# Patient Record
Sex: MALE | Race: WHITE | NOT HISPANIC OR LATINO | Employment: OTHER | URBAN - METROPOLITAN AREA
[De-identification: names, ages, dates, MRNs, and addresses within clinical notes are randomized per-mention and may not be internally consistent; named-entity substitution may affect disease eponyms.]

---

## 2017-01-03 ENCOUNTER — TRANSFERRED RECORDS (OUTPATIENT)
Dept: FAMILY MEDICINE | Facility: CLINIC | Age: 77
End: 2017-01-03

## 2017-01-25 ENCOUNTER — OFFICE VISIT (OUTPATIENT)
Dept: PHARMACY | Facility: PHYSICIAN GROUP | Age: 77
End: 2017-01-25
Payer: COMMERCIAL

## 2017-01-25 VITALS — DIASTOLIC BLOOD PRESSURE: 62 MMHG | SYSTOLIC BLOOD PRESSURE: 112 MMHG

## 2017-01-25 VITALS — BODY MASS INDEX: 27.31 KG/M2 | WEIGHT: 185 LBS

## 2017-01-25 DIAGNOSIS — I50.22 CHRONIC SYSTOLIC CONGESTIVE HEART FAILURE (H): Primary | ICD-10-CM

## 2017-01-25 DIAGNOSIS — Z79.01 LONG TERM CURRENT USE OF ANTICOAGULANT THERAPY: ICD-10-CM

## 2017-01-25 DIAGNOSIS — E11.9 TYPE 2 DIABETES MELLITUS WITHOUT COMPLICATION, WITHOUT LONG-TERM CURRENT USE OF INSULIN (H): ICD-10-CM

## 2017-01-25 DIAGNOSIS — Z86.73 HISTORY OF STROKE: ICD-10-CM

## 2017-01-25 DIAGNOSIS — Z86.73 HISTORY OF TIA (TRANSIENT ISCHEMIC ATTACK) AND STROKE: Primary | ICD-10-CM

## 2017-01-25 DIAGNOSIS — I10 BENIGN ESSENTIAL HYPERTENSION: ICD-10-CM

## 2017-01-25 LAB — INR PPP: 4.3

## 2017-01-25 PROCEDURE — 36415 COLL VENOUS BLD VENIPUNCTURE: CPT | Performed by: FAMILY MEDICINE

## 2017-01-25 PROCEDURE — 99207 ZZC NO CHARGE LOS: CPT | Performed by: PHARMACIST

## 2017-01-25 PROCEDURE — 99213 OFFICE O/P EST LOW 20 MIN: CPT | Performed by: FAMILY MEDICINE

## 2017-01-25 PROCEDURE — 85610 PROTHROMBIN TIME: CPT | Performed by: FAMILY MEDICINE

## 2017-01-25 NOTE — PROGRESS NOTES
Problem(s) Oriented visit        SUBJECTIVE:                                                    Jose Lees is a 76 year old male who presents to clinic today for medication recheck. He is on coumadin for history of TIA. He denies any new associated symptoms. He has diabetes. He takes metformin and glimepiride. He denies any vision concerns, numbness in his feet, or chest pain or pressure. He does not get much exercise. He denies any BARTH, orthopnea, or edema. No hypoglycemic events.       Problem list, Medication list, Allergies, and Medical/Social/Surgical histories reviewed in [facility] and updated as appropriate.   Additional history: as documented    ROS:  General and CV completed and negative except as noted above      Histories:   Patient Active Problem List   Diagnosis     Diabetes mellitus, type 2 (H)     Prostate cancer (H)     Autism disorder     ACP (advance care planning)     Health Care Home     History of MI (myocardial infarction)     History of stroke     CHF (congestive heart failure) (H)     Transient cerebral ischemia     Long term current use of anticoagulant therapy     Advanced directives, counseling/discussion     Stricture and stenosis of esophagus     Past Surgical History   Procedure Laterality Date     Coronary angiography adult order       Heart cath, angioplasty  4/14     BMS to LAD     Esophagoscopy, gastroscopy, duodenoscopy (egd), combined N/A 8/6/2015     Procedure: COMBINED ESOPHAGOSCOPY, GASTROSCOPY, DUODENOSCOPY (EGD), REMOVE FOREIGN BODY;  Surgeon: Yu Tapia MD;  Location:  GI       Social History   Substance Use Topics     Smoking status: Never Smoker      Smokeless tobacco: Never Used     Alcohol Use: No      Comment: never     Family History   Problem Relation Age of Onset     CEREBROVASCULAR DISEASE Mother 92     C.A.D. Father 48           OBJECTIVE:                                                    /62 mmHg  There is no weight on file to calculate BMI.    APPEARANCE: = Relaxed and in no distress  Neck = No anterior or posterior adenopathy appreciated.  Thyroid = Not enlarged and no masses felt  Resp effort = Calm regular breathing  Breath Sounds = Good air movement with no rales or rhonchi in any lung fields  Heart Rate, Rythym, & sounds (no Murm)  = Regular rate and rythym with no S3, S4, or murmer appreciated.  Recent/Remote Memory = Alert and Oriented x 3  Mood/Affect = Cooperative and interested   Labs Resulted Today:   Results for orders placed or performed in visit on 01/25/17   Prothrombin - INR ([facility])   Result Value Ref Range    INR 4.3 (A)    Hemoglobin A1C ([facility])   Result Value Ref Range    Hemoglobin A1C 7.6 (H) 4.8 - 5.6 %    Narrative    Performed at:  01 - [facility] [address], [place], [place]  [id number]  : Pollo Oconnor MD, Phone:  [phone]     ASSESSMENT/PLAN:                                                        Jose was seen today for inr followup and blood draw.    Diagnoses and all orders for this visit:    Transient cerebral ischemia  -     Prothrombin - INR ([facility])    Long term current use of anticoagulant therapy  -     Prothrombin - INR ([facility])    Diabetes mellitus, type 2 (H)  -     Hemoglobin A1C ([facility])  Continue oral meds. Strongly encouraged to add exercise, watch his diet, and he will recheck A1c in 6 months.    Patient Instructions   Please skip one dose of Coumadin on Thursday, January 26th.  Then resume same dose of Coumadin 2 mg on Mondays, Wednesdays, and Fridays, with Coumadin 4 mg Tuesdays, Thursdays, Saturdays and Sundays.  Recheck INR in one week.        The following health maintenance items are reviewed in Epic and correct as of today:  Health Maintenance   Topic Date Due     COLON CANCER SCREEN (SYSTEM ASSIGNED)  11/13/1991     AORTIC ANEURYSM SCREENING (SYSTEM ASSIGNED)  11/13/2006     EYE EXAM Q1 YEAR( NO INBASKET)  04/01/2013     FALL RISK ASSESSMENT  01/07/2015     PSA Q1 YR  12/08/2016      LIPID MONITORING Q1 YEAR( NO INBASKET)  01/04/2017     BMP Q6 MOS (NO INBASKET)  01/27/2017     FOOT EXAM Q1 YEAR( NO INBASKET)  02/18/2017     MICROALBUMIN Q1 YEAR( NO INBASKET)  02/18/2017     A1C Q6 MO( NO INBASKET)  07/25/2017     ALT Q1 YR (NO INBASKET)  07/27/2017     CREATININE Q1 YEAR (NO INBASKET)  07/27/2017     CBC Q1 YR (NO INBASKET)  07/27/2017     INFLUENZA VACCINE (SYSTEM ASSIGNED)  09/01/2017     TSH W/ FREE T4 REFLEX Q2 YEAR (NO INBASKET)  02/03/2018     HF ACTION PLAN Q3 YR (NO INBASKET MSG)  04/06/2019     ADVANCE DIRECTIVE PLANNING Q5 YRS (NO INBASKET)  08/12/2020     TETANUS IMMUNIZATION (SYSTEM ASSIGNED)  09/16/2021     PNEUMOCOCCAL  Completed       Arlene Mcdermott MD  [facility]  Family Practice  [facility]  [phone]    For any issues my office # is [phone]

## 2017-01-25 NOTE — PATIENT INSTRUCTIONS
Please skip one dose of Coumadin on Thursday, January 26th.  Then resume same dose of Coumadin 2 mg on Mondays, Wednesdays, and Fridays, with Coumadin 4 mg Tuesdays, Thursdays, Saturdays and Sundays.  Recheck INR in one week.

## 2017-01-25 NOTE — PROGRESS NOTES
SUBJECTIVE/OBJECTIVE:                                                    Jose Lees is a 76 year old male coming in for a follow-up visit for Medication Therapy Management.  He was referred to me from Dr. Helm.     Chief Complaint: Follow up from our visit on 7/27.  First visit of the year.   Tobacco: No tobacco use   Alcohol: not currently using    Medication Adherence: no issues reported    HFrEF: Current medications include metoprolol succinate 25mg daily.  Patient reports no current medication side effects.     ECHO:  Date 4/7/14, EF 25-30%, has not been rechecked. Pt is not complaining of sx of HF.    Pt is not measuring daily weights now- clinic weights have been stable.    Patient does not self-monitor BP.   Pt is not following a low sodium diet, is avoiding EtOH.  Not on ACE inhib- BP low to start and unlikely able to tolerate additional BP lowering medication at this time.     Diabetes:  Pt currently taking metformin 1000mg BID (500mg tabs- easier to swallow) and glimepiride 1mg daily.  Pt is not experiencing side effects.   SMBG: never. Ranges (patient reported): none- not checking, would cost a lot of money to have help to get this checked by care takers.   Symptoms of low blood sugar? none. Frequency of hypoglycemia? never.  Recent symptoms of high blood sugar? none  Microalbumin is < 30 mg/g. Pt is not taking an ACEi/ARB.  Aspirin: Taking 81mg daily and denies side effects  Diet/Exercise: eats one meal a day with [facility] and isn't watching his diet well, but has a hard time choosing the right foods.      Hypertension/Stroke: Current medications include metoprolol succinate 50mg daily, warfarin as directed. Patient does not self-monitor BP.  Patient reports no current medication side effects.    Current labs include:  BP Readings from Last 3 Encounters:   06/23/16 120/80   05/24/16 116/60   04/15/16 110/58     Today's Vitals: There were no vitals taken for this visit.  A1C      7.1    7/27/2016.  CHOL      139   1/4/2016  TRIG      117   1/4/2016  HDL       51   1/4/2016  LDL       65   1/4/2016    Liver Function Studies -   Recent Labs   Lab Test  01/04/16   1124   PROTTOTAL  6.8   ALBUMIN  4.3   BILITOTAL  0.6   ALKPHOS  56   AST  22   ALT  18       No results found for: UCRR, MICROL, UMALCR    Last Basic Metabolic Panel:  NA      140   7/27/2016   POTASSIUM      4.2   7/27/2016  CHLORIDE      101   7/27/2016  BUN       14   7/27/2016  BUN       14   7/27/2016  CR     1.03   7/27/2016  GFR ESTIMATE   Date Value Ref Range Status   08/06/2015 81 >60 mL/min/1.7m2 Final     Comment:     Non  GFR Calc   04/10/2014 >90 >60 mL/min/1.7m2 Final   04/08/2014 >90 >60 mL/min/1.7m2 Final     GFR ESTIMATE IF BLACK   Date Value Ref Range Status   08/06/2015 >90   GFR Calc   >60 mL/min/1.7m2 Final   04/10/2014 >90 >60 mL/min/1.7m2 Final   04/08/2014 >90 >60 mL/min/1.7m2 Final     TSH   Date Value Ref Range Status   06/03/2012 4.41 0.4 - 5.0 mU/L Final   ]    Most Recent Immunizations   Administered Date(s) Administered     Influenza (High Dose) 3 valent vaccine 10/07/2016     Influenza (IIV3) 10/09/2013     Pneumococcal (PCV 13) 10/07/2016     Pneumococcal 23 valent 09/16/2011     TDAP (ADACEL AGES 11-64) 09/16/2011     Zoster vaccine, live 03/03/2015     ASSESSMENT:                                                    Current medications were reviewed today.      Medication Adherence: no issues identified    HFrEF: Needs improvement- pcp to consider rechecking ECHO given last one in 2014 had decrease in EF.     Diabetes: Needs improvement, due for recheck of A1c.     Hypertension/Stroke: Stable.     PLAN:                                                      1. A1c check today.     2. PCP to consider rechecking ECHO if appropriate.       I spent 20 minutes with this patient today.  All changes were made via collaborative practice agreement with Jermain Helm. A copy of the  visit note was provided to the patient's primary care provider.     Will follow up in 6 months.    The patient was given a summary of these recommendations as an after visit summary.    Jennifer Gonzalez, Pharm.D, [facility]  Medication Therapy Management Pharmacist  [phone]

## 2017-01-25 NOTE — PATIENT INSTRUCTIONS
Recommendations from today's MTM visit:                                                    MTM (medication therapy management) is a service provided by a clinical pharmacist designed to help you get the most of out of your medicines.   Today we reviewed what your medicines are for, how to know if they are working, that your medicines are safe and how to make your medicine regimen as easy as possible.     1. Checking INR and A1c today.     Next MTM visit: 6 months or sooner if needed.     To schedule another MTM appointment, please call the clinic directly or you may call the MTM scheduling line at [phone] or toll-free at [phone].     My Clinical Pharmacist's contact information:                                                      It was a pleasure seeing you today!  Please feel free to contact me with any questions or concerns you have.      Jennifer Gonzalez, Pharm.D, [facility]  Medication Therapy Management Pharmacist  [phone]    You may receive a survey about the MTM services you received.  I would appreciate your feedback to help me serve you better in the future. Please fill it out and return it when you can. Your comments will be anonymous.

## 2017-01-25 NOTE — MR AVS SNAPSHOT
"              After Visit Summary   1/25/2017    Jose Lees    MRN: [id number]           Patient Information     Date Of Birth          1940        Visit Information        Provider Department      1/25/2017 2:30 PM Arlene Mcdermott MD [facility]        Today's Diagnoses     Transient cerebral ischemia    -  1     Long term current use of anticoagulant therapy         Diabetes mellitus, type 2 (H)           Care Instructions    Please skip one dose of Coumadin on Thursday, January 26th.  Then resume same dose of Coumadin 2 mg on Mondays, Wednesdays, and Fridays, with Coumadin 4 mg Tuesdays, Thursdays, Saturdays and Sundays.  Recheck INR in one week.        Follow-ups after your visit        Who to contact     If you have questions or need follow up information about today's clinic visit or your schedule please contact [facility] directly at [phone].  Normal or non-critical lab and imaging results will be communicated to you by LeanWagon[employer], letter or phone within 4 business days after the clinic has received the results. If you do not hear from us within 7 days, please contact the clinic through Aliveshoest or phone. If you have a critical or abnormal lab result, we will notify you by phone as soon as possible.  Submit refill requests through [employer] or call your pharmacy and they will forward the refill request to us. Please allow 3 business days for your refill to be completed.          Additional Information About Your Visit        MyChart Information     [employer] lets you send messages to your doctor, view your test results, renew your prescriptions, schedule appointments and more. To sign up, go to [email or web address] . Click on \"Log in\" on the left side of the screen, which will take you to the Welcome page. Then click on \"Sign up Now\" on the right side of the page.     You will be asked to enter the access code listed below, as well as some personal information. " Please follow the directions to create your username and password.     Your access code is: [email or web address]  Expires: 2017  2:46 PM     Your access code will  in 90 days. If you need help or a new code, please call your [facility] clinic or [phone].        Care EveryWhere ID     This is your Care EveryWhere ID. This could be used by other organizations to access your [facility] medical records  [id number]         Blood Pressure from Last 3 Encounters:   17 112/62   16 120/80   16 116/60    Weight from Last 3 Encounters:   17 83.915 kg (185 lb)   16 86.183 kg (190 lb)   16 84.823 kg (187 lb)              We Performed the Following     Hemoglobin A1C ([facility])     Prothrombin - INR ([facility])        Primary Care Provider Office Phone # Fax #    Jermain Helm [phone]       [facility] [address] [place] [address]        Thank you!     Thank you for choosing [facility]  for your care. Our goal is always to provide you with excellent care. Hearing back from our patients is one way we can continue to improve our services. Please take a few minutes to complete the written survey that you may receive in the mail after your visit with us. Thank you!             Your Updated Medication List - Protect others around you: Learn how to safely use, store and throw away your medicines at www.disposemymeds.org.          This list is accurate as of: 17  3:02 PM.  Always use your most recent med list.                   Brand Name Dispense Instructions for use    ACE/ARB NOT PRESCRIBED (INTENTIONAL)      ACE & ARB not prescribed due to Symptomatic hypotension not due to excessive diuresis       aspirin 81 MG chewable tablet     36 tablet    Take 1 tablet (81 mg) by mouth daily       atorvastatin 20 MG tablet    LIPITOR    90 tablet    TAKE 1 TABLET EVERY DAY       bicalutamide 50 MG tablet    CASODEX    90 tablet    TAKE 1  TABLET EVERY EVENING       Blood Glucose Monitoring Suppl W/DEVICE Kit     1 kit    Test blood glucose every other day       D3-1000 PO      Take 1 capsule by mouth daily       ENSURE PO      Take 1 Can by mouth daily.       glimepiride 1 MG tablet    AMARYL    90 tablet    TAKE 1 TABLET (1 MG) BY MOUTH EVERY MORNING (BEFORE BREAKFAST)       metFORMIN 500 MG tablet    GLUCOPHAGE    360 tablet    Take 2 tablets (1,000 mg) by mouth 2 times daily (with meals)       metoprolol 25 MG 24 hr tablet    TOPROL-XL    90 tablet    TAKE 1 TABLET EVERY DAY       multivitamin, therapeutic Tabs tablet      Take 1 tablet by mouth daily       sertraline 50 MG tablet    ZOLOFT    90 tablet    TAKE 1 TABLET EVERY DAY       * warfarin 4 MG tablet    COUMADIN    180 tablet    4 mg every day except tuesday       * warfarin 2 MG tablet    COUMADIN    90 tablet    Take 2 mg on Tuesdays only       * Notice:  This list has 2 medication(s) that are the same as other medications prescribed for you. Read the directions carefully, and ask your doctor or other care provider to review them with you.

## 2017-01-26 LAB — HBA1C MFR BLD: 7.6 % (ref 4.8–5.6)

## 2017-02-05 PROBLEM — Z86.73 HISTORY OF TIA (TRANSIENT ISCHEMIC ATTACK) AND STROKE: Status: ACTIVE | Noted: 2017-02-05

## 2017-02-07 ENCOUNTER — HOSPITAL ENCOUNTER (INPATIENT)
Facility: CLINIC | Age: 77
LOS: 5 days | Discharge: NURSING, REHAB OR LONG-TERM CARE | DRG: 194 | End: 2017-02-13
Attending: EMERGENCY MEDICINE | Admitting: INTERNAL MEDICINE
Payer: MEDICARE

## 2017-02-07 ENCOUNTER — APPOINTMENT (OUTPATIENT)
Dept: GENERAL RADIOLOGY | Facility: CLINIC | Age: 77
DRG: 194 | End: 2017-02-07
Attending: EMERGENCY MEDICINE
Payer: MEDICARE
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
## 2017-02-07 DIAGNOSIS — R07.0 THROAT PAIN: Primary | ICD-10-CM

## 2017-02-07 DIAGNOSIS — M62.81 GENERALIZED MUSCLE WEAKNESS: ICD-10-CM

## 2017-02-07 DIAGNOSIS — J10.1 INFLUENZA A: ICD-10-CM

## 2017-02-07 LAB
ALBUMIN SERPL-MCNC: 3.6 G/DL (ref 3.4–5)
ALP SERPL-CCNC: 57 U/L (ref 40–150)
ALT SERPL W P-5'-P-CCNC: 25 U/L (ref 0–70)
ANION GAP SERPL CALCULATED.3IONS-SCNC: 12 MMOL/L (ref 3–14)
AST SERPL W P-5'-P-CCNC: 25 U/L (ref 0–45)
BILIRUB SERPL-MCNC: 0.5 MG/DL (ref 0.2–1.3)
BUN SERPL-MCNC: 16 MG/DL (ref 7–30)
CALCIUM SERPL-MCNC: 8.7 MG/DL (ref 8.5–10.1)
CHLORIDE SERPL-SCNC: 104 MMOL/L (ref 94–109)
CO2 SERPL-SCNC: 23 MMOL/L (ref 20–32)
CREAT SERPL-MCNC: 0.97 MG/DL (ref 0.66–1.25)
ERYTHROCYTE [DISTWIDTH] IN BLOOD BY AUTOMATED COUNT: 13.5 % (ref 10–15)
FLUAV+FLUBV AG SPEC QL: ABNORMAL
FLUAV+FLUBV AG SPEC QL: POSITIVE
GFR SERPL CREATININE-BSD FRML MDRD: 75 ML/MIN/1.7M2
GLUCOSE SERPL-MCNC: 233 MG/DL (ref 70–99)
HCT VFR BLD AUTO: 37.4 % (ref 40–53)
HGB BLD-MCNC: 12.7 G/DL (ref 13.3–17.7)
INR PPP: 1.87 (ref 0.86–1.14)
LACTATE BLD-SCNC: 3.1 MMOL/L (ref 0.7–2.1)
MCH RBC QN AUTO: 31.2 PG (ref 26.5–33)
MCHC RBC AUTO-ENTMCNC: 34 G/DL (ref 31.5–36.5)
MCV RBC AUTO: 92 FL (ref 78–100)
PLATELET # BLD AUTO: 142 10E9/L (ref 150–450)
POTASSIUM SERPL-SCNC: 3.9 MMOL/L (ref 3.4–5.3)
PROT SERPL-MCNC: 7.3 G/DL (ref 6.8–8.8)
RBC # BLD AUTO: 4.07 10E12/L (ref 4.4–5.9)
SODIUM SERPL-SCNC: 139 MMOL/L (ref 133–144)
SPECIMEN SOURCE: ABNORMAL
WBC # BLD AUTO: 7 10E9/L (ref 4–11)

## 2017-02-07 PROCEDURE — 83605 ASSAY OF LACTIC ACID: CPT | Performed by: EMERGENCY MEDICINE

## 2017-02-07 PROCEDURE — 25000128 H RX IP 250 OP 636: Performed by: EMERGENCY MEDICINE

## 2017-02-07 PROCEDURE — 99285 EMERGENCY DEPT VISIT HI MDM: CPT | Mod: 25

## 2017-02-07 PROCEDURE — 80053 COMPREHEN METABOLIC PANEL: CPT | Performed by: EMERGENCY MEDICINE

## 2017-02-07 PROCEDURE — 85610 PROTHROMBIN TIME: CPT | Performed by: EMERGENCY MEDICINE

## 2017-02-07 PROCEDURE — 87804 INFLUENZA ASSAY W/OPTIC: CPT | Performed by: EMERGENCY MEDICINE

## 2017-02-07 PROCEDURE — 71020 XR CHEST 2 VW: CPT

## 2017-02-07 PROCEDURE — 85027 COMPLETE CBC AUTOMATED: CPT | Performed by: EMERGENCY MEDICINE

## 2017-02-07 RX ADMIN — SODIUM CHLORIDE 1000 ML: 9 INJECTION, SOLUTION INTRAVENOUS at 23:14

## 2017-02-07 NOTE — IP AVS SNAPSHOT
"` `           [place] [address] [facility]: [phone]                                              INTERAGENCY TRANSFER FORM - NURSING   2017                    Hospital Admission Date: 2017  HONG FAJARDO   : 1940  Sex: Male        Attending Provider: Dyllan Duncan MD     Allergies:  No Known Allergies    Infection:  None   Service:  HOSPITALIST    Ht:  1.765 m (5' 9.5\")   Wt:  80.7 kg (177 lb 14.6 oz)   Admission Wt:  84.6 kg (186 lb 8 oz)    BMI:  25.9 kg/m 2   BSA:  1.99 m 2            Patient PCP Information     Provider PCP Type    Jermain Helm MD General      Current Code Status     Date Active Code Status Order ID Comments User Context       Prior      Code Status History     Date Active Date Inactive Code Status Order ID Comments User Context    2017  1:21 PM  Full Code [id number]  Ash Tidwell MD Outpatient    2017  2:32 AM 2017  1:21 PM Full Code [id number]  Dyllan Duncan MD Inpatient    2015 10:12 AM 2017  2:32 AM Full Code [id number]  Brock Smith MD Outpatient    2015 12:12 PM 2015 10:12 AM Full Code [id number]  Brock Smith MD Inpatient    2014  5:11 PM 2014  3:41 PM Full Code [id number]  Buck Peraza RN Inpatient    2012  6:31 PM 2012  7:31 PM Full Code [id number]  Stefan Barraza RN Inpatient      Advance Directives        Does patient have a scanned Advance Directive/ACP document in EPIC?           No        Hospital Problems as of 2017              Priority Class Noted POA    Influenza A Medium  2017 Yes      Non-Hospital Problems as of 2017              Priority Class Noted    Diabetes mellitus, type 2 (H)   Unknown    Prostate cancer (H)   10/13/2011    Autism disorder   2012    ACP (advance care planning)   Unknown    Health Care Home   2014    History of MI (myocardial infarction) Medium  2014    History of stroke Medium  2014    " CHF (congestive heart failure) (H) Medium  5/12/2014    Long term current use of anticoagulant therapy Medium  10/23/2014    Advanced directives, counseling/discussion   8/12/2015    Stricture and stenosis of esophagus Medium  10/22/2015    History of TIA (transient ischemic attack) and stroke Medium  2/5/2017      Immunizations     Name Date      HERPES ZOSTER 03/03/15     Influenza (High Dose) 3 valent vaccine 10/07/16     Influenza (High Dose) 3 valent vaccine 11/11/15     Influenza (High Dose) 3 valent vaccine 09/12/14     Influenza (IIV3) 10/09/13     Influenza (IIV3) 09/19/12     Influenza (IIV3) 09/16/11     Pneumococcal (PCV 13) 10/07/16     Pneumococcal 23 valent 09/16/11     TDAP (ADACEL AGES 11-64) 09/16/11          END      ASSESSMENT     Discharge Profile Flowsheet     EXPECTED DISCHARGE     GI Signs/Symptoms  diarrhea 02/13/17 1320    Expected Discharge Date  02/13/17 ([facility] at 1530) 02/13/17 1403   Passing flatus  yes 02/13/17 1320    DISCHARGE NEEDS ASSESSMENT     COMMUNICATION ASSESSMENT      Concerns To Be Addressed  discharge planning concerns 02/09/17 1501   Patient's communication style  spoken language (English or Bilingual) 08/06/15 1217    Patient/family verbalizes understanding of discharge plan recommendations?  Yes 02/13/17 1403   FINAL RESOURCES      Medical Team notified of plan?  yes 02/13/17 1403   Resources List  Transitional Care 02/10/17 1402    Equipment Currently Used at Home  walker, standard 02/09/17 1120   Transitional Care  [facility] [phone] 02/10/17 1402    Transportation Available  family or friend will provide (Family at 1530) 02/13/17 1403   PAS Number  [id number] 02/13/17 1428    # of Referrals Placed by CTS  Senior Linkage Line 02/13/17 1428   Senior Linkage Line Referral Placed  02/13/17 02/13/17 1428    Equipment Used at Home  bath bench;walker, rolling;grab bar 04/08/14 1340   Referrals Placed  Senior Linkage Line  "02/13/17 1428    ASSESSMENT OF FUNCTIONAL STATUS     SKIN      Assesssment of Functional Status  Needs placement in a SNF/TCF for rehabilitation 02/09/17 1501   Inspection  Full 02/13/17 0205    GASTROINTESTINAL (ADULT,PEDIATRIC,OB)     Skin WDL  WDL 02/13/17 1320    GI WDL  ex 02/13/17 1320   Skin Color/Characteristics  pale 02/13/17 0205    Abdominal Appearance  distended 02/10/17 0854   SAFETY      Last Bowel Movement  02/13/17 02/13/17 1320   Safety WDL  WDL 02/13/17 1327                 Assessment WDL (Within Defined Limits) Definitions           Safety WDL     Effective: 09/28/15    Row Information: <b>WDL Definition:</b> Bed in low position, wheels locked; call light in reach; upper side rails up x 2; ID band on<br> <font color=\"gray\"><i>Item=AS safety wdl>>List=AS safety wdl>>Version=F14</i></font>      Skin WDL     Effective: 09/28/15    Row Information: <b>WDL Definition:</b> Warm; dry; intact; elastic; without discoloration; pressure points without redness<br> <font color=\"gray\"><i>Item=AS skin wdl>>List=AS skin wdl>>Version=F14</i></font>      Vitals     Vital Signs Flowsheet     VITAL SIGNS     Side Effects Monitoring: Sedation Level  S 02/13/17 0153    Temp  97.5  F (36.4  C) 02/13/17 0814   HEIGHT AND WEIGHT      Temp src  Oral 02/13/17 0814   Height  1.765 m (5' 9.5\") 02/07/17 2249    Resp  16 02/13/17 0814   Height Method  Stated 02/07/17 2249    Pulse  68 02/13/17 0103   Weight  80.7 kg (177 lb 14.6 oz) 02/10/17 0606    Heart Rate  79 02/13/17 0814   BSA (Calculated - sq m)  2.04 02/07/17 2249    Pulse/Heart Rate Source  Monitor 02/12/17 1950   BMI (Calculated)  27.2 02/07/17 2249    BP  111/64 02/13/17 0814   JAVON COMA SCALE      BP Location  Left arm 02/13/17 0103   Best Eye Response  4-->(E4) spontaneous 02/13/17 0153    OXYGEN THERAPY     Best Motor Response  6-->(M6) obeys commands 02/13/17 0153    SpO2  94 % 02/13/17 0814   Best Verbal Response  5-->(V5) oriented 02/13/17 0153    O2 Device "  None (Room air) 02/13/17 0814   Alexandre Coma Scale Score  15 02/13/17 0153    PAIN/COMFORT     POSITIONING      Patient Currently in Pain  denies 02/13/17 1312   Body Position  independently positioning 02/13/17 1327    Preferred Pain Scale  number (Numeric Rating Pain Scale) 02/12/17 0558   Head of Bed (HOB)  HOB at 30-45 degrees 02/13/17 0205    0-10 Pain Scale  3 02/11/17 0949   Chair  Recline and up in chair 02/12/17 0543    Pain Location  Throat 02/11/17 2051   Positioning/Transfer Devices  pillows 02/13/17 1327    Pain Management Interventions  medication offered but refused;no interventions per patient request 02/11/17 2051   DAILY CARE      Pain Intervention(s)  Medication (See eMAR) 02/11/17 1830   Activity Type  ambulated to bathroom;dorsiflexion, plantar flexion encouraged 02/13/17 1327    ANALGESIA SIDE EFFECTS MONITORING     Activity Level of Assistance  assistance, 1 person 02/13/17 1327    Side Effects Monitoring: Respiratory Quality  R 02/13/17 0153   Activity Assistive Device  gait belt;walker 02/13/17 1327    Side Effects Monitoring: Respiratory Depth  N 02/13/17 0153                 Patient Lines/Drains/Airways Status    Active LINES/DRAINS/AIRWAYS     None            Patient Lines/Drains/Airways Status    Active PICC/CVC     None            Intake/Output Detail Report     Date Intake     Output Net    Shift P.O. I.V. IV Piggyback Total Urine Total       Day 02/12/17 0700 - 02/12/17 1459 400 -- -- 400 -- -- 400    Pinky 02/12/17 1500 - 02/12/17 2259 -- -- -- -- 200 200 -200    Noc 02/12/17 2300 - 02/13/17 0659 -- -- -- -- -- -- 0    Day 02/13/17 0700 - 02/13/17 1459 -- -- -- -- 400 400 -400    Pinky 02/13/17 1500 - 02/13/17 2259 -- -- -- -- -- -- 0      Last Void/BM       Most Recent Value    Urine Occurrence 1 at 02/13/2017 1308    Stool Occurrence 1 at 02/13/2017 1308      Case Management/Discharge Planning     Case Management/Discharge Planning Flowsheet     REFERRAL INFORMATION     Sources Of  Support  friend(s);other family members;sibling(s) 02/09/17 1501    Did the Initial Social Work Assessment result in a Social Work Case?  Yes 02/09/17 1459   Reaction To Health Status  accepting 02/09/17 1501    Admission Type  inpatient 02/09/17 1459   Understanding Of Condition And Treatment  adequate understanding of medical condition;adequate understanding of treatment 02/09/17 1501    Arrived From  home or self-care 02/09/17 1459   EXPECTED DISCHARGE      Referral Source  interdisciplinary rounds 02/09/17 1501   Expected Discharge Date  02/13/17 ([facility] at 1530) 02/13/17 1403    # of Referrals Placed by [facility]  Senior Linkage Line 02/13/17 1428   ASSESSMENT/CONCERNS TO BE ADDRESSED      Reason For Consult  discharge planning 02/09/17 1501   Concerns To Be Addressed  discharge planning concerns 02/09/17 1501    Record Reviewed  history and physical;medical record 02/09/17 1501   DISCHARGE PLANNING       Assigned to Case  SHU Miramontes 02/13/17 1428   Patient/family verbalizes understanding of discharge plan recommendations?  Yes 02/13/17 1403    Primary Care Clinic Name  [facility] 08/07/15 1231   Medical Team notified of plan?  yes 02/13/17 1403    Primary Care MD Name  Jermain Helm MD 02/09/17 1501   Transportation Available  family or friend will provide (Family at 1530) 02/13/17 1403    LIVING ENVIRONMENT     [facility]illed [facility] Facility  [facility] 06/04/12 1527    Lives With  alone 02/09/17 1501   Skilled Nursing Facility Phone Number  [phone] 06/04/12 1527    Living Arrangements  assisted living ([facility]) 02/09/17 1501   Equipment Used at Home  bath bench;walker, rolling;grab bar 04/08/14 1340    Provides Primary Care For  no one 02/09/17 1501   FINAL NOTE      Quality Of Family Relationships  supportive;involved 02/09/17 1501   Final Note  D/C to [facility] on 2-13-17 via family at 1530 02/13/17 1403    Able to Return to Prior  Living Arrangements  no 02/09/17 1501   FINAL RESOURCES      HOME SAFETY     Equipment Currently Used at Home  walker, standard 02/09/17 1120    Patient Feels Safe Living in Home?  yes 02/09/17 1501   Resources List  Transitional Care 02/10/17 1402    ASSESSMENT OF FAMILY/SOCIAL SUPPORT     Transitional Care  [facility] [phone] 02/10/17 1402    Marital Status  Single 02/09/17 1501   PAS Number  [id number] 02/13/17 1428    Who is your support system?  Sibling(s) 02/09/17 1501   Senior Linkage Line Referral Placed  02/13/17 02/13/17 1428    Description of Support System  Supportive;Involved 02/09/17 1501   Referrals Placed  Senior Linkage Line 02/13/17 1428    Support Assessment  Adequate family and caregiver support;Adequate social supports 02/09/17 1501   ABUSE RISK SCREEN      Quality of Family Relationships  supportive;involved 02/09/17 1501   QUESTION TO PATIENT:  Has a member of your family or a partner(now or in the past) intimidated, hurt, manipulated, or controlled you in any way?  no 02/07/17 2248    ASSESSMENT OF FUNCTIONAL STATUS     QUESTION TO PATIENT: Do you feel safe going back to the place where you are living?  yes 02/07/17 2248    Assesssment of Functional Status  Needs placement in a SNF/TCF for rehabilitation 02/09/17 1501   OBSERVATION: Is there reason to believe there has been maltreatment of a vulnerable adult (ie. Physical/Sexual/Emotional abuse, self neglect, lack of adequate food, shelter, medical care, or financial exploitation)?  no 02/07/17 2248    EMPLOYMENT     (R) MENTAL HEALTH SUICIDE RISK      Do you work full or part-time?  no 02/09/17 1501   Are you depressed or being treated for depression?  No 02/08/17 0251    COPING/STRESS     HOMICIDE RISK      Major Change/Loss/Stressor  hospitalization 02/09/17 1501   Homicidal Ideation  no 02/07/17 2248    Patient Personal Strengths  expressive of needs;motivated;positive attitude;strong support system 02/09/17  1502

## 2017-02-07 NOTE — IP AVS SNAPSHOT
` [place] [address] [facility]: [phone]            Medication Administration Report for Jose Lees as of 02/13/17 1796   Legend:    Given Hold Not Given Due Canceled Entry Other Actions    Time Time (Time) Time  Time-Action       Inactive    Active    Linked        Medications 02/07/17 02/08/17 02/09/17 02/10/17 02/11/17 02/12/17 02/13/17    acetaminophen (TYLENOL) Suppository 650 mg  Dose: 650 mg Freq: EVERY 4 HOURS PRN Route: RE  PRN Reason: mild pain  Start: 02/08/17 0232   Admin Instructions: Alternate ibuprofen (if ordered) with acetaminophen.  Maximum acetaminophen dose from all sources = 75 mg/kg/day not to exceed 4 grams/day.               acetaminophen (TYLENOL) tablet 650 mg  Dose: 650 mg Freq: EVERY 4 HOURS PRN Route: PO  PRN Reasons: mild pain,fever  Start: 02/08/17 0232   Admin Instructions: Alternate ibuprofen (if ordered) with acetaminophen.  Maximum acetaminophen dose from all sources = 75 mg/kg/day not to exceed 4 grams/day.      2018 (650 mg)-Given        0818 (650 mg)-Given               aspirin chewable tablet 81 mg  Dose: 81 mg Freq: DAILY Route: PO  Start: 02/08/17 0900     0923 (81 mg)-Given        0818 (81 mg)-Given        0836 (81 mg)-Given        0855 (81 mg)-Given        0819 (81 mg)-Given        0823 (81 mg)-Given           atorvastatin (LIPITOR) tablet 20 mg  Dose: 20 mg Freq: DAILY Route: PO  Start: 02/08/17 0900     0923 (20 mg)-Given        0818 (20 mg)-Given        0837 (20 mg)-Given        0854 (20 mg)-Given        0819 (20 mg)-Given        0823 (20 mg)-Given           benzocaine-menthol (CHLORASEPTIC) 6-10 MG lozenge 1 lozenge  Dose: 1 lozenge Freq: 3 TIMES DAILY Route: BU  Start: 02/10/17 1415       1445 (1 lozenge)-Given       2151 (1 lozenge)-Given        0855 (1 lozenge)-Given       1802 (1 lozenge)-Given               (0819)-Not Given       (1545)-Not Given       2121 (1 lozenge)-Given        1024 (1 lozenge)-Given       [ ] 1600       [ ]  2200           benzocaine-menthol (CHLORASEPTIC) 6-10 MG lozenge 1 lozenge  Dose: 1 lozenge Freq: EVERY 1 HOUR PRN Route: BU  PRN Reason: sore throat  Start: 02/10/17 1411              bicalutamide (CASODEX) tablet 50 mg  Dose: 50 mg Freq: EVERY EVENING Route: PO  Start: 02/08/17 2000   Admin Instructions: Administer at the same time every day.      2018 (50 mg)-Given        2104 (50 mg)-Given        2151 (50 mg)-Given        2039 (50 mg)-Given        2121 (50 mg)-Given        [ ] 2000           bisacodyl (DULCOLAX) Suppository 10 mg  Dose: 10 mg Freq: DAILY PRN Route: RE  PRN Reason: constipation  Start: 02/08/17 0232   Admin Instructions: Hold for loose stools.  This is the third step of a three step constipation treatment protocol.               glimepiride (AMARYL) tablet 1 mg  Dose: 1 mg Freq: DAILY WITH BREAKFAST Route: PO  Start: 02/11/17 0900   Admin Instructions: Take before or with meals.         0855 (1 mg)-Given        0819 (1 mg)-Given        0823 (1 mg)-Given           glucose 40 % gel 15-30 g  Dose: 15-30 g Freq: EVERY 15 MIN PRN Route: PO  PRN Reason: low blood sugar  Start: 02/08/17 0232   Admin Instructions: Give 15 g for BG 51 to 69 mg/dL IF patient is conscious and able to swallow. Give 30 g for BG less than or equal to 50 mg/dL IF patient is conscious and able to swallow. Do NOT give glucose gel via enteral tube.  IF patient has enteral tube: give apple juice 120 mL (4 oz or 15 g of CHO) via enteral tube for BG 51 to 69 mg/dL.  Give apple juice 240 mL (8 oz or 30 g of CHO) via enteral tube for BG less than or equal to 50 mg/dL.              Or  dextrose 50 % injection 25-50 mL  Dose: 25-50 mL Freq: EVERY 15 MIN PRN Route: IV  PRN Reason: low blood sugar  Start: 02/08/17 0232   Admin Instructions: Use if have IV access, BG less than 70 mg/dL and meet dose criteria below:  Dose if conscious and alert (or disorientated) and NPO = 25 mL  Dose if unconscious / not alert = 50 mL               Or  glucagon injection 1 mg  Dose: 1 mg Freq: EVERY 15 MIN PRN Route: SC  PRN Reason: low blood sugar  PRN Comment: May repeat x 1 only  Start: 02/08/17 0232   Admin Instructions: May give SQ or IM. IF BG less than or equal to 50 mg/dL and no IV access.  ONLY use glucagon IF patient has NO IV access AND is UNABLE to swallow.               HYDROmorphone (PF) (DILAUDID) injection 0.2 mg  Dose: 0.2 mg Freq: EVERY 2 HOURS PRN Route: IV  PRN Reason: severe pain  Start: 02/08/17 0232   Admin Instructions: Hold while on PCA.               insulin aspart (NovoLOG) inj (RAPID ACTING)  Dose: 1-5 Units Freq: AT BEDTIME Route: SC  Start: 02/08/17 0245   Admin Instructions: MEDIUM INSULIN RESISTANCE DOSING    Do Not give Bedtime Correction Insulin if BG less than  200.   For  - 249 give 1 units.   For  - 299 give 2 units.   For  - 349 give 3 units.   For  -399 give 4 units.   For BG greater than or equal to 400 give 5 units.  Notify provider if glucose greater than or equal to 350 mg/dL after administration of correction dose.  If given at mealtime, must be administered 5 min before meal or immediately after.      (0317)-Not Given       2248 (1 Units)-Given         (0014)-Not Given       (2151)-Not Given [C]        (2107)-Not Given        (2121)-Not Given        [ ] 2200           insulin aspart (NovoLOG) inj (RAPID ACTING)  Dose: 1-7 Units Freq: 3 TIMES DAILY BEFORE MEALS Route: SC  Start: 02/08/17 0730   Admin Instructions: Correction Scale - MEDIUM INSULIN RESISTANCE DOSING     Do Not give Correction Insulin if Pre-Meal BG less than 140.   For Pre-Meal  - 189 give 1 unit.   For Pre-Meal  - 239 give 2 units.   For Pre-Meal  - 289 give 3 units.   For Pre-Meal  - 339 give 4 units.   For Pre-Meal - 399 give 5 units.   For Pre-Meal -449 give 6 units  For Pre-Meal BG greater than or equal to 450 give 7 units.   To be given with prandial insulin, and based on pre-meal  "blood glucose.    Notify provider if glucose greater than or equal to 350 mg/dL after administration of correction dose.  If given at mealtime, must be administered 5 min before meal or immediately after.      0922 (1 Units)-Given       1257 (1 Units)-Given       1800 (1 Units)-Given        0817 (1 Units)-Given       1441 (2 Units)-Given       1851 (2 Units)-Given        (0828)-Not Given [C]       1215 (2 Units)-Given [C]       1702 (1 Units)-Given        0855 (1 Units)-Given       (1427)-Not Given       (1802)-Not Given        (0819)-Not Given       1139 (2 Units)-Given       (1633)-Not Given        (0827)-Not Given       1137 (1 Units)-Given       [ ] 1700           lidocaine (LMX4) cream  Freq: EVERY 1 HOUR PRN Route: Top  PRN Reason: pain  PRN Comment: with VAD insertion or accessing implanted port.  Start: 02/08/17 0232   Admin Instructions: Do NOT give if patient has a history of allergy to any local anesthetic or any \"gonzalo\" product.   Apply 30 minutes prior to VAD insertion or port access.  MAX Dose:  2.5 g (  of 5 g tube)               lidocaine 1 % 1 mL  Dose: 1 mL Freq: EVERY 1 HOUR PRN Route: OTHER  PRN Comment: mild pain with VAD insertion or accessing implanted port  Start: 02/08/17 0232   Admin Instructions: Do NOT give if patient has a history of allergy to any local anesthetic or any \"gonzalo\" product. MAX dose 1 mL subcutaneous OR intradermal in divided doses.               magnesium sulfate 4 g in 100 mL sterile water (premade)  Dose: 4 g Freq: EVERY 4 HOURS PRN Route: IV  PRN Reason: magnesium supplementation  Start: 02/08/17 0232   Admin Instructions: For serum Mg++ less than 1.6 mg/dL  Give 4 g and recheck magnesium level 2 hours after dose, and next AM.               metFORMIN (GLUCOPHAGE) tablet 1,000 mg  Dose: 1,000 mg Freq: 2 TIMES DAILY WITH MEALS Route: PO  Start: 02/10/17 1800   Admin Instructions: If the patient receives intravenous, iodinated contrast, hold metformin for 24 hours before " AND 48 hours after procedure. Contact pharmacy if no hold order is written.        1654 (1,000 mg)-Given               0854 (1,000 mg)-Given       1801 (1,000 mg)-Given        0819 (1,000 mg)-Given       1804 (1,000 mg)-Given        0823 (1,000 mg)-Given       [ ] 1800           metoprolol (TOPROL-XL) 24 hr tablet 25 mg  Dose: 25 mg Freq: DAILY Route: PO  Start: 02/08/17 0900   Admin Instructions: DO NOT CRUSH. Tablet may be split in half along score line.      0923 (25 mg)-Given        0818 (25 mg)-Given        0836 (25 mg)-Given        0854 (25 mg)-Given        0819 (25 mg)-Given        0823 (25 mg)-Given           naloxone (NARCAN) injection 0.1-0.4 mg  Dose: 0.1-0.4 mg Freq: EVERY 2 MIN PRN Route: IV  PRN Reason: opioid reversal  Start: 02/08/17 0232   Admin Instructions: For respiratory rate LESS than or EQUAL to 8.  Partial reversal dose:  0.1 mg titrated q 2 minutes for Analgesia Side Effects Monitoring Sedation Level of 3 (frequently drowsy, arousable, drifts to sleep during conversation).Full reversal dose:  0.4 mg bolus for Analgesia Side Effects Monitoring Sedation Level of 4 (somnolent, minimal or no response to stimulation).               ondansetron (ZOFRAN-ODT) ODT tab 4 mg  Dose: 4 mg Freq: EVERY 6 HOURS PRN Route: PO  PRN Reason: nausea  Start: 02/08/17 0232   Admin Instructions: This is Step 1 of nausea and vomiting management.  If nausea not resolved in 15 minutes, go to Step 2 prochlorperazine (COMPAZINE). Do not push through foil backing. Peel back foil and gently remove. Place on tongue immediately. Administration with liquid unnecessary              Or  ondansetron (ZOFRAN) injection 4 mg  Dose: 4 mg Freq: EVERY 6 HOURS PRN Route: IV  PRN Reasons: nausea,vomiting  Start: 02/08/17 0232   Admin Instructions: This is Step 1 of nausea and vomiting management.  If nausea not resolved in 15 minutes, go to Step 2 prochlorperazine (COMPAZINE).               oxyCODONE (ROXICODONE) IR half-tab 2.5-5  mg  Dose: 2.5-5 mg Freq: EVERY 3 HOURS PRN Route: PO  PRN Reason: moderate to severe pain  Start: 02/08/17 0232              polyethylene glycol (MIRALAX/GLYCOLAX) Packet 17 g  Dose: 17 g Freq: DAILY PRN Route: PO  PRN Reason: constipation  Start: 02/08/17 0232   Admin Instructions: Give in 8oz of  water, juice, or soda. Hold for loose stools.  This is the second step of a three step constipation treatment protocol.  1 Packet = 17 grams. Mixed prescribed dose in 8 ounces of water. Follow with 8 oz. of water.               potassium chloride (KLOR-CON) Packet 20-40 mEq  Dose: 20-40 mEq Freq: EVERY 2 HOURS PRN Route: ORAL OR FEED  PRN Reason: potassium supplementation  Start: 02/08/17 0232   Admin Instructions: Use if unable to tolerate tablets.  If Serum K+ 3.0-3.3, dose = 60 mEq po total dose (40 mEq x1 followed in 2 hours by 20 mEq x1). Recheck K+ level 4 hours after dose and the next AM.  If Serum K+ 2.5-2.9, dose = 80 mEq po total dose (40 mEq Q2H x2). Recheck K+ level 4 hours after dose and the next AM.  If Serum K+ less than 2.5, See IV order.  Dissolve packet contents in 4-8 ounces of cold water or juice.               potassium chloride 10 mEq in 100 mL intermittent infusion  Dose: 10 mEq Freq: EVERY 1 HOUR PRN Route: IV  PRN Reason: potassium supplementation  Start: 02/08/17 0232   Admin Instructions: Infuse via PERIPHERAL LINE or CENTRAL LINE. Use for central line replacement if patient weight less than 65 kg, if patient is on TPN with high potassium content or if unit does not stock 20 mEq bags.   If Serum K+ 3.0-3.3, dose = 10 mEq/hr x4 doses (40 mEq IV total dose). Recheck K+ level 2 hours after dose and the next AM.   If Serum K+ less than 3.0, dose = 10 mEq/hr x6 doses (60 mEq IV total dose). Recheck K+ level 2 hours after dose and the next AM.               potassium chloride 10 mEq in 100 mL intermittent infusion with 10 mg lidocaine  Dose: 10 mEq Freq: EVERY 1 HOUR PRN Route: IV  PRN Reason:  potassium supplementation  Start: 02/08/17 0232   Admin Instructions: Infuse via PERIPHERAL LINE. Use potassium with lidocaine for pain with peripheral administration.  If Serum K+ 3.0-3.3, dose = 10 mEq/hr x4 doses (40 mEq IV total dose). Recheck K+ level 2 hours after dose and the next AM.  If Serum K+ less than 3.0, dose = 10 mEq/hr x6 doses (60 mEq IV total dose). Recheck K+ level 2 hours after dose and the next AM.               potassium chloride 20 mEq in 50 mL intermittent infusion  Dose: 20 mEq Freq: EVERY 1 HOUR PRN Route: IV  PRN Reason: potassium supplementation  Start: 02/08/17 0232   Admin Instructions: Infuse via CENTRAL LINE Only. May need EKG if less than 65 kg or on TPN - Max rate is 0.3 mEq/kg/hr for patients not on EKG monitoring.   If Serum K+ 3.0-3.3, dose = 20 mEq/hr x2 doses (40 mEq IV total dose). Recheck K+ level 2 hours after dose and the next AM.  If Serum K+ less than 3.0, dose = 20 mEq/hr x3 doses (60 mEq IV total dose). Recheck K+ level 2 hours after dose and the next AM.               potassium chloride SA (K-DUR/KLOR-CON M) CR tablet 20-40 mEq  Dose: 20-40 mEq Freq: EVERY 2 HOURS PRN Route: PO  PRN Reason: potassium supplementation  Start: 02/08/17 0232   Admin Instructions: Use if able to take PO.   If Serum K+ 3.0-3.3, dose = 60 mEq po total dose (40 mEq x1 followed in 2 hours by 20 mEq x1). Recheck K+ level 4 hours after dose and the next AM.  If Serum K+ 2.5-2.9, dose = 80 mEq po total dose (40 mEq Q2H x2). Recheck K+ level 4 hours after dose and the next AM.  If Serum K+ less than 2.5, See IV order.  DO NOT CRUSH.               prochlorperazine (COMPAZINE) injection 5 mg  Dose: 5 mg Freq: EVERY 6 HOURS PRN Route: IV  PRN Reasons: nausea,vomiting  Start: 02/08/17 0232   Admin Instructions: This is Step 2 of nausea and vomiting management.   If nausea not resolved in 15 minutes, give metoclopramide (REGLAN) if ordered (step 3 of nausea and vomiting management)               Or  prochlorperazine (COMPAZINE) tablet 5 mg  Dose: 5 mg Freq: EVERY 6 HOURS PRN Route: PO  PRN Reason: vomiting  Start: 02/08/17 0232   Admin Instructions: This is Step 2 of nausea and vomiting management.   If nausea not resolved in 15 minutes, give metoclopramide (REGLAN) if ordered (step 3 of nausea and vomiting management)              Or  prochlorperazine (COMPAZINE) Suppository 12.5 mg  Dose: 12.5 mg Freq: EVERY 12 HOURS PRN Route: RE  PRN Reasons: nausea,vomiting  Start: 02/08/17 0232   Admin Instructions: This is Step 2 of nausea and vomiting management.   If nausea not resolved in 15 minutes, give metoclopramide (REGLAN) if ordered (step 3 of nausea and vomiting management)               senna-docusate (SENOKOT-S;PERICOLACE) 8.6-50 MG per tablet 1-2 tablet  Dose: 1-2 tablet Freq: 2 TIMES DAILY PRN Route: PO  PRN Comment: constipation   Start: 02/08/17 0232   Admin Instructions: If no bowel movement in 24 hours, increase to 2 tablets PO BID.  Hold for loose stools.   This is the first step of a three step constipation treatment protocol.               sertraline (ZOLOFT) tablet 50 mg  Dose: 50 mg Freq: DAILY Route: PO  Start: 02/08/17 0900     0923 (50 mg)-Given        0818 (50 mg)-Given        0836 (50 mg)-Given        0854 (50 mg)-Given        0819 (50 mg)-Given        0823 (50 mg)-Given           sodium chloride (PF) 0.9% PF flush 3 mL  Dose: 3 mL Freq: EVERY 1 HOUR PRN Route: IK  PRN Reason: line flush  PRN Comment: for peripheral IV flush post IV meds  Start: 02/08/17 0232         0819 (3 mL)-Given            Warfarin Therapy Reminder (Check START DATE - warfarin may be starting in the FUTURE)  Freq: CONTINUOUS PRN Route: XX  Start: 02/08/17 0232   Admin Instructions: *Note to reorder warfarin daily*  Pharmacy Warfarin Dosing Service  Patient is on Warfarin Therapy - check for daily order               warfarin-No DOSE today  Dose: 1 each Freq: NO DOSE TODAY (WARFARIN) Route: XX  Start: 02/13/17 1257    End: 02/13/17 2356   Admin Instructions: No dose of Warfarin due today per order           2356-Med Discontinued         Dose: 1 each Freq: NO DOSE TODAY (WARFARIN) Route: XX  Start: 02/12/17 0902   End: 02/13/17 0001   Admin Instructions: No dose of Warfarin due today per order           0001-Med Discontinued      Completed Medications  Medications 02/07/17 02/08/17 02/09/17 02/10/17 02/11/17 02/12/17 02/13/17         Dose: 75 mg Freq: 2 TIMES DAILY Route: PO  Indications of Use: INFLUENZA  Start: 02/08/17 0900   End: 02/12/17 0819     0923 (75 mg)-Given       2018 (75 mg)-Given        0818 (75 mg)-Given       2104 (75 mg)-Given        0836 (75 mg)-Given       2151 (75 mg)-Given        0855 (75 mg)-Given       2039 (75 mg)-Given        0819 (75 mg)-Given              Dose: 0.5 mg Freq: ONCE AT 6PM Route: PO  Start: 02/11/17 1800   End: 02/11/17 1801        1801 (0.5 mg)-Given               Dose: 1 mg Freq: ONCE AT 6PM Route: PO  Start: 02/10/17 1800   End: 02/10/17 1654       1654 (1 mg)-Given                    Discontinued Medications  Medications 02/07/17 02/08/17 02/09/17 02/10/17 02/11/17 02/12/17 02/13/17         Dose: 3 mL Freq: EVERY 8 HOURS Route: IK  Start: 02/08/17 0245   End: 02/11/17 0856   Admin Instructions: And Q1H PRN, to lock peripheral IV dormant line.      0317 (3 mL)-Given       0926 (3 mL)-Given              1802 (3 mL)-Given        0151 (3 mL)-Given       0818 (3 mL)-Given              1950 (3 mL)-Given        0548 (3 mL)-Given               0100 (3 mL)-Given       (0856)-Not Given       0856-Med Discontinued

## 2017-02-07 NOTE — IP AVS SNAPSHOT
"[place] [address] [facility]: [phone]                                              INTERAGENCY TRANSFER FORM - PHYSICIAN ORDERS   2017                    Hospital Admission Date: 2017  HONG FAJARDO   : 1940  Sex: Male        Attending Provider: Dyllan Duncan MD     Allergies:  No Known Allergies    Infection:  None   Service:  HOSPITALIST    Ht:  1.765 m (5' 9.5\")   Wt:  80.7 kg (177 lb 14.6 oz)   Admission Wt:  84.6 kg (186 lb 8 oz)    BMI:  25.9 kg/m 2   BSA:  1.99 m 2            Patient PCP Information     Provider PCP Type    Jermain Helm MD General      ED Clinical Impression     Diagnosis Description Comment Added By Time Added    Influenza A [J10.1] Influenza A [J10.1]  Pollo Armstrong MD 2017 12:13 AM    Generalized muscle weakness [M62.81] Generalized muscle weakness [M62.81]  Pollo Armstrong MD 2017 12:13 AM    Fall, initial encounter [W19.XXXA] Fall, initial encounter [W19.XXXA]  Pollo Armstrong MD 2017 12:13 AM      Hospital Problems as of 2017              Priority Class Noted POA    Influenza A Medium  2017 Yes      Non-Hospital Problems as of 2017              Priority Class Noted    Diabetes mellitus, type 2 (H)   Unknown    Prostate cancer (H)   10/13/2011    Autism disorder   2012    ACP (advance care planning)   Unknown    Health Care Home   2014    History of MI (myocardial infarction) Medium  2014    History of stroke Medium  2014    CHF (congestive heart failure) (H) Medium  2014    Long term current use of anticoagulant therapy Medium  10/23/2014    Advanced directives, counseling/discussion   2015    Stricture and stenosis of esophagus Medium  10/22/2015    History of TIA (transient ischemic attack) and stroke Medium  2017      Code Status History     Date Active Date Inactive Code Status Order ID Comments User Context    2017  1:21 PM  Full Code [id number]  " Ash Tidwell MD Outpatient    2/8/2017  2:32 AM 2/9/2017  1:21 PM Full Code [id number]  Dyllan Duncan MD Inpatient    8/7/2015 10:12 AM 2/8/2017  2:32 AM Full Code [id number]  Brock Smith MD Outpatient    8/6/2015 12:12 PM 8/7/2015 10:12 AM Full Code [id number]  Brock Smith MD Inpatient    4/7/2014  5:11 PM 4/11/2014  3:41 PM Full Code [id number]  Buck Peraza RN Inpatient    6/1/2012  6:31 PM 6/4/2012  7:31 PM Full Code [id number]  Stefan Barraza RN Inpatient         Medication Review      START taking        Dose / Directions Comments    * benzocaine-menthol 6-10 MG lozenge   Commonly known as:  CHLORASEPTIC   Used for:  Throat pain        Dose:  1 lozenge   Place 1 lozenge inside cheek 3 times daily for 3 days   Quantity:  84 lozenge   Refills:  0        * benzocaine-menthol 6-10 MG lozenge   Commonly known as:  CHLORASEPTIC   Used for:  Throat pain        Dose:  1 lozenge   Place 1 lozenge inside cheek every hour as needed for sore throat   Quantity:  84 lozenge   Refills:  0        oseltamivir 75 MG capsule   Commonly known as:  TAMIFLU   Indication:  Flu        Dose:  75 mg   Take 1 capsule (75 mg) by mouth 2 times daily for 7 doses   Quantity:  7 capsule   Refills:  1        * Notice:  This list has 2 medication(s) that are the same as other medications prescribed for you. Read the directions carefully, and ask your doctor or other care provider to review them with you.      CONTINUE these medications which have NOT CHANGED        Dose / Directions Comments    ACE/ARB NOT PRESCRIBED (INTENTIONAL)   Used for:  Chronic systolic congestive heart failure (H)        ACE & ARB not prescribed due to Symptomatic hypotension not due to excessive diuresis   Refills:  0        aspirin 81 MG chewable tablet   Used for:  History of MI (myocardial infarction)        Dose:  81 mg   Take 1 tablet (81 mg) by mouth daily   Quantity:  36 tablet   Refills:  12        atorvastatin 20 MG  tablet   Commonly known as:  LIPITOR   Used for:  Encounter for medication refill        TAKE 1 TABLET EVERY DAY   Quantity:  90 tablet   Refills:  1    - Reminder needs fasting labs & diabetic appt before next refill     - thanks       bicalutamide 50 MG tablet   Commonly known as:  CASODEX   Used for:  Encounter for medication refill        TAKE 1 TABLET EVERY EVENING   Quantity:  90 tablet   Refills:  3        Blood Glucose Monitoring Suppl W/DEVICE Kit   Used for:  Type II or unspecified type diabetes mellitus without mention of complication, not stated as uncontrolled, Rhabdomyolysis, UTI (urinary tract infection)        Test blood glucose every other day   Quantity:  1 kit   Refills:  0    Dispence device and strips per patients insurance coverage       D3-1000 PO        Dose:  1 capsule   Take 1 capsule by mouth daily   Refills:  0        ENSURE PO        Dose:  1 Can   Take 1 Can by mouth daily.   Refills:  0        glimepiride 1 MG tablet   Commonly known as:  AMARYL   Used for:  Encounter for medication refill        TAKE 1 TABLET (1 MG) BY MOUTH EVERY MORNING (BEFORE BREAKFAST)   Quantity:  90 tablet   Refills:  1        metFORMIN 500 MG tablet   Commonly known as:  GLUCOPHAGE   Used for:  Encounter for medication refill        Dose:  1000 mg   Take 2 tablets (1,000 mg) by mouth 2 times daily (with meals)   Quantity:  360 tablet   Refills:  1        metoprolol 25 MG 24 hr tablet   Commonly known as:  TOPROL-XL   Used for:  Encounter for medication refill        TAKE 1 TABLET EVERY DAY   Quantity:  90 tablet   Refills:  3        multivitamin, therapeutic Tabs tablet        Dose:  1 tablet   Take 1 tablet by mouth daily   Refills:  0        sertraline 50 MG tablet   Commonly known as:  ZOLOFT   Used for:  Encounter for medication refill        TAKE 1 TABLET EVERY DAY   Quantity:  90 tablet   Refills:  3        * warfarin 4 MG tablet   Commonly known as:  COUMADIN   Used for:  Encounter for medication refill         4 mg on Sundays, Tuesdays, Thursdays, and Saturdays   Quantity:  180 tablet   Refills:  3        * warfarin 2 MG tablet   Commonly known as:  COUMADIN   Used for:  Long term (current) use of anticoagulants        Take 2 mg on Mondays, Wednesdays, and Fridays   Quantity:  90 tablet   Refills:  3        * Notice:  This list has 2 medication(s) that are the same as other medications prescribed for you. Read the directions carefully, and ask your doctor or other care provider to review them with you.            Summary of Visit     Reason for your hospital stay       Influenza A infection.             After Care     Activity       Your activity upon discharge: activity as tolerated.       Diet       Follow this diet upon discharge:   Combination Diet Low Saturated Fat Na <2400mg Diet, No Caffeine Diet       General info for SNF       Length of Stay Estimate: Short Term Care: Estimated # of Days <30  Condition at Discharge: Stable  Level of care:skilled   Rehabilitation Potential: Good  Admission H&P remains valid and up-to-date: Yes  Recent Chemotherapy: N/A  Use Nursing Home Standing Orders: Yes       Mantoux instructions       Give two-step Mantoux (PPD) Per Facility Policy Yes             Referrals     Occupational Therapy Adult Consult       Evaluate and treat as clinically indicated.    Reason:   Influenza A and Deconditioning.       Physical Therapy Adult Consult       Evaluate and treat as clinically indicated.    Reason: Influenza A and Deconditioning.             Follow-Up Appointment Instructions     Future Labs/Procedures    Follow-up and recommended labs and tests      Comments:    Follow up with primary care provider, Jermain Helm, within 1-2 weeks, for hospital follow- up. No follow up labs or test are needed.      Follow-Up Appointment Instructions     Follow-up and recommended labs and tests        Follow up with primary care provider, Jermain Helm, within 1-2 weeks, for hospital  follow- up. No follow up labs or test are needed.             Statement of Approval     Ordered          02/13/17 1407  I have reviewed and agree with all the recommendations and orders detailed in this document.  EFFECTIVE NOW     Approved and electronically signed by:  Ash Tidwell MD           02/09/17 8731  I have reviewed and agree with all the recommendations and orders detailed in this document.  EFFECTIVE NOW     Approved and electronically signed by:  Ash Tidwell MD

## 2017-02-07 NOTE — IP AVS SNAPSHOT
"` ` Patient Information     Patient Name Sex Jose Metz ([id number]) Male 1940       Room Bed    7117 5517-01      Patient Demographics     Address Phone E-mail Address    [address] [facility] [address] [phone] (Home)  NONE (Work)  [phone] (Mobile) [email or web address]      Patient Ethnicity & Race     Ethnic Group Patient Race    American White      Emergency Contact(s)     Name Relation Home Work Mobile    RAEANN LEON Sister [phone] none [phone]      Documents on File        Status Date Received Description       Documents for the Patient    Consent for Services - [facility] Received () 11 CONSENT    Privacy Notice - [facility] Received () 11 PRIVACY    Insurance Card Received () 11 MEDICA    Patient ID Received 14 -    Power of  Not Received  Power of ....2011    HIM GERMAN Authorization - File Only   \"My Chart\" Auth w sister as proxy..Oct 23, 2011    Privacy Notice - [place]       External Medication Information Consent Accepted () 12 [facility]-EXTERNAL    Consent for Services - Hospital/Clinic  ()      Privacy Notice - [place] Received 12     Advance Directives and Living Will Not Received  12 POLST FORM    Consent for Services - [facility] Received () 12 [facility]-CONSENT    Privacy Notice - [facility] Received 12 [facility]-PRIVACY    Insurance Card Received () 12 [facility]-MEDICA    Insurance Card Received () 12 [facility]-MEDICARE    Consent for EHR Access  13 Copied from existing Consent for services - IP/ED collected on 2012    Insurance Card Received () 13 [facility]-MEDICA    Insurance Card Received () 13 [facility]-MEDICARE    Consent for Services - Hospital/Clinic   3/21/13 ABN [facility]    Consent for Services - [facility] Received () 13 [facility]-CONSENT    External Medication Information Consent Accepted 13 [facility]-EXTERNAL    Insurance " Card Received () 14 [facility]-MEDICA    Insurance Card Received 14 [facility]-MEDICARE    [facility] Specified Other       Consent for Services - Hospital/Clinic Received () 14     Consent for Services - Geriatrics Received 14     Consent for Services - [facility] Received () 14 [facility]-CONSENT    Insurance Card Received () 01/13/15 [facility]-Medica    Patient ID Received 08/06/15 -    Insurance Card Received 08/06/15 -Medicare    Insurance Card Received 08/06/15 -BCBS    Consent for Services - Hospital/Clinic Received () 08/06/15     Consent for Services - [facility] Received () 08/12/15 [facility]-CONSENT    Insurance Card Received 16 [facility]-MEDICA    Consent for Services - [facility] Received 16 [facility]-CONSENT    Consent for Services/Privacy Notice - Hospital/Clinic Received 17     Insurance Card Received 17     Insurance Card  (Deleted)      External Medication Information Consent  (Deleted)         Documents for the Encounter    CMS IM for Patient Signature Received 02/10/17 1MM      Admission Information     Attending Provider Admitting Provider Admission Type Admission Date/Time    Duncan, Dyllan Darnell MD Duncan, Dyllan Darnell MD Emergency 17  2244    Discharge Date Hospital Service Auth/Cert Status Service Area     Hospitalist [facility] SERVICES    Unit Room/Bed Admission Status        55 ORTHO SPEC UNIT 5517/5517-01 Admission (Confirmed)       Admission     Complaint    Influenza A      Hospital Account     Name Acct ID Class Status Primary Coverage    Jose Lees [id number] Inpatient Open MEDICARE - MEDICARE FOR HB SUPPLEMENT            Guarantor Account (for Hospital Account #[id number])     Name Relation to Pt Service Area Active? Acct Type    Jose Lees Self FCS Yes Personal/Family    Address Phone          [address] [place] [address] [phone](H)  NONE(O)              Coverage Information (for Hospital Account  #[id number])     1. MEDICARE/MEDICARE FOR HB SUPPLEMENT     F/O Payor/Plan Precert #    MEDICARE/MEDICARE FOR HB SUPPLEMENT     Subscriber Subscriber #    Jose Lees [id number]    Address Phone    [employer] CLAIMS  [address]  [place] [address] [phone]          2. MEDICA/MEDICA PRIME SOLUTION     F/O Payor/Plan Precert #    MEDICA/MEDICA PRIME SOLUTION     Subscriber Subscriber #    Jose Lees [id number]    Address Phone    [address] [address]  [place] [address] [phone]

## 2017-02-07 NOTE — IP AVS SNAPSHOT
"          [place] [address] [facility]: [phone]                                              INTERAGENCY TRANSFER FORM - LAB / IMAGING / EKG / EMG RESULTS   2017                    Hospital Admission Date: 2017  HONG FAJARDO   : 1940  Sex: Male        Attending Provider: Dyllan Duncan MD     Allergies:  No Known Allergies    Infection:  None   Service:  HOSPITALIST    Ht:  1.765 m (5' 9.5\")   Wt:  80.7 kg (177 lb 14.6 oz)   Admission Wt:  84.6 kg (186 lb 8 oz)    BMI:  25.9 kg/m 2   BSA:  1.99 m 2            Patient PCP Information     Provider PCP Type    Jermain Helm MD General         Lab Results - 3 Days      Glucose by meter [[id number]] (Abnormal)  Resulted: 17 1156, Result status: Final result    Ordering provider: Dyllan Duncan MD  17 1129 Resulting lab: POINT OF CARE TEST, GLUCOSE    Specimen Information    Type Source Collected On     17 1129          Components       Value Reference Range Flag Lab   Glucose 157 70 - 99 mg/dL H 170            INR [[id number]] (Abnormal)  Resulted: 17 0703, Result status: Final result    Ordering provider: Dyllan Duncan MD  17 0001 Resulting lab: [facility]    Specimen Information    Type Source Collected On   Blood  17 0628          Components       Value Reference Range Flag Lab   INR 3.32 0.86 - 1.14 H FrStHsLb            Glucose by meter [[id number]]  Resulted: 17 2136, Result status: Final result    Ordering provider: Dyllan Duncan MD  17 Resulting lab: POINT OF CARE TEST, GLUCOSE    Specimen Information    Type Source Collected On     17          Components       Value Reference Range Flag Lab   Glucose 94 70 - 99 mg/dL  170            Glucose by meter [[id number]]  Resulted: 17 1636, Result status: Final result    Ordering provider: Dyllan Duncan MD  17 162 Resulting lab: POINT OF CARE TEST, GLUCOSE    " Specimen Information    Type Source Collected On     02/12/17 1622          Components       Value Reference Range Flag Lab   Glucose 78 70 - 99 mg/dL  170            Glucose by meter [[id number]] (Abnormal)  Resulted: 02/12/17 1136, Result status: Final result    Ordering provider: Dyllan Duncan MD  02/12/17 1124 Resulting lab: POINT OF CARE TEST, GLUCOSE    Specimen Information    Type Source Collected On     02/12/17 1124          Components       Value Reference Range Flag Lab   Glucose 236 70 - 99 mg/dL H 170            Glucose by meter [[id number]] (Abnormal)  Resulted: 02/12/17 0756, Result status: Final result    Ordering provider: Dyllan Duncan MD  02/12/17 0748 Resulting lab: POINT OF CARE TEST, GLUCOSE    Specimen Information    Type Source Collected On     02/12/17 0748          Components       Value Reference Range Flag Lab   Glucose 127 70 - 99 mg/dL H 170            INR [[id number]] (Abnormal)  Resulted: 02/12/17 0751, Result status: Final result    Ordering provider: Dyllan Duncan MD  02/11/17 2151 Resulting lab: [facility]    Specimen Information    Type Source Collected On   Blood  02/12/17 0659          Components       Value Reference Range Flag Lab   INR 3.19 0.86 - 1.14 H FrStHsLb            Glucose by meter [[id number]] (Abnormal)  Resulted: 02/11/17 2121, Result status: Final result    Ordering provider: Dyllan Duncan MD  02/11/17 2102 Resulting lab: POINT OF CARE TEST, GLUCOSE    Specimen Information    Type Source Collected On     02/11/17 2102          Components       Value Reference Range Flag Lab   Glucose 123 70 - 99 mg/dL H 170            Glucose by meter [[id number]] (Abnormal)  Resulted: 02/11/17 1721, Result status: Final result    Ordering provider: Dyllan Duncan MD  02/11/17 1713 Resulting lab: POINT OF CARE TEST, GLUCOSE    Specimen Information    Type Source Collected On     02/11/17 1713          Components       Value Reference Range Flag  Lab   Glucose 121 70 - 99 mg/dL H 170            Glucose by meter [[id number]]  Resulted: 02/11/17 1401, Result status: Final result    Ordering provider: Dyllan Duncan MD  02/11/17 1355 Resulting lab: POINT OF CARE TEST, GLUCOSE    Specimen Information    Type Source Collected On     02/11/17 1355          Components       Value Reference Range Flag Lab   Glucose 91 70 - 99 mg/dL  170            Glucose by meter [[id number]] (Abnormal)  Resulted: 02/11/17 0836, Result status: Final result    Ordering provider: Dyllan Duncan MD  02/11/17 0820 Resulting lab: POINT OF CARE TEST, GLUCOSE    Specimen Information    Type Source Collected On     02/11/17 0820          Components       Value Reference Range Flag Lab   Glucose 166 70 - 99 mg/dL H 170            INR [[id number]] (Abnormal)  Resulted: 02/11/17 0702, Result status: Final result    Ordering provider: Dyllan Duncan MD  02/11/17 0000 Resulting lab: [facility]    Specimen Information    Type Source Collected On   Blood  02/11/17 0633          Components       Value Reference Range Flag Lab   INR 2.95 0.86 - 1.14 H FrStHsLb            Glucose by meter [[id number]] (Abnormal)  Resulted: 02/11/17 0201, Result status: Final result    Ordering provider: Dyllan Duncan MD  02/11/17 0157 Resulting lab: POINT OF CARE TEST, GLUCOSE    Specimen Information    Type Source Collected On     02/11/17 0157          Components       Value Reference Range Flag Lab   Glucose 167 70 - 99 mg/dL H 170            Glucose by meter [[id number]] (Abnormal)  Resulted: 02/10/17 2126, Result status: Final result    Ordering provider: Dyllan Duncan MD  02/10/17 2121 Resulting lab: POINT OF CARE TEST, GLUCOSE    Specimen Information    Type Source Collected On     02/10/17 2121          Components       Value Reference Range Flag Lab   Glucose 163 70 - 99 mg/dL H 170            Glucose by meter [[id number]] (Abnormal)  Resulted: 02/10/17 1616, Result status:  Final result    Ordering provider: Dyllan Duncan MD  02/10/17 1604 Resulting lab: POINT OF CARE TEST, GLUCOSE    Specimen Information    Type Source Collected On     02/10/17 1604          Components       Value Reference Range Flag Lab   Glucose 188 70 - 99 mg/dL H 170            Glucose by meter [[id number]] (Abnormal)  Resulted: 02/10/17 1211, Result status: Final result    Ordering provider: Dyllan Duncan MD  02/10/17 1153 Resulting lab: POINT OF CARE TEST, GLUCOSE    Specimen Information    Type Source Collected On     02/10/17 1153          Components       Value Reference Range Flag Lab   Glucose 198 70 - 99 mg/dL H 170            Glucose by meter [[id number]] (Abnormal)  Resulted: 02/10/17 1211, Result status: Final result    Ordering provider: Dyllan Duncan MD  02/10/17 0757 Resulting lab: POINT OF CARE TEST, GLUCOSE    Specimen Information    Type Source Collected On     02/10/17 0757          Components       Value Reference Range Flag Lab   Glucose 139 70 - 99 mg/dL H 170            INR [[id number]] (Abnormal)  Resulted: 02/10/17 0712, Result status: Final result    Ordering provider: Dyllan Duncan MD  02/10/17 0000 Resulting lab: [facility]    Specimen Information    Type Source Collected On   Blood  02/10/17 0647          Components       Value Reference Range Flag Lab   INR 2.70 0.86 - 1.14 [facility]            Testing Performed By     Lab - Abbreviation Name Director Address Valid Date Range    14 - [facility] Unknown [address] [address] 05/08/15 1057 - Present    170 - Unknown POINT OF CARE TEST, GLUCOSE Unknown Unknown 10/31/11 1114 - Present            Unresulted Labs (24h ago through future)    Start       Ordered    02/14/17 0600  Creatinine  EVERY THREE DAYS,   Routine     Comments:  Last Lab Result: Creatinine (mg/dL)       Date                     Value                 02/09/2017               0.88            "  ----------    02/13/17 1256    02/09/17 0600  INR  (warfarin (COUMADIN) Pharmacy Consult-INITIAL ORDER)  DAILY,   Routine      02/08/17 0232    Unscheduled  Potassium  (Potassium Replacement - \"Standard\" - For K levels less than 3.4 mmol/L - UU,UR,UA,RH,SH,PH,WY )  CONDITIONAL (SPECIFY),   Routine     Comments:  Obtain Potassium Level for these conditions:  *IF no potassium result within 24 hours before initiation of order set, draw potassium level with next lab collect.    *2 HOURS AFTER last IV potassium replacement dose and 4 hours after an oral replacement dose.  *Next morning after potassium dose.     Repeat Potassium Replacement if necessary.    02/08/17 0232    Unscheduled  Magnesium  (Magnesium Replacement -  Adult - \"Standard\" - Replacement for all levels less than 1.6 mg/dL )  CONDITIONAL (SPECIFY),   Routine     Comments:  Obtain Magnesium Level for these conditions:  *IF no magnesium result within 24 hrs before initiation of order set, draw magnesium level with next lab collect.    *2 HOURS AFTER last magnesium replacement dose when magnesium replacement given for level less than 1.6   *Next morning after magnesium dose.     Repeat Magnesium Replacement if necessary.    02/08/17 0232      Encounter-Level Documents:     There are no encounter-level documents.      Order-Level Documents:     There are no order-level documents.      "

## 2017-02-07 NOTE — IP AVS SNAPSHOT
` `     [facility] ORTH[facility]TY UNIT: [phone]                 INTERAGENCY TRANSFER FORM - NOTES (H&P, Discharge Summary, Consults, Procedures, Therapies)   2017                    Hospital Admission Date: 2017  OJSE FAJARDO   : 1940  Sex: Male        Patient PCP Information     Provider PCP Type    Jermain Helm MD General         History & Physicals      H&P by Dyllan Duncan MD at 2017  1:07 AM     Author:  Dyllan Duncan MD Service:  Hospitalist Author Type:  Physician    Filed:  2017  2:18 AM Date of Service:  2017  1:07 AM Note Created:  2017  1:07 AM    Status:  Signed :  Dyllan Duncan MD (Physician)         [facility]    History and Physical  Hospitalist       Date of Admission:  2017  Date of Service (when I saw the patient): 2017    Assessment and Plan  Jose Fajardo is a 76 year old male who presents with fever, chills, lightheadedness with standing, and 3 falls today found to have positive influenza A    Influenza A with SIRS: Patient with fevers to 102, shaking chills, cough sore throat who was evaluated in the emergency department and found to be influenza A positive. Patient with ~36h of symptoms. Lactic acid elevated to the 3 range on admission in the setting of poor intake and fevers  -Tamiflu 75 b.i.d.for total of 10 doses  -Spot check oximetry  -Ambulate with nursing staff to assess for oxygen needs    Orthostatic hypotension with falls: The patient endorses feeling lightheaded over the past 2 days, multiple falls 17.  -1 L normal saline over 4 hours; patient with low EF heart failure, so will need to be cautious with rate of fluid administration  -fall precautions  -Ambulate with nursing staff  -Orthostatic blood pressure and pulse following each IV fluid bolus; provider to be paged if abnormal for additional fluid orders  -Cardiac diet as tolerated  -Compression stockings given  chronic peripheral edema and now orthostatic symptoms    DM II: Most recent hemoglobin A1c of 7.6 1/25/17. Blood glucose in the 200 range on arrival to emergency department  -Holding prior to admission metformin and glimepiride  -medium dose sliding scale available    Chronic systolic congestive heart failure, left-sided: Ischemic cardiomyopathy secondary to April 2014 myocardial infarction. It appears that most recent TTE was performed July 2014 with an ejection fraction of 35% as commented on by cardiology service; image not currently available for this.Does not appear to be in acute exacerbation, though is at risk given presentation with orthostatic hypotension and falls requiring fluid resuscitation  -Intake and output  -Daily weights  -Cardiac telemetry  -Spot check oximetry  -continue daily aspirin as per prior cardiology recommendations; aware the patient is also on chronic anticoagulation  -Continue Lipitor 20 mg daily  -Continue metoprolol 25 mg XL daily  -It has been several years since patient's last TTE, though currently I do not see an indication to repeat. Consider outpatient cardiology follow-up to discuss discontinuation of aspirin in the setting of chronic anticoagulation or slow up titration of ACE inhibitor previously held given history of hypotension, though patient appears to have been stable for several years now otherwise.  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 No formal diagnosis of atrial fibrillation, though has remained on anticoagulation. Slightly subtherapeutic at 1.87 on admission  -Will continue warfarin, pharmacy to dose    Prostate cancer: Follows [facility]. Seen recently in clinic for Lupron administration with next follow-up  scheduled in July 2017  -Continue Casodex evening administration    DVT Prophylaxis: continue warfarin    Code Status: full code. Discussed with patient on admission    Disposition: Expected discharge in 1-2 days pending resolution of orthostasis, advancement of diet as tolerated    [facility]an [facility]    Primary Care Physician  Jermain Helm    Chief Complaint  Falls, fever, weakness    History is obtained from the patient, chart review, Dr Armstrong in the ED    History of Present Illness  Jose Lees is a 76 year old male who presents with approximately 36 hours of fever, chills, sore throat who presents today after multiple falls and feeling lightheaded. Patient states he fell 3 times today. Had been feeling in his usual state of health up until 2/6/17 when he developed fevers up to 102. States that his appetite was poor, and he knew he was ill. He suspected a viral illness, and actually avoided going down for meals as he didn't want to get any other residents at [facility] assisted living ill. States he only ate a few bites of mashed potatoes he other evening given poor appetite. Denies nausea or vomiting. In the emergency department, influenza swab positive for influenza A, and initial dose of Tamiflu given in the emergency department.    Regarding falls, patient states that he had been feeling lightheaded throughout the day when standing. Does not recall any particular prodrome prior to falls. Did not lose consciousness, denies hitting his head, no trauma associated with these falls. Falls were the primary reason patient presented to the emergency department today.    Past Medical History   I have reviewed this patient's medical history and updated it with pertinent information if needed.   Past Medical History   Diagnosis Date     Type II or unspecified type diabetes mellitus without mention of complication, not stated as uncontrolled      Diabetes mellitus     Autism disorder 6/29/2012     ACP  (advance care planning)      Form given     Stroke, embolic (H) 4/7/2014     bilateral cerebral embolic CVAs     Acute anterior wall MI (H) 4/7/2014     Hyperlipidaemia      Coronary artery disease 4/2014 4/2014 HEART CATH - 70% mid LAD -- BMS placed, small 1st diagonal 80%, RCA 60-70% before crux, 70% mid PDA     Prostate cancer (H) 10/11     GLEASOR 8     Dementia      CHF (congestive heart failure) (H)      ECHO EF=25-30% on 4/7/14     Hyperlipidaemia LDL goal < 100      Stricture and stenosis of esophagus 10/22/2015       Past Surgical History  I have reviewed this patient's surgical history and updated it with pertinent information if needed.  Past Surgical History   Procedure Laterality Date     Coronary angiography adult order       Heart cath, angioplasty  4/14     BMS to LAD     Esophagoscopy, gastroscopy, duodenoscopy (egd), combined N/A 8/6/2015     Procedure: COMBINED ESOPHAGOSCOPY, GASTROSCOPY, DUODENOSCOPY (EGD), REMOVE FOREIGN BODY;  Surgeon: Yu Tapia MD;  Location:  GI       Prior to Admission Medications  Prior to Admission Medications   Prescriptions Last Dose Informant Patient Reported? Taking?   ACE/ARB NOT PRESCRIBED, INTENTIONAL,   No No   Sig: ACE & ARB not prescribed due to Symptomatic hypotension not due to excessive diuresis   Blood Glucose Monitoring Suppl W/DEVICE KIT  Self No No   Sig: Test blood glucose every other day       Cholecalciferol (D3-1000 PO)   Yes No   Sig: Take 1 capsule by mouth daily   Nutritional Supplements (ENSURE PO)  Self Yes No   Sig: Take 1 Can by mouth daily.   aspirin 81 MG chewable tablet  Self No No   Sig: Take 1 tablet (81 mg) by mouth daily   atorvastatin (LIPITOR) 20 MG tablet   No No   Sig: TAKE 1 TABLET EVERY DAY   bicalutamide (CASODEX) 50 MG tablet   No No   Sig: TAKE 1 TABLET EVERY EVENING   glimepiride (AMARYL) 1 MG tablet   No No   Sig: TAKE 1 TABLET (1 MG) BY MOUTH EVERY MORNING (BEFORE BREAKFAST)   metFORMIN (GLUCOPHAGE) 500 MG  tablet   No No   Sig: Take 2 tablets (1,000 mg) by mouth 2 times daily (with meals)   metoprolol (TOPROL-XL) 25 MG 24 hr tablet   No No   Sig: TAKE 1 TABLET EVERY DAY   multivitamin, therapeutic (THERA-VIT) TABS  Self Yes No   Sig: Take 1 tablet by mouth daily   sertraline (ZOLOFT) 50 MG tablet   No No   Sig: TAKE 1 TABLET EVERY DAY   warfarin (COUMADIN) 2 MG tablet   Yes No   Sig: Take 2 mg on  only   warfarin (COUMADIN) 4 MG tablet   Yes No   Si mg every day except tuesday      Facility-Administered Medications: None     Allergies  No Known Allergies    Social History  I have reviewed this patient's social history and updated it with pertinent information if needed. Jose LUCAS Yoana  reports that he has never smoked. He has never used smokeless tobacco. He reports that he does not drink alcohol or use illicit drugs.    Family History  I have reviewed this patient's family history and updated it with pertinent information if needed.   Family History   Problem Relation Age of Onset     CEREBROVASCULAR DISEASE Mother 92     C.A.D. Father 48       Review of Systems  The 10 point Review of Systems is negative other than noted in the HPI or here.   Patient endorses a sore throat and mild cough  Denies orthopnea    Physical Exam  Temp: 99.5  F (37.5  C) Temp src: Temporal BP: 136/66 mmHg Pulse: 85 Heart Rate: 93 Resp: 20 SpO2: 96 % O2 Device: None (Room air)    Vital Signs with Ranges  Temp:  [99.5  F (37.5  C)] 99.5  F (37.5  C)  Pulse:  [85] 85  Heart Rate:  [93] 93  Resp:  [20] 20  BP: (135-136)/(64-80) 136/66 mmHg  SpO2:  [96 %] 96 %  186 lbs 8 oz    Constitutional: no acute distress, alert, conversant  Eyes: no scleral icterus or injection  HEENT: normocephalic without evidence of trauma  Respiratory: breath sounds clear bilaterally to auscultation, no wheezes, no crackles  Cardiovascular: regular rate and rhythm, 2/6 relatively harsh systolic murmur appreciated at apex. This is not noted at last  cardiology visit, though patient states he was told as a child he hada murmur  GI: abdomen soft, non-tender, normoactive bowel sounds, no masses  Lymph/Hematologic: 1+ bilateral lower extremity pitting edema  Genitourinary: not examined  Skin: no rashes  Musculoskeletal: muscular tone intact in all extremities  Neurologic: mental status grossly intact, no focal deficits, alert  Psychiatric: normal affect    Data  Data reviewed today:  I personally reviewed chest x-ray with no acute infiltrate.    Recent Labs  Lab 02/07/17  2300   WBC 7.0   HGB 12.7*   MCV 92   *   INR 1.87*      POTASSIUM 3.9   CHLORIDE 104   CO2 23   BUN 16   CR 0.97   ANIONGAP 12   TENZIN 8.7   *   ALBUMIN 3.6   PROTTOTAL 7.3   BILITOTAL 0.5   ALKPHOS 57   ALT 25   AST 25       Recent Results (from the past 24 hour(s))   Chest XR,  PA & LAT    Narrative    XR CHEST 2 VW  2/7/2017 11:25 PM      HISTORY: Cough.     COMPARISON: 8/6/2015.    FINDINGS: The heart size is normal. Thoracic aorta is calcified. The  lungs are clear. No pneumothorax or pleural effusion. Degenerative  disease in the thoracic spine.      Impression    IMPRESSION: No acute abnormality.          Revision History        User Key Date/Time User Provider Type Action    > [N/A] 2/8/2017  2:18 AM Duncan, Dyllan Darnell MD Physician Sign                     Discharge Summaries      Discharge Summaries by Ash Tidwell MD at 2/13/2017  2:00 PM     Author:  Ash Tidwell MD Service:  Hospitalist Author Type:  Physician    Filed:  2/13/2017  2:15 PM Date of Service:  2/13/2017  2:00 PM Note Created:  2/13/2017  2:08 PM    Status:  Signed :  Ash Tidwell MD (Physician)         [facility]    Discharge Summary  Hospitalist  Ash Tidwell MD    Date of Admission:  2/7/2017  Date of Discharge:[AT1.1]  2/13/2017[AT1.2]  Discharging Provider:[AT1.1] Ash Tidwell    Discharge Diagnoses[AT1.2]    1. Influenza A with SIRS, resolved.[AT1.1]    History of Present Illness[AT1.2]   Jose Lees is an 76 year old male, who presented with fever, chills, sore throat, lightheadedness and falls.[AT1.1]    Hospital Course[AT1.2]   76 year old male with PmHx of CAD, Type 2 DM, embolic stroke, prostate cancer, hyperlipidemia, dementia, esophageal stricture, who was admitted on 2/8/17 due to fever up to 102F, chills, sore throat, lightheadedness with standing and history of 3 falls on the day of admission. Work up done on 2/7/17 revealed, normal CMP. CBC revealed, Hb 12.7, WBC 7.0 and Plts 142. Lactic acid was 3.1. INR was 1.87 and UA is negative. Nasal swab was positive for Influenza A. Chest x-rays on 2/7/17 is normal.     He was admitted to the medical floor and was placed on droplet isolation. He was commenced on a 5 days course of Tamiflu 75mg po b.i.d, which he completed on 2/12/17. He had an intermittent cough, which was occasional productive of white sputum and a mild sore throat on the day of discharge. He was reviewed by the physical and occupational therapists, who recommended TCU at discharge. For scheduled Chloraseptic Lozenges and Chloraseptic lozenges prn.   [AT1.1]  Significant Results and Procedures[AT1.2]   See above.[AT1.1]    Pending Results[AT1.2]   None.[AT1.1]  Unresulted Labs Ordered in the Past 30 Days of this Admission     No orders found from 12/9/2016 to 2/8/2017.          Code Status[AT1.2]   Full Code[AT1.1]       Primary Care Physician   Jermain Helm    Physical Exam   Temp: 97.5  F (36.4  C) Temp src: Oral BP: 111/64 Pulse: 68 Heart Rate: 79 Resp: 16 SpO2: 94 % O2 Device: None (Room air)    Vitals:    02/07/17 2245 02/09/17 0729 02/10/17 0606   Weight: 84.6 kg (186 lb 8 oz) 81.7 kg (180 lb 1.9 oz) 80.7 kg (177 lb 14.6 oz)[AT1.2]     Constitutional: Elderly white male, awake, cooperative, no apparent distress, O2 Sats 94% on RA  HENNT: No pharyngeal erythema  Respiratory: BS  vesicular bilaterally, no crackles or wheezing  Cardiovascular: S1 and S2, reg, no murmur noted  GI: Soft, non-distended, non-tender, no masses, BS present+  Skin/Integumen: No rashes  Extremities: Bilat pedal edema 1+[AT1.1]    Discharge Disposition[AT1.2]   Discharged to short-term care facility  Condition at discharge: Stable[AT1.1]    Consultations This Hospital Stay   PHARMACY TO DOSE WARFARIN  PHYSICAL THERAPY ADULT IP CONSULT  PHYSICAL THERAPY ADULT IP CONSULT  OCCUPATIONAL THERAPY ADULT IP CONSULT    Time Spent on this Encounter[AT1.2]   Ash KNIGHT, personally saw the patient today and spent less than or equal to 30 minutes discharging this patient.[AT1.1]    Discharge Orders     Reason for your hospital stay   Influenza A infection.     Follow-up and recommended labs and tests    Follow up with primary care provider, Jermain Helm, within 1-2 weeks, for hospital follow- up. No follow up labs or test are needed.     Activity   Your activity upon discharge: activity as tolerated.     Mantoux instructions   Give two-step Mantoux (PPD) Per Facility Policy Yes     General info for SNF   Length of Stay Estimate: Short Term Care: Estimated # of Days <30  Condition at Discharge: Stable  Level of care:skilled   Rehabilitation Potential: Good  Admission H&P remains valid and up-to-date: Yes  Recent Chemotherapy: N/A  Use Nursing Home Standing Orders: Yes     Full Code     Physical Therapy Adult Consult   Evaluate and treat as clinically indicated.    Reason: Influenza A and Deconditioning.     Occupational Therapy Adult Consult   Evaluate and treat as clinically indicated.    Reason:   Influenza A and Deconditioning.     Diet   Follow this diet upon discharge:   Combination Diet Low Saturated Fat Na <2400mg Diet, No Caffeine Diet       Discharge Medications   Current Discharge Medication List      START taking these medications    Details   !! benzocaine-menthol (CHLORASEPTIC) 6-10 MG lozenge  Place 1 lozenge inside cheek 3 times daily for 3 days  Qty: 84 lozenge    Associated Diagnoses: Throat pain      !! benzocaine-menthol (CHLORASEPTIC) 6-10 MG lozenge Place 1 lozenge inside cheek every hour as needed for sore throat  Qty: 84 lozenge    Associated Diagnoses: Throat pain      oseltamivir (TAMIFLU) 75 MG capsule Take 1 capsule (75 mg) by mouth 2 times daily for 7 doses  Qty: 7 capsule, Refills: 1    Associated Diagnoses: Influenza A       !! - Potential duplicate medications found. Please discuss with provider.      CONTINUE these medications which have NOT CHANGED    Details   metFORMIN (GLUCOPHAGE) 500 MG tablet Take 2 tablets (1,000 mg) by mouth 2 times daily (with meals)  Qty: 360 tablet, Refills: 1    Associated Diagnoses: Encounter for medication refill      metoprolol (TOPROL-XL) 25 MG 24 hr tablet TAKE 1 TABLET EVERY DAY  Qty: 90 tablet, Refills: 3    Associated Diagnoses: Encounter for medication refill      glimepiride (AMARYL) 1 MG tablet TAKE 1 TABLET (1 MG) BY MOUTH EVERY MORNING (BEFORE BREAKFAST)  Qty: 90 tablet, Refills: 1    Associated Diagnoses: Encounter for medication refill      atorvastatin (LIPITOR) 20 MG tablet TAKE 1 TABLET EVERY DAY  Qty: 90 tablet, Refills: 1    Comments: Reminder needs fasting labs & diabetic appt before next refill   thanks  Associated Diagnoses: Encounter for medication refill      !! warfarin (COUMADIN) 4 MG tablet 4 mg on Sundays, Tuesdays, Thursdays, and Saturdays  Qty: 180 tablet, Refills: 3    Associated Diagnoses: Encounter for medication refill      !! warfarin (COUMADIN) 2 MG tablet Take 2 mg on Mondays, Wednesdays, and Fridays  Qty: 90 tablet, Refills: 3    Associated Diagnoses: Long term (current) use of anticoagulants      Cholecalciferol (D3-1000 PO) Take 1 capsule by mouth daily      sertraline (ZOLOFT) 50 MG tablet TAKE 1 TABLET EVERY DAY  Qty: 90 tablet, Refills: 3    Associated Diagnoses: Encounter for medication refill      bicalutamide  (CASODEX) 50 MG tablet TAKE 1 TABLET EVERY EVENING  Qty: 90 tablet, Refills: 3    Associated Diagnoses: Encounter for medication refill      aspirin 81 MG chewable tablet Take 1 tablet (81 mg) by mouth daily  Qty: 36 tablet, Refills: 12    Associated Diagnoses: History of MI (myocardial infarction)      multivitamin, therapeutic (THERA-VIT) TABS Take 1 tablet by mouth daily      Nutritional Supplements (ENSURE PO) Take 1 Can by mouth daily.      ACE/ARB NOT PRESCRIBED, INTENTIONAL, ACE & ARB not prescribed due to Symptomatic hypotension not due to excessive diuresis    Associated Diagnoses: Chronic systolic congestive heart failure (H)      Blood Glucose Monitoring Suppl W/DEVICE KIT Test blood glucose every other day      Qty: 1 kit, Refills: 0    Comments: Dispence device and strips per patients insurance coverage  Associated Diagnoses: Type II or unspecified type diabetes mellitus without mention of complication, not stated as uncontrolled; Rhabdomyolysis; UTI (urinary tract infection)       !! - Potential duplicate medications found. Please discuss with provider.        Allergies   No Known Allergies  Data[AT1.2]   Most Recent 3 CBC's:[AT1.1]  Recent Labs   Lab Test  02/09/17   0641  02/07/17   2300  07/27/16   1526   WBC  7.4  7.0  8.1   HGB  12.1*  12.7*  13.3*   MCV  91  92  86.9   PLT  131*  142*  204[AT1.2]      Most Recent 3 BMP's:[AT1.1]  Recent Labs   Lab Test  02/09/17   0641  02/07/17   2300  07/27/16   1526   08/06/15   0905   NA  137  139  140   < >  142   POTASSIUM  3.5  3.9  4.2   < >  3.6   CHLORIDE  105  104  101   < >  110*   CO2  22  23   --    --   19*   BUN  13  16  14  14   < >  13   CR  0.88  0.97  1.03   < >  0.92   ANIONGAP  10  12   --    --   13   TENZIN  8.3*  8.7  9.5   < >  8.8   GLC  180*  233*  118*   < >  196*    < > = values in this interval not displayed.[AT1.2]     Most Recent 2 LFT's:[AT1.1]  Recent Labs   Lab Test  02/07/17   2300  01/04/16   1124   AST  25  22   ALT  25  18    ALKPHOS  57  56   BILITOTAL  0.5  0.6[AT1.2]     Most Recent INR's and Anticoagulation Dosing History:  Anticoagulation Dose History     Recent Dosing and Labs Latest Ref Rng & Units 2/7/2017 2/8/2017 2/9/2017 2/10/2017 2/11/2017 2/12/2017 2/13/2017    Warfarin 0.5 mg - - - - - 0.5 mg - -    Warfarin 1 mg - - - - 1 mg - - -    Warfarin 4 mg - - 4 mg 4 mg - - - -    INR 0.86 - 1.14 1.87(H) 2.03(H) 1.96(H) 2.70(H) 2.95(H) 3.19(H) 3.32(H)        Most Recent 3 Troponin's:[AT1.1]  Recent Labs   Lab Test  08/06/15   0905  04/09/14   0600  04/09/14   0240   TROPI  <0.015  The 99th percentile for upper reference range is 0.045 ug/L.  Troponin values in   the range of 0.045 - 0.120 ug/L may be associated with risks of adverse   clinical events.    0.626*  0.752*[AT1.2]     Most Recent Cholesterol Panel:[AT1.1]  Recent Labs   Lab Test  01/04/16   1124   CHOL  139   LDL  65   HDL  51   TRIG  117[AT1.2]     Most Recent 6 Bacteria Isolates From Any Culture (See EPIC Reports for Culture Details):[AT1.1]  Recent Labs   Lab Test  06/02/12   1820  06/01/12   1415   CULT  10 to 50,000 colonies/mL Coagulase negative Staphylococcus Faxed final report to [phone] 6.6.12 [facility]  No growth after 6 days  No growth after 6 days[AT1.2]     Most Recent TSH, T4 and A1c Labs:[AT1.1]  Recent Labs   Lab Test  01/25/17   1429   02/03/16   1046   06/03/12   0610   TSH   --    --    --    --   4.41   T4   --    --   1.01   --    --    A1C  7.6*   < >   --    < >   --     < > = values in this interval not displayed.[AT1.2]       Results for orders placed or performed during the hospital encounter of 02/07/17   Chest XR,  PA & LAT    Narrative    XR CHEST 2 VW  2/7/2017 11:25 PM      HISTORY: Cough.     COMPARISON: 8/6/2015.    FINDINGS: The heart size is normal. Thoracic aorta is calcified. The  lungs are clear. No pneumothorax or pleural effusion. Degenerative  disease in the thoracic spine.      Impression    IMPRESSION: No acute  abnormality.    ZULEMA COTTO MD[AT1.1]          Revision History        User Key Date/Time User Provider Type Action    > AT1.2 2/13/2017  2:15 PM Ash Tidwell MD Physician Sign     AT1.1 2/13/2017  2:08 PM Ash Tidwell MD Physician                   Consult Notes     No notes of this type exist for this encounter.         Progress Notes - Physician (Notes from 02/10/17 through 02/13/17)      Progress Notes by Cristela Acosta LSW at 2/13/2017  2:05 PM     Author:  Cristela Acosta LSW Service:  Social Work Author Type:      Filed:  2/13/2017  2:28 PM Date of Service:  2/13/2017  2:05 PM Note Created:  2/13/2017  2:05 PM    Status:  Addendum :  Cristela Acosta LSW ()         CHARLIE  D: Per MD order, patient okay to discharge today to Rehab.  I: CHARLIE faxed the discharge orders and discussed transportation to get patient to Rehab.  Patient confirmed that his sister will transport him to Rehab today.  Patient confirmed that his sister will plan to transport him today at around 1530.  CHARLIE spoke to Maida in Admissions at [facility] to update her that patient is ready for discharge today and the time of transportation.  A: Patient is alert and oriented X3.  Patient is aware and in agreement with the plan for discharge today to Rehab.  P: Patient to discharge today to [facility].  CHARLIE will be available to assist as needed.    SHU Miramontes  [SB1.1]    PAS-RR    D: Per DHS regulation, CHARLIE completed and submitted PAS-RR to [place] [employer] on [employer] Direct Connect via the [employer] LinkAge Line.  PAS-RR confirmation # is : [id number].    I: CHARLIE spoke with patient and he is aware a PAS-RR has been submitted.  CHARLIE reviewed with patient that he may be contacted for a follow up appointment within 10 days of hospital discharge if their SNF stay is < 30 days.  Contact information for [facility] LinkAge Line was also  provided.    A: Patient verbalized understanding.    P: Further questions may be directed to Senior LinkAge Line at #[phone], option #4 for [facility] staff.    SHU Miramontes  [SB1.2]         Revision History        User Key Date/Time User Provider Type Action    > SB1.2 2/13/2017  2:28 PM Cristela Acosta LSW  Addend     SB1.1 2/13/2017  2:07 PM Cristela Acosta LSW  Sign            Progress Notes by Aileen Wan at 2/13/2017 10:30 AM     Author:  Aileen Wan Service:  Infection Preventionist Author Type:  (none)    Filed:  2/13/2017 10:34 AM Date of Service:  2/13/2017 10:30 AM Note Created:  2/13/2017 10:30 AM    Status:  Signed :  Aileen Wan         Droplet isolation for influenza:  (Per Dr. Duncan H and P dated 2/8/17, symptoms of influenza started 36 hours prior.  Influenza symptom onset is 2/6/17.)  Droplet isolation for influenza for 7 days after illness onset or 24 hours after resolution of fever and respiratory symptoms, whichever is longer.  Earliest D/C of droplet iso is 2/13/17, provided that fever and respiratory symptoms are resolved.  Aileen Wan, [facility][TD1.1]     Revision History        User Key Date/Time User Provider Type Action    > TD1.1 2/13/2017 10:34 AM Aileen Wan (none) Sign            Progress Notes by Ash Tidwell MD at 2/12/2017  2:51 PM     Author:  Ash Tidwell MD Service:  Hospitalist Author Type:  Physician    Filed:  2/12/2017  3:59 PM Date of Service:  2/12/2017  2:51 PM Note Created:  2/12/2017  3:51 PM    Status:  Signed :  Ash Tidwell MD (Physician)         [facility]    Hospitalist Progress Note  Ash Tidwell MD    Date of Service : 02/12/2017    Assessment & Plan      76 year old male with PmHx of  CAD, Type 2 DM, embolic stroke, prostate cancer, hyperlipidemia, dementia, esophageal stricture, who was  admitted on 2/8/17 due to fever up to 102F, chills, sore throat, lightheadedness with standing and history of  3 falls on the day of admission. Work up done on 2/7/17 revealed, normal CMP. CBC revealed, Hb 12.7, WBC 7.0 and Plts 142. Lactic acid was 3.1. INR was 1.87 and UA is negative. Nasal swab was positive for Influenza A. Chest x-rays on  2/7/17 is normal.    1. Influenza A with SIRS: Continue Tamiflu 75mg po b.i.d. for total of 10 doses (to end on 2/12/17). For scheduled Chloraseptic Lozenges and Chloraseptic lozenges prn.    2. Type 2 DM: BG was 166 this am. HbA1C was 7.6% on 1/25/17. Continue metformin and glimepiride today. Continue medium dose sliding scale prn.    3. Chronic systolic congestive heart failure: stable. Continue, aspirin, toprol Xl and lipitor. ECHO done in July 2014 revealed, LVEF 35%.    4. History of embolic CVA:  continue warfarin per pharmacy. INR is 3.19  on 2/12/17.    5. Prostate cancer: pt [facility]. On Lupron and  Casodex.        DVT Prophylaxis: Ambulate every shift  Code Status: Prior    Disposition: Expected discharge in 1 day. Appreciate PT review. For possible TCU at discharge, as the pt's assisted living facility will not take him back, if he needs Home PT.      Interval History   The pt reported that his sore throat is getting better. No complaints of shortness of breath/chest pain/cough.    -Data reviewed today: I reviewed all new labs and imaging results over the last 24 hours. I personally reviewed no images or EKG's today.    Physical Exam   Temp: 98.6  F (37  C) Temp src: Oral BP: 119/72 Pulse: 64 Heart Rate: 71 Resp: 16 SpO2: 95 % O2 Device: None (Room air)    Vitals:    02/07/17 2245 02/09/17 0729 02/10/17 0606   Weight: 84.6 kg (186 lb 8 oz) 81.7 kg (180 lb 1.9 oz) 80.7 kg (177 lb 14.6 oz)     Vital Signs with Ranges  Temp:  [98.4  F (36.9  C)-98.9  F (37.2  C)] 98.6  F (37  C)  Pulse:  [64] 64  Heart Rate:  [63-71] 71  Resp:   [16] 16  BP: ()/(56-74) 119/72  SpO2:  [95 %-100 %] 95 %  I/O last 3 completed shifts:  In: 650 [P.O.:650]  Out: 400 [Urine:400]    Constitutional: Elderly white male, awake, cooperative, no apparent distress, O2 Sats 96% on RA  HENNT: No pharyngeal erythema  Respiratory: BS vesicular bilaterally, no crackles or wheezing  Cardiovascular: S1 and S2, reg, no murmur noted  GI: Soft, non-distended, non-tender, no masses, BS present+  Skin/Integumen: No rashes  Extremities: Bilat pedal edema 1+    Medications     Warfarin Therapy Reminder         warfarin-No DOSE today  1 each Does not apply no dose today (warfarin)     sore throat lozenge  1 lozenge Buccal TID     metFORMIN  1,000 mg Oral BID w/meals     glimepiride  1 mg Oral Daily with breakfast     aspirin  81 mg Oral Daily     atorvastatin  20 mg Oral Daily     bicalutamide  50 mg Oral QPM     sertraline  50 mg Oral Daily     metoprolol  25 mg Oral Daily     insulin aspart  1-7 Units Subcutaneous TID AC     insulin aspart  1-5 Units Subcutaneous At Bedtime       Data     Recent Labs  Lab 02/12/17  0659 02/11/17  0633 02/10/17  0647 02/09/17  0641  02/07/17  2300   WBC  --   --   --  7.4  --  7.0   HGB  --   --   --  12.1*  --  12.7*   MCV  --   --   --  91  --  92   PLT  --   --   --  131*  --  142*   INR 3.19* 2.95* 2.70* 1.96*  < > 1.87*   NA  --   --   --  137  --  139   POTASSIUM  --   --   --  3.5  --  3.9   CHLORIDE  --   --   --  105  --  104   CO2  --   --   --  22  --  23   BUN  --   --   --  13  --  16   CR  --   --   --  0.88  --  0.97   ANIONGAP  --   --   --  10  --  12   TENZIN  --   --   --  8.3*  --  8.7   GLC  --   --   --  180*  --  233*   ALBUMIN  --   --   --   --   --  3.6   PROTTOTAL  --   --   --   --   --  7.3   BILITOTAL  --   --   --   --   --  0.5   ALKPHOS  --   --   --   --   --  57   ALT  --   --   --   --   --  25   AST  --   --   --   --   --  25   < > = values in this interval not displayed.    No results found for this or any  previous visit (from the past 24 hour(s)).[AT1.1]         Revision History        User Key Date/Time User Provider Type Action    > AT1.1 2/12/2017  3:59 PM Ash Tidwell MD Physician Sign            Progress Notes by Ash Tidwell MD at 2/11/2017  7:47 PM     Author:  Ash Tidwell MD Service:  Hospitalist Author Type:  Physician    Filed:  2/11/2017 11:51 PM Date of Service:  2/11/2017  7:47 PM Note Created:  2/11/2017 11:47 PM    Status:  Signed :  Ash Tidwell MD (Physician)         [facility]    Hospitalist Progress Note  Ash Tidwell MD    Date of Service : 02/11/2017    Assessment and Plan     76 year old male with PmHx of  CAD, Type 2 DM, embolic stroke, prostate cancer, hyperlipidemia, dementia, esophageal stricture, who was admitted on 2/8/17 due to fever up to 102F, chills, sore throat, lightheadedness with standing and history of  3 falls on the day of admission. Work up done on 2/7/17 revealed, normal CMP. CBC revealed, Hb 12.7, WBC 7.0 and Plts 142. Lactic acid was 3.1. INR was 1.87 and UA is negative. Nasal swab was positive for Influenza A. Chest x-rays on  2/7/17 is normal.    1. Influenza A with SIRS: Continue Tamiflu 75mg po b.i.d. for total of 10 doses (to end on 2/12/17). For scheduled Chloraseptic Lozenges and Chloraseptic lozenges prn.    2. Type 2 DM: BG was 166 this am. HbA1C was 7.6% on 1/25/17. Continue metformin and glimepiride today. Continue medium dose sliding scale prn.    3. Chronic systolic congestive heart failure: monitor weights. Continue, aspirin, toprol Xl and lipitor. ECHO done in July 2014 revealed, LVEF 35%.    4. History of embolic CVA:  continue warfarin per pharmacy. INR is 2.95  on 2/9/17.    5. Prostate cancer: pt follows [facility]. On Lupron and  Casodex.        DVT Prophylaxis: Ambulate every shift  Code Status: Prior    Disposition: Expected  discharge in 1-2 days. Appreciate PT review. For possible TCU at discharge, as the pt's assisted living facility will not take him back, if he needs Home PT.      Interval History  The pt still has a sore throat, which is more prominent when he swallows. No complaints of shortness of breath/chest pain.    -Data reviewed today: I reviewed all new labs and imaging results over the last 24 hours. I personally reviewed no images or EKG's today.    Physical Exam  Temp: 98.5  F (36.9  C) Temp src: Oral BP: 117/66 mmHg Pulse: 62 Heart Rate: 67 Resp: 16 SpO2: 100 % O2 Device: None (Room air)    Filed Vitals:    02/07/17 2245 02/09/17 0729 02/10/17 0606   Weight: 84.596 kg (186 lb 8 oz) 81.7 kg (180 lb 1.9 oz) 80.7 kg (177 lb 14.6 oz)     Vital Signs with Ranges  Temp:  [97.6  F (36.4  C)-98.7  F (37.1  C)] 98.5  F (36.9  C)  Pulse:  [62] 62  Heart Rate:  [62-79] 67  Resp:  [16] 16  BP: ()/(56-68) 117/66 mmHg  SpO2:  [95 %-100 %] 100 %  I/O last 3 completed shifts:  In: 600 [P.O.:600]  Out: 175 [Urine:175]    Constitutional: Elderly white male, awake, raspy voice+, cooperative, no apparent distress, O2 Sats 100% on RA  HENNT: No pharyngeal erythema  Respiratory: BS vesicular bilaterally, no crackles or wheezing  Cardiovascular: S1 and S2, reg, no murmur noted  GI: Soft, non-distended, non-tender, no masses, BS present+  Skin/Integumen: No rashes  Extremities: Bilat pedal edema 1+    Medications    Warfarin Therapy Reminder         sore throat lozenge  1 lozenge Buccal TID     metFORMIN  1,000 mg Oral BID w/meals     glimepiride  1 mg Oral Daily with breakfast     oseltamivir  75 mg Oral BID     aspirin  81 mg Oral Daily     atorvastatin  20 mg Oral Daily     bicalutamide  50 mg Oral QPM     sertraline  50 mg Oral Daily     metoprolol  25 mg Oral Daily     insulin aspart  1-7 Units Subcutaneous TID AC     insulin aspart  1-5 Units Subcutaneous At Bedtime       Data    Recent Labs  Lab 02/11/17  0633 02/10/17  0647  02/09/17  0641  02/07/17  2300   WBC  --   --  7.4  --  7.0   HGB  --   --  12.1*  --  12.7*   MCV  --   --  91  --  92   PLT  --   --  131*  --  142*   INR 2.95* 2.70* 1.96*  < > 1.87*   NA  --   --  137  --  139   POTASSIUM  --   --  3.5  --  3.9   CHLORIDE  --   --  105  --  104   CO2  --   --  22  --  23   BUN  --   --  13  --  16   CR  --   --  0.88  --  0.97   ANIONGAP  --   --  10  --  12   TENZIN  --   --  8.3*  --  8.7   GLC  --   --  180*  --  233*   ALBUMIN  --   --   --   --  3.6   PROTTOTAL  --   --   --   --  7.3   BILITOTAL  --   --   --   --  0.5   ALKPHOS  --   --   --   --  57   ALT  --   --   --   --  25   AST  --   --   --   --  25   < > = values in this interval not displayed.    No results found for this or any previous visit (from the past 24 hour(s)).         Revision History        User Key Date/Time User Provider Type Action    > [N/A] 2/11/2017 11:51 PM Ash Tidwell MD Physician Sign            Progress Notes by Ash Tidwell MD at 2/10/2017  2:18 PM     Author:  Ash Tidwell MD Service:  Hospitalist Author Type:  Physician    Filed:  2/10/2017  2:48 PM Date of Service:  2/10/2017  2:18 PM Note Created:  2/10/2017  2:18 PM    Status:  Signed :  Ash Tidwell MD (Physician)         [facility]    Hospitalist Progress Note  Ash Tidwell MD    Date of Service : 02/10/2017    Assessment and Plan     76 year old male with PmHx of  CAD, Type 2 DM, embolic stroke, prostate cancer, hyperlipidemia, dementia, esophageal stricture, who was admitted on 2/8/17 due to fever up to 102F, chills, sore throat, lightheadedness with standing and history of   3 falls on the day of admission. Work up done on 2/7/17 revealed, normal CMP. CBC revealed, Hb 12.7, WBC 7.0 and Plts 142. Lactic acid was 3.1. INR was 1.87 and UA is negative. Nasal swab was positive for Influenza A. Chest x-rays on  2/7/17 is normal.    1.  Influenza A with SIRS: Continue Tamiflu 75mg po b.i.d. for total of 10 doses (to end on 2/12/17). For scheduled Chloraseptic  Lozenges and lozenges prn.    2. Type 2 DM: BG was 139 this am. HbA1C was 7.6% on 1/25/17. Recommenced metformin and glimepiride today.  Continue medium dose sliding scale prn.    3. Chronic systolic congestive heart failure: monitor weights. Continue, aspirin, toprol Xl and lipitor. ECHO done in July 2014 revealed, LVEF 35%.    4. History of embolic CVA:  continue warfarin per pharmacy. INR is 1.96 on 2/9/17.    5. Prostate cancer: pt follows [facility]. On Lupron and  Casodex.        DVT Prophylaxis: Ambulate every shift  Code Status: Prior    Disposition: Expected discharge in 1-3 days. Appreciate PT review. For possible TCU at discharge, as the pt's assisted living facility will not take him back, if he needs Home PT.      Interval History  The pt still has a sore throat. He is coughing up clear sputum. No complaints of shortness of breath/chest pain.    -Data reviewed today: I reviewed all new labs and imaging results over the last 24 hours. I personally reviewed no images or EKG's today.    Physical Exam  Temp: 98.6  F (37  C) Temp src: Oral BP: 118/68 mmHg Pulse: 72 Heart Rate: 65 Resp: 16 SpO2: 94 % O2 Device: None (Room air)    Filed Vitals:    02/07/17 2245 02/09/17 0729 02/10/17 0606   Weight: 84.596 kg (186 lb 8 oz) 81.7 kg (180 lb 1.9 oz) 80.7 kg (177 lb 14.6 oz)     Vital Signs with Ranges  Temp:  [98.6  F (37  C)-99.2  F (37.3  C)] 98.6  F (37  C)  Pulse:  [72] 72  Heart Rate:  [65-74] 65  Resp:  [16] 16  BP: (113-118)/(65-68) 118/68 mmHg  SpO2:  [94 %-96 %] 94 %  I/O last 3 completed shifts:  In: 880 [P.O.:880]  Out: 575 [Urine:575]    Constitutional: Elderly white male, awake, raspy voice+, cooperative, no apparent distress, O2 Sats 97% on RA  HENNT: No pharyngeal erythema  Respiratory: BS vesicular bilaterally, no crackles or  wheezing  Cardiovascular: S1 and S2, reg, no murmur noted  GI: Soft, non-distended, non-tender, no masses, BS present+  Skin/Integumen: No rashes  Extremities: Bilat pedal edema 1+    Medications    Warfarin Therapy Reminder         warfarin  1 mg Oral ONCE at 18:00     sore throat lozenge  1 lozenge Buccal TID     oseltamivir  75 mg Oral BID     aspirin  81 mg Oral Daily     atorvastatin  20 mg Oral Daily     bicalutamide  50 mg Oral QPM     sertraline  50 mg Oral Daily     metoprolol  25 mg Oral Daily     insulin aspart  1-7 Units Subcutaneous TID AC     insulin aspart  1-5 Units Subcutaneous At Bedtime     sodium chloride (PF)  3 mL Intracatheter Q8H       Data    Recent Labs  Lab 02/10/17  0647 02/09/17  0641 02/08/17  1126 02/07/17  2300   WBC  --  7.4  --  7.0   HGB  --  12.1*  --  12.7*   MCV  --  91  --  92   PLT  --  131*  --  142*   INR 2.70* 1.96* 2.03* 1.87*   NA  --  137  --  139   POTASSIUM  --  3.5  --  3.9   CHLORIDE  --  105  --  104   CO2  --  22  --  23   BUN  --  13  --  16   CR  --  0.88  --  0.97   ANIONGAP  --  10  --  12   TENZIN  --  8.3*  --  8.7   GLC  --  180*  --  233*   ALBUMIN  --   --   --  3.6   PROTTOTAL  --   --   --  7.3   BILITOTAL  --   --   --  0.5   ALKPHOS  --   --   --  57   ALT  --   --   --  25   AST  --   --   --  25       No results found for this or any previous visit (from the past 24 hour(s)).         Revision History        User Key Date/Time User Provider Type Action    > [N/A] 2/10/2017  2:48 PM Ash Tidwell MD Physician Sign            Progress Notes by Cristela Acosta LSW at 2/10/2017  2:02 PM     Author:  Cristela Acosta LSW Service:  Social Work Author Type:      Filed:  2/10/2017  2:13 PM Date of Service:  2/10/2017  2:02 PM Note Created:  2/10/2017  2:02 PM    Status:  Signed :  Cristela Acosta LSW ()         CHARLIE  D: CHARLIE following for discharge planning needs.  CHARLIE received a message from Maida in  Admissions at [facility] stating that they will have a bed available for patient on Monday.  SW to follow up closer to discharge regarding transportation.  P: SW will continue to follow and assist with finalizing the discharge plan as appropriate.    SHU Miramontes         Revision History        User Key Date/Time User Provider Type Action    > [N/A] 2/10/2017  2:13 PM Cristela Acosta LSW  Sign                  Procedure Notes     No notes of this type exist for this encounter.         Progress Notes - Therapies (Notes from 02/10/17 through 02/13/17)      Progress Notes by Aileen Wan at 2/13/2017 10:30 AM     Author:  Aileen Wan Service:  Infection Preventionist Author Type:  (none)    Filed:  2/13/2017 10:34 AM Date of Service:  2/13/2017 10:30 AM Note Created:  2/13/2017 10:30 AM    Status:  Signed :  Aileen Wan         Droplet isolation for influenza:  (Per Dr. Duncan H and P dated 2/8/17, symptoms of influenza started 36 hours prior.  Influenza symptom onset is 2/6/17.)  Droplet isolation for influenza for 7 days after illness onset or 24 hours after resolution of fever and respiratory symptoms, whichever is longer.  Earliest D/C of droplet iso is 2/13/17, provided that fever and respiratory symptoms are resolved.  ALEXANDRA [facility][TD1.1]     Revision History        User Key Date/Time User Provider Type Action    > TD1.1 2/13/2017 10:34 AM Aileen Wan (none) Sign

## 2017-02-07 NOTE — IP AVS SNAPSHOT
[address] [facility]    [address] [place] [address]    Phone:  [phone]                                       After Visit Summary   2/7/2017    Jose Lees    MRN: [id number]           After Visit Summary Signature Page     I have received my discharge instructions, and my questions have been answered. I have discussed any challenges I see with this plan with the nurse or doctor.    ..........................................................................................................................................  Patient/Patient Representative Signature      ..........................................................................................................................................  Patient Representative Print Name and Relationship to Patient    ..................................................               ................................................  Date                                            Time    ..........................................................................................................................................  Reviewed by Signature/Title    ...................................................              ..............................................  Date                                                            Time

## 2017-02-07 NOTE — IP AVS SNAPSHOT
MRN:[id number]                      After Visit Summary   2/7/2017    Jose Lees    MRN: [id number]           Thank you!     Thank you for choosing [facility] for your care. Our goal is always to provide you with excellent care. Hearing back from our patients is one way we can continue to improve our services. Please take a few minutes to complete the written survey that you may receive in the mail after you visit with us. Thank you!        Patient Information     Date Of Birth          1940        About your hospital stay     You were admitted on:  February 8, 2017 You last received care in the:  [address] [facility]    You were discharged on:  February 13, 2017        Reason for your hospital stay       Influenza A infection.                  Who to Call     For medical emergencies, please call 911.  For non-urgent questions about your medical care, please call your primary care provider or clinic, [phone]          Attending Provider     Provider Specialty    Pollo Armstrong MD Emergency Medicine    Duncan, Dyllan Darnell MD Internal Medicine       Primary Care Provider Office Phone # Fax #    Jermain Helm [phone]       [facility] [address] [facility] [address]        After Care Instructions     Activity       Your activity upon discharge: activity as tolerated.            Diet       Follow this diet upon discharge:   Combination Diet Low Saturated Fat Na <2400mg Diet, No Caffeine Diet            General info for SNF       Length of Stay Estimate: Short Term Care: Estimated # of Days <30  Condition at Discharge: Stable  Level of care:skilled   Rehabilitation Potential: Good  Admission H&P remains valid and up-to-date: Yes  Recent Chemotherapy: N/A  Use Nursing Home Standing Orders: Yes            Mantoux instructions       Give two-step Mantoux (PPD) Per Facility Policy Yes                  Follow-up  "Appointments     Follow-up and recommended labs and tests        Follow up with primary care provider, Jermain Helm, within 1-2 weeks, for hospital follow- up. No follow up labs or test are needed.                  Additional Services     Occupational Therapy Adult Consult       Evaluate and treat as clinically indicated.    Reason:   Influenza A and Deconditioning.            Physical Therapy Adult Consult       Evaluate and treat as clinically indicated.    Reason: Influenza A and Deconditioning.                  Pending Results     No orders found from 2/5/2017 to 2/8/2017.            Statement of Approval     Ordered          02/13/17 1407  I have reviewed and agree with all the recommendations and orders detailed in this document.  EFFECTIVE NOW     Approved and electronically signed by:  Ash Tidwell MD           02/09/17 1325  I have reviewed and agree with all the recommendations and orders detailed in this document.  EFFECTIVE NOW     Approved and electronically signed by:  Ash Tidwell MD             Admission Information     Date & Time Provider Department Dept. Phone    2/7/2017 Duncan, Dyllan Darnell MD [place] [address] [facility] [phone]      Your Vitals Were     Blood Pressure Pulse Temperature Respirations Height Weight    111/64 68 97.5  F (36.4  C) (Oral) 16 1.765 m (5' 9.5\") 80.7 kg (177 lb 14.6 oz)    Pulse Oximetry BMI (Body Mass Index)                94% 25.9 kg/m2          MyChart Information     [email or web address] gives you secure access to your electronic health record. If you see a primary care provider, you can also send messages to your care team and make appointments. If you have questions, please call your primary care clinic.  If you do not have a primary care provider, please call [phone] and they will assist you.        Care EveryWhere ID     This is your Care EveryWhere ID. This could be used by other organizations to access your " [facility] medical records  [id number]           Review of your medicines      START taking        Dose / Directions    * benzocaine-menthol 6-10 MG lozenge   Commonly known as:  CHLORASEPTIC   Used for:  Throat pain        Dose:  1 lozenge   Place 1 lozenge inside cheek 3 times daily for 3 days   Quantity:  84 lozenge   Refills:  0       * benzocaine-menthol 6-10 MG lozenge   Commonly known as:  CHLORASEPTIC   Used for:  Throat pain        Dose:  1 lozenge   Place 1 lozenge inside cheek every hour as needed for sore throat   Quantity:  84 lozenge   Refills:  0       oseltamivir 75 MG capsule   Commonly known as:  TAMIFLU   Indication:  Flu        Dose:  75 mg   Take 1 capsule (75 mg) by mouth 2 times daily for 7 doses   Quantity:  7 capsule   Refills:  1       * Notice:  This list has 2 medication(s) that are the same as other medications prescribed for you. Read the directions carefully, and ask your doctor or other care provider to review them with you.      CONTINUE these medicines which have NOT CHANGED        Dose / Directions    ACE/ARB NOT PRESCRIBED (INTENTIONAL)   Used for:  Chronic systolic congestive heart failure (H)        ACE & ARB not prescribed due to Symptomatic hypotension not due to excessive diuresis   Refills:  0       aspirin 81 MG chewable tablet   Used for:  History of MI (myocardial infarction)        Dose:  81 mg   Take 1 tablet (81 mg) by mouth daily   Quantity:  36 tablet   Refills:  12       atorvastatin 20 MG tablet   Commonly known as:  LIPITOR   Used for:  Encounter for medication refill        TAKE 1 TABLET EVERY DAY   Quantity:  90 tablet   Refills:  1       bicalutamide 50 MG tablet   Commonly known as:  CASODEX   Used for:  Encounter for medication refill        TAKE 1 TABLET EVERY EVENING   Quantity:  90 tablet   Refills:  3       Blood Glucose Monitoring Suppl W/DEVICE Kit   Used for:  Type II or unspecified type diabetes mellitus without mention of complication, not stated as  uncontrolled, Rhabdomyolysis, UTI (urinary tract infection)        Test blood glucose every other day   Quantity:  1 kit   Refills:  0       D3-1000 PO        Dose:  1 capsule   Take 1 capsule by mouth daily   Refills:  0       ENSURE PO        Dose:  1 Can   Take 1 Can by mouth daily.   Refills:  0       glimepiride 1 MG tablet   Commonly known as:  AMARYL   Used for:  Encounter for medication refill        TAKE 1 TABLET (1 MG) BY MOUTH EVERY MORNING (BEFORE BREAKFAST)   Quantity:  90 tablet   Refills:  1       metFORMIN 500 MG tablet   Commonly known as:  GLUCOPHAGE   Used for:  Encounter for medication refill        Dose:  1000 mg   Take 2 tablets (1,000 mg) by mouth 2 times daily (with meals)   Quantity:  360 tablet   Refills:  1       metoprolol 25 MG 24 hr tablet   Commonly known as:  TOPROL-XL   Used for:  Encounter for medication refill        TAKE 1 TABLET EVERY DAY   Quantity:  90 tablet   Refills:  3       multivitamin, therapeutic Tabs tablet        Dose:  1 tablet   Take 1 tablet by mouth daily   Refills:  0       sertraline 50 MG tablet   Commonly known as:  ZOLOFT   Used for:  Encounter for medication refill        TAKE 1 TABLET EVERY DAY   Quantity:  90 tablet   Refills:  3       * warfarin 4 MG tablet   Commonly known as:  COUMADIN   Used for:  Encounter for medication refill        4 mg on Sundays, Tuesdays, Thursdays, and Saturdays   Quantity:  180 tablet   Refills:  3       * warfarin 2 MG tablet   Commonly known as:  COUMADIN   Used for:  Long term (current) use of anticoagulants        Take 2 mg on Mondays, Wednesdays, and Fridays   Quantity:  90 tablet   Refills:  3       * Notice:  This list has 2 medication(s) that are the same as other medications prescribed for you. Read the directions carefully, and ask your doctor or other care provider to review them with you.         Where to get your medicines      These medications were sent to [facility] [place], [place] - [address]   [address] [place] [address]     Phone:  [phone]     oseltamivir 75 MG capsule         Some of these will need a paper prescription and others can be bought over the counter. Ask your nurse if you have questions.     You don't need a prescription for these medications     benzocaine-menthol 6-10 MG lozenge    benzocaine-menthol 6-10 MG lozenge                Protect others around you: Learn how to safely use, store and throw away your medicines at www.disposemymeds.org.             Medication List: This is a list of all your medications and when to take them. Check marks below indicate your daily home schedule. Keep this list as a reference.      Medications           Morning Afternoon Evening Bedtime As Needed    ACE/ARB NOT PRESCRIBED (INTENTIONAL)   ACE & ARB not prescribed due to Symptomatic hypotension not due to excessive diuresis                                aspirin 81 MG chewable tablet   Take 1 tablet (81 mg) by mouth daily   Last time this was given:  81 mg on 2/13/2017  8:23 AM                                atorvastatin 20 MG tablet   Commonly known as:  LIPITOR   TAKE 1 TABLET EVERY DAY   Last time this was given:  20 mg on 2/13/2017  8:23 AM                                * benzocaine-menthol 6-10 MG lozenge   Commonly known as:  CHLORASEPTIC   Place 1 lozenge inside cheek 3 times daily for 3 days   Last time this was given:  1 lozenge on 2/13/2017 10:24 AM                                * benzocaine-menthol 6-10 MG lozenge   Commonly known as:  CHLORASEPTIC   Place 1 lozenge inside cheek every hour as needed for sore throat   Last time this was given:  1 lozenge on 2/13/2017 10:24 AM                                bicalutamide 50 MG tablet   Commonly known as:  CASODEX   TAKE 1 TABLET EVERY EVENING   Last time this was given:  50 mg on 2/12/2017  9:21 PM                                Blood Glucose Monitoring Suppl W/DEVICE Kit   Test blood glucose every other day                                 D3-1000 PO   Take 1 capsule by mouth daily                                ENSURE PO   Take 1 Can by mouth daily.                                glimepiride 1 MG tablet   Commonly known as:  AMARYL   TAKE 1 TABLET (1 MG) BY MOUTH EVERY MORNING (BEFORE BREAKFAST)   Last time this was given:  1 mg on 2/13/2017  8:23 AM                                metFORMIN 500 MG tablet   Commonly known as:  GLUCOPHAGE   Take 2 tablets (1,000 mg) by mouth 2 times daily (with meals)   Last time this was given:  1,000 mg on 2/13/2017  8:23 AM                                metoprolol 25 MG 24 hr tablet   Commonly known as:  TOPROL-XL   TAKE 1 TABLET EVERY DAY   Last time this was given:  25 mg on 2/13/2017  8:23 AM                                multivitamin, therapeutic Tabs tablet   Take 1 tablet by mouth daily                                oseltamivir 75 MG capsule   Commonly known as:  TAMIFLU   Take 1 capsule (75 mg) by mouth 2 times daily for 7 doses   Last time this was given:  75 mg on 2/12/2017  8:19 AM                                sertraline 50 MG tablet   Commonly known as:  ZOLOFT   TAKE 1 TABLET EVERY DAY   Last time this was given:  50 mg on 2/13/2017  8:23 AM                                * warfarin 4 MG tablet   Commonly known as:  COUMADIN   4 mg on Sundays, Tuesdays, Thursdays, and Saturdays   Last time this was given:  0.5 mg on 2/11/2017  6:01 PM                                * warfarin 2 MG tablet   Commonly known as:  COUMADIN   Take 2 mg on Mondays, Wednesdays, and Fridays   Last time this was given:  0.5 mg on 2/11/2017  6:01 PM                                * Notice:  This list has 4 medication(s) that are the same as other medications prescribed for you. Read the directions carefully, and ask your doctor or other care provider to review them with you.

## 2017-02-07 NOTE — IP AVS SNAPSHOT
Jose Lees #[id number] (CSN: [id number])  (76 year old M)  (Adm: 17)     [id number]               [place] [address] [facility] UNIT: [phone]            Patient Demographics     Patient Name Sex          Age SSN Address Phone    Francisco JangelikacrystalJose coburn Male 1940 (76 year old) [id number] [facility] [address] [phone] (Home)  NONE (Work)  [phone] (Mobile)      Emergency Contact(s)     Name Relation Home Work Mobile    RAEANN LEON [phone] none [phone]      Admission Information     Attending Provider Admitting Provider Admission Type Admission Date/Time    Duncan, Dyllan Darnell MD Duncan, Dyllan Darnell MD Emergency 17  2244    Discharge Date Hospital Service Auth/Cert Status Service Area     Hospitalist [facility] SERVICES    Unit Room/Bed Admission Status       [facility] ORTHO SPEC UNIT 5517/5517-01 Admission (Confirmed)       Admission     Complaint    Influenza A      Hospital Account     Name Acct ID Class Status Primary Coverage    Jose Lees [id number] Inpatient Open MEDICARE - MEDICARE FOR HB SUPPLEMENT            Guarantor Account (for Hospital Account #[id number])     Name Relation to Pt Service Area Active? Acct Type    Jsoe Lees Self FCS Yes Personal/Family    Address Phone          [address] [place] [address] [phone](H)  NONE(O)              Coverage Information (for Hospital Account #[id number])     1. MEDICARE/MEDICARE FOR HB SUPPLEMENT     F/O Payor/Plan Precert #    MEDICARE/MEDICARE FOR HB SUPPLEMENT     Subscriber Subscriber #    Jose Lees [id number]    Address Phone    ATTN CLAIMS  [address]  [facility] [place] [address] [phone]          2. MEDICA/MEDICA PRIME SOLUTION     F/O Payor/Plan Precert #    MEDICA/MEDICA PRIME SOLUTION     Subscriber Subscriber #    Jose Lees [id number]    Address Phone    [address] [address]  [facility]  [place] [address] [phone]                                                      INTERAGENCY TRANSFER FORM - PHYSICIAN ORDERS   2/7/2017                       [place] [address] ORTHO SPECIALTY UNIT: [phone]            Attending Provider: Dyllan Duncan MD     Allergies:  No Known Allergies    Infection:  None   Service:  HOSPITALIST    Ht:  1.765 m (5' 9.5\")   Wt:  80.7 kg (177 lb 14.6 oz)   Admission Wt:  84.6 kg (186 lb 8 oz)    BMI:  25.9 kg/m 2   BSA:  1.99 m 2            ED Clinical Impression     Diagnosis Description Comment Added By Time Added    Influenza A [J10.1] Influenza A [J10.1]  Pollo Armstrong MD 2/8/2017 12:13 AM    Generalized muscle weakness [M62.81] Generalized muscle weakness [M62.81]  Pollo Armstrong MD 2/8/2017 12:13 AM    Fall, initial encounter [W19.XXXA] Fall, initial encounter [W19.XXXA]  Pollo Armstrong MD 2/8/2017 12:13 AM      Hospital Problems as of 2/13/2017              Priority Class Noted POA    Influenza A Medium  2/8/2017 Yes      Non-Hospital Problems as of 2/13/2017              Priority Class Noted    Diabetes mellitus, type 2 (H)   Unknown    Prostate cancer (H)   10/13/2011    Autism disorder   6/29/2012    ACP (advance care planning)   Unknown    Health Care Home   1/7/2014    History of MI (myocardial infarction) Medium  5/12/2014    History of stroke Medium  5/12/2014    CHF (congestive heart failure) (H) Medium  5/12/2014    Long term current use of anticoagulant therapy Medium  10/23/2014    Advanced directives, counseling/discussion   8/12/2015    Stricture and stenosis of esophagus Medium  10/22/2015    History of TIA (transient ischemic attack) and stroke Medium  2/5/2017      Code Status History     Date Active Date Inactive Code Status Order ID Comments User Context    2/9/2017  1:21 PM  Full Code [id number]  Ash Tidwell MD Outpatient    2/8/2017  2:32 AM 2/9/2017  1:21 PM Full Code [id number]  Dyllan Duncan MD " Inpatient    8/7/2015 10:12 AM 2/8/2017  2:32 AM Full Code [id number]  Brock Smith MD Outpatient    8/6/2015 12:12 PM 8/7/2015 10:12 AM Full Code [id number]  Brock Smith MD Inpatient    4/7/2014  5:11 PM 4/11/2014  3:41 PM Full Code [id number]  Buck Peraza RN Inpatient    6/1/2012  6:31 PM 6/4/2012  7:31 PM Full Code [id number]  Stefan Barraza RN Inpatient      Current Code Status     Date Active Code Status Order ID Comments User Context       Prior      Summary of Visit     Reason for your hospital stay       Influenza A infection.                Medication Review      START taking        Dose / Directions Comments    * benzocaine-menthol 6-10 MG lozenge   Commonly known as:  CHLORASEPTIC   Used for:  Throat pain        Dose:  1 lozenge   Place 1 lozenge inside cheek 3 times daily for 3 days   Quantity:  84 lozenge   Refills:  0        * benzocaine-menthol 6-10 MG lozenge   Commonly known as:  CHLORASEPTIC   Used for:  Throat pain        Dose:  1 lozenge   Place 1 lozenge inside cheek every hour as needed for sore throat   Quantity:  84 lozenge   Refills:  0        oseltamivir 75 MG capsule   Commonly known as:  TAMIFLU   Indication:  Flu        Dose:  75 mg   Take 1 capsule (75 mg) by mouth 2 times daily for 7 doses   Quantity:  7 capsule   Refills:  1        * Notice:  This list has 2 medication(s) that are the same as other medications prescribed for you. Read the directions carefully, and ask your doctor or other care provider to review them with you.      CONTINUE these medications which have NOT CHANGED        Dose / Directions Comments    ACE/ARB NOT PRESCRIBED (INTENTIONAL)   Used for:  Chronic systolic congestive heart failure (H)        ACE & ARB not prescribed due to Symptomatic hypotension not due to excessive diuresis   Refills:  0        aspirin 81 MG chewable tablet   Used for:  History of MI (myocardial infarction)        Dose:  81 mg   Take 1 tablet (81 mg) by mouth daily    Quantity:  36 tablet   Refills:  12        atorvastatin 20 MG tablet   Commonly known as:  LIPITOR   Used for:  Encounter for medication refill        TAKE 1 TABLET EVERY DAY   Quantity:  90 tablet   Refills:  1    - Reminder needs fasting labs & diabetic appt before next refill     - thanks       bicalutamide 50 MG tablet   Commonly known as:  CASODEX   Used for:  Encounter for medication refill        TAKE 1 TABLET EVERY EVENING   Quantity:  90 tablet   Refills:  3        Blood Glucose Monitoring Suppl W/DEVICE Kit   Used for:  Type II or unspecified type diabetes mellitus without mention of complication, not stated as uncontrolled, Rhabdomyolysis, UTI (urinary tract infection)        Test blood glucose every other day   Quantity:  1 kit   Refills:  0    Dispence device and strips per patients insurance coverage       D3-1000 PO        Dose:  1 capsule   Take 1 capsule by mouth daily   Refills:  0        ENSURE PO        Dose:  1 Can   Take 1 Can by mouth daily.   Refills:  0        glimepiride 1 MG tablet   Commonly known as:  AMARYL   Used for:  Encounter for medication refill        TAKE 1 TABLET (1 MG) BY MOUTH EVERY MORNING (BEFORE BREAKFAST)   Quantity:  90 tablet   Refills:  1        metFORMIN 500 MG tablet   Commonly known as:  GLUCOPHAGE   Used for:  Encounter for medication refill        Dose:  1000 mg   Take 2 tablets (1,000 mg) by mouth 2 times daily (with meals)   Quantity:  360 tablet   Refills:  1        metoprolol 25 MG 24 hr tablet   Commonly known as:  TOPROL-XL   Used for:  Encounter for medication refill        TAKE 1 TABLET EVERY DAY   Quantity:  90 tablet   Refills:  3        multivitamin, therapeutic Tabs tablet        Dose:  1 tablet   Take 1 tablet by mouth daily   Refills:  0        sertraline 50 MG tablet   Commonly known as:  ZOLOFT   Used for:  Encounter for medication refill        TAKE 1 TABLET EVERY DAY   Quantity:  90 tablet   Refills:  3        * warfarin 4 MG tablet   Commonly  known as:  COUMADIN   Used for:  Encounter for medication refill        4 mg on Sundays, Tuesdays, Thursdays, and Saturdays   Quantity:  180 tablet   Refills:  3        * warfarin 2 MG tablet   Commonly known as:  COUMADIN   Used for:  Long term (current) use of anticoagulants        Take 2 mg on Mondays, Wednesdays, and Fridays   Quantity:  90 tablet   Refills:  3        * Notice:  This list has 2 medication(s) that are the same as other medications prescribed for you. Read the directions carefully, and ask your doctor or other care provider to review them with you.            After Care     Activity       Your activity upon discharge: activity as tolerated.       Diet       Follow this diet upon discharge:   Combination Diet Low Saturated Fat Na <2400mg Diet, No Caffeine Diet       General info for SNF       Length of Stay Estimate: Short Term Care: Estimated # of Days <30  Condition at Discharge: Stable  Level of care:skilled   Rehabilitation Potential: Good  Admission H&P remains valid and up-to-date: Yes  Recent Chemotherapy: N/A  Use Nursing Home Standing Orders: Yes       Mantoux instructions       Give two-step Mantoux (PPD) Per Facility Policy Yes             Referrals     Occupational Therapy Adult Consult       Evaluate and treat as clinically indicated.    Reason:   Influenza A and Deconditioning.       Physical Therapy Adult Consult       Evaluate and treat as clinically indicated.    Reason: Influenza A and Deconditioning.             Follow-Up Appointment Instructions     Follow-up and recommended labs and tests        Follow up with primary care provider, Jermain Helm, within 1-2 weeks, for hospital follow- up. No follow up labs or test are needed.             Statement of Approval     Ordered          02/13/17 1408  I have reviewed and agree with all the recommendations and orders detailed in this document.  EFFECTIVE NOW     Approved and electronically signed by:  Ash Tidwell  "MD           02/09/17 1322  I have reviewed and agree with all the recommendations and orders detailed in this document.  EFFECTIVE NOW     Approved and electronically signed by:  Ash Tidwell MD                                                 INTERAGENCY TRANSFER FORM - NURSING   2/7/2017                       [place] [address] [facility]IT: [phone]            Attending Provider: Dakota, Dyllan Darnell MD     Allergies:  No Known Allergies    Infection:  None   Service:  HOSPITALIST    Ht:  1.765 m (5' 9.5\")   Wt:  80.7 kg (177 lb 14.6 oz)   Admission Wt:  84.6 kg (186 lb 8 oz)    BMI:  25.9 kg/m 2   BSA:  1.99 m 2            Advance Directives        Does patient have a scanned Advance Directive/ACP document in EPIC?           No        Immunizations     Name Date      HERPES ZOSTER 03/03/15     Influenza (High Dose) 3 valent vaccine 10/07/16     Influenza (High Dose) 3 valent vaccine 11/11/15     Influenza (High Dose) 3 valent vaccine 09/12/14     Influenza (IIV3) 10/09/13     Influenza (IIV3) 09/19/12     Influenza (IIV3) 09/16/11     Pneumococcal (PCV 13) 10/07/16     Pneumococcal 23 valent 09/16/11     TDAP (ADACEL AGES 11-64) 09/16/11       ASSESSMENT     Discharge Profile Flowsheet     EXPECTED DISCHARGE     GI Signs/Symptoms  diarrhea 02/13/17 1320    Expected Discharge Date  02/13/17 ([facility] at 1530) 02/13/17 1403   Passing flatus  yes 02/13/17 1320    DISCHARGE NEEDS ASSESSMENT     COMMUNICATION ASSESSMENT      Concerns To Be Addressed  discharge planning concerns 02/09/17 1501   Patient's communication style  spoken language (English or Bilingual) 08/06/15 1217    Patient/family verbalizes understanding of discharge plan recommendations?  Yes 02/13/17 1403   FINAL RESOURCES      Medical Team notified of plan?  yes 02/13/17 1403   Resources List  Transitional Care 02/10/17 1402    Equipment Currently Used at Home  walker, standard 02/09/17 1120   " "Transitional Care  [facility] [phone] 02/10/17 1402    Transportation Available  family or friend will provide (Family at 1530) 02/13/17 1403   PAS Number  [id number] 02/13/17 1428    # of Referrals Placed by CTS  Senior Linkage Line 02/13/17 1428   Senior Linkage Line Referral Placed  02/13/17 02/13/17 1428    Equipment Used at Home  bath bench;walker, rolling;grab bar 04/08/14 1340   Referrals Placed  Senior Linkage Line 02/13/17 1428    ASSESSMENT OF FUNCTIONAL STATUS     SKIN      Assesssment of Functional Status  Needs placement in a SNF/TCF for rehabilitation 02/09/17 1501   Inspection  Full 02/13/17 0205    GASTROINTESTINAL (ADULT,PEDIATRIC,OB)     Skin WDL  WDL 02/13/17 1320    GI WDL  ex 02/13/17 1320   Skin Color/Characteristics  pale 02/13/17 0205    Abdominal Appearance  distended 02/10/17 0854   SAFETY      Last Bowel Movement  02/13/17 02/13/17 1320   Safety WDL  WDL 02/13/17 1327                 Assessment WDL (Within Defined Limits) Definitions           Safety WDL     Effective: 09/28/15    Row Information: <b>WDL Definition:</b> Bed in low position, wheels locked; call light in reach; upper side rails up x 2; ID band on<br> <font color=\"gray\"><i>Item=AS safety wdl>>List=AS safety wdl>>Version=F14</i></font>      Skin WDL     Effective: 09/28/15    Row Information: <b>WDL Definition:</b> Warm; dry; intact; elastic; without discoloration; pressure points without redness<br> <font color=\"gray\"><i>Item=AS skin wdl>>List=AS skin wdl>>Version=F14</i></font>      Vitals     Vital Signs Flowsheet     VITAL SIGNS     Side Effects Monitoring: Sedation Level  S 02/13/17 0153    Temp  97.5  F (36.4  C) 02/13/17 0814   HEIGHT AND WEIGHT      Temp src  Oral 02/13/17 0814   Height  1.765 m (5' 9.5\") 02/07/17 2249    Resp  16 02/13/17 0814   Height Method  Stated 02/07/17 2249    Pulse  68 02/13/17 0103   Weight  80.7 kg (177 lb 14.6 oz) 02/10/17 0606    Heart Rate  79 02/13/17 0814   " BSA (Calculated - sq m)  2.04 02/07/17 2249    Pulse/Heart Rate Source  Monitor 02/12/17 1950   BMI (Calculated)  27.2 02/07/17 2249    BP  111/64 02/13/17 0814   JAVON COMA SCALE      BP Location  Left arm 02/13/17 0103   Best Eye Response  4-->(E4) spontaneous 02/13/17 0153    OXYGEN THERAPY     Best Motor Response  6-->(M6) obeys commands 02/13/17 0153    SpO2  94 % 02/13/17 0814   Best Verbal Response  5-->(V5) oriented 02/13/17 0153    O2 Device  None (Room air) 02/13/17 0814   Javon Coma Scale Score  15 02/13/17 0153    PAIN/COMFORT     POSITIONING      Patient Currently in Pain  denies 02/13/17 1312   Body Position  independently positioning 02/13/17 1327    Preferred Pain Scale  number (Numeric Rating Pain Scale) 02/12/17 0558   Head of Bed (HOB)  HOB at 30-45 degrees 02/13/17 0205    0-10 Pain Scale  3 02/11/17 0949   Chair  Recline and up in chair 02/12/17 0543    Pain Location  Throat 02/11/17 2051   Positioning/Transfer Devices  pillows 02/13/17 1327    Pain Management Interventions  medication offered but refused;no interventions per patient request 02/11/17 2051   DAILY CARE      Pain Intervention(s)  Medication (See eMAR) 02/11/17 1830   Activity Type  ambulated to bathroom;dorsiflexion, plantar flexion encouraged 02/13/17 1327    ANALGESIA SIDE EFFECTS MONITORING     Activity Level of Assistance  assistance, 1 person 02/13/17 1327    Side Effects Monitoring: Respiratory Quality  R 02/13/17 0153   Activity Assistive Device  gait belt;walker 02/13/17 1327    Side Effects Monitoring: Respiratory Depth  N 02/13/17 0153                 Patient Lines/Drains/Airways Status    Active LINES/DRAINS/AIRWAYS     None            Patient Lines/Drains/Airways Status    Active PICC/CVC     None            Intake/Output Detail Report     Date Intake     Output Net    Shift P.O. I.V. IV Piggyback Total Urine Total       Day 02/12/17 0700 - 02/12/17 1459 400 -- -- 400 -- -- 400    Pinky 02/12/17 1500 - 02/12/17 1258  -- -- -- -- 200 200 -200    Noc 02/12/17 2300 - 02/13/17 0659 -- -- -- -- -- -- 0    Day 02/13/17 0700 - 02/13/17 1459 -- -- -- -- 400 400 -400    Pinky 02/13/17 1500 - 02/13/17 2259 -- -- -- -- -- -- 0      Last Void/BM       Most Recent Value    Urine Occurrence 1 at 02/13/2017 1308    Stool Occurrence 1 at 02/13/2017 1308      Case Management/Discharge Planning     Case Management/Discharge Planning Flowsheet     REFERRAL INFORMATION     Sources Of Support  friend(s);other family members;sibling(s) 02/09/17 1501    Did the Initial Social Work Assessment result in a Social Work Case?  Yes 02/09/17 1459   Reaction To Health Status  accepting 02/09/17 1501    Admission Type  inpatient 02/09/17 1459   Understanding Of Condition And Treatment  adequate understanding of medical condition;adequate understanding of treatment 02/09/17 1501    Arrived From  home or self-care 02/09/17 1459   EXPECTED DISCHARGE      Referral Source  interdisciplinary rounds 02/09/17 1501   Expected Discharge Date  02/13/17 ([facility] at 1530) 02/13/17 1403    # of Referrals Placed by [facility]  Senior Linkage Line 02/13/17 1428   ASSESSMENT/CONCERNS TO BE ADDRESSED      Reason For Consult  discharge planning 02/09/17 1501   Concerns To Be Addressed  discharge planning concerns 02/09/17 1501    Record Reviewed  history and physical;medical record 02/09/17 1501   DISCHARGE PLANNING       Assigned to Case  SHU Miramontes 02/13/17 1428   Patient/family verbalizes understanding of discharge plan recommendations?  Yes 02/13/17 1403    Primary Care Clinic Name  [facility] 08/07/15 1231   Medical Team notified of plan?  yes 02/13/17 1403    Primary Care MD Name  Jermain Helm MD 02/09/17 1501   Transportation Available  family or friend will provide (Family at 1530) 02/13/17 1403    LIVING ENVIRONMENT     Skilled Nursing Facility  [facility] 06/04/12 1527    Lives With  alone 02/09/17 1501   Skilled  Nursing Facility Phone Number  [phone] 06/04/12 1527    Living Arrangements  assisted living ([facility]) 02/09/17 1501   Equipment Used at Home  bath bench;walker, rolling;grab bar 04/08/14 1340    Provides Primary Care For  no one 02/09/17 1501   FINAL NOTE      Quality Of Family Relationships  supportive;involved 02/09/17 1501   Final Note  D/C to [facility] on 2-13-17 via family at 1530 02/13/17 1403    Able to Return to Prior Living Arrangements  no 02/09/17 1501   FINAL RESOURCES      HOME SAFETY     Equipment Currently Used at Home  walker, standard 02/09/17 1120    Patient Feels Safe Living in Home?  yes 02/09/17 1501   Resources List  Transitional Care 02/10/17 1402    ASSESSMENT OF FAMILY/SOCIAL SUPPORT     Transitional Care  [facility] [phone] 02/10/17 1402    Marital Status  Single 02/09/17 1501   PAS Number  [id number] 02/13/17 1428    Who is your support system?  Sibling(s) 02/09/17 1501   Senior Linkage Line Referral Placed  02/13/17 02/13/17 1428    Description of Support System  Supportive;Involved 02/09/17 1501   Referrals Placed  Senior Linkage Line 02/13/17 1428    Support Assessment  Adequate family and caregiver support;Adequate social supports 02/09/17 1501   ABUSE RISK SCREEN      Quality of Family Relationships  supportive;involved 02/09/17 1501   QUESTION TO PATIENT:  Has a member of your family or a partner(now or in the past) intimidated, hurt, manipulated, or controlled you in any way?  no 02/07/17 2248    ASSESSMENT OF FUNCTIONAL STATUS     QUESTION TO PATIENT: Do you feel safe going back to the place where you are living?  yes 02/07/17 2248    Assesssment of Functional Status  Needs placement in a SNF/TCF for rehabilitation 02/09/17 1501   OBSERVATION: Is there reason to believe there has been maltreatment of a vulnerable adult (ie. Physical/Sexual/Emotional abuse, self neglect, lack of adequate food, shelter, medical care, or financial  exploitation)?  no 02/07/17 2248    EMPLOYMENT     (R) MENTAL HEALTH SUICIDE RISK      Do you work full or part-time?  no 02/09/17 1501   Are you depressed or being treated for depression?  No 02/08/17 0251    COPING/STRESS     HOMICIDE RISK      Major Change/Loss/Stressor  hospitalization 02/09/17 1501   Homicidal Ideation  no 02/07/17 2248    Patient Personal Strengths  expressive of needs;motivated;positive attitude;strong support system 02/09/17 1501                       [address] SPECIALTY UNIT: [phone]            Medication Administration Report for Jose Lees as of 02/13/17 1506   Legend:    Given Hold Not Given Due Canceled Entry Other Actions    Time Time (Time) Time  Time-Action       Inactive    Active    Linked        Medications 02/07/17 02/08/17 02/09/17 02/10/17 02/11/17 02/12/17 02/13/17    acetaminophen (TYLENOL) Suppository 650 mg  Dose: 650 mg Freq: EVERY 4 HOURS PRN Route: RE  PRN Reason: mild pain  Start: 02/08/17 0232   Admin Instructions: Alternate ibuprofen (if ordered) with acetaminophen.  Maximum acetaminophen dose from all sources = 75 mg/kg/day not to exceed 4 grams/day.               acetaminophen (TYLENOL) tablet 650 mg  Dose: 650 mg Freq: EVERY 4 HOURS PRN Route: PO  PRN Reasons: mild pain,fever  Start: 02/08/17 0232   Admin Instructions: Alternate ibuprofen (if ordered) with acetaminophen.  Maximum acetaminophen dose from all sources = 75 mg/kg/day not to exceed 4 grams/day.      2018 (650 mg)-Given        0818 (650 mg)-Given               aspirin chewable tablet 81 mg  Dose: 81 mg Freq: DAILY Route: PO  Start: 02/08/17 0900     0923 (81 mg)-Given        0818 (81 mg)-Given        0836 (81 mg)-Given        0855 (81 mg)-Given        0819 (81 mg)-Given        0823 (81 mg)-Given           atorvastatin (LIPITOR) tablet 20 mg  Dose: 20 mg Freq: DAILY Route: PO  Start: 02/08/17 0900     0923 (20 mg)-Given        0818 (20 mg)-Given        0837 (20  mg)-Given        0854 (20 mg)-Given        0819 (20 mg)-Given        0823 (20 mg)-Given           benzocaine-menthol (CHLORASEPTIC) 6-10 MG lozenge 1 lozenge  Dose: 1 lozenge Freq: 3 TIMES DAILY Route: BU  Start: 02/10/17 1415       1445 (1 lozenge)-Given       2151 (1 lozenge)-Given        0855 (1 lozenge)-Given       1802 (1 lozenge)-Given               (0819)-Not Given       (1545)-Not Given       2121 (1 lozenge)-Given        1024 (1 lozenge)-Given       [ ] 1600       [ ] 2200           benzocaine-menthol (CHLORASEPTIC) 6-10 MG lozenge 1 lozenge  Dose: 1 lozenge Freq: EVERY 1 HOUR PRN Route: BU  PRN Reason: sore throat  Start: 02/10/17 1411              bicalutamide (CASODEX) tablet 50 mg  Dose: 50 mg Freq: EVERY EVENING Route: PO  Start: 02/08/17 2000   Admin Instructions: Administer at the same time every day.      2018 (50 mg)-Given        2104 (50 mg)-Given        2151 (50 mg)-Given        2039 (50 mg)-Given        2121 (50 mg)-Given        [ ] 2000           bisacodyl (DULCOLAX) Suppository 10 mg  Dose: 10 mg Freq: DAILY PRN Route: RE  PRN Reason: constipation  Start: 02/08/17 0232   Admin Instructions: Hold for loose stools.  This is the third step of a three step constipation treatment protocol.               glimepiride (AMARYL) tablet 1 mg  Dose: 1 mg Freq: DAILY WITH BREAKFAST Route: PO  Start: 02/11/17 0900   Admin Instructions: Take before or with meals.         0855 (1 mg)-Given        0819 (1 mg)-Given        0823 (1 mg)-Given           glucose 40 % gel 15-30 g  Dose: 15-30 g Freq: EVERY 15 MIN PRN Route: PO  PRN Reason: low blood sugar  Start: 02/08/17 0232   Admin Instructions: Give 15 g for BG 51 to 69 mg/dL IF patient is conscious and able to swallow. Give 30 g for BG less than or equal to 50 mg/dL IF patient is conscious and able to swallow. Do NOT give glucose gel via enteral tube.  IF patient has enteral tube: give apple juice 120 mL (4 oz or 15 g of CHO) via enteral tube for BG 51 to 69  mg/dL.  Give apple juice 240 mL (8 oz or 30 g of CHO) via enteral tube for BG less than or equal to 50 mg/dL.              Or  dextrose 50 % injection 25-50 mL  Dose: 25-50 mL Freq: EVERY 15 MIN PRN Route: IV  PRN Reason: low blood sugar  Start: 02/08/17 0232   Admin Instructions: Use if have IV access, BG less than 70 mg/dL and meet dose criteria below:  Dose if conscious and alert (or disorientated) and NPO = 25 mL  Dose if unconscious / not alert = 50 mL              Or  glucagon injection 1 mg  Dose: 1 mg Freq: EVERY 15 MIN PRN Route: SC  PRN Reason: low blood sugar  PRN Comment: May repeat x 1 only  Start: 02/08/17 0232   Admin Instructions: May give SQ or IM. IF BG less than or equal to 50 mg/dL and no IV access.  ONLY use glucagon IF patient has NO IV access AND is UNABLE to swallow.               HYDROmorphone (PF) (DILAUDID) injection 0.2 mg  Dose: 0.2 mg Freq: EVERY 2 HOURS PRN Route: IV  PRN Reason: severe pain  Start: 02/08/17 0232   Admin Instructions: Hold while on PCA.               insulin aspart (NovoLOG) inj (RAPID ACTING)  Dose: 1-5 Units Freq: AT BEDTIME Route: SC  Start: 02/08/17 0245   Admin Instructions: MEDIUM INSULIN RESISTANCE DOSING    Do Not give Bedtime Correction Insulin if BG less than  200.   For  - 249 give 1 units.   For  - 299 give 2 units.   For  - 349 give 3 units.   For  -399 give 4 units.   For BG greater than or equal to 400 give 5 units.  Notify provider if glucose greater than or equal to 350 mg/dL after administration of correction dose.  If given at mealtime, must be administered 5 min before meal or immediately after.      (0317)-Not Given       2248 (1 Units)-Given         (0014)-Not Given       (2151)-Not Given [C]        (2107)-Not Given        (2121)-Not Given        [ ] 2200           insulin aspart (NovoLOG) inj (RAPID ACTING)  Dose: 1-7 Units Freq: 3 TIMES DAILY BEFORE MEALS Route: SC  Start: 02/08/17 0730   Admin Instructions: Correction  "Scale - MEDIUM INSULIN RESISTANCE DOSING     Do Not give Correction Insulin if Pre-Meal BG less than 140.   For Pre-Meal  - 189 give 1 unit.   For Pre-Meal  - 239 give 2 units.   For Pre-Meal  - 289 give 3 units.   For Pre-Meal  - 339 give 4 units.   For Pre-Meal - 399 give 5 units.   For Pre-Meal -449 give 6 units  For Pre-Meal BG greater than or equal to 450 give 7 units.   To be given with prandial insulin, and based on pre-meal blood glucose.    Notify provider if glucose greater than or equal to 350 mg/dL after administration of correction dose.  If given at mealtime, must be administered 5 min before meal or immediately after.      0922 (1 Units)-Given       1257 (1 Units)-Given       1800 (1 Units)-Given        0817 (1 Units)-Given       1441 (2 Units)-Given       1851 (2 Units)-Given        (0828)-Not Given [C]       1215 (2 Units)-Given [C]       1702 (1 Units)-Given        0855 (1 Units)-Given       (1427)-Not Given       (1802)-Not Given        (0819)-Not Given       1139 (2 Units)-Given       (1633)-Not Given        (0827)-Not Given       1137 (1 Units)-Given       [ ] 1700           lidocaine (LMX4) cream  Freq: EVERY 1 HOUR PRN Route: Top  PRN Reason: pain  PRN Comment: with VAD insertion or accessing implanted port.  Start: 02/08/17 0232   Admin Instructions: Do NOT give if patient has a history of allergy to any local anesthetic or any \"gonzalo\" product.   Apply 30 minutes prior to VAD insertion or port access.  MAX Dose:  2.5 g (  of 5 g tube)               lidocaine 1 % 1 mL  Dose: 1 mL Freq: EVERY 1 HOUR PRN Route: OTHER  PRN Comment: mild pain with VAD insertion or accessing implanted port  Start: 02/08/17 0232   Admin Instructions: Do NOT give if patient has a history of allergy to any local anesthetic or any \"gonzalo\" product. MAX dose 1 mL subcutaneous OR intradermal in divided doses.               magnesium sulfate 4 g in 100 mL sterile water (premade)  Dose: 4 " g Freq: EVERY 4 HOURS PRN Route: IV  PRN Reason: magnesium supplementation  Start: 02/08/17 0232   Admin Instructions: For serum Mg++ less than 1.6 mg/dL  Give 4 g and recheck magnesium level 2 hours after dose, and next AM.               metFORMIN (GLUCOPHAGE) tablet 1,000 mg  Dose: 1,000 mg Freq: 2 TIMES DAILY WITH MEALS Route: PO  Start: 02/10/17 1800   Admin Instructions: If the patient receives intravenous, iodinated contrast, hold metformin for 24 hours before AND 48 hours after procedure. Contact pharmacy if no hold order is written.        1654 (1,000 mg)-Given               0854 (1,000 mg)-Given       1801 (1,000 mg)-Given        0819 (1,000 mg)-Given       1804 (1,000 mg)-Given        0823 (1,000 mg)-Given       [ ] 1800           metoprolol (TOPROL-XL) 24 hr tablet 25 mg  Dose: 25 mg Freq: DAILY Route: PO  Start: 02/08/17 0900   Admin Instructions: DO NOT CRUSH. Tablet may be split in half along score line.      0923 (25 mg)-Given        0818 (25 mg)-Given        0836 (25 mg)-Given        0854 (25 mg)-Given        0819 (25 mg)-Given        0823 (25 mg)-Given           naloxone (NARCAN) injection 0.1-0.4 mg  Dose: 0.1-0.4 mg Freq: EVERY 2 MIN PRN Route: IV  PRN Reason: opioid reversal  Start: 02/08/17 0232   Admin Instructions: For respiratory rate LESS than or EQUAL to 8.  Partial reversal dose:  0.1 mg titrated q 2 minutes for Analgesia Side Effects Monitoring Sedation Level of 3 (frequently drowsy, arousable, drifts to sleep during conversation).Full reversal dose:  0.4 mg bolus for Analgesia Side Effects Monitoring Sedation Level of 4 (somnolent, minimal or no response to stimulation).               ondansetron (ZOFRAN-ODT) ODT tab 4 mg  Dose: 4 mg Freq: EVERY 6 HOURS PRN Route: PO  PRN Reason: nausea  Start: 02/08/17 0232   Admin Instructions: This is Step 1 of nausea and vomiting management.  If nausea not resolved in 15 minutes, go to Step 2 prochlorperazine (COMPAZINE). Do not push through foil  backing. Peel back foil and gently remove. Place on tongue immediately. Administration with liquid unnecessary              Or  ondansetron (ZOFRAN) injection 4 mg  Dose: 4 mg Freq: EVERY 6 HOURS PRN Route: IV  PRN Reasons: nausea,vomiting  Start: 02/08/17 0232   Admin Instructions: This is Step 1 of nausea and vomiting management.  If nausea not resolved in 15 minutes, go to Step 2 prochlorperazine (COMPAZINE).               oxyCODONE (ROXICODONE) IR half-tab 2.5-5 mg  Dose: 2.5-5 mg Freq: EVERY 3 HOURS PRN Route: PO  PRN Reason: moderate to severe pain  Start: 02/08/17 0232              polyethylene glycol (MIRALAX/GLYCOLAX) Packet 17 g  Dose: 17 g Freq: DAILY PRN Route: PO  PRN Reason: constipation  Start: 02/08/17 0232   Admin Instructions: Give in 8oz of  water, juice, or soda. Hold for loose stools.  This is the second step of a three step constipation treatment protocol.  1 Packet = 17 grams. Mixed prescribed dose in 8 ounces of water. Follow with 8 oz. of water.               potassium chloride (KLOR-CON) Packet 20-40 mEq  Dose: 20-40 mEq Freq: EVERY 2 HOURS PRN Route: ORAL OR FEED  PRN Reason: potassium supplementation  Start: 02/08/17 0232   Admin Instructions: Use if unable to tolerate tablets.  If Serum K+ 3.0-3.3, dose = 60 mEq po total dose (40 mEq x1 followed in 2 hours by 20 mEq x1). Recheck K+ level 4 hours after dose and the next AM.  If Serum K+ 2.5-2.9, dose = 80 mEq po total dose (40 mEq Q2H x2). Recheck K+ level 4 hours after dose and the next AM.  If Serum K+ less than 2.5, See IV order.  Dissolve packet contents in 4-8 ounces of cold water or juice.               potassium chloride 10 mEq in 100 mL intermittent infusion  Dose: 10 mEq Freq: EVERY 1 HOUR PRN Route: IV  PRN Reason: potassium supplementation  Start: 02/08/17 0232   Admin Instructions: Infuse via PERIPHERAL LINE or CENTRAL LINE. Use for central line replacement if patient weight less than 65 kg, if patient is on TPN with high  potassium content or if unit does not stock 20 mEq bags.   If Serum K+ 3.0-3.3, dose = 10 mEq/hr x4 doses (40 mEq IV total dose). Recheck K+ level 2 hours after dose and the next AM.   If Serum K+ less than 3.0, dose = 10 mEq/hr x6 doses (60 mEq IV total dose). Recheck K+ level 2 hours after dose and the next AM.               potassium chloride 10 mEq in 100 mL intermittent infusion with 10 mg lidocaine  Dose: 10 mEq Freq: EVERY 1 HOUR PRN Route: IV  PRN Reason: potassium supplementation  Start: 02/08/17 0232   Admin Instructions: Infuse via PERIPHERAL LINE. Use potassium with lidocaine for pain with peripheral administration.  If Serum K+ 3.0-3.3, dose = 10 mEq/hr x4 doses (40 mEq IV total dose). Recheck K+ level 2 hours after dose and the next AM.  If Serum K+ less than 3.0, dose = 10 mEq/hr x6 doses (60 mEq IV total dose). Recheck K+ level 2 hours after dose and the next AM.               potassium chloride 20 mEq in 50 mL intermittent infusion  Dose: 20 mEq Freq: EVERY 1 HOUR PRN Route: IV  PRN Reason: potassium supplementation  Start: 02/08/17 0232   Admin Instructions: Infuse via CENTRAL LINE Only. May need EKG if less than 65 kg or on TPN - Max rate is 0.3 mEq/kg/hr for patients not on EKG monitoring.   If Serum K+ 3.0-3.3, dose = 20 mEq/hr x2 doses (40 mEq IV total dose). Recheck K+ level 2 hours after dose and the next AM.  If Serum K+ less than 3.0, dose = 20 mEq/hr x3 doses (60 mEq IV total dose). Recheck K+ level 2 hours after dose and the next AM.               potassium chloride SA (K-DUR/KLOR-CON M) CR tablet 20-40 mEq  Dose: 20-40 mEq Freq: EVERY 2 HOURS PRN Route: PO  PRN Reason: potassium supplementation  Start: 02/08/17 0232   Admin Instructions: Use if able to take PO.   If Serum K+ 3.0-3.3, dose = 60 mEq po total dose (40 mEq x1 followed in 2 hours by 20 mEq x1). Recheck K+ level 4 hours after dose and the next AM.  If Serum K+ 2.5-2.9, dose = 80 mEq po total dose (40 mEq Q2H x2). Recheck K+  level 4 hours after dose and the next AM.  If Serum K+ less than 2.5, See IV order.  DO NOT CRUSH.               prochlorperazine (COMPAZINE) injection 5 mg  Dose: 5 mg Freq: EVERY 6 HOURS PRN Route: IV  PRN Reasons: nausea,vomiting  Start: 02/08/17 0232   Admin Instructions: This is Step 2 of nausea and vomiting management.   If nausea not resolved in 15 minutes, give metoclopramide (REGLAN) if ordered (step 3 of nausea and vomiting management)              Or  prochlorperazine (COMPAZINE) tablet 5 mg  Dose: 5 mg Freq: EVERY 6 HOURS PRN Route: PO  PRN Reason: vomiting  Start: 02/08/17 0232   Admin Instructions: This is Step 2 of nausea and vomiting management.   If nausea not resolved in 15 minutes, give metoclopramide (REGLAN) if ordered (step 3 of nausea and vomiting management)              Or  prochlorperazine (COMPAZINE) Suppository 12.5 mg  Dose: 12.5 mg Freq: EVERY 12 HOURS PRN Route: RE  PRN Reasons: nausea,vomiting  Start: 02/08/17 0232   Admin Instructions: This is Step 2 of nausea and vomiting management.   If nausea not resolved in 15 minutes, give metoclopramide (REGLAN) if ordered (step 3 of nausea and vomiting management)               senna-docusate (SENOKOT-S;PERICOLACE) 8.6-50 MG per tablet 1-2 tablet  Dose: 1-2 tablet Freq: 2 TIMES DAILY PRN Route: PO  PRN Comment: constipation   Start: 02/08/17 0232   Admin Instructions: If no bowel movement in 24 hours, increase to 2 tablets PO BID.  Hold for loose stools.   This is the first step of a three step constipation treatment protocol.               sertraline (ZOLOFT) tablet 50 mg  Dose: 50 mg Freq: DAILY Route: PO  Start: 02/08/17 0900     0923 (50 mg)-Given        0818 (50 mg)-Given        0836 (50 mg)-Given        0854 (50 mg)-Given        0819 (50 mg)-Given        0823 (50 mg)-Given           sodium chloride (PF) 0.9% PF flush 3 mL  Dose: 3 mL Freq: EVERY 1 HOUR PRN Route: IK  PRN Reason: line flush  PRN Comment: for peripheral IV flush post IV  meds  Start: 02/08/17 0232         0819 (3 mL)-Given            Warfarin Therapy Reminder (Check START DATE - warfarin may be starting in the FUTURE)  Freq: CONTINUOUS PRN Route: XX  Start: 02/08/17 0232   Admin Instructions: *Note to reorder warfarin daily*  Pharmacy Warfarin Dosing Service  Patient is on Warfarin Therapy - check for daily order               warfarin-No DOSE today  Dose: 1 each Freq: NO DOSE TODAY (WARFARIN) Route: XX  Start: 02/13/17 1257   End: 02/13/17 2356   Admin Instructions: No dose of Warfarin due today per order           2356-Med Discontinued         Dose: 1 each Freq: NO DOSE TODAY (WARFARIN) Route: XX  Start: 02/12/17 0902   End: 02/13/17 0001   Admin Instructions: No dose of Warfarin due today per order           0001-Med Discontinued      Completed Medications  Medications 02/07/17 02/08/17 02/09/17 02/10/17 02/11/17 02/12/17 02/13/17         Dose: 75 mg Freq: 2 TIMES DAILY Route: PO  Indications of Use: INFLUENZA  Start: 02/08/17 0900   End: 02/12/17 0819     0923 (75 mg)-Given       2018 (75 mg)-Given        0818 (75 mg)-Given       2104 (75 mg)-Given        0836 (75 mg)-Given       2151 (75 mg)-Given        0855 (75 mg)-Given       2039 (75 mg)-Given        0819 (75 mg)-Given              Dose: 0.5 mg Freq: ONCE AT 6PM Route: PO  Start: 02/11/17 1800   End: 02/11/17 1801        1801 (0.5 mg)-Given               Dose: 1 mg Freq: ONCE AT 6PM Route: PO  Start: 02/10/17 1800   End: 02/10/17 1654       1654 (1 mg)-Given                    Discontinued Medications  Medications 02/07/17 02/08/17 02/09/17 02/10/17 02/11/17 02/12/17 02/13/17         Dose: 3 mL Freq: EVERY 8 HOURS Route: IK  Start: 02/08/17 0245   End: 02/11/17 0856   Admin Instructions: And Q1H PRN, to lock peripheral IV dormant line.      0317 (3 mL)-Given       0926 (3 mL)-Given              1802 (3 mL)-Given        0151 (3 mL)-Given       0818 (3 mL)-Given              1950 (3 mL)-Given        0548 (3 mL)-Given                0100 (3 mL)-Given       (0856)-Not Given       0856-Med Discontinued                  INTERAGENCY TRANSFER FORM - NOTES (H&P, Discharge Summary, Consults, Procedures, Therapies)   2/7/2017                       [place] [address] [facility]: [phone]               History & Physicals      H&P by Dyllan Duncan MD at 2/8/2017  1:07 AM     Author:  Dyllan Duncan MD Service:  Hospitalist Author Type:  Physician    Filed:  2/8/2017  2:18 AM Date of Service:  2/8/2017  1:07 AM Note Created:  2/8/2017  1:07 AM    Status:  Signed :  Dyllan Duncan MD (Physician)         [facility]    History and Physical  Hospitalist       Date of Admission:  2/7/2017  Date of Service (when I saw the patient): 02/08/2017    Assessment and Plan  Jose Lees is a 76 year old male who presents with fever, chills, lightheadedness with standing, and 3 falls today found to have positive influenza A    Influenza A with SIRS: Patient with fevers to 102, shaking chills, cough sore throat who was evaluated in the emergency department and found to be influenza A positive. Patient with ~36h of symptoms. Lactic acid elevated to the 3 range on admission in the setting of poor intake and fevers  -Tamiflu 75 b.i.d.for total of 10 doses  -Spot check oximetry  -Ambulate with nursing staff to assess for oxygen needs    Orthostatic hypotension with falls: The patient endorses feeling lightheaded over the past 2 days, multiple falls 2/7/17.  -1 L normal saline over 4 hours; patient with low EF heart failure, so will need to be cautious with rate of fluid administration  -fall precautions  -Ambulate with nursing staff  -Orthostatic blood pressure and pulse following each IV fluid bolus; provider to be paged if abnormal for additional fluid orders  -Cardiac diet as tolerated  -Compression stockings given chronic peripheral edema and now orthostatic symptoms    DM II: Most recent hemoglobin  A1c of 7.6 1/25/17. Blood glucose in the 200 range on arrival to emergency department  -Holding prior to admission metformin and glimepiride  -medium dose sliding scale available    Chronic systolic congestive heart failure, left-sided: Ischemic cardiomyopathy secondary to April 2014 myocardial infarction. It appears that most recent TTE was performed July 2014 with an ejection fraction of 35% as commented on by cardiology service; image not currently available for this.Does not appear to be in acute exacerbation, though is at risk given presentation with orthostatic hypotension and falls requiring fluid resuscitation  -Intake and output  -Daily weights  -Cardiac telemetry  -Spot check oximetry  -continue daily aspirin as per prior cardiology recommendations; aware the patient is also on chronic anticoagulation  -Continue Lipitor 20 mg daily  -Continue metoprolol 25 mg XL daily  -It has been several years since patient's last TTE, though currently I do not see an indication to repeat. Consider outpatient cardiology follow-up to discuss discontinuation of aspirin in the setting of chronic anticoagulation or slow up titration of ACE inhibitor previously held given history of hypotension, though patient appears to have been stable for several years now otherwise.  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 No formal diagnosis of atrial fibrillation, though has remained on anticoagulation. Slightly subtherapeutic at 1.87 on admission  -Will continue warfarin, pharmacy to dose    Prostate cancer: [facility]s [facility]. Seen recently in clinic for Lupron administration with next follow-up scheduled in July 2017  -Continue Casodex evening administration    DVT Prophylaxis: continue  warfarin    Code Status: full code. Discussed with patient on admission    Disposition: Expected discharge in 1-2 days pending resolution of orthostasis, advancement of diet as tolerated    Dyllan [facility]    Primary Care Physician  Jermain Helm    Chief Complaint  Falls, fever, weakness    History is obtained from the patient, chart review, Dr Armstrong in the ED    History of Present Illness  Jose Lees is a 76 year old male who presents with approximately 36 hours of fever, chills, sore throat who presents today after multiple falls and feeling lightheaded. Patient states he fell 3 times today. Had been feeling in his usual state of health up until 2/6/17 when he developed fevers up to 102. States that his appetite was poor, and he knew he was ill. He suspected a viral illness, and actually avoided going down for meals as he didn't want to get any other residents at [facility] assisted living ill. States he only ate a few bites of mashed potatoes he other evening given poor appetite. Denies nausea or vomiting. In the emergency department, influenza swab positive for influenza A, and initial dose of Tamiflu given in the emergency department.    Regarding falls, patient states that he had been feeling lightheaded throughout the day when standing. Does not recall any particular prodrome prior to falls. Did not lose consciousness, denies hitting his head, no trauma associated with these falls. Falls were the primary reason patient presented to the emergency department today.    Past Medical History   I have reviewed this patient's medical history and updated it with pertinent information if needed.   Past Medical History   Diagnosis Date     Type II or unspecified type diabetes mellitus without mention of complication, not stated as uncontrolled      Diabetes mellitus     Autism disorder 6/29/2012     ACP (advance care planning)      Form given     Stroke, embolic (H) 4/7/2014     bilateral  cerebral embolic CVAs     Acute anterior wall MI (H) 4/7/2014     Hyperlipidaemia      Coronary artery disease 4/2014 4/2014 HEART CATH - 70% mid LAD -- BMS placed, small 1st diagonal 80%, RCA 60-70% before crux, 70% mid PDA     Prostate cancer (H) 10/11     GLEASOR 8     Dementia      CHF (congestive heart failure) (H)      ECHO EF=25-30% on 4/7/14     Hyperlipidaemia LDL goal < 100      Stricture and stenosis of esophagus 10/22/2015       Past Surgical History  I have reviewed this patient's surgical history and updated it with pertinent information if needed.  Past Surgical History   Procedure Laterality Date     Coronary angiography adult order       Heart cath, angioplasty  4/14     BMS to LAD     Esophagoscopy, gastroscopy, duodenoscopy (egd), combined N/A 8/6/2015     Procedure: COMBINED ESOPHAGOSCOPY, GASTROSCOPY, DUODENOSCOPY (EGD), REMOVE FOREIGN BODY;  Surgeon: Yu Tapia MD;  Location:  GI       Prior to Admission Medications  Prior to Admission Medications   Prescriptions Last Dose Informant Patient Reported? Taking?   ACE/ARB NOT PRESCRIBED, INTENTIONAL,   No No   Sig: ACE & ARB not prescribed due to Symptomatic hypotension not due to excessive diuresis   Blood Glucose Monitoring Suppl W/DEVICE KIT  Self No No   Sig: Test blood glucose every other day       Cholecalciferol (D3-1000 PO)   Yes No   Sig: Take 1 capsule by mouth daily   Nutritional Supplements (ENSURE PO)  Self Yes No   Sig: Take 1 Can by mouth daily.   aspirin 81 MG chewable tablet  Self No No   Sig: Take 1 tablet (81 mg) by mouth daily   atorvastatin (LIPITOR) 20 MG tablet   No No   Sig: TAKE 1 TABLET EVERY DAY   bicalutamide (CASODEX) 50 MG tablet   No No   Sig: TAKE 1 TABLET EVERY EVENING   glimepiride (AMARYL) 1 MG tablet   No No   Sig: TAKE 1 TABLET (1 MG) BY MOUTH EVERY MORNING (BEFORE BREAKFAST)   metFORMIN (GLUCOPHAGE) 500 MG tablet   No No   Sig: Take 2 tablets (1,000 mg) by mouth 2 times daily (with meals)    metoprolol (TOPROL-XL) 25 MG 24 hr tablet   No No   Sig: TAKE 1 TABLET EVERY DAY   multivitamin, therapeutic (THERA-VIT) TABS  Self Yes No   Sig: Take 1 tablet by mouth daily   sertraline (ZOLOFT) 50 MG tablet   No No   Sig: TAKE 1 TABLET EVERY DAY   warfarin (COUMADIN) 2 MG tablet   Yes No   Sig: Take 2 mg on  only   warfarin (COUMADIN) 4 MG tablet   Yes No   Si mg every day except tuesday      Facility-Administered Medications: None     Allergies  No Known Allergies    Social History  I have reviewed this patient's social history and updated it with pertinent information if needed. Jose LUCAS Francisco Jangelikaleightonrenée  reports that he has never smoked. He has never used smokeless tobacco. He reports that he does not drink alcohol or use illicit drugs.    Family History  I have reviewed this patient's family history and updated it with pertinent information if needed.   Family History   Problem Relation Age of Onset     CEREBROVASCULAR DISEASE Mother 92     C.A.D. Father 48       Review of Systems  The 10 point Review of Systems is negative other than noted in the HPI or here.   Patient endorses a sore throat and mild cough  Denies orthopnea    Physical Exam  Temp: 99.5  F (37.5  C) Temp src: Temporal BP: 136/66 mmHg Pulse: 85 Heart Rate: 93 Resp: 20 SpO2: 96 % O2 Device: None (Room air)    Vital Signs with Ranges  Temp:  [99.5  F (37.5  C)] 99.5  F (37.5  C)  Pulse:  [85] 85  Heart Rate:  [93] 93  Resp:  [20] 20  BP: (135-136)/(64-80) 136/66 mmHg  SpO2:  [96 %] 96 %  186 lbs 8 oz    Constitutional: no acute distress, alert, conversant  Eyes: no scleral icterus or injection  HEENT: normocephalic without evidence of trauma  Respiratory: breath sounds clear bilaterally to auscultation, no wheezes, no crackles  Cardiovascular: regular rate and rhythm, 2/6 relatively harsh systolic murmur appreciated at apex. This is not noted at last cardiology visit, though patient states he was told as a child he hada murmur  GI:  abdomen soft, non-tender, normoactive bowel sounds, no masses  Lymph/Hematologic: 1+ bilateral lower extremity pitting edema  Genitourinary: not examined  Skin: no rashes  Musculoskeletal: muscular tone intact in all extremities  Neurologic: mental status grossly intact, no focal deficits, alert  Psychiatric: normal affect    Data  Data reviewed today:  I personally reviewed chest x-ray with no acute infiltrate.    Recent Labs  Lab 02/07/17  2300   WBC 7.0   HGB 12.7*   MCV 92   *   INR 1.87*      POTASSIUM 3.9   CHLORIDE 104   CO2 23   BUN 16   CR 0.97   ANIONGAP 12   TENZIN 8.7   *   ALBUMIN 3.6   PROTTOTAL 7.3   BILITOTAL 0.5   ALKPHOS 57   ALT 25   AST 25       Recent Results (from the past 24 hour(s))   Chest XR,  PA & LAT    Narrative    XR CHEST 2 VW  2/7/2017 11:25 PM      HISTORY: Cough.     COMPARISON: 8/6/2015.    FINDINGS: The heart size is normal. Thoracic aorta is calcified. The  lungs are clear. No pneumothorax or pleural effusion. Degenerative  disease in the thoracic spine.      Impression    IMPRESSION: No acute abnormality.          Revision History        User Key Date/Time User Provider Type Action    > [N/A] 2/8/2017  2:18 AM Duncan, Dyllan Darnell MD Physician Sign                     Discharge Summaries      Discharge Summaries by Ash Tidwell MD at 2/13/2017  2:00 PM     Author:  Ash Tidwell MD Service:  Hospitalist Author Type:  Physician    Filed:  2/13/2017  2:15 PM Date of Service:  2/13/2017  2:00 PM Note Created:  2/13/2017  2:08 PM    Status:  Signed :  Ash Tidwell MD (Physician)         [facility]    Discharge Summary  Hospitalist  Ash Tidwell MD    Date of Admission:  2/7/2017  Date of Discharge:[AT1.1]  2/13/2017[AT1.2]  Discharging Provider:[AT1.1] Ash Tidwell    Discharge Diagnoses[AT1.2]   1. Influenza A with SIRS, resolved.[AT1.1]    History of Present Illness[AT1.2]    Jose Lees is an 76 year old male, who presented with fever, chills, sore throat, lightheadedness and falls.[AT1.1]    Hospital Course[AT1.2]   76 year old male with PmHx of CAD, Type 2 DM, embolic stroke, prostate cancer, hyperlipidemia, dementia, esophageal stricture, who was admitted on 2/8/17 due to fever up to 102F, chills, sore throat, lightheadedness with standing and history of 3 falls on the day of admission. Work up done on 2/7/17 revealed, normal CMP. CBC revealed, Hb 12.7, WBC 7.0 and Plts 142. Lactic acid was 3.1. INR was 1.87 and UA is negative. Nasal swab was positive for Influenza A. Chest x-rays on 2/7/17 is normal.     He was admitted to the medical floor and was placed on droplet isolation. He was commenced on a 5 days course of Tamiflu 75mg po b.i.d, which he completed on 2/12/17. He had an intermittent cough, which was occasional productive of white sputum and a mild sore throat on the day of discharge. He was reviewed by the physical and occupational therapists, who recommended TCU at discharge. For scheduled Chloraseptic Lozenges and Chloraseptic lozenges prn.   [AT1.1]  Significant Results and Procedures[AT1.2]   See above.[AT1.1]    Pending Results[AT1.2]   None.[AT1.1]  Unresulted Labs Ordered in the Past 30 Days of this Admission     No orders found from 12/9/2016 to 2/8/2017.          Code Status[AT1.2]   Full Code[AT1.1]       Primary Care Physician   Jermain Helm    Physical Exam   Temp: 97.5  F (36.4  C) Temp src: Oral BP: 111/64 Pulse: 68 Heart Rate: 79 Resp: 16 SpO2: 94 % O2 Device: None (Room air)    Vitals:    02/07/17 2245 02/09/17 0729 02/10/17 0606   Weight: 84.6 kg (186 lb 8 oz) 81.7 kg (180 lb 1.9 oz) 80.7 kg (177 lb 14.6 oz)[AT1.2]     Constitutional: Elderly white male, awake, cooperative, no apparent distress, O2 Sats 94% on RA  HENNT: No pharyngeal erythema  Respiratory: BS vesicular bilaterally, no crackles or wheezing  Cardiovascular: S1 and S2, reg, no  murmur noted  GI: Soft, non-distended, non-tender, no masses, BS present+  Skin/Integumen: No rashes  Extremities: Bilat pedal edema 1+[AT1.1]    Discharge Disposition[AT1.2]   Discharged to short-term care facility  Condition at discharge: Stable[AT1.1]    Consultations This Hospital Stay   PHARMACY TO DOSE WARFARIN  PHYSICAL THERAPY ADULT IP CONSULT  PHYSICAL THERAPY ADULT IP CONSULT  OCCUPATIONAL THERAPY ADULT IP CONSULT    Time Spent on this Encounter[AT1.2]   Ash KNIGHT, personally saw the patient today and spent less than or equal to 30 minutes discharging this patient.[AT1.1]    Discharge Orders     Reason for your hospital stay   Influenza A infection.     Follow-up and recommended labs and tests    Follow up with primary care provider, Jermain Helm, within 1-2 weeks, for hospital follow- up. No follow up labs or test are needed.     Activity   Your activity upon discharge: activity as tolerated.     Mantoux instructions   Give two-step Mantoux (PPD) Per Facility Policy Yes     General info for SNF   Length of Stay Estimate: Short Term Care: Estimated # of Days <30  Condition at Discharge: Stable  Level of care:skilled   Rehabilitation Potential: Good  Admission H&P remains valid and up-to-date: Yes  Recent Chemotherapy: N/A  Use Nursing Home Standing Orders: Yes     Full Code     Physical Therapy Adult Consult   Evaluate and treat as clinically indicated.    Reason: Influenza A and Deconditioning.     Occupational Therapy Adult Consult   Evaluate and treat as clinically indicated.    Reason:   Influenza A and Deconditioning.     Diet   Follow this diet upon discharge:   Combination Diet Low Saturated Fat Na <2400mg Diet, No Caffeine Diet       Discharge Medications   Current Discharge Medication List      START taking these medications    Details   !! benzocaine-menthol (CHLORASEPTIC) 6-10 MG lozenge Place 1 lozenge inside cheek 3 times daily for 3 days  Qty: 84 lozenge    Associated  Diagnoses: Throat pain      !! benzocaine-menthol (CHLORASEPTIC) 6-10 MG lozenge Place 1 lozenge inside cheek every hour as needed for sore throat  Qty: 84 lozenge    Associated Diagnoses: Throat pain      oseltamivir (TAMIFLU) 75 MG capsule Take 1 capsule (75 mg) by mouth 2 times daily for 7 doses  Qty: 7 capsule, Refills: 1    Associated Diagnoses: Influenza A       !! - Potential duplicate medications found. Please discuss with provider.      CONTINUE these medications which have NOT CHANGED    Details   metFORMIN (GLUCOPHAGE) 500 MG tablet Take 2 tablets (1,000 mg) by mouth 2 times daily (with meals)  Qty: 360 tablet, Refills: 1    Associated Diagnoses: Encounter for medication refill      metoprolol (TOPROL-XL) 25 MG 24 hr tablet TAKE 1 TABLET EVERY DAY  Qty: 90 tablet, Refills: 3    Associated Diagnoses: Encounter for medication refill      glimepiride (AMARYL) 1 MG tablet TAKE 1 TABLET (1 MG) BY MOUTH EVERY MORNING (BEFORE BREAKFAST)  Qty: 90 tablet, Refills: 1    Associated Diagnoses: Encounter for medication refill      atorvastatin (LIPITOR) 20 MG tablet TAKE 1 TABLET EVERY DAY  Qty: 90 tablet, Refills: 1    Comments: Reminder needs fasting labs & diabetic appt before next refill   thanks  Associated Diagnoses: Encounter for medication refill      !! warfarin (COUMADIN) 4 MG tablet 4 mg on Sundays, Tuesdays, Thursdays, and Saturdays  Qty: 180 tablet, Refills: 3    Associated Diagnoses: Encounter for medication refill      !! warfarin (COUMADIN) 2 MG tablet Take 2 mg on Mondays, Wednesdays, and Fridays  Qty: 90 tablet, Refills: 3    Associated Diagnoses: Long term (current) use of anticoagulants      Cholecalciferol (D3-1000 PO) Take 1 capsule by mouth daily      sertraline (ZOLOFT) 50 MG tablet TAKE 1 TABLET EVERY DAY  Qty: 90 tablet, Refills: 3    Associated Diagnoses: Encounter for medication refill      bicalutamide (CASODEX) 50 MG tablet TAKE 1 TABLET EVERY EVENING  Qty: 90 tablet, Refills: 3     Associated Diagnoses: Encounter for medication refill      aspirin 81 MG chewable tablet Take 1 tablet (81 mg) by mouth daily  Qty: 36 tablet, Refills: 12    Associated Diagnoses: History of MI (myocardial infarction)      multivitamin, therapeutic (THERA-VIT) TABS Take 1 tablet by mouth daily      Nutritional Supplements (ENSURE PO) Take 1 Can by mouth daily.      ACE/ARB NOT PRESCRIBED, INTENTIONAL, ACE & ARB not prescribed due to Symptomatic hypotension not due to excessive diuresis    Associated Diagnoses: Chronic systolic congestive heart failure (H)      Blood Glucose Monitoring Suppl W/DEVICE KIT Test blood glucose every other day      Qty: 1 kit, Refills: 0    Comments: Dispence device and strips per patients insurance coverage  Associated Diagnoses: Type II or unspecified type diabetes mellitus without mention of complication, not stated as uncontrolled; Rhabdomyolysis; UTI (urinary tract infection)       !! - Potential duplicate medications found. Please discuss with provider.        Allergies   No Known Allergies  Data[AT1.2]   Most Recent 3 CBC's:[AT1.1]  Recent Labs   Lab Test  02/09/17   0641  02/07/17   2300 07/27/16   1526   WBC  7.4  7.0  8.1   HGB  12.1*  12.7*  13.3*   MCV  91  92  86.9   PLT  131*  142*  204[AT1.2]      Most Recent 3 BMP's:[AT1.1]  Recent Labs   Lab Test  02/09/17   0641  02/07/17 2300  07/27/16   1526   08/06/15   0905   NA  137  139  140   < >  142   POTASSIUM  3.5  3.9  4.2   < >  3.6   CHLORIDE  105  104  101   < >  110*   CO2  22  23   --    --   19*   BUN  13  16  14  14   < >  13   CR  0.88  0.97  1.03   < >  0.92   ANIONGAP  10  12   --    --   13   TENZIN  8.3*  8.7  9.5   < >  8.8   GLC  180*  233*  118*   < >  196*    < > = values in this interval not displayed.[AT1.2]     Most Recent 2 LFT's:[AT1.1]  Recent Labs   Lab Test  02/07/17   2300  01/04/16   1124   AST  25  22   ALT  25  18   ALKPHOS  57  56   BILITOTAL  0.5  0.6[AT1.2]     Most Recent INR's and  Anticoagulation Dosing History:  Anticoagulation Dose History     Recent Dosing and Labs Latest Ref Rng & Units 2/7/2017 2/8/2017 2/9/2017 2/10/2017 2/11/2017 2/12/2017 2/13/2017    Warfarin 0.5 mg - - - - - 0.5 mg - -    Warfarin 1 mg - - - - 1 mg - - -    Warfarin 4 mg - - 4 mg 4 mg - - - -    INR 0.86 - 1.14 1.87(H) 2.03(H) 1.96(H) 2.70(H) 2.95(H) 3.19(H) 3.32(H)        Most Recent 3 Troponin's:[AT1.1]  Recent Labs   Lab Test  08/06/15   0905  04/09/14   0600  04/09/14   0240   TROPI  <0.015  The 99th percentile for upper reference range is 0.045 ug/L.  Troponin values in   the range of 0.045 - 0.120 ug/L may be associated with risks of adverse   clinical events.    0.626*  0.752*[AT1.2]     Most Recent Cholesterol Panel:[AT1.1]  Recent Labs   Lab Test  01/04/16   1124   CHOL  139   LDL  65   HDL  51   TRIG  117[AT1.2]     Most Recent 6 Bacteria Isolates From Any Culture (See EPIC Reports for Culture Details):[AT1.1]  Recent Labs   Lab Test  06/02/12   1820  06/01/12   1415   CULT  10 to 50,000 colonies/mL Coagulase negative Staphylococcus Faxed final report to [phone] 6.6.12 [facility]  No growth after 6 days  No growth after 6 days[AT1.2]     Most Recent TSH, T4 and A1c Labs:[AT1.1]  Recent Labs   Lab Test  01/25/17   1429   02/03/16   1046   06/03/12   0610   TSH   --    --    --    --   4.41   T4   --    --   1.01   --    --    A1C  7.6*   < >   --    < >   --     < > = values in this interval not displayed.[AT1.2]       Results for orders placed or performed during the hospital encounter of 02/07/17   Chest XR,  PA & LAT    Narrative    XR CHEST 2 VW  2/7/2017 11:25 PM      HISTORY: Cough.     COMPARISON: 8/6/2015.    FINDINGS: The heart size is normal. Thoracic aorta is calcified. The  lungs are clear. No pneumothorax or pleural effusion. Degenerative  disease in the thoracic spine.      Impression    IMPRESSION: No acute abnormality.    ZULEMA COTTO MD[AT1.1]          Revision History        User Key  Date/Time User Provider Type Action    > AT1.2 2/13/2017  2:15 PM Ash Tidwell MD Physician Sign     AT1.1 2/13/2017  2:08 PM Ash Tidwell MD Physician                   Consult Notes     No notes of this type exist for this encounter.         Progress Notes - Physician (Notes for yesterday and today)      Progress Notes by Aileen Wan at 2/13/2017 10:30 AM     Author:  Aileen Wan Service:  Infection Preventionist Author Type:  (none)    Filed:  2/13/2017 10:34 AM Date of Service:  2/13/2017 10:30 AM Note Created:  2/13/2017 10:30 AM    Status:  Signed :  Aileen Wan         Droplet isolation for influenza:  (Per Dr. Duncan H and P dated 2/8/17, symptoms of influenza started 36 hours prior.  Influenza symptom onset is 2/6/17.)  Droplet isolation for influenza for 7 days after illness onset or 24 hours after resolution of fever and respiratory symptoms, whichever is longer.  Earliest D/C of droplet iso is 2/13/17, provided that fever and respiratory symptoms are resolved.  Aileen Wan [facility][TD1.1]     Revision History        User Key Date/Time User Provider Type Action    > TD1.1 2/13/2017 10:34 AM Aileen Wan (none) Sign            Progress Notes by Ash Tidwell MD at 2/12/2017  2:51 PM     Author:  Ash Tidwell MD Service:  Hospitalist Author Type:  Physician    Filed:  2/12/2017  3:59 PM Date of Service:  2/12/2017  2:51 PM Note Created:  2/12/2017  3:51 PM    Status:  Signed :  Ash Tidwell MD (Physician)         [facility]    Hospitalist Progress Note  Ash Tidwell MD    Date of Service : 02/12/2017    Assessment & Plan      76 year old male with PmHx of  CAD, Type 2 DM, embolic stroke, prostate cancer, hyperlipidemia, dementia, esophageal stricture, who was admitted on 2/8/17 due to fever up to 102F, chills, sore throat, lightheadedness with standing  and history of  3 falls on the day of admission. Work up done on 2/7/17 revealed, normal CMP. CBC revealed, Hb 12.7, WBC 7.0 and Plts 142. Lactic acid was 3.1. INR was 1.87 and UA is negative. Nasal swab was positive for Influenza A. Chest x-rays on  2/7/17 is normal.    1. Influenza A with SIRS: Continue Tamiflu 75mg po b.i.d. for total of 10 doses (to end on 2/12/17). For scheduled Chloraseptic Lozenges and Chloraseptic lozenges prn.    2. Type 2 DM: BG was 166 this am. HbA1C was 7.6% on 1/25/17. Continue metformin and glimepiride today. Continue medium dose sliding scale prn.    3. Chronic systolic congestive heart failure: stable. Continue, aspirin, toprol Xl and lipitor. ECHO done in July 2014 revealed, LVEF 35%.    4. History of embolic CVA:  continue warfarin per pharmacy. INR is 3.19  on 2/12/17.    5. Prostate cancer: pt follows [facility] Dr. [facility]. On Lupron and  Casodex.        DVT Prophylaxis: Ambulate every shift  Code Status: Prior    Disposition: Expected discharge in 1 day. Appreciate PT review. For possible TCU at discharge, as the pt's assisted living facility will not take him back, if he needs Home PT.      Interval History   The pt reported that his sore throat is getting better. No complaints of shortness of breath/chest pain/cough.    -Data reviewed today: I reviewed all new labs and imaging results over the last 24 hours. I personally reviewed no images or EKG's today.    Physical Exam   Temp: 98.6  F (37  C) Temp src: Oral BP: 119/72 Pulse: 64 Heart Rate: 71 Resp: 16 SpO2: 95 % O2 Device: None (Room air)    Vitals:    02/07/17 2245 02/09/17 0729 02/10/17 0606   Weight: 84.6 kg (186 lb 8 oz) 81.7 kg (180 lb 1.9 oz) 80.7 kg (177 lb 14.6 oz)     Vital Signs with Ranges  Temp:  [98.4  F (36.9  C)-98.9  F (37.2  C)] 98.6  F (37  C)  Pulse:  [64] 64  Heart Rate:  [63-71] 71  Resp:  [16] 16  BP: ()/(56-74) 119/72  SpO2:  [95 %-100 %] 95 %  I/O last 3 completed shifts:  In:  650 [P.O.:650]  Out: 400 [Urine:400]    Constitutional: Elderly white male, awake, cooperative, no apparent distress, O2 Sats 96% on RA  HENNT: No pharyngeal erythema  Respiratory: BS vesicular bilaterally, no crackles or wheezing  Cardiovascular: S1 and S2, reg, no murmur noted  GI: Soft, non-distended, non-tender, no masses, BS present+  Skin/Integumen: No rashes  Extremities: Bilat pedal edema 1+    Medications     Warfarin Therapy Reminder         warfarin-No DOSE today  1 each Does not apply no dose today (warfarin)     sore throat lozenge  1 lozenge Buccal TID     metFORMIN  1,000 mg Oral BID w/meals     glimepiride  1 mg Oral Daily with breakfast     aspirin  81 mg Oral Daily     atorvastatin  20 mg Oral Daily     bicalutamide  50 mg Oral QPM     sertraline  50 mg Oral Daily     metoprolol  25 mg Oral Daily     insulin aspart  1-7 Units Subcutaneous TID AC     insulin aspart  1-5 Units Subcutaneous At Bedtime       Data     Recent Labs  Lab 02/12/17  0659 02/11/17  0633 02/10/17  0647 02/09/17  0641  02/07/17  2300   WBC  --   --   --  7.4  --  7.0   HGB  --   --   --  12.1*  --  12.7*   MCV  --   --   --  91  --  92   PLT  --   --   --  131*  --  142*   INR 3.19* 2.95* 2.70* 1.96*  < > 1.87*   NA  --   --   --  137  --  139   POTASSIUM  --   --   --  3.5  --  3.9   CHLORIDE  --   --   --  105  --  104   CO2  --   --   --  22  --  23   BUN  --   --   --  13  --  16   CR  --   --   --  0.88  --  0.97   ANIONGAP  --   --   --  10  --  12   TENZIN  --   --   --  8.3*  --  8.7   GLC  --   --   --  180*  --  233*   ALBUMIN  --   --   --   --   --  3.6   PROTTOTAL  --   --   --   --   --  7.3   BILITOTAL  --   --   --   --   --  0.5   ALKPHOS  --   --   --   --   --  57   ALT  --   --   --   --   --  25   AST  --   --   --   --   --  25   < > = values in this interval not displayed.    No results found for this or any previous visit (from the past 24 hour(s)).[AT1.1]         Revision History        User Key Date/Time  "User Provider Type Action    > AT1.1 2/12/2017  3:59 PM Ash Tidwell MD Physician Sign                  Procedure Notes     No notes of this type exist for this encounter.         Progress Notes - Therapies (Notes from 02/10/17 through 02/13/17)      Progress Notes by Aileen Wan at 2/13/2017 10:30 AM     Author:  Aileen Wan Service:  Infection Preventionist Author Type:  (none)    Filed:  2/13/2017 10:34 AM Date of Service:  2/13/2017 10:30 AM Note Created:  2/13/2017 10:30 AM    Status:  Signed :  Aileen Wan         Droplet isolation for influenza:  (Per Dr. Duncan H and P dated 2/8/17, symptoms of influenza started 36 hours prior.  Influenza symptom onset is 2/6/17.)  Droplet isolation for influenza for 7 days after illness onset or 24 hours after resolution of fever and respiratory symptoms, whichever is longer.  Earliest D/C of droplet iso is 2/13/17, provided that fever and respiratory symptoms are resolved.  ALEXANDRA [facility][TD1.1]     Revision History        User Key Date/Time User Provider Type Action    > TD1.1 2/13/2017 10:34 AM Aileen Wan (none) Sign                                                      INTERAGENCY TRANSFER FORM - LAB / IMAGING / EKG / EMG RESULTS   2/7/2017                       [place] [id number] [facility]: [phone]            Unresulted Labs (24h ago through future)    Start       Ordered    02/14/17 0600  Creatinine  EVERY THREE DAYS,   Routine     Comments:  Last Lab Result: Creatinine (mg/dL)       Date                     Value                 02/09/2017               0.88             ----------    02/13/17 1256    02/09/17 0600  INR  (warfarin (COUMADIN) Pharmacy Consult-INITIAL ORDER)  DAILY,   Routine      02/08/17 0232    Unscheduled  Potassium  (Potassium Replacement - \"Standard\" - For K levels less than 3.4 mmol/L - UU,UR,UA,RH,SH,PH,WY )  CONDITIONAL (SPECIFY),   Routine     Comments:  " "Obtain Potassium Level for these conditions:  *IF no potassium result within 24 hours before initiation of order set, draw potassium level with next lab collect.    *2 HOURS AFTER last IV potassium replacement dose and 4 hours after an oral replacement dose.  *Next morning after potassium dose.     Repeat Potassium Replacement if necessary.    02/08/17 0232    Unscheduled  Magnesium  (Magnesium Replacement -  Adult - \"Standard\" - Replacement for all levels less than 1.6 mg/dL )  CONDITIONAL (SPECIFY),   Routine     Comments:  Obtain Magnesium Level for these conditions:  *IF no magnesium result within 24 hrs before initiation of order set, draw magnesium level with next lab collect.    *2 HOURS AFTER last magnesium replacement dose when magnesium replacement given for level less than 1.6   *Next morning after magnesium dose.     Repeat Magnesium Replacement if necessary.    02/08/17 0232         Lab Results - 3 Days      Glucose by meter [[id number]] (Abnormal)  Resulted: 02/13/17 1156, Result status: Final result    Ordering provider: Dyllan Duncan MD  02/13/17 1129 Resulting lab: POINT OF CARE TEST, GLUCOSE    Specimen Information    Type Source Collected On     02/13/17 1129          Components       Value Reference Range Flag Lab   Glucose 157 70 - 99 mg/dL H 170            INR [[id number]] (Abnormal)  Resulted: 02/13/17 0703, Result status: Final result    Ordering provider: Dyllan Duncan MD  02/13/17 0001 Resulting lab: [facility]    Specimen Information    Type Source Collected On   Blood  02/13/17 0628          Components       Value Reference Range Flag Lab   INR 3.32 0.86 - 1.14 H FrStHsLb            Glucose by meter [[id number]]  Resulted: 02/12/17 2136, Result status: Final result    Ordering provider: Dyllan Duncan MD  02/12/17 2107 Resulting lab: POINT OF CARE TEST, GLUCOSE    Specimen Information    Type Source Collected On     02/12/17 2107          Components       Value " Reference Range Flag Lab   Glucose 94 70 - 99 mg/dL  170            Glucose by meter [[id number]]  Resulted: 02/12/17 1636, Result status: Final result    Ordering provider: Dyllan Duncan MD  02/12/17 1622 Resulting lab: POINT OF CARE TEST, GLUCOSE    Specimen Information    Type Source Collected On     02/12/17 1622          Components       Value Reference Range Flag Lab   Glucose 78 70 - 99 mg/dL  170            Glucose by meter [[id number]] (Abnormal)  Resulted: 02/12/17 1136, Result status: Final result    Ordering provider: Dyllan Duncan MD  02/12/17 1124 Resulting lab: POINT OF CARE TEST, GLUCOSE    Specimen Information    Type Source Collected On     02/12/17 1124          Components       Value Reference Range Flag Lab   Glucose 236 70 - 99 mg/dL H 170            Glucose by meter [[id number]] (Abnormal)  Resulted: 02/12/17 0756, Result status: Final result    Ordering provider: Dyllan Duncan MD  02/12/17 0748 Resulting lab: POINT OF CARE TEST, GLUCOSE    Specimen Information    Type Source Collected On     02/12/17 0748          Components       Value Reference Range Flag Lab   Glucose 127 70 - 99 mg/dL H 170            INR [[id number]] (Abnormal)  Resulted: 02/12/17 0751, Result status: Final result    Ordering provider: Dyllan Duncan MD  02/11/17 2151 Resulting lab: [facility]    Specimen Information    Type Source Collected On   Blood  02/12/17 0659          Components       Value Reference Range Flag Lab   INR 3.19 0.86 - 1.14 H FrStHsLb            Glucose by meter [[id number]] (Abnormal)  Resulted: 02/11/17 2121, Result status: Final result    Ordering provider: Dyllan Duncan MD  02/11/17 2102 Resulting lab: POINT OF CARE TEST, GLUCOSE    Specimen Information    Type Source Collected On     02/11/17 2102          Components       Value Reference Range Flag Lab   Glucose 123 70 - 99 mg/dL H 170            Glucose by meter [[id number]] (Abnormal)  Resulted: 02/11/17  1721, Result status: Final result    Ordering provider: Dyllan Duncan MD  02/11/17 1713 Resulting lab: POINT OF CARE TEST, GLUCOSE    Specimen Information    Type Source Collected On     02/11/17 1713          Components       Value Reference Range Flag Lab   Glucose 121 70 - 99 mg/dL H 170            Glucose by meter [[id number]]  Resulted: 02/11/17 1401, Result status: Final result    Ordering provider: Dyllan Duncan MD  02/11/17 1355 Resulting lab: POINT OF CARE TEST, GLUCOSE    Specimen Information    Type Source Collected On     02/11/17 1355          Components       Value Reference Range Flag Lab   Glucose 91 70 - 99 mg/dL  170            Glucose by meter [[id number]] (Abnormal)  Resulted: 02/11/17 0836, Result status: Final result    Ordering provider: Dyllan Duncan MD  02/11/17 0820 Resulting lab: POINT OF CARE TEST, GLUCOSE    Specimen Information    Type Source Collected On     02/11/17 0820          Components       Value Reference Range Flag Lab   Glucose 166 70 - 99 mg/dL H 170            INR [[id number]] (Abnormal)  Resulted: 02/11/17 0702, Result status: Final result    Ordering provider: Dyllan Duncan MD  02/11/17 0000 Resulting lab: [facility]    Specimen Information    Type Source Collected On   Blood  02/11/17 0633          Components       Value Reference Range Flag Lab   INR 2.95 0.86 - 1.14 H FrStHsLb            Glucose by meter [[id number]] (Abnormal)  Resulted: 02/11/17 0201, Result status: Final result    Ordering provider: Dyllan Duncan MD  02/11/17 0157 Resulting lab: POINT OF CARE TEST, GLUCOSE    Specimen Information    Type Source Collected On     02/11/17 0157          Components       Value Reference Range Flag Lab   Glucose 167 70 - 99 mg/dL H 170            Glucose by meter [[id number]] (Abnormal)  Resulted: 02/10/17 2126, Result status: Final result    Ordering provider: Dyllan Duncan MD  02/10/17 2121 Resulting lab: POINT OF CARE TEST,  GLUCOSE    Specimen Information    Type Source Collected On     02/10/17 2121          Components       Value Reference Range Flag Lab   Glucose 163 70 - 99 mg/dL H 170            Glucose by meter [[id number]] (Abnormal)  Resulted: 02/10/17 1616, Result status: Final result    Ordering provider: Dyllan Duncan MD  02/10/17 1604 Resulting lab: POINT OF CARE TEST, GLUCOSE    Specimen Information    Type Source Collected On     02/10/17 1604          Components       Value Reference Range Flag Lab   Glucose 188 70 - 99 mg/dL H 170            Glucose by meter [[id number]] (Abnormal)  Resulted: 02/10/17 1211, Result status: Final result    Ordering provider: Dyllan Duncan MD  02/10/17 1153 Resulting lab: POINT OF CARE TEST, GLUCOSE    Specimen Information    Type Source Collected On     02/10/17 1153          Components       Value Reference Range Flag Lab   Glucose 198 70 - 99 mg/dL H 170            Glucose by meter [[id number]] (Abnormal)  Resulted: 02/10/17 1211, Result status: Final result    Ordering provider: Dyllan Duncan MD  02/10/17 0757 Resulting lab: POINT OF CARE TEST, GLUCOSE    Specimen Information    Type Source Collected On     02/10/17 0757          Components       Value Reference Range Flag Lab   Glucose 139 70 - 99 mg/dL H 170            INR [[id number]] (Abnormal)  Resulted: 02/10/17 0712, Result status: Final result    Ordering provider: Dyllan Duncan MD  02/10/17 0000 Resulting lab: [facility]    Specimen Information    Type Source Collected On   Blood  02/10/17 0647          Components       Value Reference Range Flag Lab   INR 2.70 0.86 - 1.14 H FrStLb            Testing Performed By     Lab - Abbreviation Name Director Address Valid Date Range    14 - FrStLb [facility] Unknown [address]  [place] [address] 05/08/15 1057 - Present    170 - Unknown POINT OF CARE TEST, GLUCOSE Unknown Unknown 10/31/11 1114 - Present            Encounter-Level  Documents:     There are no encounter-level documents.      Order-Level Documents:     There are no order-level documents.

## 2017-02-08 PROBLEM — J10.1 INFLUENZA A: Status: ACTIVE | Noted: 2017-02-08

## 2017-02-08 LAB
ALBUMIN UR-MCNC: NEGATIVE MG/DL
APPEARANCE UR: CLEAR
BACTERIA #/AREA URNS HPF: ABNORMAL /HPF
BILIRUB UR QL STRIP: NEGATIVE
COLOR UR AUTO: YELLOW
GLUCOSE BLDC GLUCOMTR-MCNC: 141 MG/DL (ref 70–99)
GLUCOSE BLDC GLUCOMTR-MCNC: 148 MG/DL (ref 70–99)
GLUCOSE BLDC GLUCOMTR-MCNC: 161 MG/DL (ref 70–99)
GLUCOSE BLDC GLUCOMTR-MCNC: 189 MG/DL (ref 70–99)
GLUCOSE BLDC GLUCOMTR-MCNC: 212 MG/DL (ref 70–99)
GLUCOSE UR STRIP-MCNC: 300 MG/DL
HGB UR QL STRIP: NEGATIVE
INR PPP: 2.03 (ref 0.86–1.14)
KETONES UR STRIP-MCNC: NEGATIVE MG/DL
LEUKOCYTE ESTERASE UR QL STRIP: NEGATIVE
MUCOUS THREADS #/AREA URNS LPF: PRESENT /LPF
NITRATE UR QL: NEGATIVE
PH UR STRIP: 5.5 PH (ref 5–7)
RBC #/AREA URNS AUTO: 1 /HPF (ref 0–2)
SP GR UR STRIP: 1.01 (ref 1–1.03)
SQUAMOUS #/AREA URNS AUTO: <1 /HPF (ref 0–1)
URN SPEC COLLECT METH UR: ABNORMAL
UROBILINOGEN UR STRIP-MCNC: NORMAL MG/DL (ref 0–2)
WBC #/AREA URNS AUTO: <1 /HPF (ref 0–2)

## 2017-02-08 PROCEDURE — 99222 1ST HOSP IP/OBS MODERATE 55: CPT | Mod: AI | Performed by: INTERNAL MEDICINE

## 2017-02-08 PROCEDURE — A9270 NON-COVERED ITEM OR SERVICE: HCPCS | Mod: GY | Performed by: EMERGENCY MEDICINE

## 2017-02-08 PROCEDURE — 81001 URINALYSIS AUTO W/SCOPE: CPT | Performed by: EMERGENCY MEDICINE

## 2017-02-08 PROCEDURE — 25000132 ZZH RX MED GY IP 250 OP 250 PS 637: Mod: GY | Performed by: EMERGENCY MEDICINE

## 2017-02-08 PROCEDURE — A9270 NON-COVERED ITEM OR SERVICE: HCPCS | Mod: GY | Performed by: INTERNAL MEDICINE

## 2017-02-08 PROCEDURE — 25000131 ZZH RX MED GY IP 250 OP 636 PS 637: Mod: GY | Performed by: INTERNAL MEDICINE

## 2017-02-08 PROCEDURE — 12000007 ZZH R&B INTERMEDIATE

## 2017-02-08 PROCEDURE — 00000146 ZZHCL STATISTIC GLUCOSE BY METER IP

## 2017-02-08 PROCEDURE — 85610 PROTHROMBIN TIME: CPT | Performed by: INTERNAL MEDICINE

## 2017-02-08 PROCEDURE — 25000128 H RX IP 250 OP 636: Performed by: INTERNAL MEDICINE

## 2017-02-08 PROCEDURE — 25000132 ZZH RX MED GY IP 250 OP 250 PS 637: Mod: GY | Performed by: INTERNAL MEDICINE

## 2017-02-08 PROCEDURE — 36415 COLL VENOUS BLD VENIPUNCTURE: CPT | Performed by: INTERNAL MEDICINE

## 2017-02-08 RX ORDER — POTASSIUM CHLORIDE 1.5 G/1.58G
20-40 POWDER, FOR SOLUTION ORAL
Status: DISCONTINUED | OUTPATIENT
Start: 2017-02-08 | End: 2017-02-13 | Stop reason: HOSPADM

## 2017-02-08 RX ORDER — ONDANSETRON 2 MG/ML
4 INJECTION INTRAMUSCULAR; INTRAVENOUS EVERY 6 HOURS PRN
Status: DISCONTINUED | OUTPATIENT
Start: 2017-02-08 | End: 2017-02-13 | Stop reason: HOSPADM

## 2017-02-08 RX ORDER — AMOXICILLIN 250 MG
1-2 CAPSULE ORAL 2 TIMES DAILY PRN
Status: DISCONTINUED | OUTPATIENT
Start: 2017-02-08 | End: 2017-02-13 | Stop reason: HOSPADM

## 2017-02-08 RX ORDER — MAGNESIUM SULFATE HEPTAHYDRATE 40 MG/ML
4 INJECTION, SOLUTION INTRAVENOUS EVERY 4 HOURS PRN
Status: DISCONTINUED | OUTPATIENT
Start: 2017-02-08 | End: 2017-02-13 | Stop reason: HOSPADM

## 2017-02-08 RX ORDER — LIDOCAINE 40 MG/G
CREAM TOPICAL
Status: DISCONTINUED | OUTPATIENT
Start: 2017-02-08 | End: 2017-02-13 | Stop reason: HOSPADM

## 2017-02-08 RX ORDER — HYDROMORPHONE HYDROCHLORIDE 1 MG/ML
0.2 INJECTION, SOLUTION INTRAMUSCULAR; INTRAVENOUS; SUBCUTANEOUS
Status: DISCONTINUED | OUTPATIENT
Start: 2017-02-08 | End: 2017-02-13 | Stop reason: HOSPADM

## 2017-02-08 RX ORDER — ASPIRIN 81 MG/1
81 TABLET, CHEWABLE ORAL DAILY
Status: DISCONTINUED | OUTPATIENT
Start: 2017-02-08 | End: 2017-02-13 | Stop reason: HOSPADM

## 2017-02-08 RX ORDER — METOPROLOL SUCCINATE 25 MG/1
25 TABLET, EXTENDED RELEASE ORAL DAILY
Status: DISCONTINUED | OUTPATIENT
Start: 2017-02-08 | End: 2017-02-13 | Stop reason: HOSPADM

## 2017-02-08 RX ORDER — ACETAMINOPHEN 650 MG/1
650 SUPPOSITORY RECTAL EVERY 4 HOURS PRN
Status: DISCONTINUED | OUTPATIENT
Start: 2017-02-08 | End: 2017-02-13 | Stop reason: HOSPADM

## 2017-02-08 RX ORDER — BICALUTAMIDE 50 MG/1
50 TABLET, FILM COATED ORAL EVERY EVENING
Status: DISCONTINUED | OUTPATIENT
Start: 2017-02-08 | End: 2017-02-13 | Stop reason: HOSPADM

## 2017-02-08 RX ORDER — POLYETHYLENE GLYCOL 3350 17 G/17G
17 POWDER, FOR SOLUTION ORAL DAILY PRN
Status: DISCONTINUED | OUTPATIENT
Start: 2017-02-08 | End: 2017-02-13 | Stop reason: HOSPADM

## 2017-02-08 RX ORDER — SERTRALINE HYDROCHLORIDE 25 MG/1
50 TABLET, FILM COATED ORAL DAILY
Status: DISCONTINUED | OUTPATIENT
Start: 2017-02-08 | End: 2017-02-13 | Stop reason: HOSPADM

## 2017-02-08 RX ORDER — ATORVASTATIN CALCIUM 20 MG/1
20 TABLET, FILM COATED ORAL DAILY
Status: DISCONTINUED | OUTPATIENT
Start: 2017-02-08 | End: 2017-02-13 | Stop reason: HOSPADM

## 2017-02-08 RX ORDER — POTASSIUM CHLORIDE 29.8 MG/ML
20 INJECTION INTRAVENOUS
Status: DISCONTINUED | OUTPATIENT
Start: 2017-02-08 | End: 2017-02-13 | Stop reason: HOSPADM

## 2017-02-08 RX ORDER — OSELTAMIVIR PHOSPHATE 75 MG/1
75 CAPSULE ORAL 2 TIMES DAILY
Status: COMPLETED | OUTPATIENT
Start: 2017-02-08 | End: 2017-02-12

## 2017-02-08 RX ORDER — POTASSIUM CHLORIDE 750 MG/1
20-40 TABLET, EXTENDED RELEASE ORAL
Status: DISCONTINUED | OUTPATIENT
Start: 2017-02-08 | End: 2017-02-13 | Stop reason: HOSPADM

## 2017-02-08 RX ORDER — ONDANSETRON 4 MG/1
4 TABLET, ORALLY DISINTEGRATING ORAL EVERY 6 HOURS PRN
Status: DISCONTINUED | OUTPATIENT
Start: 2017-02-08 | End: 2017-02-13 | Stop reason: HOSPADM

## 2017-02-08 RX ORDER — WARFARIN SODIUM 4 MG/1
4 TABLET ORAL
Status: COMPLETED | OUTPATIENT
Start: 2017-02-08 | End: 2017-02-08

## 2017-02-08 RX ORDER — PROCHLORPERAZINE MALEATE 5 MG
5 TABLET ORAL EVERY 6 HOURS PRN
Status: DISCONTINUED | OUTPATIENT
Start: 2017-02-08 | End: 2017-02-13 | Stop reason: HOSPADM

## 2017-02-08 RX ORDER — DEXTROSE MONOHYDRATE 25 G/50ML
25-50 INJECTION, SOLUTION INTRAVENOUS
Status: DISCONTINUED | OUTPATIENT
Start: 2017-02-08 | End: 2017-02-13 | Stop reason: HOSPADM

## 2017-02-08 RX ORDER — ACETAMINOPHEN 325 MG/1
650 TABLET ORAL EVERY 4 HOURS PRN
Status: DISCONTINUED | OUTPATIENT
Start: 2017-02-08 | End: 2017-02-13 | Stop reason: HOSPADM

## 2017-02-08 RX ORDER — NICOTINE POLACRILEX 4 MG
15-30 LOZENGE BUCCAL
Status: DISCONTINUED | OUTPATIENT
Start: 2017-02-08 | End: 2017-02-13 | Stop reason: HOSPADM

## 2017-02-08 RX ORDER — PROCHLORPERAZINE 25 MG
12.5 SUPPOSITORY, RECTAL RECTAL EVERY 12 HOURS PRN
Status: DISCONTINUED | OUTPATIENT
Start: 2017-02-08 | End: 2017-02-13 | Stop reason: HOSPADM

## 2017-02-08 RX ORDER — OSELTAMIVIR PHOSPHATE 75 MG/1
75 CAPSULE ORAL ONCE
Status: COMPLETED | OUTPATIENT
Start: 2017-02-08 | End: 2017-02-08

## 2017-02-08 RX ORDER — BISACODYL 10 MG
10 SUPPOSITORY, RECTAL RECTAL DAILY PRN
Status: DISCONTINUED | OUTPATIENT
Start: 2017-02-08 | End: 2017-02-13 | Stop reason: HOSPADM

## 2017-02-08 RX ORDER — POTASSIUM CHLORIDE 7.45 MG/ML
10 INJECTION INTRAVENOUS
Status: DISCONTINUED | OUTPATIENT
Start: 2017-02-08 | End: 2017-02-13 | Stop reason: HOSPADM

## 2017-02-08 RX ORDER — NALOXONE HYDROCHLORIDE 0.4 MG/ML
.1-.4 INJECTION, SOLUTION INTRAMUSCULAR; INTRAVENOUS; SUBCUTANEOUS
Status: DISCONTINUED | OUTPATIENT
Start: 2017-02-08 | End: 2017-02-13 | Stop reason: HOSPADM

## 2017-02-08 RX ADMIN — ACETAMINOPHEN 650 MG: 325 TABLET, FILM COATED ORAL at 20:18

## 2017-02-08 RX ADMIN — SERTRALINE HYDROCHLORIDE 50 MG: 25 TABLET ORAL at 09:23

## 2017-02-08 RX ADMIN — Medication 2 LOZENGE: at 16:50

## 2017-02-08 RX ADMIN — BICALUTAMIDE 50 MG: 50 TABLET, FILM COATED ORAL at 20:18

## 2017-02-08 RX ADMIN — INSULIN ASPART 1 UNITS: 100 INJECTION, SOLUTION INTRAVENOUS; SUBCUTANEOUS at 12:57

## 2017-02-08 RX ADMIN — OSELTAMIVIR PHOSPHATE 75 MG: 75 CAPSULE ORAL at 20:18

## 2017-02-08 RX ADMIN — INSULIN ASPART 1 UNITS: 100 INJECTION, SOLUTION INTRAVENOUS; SUBCUTANEOUS at 18:00

## 2017-02-08 RX ADMIN — Medication 2 LOZENGE: at 11:00

## 2017-02-08 RX ADMIN — Medication 2 LOZENGE: at 20:18

## 2017-02-08 RX ADMIN — METOPROLOL SUCCINATE 25 MG: 25 TABLET, EXTENDED RELEASE ORAL at 09:23

## 2017-02-08 RX ADMIN — WARFARIN SODIUM 4 MG: 4 TABLET ORAL at 18:00

## 2017-02-08 RX ADMIN — OSELTAMIVIR PHOSPHATE 75 MG: 75 CAPSULE ORAL at 09:23

## 2017-02-08 RX ADMIN — INSULIN ASPART 1 UNITS: 100 INJECTION, SOLUTION INTRAVENOUS; SUBCUTANEOUS at 09:22

## 2017-02-08 RX ADMIN — SODIUM CHLORIDE 1000 ML: 9 INJECTION, SOLUTION INTRAVENOUS at 03:11

## 2017-02-08 RX ADMIN — ASPIRIN 81 MG 81 MG: 81 TABLET ORAL at 09:23

## 2017-02-08 RX ADMIN — ATORVASTATIN CALCIUM 20 MG: 20 TABLET, FILM COATED ORAL at 09:23

## 2017-02-08 RX ADMIN — OSELTAMIVIR PHOSPHATE 75 MG: 75 CAPSULE ORAL at 02:10

## 2017-02-08 ASSESSMENT — ENCOUNTER SYMPTOMS
VOMITING: 0
DIARRHEA: 0
SHORTNESS OF BREATH: 0
VOICE CHANGE: 1
SORE THROAT: 1
LIGHT-HEADEDNESS: 0
CHILLS: 1
DIZZINESS: 0
FEVER: 1
COUGH: 1
ABDOMINAL PAIN: 0

## 2017-02-08 ASSESSMENT — ACTIVITIES OF DAILY LIVING (ADL)
COGNITION: 0 - NO COGNITION ISSUES REPORTED
RETIRED_COMMUNICATION: 0-->UNDERSTANDS/COMMUNICATES WITHOUT DIFFICULTY
TOILETING: 0-->INDEPENDENT
WHICH_OF_THE_ABOVE_FUNCTIONAL_RISKS_HAD_A_RECENT_ONSET_OR_CHANGE?: FALL HISTORY
FALL_HISTORY_WITHIN_LAST_SIX_MONTHS: YES
TRANSFERRING: 1-->ASSISTIVE EQUIPMENT
RETIRED_EATING: 0-->INDEPENDENT
SWALLOWING: 0-->SWALLOWS FOODS/LIQUIDS WITHOUT DIFFICULTY
DRESS: 0-->INDEPENDENT
NUMBER_OF_TIMES_PATIENT_HAS_FALLEN_WITHIN_LAST_SIX_MONTHS: 3
BATHING: 0-->INDEPENDENT
AMBULATION: 1-->ASSISTIVE EQUIPMENT

## 2017-02-08 NOTE — PROGRESS NOTES
[facility]    Hospitalist Progress Note  Ash Tidwell MD    Date of Service : 02/08/2017    Assessment and Plan     76 year old male with PmHx of  CAD, Type 2 DM, embolic stroke, prostate cancer, hyperlipidemia, dementia, esophageal stricture, who was admitted on 2/8/17 due to fever up to 102F, chills, sore throat, lightheadedness with standing and history of 3 falls on the day of admission. Work up done on 2/7/17 revealed, normal CMP. CBC revealed, Hb 12.7, WBC 7.0 and Plts 142. Lactic acid was 3.1. INR was 1.87 and UA is negative. Nasal swab was positive for Influenza A. Chest x-rays on  2/7/17 is normal.    1. Influenza A with SIRS: Continue Tamiflu 75mg po b.i.d. for total of 10 doses    2. Orthostatic hypotension: improved S/P 2L IV N/saline. Monitor vitals.     3. Type 2 DM: BG was 161 this am. HbA1C was 7.6 on 1/25/17. Holding prior to admission metformin and glimepiride.  Continue medium dose sliding scale prn.    4. Chronic systolic congestive heart failure: monitor weights. Continue, aspirin, toprol Xl and lipitor. ECHO done in July 2014 revealed, LVEF 35%.    5. History of embolic CVA:  continue warfarin per pharmacy.    6. Prostate cancer: [facility]s [facility]. On Lupron and  Casodex.        DVT Prophylaxis: Ambulate every shift  Code Status: Full Code    Disposition: Expected discharge in 1 days. For PT review.      Interval History  The pt reported feeling better today. He still has a sore throat. He denied any dizziness at rest, but he is yet to ambulate.     -Data reviewed today: I reviewed all new labs and imaging results over the last 24 hours. I personally reviewed no images or EKG's today.    Physical Exam  Temp: 98.8  F (37.1  C) Temp src: Oral BP: 107/59 mmHg Pulse: 69 Heart Rate: 70 Resp: 16 SpO2: 97 % O2 Device: None (Room air)    Filed Vitals:    02/07/17 2245   Weight: 84.596 kg (186 lb 8 oz)     Vital Signs with Ranges  Temp:  [97.8   F (36.6  C)-99.5  F (37.5  C)] 98.8  F (37.1  C)  Pulse:  [69-85] 69  Heart Rate:  [70-93] 70  Resp:  [16-20] 16  BP: (107-136)/(59-80) 107/59 mmHg  SpO2:  [96 %-97 %] 97 %  I/O last 3 completed shifts:  In: 120 [P.O.:120]  Out: 100 [Urine:100]    Constitutional: Elderly white male, awake, husky voice+, cooperative, no apparent distress, O2 Sats 94% on RA  Respiratory: BS vesicular bilaterally, no crackles or wheezing  Cardiovascular: S1 and S2, reg, no murmur noted  GI: Soft, non-distended, non-tender, no masses, BS present+  Skin/Integumen: No rashes  Extremities: Bilat pedal edema 1+    Medications    Warfarin Therapy Reminder         oseltamivir  75 mg Oral BID     aspirin  81 mg Oral Daily     atorvastatin  20 mg Oral Daily     bicalutamide  50 mg Oral QPM     sertraline  50 mg Oral Daily     metoprolol  25 mg Oral Daily     insulin aspart  1-7 Units Subcutaneous TID AC     insulin aspart  1-5 Units Subcutaneous At Bedtime     sodium chloride (PF)  3 mL Intracatheter Q8H       Data    Recent Labs  Lab 02/07/17  2300   WBC 7.0   HGB 12.7*   MCV 92   *   INR 1.87*      POTASSIUM 3.9   CHLORIDE 104   CO2 23   BUN 16   CR 0.97   ANIONGAP 12   TENZIN 8.7   *   ALBUMIN 3.6   PROTTOTAL 7.3   BILITOTAL 0.5   ALKPHOS 57   ALT 25   AST 25       Recent Results (from the past 24 hour(s))   Chest XR,  PA & LAT    Narrative    XR CHEST 2 VW  2/7/2017 11:25 PM      HISTORY: Cough.     COMPARISON: 8/6/2015.    FINDINGS: The heart size is normal. Thoracic aorta is calcified. The  lungs are clear. No pneumothorax or pleural effusion. Degenerative  disease in the thoracic spine.      Impression    IMPRESSION: No acute abnormality.    ZULEMA COTTO MD

## 2017-02-08 NOTE — PLAN OF CARE
Problem: Individualization  Goal: Patient Preferences  Outcome: No Change  Vss, a-febrile, denies pain, c/o sore throat, lozenge given, voice is hoarse,denies nausea or coughing, up with 1-2 assist gait belt and walker, voiding per urinal

## 2017-02-08 NOTE — PLAN OF CARE
Problem: Goal Outcome Summary  Goal: Goal Outcome Summary  Outcome: Improving  Pt up on floor from ED around 2:35am. A&O. Up w/ ax2, says uses walker per baseline. Infrequent non productive cough, vss. Voids in urinal but may be incontinent at times. IV SL, 1000ml bolus given @ 250ml/hr. Continue to monitor.

## 2017-02-08 NOTE — PHARMACY-ADMISSION MEDICATION HISTORY
Admission medication history interview status for the 2/7/2017  admission is complete. See EPIC admission navigator for prior to admission medications     Medication history source reliability:Good    Actions taken by pharmacist (provider contacted, etc):None     Additional medication history information not noted on PTA med list :None    Medication reconciliation/reorder completed by provider prior to medication history? Yes    Time spent in this activity: 15 minutes    Prior to Admission medications    Medication Sig Last Dose Taking? Auth Provider   metFORMIN (GLUCOPHAGE) 500 MG tablet Take 2 tablets (1,000 mg) by mouth 2 times daily (with meals) 2/7/2017 at am Yes Arlene Mcdermott MD   metoprolol (TOPROL-XL) 25 MG 24 hr tablet TAKE 1 TABLET EVERY DAY 2/7/2017 at am Yes Jermain Helm MD   glimepiride (AMARYL) 1 MG tablet TAKE 1 TABLET (1 MG) BY MOUTH EVERY MORNING (BEFORE BREAKFAST) 2/7/2017 at am Yes Jermain Helm MD   atorvastatin (LIPITOR) 20 MG tablet TAKE 1 TABLET EVERY DAY 2/7/2017 at am Yes Jermain Helm MD   warfarin (COUMADIN) 4 MG tablet 4 mg on Sundays, Tuesdays, Thursdays, and Saturdays 2/7/2017 at am Yes Jermain Helm MD   warfarin (COUMADIN) 2 MG tablet Take 2 mg on Mondays, Wednesdays, and Fridays 2/6/2017 at am Yes Jermain Helm MD   Cholecalciferol (D3-1000 PO) Take 1 capsule by mouth daily 2/7/2017 at am Yes Reported, Patient   sertraline (ZOLOFT) 50 MG tablet TAKE 1 TABLET EVERY DAY 2/6/2017 at hs Yes Jermain Helm MD   bicalutamide (CASODEX) 50 MG tablet TAKE 1 TABLET EVERY EVENING 2/7/2017 at am Yes Jermain Helm MD   aspirin 81 MG chewable tablet Take 1 tablet (81 mg) by mouth daily 2/7/2017 at am Yes Gely Urbina,    multivitamin, therapeutic (THERA-VIT) TABS Take 1 tablet by mouth daily 2/7/2017 at am Yes Unknown, Entered By History   Nutritional Supplements (ENSURE PO) Take 1 Can by mouth daily.  Yes Reported, Patient

## 2017-02-08 NOTE — H&P
[facility]    History and Physical  Hospitalist       Date of Admission:  2/7/2017  Date of Service (when I saw the patient): 02/08/2017    Assessment and Plan  Jose Lees is a 76 year old male who presents with fever, chills, lightheadedness with standing, and 3 falls today found to have positive influenza A    Influenza A with SIRS: Patient with fevers to 102, shaking chills, cough sore throat who was evaluated in the emergency department and found to be influenza A positive. Patient with ~36h of symptoms. Lactic acid elevated to the 3 range on admission in the setting of poor intake and fevers  -Tamiflu 75 b.i.d.for total of 10 doses  -Spot check oximetry  -Ambulate with nursing staff to assess for oxygen needs    Orthostatic hypotension with falls: The patient endorses feeling lightheaded over the past 2 days, multiple falls 2/7/17.  -1 L normal saline over 4 hours; patient with low EF heart failure, so will need to be cautious with rate of fluid administration  -fall precautions  -Ambulate with nursing staff  -Orthostatic blood pressure and pulse following each IV fluid bolus; provider to be paged if abnormal for additional fluid orders  -Cardiac diet as tolerated  -Compression stockings given chronic peripheral edema and now orthostatic symptoms    DM II: Most recent hemoglobin A1c of 7.6 1/25/17. Blood glucose in the 200 range on arrival to emergency department  -Holding prior to admission metformin and glimepiride  -medium dose sliding scale available    Chronic systolic congestive heart failure, left-sided: Ischemic cardiomyopathy secondary to April 2014 myocardial infarction. It appears that most recent TTE was performed July 2014 with an ejection fraction of 35% as commented on by cardiology service; image not currently available for this.Does not appear to be in acute exacerbation, though is at risk given presentation with orthostatic hypotension and falls requiring fluid  resuscitation  -Intake and output  -Daily weights  -Cardiac telemetry  -Spot check oximetry  -continue daily aspirin as per prior cardiology recommendations; aware the patient is also on chronic anticoagulation  -Continue Lipitor 20 mg daily  -Continue metoprolol 25 mg XL daily  -It has been several years since patient's last TTE, though currently I do not see an indication to repeat. Consider outpatient cardiology follow-up to discuss discontinuation of aspirin in the setting of chronic anticoagulation or slow up titration of ACE inhibitor previously held given history of hypotension, though patient appears to have been stable for several years now otherwise.  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 No formal diagnosis of atrial fibrillation, though has remained on anticoagulation. Slightly subtherapeutic at 1.87 on admission  -Will continue warfarin, pharmacy to dose    Prostate cancer: Follows with [facility]. Seen recently in clinic for Lupron administration with next follow-up scheduled in July 2017  -Continue Casodex evening administration    DVT Prophylaxis: continue warfarin    Code Status: full code. Discussed with patient on admission    Disposition: Expected discharge in 1-2 days pending resolution of orthostasis, advancement of diet as tolerated    Dyllan Darnell [place]    Primary Care Physician  Jermain Helm    Chief Complaint  Falls, fever, weakness    History is obtained from the patient, chart review, Dr Armstrong in the ED    History of Present Illness  Jose Lees is a 76 year old male who presents with approximately 36 hours of fever, chills, sore throat who presents today after multiple falls and feeling lightheaded. Patient states he fell 3 times  today. Had been feeling in his usual state of health up until 2/6/17 when he developed fevers up to 102. States that his appetite was poor, and he knew he was ill. He suspected a viral illness, and actually avoided going down for meals as he didn't want to get any other residents at [facility] assisted living ill. States he only ate a few bites of mashed potatoes he other evening given poor appetite. Denies nausea or vomiting. In the emergency department, influenza swab positive for influenza A, and initial dose of Tamiflu given in the emergency department.    Regarding falls, patient states that he had been feeling lightheaded throughout the day when standing. Does not recall any particular prodrome prior to falls. Did not lose consciousness, denies hitting his head, no trauma associated with these falls. Falls were the primary reason patient presented to the emergency department today.    Past Medical History   I have reviewed this patient's medical history and updated it with pertinent information if needed.   Past Medical History   Diagnosis Date     Type II or unspecified type diabetes mellitus without mention of complication, not stated as uncontrolled      Diabetes mellitus     Autism disorder 6/29/2012     ACP (advance care planning)      Form given     Stroke, embolic (H) 4/7/2014     bilateral cerebral embolic CVAs     Acute anterior wall MI (H) 4/7/2014     Hyperlipidaemia      Coronary artery disease 4/2014 4/2014 HEART CATH - 70% mid LAD -- BMS placed, small 1st diagonal 80%, RCA 60-70% before crux, 70% mid PDA     Prostate cancer (H) 10/11     GLEASOR 8     Dementia      CHF (congestive heart failure) (H)      ECHO EF=25-30% on 4/7/14     Hyperlipidaemia LDL goal < 100      Stricture and stenosis of esophagus 10/22/2015       Past Surgical History  I have reviewed this patient's surgical history and updated it with pertinent information if needed.  Past Surgical History   Procedure  Laterality Date     Coronary angiography adult order       Heart cath, angioplasty       BMS to LAD     Esophagoscopy, gastroscopy, duodenoscopy (egd), combined N/A 2015     Procedure: COMBINED ESOPHAGOSCOPY, GASTROSCOPY, DUODENOSCOPY (EGD), REMOVE FOREIGN BODY;  Surgeon: Yu Tapia MD;  Location:  GI       Prior to Admission Medications  Prior to Admission Medications   Prescriptions Last Dose Informant Patient Reported? Taking?   ACE/ARB NOT PRESCRIBED, INTENTIONAL,   No No   Sig: ACE & ARB not prescribed due to Symptomatic hypotension not due to excessive diuresis   Blood Glucose Monitoring Suppl W/DEVICE KIT  Self No No   Sig: Test blood glucose every other day       Cholecalciferol (D3-1000 PO)   Yes No   Sig: Take 1 capsule by mouth daily   Nutritional Supplements (ENSURE PO)  Self Yes No   Sig: Take 1 Can by mouth daily.   aspirin 81 MG chewable tablet  Self No No   Sig: Take 1 tablet (81 mg) by mouth daily   atorvastatin (LIPITOR) 20 MG tablet   No No   Sig: TAKE 1 TABLET EVERY DAY   bicalutamide (CASODEX) 50 MG tablet   No No   Sig: TAKE 1 TABLET EVERY EVENING   glimepiride (AMARYL) 1 MG tablet   No No   Sig: TAKE 1 TABLET (1 MG) BY MOUTH EVERY MORNING (BEFORE BREAKFAST)   metFORMIN (GLUCOPHAGE) 500 MG tablet   No No   Sig: Take 2 tablets (1,000 mg) by mouth 2 times daily (with meals)   metoprolol (TOPROL-XL) 25 MG 24 hr tablet   No No   Sig: TAKE 1 TABLET EVERY DAY   multivitamin, therapeutic (THERA-VIT) TABS  Self Yes No   Sig: Take 1 tablet by mouth daily   sertraline (ZOLOFT) 50 MG tablet   No No   Sig: TAKE 1 TABLET EVERY DAY   warfarin (COUMADIN) 2 MG tablet   Yes No   Sig: Take 2 mg on  only   warfarin (COUMADIN) 4 MG tablet   Yes No   Si mg every day except tuesday      Facility-Administered Medications: None     Allergies  No Known Allergies    Social History  I have reviewed this patient's social history and updated it with pertinent information if needed. Jose LUCAS  Yoana  reports that he has never smoked. He has never used smokeless tobacco. He reports that he does not drink alcohol or use illicit drugs.    Family History  I have reviewed this patient's family history and updated it with pertinent information if needed.   Family History   Problem Relation Age of Onset     CEREBROVASCULAR DISEASE Mother 92     C.A.D. Father 48       Review of Systems  The 10 point Review of Systems is negative other than noted in the HPI or here.   Patient endorses a sore throat and mild cough  Denies orthopnea    Physical Exam  Temp: 99.5  F (37.5  C) Temp src: Temporal BP: 136/66 mmHg Pulse: 85 Heart Rate: 93 Resp: 20 SpO2: 96 % O2 Device: None (Room air)    Vital Signs with Ranges  Temp:  [99.5  F (37.5  C)] 99.5  F (37.5  C)  Pulse:  [85] 85  Heart Rate:  [93] 93  Resp:  [20] 20  BP: (135-136)/(64-80) 136/66 mmHg  SpO2:  [96 %] 96 %  186 lbs 8 oz    Constitutional: no acute distress, alert, conversant  Eyes: no scleral icterus or injection  HEENT: normocephalic without evidence of trauma  Respiratory: breath sounds clear bilaterally to auscultation, no wheezes, no crackles  Cardiovascular: regular rate and rhythm, 2/6 relatively harsh systolic murmur appreciated at apex. This is not noted at last cardiology visit, though patient states he was told as a child he hada murmur  GI: abdomen soft, non-tender, normoactive bowel sounds, no masses  Lymph/Hematologic: 1+ bilateral lower extremity pitting edema  Genitourinary: not examined  Skin: no rashes  Musculoskeletal: muscular tone intact in all extremities  Neurologic: mental status grossly intact, no focal deficits, alert  Psychiatric: normal affect    Data  Data reviewed today:  I personally reviewed chest x-ray with no acute infiltrate.    Recent Labs  Lab 02/07/17  2300   WBC 7.0   HGB 12.7*   MCV 92   *   INR 1.87*      POTASSIUM 3.9   CHLORIDE 104   CO2 23   BUN 16   CR 0.97   ANIONGAP 12   TENZIN 8.7   *   ALBUMIN  3.6   PROTTOTAL 7.3   BILITOTAL 0.5   ALKPHOS 57   ALT 25   AST 25       Recent Results (from the past 24 hour(s))   Chest XR,  PA & LAT    Narrative    XR CHEST 2 VW  2/7/2017 11:25 PM      HISTORY: Cough.     COMPARISON: 8/6/2015.    FINDINGS: The heart size is normal. Thoracic aorta is calcified. The  lungs are clear. No pneumothorax or pleural effusion. Degenerative  disease in the thoracic spine.      Impression    IMPRESSION: No acute abnormality.

## 2017-02-08 NOTE — ED NOTES
Bed: ED  Expected date: 2/7/17  Expected time: 10:35 PM  Means of arrival: Ambulance  Comments:  [address] 76M weakness

## 2017-02-08 NOTE — ED NOTES
[facility]  ED Nurse Handoff Report    ED Chief complaint: Fever      ED Diagnosis:   Final diagnoses:   Influenza A   Generalized muscle weakness   Fall, initial encounter       Code Status: Full Code    Allergies: No Known Allergies    Activity level:  Stand with Assist     Needed?: No    Isolation: No  Infection: Not Applicable  Influenza    Bariatric?: No      Vital Signs:   Filed Vitals:    02/07/17 2315 02/08/17 0105 02/08/17 0106 02/08/17 0110   BP: 136/66 121/67     Pulse:       Temp:       TempSrc:       Resp:       Height:       Weight:       SpO2:   96% 96%       Cardiac Rhythm: ,        Pain level: 0-10 Pain Scale: 0    Is this patient confused?: No    Patient Report: Initial Complaint:   Fever and weakness last few days, fall time 2 at care facility earlier today secondarily to weakness. Denies CP, SOB or lightheadedness. Pt. has freq. non-prod. cough and sore throat. Alert and orient. times 3.            Focused Assessment: Lung sounds clear, pt. Having non-prod. Cough and fever at home.   Tests Performed: CXR, and influenza, blood work.   Abnormal Results: See results  Treatments provided: IV fluids, Tamiflu    Family Comments: none    OBS brochure/video discussed/provided to patient: N/A    ED Medications:   Medications   oseltamivir (TAMIFLU) capsule 75 mg (not administered)   0.9% sodium chloride BOLUS (1,000 mLs Intravenous New Bag 2/7/17 7147)       Drips infusing?:  No      ED NURSE PHONE NUMBER: [phone]

## 2017-02-08 NOTE — ED PROVIDER NOTES
History     Chief Complaint:  Generalized Weakness    HPI   Jose Lees is a 76 year old anticoagulated male with a history of CAD, MI s/p one cardiac stent, hypertension, diabetes, CHF, and stroke who presents via EMS with concern for generalized weakness. The patient reports that yesterday morning he developed a flu-like illness with fever up to 102.7, chills, dry cough, and sore throat with hoarse voice. Today, the patient began feeling markedly weak and fell three times. He attributes each of these falls to feeling generally weak; he did not have any preceding symptoms of chest pain, shortness of breath, lightheadedness, or dizziness. He did not sustain any injuries, hit his head, or lose consciousness any of the times he fell. Here, the patient denies any current chest pain, abdominal pain, vomiting, or diarrhea. He did get his flu shot this year. He notes that he has been around a sick gentleman at his living facility.     Allergies:  The patient has no known drug allergies.     Medications:    Metformin  Metoprolol  Glimepiride  Lipitor  Coumadin  Vitamin D3  Zoloft  Casodex  Aspirin  MVI  Ensure     Past Medical History:    CAD  Acute anterior wall MI  HTN  HLD  DM type 2  Autism disorder  Embolic stroke  TIA  Prostate cancer  Dementia  CHF  Stricture and stenosis of esophagus    Past Surgical History:    Heart cath, angioplasty (BMS to LAD)  EGD, remove foreign body    Family History:    Cerebrovascular disease  CAD    Social History:  Relationship status: Single  Tobacco Use: Negative  Alcohol Use: Negative  The patient presents with his sister and brother-in-law.  The patient is retired.  The patient uses a walker to ambulate.   The patient lives in an assisted living facility ([facility]).      Review of Systems   Constitutional: Positive for fever and chills.        Positive for generalized weakness.    HENT: Positive for sore throat and voice change.         Negative  "for head injury.   Respiratory: Positive for cough. Negative for shortness of breath.    Cardiovascular: Negative for chest pain.   Gastrointestinal: Negative for vomiting, abdominal pain and diarrhea.   Neurological: Negative for dizziness, syncope and light-headedness.   All other systems reviewed and are negative.    Physical Exam   First Vitals:  BP: 135/64 mmHg  Pulse: 85  Heart Rate: 93  Temp: 99.5  F (37.5  C)  Resp: 20  Height: 176.5 cm (5' 9.5\")  Weight: 84.596 kg (186 lb 8 oz)  SpO2: 96 %    Physical Exam  General:              Well-nourished              Speaking in full sentences              Hoarse sounding voice  Head:   No hematoma or step-off   No open wounds or bleeding  Eyes:              Conjunctiva without injection or scleral icterus              PERRL   No raccoon eyes  ENT:              Moist mucous membranes              Posterior oropharynx clear without erythema or exudate              TM clear without erythema or fluid   No hemotympanum              Nares patent              Pinnae normal  Neck:              Full ROM              No stiffness appreciated  Resp:              Lungs CTAB              No crackles, wheezing or audible rubs              Good air movement  CV:                    Normal rate, regular rhythm              S1 and S2 present              III/VI systolic murmur  GI:              BS present              Abdomen soft without distention              Non-tender to light and deep palpation              No guarding or rebound tenderness  Skin:              Warm, dry, well perfused              No rashes or open wounds on exposed skin  MSK:              Moves all extremities              No focal deformities              Trace LE edema  Neuro:              Alert              Answers questions appropriately              Moves all extremities equally  Psych:              Normal affect, normal mood    Emergency Department Course   Imaging:  Radiographic findings were " communicated with the patient who voiced understanding of the findings.    Chest XR, PA and LAT, per radiology:   No acute abnormality.       Laboratory:  CBC: WBC 7.0, HGB 12.7 (L),  (L)  CMP: Glucose 233 (H), o/w WNL (Creatinine 0.97)  Lactate: 3.1 (H)  2300: INR: 1.87 (H)    Influenza A/B antigen: A:POSITIVE  B:Negative     UA: pending collection    Interventions:  2314: Normal Saline, 1000 mL, IV  Tamiflu, 75 mg, PO      Emergency Department Course:  Nursing notes and vitals reviewed.  I performed an exam of the patient as documented above.  The above workup was undertaken.  0014: I rechecked the patient and discussed results and plan for admission.  0028: Discussed the patient with Dr. Duncan of the hospitalist service who accepts care of the patient.  Findings and plan explained to the Patient who consents to admission. Discussed the patient with Dr. Duncan, who will admit the patient for further monitoring, evaluation, and treatment.     Impression & Plan    Medical Decision Making:  Jose Lees is a 76 year old male presenting to the ED for evaluation of weakness, and fevers. Vital signs on presentation reveal mildly elevated blood pressure though otherwise are grossly unremarkable. History, exam, and ED course as above. Symptoms are most consistent with influenza. Rapid influenza test did return positive for influenza A. Given the duration of symptoms as well as patient's chronic comorbid disease he was provided first dose of Tamiflu from the ED. No other focal evidence of superimposed infection is identified by history or on exam. CXR clear without evidence of pneumonia. Laboratory studies reveal hemoglobin of 12.7, platelet count of 142, and lactate of 3.1. Lactate elevation likely secondary to the above infectious process, although given that this is likely related to influenza, will hold on broad spectrum antibiotics. With regards to patient's falls, he denies any injury these and  specifically denies striking his head nor loss of consciousness. I see no objective evidence of trauma on my exam. Patient was provided IV fluids. In light of his persistent weakness as well as recurrent falls at home, he will be admitted to the hospitalist service under the care of Dr. Duncan for further treatment and care. Patient and family updated and are agreeable. All questions answered prior to admission.     Diagnosis:    ICD-10-CM   1. Influenza A J10.1   2. Generalized muscle weakness M62.81   3. Fall, initial encounter W19.XXXA     Disposition:  Admit under the care of Dr. Duncan.      I, Teri Ellis, am serving as a scribe on 2/7/2017 at 11:00 PM to personally document services performed by Pollo Armstrong MD, based on my observations and the provider's statements to me.     EMERGENCY DEPARTMENT    Pollo Armstrong MD  02/08/17 0459

## 2017-02-09 ENCOUNTER — APPOINTMENT (OUTPATIENT)
Dept: PHYSICAL THERAPY | Facility: CLINIC | Age: 77
DRG: 194 | End: 2017-02-09
Attending: INTERNAL MEDICINE
Payer: MEDICARE

## 2017-02-09 LAB
ANION GAP SERPL CALCULATED.3IONS-SCNC: 10 MMOL/L (ref 3–14)
BUN SERPL-MCNC: 13 MG/DL (ref 7–30)
CALCIUM SERPL-MCNC: 8.3 MG/DL (ref 8.5–10.1)
CHLORIDE SERPL-SCNC: 105 MMOL/L (ref 94–109)
CO2 SERPL-SCNC: 22 MMOL/L (ref 20–32)
CREAT SERPL-MCNC: 0.88 MG/DL (ref 0.66–1.25)
ERYTHROCYTE [DISTWIDTH] IN BLOOD BY AUTOMATED COUNT: 13.4 % (ref 10–15)
GFR SERPL CREATININE-BSD FRML MDRD: 84 ML/MIN/1.7M2
GLUCOSE BLDC GLUCOMTR-MCNC: 154 MG/DL (ref 70–99)
GLUCOSE BLDC GLUCOMTR-MCNC: 200 MG/DL (ref 70–99)
GLUCOSE BLDC GLUCOMTR-MCNC: 205 MG/DL (ref 70–99)
GLUCOSE BLDC GLUCOMTR-MCNC: 235 MG/DL (ref 70–99)
GLUCOSE SERPL-MCNC: 180 MG/DL (ref 70–99)
HCT VFR BLD AUTO: 35.9 % (ref 40–53)
HGB BLD-MCNC: 12.1 G/DL (ref 13.3–17.7)
INR PPP: 1.96 (ref 0.86–1.14)
LACTATE BLD-SCNC: 0.8 MMOL/L (ref 0.7–2.1)
MAGNESIUM SERPL-MCNC: 2.2 MG/DL (ref 1.6–2.3)
MCH RBC QN AUTO: 30.7 PG (ref 26.5–33)
MCHC RBC AUTO-ENTMCNC: 33.7 G/DL (ref 31.5–36.5)
MCV RBC AUTO: 91 FL (ref 78–100)
PLATELET # BLD AUTO: 131 10E9/L (ref 150–450)
POTASSIUM SERPL-SCNC: 3.5 MMOL/L (ref 3.4–5.3)
RBC # BLD AUTO: 3.94 10E12/L (ref 4.4–5.9)
SODIUM SERPL-SCNC: 137 MMOL/L (ref 133–144)
WBC # BLD AUTO: 7.4 10E9/L (ref 4–11)

## 2017-02-09 PROCEDURE — 40000193 ZZH STATISTIC PT WARD VISIT: Performed by: PHYSICAL THERAPIST

## 2017-02-09 PROCEDURE — 97530 THERAPEUTIC ACTIVITIES: CPT | Mod: GP | Performed by: PHYSICAL THERAPIST

## 2017-02-09 PROCEDURE — A9270 NON-COVERED ITEM OR SERVICE: HCPCS | Mod: GY | Performed by: INTERNAL MEDICINE

## 2017-02-09 PROCEDURE — 25000132 ZZH RX MED GY IP 250 OP 250 PS 637: Mod: GY | Performed by: INTERNAL MEDICINE

## 2017-02-09 PROCEDURE — 85027 COMPLETE CBC AUTOMATED: CPT | Performed by: INTERNAL MEDICINE

## 2017-02-09 PROCEDURE — 85610 PROTHROMBIN TIME: CPT | Performed by: INTERNAL MEDICINE

## 2017-02-09 PROCEDURE — 00000146 ZZHCL STATISTIC GLUCOSE BY METER IP

## 2017-02-09 PROCEDURE — 12000007 ZZH R&B INTERMEDIATE

## 2017-02-09 PROCEDURE — 83735 ASSAY OF MAGNESIUM: CPT | Performed by: INTERNAL MEDICINE

## 2017-02-09 PROCEDURE — 36415 COLL VENOUS BLD VENIPUNCTURE: CPT | Performed by: INTERNAL MEDICINE

## 2017-02-09 PROCEDURE — 80048 BASIC METABOLIC PNL TOTAL CA: CPT | Performed by: INTERNAL MEDICINE

## 2017-02-09 PROCEDURE — 97116 GAIT TRAINING THERAPY: CPT | Mod: GP | Performed by: PHYSICAL THERAPIST

## 2017-02-09 PROCEDURE — 99232 SBSQ HOSP IP/OBS MODERATE 35: CPT | Performed by: INTERNAL MEDICINE

## 2017-02-09 PROCEDURE — 83605 ASSAY OF LACTIC ACID: CPT | Performed by: INTERNAL MEDICINE

## 2017-02-09 RX ORDER — OSELTAMIVIR PHOSPHATE 75 MG/1
75 CAPSULE ORAL 2 TIMES DAILY
Qty: 7 CAPSULE | Refills: 1 | Status: SHIPPED | OUTPATIENT
Start: 2017-02-09 | End: 2017-02-13

## 2017-02-09 RX ORDER — WARFARIN SODIUM 4 MG/1
4 TABLET ORAL
Status: COMPLETED | OUTPATIENT
Start: 2017-02-09 | End: 2017-02-09

## 2017-02-09 RX ADMIN — WARFARIN SODIUM 4 MG: 4 TABLET ORAL at 19:49

## 2017-02-09 RX ADMIN — ASPIRIN 81 MG 81 MG: 81 TABLET ORAL at 08:18

## 2017-02-09 RX ADMIN — SERTRALINE HYDROCHLORIDE 50 MG: 25 TABLET ORAL at 08:18

## 2017-02-09 RX ADMIN — INSULIN ASPART 2 UNITS: 100 INJECTION, SOLUTION INTRAVENOUS; SUBCUTANEOUS at 14:41

## 2017-02-09 RX ADMIN — Medication 2 LOZENGE: at 16:20

## 2017-02-09 RX ADMIN — OSELTAMIVIR PHOSPHATE 75 MG: 75 CAPSULE ORAL at 08:18

## 2017-02-09 RX ADMIN — OSELTAMIVIR PHOSPHATE 75 MG: 75 CAPSULE ORAL at 21:04

## 2017-02-09 RX ADMIN — ACETAMINOPHEN 650 MG: 325 TABLET, FILM COATED ORAL at 08:18

## 2017-02-09 RX ADMIN — ATORVASTATIN CALCIUM 20 MG: 20 TABLET, FILM COATED ORAL at 08:18

## 2017-02-09 RX ADMIN — BICALUTAMIDE 50 MG: 50 TABLET, FILM COATED ORAL at 21:04

## 2017-02-09 RX ADMIN — METOPROLOL SUCCINATE 25 MG: 25 TABLET, EXTENDED RELEASE ORAL at 08:18

## 2017-02-09 RX ADMIN — INSULIN ASPART 2 UNITS: 100 INJECTION, SOLUTION INTRAVENOUS; SUBCUTANEOUS at 18:51

## 2017-02-09 RX ADMIN — INSULIN ASPART 1 UNITS: 100 INJECTION, SOLUTION INTRAVENOUS; SUBCUTANEOUS at 08:17

## 2017-02-09 NOTE — PROGRESS NOTES
[facility]    Hospitalist Progress Note  Ash Tidwell MD    Date of Service : 02/09/2017    Assessment and Plan     76 year old male with PmHx of  CAD, Type 2 DM, embolic stroke, prostate cancer, hyperlipidemia, dementia, esophageal stricture, who was admitted on 2/8/17 due to fever up to 102F, chills, sore throat, lightheadedness with standing and history of   3 falls on the day of admission. Work up done on 2/7/17 revealed, normal CMP. CBC revealed, Hb 12.7, WBC 7.0 and Plts 142. Lactic acid was 3.1. INR was 1.87 and UA is negative. Nasal swab was positive for Influenza A. Chest x-rays on  2/7/17 is normal.    1. Influenza A with SIRS: Continue Tamiflu 75mg po b.i.d. for total of 10 doses. Continue lozenges prn.    2. Orthostatic hypotension: resolved. S/P 2L IV N/saline. SBP is ranging 97-->115mmHg. Monitor vitals.     3. Type 2 DM: BG was 180 this am. HbA1C was 7.6% on 1/25/17. Holding prior to admission metformin and glimepiride.  Continue medium dose sliding scale prn.    4. Chronic systolic congestive heart failure: monitor weights. Continue, aspirin, toprol Xl and lipitor. ECHO done in July 2014 revealed, LVEF 35%.    5. History of embolic CVA:  continue warfarin per pharmacy. INR is 1.96 on 2/9/17.    6. Prostate cancer: pt [facility]. On Lupron and  Casodex.        DVT Prophylaxis: Ambulate every shift  Code Status: Prior    Disposition: Expected discharge in 1 days. Appreciate PT review. For TCU at discharge, as the pt's assisted living facility will not take him back with home PT      Interval History  The pt still has a sore throat. No complaints of shortness of breath/chest pain.    -Data reviewed today: I reviewed all new labs and imaging results over the last 24 hours. I personally reviewed no images or EKG's today.    Physical Exam  Temp: 99.6  F (37.6  C) Temp src: Oral BP: 97/54 mmHg Pulse: 83 Heart Rate: 82 Resp: 16 SpO2: 97 % O2  Device: None (Room air)    Filed Vitals:    02/07/17 2245 02/09/17 0729   Weight: 84.596 kg (186 lb 8 oz) 81.7 kg (180 lb 1.9 oz)     Vital Signs with Ranges  Temp:  [98.5  F (36.9  C)-100.6  F (38.1  C)] 99.6  F (37.6  C)  Pulse:  [65-83] 83  Heart Rate:  [74-82] 82  Resp:  [16] 16  BP: ()/(54-75) 97/54 mmHg  SpO2:  [93 %-97 %] 97 %  I/O last 3 completed shifts:  In: 300 [P.O.:300]  Out: 200 [Urine:200]    Constitutional: Elderly white male, awake, slight husky voice+, cooperative, no apparent distress, O2 Sats 97% on RA  Respiratory: BS vesicular bilaterally, no crackles or wheezing  Cardiovascular: S1 and S2, reg, no murmur noted  GI: Soft, non-distended, non-tender, no masses, BS present+  Skin/Integumen: No rashes  Extremities: Bilat pedal edema 1+    Medications    Warfarin Therapy Reminder         warfarin  4 mg Oral ONCE at 18:00     oseltamivir  75 mg Oral BID     aspirin  81 mg Oral Daily     atorvastatin  20 mg Oral Daily     bicalutamide  50 mg Oral QPM     sertraline  50 mg Oral Daily     metoprolol  25 mg Oral Daily     insulin aspart  1-7 Units Subcutaneous TID AC     insulin aspart  1-5 Units Subcutaneous At Bedtime     sodium chloride (PF)  3 mL Intracatheter Q8H       Data    Recent Labs  Lab 02/09/17  0641 02/08/17  1126 02/07/17  2300   WBC 7.4  --  7.0   HGB 12.1*  --  12.7*   MCV 91  --  92   *  --  142*   INR 1.96* 2.03* 1.87*     --  139   POTASSIUM 3.5  --  3.9   CHLORIDE 105  --  104   CO2 22  --  23   BUN 13  --  16   CR 0.88  --  0.97   ANIONGAP 10  --  12   TENZIN 8.3*  --  8.7   *  --  233*   ALBUMIN  --   --  3.6   PROTTOTAL  --   --  7.3   BILITOTAL  --   --  0.5   ALKPHOS  --   --  57   ALT  --   --  25   AST  --   --  25       No results found for this or any previous visit (from the past 24 hour(s)).

## 2017-02-09 NOTE — PLAN OF CARE
Problem: Goal Outcome Summary  Goal: Goal Outcome Summary  Outcome: No Change  A&Ox4, denies pain/discomfort during noc. Slept well. VSS on RA. LS diminished, coarse in bases and inspiratory/expiratory wheezes throughout. Infrequent, loose non-productive cough. 0200 BG reading 154. Up with 1, voiding well. Tolerates po intake. Remains on droplet precautions.

## 2017-02-09 NOTE — PLAN OF CARE
Problem: Goal Outcome Summary  Goal: Goal Outcome Summary  Outcome: Improving  A&O, up with A1, GB and walker. Up in chair intermittently. IV SL. VSS on RA, low grade temp, tylenol given, improved.  and 205, S/s given per order. LS diminished, loose productive cough, droplet precautions continued. SW/CC following for placement.

## 2017-02-09 NOTE — PROGRESS NOTES
HOSPITALIST SERVICE CROSS-COVER NOTE:    Discontinued telemetry.      Jean-Paul Doe MD  Hospitalist  Pager # [phone]

## 2017-02-09 NOTE — PLAN OF CARE
Problem: Goal Outcome Summary  Goal: Goal Outcome Summary  PT: Orders received, evaluation completed, and treatment initiated. Patient is a 75 y/o male presenting with influenza A. Patient lives in an assisted living, patient reports he receives assist for meals. Patient reports completing functional mobility independently with use of walker. Patient performed bed mobility, standby assist. Sit <> stand with walker and contact guard assist. Patient ambulated 15 feet and 20 feet with walker and contact guard assist, short shuffling gait pattern with forward flexed posture. Patient required minimum assist for toilet transfer with use of walker. Patient sitting up in chair at end of session. PT Recommendation: Discharge to [facility] with increased assist for all functional mobility and Home PT. If [facility] is unable to provide increased level of assist would recommend TCU prior to returning to [facility].

## 2017-02-09 NOTE — PLAN OF CARE
Problem: Goal Outcome Summary  Goal: Goal Outcome Summary  Outcome: No Change  Lozenge riven for sore throat w/ relief. Tylenol also given for comfort. Voiding per urinal but also incontinent. Up w/ assist of 1, walker and gait belt. Possible DC tomorrow.

## 2017-02-09 NOTE — PROGRESS NOTES
02/09/17 1037   Quick Adds   Type of Visit Initial PT Evaluation   Living Environment   Lives With facility resident   Living Arrangements assisted living   Number of Stairs to Enter Home 0   Number of Stairs Within Home 0   Transportation Available family or friend will provide   Living Environment Comment Pt recieves assist for meals.    Self-Care   Usual Activity Tolerance good   Current Activity Tolerance moderate   Regular Exercise no   Equipment Currently Used at Home walker, standard   Activity/Exercise/Self-Care Comment Pt owns a FWW.    Functional Level Prior   Ambulation 1-->assistive equipment   Transferring 1-->assistive equipment   Toileting 1-->assistive equipment   Bathing 1-->assistive equipment   Dressing 1-->assistive equipment   Fall history within last six months yes   Number of times patient has fallen within last six months 3   Prior Functional Level Comment Pt reports completing functional mobility independently with use of walker.    General Information   Onset of Illness/Injury or Date of Surgery - Date 02/07/17   Referring Physician Ash Tidwell MD   Patient/Family Goals Statement None stated.    Pertinent History of Current Problem (include personal factors and/or comorbidities that impact the POC) 75 y/o male presenting with influenza A. PMH including CAD, MI s/p cardiac stent, HTN, DM, CHF, and stroke.    Precautions/Limitations fall precautions   General Observations Pt laying in bed upon arrival of therapist.    General Info Comments Ambulate with assist.    Cognitive Status Examination   Orientation orientation to person, place and time   Level of Consciousness alert   Follows Commands and Answers Questions 75% of the time;unable to follow multi-step instructions   Personal Safety and Judgment at risk behaviors demonstrated   Pain Assessment   Patient Currently in Pain No   Range of Motion (ROM)   ROM Comment B LEs WFL.    Strength   Strength Comments B LEs grossly  "4-/5.    Bed Mobility   Bed Mobility Comments Supine-sit, SBA.    Transfer Skills   Transfer Comments Sit <> stand with FWW and CGA.    Gait   Gait Comments Pt amb 15' wiht FWW and CGA.    Balance   Balance Comments Noted good sitting balance and fair standing balance at FWW.    Sensory Examination   Sensory Perception Comments Pt denied numbness/tingling in B LEs.    General Therapy Interventions   Planned Therapy Interventions balance training;gait training;neuromuscular re-education;ROM;strengthening;transfer training   Clinical Impression   Criteria for Skilled Therapeutic Intervention yes, treatment indicated   PT Diagnosis Difficulty with gait.    Influenced by the following impairments LE weakness, decreased activity tolerance   Functional limitations due to impairments Limited functional mobility requiring AD and assist.    Clinical Presentation Stable/Uncomplicated   Clinical Presentation Rationale Based on PMH, current presentation, and social support.    Clinical Decision Making (Complexity) Low complexity   Therapy Frequency` daily   Predicted Duration of Therapy Intervention (days/wks) 4 days   Anticipated Discharge Disposition Home with Assist;Transitional Care Facility  (Pending progress. )   Risk & Benefits of therapy have been explained Yes   Patient, Family & other staff in agreement with plan of care Yes   [facility] AM-PAC  \"6 Clicks\" V.2 Basic Mobility Inpatient Short Form   1. Turning from your back to your side while in a flat bed without using bedrails? 4 - None   2. Moving from lying on your back to sitting on the side of a flat bed without using bedrails? 3 - A Little   3. Moving to and from a bed to a chair (including a wheelchair)? 3 - A Little   4. Standing up from a chair using your arms (e.g., wheelchair, or bedside chair)? 3 - A Little   5. To walk in hospital room? 3 - A Little   6. Climbing 3-5 steps with a railing? 2 - A Lot   Basic Mobility Raw Score (Score out of 24.Lower " scores equate to lower levels of function) 18   Total Evaluation Time   Total Evaluation Time (Minutes) 15

## 2017-02-09 NOTE — PROGRESS NOTES
Visited with patient regarding recommendation for one assist with mobility, and patient agrees.  He states he will contact his sister to discuss private pay for TCU, but is unsure he will be able to get a hold of her as she is babysitting at another location. [facility] where he lives cannot accommodate the set up for increased services where he resides, and is unable to accommodate a return today per RN at [facility]. Await patient attempt to contact family, but assuming he will be unable to leave today, and anticipate discharge to TCU tomorrow.  Will follow as needed. Tanya CarcamoRN

## 2017-02-09 NOTE — PROGRESS NOTES
Care Transition Initial Assessment - SW  Reason For Consult: discharge planning  Met with: Patient   Active Problems:    Influenza A         DATA  Lives With: alone  Living Arrangements: assisted living ([facility])  Description of Support System: Supportive, Involved  Who is your support system?: Sibling(s)  Support Assessment: Adequate family and caregiver support, Adequate social supports.   Identified issues/concerns regarding health management:   Patient feels that they have adequate support @ home?  Yes           Quality Of Family Relationships: supportive, involved  Transportation Available: TBD      ASSESSMENT  Cognitive Status:  Awake, alert and oriented X3.  Concerns to be addressed:     Per automatic referral, SW met with patient to discuss discharge planning needs.  Patient is a 76-year-old male who was admitted to the hospital on 2-7-17 with Influenza A.  Prior to hospitalization, patient was living alone in an apartment at [facility].  Patient is aware that the recommendation is for TCU upon discharge and patient expressed interest in [facility].  He is aware of the $36 per day fee for the private room that is not covered under his insurance.  SW made a referral to [facility] via [facility] to have them assess patient for a possible admission for Monday February 13.  SW will follow up regarding transportation once the discharge plan has been finalized.     PLAN  Patient Goals and Preferences: TCU at discharge.  Patient anticipates discharging to:  [facility].    SHU Miramontes

## 2017-02-10 ENCOUNTER — APPOINTMENT (OUTPATIENT)
Dept: PHYSICAL THERAPY | Facility: CLINIC | Age: 77
DRG: 194 | End: 2017-02-10
Payer: MEDICARE

## 2017-02-10 LAB
GLUCOSE BLDC GLUCOMTR-MCNC: 139 MG/DL (ref 70–99)
GLUCOSE BLDC GLUCOMTR-MCNC: 154 MG/DL (ref 70–99)
GLUCOSE BLDC GLUCOMTR-MCNC: 163 MG/DL (ref 70–99)
GLUCOSE BLDC GLUCOMTR-MCNC: 187 MG/DL (ref 70–99)
GLUCOSE BLDC GLUCOMTR-MCNC: 188 MG/DL (ref 70–99)
GLUCOSE BLDC GLUCOMTR-MCNC: 198 MG/DL (ref 70–99)
INR PPP: 2.7 (ref 0.86–1.14)

## 2017-02-10 PROCEDURE — 97530 THERAPEUTIC ACTIVITIES: CPT | Mod: GP | Performed by: PHYSICAL THERAPIST

## 2017-02-10 PROCEDURE — 00000146 ZZHCL STATISTIC GLUCOSE BY METER IP

## 2017-02-10 PROCEDURE — 99232 SBSQ HOSP IP/OBS MODERATE 35: CPT | Performed by: INTERNAL MEDICINE

## 2017-02-10 PROCEDURE — A9270 NON-COVERED ITEM OR SERVICE: HCPCS | Mod: GY | Performed by: INTERNAL MEDICINE

## 2017-02-10 PROCEDURE — 36415 COLL VENOUS BLD VENIPUNCTURE: CPT | Performed by: INTERNAL MEDICINE

## 2017-02-10 PROCEDURE — 85610 PROTHROMBIN TIME: CPT | Performed by: INTERNAL MEDICINE

## 2017-02-10 PROCEDURE — 97116 GAIT TRAINING THERAPY: CPT | Mod: GP | Performed by: PHYSICAL THERAPIST

## 2017-02-10 PROCEDURE — 25000132 ZZH RX MED GY IP 250 OP 250 PS 637: Mod: GY | Performed by: INTERNAL MEDICINE

## 2017-02-10 PROCEDURE — 40000193 ZZH STATISTIC PT WARD VISIT: Performed by: PHYSICAL THERAPIST

## 2017-02-10 PROCEDURE — 12000007 ZZH R&B INTERMEDIATE

## 2017-02-10 RX ORDER — GLIMEPIRIDE 1 MG/1
1 TABLET ORAL
Status: DISCONTINUED | OUTPATIENT
Start: 2017-02-11 | End: 2017-02-13 | Stop reason: HOSPADM

## 2017-02-10 RX ORDER — WARFARIN SODIUM 1 MG/1
1 TABLET ORAL
Status: COMPLETED | OUTPATIENT
Start: 2017-02-10 | End: 2017-02-10

## 2017-02-10 RX ADMIN — Medication 1 LOZENGE: at 21:51

## 2017-02-10 RX ADMIN — ASPIRIN 81 MG 81 MG: 81 TABLET ORAL at 08:36

## 2017-02-10 RX ADMIN — ATORVASTATIN CALCIUM 20 MG: 20 TABLET, FILM COATED ORAL at 08:37

## 2017-02-10 RX ADMIN — METOPROLOL SUCCINATE 25 MG: 25 TABLET, EXTENDED RELEASE ORAL at 08:36

## 2017-02-10 RX ADMIN — WARFARIN SODIUM 1 MG: 1 TABLET ORAL at 16:54

## 2017-02-10 RX ADMIN — Medication 1 LOZENGE: at 14:45

## 2017-02-10 RX ADMIN — OSELTAMIVIR PHOSPHATE 75 MG: 75 CAPSULE ORAL at 21:51

## 2017-02-10 RX ADMIN — BICALUTAMIDE 50 MG: 50 TABLET, FILM COATED ORAL at 21:51

## 2017-02-10 RX ADMIN — INSULIN ASPART 1 UNITS: 100 INJECTION, SOLUTION INTRAVENOUS; SUBCUTANEOUS at 17:02

## 2017-02-10 RX ADMIN — SERTRALINE HYDROCHLORIDE 50 MG: 25 TABLET ORAL at 08:36

## 2017-02-10 RX ADMIN — OSELTAMIVIR PHOSPHATE 75 MG: 75 CAPSULE ORAL at 08:36

## 2017-02-10 RX ADMIN — INSULIN ASPART 2 UNITS: 100 INJECTION, SOLUTION INTRAVENOUS; SUBCUTANEOUS at 12:15

## 2017-02-10 RX ADMIN — METFORMIN HYDROCHLORIDE 1000 MG: 500 TABLET, FILM COATED ORAL at 16:54

## 2017-02-10 NOTE — PROGRESS NOTES
[facility]    Hospitalist Progress Note  Ash Tidwell MD    Date of Service : 02/10/2017    Assessment and Plan     76 year old male with PmHx of  CAD, Type 2 DM, embolic stroke, prostate cancer, hyperlipidemia, dementia, esophageal stricture, who was admitted on 2/8/17 due to fever up to 102F, chills, sore throat, lightheadedness with standing and history of   3 falls on the day of admission. Work up done on 2/7/17 revealed, normal CMP. CBC revealed, Hb 12.7, WBC 7.0 and Plts 142. Lactic acid was 3.1. INR was 1.87 and UA is negative. Nasal swab was positive for Influenza A. Chest x-rays on  2/7/17 is normal.    1. Influenza A with SIRS: Continue Tamiflu 75mg po b.i.d. for total of 10 doses (to end on 2/12/17). For scheduled Chloraseptic  Lozenges and lozenges prn.    2. Type 2 DM: BG was 139 this am. HbA1C was 7.6% on 1/25/17. Recommenced metformin and glimepiride today.  Continue medium dose sliding scale prn.    3. Chronic systolic congestive heart failure: monitor weights. Continue, aspirin, toprol Xl and lipitor. ECHO done in July 2014 revealed, LVEF 35%.    4. History of embolic CVA:  continue warfarin per pharmacy. INR is 1.96 on 2/9/17.    5. Prostate cancer: pt [facility]. On Lupron and  Casodex.        DVT Prophylaxis: Ambulate every shift  Code Status: Prior    Disposition: Expected discharge in 1-3 days. Appreciate PT review. For possible TCU at discharge, as the pt's assisted living facility will not take him back, if he needs Home PT.      Interval History  The pt still has a sore throat. He is coughing up clear sputum. No complaints of shortness of breath/chest pain.    -Data reviewed today: I reviewed all new labs and imaging results over the last 24 hours. I personally reviewed no images or EKG's today.    Physical Exam  Temp: 98.6  F (37  C) Temp src: Oral BP: 118/68 mmHg Pulse: 72 Heart Rate: 65 Resp: 16 SpO2: 94 % O2 Device: None  (Room air)    Filed Vitals:    02/07/17 2245 02/09/17 0729 02/10/17 0606   Weight: 84.596 kg (186 lb 8 oz) 81.7 kg (180 lb 1.9 oz) 80.7 kg (177 lb 14.6 oz)     Vital Signs with Ranges  Temp:  [98.6  F (37  C)-99.2  F (37.3  C)] 98.6  F (37  C)  Pulse:  [72] 72  Heart Rate:  [65-74] 65  Resp:  [16] 16  BP: (113-118)/(65-68) 118/68 mmHg  SpO2:  [94 %-96 %] 94 %  I/O last 3 completed shifts:  In: 880 [P.O.:880]  Out: 575 [Urine:575]    Constitutional: Elderly white male, awake, raspy voice+, cooperative, no apparent distress, O2 Sats 97% on RA  HENNT: No pharyngeal erythema  Respiratory: BS vesicular bilaterally, no crackles or wheezing  Cardiovascular: S1 and S2, reg, no murmur noted  GI: Soft, non-distended, non-tender, no masses, BS present+  Skin/Integumen: No rashes  Extremities: Bilat pedal edema 1+    Medications    Warfarin Therapy Reminder         warfarin  1 mg Oral ONCE at 18:00     sore throat lozenge  1 lozenge Buccal TID     oseltamivir  75 mg Oral BID     aspirin  81 mg Oral Daily     atorvastatin  20 mg Oral Daily     bicalutamide  50 mg Oral QPM     sertraline  50 mg Oral Daily     metoprolol  25 mg Oral Daily     insulin aspart  1-7 Units Subcutaneous TID AC     insulin aspart  1-5 Units Subcutaneous At Bedtime     sodium chloride (PF)  3 mL Intracatheter Q8H       Data    Recent Labs  Lab 02/10/17  0647 02/09/17  0641 02/08/17  1126 02/07/17  2300   WBC  --  7.4  --  7.0   HGB  --  12.1*  --  12.7*   MCV  --  91  --  92   PLT  --  131*  --  142*   INR 2.70* 1.96* 2.03* 1.87*   NA  --  137  --  139   POTASSIUM  --  3.5  --  3.9   CHLORIDE  --  105  --  104   CO2  --  22  --  23   BUN  --  13  --  16   CR  --  0.88  --  0.97   ANIONGAP  --  10  --  12   TENZIN  --  8.3*  --  8.7   GLC  --  180*  --  233*   ALBUMIN  --   --   --  3.6   PROTTOTAL  --   --   --  7.3   BILITOTAL  --   --   --  0.5   ALKPHOS  --   --   --  57   ALT  --   --   --  25   AST  --   --   --  25       No results found for this or  any previous visit (from the past 24 hour(s)).

## 2017-02-10 NOTE — PLAN OF CARE
Problem: Goal Outcome Summary  Goal: Goal Outcome Summary  Outcome: Improving  Pt has confirmed Influenza A. VSS. Glucose tests before meals. Pt forgetful at times. Pt incontinent at times. Expected to discharge after prescription of tamiflu is done.

## 2017-02-10 NOTE — PLAN OF CARE
Problem: Goal Outcome Summary  Goal: Goal Outcome Summary  Outcome: No Change  A&O but forgetful at times, vs stable, on room air.  Lungs coarse and diminished, infrequent cough, productive at times.  Up with assist x 1 and walker at the bedside.  Voiding using the urinal, incontinent of urine at times, brief on, will continue to monitor.

## 2017-02-10 NOTE — PLAN OF CARE
Problem: Goal Outcome Summary  Goal: Goal Outcome Summary  Outcome: No Change  A&O x4. A-1 with walker. L/S diminished, denies SOB. C/o of sore throat, relieved with throat lozenges. Eating, drinking, and voiding adequately. Pt had low gr fever this evening, otherwise VSS on RA. Pt to DC to tcu after finished tamiflu doses.

## 2017-02-10 NOTE — PLAN OF CARE
Problem: Goal Outcome Summary  Goal: Goal Outcome Summary  Outcome: Improving  Assumed care at 1530. Pt is a/o. VSS. Forgetful. Infrequent cough. Breathing well. On RA. Up SBA to 1 person assist with a walker. Tolerating diet well. Voiding. Anticipating dcd back to [facility]. Will monitor

## 2017-02-10 NOTE — PROGRESS NOTES
CHARLIE  D: SW following for discharge planning needs.  CHARLIE received a message from Maida in Admissions at [facility] stating that they will have a bed available for patient on Monday.  SW to follow up closer to discharge regarding transportation.  P: SW will continue to follow and assist with finalizing the discharge plan as appropriate.    SHU Miramontes

## 2017-02-10 NOTE — PLAN OF CARE
Problem: Goal Outcome Summary  Goal: Goal Outcome Summary  PT: Patient agreeable to participate in therapy session. Patient performed supine-sit, standby assist. Sit <> stand with walker and contact guard assist, cues provided for safe transfer technique. Patient ambulated 40 feet and 30 feet with walker and contact guard assist, continuous cues for upright posture and keeping walker close to body. Patient required minimum assist for toilet transfer, moderate assist for brief management. Patient sitting up in chair for breakfast at end of session. PT Recommendation: TCU prior to returning to [facility].

## 2017-02-11 ENCOUNTER — APPOINTMENT (OUTPATIENT)
Dept: PHYSICAL THERAPY | Facility: CLINIC | Age: 77
DRG: 194 | End: 2017-02-11
Payer: MEDICARE

## 2017-02-11 LAB
GLUCOSE BLDC GLUCOMTR-MCNC: 121 MG/DL (ref 70–99)
GLUCOSE BLDC GLUCOMTR-MCNC: 123 MG/DL (ref 70–99)
GLUCOSE BLDC GLUCOMTR-MCNC: 166 MG/DL (ref 70–99)
GLUCOSE BLDC GLUCOMTR-MCNC: 167 MG/DL (ref 70–99)
GLUCOSE BLDC GLUCOMTR-MCNC: 91 MG/DL (ref 70–99)
INR PPP: 2.95 (ref 0.86–1.14)

## 2017-02-11 PROCEDURE — 97530 THERAPEUTIC ACTIVITIES: CPT | Mod: GP

## 2017-02-11 PROCEDURE — 99232 SBSQ HOSP IP/OBS MODERATE 35: CPT | Performed by: INTERNAL MEDICINE

## 2017-02-11 PROCEDURE — 85610 PROTHROMBIN TIME: CPT | Performed by: INTERNAL MEDICINE

## 2017-02-11 PROCEDURE — A9270 NON-COVERED ITEM OR SERVICE: HCPCS | Mod: GY | Performed by: INTERNAL MEDICINE

## 2017-02-11 PROCEDURE — 36415 COLL VENOUS BLD VENIPUNCTURE: CPT | Performed by: INTERNAL MEDICINE

## 2017-02-11 PROCEDURE — 25000132 ZZH RX MED GY IP 250 OP 250 PS 637: Mod: GY | Performed by: INTERNAL MEDICINE

## 2017-02-11 PROCEDURE — 00000146 ZZHCL STATISTIC GLUCOSE BY METER IP

## 2017-02-11 PROCEDURE — 40000193 ZZH STATISTIC PT WARD VISIT

## 2017-02-11 PROCEDURE — 12000007 ZZH R&B INTERMEDIATE

## 2017-02-11 PROCEDURE — 97116 GAIT TRAINING THERAPY: CPT | Mod: GP

## 2017-02-11 RX ADMIN — OSELTAMIVIR PHOSPHATE 75 MG: 75 CAPSULE ORAL at 20:39

## 2017-02-11 RX ADMIN — ASPIRIN 81 MG 81 MG: 81 TABLET ORAL at 08:55

## 2017-02-11 RX ADMIN — BICALUTAMIDE 50 MG: 50 TABLET, FILM COATED ORAL at 20:39

## 2017-02-11 RX ADMIN — INSULIN ASPART 1 UNITS: 100 INJECTION, SOLUTION INTRAVENOUS; SUBCUTANEOUS at 08:55

## 2017-02-11 RX ADMIN — METFORMIN HYDROCHLORIDE 1000 MG: 500 TABLET, FILM COATED ORAL at 18:01

## 2017-02-11 RX ADMIN — Medication 1 LOZENGE: at 08:55

## 2017-02-11 RX ADMIN — METOPROLOL SUCCINATE 25 MG: 25 TABLET, EXTENDED RELEASE ORAL at 08:54

## 2017-02-11 RX ADMIN — OSELTAMIVIR PHOSPHATE 75 MG: 75 CAPSULE ORAL at 08:55

## 2017-02-11 RX ADMIN — ATORVASTATIN CALCIUM 20 MG: 20 TABLET, FILM COATED ORAL at 08:54

## 2017-02-11 RX ADMIN — METFORMIN HYDROCHLORIDE 1000 MG: 500 TABLET, FILM COATED ORAL at 08:54

## 2017-02-11 RX ADMIN — SERTRALINE HYDROCHLORIDE 50 MG: 25 TABLET ORAL at 08:54

## 2017-02-11 RX ADMIN — Medication 1 LOZENGE: at 18:02

## 2017-02-11 RX ADMIN — GLIMEPIRIDE 1 MG: 1 TABLET ORAL at 08:55

## 2017-02-11 RX ADMIN — Medication 0.5 MG: at 18:01

## 2017-02-11 NOTE — PLAN OF CARE
Problem: Goal Outcome Summary  Goal: Goal Outcome Summary  Outcome: Improving  Pt A/O, forgetful at times. Up with 1. Lung sounds coarse. Frequent, productive cough. [facility]. Will continue to monitor.

## 2017-02-11 NOTE — PLAN OF CARE
Problem: Goal Outcome Summary  Goal: Goal Outcome Summary  Physical Therapy: Patient agreed to participate in PT.  Ambulated with FWW and SBA x 50' with fair balance and able to self-correct posture 75% of the time.  Bed mobility with supervision, sit/stand with Stand by assist, toilet transfer with Stand by assist to endy/doff brief and able to stand at bathroom sink x 5 min to wash/dry hands/glasses with stand by assist.  Agree with plan to discharge to TCU when medically appropriate.

## 2017-02-12 ENCOUNTER — APPOINTMENT (OUTPATIENT)
Dept: PHYSICAL THERAPY | Facility: CLINIC | Age: 77
DRG: 194 | End: 2017-02-12
Payer: MEDICARE

## 2017-02-12 LAB
GLUCOSE BLDC GLUCOMTR-MCNC: 127 MG/DL (ref 70–99)
GLUCOSE BLDC GLUCOMTR-MCNC: 236 MG/DL (ref 70–99)
GLUCOSE BLDC GLUCOMTR-MCNC: 78 MG/DL (ref 70–99)
GLUCOSE BLDC GLUCOMTR-MCNC: 94 MG/DL (ref 70–99)
INR PPP: 3.19 (ref 0.86–1.14)

## 2017-02-12 PROCEDURE — 25000132 ZZH RX MED GY IP 250 OP 250 PS 637: Mod: GY | Performed by: INTERNAL MEDICINE

## 2017-02-12 PROCEDURE — 12000007 ZZH R&B INTERMEDIATE

## 2017-02-12 PROCEDURE — 36415 COLL VENOUS BLD VENIPUNCTURE: CPT | Performed by: INTERNAL MEDICINE

## 2017-02-12 PROCEDURE — 85610 PROTHROMBIN TIME: CPT | Performed by: INTERNAL MEDICINE

## 2017-02-12 PROCEDURE — A9270 NON-COVERED ITEM OR SERVICE: HCPCS | Mod: GY | Performed by: INTERNAL MEDICINE

## 2017-02-12 PROCEDURE — 00000146 ZZHCL STATISTIC GLUCOSE BY METER IP

## 2017-02-12 PROCEDURE — 97116 GAIT TRAINING THERAPY: CPT | Mod: GP | Performed by: PHYSICAL THERAPY ASSISTANT

## 2017-02-12 PROCEDURE — 40000193 ZZH STATISTIC PT WARD VISIT: Performed by: PHYSICAL THERAPY ASSISTANT

## 2017-02-12 PROCEDURE — 99232 SBSQ HOSP IP/OBS MODERATE 35: CPT | Performed by: INTERNAL MEDICINE

## 2017-02-12 RX ADMIN — GLIMEPIRIDE 1 MG: 1 TABLET ORAL at 08:19

## 2017-02-12 RX ADMIN — ASPIRIN 81 MG 81 MG: 81 TABLET ORAL at 08:19

## 2017-02-12 RX ADMIN — Medication 1 LOZENGE: at 21:21

## 2017-02-12 RX ADMIN — OSELTAMIVIR PHOSPHATE 75 MG: 75 CAPSULE ORAL at 08:19

## 2017-02-12 RX ADMIN — BICALUTAMIDE 50 MG: 50 TABLET, FILM COATED ORAL at 21:21

## 2017-02-12 RX ADMIN — INSULIN ASPART 2 UNITS: 100 INJECTION, SOLUTION INTRAVENOUS; SUBCUTANEOUS at 11:39

## 2017-02-12 RX ADMIN — METOPROLOL SUCCINATE 25 MG: 25 TABLET, EXTENDED RELEASE ORAL at 08:19

## 2017-02-12 RX ADMIN — METFORMIN HYDROCHLORIDE 1000 MG: 500 TABLET, FILM COATED ORAL at 08:19

## 2017-02-12 RX ADMIN — METFORMIN HYDROCHLORIDE 1000 MG: 500 TABLET, FILM COATED ORAL at 18:04

## 2017-02-12 RX ADMIN — SERTRALINE HYDROCHLORIDE 50 MG: 25 TABLET ORAL at 08:19

## 2017-02-12 RX ADMIN — ATORVASTATIN CALCIUM 20 MG: 20 TABLET, FILM COATED ORAL at 08:19

## 2017-02-12 NOTE — PLAN OF CARE
Problem: Goal Outcome Summary  Goal: Goal Outcome Summary  Outcome: Improving  Progressing toward plan of care.

## 2017-02-12 NOTE — PLAN OF CARE
Problem: Goal Outcome Summary  Goal: Goal Outcome Summary  PT- Per plan established by the Physical Therapist, according to functional mobility the discharge recommendation is TCU.  Pt performed sit to/from stand transfer with min assist.  Pt performed gait training x 180 ft using wheeled walker and CGA.  Pt fatigued following gait.

## 2017-02-12 NOTE — PROGRESS NOTES
[facility]    Hospitalist Progress Note  Ash Tidwell MD    Date of Service : 02/11/2017    Assessment and Plan     76 year old male with PmHx of  CAD, Type 2 DM, embolic stroke, prostate cancer, hyperlipidemia, dementia, esophageal stricture, who was admitted on 2/8/17 due to fever up to 102F, chills, sore throat, lightheadedness with standing and history of  3 falls on the day of admission. Work up done on 2/7/17 revealed, normal CMP. CBC revealed, Hb 12.7, WBC 7.0 and Plts 142. Lactic acid was 3.1. INR was 1.87 and UA is negative. Nasal swab was positive for Influenza A. Chest x-rays on  2/7/17 is normal.    1. Influenza A with SIRS: Continue Tamiflu 75mg po b.i.d. for total of 10 doses (to end on 2/12/17). For scheduled Chloraseptic Lozenges and Chloraseptic lozenges prn.    2. Type 2 DM: BG was 166 this am. HbA1C was 7.6% on 1/25/17. Continue metformin and glimepiride today. Continue medium dose sliding scale prn.    3. Chronic systolic congestive heart failure: monitor weights. Continue, aspirin, toprol Xl and lipitor. ECHO done in July 2014 revealed, LVEF 35%.    4. History of embolic CVA:  continue warfarin per pharmacy. INR is 2.95  on 2/9/17.    5. Prostate cancer: pt follows [facility]. On Lupron and  Casodex.        DVT Prophylaxis: Ambulate every shift  Code Status: Prior    Disposition: Expected discharge in 1-2 days. Appreciate PT review. For possible TCU at discharge, as the pt's assisted living facility will not take him back, if he needs Home PT.      Interval History  The pt still has a sore throat, which is more prominent when he swallows. No complaints of shortness of breath/chest pain.    -Data reviewed today: I reviewed all new labs and imaging results over the last 24 hours. I personally reviewed no images or EKG's today.    Physical Exam  Temp: 98.5  F (36.9  C) Temp src: Oral BP: 117/66 mmHg Pulse: 62 Heart Rate: 67 Resp: 16 SpO2:  100 % O2 Device: None (Room air)    Filed Vitals:    02/07/17 2245 02/09/17 0729 02/10/17 0606   Weight: 84.596 kg (186 lb 8 oz) 81.7 kg (180 lb 1.9 oz) 80.7 kg (177 lb 14.6 oz)     Vital Signs with Ranges  Temp:  [97.6  F (36.4  C)-98.7  F (37.1  C)] 98.5  F (36.9  C)  Pulse:  [62] 62  Heart Rate:  [62-79] 67  Resp:  [16] 16  BP: ()/(56-68) 117/66 mmHg  SpO2:  [95 %-100 %] 100 %  I/O last 3 completed shifts:  In: 600 [P.O.:600]  Out: 175 [Urine:175]    Constitutional: Elderly white male, awake, raspy voice+, cooperative, no apparent distress, O2 Sats 100% on RA  HENNT: No pharyngeal erythema  Respiratory: BS vesicular bilaterally, no crackles or wheezing  Cardiovascular: S1 and S2, reg, no murmur noted  GI: Soft, non-distended, non-tender, no masses, BS present+  Skin/Integumen: No rashes  Extremities: Bilat pedal edema 1+    Medications    Warfarin Therapy Reminder         sore throat lozenge  1 lozenge Buccal TID     metFORMIN  1,000 mg Oral BID w/meals     glimepiride  1 mg Oral Daily with breakfast     oseltamivir  75 mg Oral BID     aspirin  81 mg Oral Daily     atorvastatin  20 mg Oral Daily     bicalutamide  50 mg Oral QPM     sertraline  50 mg Oral Daily     metoprolol  25 mg Oral Daily     insulin aspart  1-7 Units Subcutaneous TID AC     insulin aspart  1-5 Units Subcutaneous At Bedtime       Data    Recent Labs  Lab 02/11/17  0633 02/10/17  0647 02/09/17  0641  02/07/17  2300   WBC  --   --  7.4  --  7.0   HGB  --   --  12.1*  --  12.7*   MCV  --   --  91  --  92   PLT  --   --  131*  --  142*   INR 2.95* 2.70* 1.96*  < > 1.87*   NA  --   --  137  --  139   POTASSIUM  --   --  3.5  --  3.9   CHLORIDE  --   --  105  --  104   CO2  --   --  22  --  23   BUN  --   --  13  --  16   CR  --   --  0.88  --  0.97   ANIONGAP  --   --  10  --  12   TENZIN  --   --  8.3*  --  8.7   GLC  --   --  180*  --  233*   ALBUMIN  --   --   --   --  3.6   PROTTOTAL  --   --   --   --  7.3   BILITOTAL  --   --   --   --   0.5   ALKPHOS  --   --   --   --  57   ALT  --   --   --   --  25   AST  --   --   --   --  25   < > = values in this interval not displayed.    No results found for this or any previous visit (from the past 24 hour(s)).

## 2017-02-12 NOTE — PLAN OF CARE
Problem: Goal Outcome Summary  Goal: Goal Outcome Summary  Outcome: Improving  Patient is A&O x3 with some forgetfulness, VSS on RA. Infrequent dry cough, grunting during expiration, denies any pain. Up with assist of 1 and using a urinal, uses brief with occasional incontinence, on mod carb-low sodium-no caffeine diet. On droplet precaution, Plan is to d/c tomorrow.

## 2017-02-12 NOTE — PLAN OF CARE
Problem: Goal Outcome Summary  Goal: Goal Outcome Summary  Outcome: Improving  Lungs now more clear. Voiding well per urinal or bathroom. States toes tingling but WNL for patient. Encouraged to have lozenges for sore throat, but patient declines at this time.

## 2017-02-12 NOTE — PROGRESS NOTES
[facility]    Hospitalist Progress Note  Ash Tidwell MD    Date of Service : 02/12/2017    Assessment & Plan      76 year old male with PmHx of  CAD, Type 2 DM, embolic stroke, prostate cancer, hyperlipidemia, dementia, esophageal stricture, who was admitted on 2/8/17 due to fever up to 102F, chills, sore throat, lightheadedness with standing and history of  3 falls on the day of admission. Work up done on 2/7/17 revealed, normal CMP. CBC revealed, Hb 12.7, WBC 7.0 and Plts 142. Lactic acid was 3.1. INR was 1.87 and UA is negative. Nasal swab was positive for Influenza A. Chest x-rays on  2/7/17 is normal.    1. Influenza A with SIRS: Continue Tamiflu 75mg po b.i.d. for total of 10 doses (to end on 2/12/17). For scheduled Chloraseptic Lozenges and Chloraseptic lozenges prn.    2. Type 2 DM: BG was 166 this am. HbA1C was 7.6% on 1/25/17. Continue metformin and glimepiride today. Continue medium dose sliding scale prn.    3. Chronic systolic congestive heart failure: stable. Continue, aspirin, toprol Xl and lipitor. ECHO done in July 2014 revealed, LVEF 35%.    4. History of embolic CVA:  continue warfarin per pharmacy. INR is 3.19  on 2/12/17.    5. Prostate cancer: pt follows with [facility]. On Lupron and  Casodex.        DVT Prophylaxis: Ambulate every shift  Code Status: Prior    Disposition: Expected discharge in 1 day. Appreciate PT review. For possible TCU at discharge, as the pt's assisted living facility will not take him back, if he needs Home PT.      Interval History   The pt reported that his sore throat is getting better. No complaints of shortness of breath/chest pain/cough.    -Data reviewed today: I reviewed all new labs and imaging results over the last 24 hours. I personally reviewed no images or EKG's today.    Physical Exam   Temp: 98.6  F (37  C) Temp src: Oral BP: 119/72 Pulse: 64 Heart Rate: 71 Resp: 16 SpO2: 95 % O2 Device: None (Room  air)    Vitals:    02/07/17 2245 02/09/17 0729 02/10/17 0606   Weight: 84.6 kg (186 lb 8 oz) 81.7 kg (180 lb 1.9 oz) 80.7 kg (177 lb 14.6 oz)     Vital Signs with Ranges  Temp:  [98.4  F (36.9  C)-98.9  F (37.2  C)] 98.6  F (37  C)  Pulse:  [64] 64  Heart Rate:  [63-71] 71  Resp:  [16] 16  BP: ()/(56-74) 119/72  SpO2:  [95 %-100 %] 95 %  I/O last 3 completed shifts:  In: 650 [P.O.:650]  Out: 400 [Urine:400]    Constitutional: Elderly white male, awake, cooperative, no apparent distress, O2 Sats 96% on RA  HENNT: No pharyngeal erythema  Respiratory: BS vesicular bilaterally, no crackles or wheezing  Cardiovascular: S1 and S2, reg, no murmur noted  GI: Soft, non-distended, non-tender, no masses, BS present+  Skin/Integumen: No rashes  Extremities: Bilat pedal edema 1+    Medications     Warfarin Therapy Reminder         warfarin-No DOSE today  1 each Does not apply no dose today (warfarin)     sore throat lozenge  1 lozenge Buccal TID     metFORMIN  1,000 mg Oral BID w/meals     glimepiride  1 mg Oral Daily with breakfast     aspirin  81 mg Oral Daily     atorvastatin  20 mg Oral Daily     bicalutamide  50 mg Oral QPM     sertraline  50 mg Oral Daily     metoprolol  25 mg Oral Daily     insulin aspart  1-7 Units Subcutaneous TID AC     insulin aspart  1-5 Units Subcutaneous At Bedtime       Data     Recent Labs  Lab 02/12/17  0659 02/11/17  0633 02/10/17  0647 02/09/17  0641  02/07/17  2300   WBC  --   --   --  7.4  --  7.0   HGB  --   --   --  12.1*  --  12.7*   MCV  --   --   --  91  --  92   PLT  --   --   --  131*  --  142*   INR 3.19* 2.95* 2.70* 1.96*  < > 1.87*   NA  --   --   --  137  --  139   POTASSIUM  --   --   --  3.5  --  3.9   CHLORIDE  --   --   --  105  --  104   CO2  --   --   --  22  --  23   BUN  --   --   --  13  --  16   CR  --   --   --  0.88  --  0.97   ANIONGAP  --   --   --  10  --  12   TENZIN  --   --   --  8.3*  --  8.7   GLC  --   --   --  180*  --  233*   ALBUMIN  --   --   --   --    --  3.6   PROTTOTAL  --   --   --   --   --  7.3   BILITOTAL  --   --   --   --   --  0.5   ALKPHOS  --   --   --   --   --  57   ALT  --   --   --   --   --  25   AST  --   --   --   --   --  25   < > = values in this interval not displayed.    No results found for this or any previous visit (from the past 24 hour(s)).

## 2017-02-13 VITALS
BODY MASS INDEX: 25.47 KG/M2 | DIASTOLIC BLOOD PRESSURE: 64 MMHG | TEMPERATURE: 97.5 F | OXYGEN SATURATION: 94 % | SYSTOLIC BLOOD PRESSURE: 111 MMHG | RESPIRATION RATE: 16 BRPM | WEIGHT: 177.91 LBS | HEIGHT: 70 IN | HEART RATE: 68 BPM

## 2017-02-13 LAB
GLUCOSE BLDC GLUCOMTR-MCNC: 157 MG/DL (ref 70–99)
INR PPP: 3.32 (ref 0.86–1.14)

## 2017-02-13 PROCEDURE — 99238 HOSP IP/OBS DSCHRG MGMT 30/<: CPT | Performed by: INTERNAL MEDICINE

## 2017-02-13 PROCEDURE — A9270 NON-COVERED ITEM OR SERVICE: HCPCS | Mod: GY | Performed by: INTERNAL MEDICINE

## 2017-02-13 PROCEDURE — 36415 COLL VENOUS BLD VENIPUNCTURE: CPT | Performed by: INTERNAL MEDICINE

## 2017-02-13 PROCEDURE — 00000146 ZZHCL STATISTIC GLUCOSE BY METER IP

## 2017-02-13 PROCEDURE — 85610 PROTHROMBIN TIME: CPT | Performed by: INTERNAL MEDICINE

## 2017-02-13 PROCEDURE — 25000132 ZZH RX MED GY IP 250 OP 250 PS 637: Mod: GY | Performed by: INTERNAL MEDICINE

## 2017-02-13 RX ADMIN — ATORVASTATIN CALCIUM 20 MG: 20 TABLET, FILM COATED ORAL at 08:23

## 2017-02-13 RX ADMIN — METFORMIN HYDROCHLORIDE 1000 MG: 500 TABLET, FILM COATED ORAL at 08:23

## 2017-02-13 RX ADMIN — SERTRALINE HYDROCHLORIDE 50 MG: 25 TABLET ORAL at 08:23

## 2017-02-13 RX ADMIN — INSULIN ASPART 1 UNITS: 100 INJECTION, SOLUTION INTRAVENOUS; SUBCUTANEOUS at 11:37

## 2017-02-13 RX ADMIN — METOPROLOL SUCCINATE 25 MG: 25 TABLET, EXTENDED RELEASE ORAL at 08:23

## 2017-02-13 RX ADMIN — ASPIRIN 81 MG 81 MG: 81 TABLET ORAL at 08:23

## 2017-02-13 RX ADMIN — Medication 1 LOZENGE: at 10:24

## 2017-02-13 RX ADMIN — GLIMEPIRIDE 1 MG: 1 TABLET ORAL at 08:23

## 2017-02-13 NOTE — PLAN OF CARE
Problem: Goal Outcome Summary  Goal: Goal Outcome Summary  Outcome: Adequate for Discharge Date Met:  02/13/17  Patient denies pain, up with assist 1 and walker, voiding in urinal or BR, had bm today, discharged to TCU, family here to transport.

## 2017-02-13 NOTE — PLAN OF CARE
Problem: Goal Outcome Summary  Goal: Goal Outcome Summary  PT-  PT deferred as pt is discharging to TCU soon.  PT goals not met.

## 2017-02-13 NOTE — PLAN OF CARE
Problem: Goal Outcome Summary  Goal: Goal Outcome Summary  Outcome: No Change  Up with SBA. Has expiratory rhonchi, but clears with cough. Blood glucose stable and WNL. Eager to be discharged soon.

## 2017-02-13 NOTE — PROGRESS NOTES
SPIRITUAL HEALTH SERVICES Progress Note  [facility]    LOS visit.  Pt about to discharge in mere minutes, and so declined.                                                                                                                                           Barbra Chanel M.Div.  Chaplain Resident  Pager [phone]

## 2017-02-13 NOTE — PROGRESS NOTES
Droplet isolation for influenza:  (Per Dr. Duncan H and P dated 2/8/17, symptoms of influenza started 36 hours prior.  Influenza symptom onset is 2/6/17.)  Droplet isolation for influenza for 7 days after illness onset or 24 hours after resolution of fever and respiratory symptoms, whichever is longer.  Earliest D/C of droplet iso is 2/13/17, provided that fever and respiratory symptoms are resolved.  Aileen Wan, IP [facility]

## 2017-02-13 NOTE — DISCHARGE SUMMARY
[facility]    Discharge Summary  Hospitalist  Ash Tidwell MD    Date of Admission:  2/7/2017  Date of Discharge:  2/13/2017  Discharging Provider: Ash Tidwell    Discharge Diagnoses   1. Influenza A with SIRS, resolved.    History of Present Illness   Jose Lees is an 76 year old male, who presented with fever, chills, sore throat, lightheadedness and falls.    Hospital Course   76 year old male with PmHx of CAD, Type 2 DM, embolic stroke, prostate cancer, hyperlipidemia, dementia, esophageal stricture, who was admitted on 2/8/17 due to fever up to 102F, chills, sore throat, lightheadedness with standing and history of 3 falls on the day of admission. Work up done on 2/7/17 revealed, normal CMP. CBC revealed, Hb 12.7, WBC 7.0 and Plts 142. Lactic acid was 3.1. INR was 1.87 and UA is negative. Nasal swab was positive for Influenza A. Chest x-rays on 2/7/17 is normal.     He was admitted to the medical floor and was placed on droplet isolation. He was commenced on a 5 days course of Tamiflu 75mg po b.i.d, which he completed on 2/12/17. He had an intermittent cough, which was occasional productive of white sputum and a mild sore throat on the day of discharge. He was reviewed by the physical and occupational therapists, who recommended TCU at discharge. For scheduled Chloraseptic Lozenges and Chloraseptic lozenges prn.     Significant Results and Procedures   See above.    Pending Results   None.  Unresulted Labs Ordered in the Past 30 Days of this Admission     No orders found from 12/9/2016 to 2/8/2017.          Code Status   Full Code       Primary Care Physician   Jermain Helm    Physical Exam   Temp: 97.5  F (36.4  C) Temp src: Oral BP: 111/64 Pulse: 68 Heart Rate: 79 Resp: 16 SpO2: 94 % O2 Device: None (Room air)    Vitals:    02/07/17 2245 02/09/17 0729 02/10/17 0606   Weight: 84.6 kg (186 lb 8 oz) 81.7 kg (180 lb 1.9 oz) 80.7 kg (177 lb 14.6 oz)      Constitutional: Elderly white male, awake, cooperative, no apparent distress, O2 Sats 94% on RA  HENNT: No pharyngeal erythema  Respiratory: BS vesicular bilaterally, no crackles or wheezing  Cardiovascular: S1 and S2, reg, no murmur noted  GI: Soft, non-distended, non-tender, no masses, BS present+  Skin/Integumen: No rashes  Extremities: Bilat pedal edema 1+    Discharge Disposition   Discharged to short-term care facility  Condition at discharge: Stable    Consultations This Hospital Stay   PHARMACY TO DOSE WARFARIN  PHYSICAL THERAPY ADULT IP CONSULT  PHYSICAL THERAPY ADULT IP CONSULT  OCCUPATIONAL THERAPY ADULT IP CONSULT    Time Spent on this Encounter   I, Ash Tidwell, personally saw the patient today and spent less than or equal to 30 minutes discharging this patient.    Discharge Orders     Reason for your hospital stay   Influenza A infection.     Follow-up and recommended labs and tests    Follow up with primary care provider, Jermain Helm, within 1-2 weeks, for hospital follow- up. No follow up labs or test are needed.     Activity   Your activity upon discharge: activity as tolerated.     Mantoux instructions   Give two-step Mantoux (PPD) Per Facility Policy Yes     General info for SNF   Length of Stay Estimate: Short Term Care: Estimated # of Days <30  Condition at Discharge: Stable  Level of care:skilled   Rehabilitation Potential: Good  Admission H&P remains valid and up-to-date: Yes  Recent Chemotherapy: N/A  Use Nursing Home Standing Orders: Yes     Full Code     Physical Therapy Adult Consult   Evaluate and treat as clinically indicated.    Reason: Influenza A and Deconditioning.     Occupational Therapy Adult Consult   Evaluate and treat as clinically indicated.    Reason:   Influenza A and Deconditioning.     Diet   Follow this diet upon discharge:   Combination Diet Low Saturated Fat Na <2400mg Diet, No Caffeine Diet       Discharge Medications   Current Discharge  Medication List      START taking these medications    Details   !! benzocaine-menthol (CHLORASEPTIC) 6-10 MG lozenge Place 1 lozenge inside cheek 3 times daily for 3 days  Qty: 84 lozenge    Associated Diagnoses: Throat pain      !! benzocaine-menthol (CHLORASEPTIC) 6-10 MG lozenge Place 1 lozenge inside cheek every hour as needed for sore throat  Qty: 84 lozenge    Associated Diagnoses: Throat pain      oseltamivir (TAMIFLU) 75 MG capsule Take 1 capsule (75 mg) by mouth 2 times daily for 7 doses  Qty: 7 capsule, Refills: 1    Associated Diagnoses: Influenza A       !! - Potential duplicate medications found. Please discuss with provider.      CONTINUE these medications which have NOT CHANGED    Details   metFORMIN (GLUCOPHAGE) 500 MG tablet Take 2 tablets (1,000 mg) by mouth 2 times daily (with meals)  Qty: 360 tablet, Refills: 1    Associated Diagnoses: Encounter for medication refill      metoprolol (TOPROL-XL) 25 MG 24 hr tablet TAKE 1 TABLET EVERY DAY  Qty: 90 tablet, Refills: 3    Associated Diagnoses: Encounter for medication refill      glimepiride (AMARYL) 1 MG tablet TAKE 1 TABLET (1 MG) BY MOUTH EVERY MORNING (BEFORE BREAKFAST)  Qty: 90 tablet, Refills: 1    Associated Diagnoses: Encounter for medication refill      atorvastatin (LIPITOR) 20 MG tablet TAKE 1 TABLET EVERY DAY  Qty: 90 tablet, Refills: 1    Comments: Reminder needs fasting labs & diabetic appt before next refill   thanks  Associated Diagnoses: Encounter for medication refill      !! warfarin (COUMADIN) 4 MG tablet 4 mg on Sundays, Tuesdays, Thursdays, and Saturdays  Qty: 180 tablet, Refills: 3    Associated Diagnoses: Encounter for medication refill      !! warfarin (COUMADIN) 2 MG tablet Take 2 mg on Mondays, Wednesdays, and Fridays  Qty: 90 tablet, Refills: 3    Associated Diagnoses: Long term (current) use of anticoagulants      Cholecalciferol (D3-1000 PO) Take 1 capsule by mouth daily      sertraline (ZOLOFT) 50 MG tablet TAKE 1  TABLET EVERY DAY  Qty: 90 tablet, Refills: 3    Associated Diagnoses: Encounter for medication refill      bicalutamide (CASODEX) 50 MG tablet TAKE 1 TABLET EVERY EVENING  Qty: 90 tablet, Refills: 3    Associated Diagnoses: Encounter for medication refill      aspirin 81 MG chewable tablet Take 1 tablet (81 mg) by mouth daily  Qty: 36 tablet, Refills: 12    Associated Diagnoses: History of MI (myocardial infarction)      multivitamin, therapeutic (THERA-VIT) TABS Take 1 tablet by mouth daily      Nutritional Supplements (ENSURE PO) Take 1 Can by mouth daily.      ACE/ARB NOT PRESCRIBED, INTENTIONAL, ACE & ARB not prescribed due to Symptomatic hypotension not due to excessive diuresis    Associated Diagnoses: Chronic systolic congestive heart failure (H)      Blood Glucose Monitoring Suppl W/DEVICE KIT Test blood glucose every other day      Qty: 1 kit, Refills: 0    Comments: Dispence device and strips per patients insurance coverage  Associated Diagnoses: Type II or unspecified type diabetes mellitus without mention of complication, not stated as uncontrolled; Rhabdomyolysis; UTI (urinary tract infection)       !! - Potential duplicate medications found. Please discuss with provider.        Allergies   No Known Allergies  Data   Most Recent 3 CBC's:  Recent Labs   Lab Test  02/09/17   0641  02/07/17   2300  07/27/16   1526   WBC  7.4  7.0  8.1   HGB  12.1*  12.7*  13.3*   MCV  91  92  86.9   PLT  131*  142*  204      Most Recent 3 BMP's:  Recent Labs   Lab Test  02/09/17   0641  02/07/17   2300  07/27/16   1526   08/06/15   0905   NA  137  139  140   < >  142   POTASSIUM  3.5  3.9  4.2   < >  3.6   CHLORIDE  105  104  101   < >  110*   CO2  22  23   --    --   19*   BUN  13  16  14  14   < >  13   CR  0.88  0.97  1.03   < >  0.92   ANIONGAP  10  12   --    --   13   TENZIN  8.3*  8.7  9.5   < >  8.8   GLC  180*  233*  118*   < >  196*    < > = values in this interval not displayed.     Most Recent 2 LFT's:  Recent  Labs   Lab Test  02/07/17   2300  01/04/16   1124   AST  25  22   ALT  25  18   ALKPHOS  57  56   BILITOTAL  0.5  0.6     Most Recent INR's and Anticoagulation Dosing History:  Anticoagulation Dose History     Recent Dosing and Labs Latest Ref Rng & Units 2/7/2017 2/8/2017 2/9/2017 2/10/2017 2/11/2017 2/12/2017 2/13/2017    Warfarin 0.5 mg - - - - - 0.5 mg - -    Warfarin 1 mg - - - - 1 mg - - -    Warfarin 4 mg - - 4 mg 4 mg - - - -    INR 0.86 - 1.14 1.87(H) 2.03(H) 1.96(H) 2.70(H) 2.95(H) 3.19(H) 3.32(H)        Most Recent 3 Troponin's:  Recent Labs   Lab Test  08/06/15   0905  04/09/14   0600  04/09/14   0240   TROPI  <0.015  The 99th percentile for upper reference range is 0.045 ug/L.  Troponin values in   the range of 0.045 - 0.120 ug/L may be associated with risks of adverse   clinical events.    0.626*  0.752*     Most Recent Cholesterol Panel:  Recent Labs   Lab Test  01/04/16   1124   CHOL  139   LDL  65   HDL  51   TRIG  117     Most Recent 6 Bacteria Isolates From Any Culture (See EPIC Reports for Culture Details):  Recent Labs   Lab Test  06/02/12   1820  06/01/12   1415   CULT  10 to 50,000 colonies/mL Coagulase negative Staphylococcus Faxed final report to [phone] 6.6.12 [facility]  No growth after 6 days  No growth after 6 days     Most Recent TSH, T4 and A1c Labs:  Recent Labs   Lab Test  01/25/17   1429   02/03/16   1046   06/03/12   0610   TSH   --    --    --    --   4.41   T4   --    --   1.01   --    --    A1C  7.6*   < >   --    < >   --     < > = values in this interval not displayed.       Results for orders placed or performed during the hospital encounter of 02/07/17   Chest XR,  PA & LAT    Narrative    XR CHEST 2 VW  2/7/2017 11:25 PM      HISTORY: Cough.     COMPARISON: 8/6/2015.    FINDINGS: The heart size is normal. Thoracic aorta is calcified. The  lungs are clear. No pneumothorax or pleural effusion. Degenerative  disease in the thoracic spine.      Impression    IMPRESSION: No acute  abnormality.    ZULEMA COTTO MD

## 2017-02-13 NOTE — PLAN OF CARE
Problem: Goal Outcome Summary  Goal: Goal Outcome Summary  Outcome: No Change  Patient VSS on RA. A&Ox4 with forgetfulness. Patient assist of one with gait belt and walker. No SOB present. Patient noted to have expiratory wheezes throughout their lungs. Nonproductive cough noted. Patient resting comfortably.

## 2017-02-13 NOTE — PROGRESS NOTES
CHARLIE  D: Per MD order, patient okay to discharge today to Rehab.  I: SW faxed the discharge orders and discussed transportation to get patient to Rehab.  Patient confirmed that his sister will transport him to Rehab today.  Patient confirmed that his sister will plan to transport him today at around 1530.  SW spoke to Maida in Admissions at [facility] to update her that patient is ready for discharge today and the time of transportation.  A: Patient is alert and oriented X3.  Patient is aware and in agreement with the plan for discharge today to Rehab.  P: Patient to discharge today to [facility].  SW will be available to assist as needed.    SHU Miramontes      PAS-RR    D: Per DHS regulation, CHARLIE completed and submitted PAS-RR to [place] [employer] on [employer]ging Direct Connect via the Senior LinkAge Line.  PAS-RR confirmation # is : [id number].    I: CHARLIE spoke with patient and he is aware a PAS-RR has been submitted.  CHARLIE reviewed with patient that he may be contacted for a follow up appointment within 10 days of hospital discharge if their SNF stay is < 30 days.  Contact information for [facility] Line was also provided.    A: Patient verbalized understanding.    P: Further questions may be directed to [facility] Line at #[phone], option #4 for PAS-RR staff.    SHU Miramontes

## 2017-02-14 ENCOUNTER — NURSING HOME VISIT (OUTPATIENT)
Dept: GERIATRICS | Facility: CLINIC | Age: 77
End: 2017-02-14
Payer: COMMERCIAL

## 2017-02-14 VITALS
SYSTOLIC BLOOD PRESSURE: 128 MMHG | TEMPERATURE: 98.3 F | DIASTOLIC BLOOD PRESSURE: 66 MMHG | WEIGHT: 178.7 LBS | BODY MASS INDEX: 25.58 KG/M2 | HEART RATE: 88 BPM | HEIGHT: 70 IN | OXYGEN SATURATION: 94 % | RESPIRATION RATE: 18 BRPM

## 2017-02-14 DIAGNOSIS — C61 PROSTATE CANCER (H): ICD-10-CM

## 2017-02-14 DIAGNOSIS — F84.0 AUTISM DISORDER: ICD-10-CM

## 2017-02-14 DIAGNOSIS — R53.81 PHYSICAL DECONDITIONING: ICD-10-CM

## 2017-02-14 DIAGNOSIS — E11.9 TYPE 2 DIABETES MELLITUS WITHOUT COMPLICATION, WITHOUT LONG-TERM CURRENT USE OF INSULIN (H): ICD-10-CM

## 2017-02-14 DIAGNOSIS — Z79.01 LONG TERM CURRENT USE OF ANTICOAGULANT THERAPY: ICD-10-CM

## 2017-02-14 DIAGNOSIS — J10.1 INFLUENZA A: Primary | ICD-10-CM

## 2017-02-14 DIAGNOSIS — Z86.73 HISTORY OF STROKE: ICD-10-CM

## 2017-02-14 DIAGNOSIS — K22.2 STRICTURE AND STENOSIS OF ESOPHAGUS: ICD-10-CM

## 2017-02-14 DIAGNOSIS — I50.22 CHRONIC SYSTOLIC CONGESTIVE HEART FAILURE (H): ICD-10-CM

## 2017-02-14 DIAGNOSIS — I25.2 HISTORY OF MI (MYOCARDIAL INFARCTION): ICD-10-CM

## 2017-02-14 LAB — INR PPP: 3.9

## 2017-02-14 PROCEDURE — 99310 SBSQ NF CARE HIGH MDM 45: CPT | Performed by: NURSE PRACTITIONER

## 2017-02-14 PROCEDURE — 99207 ZZC CDG-CORRECTLY CODED, REVIEWED AND AGREE: CPT | Performed by: NURSE PRACTITIONER

## 2017-02-14 RX ORDER — POTASSIUM CHLORIDE 1.5 G/1.58G
20 POWDER, FOR SOLUTION ORAL 2 TIMES DAILY
COMMUNITY
Start: 2017-02-14 | End: 2017-02-15

## 2017-02-14 RX ORDER — METOPROLOL SUCCINATE 25 MG/1
25 TABLET, EXTENDED RELEASE ORAL DAILY
COMMUNITY
End: 2017-09-09

## 2017-02-14 NOTE — PROGRESS NOTES
[facility]  PRIMARY CARE PROVIDER AND CLINIC:  [facility] [id number] / [facility]*  Chief Complaint   Patient presents with     Hospital F/U       HPI:      Jose Lees is a 76 year old  (1940),admitted to the [facility] from [facility].  Hospital stay 2/7/2017 through 2/13/2017.  Admitted to this facility for  rehab, medical management and nursing care. Current issues are:     Per Hospital course:    76 year old male with Pm Hx of CAD, Type 2 DM, embolic stroke, prostate cancer, hyperlipidemia, dementia, esophageal stricture, who was admitted on 2/8/17 due to fever up to 102F, chills, sore throat, lightheadedness with standing and history of 3 falls on the day of admission. Work up done on 2/7/17 revealed, normal CMP. CBC revealed, Hb 12.7, WBC 7.0 and Plts 142. Lactic acid was 3.1. INR was 1.87 and UA is negative. Nasal swab was positive for Influenza A. Chest x-rays on 2/7/17 is normal.      He was admitted to the medical floor and was placed on droplet isolation. He was commenced on a 5 days course of Tamiflu 75 mg po b.i.d, which he completed on 2/12/17. He had an intermittent cough, which was occasional productive of white sputum and a mild sore throat on the day of discharge. He was reviewed by the physical and occupational therapists, who recommended TCU at discharge. For scheduled Chloraseptic Lozenges and Chloraseptic lozenges prn.    No CP, SOB, Cough, dizziness, fevers, chills, HA, N/V, dysuria. States has loose BM.. Appetite is good.  No pain.        CODE STATUS/ADVANCE DIRECTIVES DISCUSSION:   CPR/Full code   Patient's living condition: lives alone    ALLERGIES:Review of patient's allergies indicates no known allergies.  PAST MEDICAL HISTORY:  has a past medical history of ACP (advance care planning); Acute anterior wall MI (H) (4/7/2014); Autism disorder (6/29/2012); CHF  (congestive heart failure) (H); Coronary artery disease (4/2014); Dementia; Hyperlipidaemia; Hyperlipidaemia LDL goal < 100; Prostate cancer (H) (10/11); Stricture and stenosis of esophagus (10/22/2015); Stroke, embolic (H) (4/7/2014); and Type II or unspecified type diabetes mellitus without mention of complication, not stated as uncontrolled.  PAST SURGICAL HISTORY:  has a past surgical history that includes Coronary Angiography Adult Order; Heart Cath, Angioplasty (4/14); and Esophagoscopy, gastroscopy, duodenoscopy (EGD), combined (N/A, 8/6/2015).  FAMILY HISTORY: family history includes C.A.D. (age of onset: 48) in his father; CEREBROVASCULAR DISEASE (age of onset: 92) in his mother.  SOCIAL HISTORY:  reports that he has never smoked. He has never used smokeless tobacco. He reports that he does not drink alcohol or use illicit drugs.    Post Discharge Medication Reconciliation Status: discharge medications reconciled and changed, per note/orders (see AVS).  Current Outpatient Prescriptions   Medication Sig Dispense Refill     Oseltamivir Phosphate (TAMIFLU PO) Take 75 mg by mouth 2 times daily For 7 days       metoprolol (TOPROL XL) 25 MG 24 hr tablet Take 25 mg by mouth daily Hold if SBP<110 or HR<55 and call if held 2 times in a row.       potassium chloride (KLOR-CON) 20 MEQ Packet Take 20 mEq by mouth 2 times daily Today then dc.       Warfarin Sodium (COUMADIN PO) Take by mouth daily INR 3.9 today so no coumadin on 2/14 and 2/15.  Check INR on 2/16       benzocaine-menthol (CHLORASEPTIC) 6-10 MG lozenge Place 1 lozenge inside cheek 3 times daily for 3 days 84 lozenge      benzocaine-menthol (CHLORASEPTIC) 6-10 MG lozenge Place 1 lozenge inside cheek every hour as needed for sore throat 84 lozenge      metFORMIN (GLUCOPHAGE) 500 MG tablet Take 2 tablets (1,000 mg) by mouth 2 times daily (with meals) 360 tablet 1     glimepiride (AMARYL) 1 MG tablet TAKE 1 TABLET (1 MG) BY MOUTH EVERY MORNING (BEFORE  "BREAKFAST) 90 tablet 1     atorvastatin (LIPITOR) 20 MG tablet TAKE 1 TABLET EVERY DAY 90 tablet 1     Cholecalciferol (D3-1000 PO) Take 1 capsule by mouth daily       ACE/ARB NOT PRESCRIBED, INTENTIONAL, ACE & ARB not prescribed due to Symptomatic hypotension not due to excessive diuresis       sertraline (ZOLOFT) 50 MG tablet TAKE 1 TABLET EVERY DAY 90 tablet 3     bicalutamide (CASODEX) 50 MG tablet TAKE 1 TABLET EVERY EVENING 90 tablet 3     aspirin 81 MG chewable tablet Take 1 tablet (81 mg) by mouth daily 36 tablet 12     multivitamin, therapeutic (THERA-VIT) TABS Take 1 tablet by mouth daily       Blood Glucose Monitoring Suppl W/DEVICE KIT Test blood glucose every other day     1 kit 0     Nutritional Supplements (ENSURE PO) Take 1 Can by mouth daily.         ROS:  10 point ROS of systems including Constitutional, Eyes, Respiratory, Cardiovascular, Gastroenterology, Genitourinary, Integumentary, Muscularskeletal, Psychiatric were all negative except for pertinent positives noted in my HPI.    Exam:  /66  Pulse 88  Temp 98.3  F (36.8  C)  Resp 18  Ht 5' 9.5\" (1.765 m)  Wt 178 lb 11.2 oz (81.1 kg)  SpO2 94%  BMI 26.01 kg/m2  GENERAL APPEARANCE:  Alert, oriented, cooperative  ENT:  Mouth and posterior oropharynx normal, moist mucous membranes, normal hearing acuity  EYES:  EOM, conjunctivae, lids, pupils and irises normal  NECK:  No adenopathy,masses or thyromegaly  RESP:  respiratory effort and palpation of chest normal, lungs clear to auscultation , no respiratory distress, diminished breath sounds slightly in bases  CV:  Palpation and auscultation of heart done , regular rate and rhythm, no murmur, rub, or gallop, no edema, +2 pedal pulses  ABDOMEN:  normal bowel sounds, soft, nontender, no hepatosplenomegaly or other masses  M/S:   seen in bed ROSS equally  SKIN:  Inspection of skin and subcutaneous tissue baseline  NEURO:   Cranial nerves 2-12 are normal tested and grossly at patient's " baseline  PSYCH:  oriented X 3, normal insight, judgement and memory, affect and mood normal    Lab/Diagnostic data:     CBC RESULTS:   Recent Labs   Lab Test  02/09/17   0641  02/07/17   2300   WBC  7.4  7.0   RBC  3.94*  4.07*   HGB  12.1*  12.7*   HCT  35.9*  37.4*   MCV  91  92   MCH  30.7  31.2   MCHC  33.7  34.0   RDW  13.4  13.5   PLT  131*  142*       Last Basic Metabolic Panel:  Recent Labs   Lab Test  02/09/17   0641  02/07/17   2300   NA  137  139   POTASSIUM  3.5  3.9   CHLORIDE  105  104   TENZIN  8.3*  8.7   CO2  22  23   BUN  13  16   CR  0.88  0.97   GLC  180*  233*       Liver Function Studies -   Recent Labs   Lab Test  02/07/17   2300  01/04/16   1124   PROTTOTAL  7.3  6.8   ALBUMIN  3.6  4.3   BILITOTAL  0.5  0.6   ALKPHOS  57  56   AST  25  22   ALT  25  18       TSH   Date Value Ref Range Status   06/03/2012 4.41 0.4 - 5.0 mU/L Final     Lab Results   Component Value Date    A1C 7.6 01/25/2017    A1C 7.1 07/27/2016     WBC   Date Value Ref Range Status   02/09/2017 7.4 4.0 - 11.0 10e9/L Final     WBC x10/cmm   Date Value Ref Range Status   07/27/2016 8.1 3.8 - 11.0 x10/cmm Final     Hemoglobin   Date Value Ref Range Status   02/09/2017 12.1 (L) 13.3 - 17.7 g/dL Final   02/07/2017 12.7 (L) 13.3 - 17.7 g/dL Final     Last Basic Metabolic Panel:  Lab Results   Component Value Date     02/09/2017      Lab Results   Component Value Date    POTASSIUM 3.5 02/09/2017     Lab Results   Component Value Date    TENZIN 8.3 02/09/2017     Lab Results   Component Value Date    CO2 22 02/09/2017     Lab Results   Component Value Date    BUN 13 02/09/2017    BUN 14 07/27/2016     Lab Results   Component Value Date    CR 0.88 02/09/2017     Lab Results   Component Value Date     02/09/2017       ASSESSMENT / PLAN:  (J10.1) Influenza A  (primary encounter diagnosis)  Comment: cont Tamil flu, feels better, no cough. monitor.    (E11.9) Type 2 diabetes mellitus without complication, without long-term  current use of insulin (H)  Comment:  A1C 7.6, check accuchecks which are 130-180's coslty in hospital, no changes, monitor.    (Z86.73) History of stroke   (I50.22) Chronic systolic congestive heart failure (H)  Z79.01) Long term current use of anticoagulant therapy  Comment: no coumadin today or  Wednesday. Check INR on 2/16. It was 3.9 today.    P (I25.2) History of MI (myocardial infarction)  Comment: cont same POC, monitor.    (R53.81) Physical deconditioning  Comment: PT OT    OTHER:  (C61) Prostate cancer (H)  (K22.2) Stricture and stenosis of esophagus   (F84.0) Autism disorder   Comment: monitor,       Information reviewed:  Medications, vital signs, orders, nursing notes, problem list, hospital information. Facility MDS and care plan reviewed.    Total time spent with patient visit was 60 minutes including patient visit and review of past records. Greater than 50% of total time spent with counseling and coordinating care.    Electronically signed by:  SARATH Yi CNP

## 2017-02-15 ENCOUNTER — CARE COORDINATION (OUTPATIENT)
Dept: CASE MANAGEMENT | Facility: CLINIC | Age: 77
End: 2017-02-15

## 2017-02-15 NOTE — PROGRESS NOTES
Clinic Care Coordination Contact  [facility]/Voicemail    Referral Source: IP Follow up  Clinical Data: Care Coordinator Outreach  Medical Concerns: Jose Lees is a 76 year old (1940),admitted to the [facility] from [facility]. Hospital stay 2/7/2017 through 2/13/2017. Admitted to this facility for rehab, medical management and nursing care.    Plan:  Care Coordinator will follow up with patient after he discharges home from [facility].     Elaine Manriquez RN  Clinic Care Coordination  [facility]  [phone]

## 2017-02-16 ENCOUNTER — NURSING HOME VISIT (OUTPATIENT)
Dept: GERIATRICS | Facility: CLINIC | Age: 77
End: 2017-02-16
Payer: COMMERCIAL

## 2017-02-16 ENCOUNTER — TRANSFERRED RECORDS (OUTPATIENT)
Dept: HEALTH INFORMATION MANAGEMENT | Facility: CLINIC | Age: 77
End: 2017-02-16

## 2017-02-16 VITALS
HEART RATE: 88 BPM | WEIGHT: 174.6 LBS | BODY MASS INDEX: 25 KG/M2 | OXYGEN SATURATION: 94 % | SYSTOLIC BLOOD PRESSURE: 128 MMHG | RESPIRATION RATE: 18 BRPM | TEMPERATURE: 98.3 F | HEIGHT: 70 IN | DIASTOLIC BLOOD PRESSURE: 66 MMHG

## 2017-02-16 DIAGNOSIS — K22.2 STRICTURE AND STENOSIS OF ESOPHAGUS: ICD-10-CM

## 2017-02-16 DIAGNOSIS — J10.1 INFLUENZA A: Primary | ICD-10-CM

## 2017-02-16 DIAGNOSIS — E11.9 TYPE 2 DIABETES MELLITUS WITHOUT COMPLICATION, WITHOUT LONG-TERM CURRENT USE OF INSULIN (H): ICD-10-CM

## 2017-02-16 DIAGNOSIS — Z79.01 LONG TERM CURRENT USE OF ANTICOAGULANT THERAPY: ICD-10-CM

## 2017-02-16 DIAGNOSIS — Z86.73 HISTORY OF STROKE: ICD-10-CM

## 2017-02-16 DIAGNOSIS — I25.2 HISTORY OF MI (MYOCARDIAL INFARCTION): ICD-10-CM

## 2017-02-16 DIAGNOSIS — R53.81 PHYSICAL DECONDITIONING: ICD-10-CM

## 2017-02-16 DIAGNOSIS — F84.0 AUTISM DISORDER: ICD-10-CM

## 2017-02-16 DIAGNOSIS — C61 PROSTATE CANCER (H): ICD-10-CM

## 2017-02-16 DIAGNOSIS — I50.22 CHRONIC SYSTOLIC CONGESTIVE HEART FAILURE (H): ICD-10-CM

## 2017-02-16 LAB
ANION GAP SERPL CALCULATED.3IONS-SCNC: 9 MMOL/L (ref 5–18)
BUN SERPL-MCNC: 15 MG/DL (ref 8–28)
CALCIUM SERPL-MCNC: 9 MG/DL (ref 8.5–10.5)
CHLORIDE SERPLBLD-SCNC: 105 MMOL/L (ref 98–107)
CO2 SERPL-SCNC: 24 MMOL/L (ref 22–31)
CREAT SERPL-MCNC: 0.81 MG/DL (ref 0.7–1.3)
GFR SERPL CREATININE-BSD FRML MDRD: >60 ML/MIN/1.73M2
GLUCOSE SERPL-MCNC: 140 MG/DL (ref 70–125)
INR PPP: 3.1
POTASSIUM SERPL-SCNC: 4 MMOL/L (ref 3.5–5)
SODIUM SERPL-SCNC: 138 MMOL/L (ref 136–145)
TSH SERPL-ACNC: 3.39 UIU/ML (ref 0.3–5)

## 2017-02-16 PROCEDURE — 99309 SBSQ NF CARE MODERATE MDM 30: CPT | Performed by: NURSE PRACTITIONER

## 2017-02-16 NOTE — PROGRESS NOTES
[facility]    Chief Complaint   Patient presents with     RECHECK     INR RESULTS     Anticoagulation       HPI:    Jose Lees is a 76 year old  (1940), who is being seen today for an episodic care visit at [facility] for the following medical issues:      Influenza A  Type 2 diabetes mellitus without complication, without long-term current use of insulin (H)  History of stroke  Chronic systolic congestive heart failure (H)  Long term current use of anticoagulant therapy  History of MI (myocardial infarction)  Physical deconditioning  Prostate cancer (H)  Stricture and stenosis of esophagus  Autism disorder       No CP, SOB, Cough, dizziness, fevers, chills, HA, N/V, dysuria or Bowel Abnormalities. Appetite is good. States he has a mild cough with some clear/white productive sputum at times. No concerns voiced today.     ALLERGIES: Review of patient's allergies indicates no known allergies.  Past Medical, Surgical, Family and Social History reviewed and updated in [facility].    Current Outpatient Prescriptions   Medication Sig Dispense Refill     benzocaine-menthol (CHLORASEPTIC) 6-10 MG lozenge Place 1 lozenge inside cheek 3 times daily X 3 days. Stop date: 2/16       Oseltamivir Phosphate (TAMIFLU PO) Take 75 mg by mouth 2 times daily For 7 administrations       metoprolol (TOPROL XL) 25 MG 24 hr tablet Take 25 mg by mouth daily Hold if SBP<110 or HR<55 and call if held 2 times in a row.       Warfarin Sodium (COUMADIN PO) Take by mouth daily INR 3.1 today-NO coumadin today, 2/16/17. INR recheck 2/17/17-will follow.       benzocaine-menthol (CHLORASEPTIC) 6-10 MG lozenge Place 1 lozenge inside cheek every hour as needed for sore throat 84 lozenge      metFORMIN (GLUCOPHAGE) 500 MG tablet Take 2 tablets (1,000 mg) by mouth 2 times daily (with meals) 360 tablet 1     glimepiride (AMARYL) 1 MG tablet TAKE 1 TABLET (1 MG) BY MOUTH EVERY MORNING (BEFORE BREAKFAST) 90  "tablet 1     atorvastatin (LIPITOR) 20 MG tablet TAKE 1 TABLET EVERY DAY 90 tablet 1     Cholecalciferol (D3-1000 PO) Take 1 capsule by mouth daily       ACE/ARB NOT PRESCRIBED, INTENTIONAL, ACE & ARB not prescribed due to Symptomatic hypotension not due to excessive diuresis       sertraline (ZOLOFT) 50 MG tablet TAKE 1 TABLET EVERY DAY 90 tablet 3     bicalutamide (CASODEX) 50 MG tablet TAKE 1 TABLET EVERY EVENING 90 tablet 3     aspirin 81 MG chewable tablet Take 1 tablet (81 mg) by mouth daily 36 tablet 12     multivitamin, therapeutic (THERA-VIT) TABS Take 1 tablet by mouth daily       Blood Glucose Monitoring Suppl W/DEVICE KIT Test blood glucose every other day     1 kit 0     Medications reviewed:  Medications reconciled to facility chart and changes were made to reflect current medications as identified as above med list. Below are the changes that were made:   Medications stopped since last EPIC medication reconciliation:   Medications Discontinued During This Encounter   Medication Reason     benzocaine-menthol (CHLORASEPTIC) 6-10 MG lozenge Dose adjustment     Nutritional Supplements (ENSURE PO) Therapy completed       Medications started since last [facility] medication reconciliation:  Orders Placed This Encounter   Medications     benzocaine-menthol (CHLORASEPTIC) 6-10 MG lozenge     Sig: Place 1 lozenge inside cheek 3 times daily X 3 days. Stop date: 2/16         REVIEW OF SYSTEMS:  10 point ROS of systems including Constitutional, Eyes, Respiratory, Cardiovascular, Gastroenterology, Genitourinary, Integumentary, Muscularskeletal, Psychiatric were all negative except for pertinent positives noted in my HPI.    Physical Exam:  /66  Pulse 88  Temp 98.3  F (36.8  C)  Resp 18  Ht 5' 9.5\" (1.765 m)  Wt 174 lb 9.6 oz (79.2 kg)  SpO2 94%  BMI 25.41 kg/m2  GENERAL APPEARANCE:  Alert, in no distress, cooperative  ENT:  Mouth and posterior oropharynx normal, moist mucous membranes, normal hearing acuity  EYES: "  EOM normal, conjunctiva and lids normal  NECK:  No adenopathy,masses or thyromegaly  RESP:  respiratory effort and palpation of chest normal, lungs clear to auscultation , no respiratory distress, cough at times, can be productive  CV:  Palpation and auscultation of heart done , regular rate and rhythm, no murmur, rub, or gallop, no edema, +2 pedal pulses  ABDOMEN:  normal bowel sounds, soft, nontender, no hepatosplenomegaly or other masses  M/S:   Gait and station normal  SKIN:  Inspection of skin and subcutaneous tissue baseline  NEURO:   Cranial nerves 2-12 are normal tested and grossly at patient's baseline  PSYCH:  oriented X 3, normal insight, judgement and memory, affect and mood normal    Recent Labs:    TSH   Date Value Ref Range Status   02/16/2017 3.39 0.30 - 5.00 uIU/mL Final   ]    Last Basic Metabolic Panel:  Lab Results   Component Value Date     02/16/2017      Lab Results   Component Value Date    POTASSIUM 4.0 02/16/2017     Lab Results   Component Value Date    CHLORIDE 105 02/16/2017     Lab Results   Component Value Date    TENZIN 9.0 02/16/2017     Lab Results   Component Value Date    CO2 24 02/16/2017     Lab Results   Component Value Date    BUN 15 02/16/2017            Lab Results   Component Value Date    CR 0.81 02/16/2017     Lab Results   Component Value Date     02/16/2017          Lab Results   Component Value Date    WBC 7.4 02/09/2017    WBC 8.1 07/27/2016     Lab Results   Component Value Date    RBC 3.94 02/09/2017           Lab Results   Component Value Date    HGB 12.1 02/09/2017     Lab Results   Component Value Date    HCT 35.9 02/09/2017     Lab Results   Component Value Date    MCV 91 02/09/2017     Lab Results   Component Value Date    MCH 30.7 02/09/2017     Lab Results   Component Value Date    MCHC 33.7 02/09/2017     Lab Results   Component Value Date    RDW 13.4 02/09/2017     Lab Results   Component Value Date     02/09/2017       Lab Results    Component Value Date    INR 3.1 02/16/2017    INR 3.9 02/14/2017    INR 3.32 02/13/2017    INR 3.19 02/12/2017    INR 2.95 02/11/2017    INR 2.70 02/10/2017    INR 1.96 02/09/2017    INR 2.03 02/08/2017       Assessment/Plan:  Influenza A  Continue to monitor. Mild cough at times.     Type 2 diabetes mellitus without complication, without long-term current use of insulin (H)  No changes in diabetic regimen. Currently on oral medication without issue.   Bmp is WNL, TSH is WNL    History of stroke  Chronic systolic congestive heart failure  Long term current use of anticoagulant therapy  INR 3.1 today. No coumadin today. Recheck tomorrow, 2/17/17-will follow.     History of MI (myocardial infarction)  Continue care, monitor.     Physical deconditioning  Ambulating well in room, continue working with PT/OT.    Prostate cancer (H)  Stricture and stenosis of esophagus  Autism disorder  Stable conditions, will monitor.         Total time with patient visit: 35 minutes including discussions about the POC and care coordination with the patient and staff. Greater than 50% of total time spent with counseling and coordinating care.      I Kailey Saucedo RN, CNP have examined pt independent of Lorrie Hollis DNP Student.   I have  confirmed all histories, ROS, exams, assessments  and plans by myself. I  discussed case with student and any additions to his/her note as below: no changes agree with all above.        Electronically signed by  SARATH Yi CNP

## 2017-02-17 ENCOUNTER — NURSING HOME VISIT (OUTPATIENT)
Dept: GERIATRICS | Facility: CLINIC | Age: 77
End: 2017-02-17
Payer: COMMERCIAL

## 2017-02-17 VITALS
DIASTOLIC BLOOD PRESSURE: 64 MMHG | TEMPERATURE: 98.3 F | SYSTOLIC BLOOD PRESSURE: 138 MMHG | OXYGEN SATURATION: 94 % | RESPIRATION RATE: 18 BRPM | BODY MASS INDEX: 24.85 KG/M2 | HEIGHT: 70 IN | WEIGHT: 173.6 LBS | HEART RATE: 88 BPM

## 2017-02-17 DIAGNOSIS — J10.1 INFLUENZA A: Primary | ICD-10-CM

## 2017-02-17 DIAGNOSIS — Z86.73 HISTORY OF TIA (TRANSIENT ISCHEMIC ATTACK) AND STROKE: ICD-10-CM

## 2017-02-17 DIAGNOSIS — I50.22 CHRONIC SYSTOLIC CONGESTIVE HEART FAILURE (H): ICD-10-CM

## 2017-02-17 DIAGNOSIS — R53.81 PHYSICAL DECONDITIONING: ICD-10-CM

## 2017-02-17 DIAGNOSIS — I25.2 HISTORY OF MI (MYOCARDIAL INFARCTION): ICD-10-CM

## 2017-02-17 DIAGNOSIS — K22.2 STRICTURE AND STENOSIS OF ESOPHAGUS: ICD-10-CM

## 2017-02-17 LAB — INR PPP: 2

## 2017-02-17 PROCEDURE — 99207 ZZC CDG-CORRECTLY CODED, REVIEWED AND AGREE: CPT | Performed by: INTERNAL MEDICINE

## 2017-02-17 PROCEDURE — 99306 1ST NF CARE HIGH MDM 50: CPT | Performed by: INTERNAL MEDICINE

## 2017-02-17 NOTE — PROGRESS NOTES
PRIMARY CARE PROVIDER AND CLINIC RESPONSIBLE:  Jermain Helm, [facility] [address] / [facility]*        ADMISSION HISTORY AND PHYSICAL EXAMINATION     Chief Complaint   Patient presents with     Hospital F/U         HISTORY OF PRESENT ILLNESS:  76 year old male, (1940), admitted to the [facility] TCU for continuation of medical care and rehab.    Pt admitted [facility] 2/7 to 2/13 for fever, chill and falls. Was noted to have influenza and was treated with tamiflu.    Pt denies any SOB/productive cough/fever/chills.     Please see Kailey Saucedo's CNP admit noted dated 2/14 for details of admission, past medical history, family history, allergies, medication list, social history and other details pertinent with this admission. Hospital admission and dc summary reviewed.      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 cerebral embolic CVAs     Type II or unspecified type diabetes mellitus without mention of complication, not stated as uncontrolled      Diabetes mellitus       Past Surgical History   Procedure Laterality Date     Coronary angiography adult order       Heart cath, angioplasty  4/14     BMS to LAD     Esophagoscopy, gastroscopy, duodenoscopy (egd), combined N/A 8/6/2015     Procedure: COMBINED ESOPHAGOSCOPY, GASTROSCOPY, DUODENOSCOPY (EGD), REMOVE FOREIGN BODY;  Surgeon: Yu Tapia MD;  Location: [facility]       Current Outpatient  Prescriptions   Medication Sig     metoprolol (TOPROL XL) 25 MG 24 hr tablet Take 25 mg by mouth daily Hold if SBP<110 or HR<55 and call if held 2 times in a row.     Warfarin Sodium (COUMADIN PO) Take by mouth daily INR 2.0 on 2/17/17. Give 2 mg po on 2/17, then 4 mg po on 2/18. On Sunday 2/19 resume home regimen of 4 mg po on Sun, Tues, Thu, Sat and 2 mg po on Mon, Wed, Fri. Recheck INR 2/21     benzocaine-menthol (CHLORASEPTIC) 6-10 MG lozenge Place 1 lozenge inside cheek every hour as needed for sore throat     metFORMIN (GLUCOPHAGE) 500 MG tablet Take 2 tablets (1,000 mg) by mouth 2 times daily (with meals)     glimepiride (AMARYL) 1 MG tablet TAKE 1 TABLET (1 MG) BY MOUTH EVERY MORNING (BEFORE BREAKFAST)     atorvastatin (LIPITOR) 20 MG tablet TAKE 1 TABLET EVERY DAY     Cholecalciferol (D3-1000 PO) Take 1 capsule by mouth daily     ACE/ARB NOT PRESCRIBED, INTENTIONAL, ACE & ARB not prescribed due to Symptomatic hypotension not due to excessive diuresis     sertraline (ZOLOFT) 50 MG tablet TAKE 1 TABLET EVERY DAY     bicalutamide (CASODEX) 50 MG tablet TAKE 1 TABLET EVERY EVENING     aspirin 81 MG chewable tablet Take 1 tablet (81 mg) by mouth daily     multivitamin, therapeutic (THERA-VIT) TABS Take 1 tablet by mouth daily     Blood Glucose Monitoring Suppl W/DEVICE KIT Test blood glucose every other day         No current facility-administered medications for this visit.        No Known Allergies    Social History     Social History     Marital status: Single     Spouse name: N/A     Number of children: N/A     Years of education: N/A     Occupational History     Not on file.     Social History Main Topics     Smoking status: Never Smoker     Smokeless tobacco: Never Used     Alcohol use No      Comment: never     Drug use: No     Sexual activity: Yes     Partners: Male     Other Topics Concern     Caffeine Concern No     Sleep Concern No     Weight Concern Yes     weight loss 22# since 4/2016     Exercise  "No     Social History Narrative          Information reviewed:  Medications, vital signs, orders, nursing notes, problem list, hospital information.     ROS: All 10 point review of system completed, those pertinent positive, please see H&P, the remaining ROS is negative.    /64  Pulse 88  Temp 98.3  F (36.8  C)  Resp 18  Ht 5' 9.5\" (1.765 m)  Wt 173 lb 9.6 oz (78.7 kg)  SpO2 94%  BMI 25.27 kg/m2    PHYSICAL EXAMINATION:   GENERAL:  No acute distress. Sitting in a chair.  SKIN:  Dry and warm.  There is no rash, lesions, ulcers or juandice at area of skin examined.  HEENT:  Head without trauma.  Pupils round, reactive. Exam of conjunctiva and lids are normal. Sclera without icterus. There is no oral thrush.  NECK:  Supple.  There is no cervical adenopathy, no thyromegaly. No jugular venous distension.  CHEST: No reproducible chest tenderness.   LUNGS:  Normal respiratory effort. Lungs are Clear on ascultation.  HEART:  Regular rate and rhythm.  No murmur, gallops or rubs auscultated.  ABDOMEN:  Soft, bowel sounds positive.  There is no tenderness or guarding.   EXTREMITIES: No edema. Normal range of motion. No calf swelling or tenderness.  NEUROLOGIC:  Alert and oriented x3.  There is no focal deficit appreciated.      Lab/Diagnostic data:  Reviewed    Lab Results   Component Value Date    WBC 7.4 02/09/2017    WBC 8.1 07/27/2016     Lab Results   Component Value Date    RBC 3.94 02/09/2017    RBC 4.43 07/27/2016     Lab Results   Component Value Date    HGB 12.1 02/09/2017     Lab Results   Component Value Date    HCT 35.9 02/09/2017     Lab Results   Component Value Date    MCV 91 02/09/2017     Lab Results   Component Value Date    MCH 30.7 02/09/2017     Lab Results   Component Value Date    MCHC 33.7 02/09/2017     Lab Results   Component Value Date    RDW 13.4 02/09/2017     Lab Results   Component Value Date     02/09/2017       Last Basic Metabolic Panel:  Lab Results   Component Value Date    NA " 138 02/16/2017      Lab Results   Component Value Date    POTASSIUM 4.0 02/16/2017     Lab Results   Component Value Date    CHLORIDE 105 02/16/2017     Lab Results   Component Value Date    TENZIN 9.0 02/16/2017     Lab Results   Component Value Date    CO2 24 02/16/2017     Lab Results   Component Value Date    BUN 15 02/16/2017    BUN 14 07/27/2016     Lab Results   Component Value Date    CR 0.81 02/16/2017     Lab Results   Component Value Date     02/16/2017     Lab Results   Component Value Date    INR 2.0 02/17/2017    INR 3.1 02/16/2017    INR 3.9 02/14/2017    INR 3.32 02/13/2017    INR 3.19 02/12/2017    INR 2.95 02/11/2017    INR 2.70 02/10/2017    INR 1.96 02/09/2017    INR 2.03 02/08/2017    INR 1.87 02/07/2017    INR 4.3 01/25/2017    INR 1.6 12/29/2016         ASSESSMENT / PLAN:     Influenza A  - s/p tamiflu.  - Does not require precautions for influenza.    History of TIA (transient ischemic attack) and stroke  - On coumadin.    History of MI (myocardial infarction)  - On ASA, lipitor and toprol XL.    Chronic systolic congestive heart failure (H)  - On toprol XL.    Stricture and stenosis of esophagus  - f/u with GI when scheduled for dilatation.    Physical deconditioning  -Plan: PT/OT, fall precautions. Care conference with patient and family for the progress of rehab and disposition issues will be discussed as planned. Rehab evaluation and other evaluations including CPT are at rehab logs, to be reviewed separately.  Fall risk assessment as well as cognitive evaluation will be formed during rehab stay if indicated.    H/o of prostate ca.  - On casodex.    Type 2 DM without mention of complication.  - On amaryl and metformin.    Depression and anxiety.  - On zoloft.    Other problems with same care. Primary care doctor and other specialists to address those chronic problems in next clinic appointment to be scheduled upon discharge from the TCU.    Total time spent with patient visit was 40  min including patient visit, review of past records, 1/2 time on patients counseling and coordinating care.        Leona Ansari MD

## 2017-02-21 ENCOUNTER — NURSING HOME VISIT (OUTPATIENT)
Dept: GERIATRICS | Facility: CLINIC | Age: 77
End: 2017-02-21
Payer: COMMERCIAL

## 2017-02-21 VITALS
HEART RATE: 84 BPM | WEIGHT: 179 LBS | OXYGEN SATURATION: 97 % | BODY MASS INDEX: 25.62 KG/M2 | TEMPERATURE: 97.3 F | HEIGHT: 70 IN | SYSTOLIC BLOOD PRESSURE: 112 MMHG | DIASTOLIC BLOOD PRESSURE: 69 MMHG | RESPIRATION RATE: 20 BRPM

## 2017-02-21 DIAGNOSIS — C61 PROSTATE CANCER (H): ICD-10-CM

## 2017-02-21 DIAGNOSIS — I50.22 CHRONIC SYSTOLIC CONGESTIVE HEART FAILURE (H): Primary | ICD-10-CM

## 2017-02-21 DIAGNOSIS — J10.1 INFLUENZA A: ICD-10-CM

## 2017-02-21 DIAGNOSIS — R53.81 PHYSICAL DECONDITIONING: ICD-10-CM

## 2017-02-21 DIAGNOSIS — Z79.01 LONG TERM CURRENT USE OF ANTICOAGULANT THERAPY: ICD-10-CM

## 2017-02-21 DIAGNOSIS — E11.9 TYPE 2 DIABETES MELLITUS WITHOUT COMPLICATION, WITHOUT LONG-TERM CURRENT USE OF INSULIN (H): ICD-10-CM

## 2017-02-21 DIAGNOSIS — Z86.73 HISTORY OF STROKE: ICD-10-CM

## 2017-02-21 DIAGNOSIS — I25.2 HISTORY OF MI (MYOCARDIAL INFARCTION): ICD-10-CM

## 2017-02-21 PROCEDURE — 99310 SBSQ NF CARE HIGH MDM 45: CPT | Performed by: NURSE PRACTITIONER

## 2017-02-21 PROCEDURE — 99207 ZZC CDG-CORRECTLY CODED, REVIEWED AND AGREE: CPT | Performed by: NURSE PRACTITIONER

## 2017-02-21 NOTE — PROGRESS NOTES
[facility]    Chief Complaint   Patient presents with     Nursing Home Acute       HPI:    Jose Lees is a 76 year old  (1940), who is being seen today for an episodic care visit at [facility]. Today's concern is:    Chronic systolic congestive heart failure (H)  History of stroke  Long term current use of anticoagulant therapy  History of MI (myocardial infarction)  Last echo was in April 2014, EF of 25-30%. Admission weight was taken with a wheelchair and was 178.7 lb, beginning the next day the weights were taken using the standing scale and have ranged from 174-179 lb. Going by standing weights only, there has been a weight gain on 5 lbs in one week. Trace edema in bilateral lower extremities, inspiratory wheezing in bilateral upper lobes.  Denies increased shortness of breath. Creatinine on last week was 0.8. Current medication regimen includes: Toprol XL 25 mg daily, ASA 81 mg daily, Lipitor 20 mg daily, and anticoagulated with Coumadin. BPs: 101-148/61-75 mmHg  -Lasix 20 mg today and tomorrow 2/23.  -Coumadin     Type 2 diabetes mellitus without complication, without long-term current use of insulin (H)  Well controlled with oral regimen of Metformin 1000 mg BID and Gimepiride 1 mg daily.   Lab Results   Component Value Date    A1C 7.6 01/25/2017     Blood sugars: A.M.: 142-214 mg/dL, NOON: 134-285 mg/dL, P.M.: 117-186 mg/dL    Influenza-resolved  Physical deconditioning  H/O Prostate cancer (H)  Received tamiflu while hospitalized. Still has a non productive cough. Denies shortness of breath. Oxygen saturations 94-97% on room air.   -Receiving PT/OT, up and about with walker.        ALLERGIES: Review of patient's allergies indicates no known allergies.  Past Medical, Surgical, Family and Social History reviewed and updated in EPIC.    Current Outpatient Prescriptions   Medication Sig Dispense Refill     metoprolol (TOPROL XL) 25 MG 24 hr tablet Take 25  mg by mouth daily Hold if SBP<110 or HR<55 and call if held 2 times in a row.       Warfarin Sodium (COUMADIN PO) Take by mouth daily INR 2.0 on 2/17/17. Give 2 mg po on 2/17, then 4 mg po on 2/18. On Sunday 2/19 resume home regimen of 4 mg po on Sun, Tues, Thu, Sat and 2 mg po on Mon, Wed, Fri. Recheck INR 2/21       benzocaine-menthol (CHLORASEPTIC) 6-10 MG lozenge Place 1 lozenge inside cheek every hour as needed for sore throat 84 lozenge      metFORMIN (GLUCOPHAGE) 500 MG tablet Take 2 tablets (1,000 mg) by mouth 2 times daily (with meals) 360 tablet 1     glimepiride (AMARYL) 1 MG tablet TAKE 1 TABLET (1 MG) BY MOUTH EVERY MORNING (BEFORE BREAKFAST) 90 tablet 1     atorvastatin (LIPITOR) 20 MG tablet TAKE 1 TABLET EVERY DAY 90 tablet 1     Cholecalciferol (D3-1000 PO) Take 1 capsule by mouth daily       ACE/ARB NOT PRESCRIBED, INTENTIONAL, ACE & ARB not prescribed due to Symptomatic hypotension not due to excessive diuresis       sertraline (ZOLOFT) 50 MG tablet TAKE 1 TABLET EVERY DAY 90 tablet 3     bicalutamide (CASODEX) 50 MG tablet TAKE 1 TABLET EVERY EVENING 90 tablet 3     aspirin 81 MG chewable tablet Take 1 tablet (81 mg) by mouth daily 36 tablet 12     multivitamin, therapeutic (THERA-VIT) TABS Take 1 tablet by mouth daily       Blood Glucose Monitoring Suppl W/DEVICE KIT Test blood glucose every other day     1 kit 0     Medications reviewed:  Medications reconciled to facility chart and changes were made to reflect current medications as identified as above med list. Below are the changes that were made:   Medications stopped since last EPIC medication reconciliation:   There are no discontinued medications.    Medications started since last [facility] medication reconciliation:  No orders of the defined types were placed in this encounter.      REVIEW OF SYSTEMS:  10 point ROS of systems including Constitutional, Eyes, Respiratory, Cardiovascular, Gastroenterology, Genitourinary, Integumentary,  "Musculoskeletal, Psychiatric were all negative except for pertinent positives noted in my HPI.    Physical Exam:  /69  Pulse 84  Temp 97.3  F (36.3  C)  Resp 20  Ht 5' 9.5\" (1.765 m)  Wt 179 lb (81.2 kg)  SpO2 97%  BMI 26.05 kg/m2  GENERAL APPEARANCE:  Alert, in no distress, pleasant, cooperative, oriented x 3  EYES:  EOM, lids, pupils and irises normal, sclera clear and conjunctiva normal, no discharge or mattering on lids or lashes noted  ENT:  Mouth normal, moist mucous membranes, nose without drainage or crusting, external ears without lesions, hearing acuity normal  NECK:  Nontender, supple, symmetrical; no adenopathy, masses or thyromegaly, trachea midline  RESP:  respiratory effort and palpation of chest normal, no chest wall tenderness, no respiratory distress, inspiratory wheezing in bilateral upper lobes, patient is on room air.   CV:  Palpation and auscultation of heart done, rate and rhythm regular, no murmur, no rub or gallop. Trace edema in bilateral lower extremities.   ABDOMEN:  normal bowel sounds, soft, nontender, no grimacing or guarding with palpation, no hepatosplenomegaly or other masses  M/S:   Gait and station ambulates independently with walker, no tenderness or swelling of the joints; able to move all extremities   SKIN:  Inspection and palpation of skin and subcutaneous tissue: skin warm, dry and intact without rashes  NEURO: cranial nerves 2-12 grossly intact, no facial asymmetry, no speech deficits and able to follow directions, moves all extremities symmetrically  PSYCH:  insight and judgement, memory, affect and mood normal    Recent Labs:      Last Basic Metabolic Panel:  Lab Results   Component Value Date     02/16/2017      Lab Results   Component Value Date    POTASSIUM 4.0 02/16/2017     Lab Results   Component Value Date    CHLORIDE 105 02/16/2017     Lab Results   Component Value Date    TENZIN 9.0 02/16/2017     Lab Results   Component Value Date    CO2 24 02/16/2017 "     Lab Results   Component Value Date    BUN 15 02/16/2017    BUN 14 07/27/2016     Lab Results   Component Value Date    CR 0.81 02/16/2017     Lab Results   Component Value Date     02/16/2017     Lab Results   Component Value Date    WBC 7.4 02/09/2017    WBC 8.1 07/27/2016     Lab Results   Component Value Date    RBC 3.94 02/09/2017    RBC 4.43 07/27/2016     Lab Results   Component Value Date    HGB 12.1 02/09/2017     Lab Results   Component Value Date    HCT 35.9 02/09/2017     Lab Results   Component Value Date    MCV 91 02/09/2017     Lab Results   Component Value Date    MCH 30.7 02/09/2017     Lab Results   Component Value Date    MCHC 33.7 02/09/2017     Lab Results   Component Value Date    RDW 13.4 02/09/2017     Lab Results   Component Value Date     02/09/2017       Lab Results   Component Value Date    INR 2.0 02/17/2017    INR 3.1 02/16/2017    INR 3.9 02/14/2017    INR 3.32 02/13/2017    INR 3.19 02/12/2017    INR 2.95 02/11/2017    INR 2.70 02/10/2017    INR 1.96 02/09/2017    INR 2.03 02/08/2017    INR 1.87 02/07/2017         Assessment/Plan:  (I50.22) Chronic systolic congestive heart failure (H)  (primary encounter diagnosis)  (I25.2) History of MI (myocardial infarction)  (Z86.73) History of stroke  (Z79.01) Long term current use of anticoagulant therapy  Comment: Acute on chronic. A five pound weight gain in the past week and inspiratory wheezing on exam. Last creatinine 0.8 and K 4.0 on 2/16/17.  Plan: Lasix 20 mg today and tomorrow 2/22. Continue with daily weights and assess the need for further diuresis.  Coumadin dosing when INR available.     (E11.9) Type 2 diabetes mellitus without complication, without long-term current use of insulin (H)  Comment: Chronic, stable with oral medications.   Plan: continue with Metformin and Glimepiride    (J10.1) Influenza A-resolved  (R53.81) Physical deconditioning  (C61) Prostate cancer (H)  Comment: Influenza resolved but continues  to have physical deconditioning  Plan: PT/OT    Orders:  Give Lasix 20 mg PO 2/21 and 2/22  Coumadin - dose to be addressed once INR available.     Anna Pelayo, RN, NP Intern    This patient was seen along with a nurse practitioner intern.  The histories and reviews of systems were obtained by intern and confirmed by myself. All objective, assessments and plans were completed by myself.   Heather Parra    Total time spent with patient visit was 35 min including patient visit, review of past records and discussion of patient status and POC with staff. Greater than 50% of total time spent with counseling and coordinating care.     Electronically signed by  SARATH Gabriel CNP

## 2017-02-21 NOTE — MR AVS SNAPSHOT
After Visit Summary   2/21/2017    Jose Lees    MRN: [id number]           Patient Information     Date Of Birth          1940        Visit Information        Provider Department      2/21/2017 7:00 AM Heather Parra APRN CNP [facility]        Today's Diagnoses     Chronic systolic congestive heart failure (H)    -  1    Influenza A        Type 2 diabetes mellitus without complication, without long-term current use of insulin (H)        History of stroke        Long term current use of anticoagulant therapy        History of MI (myocardial infarction)        Physical deconditioning        Prostate cancer (H)           Follow-ups after your visit        Who to contact     If you have questions or need follow up information about today's clinic visit or your schedule please contact [facility] directly at [phone].  Normal or non-critical lab and imaging results will be communicated to you by Joystickershart, letter or phone within 4 business days after the clinic has received the results. If you do not hear from us within 7 days, please contact the clinic through Joystickershart or phone. If you have a critical or abnormal lab result, we will notify you by phone as soon as possible.  Submit refill requests through Leadwerks or call your pharmacy and they will forward the refill request to us. Please allow 3 business days for your refill to be completed.          Additional Information About Your Visit        MyChart Information     Leadwerks gives you secure access to your electronic health record. If you see a primary care provider, you can also send messages to your care team and make appointments. If you have questions, please call your primary care clinic.  If you do not have a primary care provider, please call [phone] and they will assist you.        Care EveryWhere ID     This is your Care EveryWhere ID. This could be used by other organizations to  "access your [facility] medical records  [id number]        Your Vitals Were     Pulse Temperature Respirations Height Pulse Oximetry BMI (Body Mass Index)    84 97.3  F (36.3  C) 20 5' 9.5\" (1.765 m) 97% 26.05 kg/m2       Blood Pressure from Last 3 Encounters:   02/21/17 112/69   02/17/17 138/64   02/16/17 128/66    Weight from Last 3 Encounters:   02/21/17 179 lb (81.2 kg)   02/17/17 173 lb 9.6 oz (78.7 kg)   02/16/17 174 lb 9.6 oz (79.2 kg)              Today, you had the following     No orders found for display       Primary Care Provider Office Phone # Fax #    Jermain Helm [phone]       [facility] [address]  [facility] [address]        Thank you!     Thank you for choosing [facility]  for your care. Our goal is always to provide you with excellent care. Hearing back from our patients is one way we can continue to improve our services. Please take a few minutes to complete the written survey that you may receive in the mail after your visit with us. Thank you!             Your Updated Medication List - Protect others around you: Learn how to safely use, store and throw away your medicines at www.disposemymeds.org.          This list is accurate as of: 2/21/17 11:12 AM.  Always use your most recent med list.                   Brand Name Dispense Instructions for use    ACE/ARB NOT PRESCRIBED (INTENTIONAL)      ACE & ARB not prescribed due to Symptomatic hypotension not due to excessive diuresis       aspirin 81 MG chewable tablet     36 tablet    Take 1 tablet (81 mg) by mouth daily       atorvastatin 20 MG tablet    LIPITOR    90 tablet    TAKE 1 TABLET EVERY DAY       benzocaine-menthol 6-10 MG lozenge    CHLORASEPTIC    84 lozenge    Place 1 lozenge inside cheek every hour as needed for sore throat       bicalutamide 50 MG tablet    CASODEX    90 tablet    TAKE 1 TABLET EVERY EVENING       Blood Glucose Monitoring Suppl W/DEVICE Kit     1 " kit    Test blood glucose every other day       COUMADIN PO      Take by mouth daily INR 2.0 on 2/17/17. Give 2 mg po on 2/17, then 4 mg po on 2/18. On Sunday 2/19 resume home regimen of 4 mg po on Sun, Tues, Thu, Sat and 2 mg po on Mon, Wed, Fri. Recheck INR 2/21       D3-1000 PO      Take 1 capsule by mouth daily       glimepiride 1 MG tablet    AMARYL    90 tablet    TAKE 1 TABLET (1 MG) BY MOUTH EVERY MORNING (BEFORE BREAKFAST)       metFORMIN 500 MG tablet    GLUCOPHAGE    360 tablet    Take 2 tablets (1,000 mg) by mouth 2 times daily (with meals)       multivitamin, therapeutic Tabs tablet      Take 1 tablet by mouth daily       sertraline 50 MG tablet    ZOLOFT    90 tablet    TAKE 1 TABLET EVERY DAY       TOPROL XL 25 MG 24 hr tablet   Generic drug:  metoprolol      Take 25 mg by mouth daily Hold if SBP<110 or HR<55 and call if held 2 times in a row.

## 2017-02-24 ENCOUNTER — DISCHARGE SUMMARY NURSING HOME (OUTPATIENT)
Dept: GERIATRICS | Facility: CLINIC | Age: 77
End: 2017-02-24
Payer: COMMERCIAL

## 2017-02-24 VITALS
DIASTOLIC BLOOD PRESSURE: 66 MMHG | SYSTOLIC BLOOD PRESSURE: 118 MMHG | WEIGHT: 181 LBS | RESPIRATION RATE: 20 BRPM | HEIGHT: 70 IN | HEART RATE: 75 BPM | TEMPERATURE: 97.6 F | BODY MASS INDEX: 25.91 KG/M2 | OXYGEN SATURATION: 96 %

## 2017-02-24 DIAGNOSIS — E11.9 TYPE 2 DIABETES MELLITUS WITHOUT COMPLICATION, WITHOUT LONG-TERM CURRENT USE OF INSULIN (H): ICD-10-CM

## 2017-02-24 DIAGNOSIS — Z79.01 LONG TERM CURRENT USE OF ANTICOAGULANT THERAPY: ICD-10-CM

## 2017-02-24 DIAGNOSIS — Z86.73 HISTORY OF STROKE: ICD-10-CM

## 2017-02-24 DIAGNOSIS — I25.2 HISTORY OF MI (MYOCARDIAL INFARCTION): ICD-10-CM

## 2017-02-24 DIAGNOSIS — J10.1 INFLUENZA A: Primary | ICD-10-CM

## 2017-02-24 DIAGNOSIS — I50.22 CHRONIC SYSTOLIC CONGESTIVE HEART FAILURE (H): ICD-10-CM

## 2017-02-24 DIAGNOSIS — F84.0 AUTISM DISORDER: ICD-10-CM

## 2017-02-24 DIAGNOSIS — R53.81 PHYSICAL DECONDITIONING: ICD-10-CM

## 2017-02-24 DIAGNOSIS — K22.2 STRICTURE AND STENOSIS OF ESOPHAGUS: ICD-10-CM

## 2017-02-24 DIAGNOSIS — C61 PROSTATE CANCER (H): ICD-10-CM

## 2017-02-24 PROCEDURE — 99207 ZZC CDG-CORRECTLY CODED, REVIEWED AND AGREE: CPT | Performed by: NURSE PRACTITIONER

## 2017-02-24 PROCEDURE — 99316 NF DSCHRG MGMT 30 MIN+: CPT | Performed by: NURSE PRACTITIONER

## 2017-02-24 NOTE — PATIENT INSTRUCTIONS
[facility] Discharge Orders    Name: Jose Lees  : 1940  Planned Discharge Date: 17  Discharged to: previous assisted living    MEDICAL FOLLOW UP  Follow up with PCP in 1-2 weeks   Follow up with Specialists as previously scheduled      FUTURE LABS: INR due 17 prior to discharge with results to TCU NP for further Coumadin orders and INR re-check date    ORDER CHANGES:  None     DISCHARGE MEDICATIONS:  The patient s pharmacy is authorized to dispense a 30-day supply of medications. Refill requests should be directed to the primary provider, Jermain Helm.   No narcotics are prescribed at time of discharge.   Current Outpatient Prescriptions   Medication Sig Dispense Refill     metoprolol (TOPROL XL) 25 MG 24 hr tablet Take 25 mg by mouth daily Hold if SBP<110 or HR<55 and call if held 2 times in a row.       Warfarin Sodium (COUMADIN PO) Take by mouth daily Give 4 mg po on Mon & Fri and give 3 mg po on Sun, Tue, Wed, Thu, Sat. Recheck INR .       benzocaine-menthol (CHLORASEPTIC) 6-10 MG lozenge Place 1 lozenge inside cheek every hour as needed for sore throat 84 lozenge      metFORMIN (GLUCOPHAGE) 500 MG tablet Take 2 tablets (1,000 mg) by mouth 2 times daily (with meals) 360 tablet 1     glimepiride (AMARYL) 1 MG tablet TAKE 1 TABLET (1 MG) BY MOUTH EVERY MORNING (BEFORE BREAKFAST) 90 tablet 1     atorvastatin (LIPITOR) 20 MG tablet TAKE 1 TABLET EVERY DAY 90 tablet 1     Cholecalciferol (D3-1000 PO) Take 1 capsule by mouth daily       ACE/ARB NOT PRESCRIBED, INTENTIONAL, ACE & ARB not prescribed due to Symptomatic hypotension not due to excessive diuresis       sertraline (ZOLOFT) 50 MG tablet TAKE 1 TABLET EVERY DAY 90 tablet 3     bicalutamide (CASODEX) 50 MG tablet TAKE 1 TABLET EVERY EVENING 90 tablet 3     aspirin 81 MG chewable tablet Take 1 tablet (81 mg) by mouth daily 36 tablet 12     multivitamin, therapeutic (THERA-VIT) TABS Take 1 tablet by mouth  daily       Blood Glucose Monitoring Suppl W/DEVICE KIT Test blood glucose every other day     1 kit 0       SERVICES:  Home Care:  Physical Therapy, Registered Nurse and From:  [facility]    ADDITIONAL INSTRUCTIONS:  Weigh yourself daily in the morning and keep a record. Call your primary clinic: a) if you are more short of breath, or b) if your weight changes more than 3 pounds in one day or more than 5 pounds in one week.     SARATH Gabriel CNP  This document was electronically signed on February 24, 2017

## 2017-02-24 NOTE — PROGRESS NOTES
[facility] DISCHARGE SUMMARY    PATIENT'S NAME: Jose Lees  YOB: 1940  MEDICAL RECORD NUMBER:  [id number]    PRIMARY CARE PROVIDER AND CLINIC RESPONSIBLE AFTER TRANSFER: Jermain Helm [facility] [id number] / [facility][note 3] [id number]    CODE STATUS/ADVANCE DIRECTIVES DISCUSSION:   CPR/Full code      No Known Allergies    TRANSFERRING PROVIDERS: SARATH Gabriel CNP, Orlando Zhang MD  DATE OF SNF ADMISSION:  February / 13 / 2017  DATE OF SNF (anticipated) DISCHARGE: February / 28 / 2017  DISCHARGE DISPOSITION: Non-FMG Provider   Nursing Facility: [facility] stay 2/7/17 to 2/13/17.     Condition on Discharge:  Improving.  Function: Ambulates with FWW  Cognitive Scores: SLUMS 20/30    Equipment: walker      DISCHARGE DIAGNOSIS:   1. Influenza A    2. Type 2 diabetes mellitus without complication, without long-term current use of insulin (H)    3. History of stroke    4. Chronic systolic congestive heart failure (H)    5. Long term current use of anticoagulant therapy    6. History of MI (myocardial infarction)    7. Physical deconditioning    8. Prostate cancer (H)    9. Stricture and stenosis of esophagus    10. Autism disorder        [facility] Course:    Per Hospital course: 76 year old male with Pm Hx of CAD, Type 2 DM, embolic stroke, prostate cancer, hyperlipidemia, dementia, esophageal stricture, who was admitted on 2/8/17 due to fever up to 102F, chills, sore throat, lightheadedness with standing and history of 3 falls on the day of admission. Work up done on 2/7/17 revealed, normal CMP. CBC revealed, Hb 12.7, WBC 7.0 and Plts 142. Lactic acid was 3.1. INR was 1.87 and UA is negative. Nasal swab was positive for Influenza A. Chest x-rays on 2/7/17 is normal.      He was admitted to the medical floor and was placed on droplet isolation. He was commenced on a 5 days  course of Tamiflu 75 mg po b.i.d, which he completed on 2/12/17. He had an intermittent cough, which was occasional productive of white sputum and a mild sore throat on the day of discharge. He was reviewed by the physical and occupational therapists, who recommended TCU at discharge. For scheduled Chloraseptic Lozenges and Chloraseptic lozenges prn.      Chronic systolic congestive heart failure (H)  History of stroke  Long term current use of anticoagulant therapy  History of MI (myocardial infarction)  Last echo was in April 2014, EF of 25-30%. Denies increased shortness of breath. Creatinine on last week was 0.8. Current medication regimen includes: Toprol XL 25 mg daily, ASA 81 mg daily, Lipitor 20 mg daily, and anticoagulated with Coumadin. Received extra doses of Lasix this week x 2. Edema improved. Blood Pressures: 103-148/53-76 mmHg and Weights: 178.7 lb on 2/14/17 and 181.0 lb on 2/23/17. Will be discharged home with TEDs bilaterally.     Type 2 diabetes mellitus without complication, without long-term current use of insulin (H)  Well controlled with oral regimen of Metformin 1000 mg BID and Glimepiride 1 mg daily.   Lab Results   Component Value Date    A1C 7.6 01/25/2017   Blood sugars:   A.M.: 142-214 mg/dL   NOON: 134-285 mg/dL  P.M.:  mg/dL    Influenza-resolved  Physical deconditioning  H/O Prostate cancer (H)  Received tamiflu while hospitalized. Still has a non productive cough. Denies shortness of breath. Oxygen saturations 94-97% on room air.   -Receiving PT up and about with walker. Ready to DC home to [facility] with nursing and PT.       PAST MEDICAL HISTORY:  has a past medical history of ACP (advance care planning); Acute anterior wall MI (H) (4/7/2014); Autism disorder (6/29/2012); CHF (congestive heart failure) (H); Coronary artery disease (4/2014); Dementia; Hyperlipidaemia; Hyperlipidaemia LDL goal < 100; Prostate cancer (H) (10/11); Stricture and stenosis of esophagus (10/22/2015);  Stroke, embolic (H) (4/7/2014); and Type II or unspecified type diabetes mellitus without mention of complication, not stated as uncontrolled.    DISCHARGE MEDICATIONS:  Current Outpatient Prescriptions   Medication Sig Dispense Refill     metoprolol (TOPROL XL) 25 MG 24 hr tablet Take 25 mg by mouth daily Hold if SBP<110 or HR<55 and call if held 2 times in a row.       Warfarin Sodium (COUMADIN PO) Take by mouth daily Give 4 mg po on Mon & Fri and give 3 mg po on Sun, Tue, Wed, Thu, Sat. Recheck INR 2/28.       benzocaine-menthol (CHLORASEPTIC) 6-10 MG lozenge Place 1 lozenge inside cheek every hour as needed for sore throat 84 lozenge      metFORMIN (GLUCOPHAGE) 500 MG tablet Take 2 tablets (1,000 mg) by mouth 2 times daily (with meals) 360 tablet 1     glimepiride (AMARYL) 1 MG tablet TAKE 1 TABLET (1 MG) BY MOUTH EVERY MORNING (BEFORE BREAKFAST) 90 tablet 1     atorvastatin (LIPITOR) 20 MG tablet TAKE 1 TABLET EVERY DAY 90 tablet 1     Cholecalciferol (D3-1000 PO) Take 1 capsule by mouth daily       ACE/ARB NOT PRESCRIBED, INTENTIONAL, ACE & ARB not prescribed due to Symptomatic hypotension not due to excessive diuresis       sertraline (ZOLOFT) 50 MG tablet TAKE 1 TABLET EVERY DAY 90 tablet 3     bicalutamide (CASODEX) 50 MG tablet TAKE 1 TABLET EVERY EVENING 90 tablet 3     aspirin 81 MG chewable tablet Take 1 tablet (81 mg) by mouth daily 36 tablet 12     multivitamin, therapeutic (THERA-VIT) TABS Take 1 tablet by mouth daily       Blood Glucose Monitoring Suppl W/DEVICE KIT Test blood glucose every other day     1 kit 0       MEDICATION CHANGES/RATIONALE:   None     Controlled medications sent with patient: not applicable/none     ROS:    10 point ROS of systems including Constitutional, Eyes, Respiratory, Cardiovascular, Gastroenterology, Genitourinary, Integumentary, Musculoskeletal, Psychiatric were all negative except for pertinent positives noted in my HPI.    Physical Exam:   Vitals: /66  Pulse  "75  Temp 97.6  F (36.4  C)  Resp 20  Ht 5' 9.5\" (1.765 m)  Wt 181 lb (82.1 kg)  SpO2 96%  BMI 26.35 kg/m2  BMI= Body mass index is 26.35 kg/(m^2).  GENERAL APPEARANCE:  Alert, in no distress, pleasant, cooperative, oriented x 3  EYES:  EOM, lids, pupils and irises normal, sclera clear and conjunctiva normal, no discharge or mattering on lids or lashes noted  ENT:  Mouth normal, moist mucous membranes, nose without drainage or crusting, external ears without lesions, hearing acuity normal  RESP:  respiratory effort and palpation of chest normal, no chest wall tenderness, no respiratory distress, inspiratory wheezing in bilateral upper lobes, patient is on room air.   CV:  Palpation and auscultation of heart done, rate and rhythm regular, no murmur, no rub or gallop. Trace edema in bilateral lower extremities.   ABDOMEN:  normal bowel sounds, soft, nontender, no grimacing or guarding with palpation, no hepatosplenomegaly or other masses  M/S:   Gait and station ambulates independently with walker, no tenderness or swelling of the joints; able to move all extremities   NEURO: cranial nerves 2-12 grossly intact, no facial asymmetry, no speech deficits and able to follow directions, moves all extremities symmetrically  PSYCH:  insight and judgement, memory, mood normal; speech seems pressured.     DISCHARGE PLAN:  Physical Therapy, Registered Nurse and From:  [facility].    Patient instructed to follow-up with:  PCP in 7 days.    Current [place] scheduled appointments: None  Pending labs: INR due on 2/28 prior to discharge, obtain orders from NP for further dose and next INR prior to DC home.     SNF labs :   CBC RESULTS:   Recent Labs   Lab Test  02/09/17   0641  02/07/17   2300   WBC  7.4  7.0   RBC  3.94*  4.07*   HGB  12.1*  12.7*   HCT  35.9*  37.4*   MCV  91  92   MCH  30.7  31.2   MCHC  33.7  34.0   RDW  13.4  13.5   PLT  131*  142*       Last Basic Metabolic Panel:  Recent Labs   Lab Test 02/16/17  " 02/09/17   0641   NA  138  137   POTASSIUM  4.0  3.5   CHLORIDE  105  105   TENZIN  9.0  8.3*   CO2  24  22   BUN  15  13   CR  0.81  0.88   GLC  140*  180*       Liver Function Studies -   Recent Labs   Lab Test  02/07/17   2300  01/04/16   1124   PROTTOTAL  7.3  6.8   ALBUMIN  3.6  4.3   BILITOTAL  0.5  0.6   ALKPHOS  57  56   AST  25  22   ALT  25  18     Lab Results   Component Value Date    INR 2.0 02/17/2017    INR 3.1 02/16/2017    INR 3.9 02/14/2017    INR 3.32 02/13/2017    INR 3.19 02/12/2017    INR 2.95 02/11/2017    INR 2.70 02/10/2017    INR 1.96 02/09/2017    INR 2.03 02/08/2017    INR 1.87 02/07/2017         Discharge Treatments: none    TOTAL DISCHARGE TIME:   Greater than 30 minutes     Electronically signed by:  SARATH Gabriel CNP

## 2017-02-28 ENCOUNTER — NURSING HOME VISIT (OUTPATIENT)
Dept: GERIATRICS | Facility: CLINIC | Age: 77
End: 2017-02-28
Payer: COMMERCIAL

## 2017-02-28 VITALS
TEMPERATURE: 97.5 F | HEIGHT: 70 IN | BODY MASS INDEX: 26.37 KG/M2 | DIASTOLIC BLOOD PRESSURE: 70 MMHG | SYSTOLIC BLOOD PRESSURE: 118 MMHG | HEART RATE: 83 BPM | WEIGHT: 184.2 LBS | OXYGEN SATURATION: 98 % | RESPIRATION RATE: 16 BRPM

## 2017-02-28 DIAGNOSIS — Z86.73 HISTORY OF STROKE: Primary | ICD-10-CM

## 2017-02-28 DIAGNOSIS — Z79.01 ANTICOAGULATED ON COUMADIN: ICD-10-CM

## 2017-02-28 LAB — INR PPP: 3

## 2017-02-28 PROCEDURE — 99307 SBSQ NF CARE SF MDM 10: CPT | Performed by: NURSE PRACTITIONER

## 2017-02-28 NOTE — PROGRESS NOTES
"HPI:    Jose Lees is a 76 year old  (1940), who is being [facility]. Today's concern: INR/Coumadin management for History of stroke.    Bleeding or Thromboembolic Signs/Symptoms: None   No Medication Changes, Dietary Changes, or  Activity Changes.   Bacterial/Viral Infection:  No    Missed Coumadin Doses:  None    On ASA: no     OBJECTIVE: A & O x 3, NAD.   No focal neurological deficits.      /70  Pulse 83  Temp 97.5  F (36.4  C)  Resp 16  Ht 5' 9.5\" (1.765 m)  Wt 184 lb 3.2 oz (83.6 kg)  SpO2 98%  BMI 26.81 kg/m2    LABS: INR Today:      Lab Results   Component Value Date    INR 3.0 02/28/2017    INR 2.0 02/17/2017    INR 3.1 02/16/2017    INR 3.9 02/14/2017    INR 3.32 02/13/2017    INR 3.19 02/12/2017    INR 2.95 02/11/2017    INR 2.70 02/10/2017    INR 1.96 02/09/2017    INR 2.03 02/08/2017          ASSESSMENT:    Supratherapeutic INR for goal of 2-3    ASSESSMENT / PLAN:  (Z86.73) History of stroke  (primary encounter diagnosis)   (Z51.81,  Z79.01) Anticoagulated on Coumadin  Comment: no coumadin today, resume 3mg daily in am and  check INR 3.2.17 and home RN to call PCP for dose  Plan:        Kailey Saucedo RN, CNP    "

## 2017-03-01 ENCOUNTER — MEDICAL CORRESPONDENCE (OUTPATIENT)
Dept: FAMILY MEDICINE | Facility: CLINIC | Age: 77
End: 2017-03-01

## 2017-03-01 PROCEDURE — G0180 MD CERTIFICATION HHA PATIENT: HCPCS | Performed by: FAMILY MEDICINE

## 2017-03-02 ENCOUNTER — MEDICAL CORRESPONDENCE (OUTPATIENT)
Dept: FAMILY MEDICINE | Facility: CLINIC | Age: 77
End: 2017-03-02

## 2017-03-02 DIAGNOSIS — Z86.73 HISTORY OF TIA (TRANSIENT ISCHEMIC ATTACK) AND STROKE: Primary | ICD-10-CM

## 2017-03-02 DIAGNOSIS — Z79.01 LONG TERM CURRENT USE OF ANTICOAGULANT THERAPY: ICD-10-CM

## 2017-03-02 LAB — INR PPP: 2.1 (ref 2–3)

## 2017-03-03 ENCOUNTER — TELEPHONE (OUTPATIENT)
Dept: FAMILY MEDICINE | Facility: CLINIC | Age: 77
End: 2017-03-03

## 2017-03-03 NOTE — TELEPHONE ENCOUNTER
Sister Judith called and wondering if Jose needed follow up from the hospital.  States he is doing well and has Home health care coming out.  Left message that he still needed to schedule appointment to follow up on his hospitalization in the next few weeks.

## 2017-03-06 ENCOUNTER — MEDICAL CORRESPONDENCE (OUTPATIENT)
Dept: FAMILY MEDICINE | Facility: CLINIC | Age: 77
End: 2017-03-06

## 2017-03-09 DIAGNOSIS — Z79.01 LONG TERM CURRENT USE OF ANTICOAGULANT THERAPY: ICD-10-CM

## 2017-03-09 DIAGNOSIS — Z86.73 HISTORY OF TIA (TRANSIENT ISCHEMIC ATTACK) AND STROKE: Primary | ICD-10-CM

## 2017-03-09 LAB — INR PPP: 2 (ref 2–3)

## 2017-03-10 ENCOUNTER — OFFICE VISIT (OUTPATIENT)
Dept: FAMILY MEDICINE | Facility: CLINIC | Age: 77
End: 2017-03-10

## 2017-03-10 ENCOUNTER — MEDICAL CORRESPONDENCE (OUTPATIENT)
Dept: FAMILY MEDICINE | Facility: CLINIC | Age: 77
End: 2017-03-10

## 2017-03-10 VITALS
OXYGEN SATURATION: 99 % | DIASTOLIC BLOOD PRESSURE: 60 MMHG | BODY MASS INDEX: 26.35 KG/M2 | HEART RATE: 77 BPM | SYSTOLIC BLOOD PRESSURE: 104 MMHG | WEIGHT: 181 LBS

## 2017-03-10 DIAGNOSIS — R53.81 PHYSICAL DECONDITIONING: ICD-10-CM

## 2017-03-10 DIAGNOSIS — E11.9 TYPE 2 DIABETES MELLITUS WITHOUT COMPLICATION, WITHOUT LONG-TERM CURRENT USE OF INSULIN (H): Primary | ICD-10-CM

## 2017-03-10 NOTE — MR AVS SNAPSHOT
After Visit Summary   3/10/2017    Jose Lees    MRN: [id number]           Patient Information     Date Of Birth          1940        Visit Information        Provider Department      3/10/2017 11:00 AM Jermain Helm MD [facility]        Today's Diagnoses     Type 2 diabetes mellitus without complication, without long-term current use of insulin (H)    -  1    Physical deconditioning           Follow-ups after your visit        Who to contact     If you have questions or need follow up information about today's clinic visit or your schedule please contact [facility] directly at [phone].  Normal or non-critical lab and imaging results will be communicated to you by PathSourcehart, letter or phone within 4 business days after the clinic has received the results. If you do not hear from us within 7 days, please contact the clinic through PathSource[employer] or phone. If you have a critical or abnormal lab result, we will notify you by phone as soon as possible.  Submit refill requests through [employer] or call your pharmacy and they will forward the refill request to us. Please allow 3 business days for your refill to be completed.          Additional Information About Your Visit        MyChart Information     [employer] gives you secure access to your electronic health record. If you see a primary care provider, you can also send messages to your care team and make appointments. If you have questions, please call your primary care clinic.  If you do not have a primary care provider, please call [phone] and they will assist you.        Care EveryWhere ID     This is your Care EveryWhere ID. This could be used by other organizations to access your [facility] medical records  [id number]        Your Vitals Were     Pulse Pulse Oximetry BMI (Body Mass Index)             77 99% 26.35 kg/m2          Blood Pressure from Last 3 Encounters:   03/10/17 104/60   02/28/17 118/70    02/24/17 118/66    Weight from Last 3 Encounters:   03/10/17 82.1 kg (181 lb)   02/28/17 83.6 kg (184 lb 3.2 oz)   02/24/17 82.1 kg (181 lb)              Today, you had the following     No orders found for display       Primary Care Provider Office Phone # Fax #    Jermain Helm [phone]       [facility] [address]  [facility] [address]        Thank you!     Thank you for choosing [facility]  for your care. Our goal is always to provide you with excellent care. Hearing back from our patients is one way we can continue to improve our services. Please take a few minutes to complete the written survey that you may receive in the mail after your visit with us. Thank you!             Your Updated Medication List - Protect others around you: Learn how to safely use, store and throw away your medicines at www.disposemymeds.org.          This list is accurate as of: 3/10/17 11:18 AM.  Always use your most recent med list.                   Brand Name Dispense Instructions for use    ACE/ARB NOT PRESCRIBED (INTENTIONAL)      ACE & ARB not prescribed due to Symptomatic hypotension not due to excessive diuresis       aspirin 81 MG chewable tablet     36 tablet    Take 1 tablet (81 mg) by mouth daily       atorvastatin 20 MG tablet    LIPITOR    90 tablet    TAKE 1 TABLET EVERY DAY       benzocaine-menthol 6-10 MG lozenge    CHLORASEPTIC    84 lozenge    Place 1 lozenge inside cheek every hour as needed for sore throat       bicalutamide 50 MG tablet    CASODEX    90 tablet    TAKE 1 TABLET EVERY EVENING       Blood Glucose Monitoring Suppl W/DEVICE Kit     1 kit    Test blood glucose every other day       COUMADIN PO      Take 3 mg by mouth daily No Coumadin 2/28 (INR 3.0). Continue coumadin 3 mg po QD starting 3/1. Recheck INR 3/2 with home care RN and call to PCP       D3-1000 PO      Take 1 capsule by mouth daily       glimepiride 1 MG tablet    AMARYL    90  tablet    TAKE 1 TABLET (1 MG) BY MOUTH EVERY MORNING (BEFORE BREAKFAST)       metFORMIN 500 MG tablet    GLUCOPHAGE    360 tablet    Take 2 tablets (1,000 mg) by mouth 2 times daily (with meals)       multivitamin, therapeutic Tabs tablet      Take 1 tablet by mouth daily       sertraline 50 MG tablet    ZOLOFT    90 tablet    TAKE 1 TABLET EVERY DAY       TOPROL XL 25 MG 24 hr tablet   Generic drug:  metoprolol      Take 25 mg by mouth daily Hold if SBP<110 or HR<55 and call if held 2 times in a row.

## 2017-03-10 NOTE — PROGRESS NOTES
SUBJECTIVE:                                                      Patient presents for Hospital Followup Visit:    Hospital:  [facility]      Date of Admission: 2/7/17  Date of Discharge: 2/13/17  Reason(s) for Admission: Influenza A with SIRS, resolved            Problems taking medications regularly:  None       Medication changes since discharge: None       Problems adhering to non-medication therapy:  None    Summary of hospitalization:  [facility] discharge summary reviewed    Discharge Summary  Hospitalist  Ash Tidwell MD     Date of Admission: 2/7/2017  Date of Discharge: 2/13/2017  Discharging Provider: Ash Tidwell        Discharge Diagnoses      1. Influenza A with SIRS, resolved.        History of Present Illness  Jose Lees is an 76 year old male, who presented with fever, chills, sore throat, lightheadedness and falls.        Hospital Course      76 year old male with PmHx of CAD, Type 2 DM, embolic stroke, prostate cancer, hyperlipidemia, dementia, esophageal stricture, who was admitted on 2/8/17 due to fever up to 102F, chills, sore throat, lightheadedness with standing and history of 3 falls on the day of admission. Work up done on 2/7/17 revealed, normal CMP. CBC revealed, Hb 12.7, WBC 7.0 and Plts 142. Lactic acid was 3.1. INR was 1.87 and UA is negative. Nasal swab was positive for Influenza A. Chest x-rays on 2/7/17 is normal.      He was admitted to the medical floor and was placed on droplet isolation. He was commenced on a 5 days course of Tamiflu 75mg po b.i.d, which he completed on 2/12/17. He had an intermittent cough, which was occasional productive of white sputum and a mild sore throat on the day of discharge. He was reviewed by the physical and occupational therapists, who recommended TCU at discharge. For scheduled Chloraseptic Lozenges and Chloraseptic lozenges prn.         Significant Results and Procedures      See  above.        Pending Results      None.      Unresulted Labs Ordered in the Past 30 Days of this Admission      No orders found from 12/9/2016 to 2/8/2017.                Code Status       Full Code         Primary Care Physician       Jermain Helm        Physical Exam      Temp: 97.5  F (36.4  C) Temp src: Oral BP: 111/64 Pulse: 68 Heart Rate: 79 Resp: 16 SpO2: 94 % O2 Device: None (Room air)          Vitals:     02/07/17 2245 02/09/17 0729 02/10/17 0606   Weight: 84.6 kg (186 lb 8 oz) 81.7 kg (180 lb 1.9 oz) 80.7 kg (177 lb 14.6 oz)      Constitutional: Elderly white male, awake, cooperative, no apparent distress, O2 Sats 94% on RA  HENNT: No pharyngeal erythema  Respiratory: BS vesicular bilaterally, no crackles or wheezing  Cardiovascular: S1 and S2, reg, no murmur noted  GI: Soft, non-distended, non-tender, no masses, BS present+  Skin/Integumen: No rashes  Extremities: Bilat pedal edema 1+        Discharge Disposition       Discharged to short-term care facility  Condition at discharge: Stable        Consultations This Hospital Stay       PHARMACY TO DOSE WARFARIN  PHYSICAL THERAPY ADULT IP CONSULT  PHYSICAL THERAPY ADULT IP CONSULT  OCCUPATIONAL THERAPY ADULT IP CONSULT        Time Spent on this Encounter       I, Ash Tidwell, personally saw the patient today and spent less than or equal to 30 minutes discharging this patient.        Discharge Orders         Reason for your hospital stay   Influenza A infection.      Follow-up and recommended labs and tests    Follow up with primary care provider, Jermain Helm, within 1-2 weeks, for hospital follow- up. No follow up labs or test are needed.     Diagnostic Tests/Treatments reviewed.  Follow up needed: none  Other Healthcare Providers Involved in Patient s Care:         None  Update since discharge: improved.     ROS:  Constitutional, neuro, ENT, endocrine, pulmonary, cardiac, gastrointestinal, genitourinary, musculoskeletal,  integument and psychiatric systems are negative, except as otherwise noted.    OBJECTIVE:                                                      APPEARANCE: = Relaxed and in no distress  Conj/Eyelids = noninjected and lids and lashes are without inflammation  PERRLA/Irises = Pupils Round Reactive to Light and Irisis without inflammation  Neck = No anterior or posterior adenopathy appreciated.  Thyroid = Not enlarged and no masses felt  Resp effort = Calm regular breathing  Breath Sounds = Good air movement with no rales or rhonchi in any lung fields  Heart Rate, Rythym, & sounds (no Murm)  = Regular rate and rythym with no S3, S4, or murmer appreciated.  Carotid Art's = Pulses full and equal and no bruits appreciated  Abdomen = Soft, nontender, no masses, & bowel sounds in all quadrants  Liver/Spleen = Normal span and no splenomegaly noted  Digits and Nails = FROM in all finger joints, no nail dystrophy  Ext (edema) = No pretibial edema noted or elsewhere  Musculsktl =  Strength and ROM of major joints are within normal limits  SKIN = absent significant rashes or lesions   Recent/Remote Memory = times 2  Mood/Affect = Cooperative and interested        ASSESSMENT/PLAN:                                                      There are no diagnoses linked to this encounter.     Post Discharge Medication Reconciliation: discharge medications reconciled and changed, per note/orders (see AVS).  Issues to address: No issues identified.  Plan of care communicated with patient  Assessment/Plan:  Jose was seen today for hospital f/u and forms.    Diagnoses and all orders for this visit:    Type 2 diabetes mellitus without complication, without long-term current use of insulin (H)    Physical deconditioning      Jermain Helm MD  [facility] [place]  [phone]

## 2017-03-13 DIAGNOSIS — Z76.0 ENCOUNTER FOR MEDICATION REFILL: Primary | ICD-10-CM

## 2017-03-13 RX ORDER — WARFARIN SODIUM 2 MG/1
3 TABLET ORAL DAILY
Qty: 30 TABLET | Refills: 1 | Status: SHIPPED
Start: 2017-03-13 | End: 2017-03-29

## 2017-03-23 ENCOUNTER — MEDICAL CORRESPONDENCE (OUTPATIENT)
Dept: FAMILY MEDICINE | Facility: CLINIC | Age: 77
End: 2017-03-23

## 2017-03-23 DIAGNOSIS — Z79.01 LONG TERM CURRENT USE OF ANTICOAGULANT THERAPY: ICD-10-CM

## 2017-03-23 DIAGNOSIS — Z86.73 HISTORY OF TIA (TRANSIENT ISCHEMIC ATTACK) AND STROKE: Primary | ICD-10-CM

## 2017-03-23 LAB — INR PPP: 1.7

## 2017-03-24 ENCOUNTER — MYC REFILL (OUTPATIENT)
Dept: FAMILY MEDICINE | Facility: CLINIC | Age: 77
End: 2017-03-24

## 2017-03-24 DIAGNOSIS — Z76.0 ENCOUNTER FOR MEDICATION REFILL: ICD-10-CM

## 2017-03-27 RX ORDER — BICALUTAMIDE 50 MG/1
50 TABLET, FILM COATED ORAL DAILY
Qty: 90 TABLET | Refills: 3 | Status: SHIPPED | OUTPATIENT
Start: 2017-03-27 | End: 2018-11-26

## 2017-03-27 NOTE — TELEPHONE ENCOUNTER
Message from [facility]:  Agata Whitfield RN Fri Mar 24, 2017 5:36 PM        ----- Message -----   From: Jose Lees   Sent: 3/24/2017 5:20 PM   To: Rm Triage  Subject: Medication Renewal Request     Original authorizing provider: MD Jose Finney would like a refill of the following medications:  bicalutamide (CASODEX) 50 MG tablet [Jermain Helm MD]    Preferred pharmacy: [facility] - [facility] [address]    Comment:  This message is being sent by Judith Hollins on behalf of Jose Lees

## 2017-03-28 PROCEDURE — 99495 TRANSJ CARE MGMT MOD F2F 14D: CPT | Performed by: FAMILY MEDICINE

## 2017-03-29 RX ORDER — WARFARIN SODIUM 2 MG/1
TABLET ORAL
Qty: 135 TABLET | Refills: 1 | COMMUNITY
Start: 2017-03-15 | End: 2017-08-03

## 2017-03-29 NOTE — TELEPHONE ENCOUNTER
3/15/17 received fax from [facility] regarding prescription for Warfarin they just received.  The directions needed to be updated as well as the dispense amount.  New prescription was written and faxed to the pharmacy.  EPIC was updated.    Yomi Messina,   [facility]  [phone]

## 2017-04-06 ENCOUNTER — MEDICAL CORRESPONDENCE (OUTPATIENT)
Dept: FAMILY MEDICINE | Facility: CLINIC | Age: 77
End: 2017-04-06

## 2017-04-06 DIAGNOSIS — Z86.73 HISTORY OF TIA (TRANSIENT ISCHEMIC ATTACK) AND STROKE: Primary | ICD-10-CM

## 2017-04-06 DIAGNOSIS — Z79.01 LONG TERM CURRENT USE OF ANTICOAGULANT THERAPY: ICD-10-CM

## 2017-04-06 LAB — INR PPP: 1.7

## 2017-04-20 ENCOUNTER — MEDICAL CORRESPONDENCE (OUTPATIENT)
Dept: FAMILY MEDICINE | Facility: CLINIC | Age: 77
End: 2017-04-20

## 2017-04-20 DIAGNOSIS — Z79.01 LONG TERM CURRENT USE OF ANTICOAGULANT THERAPY: ICD-10-CM

## 2017-04-20 DIAGNOSIS — Z86.73 HISTORY OF TIA (TRANSIENT ISCHEMIC ATTACK) AND STROKE: Primary | ICD-10-CM

## 2017-04-20 LAB — INR PPP: 2.5

## 2017-05-09 DIAGNOSIS — Z79.01 LONG TERM CURRENT USE OF ANTICOAGULANT THERAPY: ICD-10-CM

## 2017-05-09 LAB — INR PPP: 3.8 (ref 2–3)

## 2017-05-09 PROCEDURE — 36415 COLL VENOUS BLD VENIPUNCTURE: CPT | Performed by: FAMILY MEDICINE

## 2017-05-09 PROCEDURE — 85610 PROTHROMBIN TIME: CPT | Performed by: FAMILY MEDICINE

## 2017-05-09 PROCEDURE — 99212 OFFICE O/P EST SF 10 MIN: CPT | Performed by: FAMILY MEDICINE

## 2017-05-09 NOTE — PATIENT INSTRUCTIONS
The dose will change to :  Skip today then back to 4mg MWF, Otherwise 3mg.    Recheck next INR in 2 weeks

## 2017-05-09 NOTE — MR AVS SNAPSHOT
After Visit Summary   5/9/2017    Jose Lees    MRN: [id number]           Patient Information     Date Of Birth          1940        Visit Information        Provider Department      5/9/2017 10:30 AM Jermain Helm MD [facility]        Today's Diagnoses     Long term current use of anticoagulant therapy          Care Instructions    The dose will change to :  Skip today then back to 4mg MWF, Otherwise 3mg.    Recheck next INR in 2 weeks          Follow-ups after your visit        Who to contact     If you have questions or need follow up information about today's clinic visit or your schedule please contact [facility] directly at [phone].  Normal or non-critical lab and imaging results will be communicated to you by MyChart, letter or phone within 4 business days after the clinic has received the results. If you do not hear from us within 7 days, please contact the clinic through Rollerhart or phone. If you have a critical or abnormal lab result, we will notify you by phone as soon as possible.  Submit refill requests through Multiplicom or call your pharmacy and they will forward the refill request to us. Please allow 3 business days for your refill to be completed.          Additional Information About Your Visit        MyChart Information     Multiplicom gives you secure access to your electronic health record. If you see a primary care provider, you can also send messages to your care team and make appointments. If you have questions, please call your primary care clinic.  If you do not have a primary care provider, please call [phone] and they will assist you.        Care EveryWhere ID     This is your Care EveryWhere ID. This could be used by other organizations to access your [place] medical records  [id number]         Blood Pressure from Last 3 Encounters:   03/10/17 104/60   02/28/17 118/70   02/24/17 118/66    Weight from Last 3 Encounters:    03/10/17 82.1 kg (181 lb)   02/28/17 83.6 kg (184 lb 3.2 oz)   02/24/17 82.1 kg (181 lb)              We Performed the Following     Prothrombin - INR ([facility])        Primary Care Provider Office Phone # Fax #    Jermain Helm [phone]       [facility] [address]  [facility] [address]        Thank you!     Thank you for choosing [facility]  for your care. Our goal is always to provide you with excellent care. Hearing back from our patients is one way we can continue to improve our services. Please take a few minutes to complete the written survey that you may receive in the mail after your visit with us. Thank you!             Your Updated Medication List - Protect others around you: Learn how to safely use, store and throw away your medicines at www.disposemymeds.org.          This list is accurate as of: 5/9/17 11:19 AM.  Always use your most recent med list.                   Brand Name Dispense Instructions for use    ACE/ARB NOT PRESCRIBED (INTENTIONAL)      ACE & ARB not prescribed due to Symptomatic hypotension not due to excessive diuresis       aspirin 81 MG chewable tablet     36 tablet    Take 1 tablet (81 mg) by mouth daily       atorvastatin 20 MG tablet    LIPITOR    90 tablet    TAKE 1 TABLET EVERY DAY       benzocaine-menthol 6-10 MG lozenge    CHLORASEPTIC    84 lozenge    Place 1 lozenge inside cheek every hour as needed for sore throat       bicalutamide 50 MG tablet    CASODEX    90 tablet    Take 1 tablet (50 mg) by mouth daily       Blood Glucose Monitoring Suppl W/DEVICE Kit     1 kit    Test blood glucose every other day       COUMADIN 2 MG tablet   Generic drug:  warfarin     135 tablet    take 3mg daily       D3-1000 PO      Take 1 capsule by mouth daily       glimepiride 1 MG tablet    AMARYL    90 tablet    TAKE 1 TABLET (1 MG) BY MOUTH EVERY MORNING (BEFORE BREAKFAST)       metFORMIN 500 MG tablet    GLUCOPHAGE    360 tablet     Take 2 tablets (1,000 mg) by mouth 2 times daily (with meals)       multivitamin, therapeutic Tabs tablet      Take 1 tablet by mouth daily       sertraline 50 MG tablet    ZOLOFT    90 tablet    TAKE 1 TABLET EVERY DAY       TOPROL XL 25 MG 24 hr tablet   Generic drug:  metoprolol      Take 25 mg by mouth daily Hold if SBP<110 or HR<55 and call if held 2 times in a row.

## 2017-05-09 NOTE — PROGRESS NOTES
Jose Lees is a 76 year old male here for an INR check.  Feeling pretty well, no signs of bleeding.    There were no vitals taken for this visit.       Todays and previous results are:    Lab Results   Component Value Date    INR 3.8 05/09/2017    INR 2.5 04/20/2017    INR 1.7 04/06/2017    INR 1.7 03/23/2017    INR 2.0 03/09/2017       The dose will change to :  Skip today then back to 4mg MWF, Otherwise 3mg.    Recheck next INR in 2 weeks    Jermain Helm

## 2017-05-12 ENCOUNTER — DOCUMENTATION ONLY (OUTPATIENT)
Dept: OTHER | Facility: CLINIC | Age: 77
End: 2017-05-12

## 2017-05-12 DIAGNOSIS — Z71.89 ADVANCED DIRECTIVES, COUNSELING/DISCUSSION: Chronic | ICD-10-CM

## 2017-05-12 DIAGNOSIS — Z71.89 ACP (ADVANCE CARE PLANNING): ICD-10-CM

## 2017-05-22 DIAGNOSIS — Z76.0 ENCOUNTER FOR MEDICATION REFILL: ICD-10-CM

## 2017-05-22 NOTE — TELEPHONE ENCOUNTER
metformin last OV 3/10/17 last labs 2/16/17  Anyi Ansari MA May 22, 2017 9:37 AM    Lab Results   Component Value Date    A1C 7.6 01/25/2017    A1C 7.1 07/27/2016    A1C 8.6 03/17/2016    A1C 8.6 02/03/2016    A1C 7.9 08/06/2015

## 2017-05-25 DIAGNOSIS — Z79.01 LONG TERM CURRENT USE OF ANTICOAGULANT THERAPY: ICD-10-CM

## 2017-05-25 LAB — INR PPP: 2.1 (ref 2–3)

## 2017-05-25 PROCEDURE — 99212 OFFICE O/P EST SF 10 MIN: CPT | Performed by: FAMILY MEDICINE

## 2017-05-25 PROCEDURE — 85610 PROTHROMBIN TIME: CPT | Performed by: FAMILY MEDICINE

## 2017-05-25 PROCEDURE — 36415 COLL VENOUS BLD VENIPUNCTURE: CPT | Performed by: FAMILY MEDICINE

## 2017-05-25 NOTE — PROGRESS NOTES
Problem(s) Oriented visit        SUBJECTIVE:                                                    Jose Lees is a 76 year old male who presents to clinic today for recheck of INR. He was too high last time, but he attributes this to taking it incorrectly at 4 mg daily. He is now understanding the proper dosing.       Problem list, Medication list, Allergies, and Medical/Social/Surgical histories reviewed in EPIC and updated as appropriate.   Additional history: as documented    ROS:  5 point ROS completed and negative except noted above, including Gen, CV, Resp, GI, MS      Histories:   Patient Active Problem List   Diagnosis     Diabetes mellitus, type 2 (H)     Prostate cancer (H)     Autism disorder     ACP (advance care planning)     Health Care Home     History of MI (myocardial infarction)     History of stroke     CHF (congestive heart failure) (H)     Long term current use of anticoagulant therapy     Stricture and stenosis of esophagus     History of TIA (transient ischemic attack) and stroke     Influenza A     Physical deconditioning     Past Surgical History:   Procedure Laterality Date     CORONARY ANGIOGRAPHY ADULT ORDER       ESOPHAGOSCOPY, GASTROSCOPY, DUODENOSCOPY (EGD), COMBINED N/A 8/6/2015    Procedure: COMBINED ESOPHAGOSCOPY, GASTROSCOPY, DUODENOSCOPY (EGD), REMOVE FOREIGN BODY;  Surgeon: Yu Tapia MD;  Location:  GI     HEART CATH, ANGIOPLASTY  4/14    BMS to LAD       Social History   Substance Use Topics     Smoking status: Never Smoker     Smokeless tobacco: Never Used     Alcohol use No      Comment: never     Family History   Problem Relation Age of Onset     CEREBROVASCULAR DISEASE Mother 92     C.A.D. Father 48           OBJECTIVE:                                                    There were no vitals taken for this visit.  There is no height or weight on file to calculate BMI.   GENERAL APPEARANCE: Alert, no acute distress  MS: extremities normal, no peripheral  edema  NEURO: Alert, oriented, speech and mentation normal  PSYCH: mentation appears normal, affect and mood normal   Labs Resulted Today:   Results for orders placed or performed in visit on 05/25/17   Prothrombin - INR (RMG)   Result Value Ref Range    INR 2.1 2.0 - 3.0     ASSESSMENT/PLAN:                                                        Jose was seen today for inr followup.    Diagnoses and all orders for this visit:    Long term current use of anticoagulant therapy  -     Prothrombin - INR ([facility])        Patient Instructions   Continue same dose of coumadin. (4 mg MWF, 3 mg TuThSaSu)  Recheck in 4 weeks.      The following health maintenance items are reviewed in Epic and correct as of today:  Health Maintenance   Topic Date Due     COLON CANCER SCREEN (SYSTEM ASSIGNED)  11/13/1991     AORTIC ANEURYSM SCREENING (SYSTEM ASSIGNED)  11/13/2006     EYE EXAM Q1 YEAR  04/01/2013     FALL RISK ASSESSMENT  01/07/2015     PSA Q1 YR  12/08/2016     LIPID MONITORING Q1 YEAR  01/04/2017     FOOT EXAM Q1 YEAR  02/18/2017     MICROALBUMIN Q1 YEAR  02/18/2017     A1C Q6 MO  07/25/2017     BMP Q6 MOS  08/16/2017     INFLUENZA VACCINE (SYSTEM ASSIGNED)  09/01/2017     ALT Q1 YR  02/07/2018     CBC Q1 YR  02/09/2018     CREATININE Q1 YEAR  02/16/2018     TSH W/ FREE T4 REFLEX Q2 YEAR  02/16/2019     HF ACTION PLAN Q3 YR  04/06/2019     TETANUS IMMUNIZATION (SYSTEM ASSIGNED)  09/16/2021     ADVANCE DIRECTIVE PLANNING Q5 YRS  05/12/2022     PNEUMOCOCCAL  Completed       Arlene Mcdermott MD  [facility]  [phone]    For any issues my office # is [phone]

## 2017-05-25 NOTE — MR AVS SNAPSHOT
After Visit Summary   5/25/2017    Jose Lees    MRN: [id number]           Patient Information     Date Of Birth          1940        Visit Information        Provider Department      5/25/2017 10:15 AM Arlene Mcdermott MD [facility]        Today's Diagnoses     Long term current use of anticoagulant therapy          Care Instructions    Continue same dose of coumadin. (4 mg MWF, 3 mg TuThSaSu)  Recheck in 4 weeks.          Follow-ups after your visit        Who to contact     If you have questions or need follow up information about today's clinic visit or your schedule please contact [facility] directly at [phone].  Normal or non-critical lab and imaging results will be communicated to you by Moovwebhart, letter or phone within 4 business days after the clinic has received the results. If you do not hear from us within 7 days, please contact the clinic through [employer] or phone. If you have a critical or abnormal lab result, we will notify you by phone as soon as possible.  Submit refill requests through CritiTech or call your pharmacy and they will forward the refill request to us. Please allow 3 business days for your refill to be completed.          Additional Information About Your Visit        MyChart Information     CritiTech gives you secure access to your electronic health record. If you see a primary care provider, you can also send messages to your care team and make appointments. If you have questions, please call your primary care clinic.  If you do not have a primary care provider, please call [phone] and they will assist you.        Care EveryWhere ID     This is your Care EveryWhere ID. This could be used by other organizations to access your [place] medical records  [id number]         Blood Pressure from Last 3 Encounters:   03/10/17 104/60   02/28/17 118/70   02/24/17 118/66    Weight from Last 3 Encounters:   03/10/17 82.1 kg (181  lb)   02/28/17 83.6 kg (184 lb 3.2 oz)   02/24/17 82.1 kg (181 lb)              We Performed the Following     Prothrombin - INR (RMG)        Primary Care Provider Office Phone # Fax #    Jermain Helm [phone]       [facility] [address]  [facility] [address]        Thank you!     Thank you for choosing [facility]  for your care. Our goal is always to provide you with excellent care. Hearing back from our patients is one way we can continue to improve our services. Please take a few minutes to complete the written survey that you may receive in the mail after your visit with us. Thank you!             Your Updated Medication List - Protect others around you: Learn how to safely use, store and throw away your medicines at www.disposemymeds.org.          This list is accurate as of: 5/25/17 10:57 AM.  Always use your most recent med list.                   Brand Name Dispense Instructions for use    ACE/ARB NOT PRESCRIBED (INTENTIONAL)      ACE & ARB not prescribed due to Symptomatic hypotension not due to excessive diuresis       aspirin 81 MG chewable tablet     36 tablet    Take 1 tablet (81 mg) by mouth daily       atorvastatin 20 MG tablet    LIPITOR    90 tablet    TAKE 1 TABLET EVERY DAY       benzocaine-menthol 6-10 MG lozenge    CHLORASEPTIC    84 lozenge    Place 1 lozenge inside cheek every hour as needed for sore throat       bicalutamide 50 MG tablet    CASODEX    90 tablet    Take 1 tablet (50 mg) by mouth daily       Blood Glucose Monitoring Suppl W/DEVICE Kit     1 kit    Test blood glucose every other day       COUMADIN 2 MG tablet   Generic drug:  warfarin     135 tablet    take 3mg daily       D3-1000 PO      Take 1 capsule by mouth daily       glimepiride 1 MG tablet    AMARYL    90 tablet    TAKE 1 TABLET (1 MG) BY MOUTH EVERY MORNING (BEFORE BREAKFAST)       metFORMIN 500 MG tablet    GLUCOPHAGE    360 tablet    TAKE 2 TABLETS TWICE  DAILY WITH MEALS       multivitamin, therapeutic Tabs tablet      Take 1 tablet by mouth daily       sertraline 50 MG tablet    ZOLOFT    90 tablet    TAKE 1 TABLET EVERY DAY       TOPROL XL 25 MG 24 hr tablet   Generic drug:  metoprolol      Take 25 mg by mouth daily Hold if SBP<110 or HR<55 and call if held 2 times in a row.

## 2017-06-29 VITALS — BODY MASS INDEX: 27.66 KG/M2 | WEIGHT: 190 LBS | SYSTOLIC BLOOD PRESSURE: 140 MMHG | DIASTOLIC BLOOD PRESSURE: 80 MMHG

## 2017-06-29 DIAGNOSIS — Z79.01 LONG TERM CURRENT USE OF ANTICOAGULANT THERAPY: ICD-10-CM

## 2017-06-29 DIAGNOSIS — Z86.73 HISTORY OF TIA (TRANSIENT ISCHEMIC ATTACK) AND STROKE: Primary | ICD-10-CM

## 2017-06-29 LAB — INR PPP: 3.5

## 2017-06-29 PROCEDURE — 99212 OFFICE O/P EST SF 10 MIN: CPT | Performed by: FAMILY MEDICINE

## 2017-06-29 PROCEDURE — 85610 PROTHROMBIN TIME: CPT | Performed by: FAMILY MEDICINE

## 2017-06-29 PROCEDURE — 36415 COLL VENOUS BLD VENIPUNCTURE: CPT | Performed by: FAMILY MEDICINE

## 2017-06-29 NOTE — PATIENT INSTRUCTIONS
INR today 3.5    Goal 2-3  Afib and TIA  Normal 4 mg MWF and 3mg all others  Take 3 mg today and Friday,  then return to regular schedule  Recheck next Thursday

## 2017-06-29 NOTE — MR AVS SNAPSHOT
After Visit Summary   6/29/2017    Jose Lees    MRN: [id number]           Patient Information     Date Of Birth          1940        Visit Information        Provider Department      6/29/2017 11:00 AM Mariela Stauffer MD [facility]        Today's Diagnoses     History of TIA (transient ischemic attack) and stroke    -  1    Long term current use of anticoagulant therapy          Care Instructions    INR today 3.5    Goal 2-3  Afib and TIA  Normal 4 mg MWF and 3mg all others  Take 3 mg today and Friday,  then return to regular schedule  Recheck next Thursday          Follow-ups after your visit        Who to contact     If you have questions or need follow up information about today's clinic visit or your schedule please contact [facility] directly at [phone].  Normal or non-critical lab and imaging results will be communicated to you by Samatoahart, letter or phone within 4 business days after the clinic has received the results. If you do not hear from us within 7 days, please contact the clinic through Samatoahart or phone. If you have a critical or abnormal lab result, we will notify you by phone as soon as possible.  Submit refill requests through Xquva or call your pharmacy and they will forward the refill request to us. Please allow 3 business days for your refill to be completed.          Additional Information About Your Visit        MyChart Information     Xquva gives you secure access to your electronic health record. If you see a primary care provider, you can also send messages to your care team and make appointments. If you have questions, please call your primary care clinic.  If you do not have a primary care provider, please call [phone] and they will assist you.        Care EveryWhere ID     This is your Care EveryWhere ID. This could be used by other organizations to access your [place] medical records  [id number]        Your  Vitals Were     BMI (Body Mass Index)                   27.66 kg/m2            Blood Pressure from Last 3 Encounters:   06/29/17 140/80   03/10/17 104/60   02/28/17 118/70    Weight from Last 3 Encounters:   06/29/17 86.2 kg (190 lb)   03/10/17 82.1 kg (181 lb)   02/28/17 83.6 kg (184 lb 3.2 oz)              We Performed the Following     Prothrombin - INR (RMG)        Primary Care Provider Office Phone # Fax #    Jermain Helm [phone]       [facility] [address]  [facility] [address]        Equal Access to Services     KARO BUENROSTRO : Hadii aad ku hadasho Soradha, waaxda luqadaha, qaybta kaalmada adeegyada, jennifer humphrey . So [facility] [phone].    ATENCIÓN: Si habla español, tiene a singh disposición servicios gratuitos de asistencia lingüística. Llame al [phone].    We comply with applicable federal civil rights laws and [place] laws. We do not discriminate on the basis of race, color, national origin, age, disability sex, sexual orientation or gender identity.            Thank you!     Thank you for choosing [facility]  for your care. Our goal is always to provide you with excellent care. Hearing back from our patients is one way we can continue to improve our services. Please take a few minutes to complete the written survey that you may receive in the mail after your visit with us. Thank you!             Your Updated Medication List - Protect others around you: Learn how to safely use, store and throw away your medicines at www.disposemymeds.org.          This list is accurate as of: 6/29/17 11:46 AM.  Always use your most recent med list.                   Brand Name Dispense Instructions for use Diagnosis    ACE/ARB NOT PRESCRIBED (INTENTIONAL)      ACE & ARB not prescribed due to Symptomatic hypotension not due to excessive diuresis    Chronic systolic congestive heart failure (H)       aspirin 81 MG chewable tablet      36 tablet    Take 1 tablet (81 mg) by mouth daily    History of MI (myocardial infarction)       atorvastatin 20 MG tablet    LIPITOR    90 tablet    TAKE 1 TABLET EVERY DAY    Encounter for medication refill       benzocaine-menthol 6-10 MG lozenge    CHLORASEPTIC    84 lozenge    Place 1 lozenge inside cheek every hour as needed for sore throat    Throat pain       bicalutamide 50 MG tablet    CASODEX    90 tablet    Take 1 tablet (50 mg) by mouth daily    Encounter for medication refill       Blood Glucose Monitoring Suppl W/DEVICE Kit     1 kit    Test blood glucose every other day    Type II or unspecified type diabetes mellitus without mention of complication, not stated as uncontrolled, Rhabdomyolysis, UTI (urinary tract infection)       COUMADIN 2 MG tablet   Generic drug:  warfarin     135 tablet    take 3mg daily    Encounter for medication refill       D3-1000 PO      Take 1 capsule by mouth daily        glimepiride 1 MG tablet    AMARYL    90 tablet    TAKE 1 TABLET (1 MG) BY MOUTH EVERY MORNING (BEFORE BREAKFAST)    Encounter for medication refill       metFORMIN 500 MG tablet    GLUCOPHAGE    360 tablet    TAKE 2 TABLETS TWICE DAILY WITH MEALS    Encounter for medication refill       multivitamin, therapeutic Tabs tablet      Take 1 tablet by mouth daily        sertraline 50 MG tablet    ZOLOFT    90 tablet    TAKE 1 TABLET EVERY DAY    Encounter for medication refill       TOPROL XL 25 MG 24 hr tablet   Generic drug:  metoprolol      Take 25 mg by mouth daily Hold if SBP<110 or HR<55 and call if held 2 times in a row.

## 2017-06-29 NOTE — PROGRESS NOTES
May have taken extra dose    White male   No bleeding or bruising    INR today 3.5    Goal 2-3  Afib and TIA  Normal routine: 4 mg MWF and 3mg all others  Plan:Take 3 mg today and Friday,  then return to regular schedule  Recheck next Thursday      Mariela Stauffer MD, [facility]

## 2017-07-07 VITALS — BODY MASS INDEX: 26.93 KG/M2 | DIASTOLIC BLOOD PRESSURE: 78 MMHG | WEIGHT: 185 LBS | SYSTOLIC BLOOD PRESSURE: 120 MMHG

## 2017-07-07 DIAGNOSIS — Z79.01 LONG TERM CURRENT USE OF ANTICOAGULANT THERAPY: ICD-10-CM

## 2017-07-07 LAB — INR PPP: 2.9 (ref 2–3)

## 2017-07-07 PROCEDURE — 36415 COLL VENOUS BLD VENIPUNCTURE: CPT | Performed by: FAMILY MEDICINE

## 2017-07-07 PROCEDURE — 85610 PROTHROMBIN TIME: CPT | Performed by: FAMILY MEDICINE

## 2017-07-07 PROCEDURE — 99213 OFFICE O/P EST LOW 20 MIN: CPT | Performed by: FAMILY MEDICINE

## 2017-07-07 NOTE — PROGRESS NOTES
[facility] Latest Ref Rng & Units 7/7/2017   INR 2.0 - 3.0 2.9   ASSESS  THER   INR GOAL  2.0-3.0   WEEKLY DOSE  4mg MWF, 3mg otherwise   PT INSTRUCT  continue same   RECHECK  2 WEEKS     TIA   LOCATION  Clinic   Comments     No bleeding or bruising  Otherwise well

## 2017-07-07 NOTE — PATIENT INSTRUCTIONS
[facility] Latest Ref Rng & Units 7/7/2017   INR 2.0 - 3.0 2.9   ASSESS  THER   INR GOAL  2.0-3.0   WEEKLY DOSE  4mg MWF, 3mg otherwise   PT INSTRUCT  continue same   RECHECK  2 WEEKS     TIA   LOCATION  Clinic   Comments

## 2017-07-07 NOTE — MR AVS SNAPSHOT
After Visit Summary   7/7/2017    Jose Lees    MRN: [id number]           Patient Information     Date Of Birth          1940        Visit Information        Provider Department      7/7/2017 10:00 AM Mariela Stauffer MD [facility]        Today's Diagnoses     Long term current use of anticoagulant therapy          Care Instructions    [facility] Antico[facility] Latest Ref Rng & Units 7/7/2017   INR 2.0 - 3.0 2.9   ASSESS  THER   INR GOAL  2.0-3.0   WEEKLY DOSE  4mg MWF, 3mg otherwise   PT INSTRUCT  continue same   RECHECK  2 WEEKS     TIA   LOCATION  Clinic   Comments               Follow-ups after your visit        Who to contact     If you have questions or need follow up information about today's clinic visit or your schedule please contact [facility] directly at [phone].  Normal or non-critical lab and imaging results will be communicated to you by GeriJoyhart, letter or phone within 4 business days after the clinic has received the results. If you do not hear from us within 7 days, please contact the clinic through GeriJoyhart or phone. If you have a critical or abnormal lab result, we will notify you by phone as soon as possible.  Submit refill requests through Ad.IQ or call your pharmacy and they will forward the refill request to us. Please allow 3 business days for your refill to be completed.          Additional Information About Your Visit        MyChart Information     Ad.IQ gives you secure access to your electronic health record. If you see a primary care provider, you can also send messages to your care team and make appointments. If you have questions, please call your primary care clinic.  If you do not have a primary care provider, please call [phone] and they will assist you.        Care EveryWhere ID     This is your Care EveryWhere ID. This could be used by other organizations to access your [facility] medical records  [id number]         Your Vitals Were     BMI (Body Mass Index)                   26.93 kg/m2            Blood Pressure from Last 3 Encounters:   07/07/17 120/78   06/29/17 140/80   03/10/17 104/60    Weight from Last 3 Encounters:   07/07/17 83.9 kg (185 lb)   06/29/17 86.2 kg (190 lb)   03/10/17 82.1 kg (181 lb)              We Performed the Following     Prothrombin - INR ([facility])        Primary Care Provider Office Phone # Fax #    Jermain Helm [phone]       [facility] [address]  [facility] [address]        Equal Access to Services     BECKA BUENROSTRO : Hadii farhan ngoo Soradha, waaxda luqadaha, qaybta kaalmada adecandiceyada, jennifer humphrey . So [facility] [phone].    ATENCIÓN: Si habla español, tiene a singh disposición servicios gratuitos de asistencia lingüística. Llame al [phone].    We comply with applicable federal civil rights laws and [place] laws. We do not discriminate on the basis of race, color, national origin, age, disability sex, sexual orientation or gender identity.            Thank you!     Thank you for choosing [facility]  for your care. Our goal is always to provide you with excellent care. Hearing back from our patients is one way we can continue to improve our services. Please take a few minutes to complete the written survey that you may receive in the mail after your visit with us. Thank you!             Your Updated Medication List - Protect others around you: Learn how to safely use, store and throw away your medicines at www.disposemymeds.org.          This list is accurate as of: 7/7/17 10:22 AM.  Always use your most recent med list.                   Brand Name Dispense Instructions for use Diagnosis    ACE/ARB NOT PRESCRIBED (INTENTIONAL)      ACE & ARB not prescribed due to Symptomatic hypotension not due to excessive diuresis    Chronic systolic congestive heart failure (H)       aspirin 81 MG chewable tablet      36 tablet    Take 1 tablet (81 mg) by mouth daily    History of MI (myocardial infarction)       atorvastatin 20 MG tablet    LIPITOR    90 tablet    TAKE 1 TABLET EVERY DAY    Encounter for medication refill       benzocaine-menthol 6-10 MG lozenge    CHLORASEPTIC    84 lozenge    Place 1 lozenge inside cheek every hour as needed for sore throat    Throat pain       bicalutamide 50 MG tablet    CASODEX    90 tablet    Take 1 tablet (50 mg) by mouth daily    Encounter for medication refill       Blood Glucose Monitoring Suppl W/DEVICE Kit     1 kit    Test blood glucose every other day    Type II or unspecified type diabetes mellitus without mention of complication, not stated as uncontrolled, Rhabdomyolysis, UTI (urinary tract infection)       COUMADIN 2 MG tablet   Generic drug:  warfarin     135 tablet    take 3mg daily    Encounter for medication refill       D3-1000 PO      Take 1 capsule by mouth daily        glimepiride 1 MG tablet    AMARYL    90 tablet    TAKE 1 TABLET (1 MG) BY MOUTH EVERY MORNING (BEFORE BREAKFAST)    Encounter for medication refill       metFORMIN 500 MG tablet    GLUCOPHAGE    360 tablet    TAKE 2 TABLETS TWICE DAILY WITH MEALS    Encounter for medication refill       multivitamin, therapeutic Tabs tablet      Take 1 tablet by mouth daily        sertraline 50 MG tablet    ZOLOFT    90 tablet    TAKE 1 TABLET EVERY DAY    Encounter for medication refill       TOPROL XL 25 MG 24 hr tablet   Generic drug:  metoprolol      Take 25 mg by mouth daily Hold if SBP<110 or HR<55 and call if held 2 times in a row.

## 2017-07-11 ENCOUNTER — TRANSFERRED RECORDS (OUTPATIENT)
Dept: FAMILY MEDICINE | Facility: CLINIC | Age: 77
End: 2017-07-11

## 2017-07-25 VITALS — BODY MASS INDEX: 26.93 KG/M2 | WEIGHT: 185 LBS | DIASTOLIC BLOOD PRESSURE: 66 MMHG | SYSTOLIC BLOOD PRESSURE: 108 MMHG

## 2017-07-25 DIAGNOSIS — E11.9 TYPE 2 DIABETES MELLITUS WITHOUT COMPLICATION, WITHOUT LONG-TERM CURRENT USE OF INSULIN (H): Primary | ICD-10-CM

## 2017-07-25 DIAGNOSIS — Z79.01 LONG TERM CURRENT USE OF ANTICOAGULANT THERAPY: ICD-10-CM

## 2017-07-25 LAB — INR PPP: 1.5 (ref 2–3)

## 2017-07-25 PROCEDURE — 85610 PROTHROMBIN TIME: CPT | Performed by: FAMILY MEDICINE

## 2017-07-25 PROCEDURE — 99213 OFFICE O/P EST LOW 20 MIN: CPT | Performed by: FAMILY MEDICINE

## 2017-07-25 PROCEDURE — 36415 COLL VENOUS BLD VENIPUNCTURE: CPT | Performed by: FAMILY MEDICINE

## 2017-07-25 NOTE — PROGRESS NOTES
Here for INR. No complaints of bleeding. See flowsheet.  He also is due for lipids and A1c. It is hard for him to come fasting, so will draw today nonfasting, and review next visit. He has no complaints.  (E11.9) Type 2 diabetes mellitus without complication, without long-term current use of insulin (H)  (primary encounter diagnosis)  Comment:   Plan: Hemoglobin A1C ([facility]), Lipid Panel ([facility])            (Z79.01) Long term current use of anticoagulant therapy  Comment:   Plan: Prothrombin - INR ([facility])

## 2017-07-26 LAB
CHOLEST SERPL-MCNC: 161 MG/DL (ref 100–199)
HBA1C MFR BLD: 8.2 % (ref 4.8–5.6)
HDLC SERPL-MCNC: 52 MG/DL
INR PPP: 1.4 (ref 0.8–1.2)
LDL/HDL RATIO: 1.6 RATIO UNITS (ref 0–3.6)
LDLC SERPL CALC-MCNC: 81 MG/DL (ref 0–99)
PT BLD: 14.7 SEC (ref 9.1–12)
TRIGL SERPL-MCNC: 141 MG/DL (ref 0–149)
VLDLC SERPL CALC-MCNC: 28 MG/DL (ref 5–40)

## 2017-08-03 DIAGNOSIS — Z76.0 ENCOUNTER FOR MEDICATION REFILL: ICD-10-CM

## 2017-08-03 RX ORDER — WARFARIN SODIUM 2 MG/1
TABLET ORAL
Qty: 135 TABLET | Refills: 1 | Status: SHIPPED | OUTPATIENT
Start: 2017-08-03 | End: 2018-10-12

## 2017-08-11 DIAGNOSIS — Z79.01 LONG TERM CURRENT USE OF ANTICOAGULANT THERAPY: ICD-10-CM

## 2017-08-11 LAB — INR PPP: 2.3

## 2017-08-11 PROCEDURE — 36415 COLL VENOUS BLD VENIPUNCTURE: CPT | Performed by: FAMILY MEDICINE

## 2017-08-11 PROCEDURE — 85610 PROTHROMBIN TIME: CPT | Performed by: FAMILY MEDICINE

## 2017-08-11 PROCEDURE — 99212 OFFICE O/P EST SF 10 MIN: CPT | Performed by: FAMILY MEDICINE

## 2017-08-11 NOTE — MR AVS SNAPSHOT
After Visit Summary   8/11/2017    Jose Lees    MRN: [id number]           Patient Information     Date Of Birth          1940        Visit Information        Provider Department      8/11/2017 10:30 AM Mariela Stauffer MD [facility]        Today's Diagnoses     Long term current use of anticoagulant therapy          Care Instructions    F/u one month    4 mg MWF and 3 mg all the other          Follow-ups after your visit        Who to contact     If you have questions or need follow up information about today's clinic visit or your schedule please contact [facility] directly at [phone].  Normal or non-critical lab and imaging results will be communicated to you by cielo24hart, letter or phone within 4 business days after the clinic has received the results. If you do not hear from us within 7 days, please contact the clinic through CaroGent or phone. If you have a critical or abnormal lab result, we will notify you by phone as soon as possible.  Submit refill requests through [employer] or call your pharmacy and they will forward the refill request to us. Please allow 3 business days for your refill to be completed.          Additional Information About Your Visit        MyChart Information     [employer] gives you secure access to your electronic health record. If you see a primary care provider, you can also send messages to your care team and make appointments. If you have questions, please call your primary care clinic.  If you do not have a primary care provider, please call [phone] and they will assist you.        Care EveryWhere ID     This is your Care EveryWhere ID. This could be used by other organizations to access your [place] medical records  [id number]         Blood Pressure from Last 3 Encounters:   07/25/17 108/66   07/07/17 120/78   06/29/17 140/80    Weight from Last 3 Encounters:   07/25/17 83.9 kg (185 lb)   07/07/17 83.9 kg (185 lb)    06/29/17 86.2 kg (190 lb)              We Performed the Following     Prothrombin - INR ([facility])        Primary Care Provider Office Phone # Fax #    Jermain Helm [phone] [address]  [facility] [address]        Equal Access to Services     [facility]: Hadii aad ku hadasho Soomaali, waaxda luqadaha, qaybta kaalmada adeegyada, waxay idiin hayaan adecandice cheema laIglesiaaan . So [facility] [phone].    ATENCIÓN: Si habla español, tiene a singh disposición servicios gratuitos de asistencia lingüística. Yehuda al [phone].    We comply with applicable federal civil rights laws and [place] laws. We do not discriminate on the basis of race, color, national origin, age, disability sex, sexual orientation or gender identity.            Thank you!     Thank you for choosing [facility]  for your care. Our goal is always to provide you with excellent care. Hearing back from our patients is one way we can continue to improve our services. Please take a few minutes to complete the written survey that you may receive in the mail after your visit with us. Thank you!             Your Updated Medication List - Protect others around you: Learn how to safely use, store and throw away your medicines at www.disposemymeds.org.          This list is accurate as of: 8/11/17 11:20 AM.  Always use your most recent med list.                   Brand Name Dispense Instructions for use Diagnosis    ACE/ARB NOT PRESCRIBED (INTENTIONAL)      ACE & ARB not prescribed due to Symptomatic hypotension not due to excessive diuresis    Chronic systolic congestive heart failure (H)       aspirin 81 MG chewable tablet     36 tablet    Take 1 tablet (81 mg) by mouth daily    History of MI (myocardial infarction)       atorvastatin 20 MG tablet    LIPITOR    90 tablet    TAKE 1 TABLET EVERY DAY    Encounter for medication refill       benzocaine-menthol 6-10 MG lozenge    CHLORASEPTIC    84 lozenge    Place  1 lozenge inside cheek every hour as needed for sore throat    Throat pain       bicalutamide 50 MG tablet    CASODEX    90 tablet    Take 1 tablet (50 mg) by mouth daily    Encounter for medication refill       Blood Glucose Monitoring Suppl W/DEVICE Kit     1 kit    Test blood glucose every other day    Type II or unspecified type diabetes mellitus without mention of complication, not stated as uncontrolled, Rhabdomyolysis, UTI (urinary tract infection)       D3-1000 PO      Take 1 capsule by mouth daily        glimepiride 1 MG tablet    AMARYL    90 tablet    TAKE 1 TABLET (1 MG) BY MOUTH EVERY MORNING (BEFORE BREAKFAST)    Encounter for medication refill       metFORMIN 500 MG tablet    GLUCOPHAGE    360 tablet    TAKE 2 TABLETS TWICE DAILY WITH MEALS    Encounter for medication refill       multivitamin, therapeutic Tabs tablet      Take 1 tablet by mouth daily        sertraline 50 MG tablet    ZOLOFT    90 tablet    TAKE 1 TABLET EVERY DAY    Encounter for medication refill       TOPROL XL 25 MG 24 hr tablet   Generic drug:  metoprolol      Take 25 mg by mouth daily Hold if SBP<110 or HR<55 and call if held 2 times in a row.        warfarin 2 MG tablet    COUMADIN    135 tablet    TAKE 1 AND 1/2 TABLETS EVERY DAY    Encounter for medication refill

## 2017-08-11 NOTE — PROGRESS NOTES
No bleeding  INR at goal  Continue same  F/u one month    4 mg MWF and 3 mg all the other    [facility] Anticoaglution INR ASSESS INR GOAL WEEKLY DOSE   8/11/2017 2.3 THER 2.0-3.0 4mg MWF, 3mg otherwise     [facility] PT INSTRUCT RECHECK  LOCATION   8/11/2017 continue same 4 WEEKS [facility]

## 2017-09-09 DIAGNOSIS — Z76.0 ENCOUNTER FOR MEDICATION REFILL: Primary | ICD-10-CM

## 2017-09-11 RX ORDER — METOPROLOL SUCCINATE 25 MG/1
TABLET, EXTENDED RELEASE ORAL
Qty: 90 TABLET | Refills: 3 | Status: SHIPPED | OUTPATIENT
Start: 2017-09-11 | End: 2019-01-25

## 2017-09-12 VITALS — SYSTOLIC BLOOD PRESSURE: 104 MMHG | BODY MASS INDEX: 27.19 KG/M2 | WEIGHT: 186.8 LBS | DIASTOLIC BLOOD PRESSURE: 56 MMHG

## 2017-09-12 DIAGNOSIS — Z79.01 LONG TERM CURRENT USE OF ANTICOAGULANT THERAPY: ICD-10-CM

## 2017-09-12 LAB — INR PPP: 5.8 (ref 2–3)

## 2017-09-12 PROCEDURE — 85610 PROTHROMBIN TIME: CPT | Performed by: FAMILY MEDICINE

## 2017-09-12 PROCEDURE — 36415 COLL VENOUS BLD VENIPUNCTURE: CPT | Performed by: FAMILY MEDICINE

## 2017-09-12 PROCEDURE — 99212 OFFICE O/P EST SF 10 MIN: CPT | Performed by: FAMILY MEDICINE

## 2017-09-12 NOTE — PROGRESS NOTES
"No bleeding or bruising (old bruising on arms)  TIA/Afib  Goal 2-3  Today value was 5.8 SUPRA  \"Maybe took a 4 mg three times when it was supposed to be a three mg.\" He has been taking this medication on his own and clearly getting confused. His wife was here today and she will start monitoring this for him from now on.  Plan:Nothing today  Recheck tomorrow    Vit K held off for now  "

## 2017-09-12 NOTE — MR AVS SNAPSHOT
After Visit Summary   9/12/2017    Jose Lees    MRN: [id number]           Patient Information     Date Of Birth          1940        Visit Information        Provider Department      9/12/2017 1:15 PM Mariela Stauffer MD [facility]        Today's Diagnoses     Long term current use of anticoagulant therapy          Care Instructions    NO COUMADIN TODAY  INR recheck tomorrow          Follow-ups after your visit        Who to contact     If you have questions or need follow up information about today's clinic visit or your schedule please contact [facility] directly at [phone].  Normal or non-critical lab and imaging results will be communicated to you by Hortonworkshart, letter or phone within 4 business days after the clinic has received the results. If you do not hear from us within 7 days, please contact the clinic through Wellfountt or phone. If you have a critical or abnormal lab result, we will notify you by phone as soon as possible.  Submit refill requests through Imsys or call your pharmacy and they will forward the refill request to us. Please allow 3 business days for your refill to be completed.          Additional Information About Your Visit        MyChart Information     Imsys gives you secure access to your electronic health record. If you see a primary care provider, you can also send messages to your care team and make appointments. If you have questions, please call your primary care clinic.  If you do not have a primary care provider, please call [phone] and they will assist you.        Care EveryWhere ID     This is your Care EveryWhere ID. This could be used by other organizations to access your [place] medical records  [id number]        Your Vitals Were     BMI (Body Mass Index)                   27.19 kg/m2            Blood Pressure from Last 3 Encounters:   09/12/17 104/56   07/25/17 108/66   07/07/17 120/78    Weight from Last  3 Encounters:   09/12/17 84.7 kg (186 lb 12.8 oz)   07/25/17 83.9 kg (185 lb)   07/07/17 83.9 kg (185 lb)              We Performed the Following     Prothrombin - INR ([facility])     Prothrombin - INR ([facility])        Primary Care Provider Office Phone # Fax #    Jermain Helm [phone] [address]  [facility] [address]        Equal Access to Services     BECKA BUENROSTRO : Hadii aad ku hadasho Soomaali, waaxda luqadaha, qaybta kaalmada adeegyada, waxay idiin hayaan adeeg kharayogesh lamaldonado . So [facility] [phone].    ATENCIÓN: Si habla español, tiene a singh disposición servicios gratuitos de asistencia lingüística. Llame al [phone].    We comply with applicable federal civil rights laws and [place] laws. We do not discriminate on the basis of race, color, national origin, age, disability sex, sexual orientation or gender identity.            Thank you!     Thank you for choosing [facility]  for your care. Our goal is always to provide you with excellent care. Hearing back from our patients is one way we can continue to improve our services. Please take a few minutes to complete the written survey that you may receive in the mail after your visit with us. Thank you!             Your Updated Medication List - Protect others around you: Learn how to safely use, store and throw away your medicines at www.disposemymeds.org.          This list is accurate as of: 9/12/17  2:00 PM.  Always use your most recent med list.                   Brand Name Dispense Instructions for use Diagnosis    ACE/ARB NOT PRESCRIBED (INTENTIONAL)      ACE & ARB not prescribed due to Symptomatic hypotension not due to excessive diuresis    Chronic systolic congestive heart failure (H)       aspirin 81 MG chewable tablet     36 tablet    Take 1 tablet (81 mg) by mouth daily    History of MI (myocardial infarction)       atorvastatin 20 MG tablet    LIPITOR    90 tablet    TAKE 1 TABLET EVERY DAY     Encounter for medication refill       benzocaine-menthol 6-10 MG lozenge    CHLORASEPTIC    84 lozenge    Place 1 lozenge inside cheek every hour as needed for sore throat    Throat pain       bicalutamide 50 MG tablet    CASODEX    90 tablet    Take 1 tablet (50 mg) by mouth daily    Encounter for medication refill       Blood Glucose Monitoring Suppl W/DEVICE Kit     1 kit    Test blood glucose every other day    Type II or unspecified type diabetes mellitus without mention of complication, not stated as uncontrolled, Rhabdomyolysis, UTI (urinary tract infection)       D3-1000 PO      Take 1 capsule by mouth daily        glimepiride 1 MG tablet    AMARYL    90 tablet    TAKE 1 TABLET (1 MG) BY MOUTH EVERY MORNING (BEFORE BREAKFAST)    Encounter for medication refill       metFORMIN 500 MG tablet    GLUCOPHAGE    360 tablet    TAKE 2 TABLETS TWICE DAILY WITH MEALS    Encounter for medication refill       metoprolol 25 MG 24 hr tablet    TOPROL-XL    90 tablet    TAKE 1 TABLET EVERY DAY    Encounter for medication refill       multivitamin, therapeutic Tabs tablet      Take 1 tablet by mouth daily        sertraline 50 MG tablet    ZOLOFT    90 tablet    TAKE 1 TABLET EVERY DAY    Encounter for medication refill       warfarin 2 MG tablet    COUMADIN    135 tablet    TAKE 1 AND 1/2 TABLETS EVERY DAY    Encounter for medication refill

## 2017-09-13 VITALS — SYSTOLIC BLOOD PRESSURE: 120 MMHG | WEIGHT: 186 LBS | DIASTOLIC BLOOD PRESSURE: 68 MMHG | BODY MASS INDEX: 27.07 KG/M2

## 2017-09-13 DIAGNOSIS — Z79.01 LONG TERM CURRENT USE OF ANTICOAGULANT THERAPY: ICD-10-CM

## 2017-09-13 LAB
INR PPP: 5.5 (ref 0.8–1.2)
INR PPP: 5.5 (ref 2–3)
PT BLD: 52.2 SEC (ref 9.1–12)

## 2017-09-13 PROCEDURE — 36415 COLL VENOUS BLD VENIPUNCTURE: CPT | Performed by: FAMILY MEDICINE

## 2017-09-13 PROCEDURE — 85610 PROTHROMBIN TIME: CPT | Performed by: FAMILY MEDICINE

## 2017-09-13 PROCEDURE — 99212 OFFICE O/P EST SF 10 MIN: CPT | Performed by: FAMILY MEDICINE

## 2017-09-13 NOTE — MR AVS SNAPSHOT
After Visit Summary   9/13/2017    Jose Lees    MRN: [id number]           Patient Information     Date Of Birth          1940        Visit Information        Provider Department      9/13/2017 2:30 PM Kamar Nieto MD [facility]        Today's Diagnoses     Long term current use of anticoagulant therapy          Care Instructions    Take no warfarin until after INR on Friday          Follow-ups after your visit        Who to contact     If you have questions or need follow up information about today's clinic visit or your schedule please contact [facility] directly at [phone].  Normal or non-critical lab and imaging results will be communicated to you by Taumatropo Animationhart, letter or phone within 4 business days after the clinic has received the results. If you do not hear from us within 7 days, please contact the clinic through MyWebGrocert or phone. If you have a critical or abnormal lab result, we will notify you by phone as soon as possible.  Submit refill requests through Dubaki or call your pharmacy and they will forward the refill request to us. Please allow 3 business days for your refill to be completed.          Additional Information About Your Visit        MyChart Information     Dubaki gives you secure access to your electronic health record. If you see a primary care provider, you can also send messages to your care team and make appointments. If you have questions, please call your primary care clinic.  If you do not have a primary care provider, please call [phone] and they will assist you.        Care EveryWhere ID     This is your Care EveryWhere ID. This could be used by other organizations to access your [place] medical records  [id number]        Your Vitals Were     BMI (Body Mass Index)                   27.07 kg/m2            Blood Pressure from Last 3 Encounters:   09/13/17 120/68   09/12/17 104/56   07/25/17 108/66    Weight from  Last 3 Encounters:   09/13/17 84.4 kg (186 lb)   09/12/17 84.7 kg (186 lb 12.8 oz)   07/25/17 83.9 kg (185 lb)              We Performed the Following     Prothrombin - INR ([facility])        Primary Care Provider Office Phone # Fax #    Jermain Helm [phone] [address]  [facility] [address]        Equal Access to Services     BECKA BUENROSTRO : Hadii aad ku hadasho Soomaali, waaxda luqadaha, qaybta kaalmada adeegyada, waxay idiin hayaan adeeg kharash la'aan . So [facility] [phone].    ATENCIÓN: Si habla español, tiene a singh disposición servicios gratuitos de asistencia lingüística. Ll[facility] [phone].    We comply with applicable federal civil rights laws and [place] laws. We do not discriminate on the basis of race, color, national origin, age, disability sex, sexual orientation or gender identity.            Thank you!     Thank you for choosing [facility]  for your care. Our goal is always to provide you with excellent care. Hearing back from our patients is one way we can continue to improve our services. Please take a few minutes to complete the written survey that you may receive in the mail after your visit with us. Thank you!             Your Updated Medication List - Protect others around you: Learn how to safely use, store and throw away your medicines at www.disposemymeds.org.          This list is accurate as of: 9/13/17  3:01 PM.  Always use your most recent med list.                   Brand Name Dispense Instructions for use Diagnosis    ACE/ARB NOT PRESCRIBED (INTENTIONAL)      ACE & ARB not prescribed due to Symptomatic hypotension not due to excessive diuresis    Chronic systolic congestive heart failure (H)       aspirin 81 MG chewable tablet     36 tablet    Take 1 tablet (81 mg) by mouth daily    History of MI (myocardial infarction)       atorvastatin 20 MG tablet    LIPITOR    90 tablet    TAKE 1 TABLET EVERY DAY    Encounter for medication  refill       benzocaine-menthol 6-10 MG lozenge    CHLORASEPTIC    84 lozenge    Place 1 lozenge inside cheek every hour as needed for sore throat    Throat pain       bicalutamide 50 MG tablet    CASODEX    90 tablet    Take 1 tablet (50 mg) by mouth daily    Encounter for medication refill       Blood Glucose Monitoring Suppl W/DEVICE Kit     1 kit    Test blood glucose every other day    Type II or unspecified type diabetes mellitus without mention of complication, not stated as uncontrolled, Rhabdomyolysis, UTI (urinary tract infection)       D3-1000 PO      Take 1 capsule by mouth daily        glimepiride 1 MG tablet    AMARYL    90 tablet    TAKE 1 TABLET (1 MG) BY MOUTH EVERY MORNING (BEFORE BREAKFAST)    Encounter for medication refill       metFORMIN 500 MG tablet    GLUCOPHAGE    360 tablet    TAKE 2 TABLETS TWICE DAILY WITH MEALS    Encounter for medication refill       metoprolol 25 MG 24 hr tablet    TOPROL-XL    90 tablet    TAKE 1 TABLET EVERY DAY    Encounter for medication refill       multivitamin, therapeutic Tabs tablet      Take 1 tablet by mouth daily        sertraline 50 MG tablet    ZOLOFT    90 tablet    TAKE 1 TABLET EVERY DAY    Encounter for medication refill       warfarin 2 MG tablet    COUMADIN    135 tablet    TAKE 1 AND 1/2 TABLETS EVERY DAY    Encounter for medication refill

## 2017-09-15 VITALS — DIASTOLIC BLOOD PRESSURE: 78 MMHG | SYSTOLIC BLOOD PRESSURE: 132 MMHG | WEIGHT: 182 LBS | BODY MASS INDEX: 26.49 KG/M2

## 2017-09-15 DIAGNOSIS — Z79.01 LONG TERM CURRENT USE OF ANTICOAGULANT THERAPY: ICD-10-CM

## 2017-09-15 LAB — INR PPP: 2.8 (ref 2–3)

## 2017-09-15 PROCEDURE — 36415 COLL VENOUS BLD VENIPUNCTURE: CPT | Performed by: FAMILY MEDICINE

## 2017-09-15 PROCEDURE — 85610 PROTHROMBIN TIME: CPT | Performed by: FAMILY MEDICINE

## 2017-09-15 PROCEDURE — 99212 OFFICE O/P EST SF 10 MIN: CPT | Performed by: FAMILY MEDICINE

## 2017-09-29 VITALS — BODY MASS INDEX: 27.22 KG/M2 | DIASTOLIC BLOOD PRESSURE: 72 MMHG | WEIGHT: 187 LBS | SYSTOLIC BLOOD PRESSURE: 128 MMHG

## 2017-09-29 DIAGNOSIS — Z23 NEED FOR PROPHYLACTIC VACCINATION AND INOCULATION AGAINST INFLUENZA: Primary | ICD-10-CM

## 2017-09-29 DIAGNOSIS — Z79.01 LONG TERM CURRENT USE OF ANTICOAGULANT THERAPY: ICD-10-CM

## 2017-09-29 LAB — INR PPP: 4.1 (ref 2–3)

## 2017-09-29 PROCEDURE — 85610 PROTHROMBIN TIME: CPT | Performed by: FAMILY MEDICINE

## 2017-09-29 PROCEDURE — 99213 OFFICE O/P EST LOW 20 MIN: CPT | Mod: 25 | Performed by: FAMILY MEDICINE

## 2017-09-29 PROCEDURE — 36415 COLL VENOUS BLD VENIPUNCTURE: CPT | Performed by: FAMILY MEDICINE

## 2017-09-29 PROCEDURE — 90662 IIV NO PRSV INCREASED AG IM: CPT | Performed by: FAMILY MEDICINE

## 2017-09-29 PROCEDURE — G0008 ADMIN INFLUENZA VIRUS VAC: HCPCS | Performed by: FAMILY MEDICINE

## 2017-09-29 NOTE — PROGRESS NOTES
Injectable Influenza Immunization Documentation    1.  Is the person to be vaccinated sick today?   No    2. Does the person to be vaccinated have an allergy to a component   of the vaccine?   No    3. Has the person to be vaccinated ever had a serious reaction   to influenza vaccine in the past?   No    4. Has the person to be vaccinated ever had Guillain-Barré syndrome?   No    Form completed by Kendell Cavazos

## 2017-09-29 NOTE — PROGRESS NOTES
INR  Goal 2-3  TIA  House keeper was in the room, took pill in middle of night 3 mg 1 am, this morning 4 mg early  No bleeding  No unusual foods  Plan: Skip tomorrow Saturday  Then Sun 3 mg, Recheck on Monday.    [facility]  [address] [place] [address]  [phone]  Mariela Stauffer MD, MEd

## 2017-09-29 NOTE — PATIENT INSTRUCTIONS
iNR  Goal 2-3  TIA  House keeper was in the room, took pill in middle of night 3 mg 1 am, this morning 4 mg early  No bleeding  No unusual foods  Plan: Skip tomorrow Saturday  Then Sun 3 mg, Recheck on Monday.

## 2017-09-29 NOTE — LETTER
[facility]  [address] [place] [address]  [phone]  9/29/17    Dear Lab:    This patient was seen in clinic today. He needs an INR Monday October 2nd.  Please draw this lab and fax the result to [phone].    Thank you.        Mariela Stauffer MD, MEd

## 2017-09-29 NOTE — MR AVS SNAPSHOT
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          Follow-ups after your visit        Who to contact     If you have questions or need follow up information about today's clinic visit or your schedule please contact [facility] directly at [phone].  Normal or non-critical lab and imaging results will be communicated to you by Matter [employer], letter or phone within 4 business days after the clinic has received the results. If you do not hear from us within 7 days, please contact the clinic through [employer] or phone. If you have a critical or abnormal lab result, we will notify you by phone as soon as possible.  Submit refill requests through Sterecycle or call your pharmacy and they will forward the refill request to us. Please allow 3 business days for your refill to be completed.          Additional Information About Your Visit        MyChart Information     Sterecycle gives you secure access to your electronic health record. If you see a primary care provider, you can also send messages to your care team and make appointments. If you have questions, please call your primary care clinic.  If you do not have a primary care provider, please call [phone] and they will assist you.        Care EveryWhere ID     This is your Care EveryWhere ID. This could be used by other organizations to  access your [place] medical records  [id number]        Your Vitals Were     BMI (Body Mass Index)                   27.22 kg/m2            Blood Pressure from Last 3 Encounters:   09/29/17 128/72   09/15/17 132/78   09/13/17 120/68    Weight from Last 3 Encounters:   09/29/17 84.8 kg (187 lb)   09/15/17 82.6 kg (182 lb)   09/13/17 84.4 kg (186 lb)              We Performed the Following     ADMIN INFLUENZA (For MEDICARE Patients ONLY) []     FLU VACCINE, INCREASED ANTIGEN, PRESV FREE, AGE 65+ [45019]     Prothrombin - INR ([facility])        Primary Care Provider Office Phone # Fax #    Jermain Helm [phone] [address]  [facility] [address]        Equal Access to Services     [facility] Center[facility] : Hadii aad grey hadasho Soomaali, waaxda luqadaha, qaybta kaalmada adeegyada, jennifer brand haynolan humphrey . So [place] [phone].    ATENCIÓN: Si habla español, tiene a singh disposición servicios gratuitos de asistencia lingüística. Llame al [phone].    We comply with applicable federal civil rights laws and [place] laws. We do not discriminate on the basis of race, color, national origin, age, disability, sex, sexual orientation, or gender identity.            Thank you!     Thank you for choosing [facility]  for your care. Our goal is always to provide you with excellent care. Hearing back from our patients is one way we can continue to improve our services. Please take a few minutes to complete the written survey that you may receive in the mail after your visit with us. Thank you!             Your Updated Medication List - Protect others around you: Learn how to safely use, store and throw away your medicines at www.disposemymeds.org.          This list is accurate as of: 9/29/17  1:41 PM.  Always use your most recent med list.                   Brand Name Dispense Instructions for use Diagnosis    ACE/ARB NOT PRESCRIBED (INTENTIONAL)      ACE & ARB not  prescribed due to Symptomatic hypotension not due to excessive diuresis    Chronic systolic congestive heart failure (H)       aspirin 81 MG chewable tablet     36 tablet    Take 1 tablet (81 mg) by mouth daily    History of MI (myocardial infarction)       atorvastatin 20 MG tablet    LIPITOR    90 tablet    TAKE 1 TABLET EVERY DAY    Encounter for medication refill       benzocaine-menthol 6-10 MG lozenge    CHLORASEPTIC    84 lozenge    Place 1 lozenge inside cheek every hour as needed for sore throat    Throat pain       bicalutamide 50 MG tablet    CASODEX    90 tablet    Take 1 tablet (50 mg) by mouth daily    Encounter for medication refill       Blood Glucose Monitoring Suppl W/DEVICE Kit     1 kit    Test blood glucose every other day    Type II or unspecified type diabetes mellitus without mention of complication, not stated as uncontrolled, Rhabdomyolysis, UTI (urinary tract infection)       D3-1000 PO      Take 1 capsule by mouth daily        glimepiride 1 MG tablet    AMARYL    90 tablet    TAKE 1 TABLET (1 MG) BY MOUTH EVERY MORNING (BEFORE BREAKFAST)    Encounter for medication refill       metFORMIN 500 MG tablet    GLUCOPHAGE    360 tablet    TAKE 2 TABLETS TWICE DAILY WITH MEALS    Encounter for medication refill       metoprolol 25 MG 24 hr tablet    TOPROL-XL    90 tablet    TAKE 1 TABLET EVERY DAY    Encounter for medication refill       multivitamin, therapeutic Tabs tablet      Take 1 tablet by mouth daily        sertraline 50 MG tablet    ZOLOFT    90 tablet    TAKE 1 TABLET EVERY DAY    Encounter for medication refill       warfarin 2 MG tablet    COUMADIN    135 tablet    TAKE 1 AND 1/2 TABLETS EVERY DAY    Encounter for medication refill

## 2017-10-02 ENCOUNTER — OFFICE VISIT (OUTPATIENT)
Dept: FAMILY MEDICINE | Facility: CLINIC | Age: 77
End: 2017-10-02

## 2017-10-02 VITALS
BODY MASS INDEX: 27.48 KG/M2 | SYSTOLIC BLOOD PRESSURE: 124 MMHG | DIASTOLIC BLOOD PRESSURE: 70 MMHG | TEMPERATURE: 97.8 F | RESPIRATION RATE: 20 BRPM | HEART RATE: 63 BPM | WEIGHT: 188.8 LBS | OXYGEN SATURATION: 97 %

## 2017-10-02 DIAGNOSIS — Z79.01 LONG TERM CURRENT USE OF ANTICOAGULANT THERAPY: ICD-10-CM

## 2017-10-02 LAB — INR PPP: 4.1 (ref 2–3)

## 2017-10-02 PROCEDURE — 85610 PROTHROMBIN TIME: CPT | Performed by: FAMILY MEDICINE

## 2017-10-02 PROCEDURE — 99213 OFFICE O/P EST LOW 20 MIN: CPT | Performed by: FAMILY MEDICINE

## 2017-10-02 PROCEDURE — 36415 COLL VENOUS BLD VENIPUNCTURE: CPT | Performed by: FAMILY MEDICINE

## 2017-10-02 NOTE — PROGRESS NOTES
Last INR on 9/15/2017 was 2.8; TIA, taking 4mg MWF, Otherwise 3mg  SUBJECTIVE:   Jose Lees is a 76 year old male who presents to clinic today for the following health issues:    Cc: INR      INR 4.1  Goal 2-3  Dx TIA  Current dosing Sat skipped, Sun 3, Mon 4 mg this morning  Today's value 4.1  No bleeding no bruise  Plan: Hold Tues/Wed  F/u [facility]  [address] [place] [address]  [phone]  Mariela Stauffer MD, MEd

## 2017-10-02 NOTE — MR AVS SNAPSHOT
After Visit Summary   10/2/2017    Jose Lees    MRN: [id number]           Patient Information     Date Of Birth          1940        Visit Information        Provider Department      10/2/2017 3:00 PM Mariela Stauffer MD [facility]        Today's Diagnoses     Long term current use of anticoagulant therapy          Care Instructions    INR 4.1  Goal 2-3  Dx TIA  Current dosing Sat skipped, Sun 3, Mon 4 mg this morning  Today's value 4.1  No bleeding no bruise  Plan: Hold Tues/Wed  F/u Thurs          Follow-ups after your visit        Who to contact     If you have questions or need follow up information about today's clinic visit or your schedule please contact [facility] directly at [phone].  Normal or non-critical lab and imaging results will be communicated to you by [employer], letter or phone within 4 business days after the clinic has received the results. If you do not hear from us within 7 days, please contact the clinic through [employer] or phone. If you have a critical or abnormal lab result, we will notify you by phone as soon as possible.  Submit refill requests through PlateJoy or call your pharmacy and they will forward the refill request to us. Please allow 3 business days for your refill to be completed.          Additional Information About Your Visit        MyChart Information     PlateJoy gives you secure access to your electronic health record. If you see a primary care provider, you can also send messages to your care team and make appointments. If you have questions, please call your primary care clinic.  If you do not have a primary care provider, please call [phone] and they will assist you.        Care EveryWhere ID     This is your Care EveryWhere ID. This could be used by other organizations to access your [place] medical records  [id number]        Your Vitals Were     Pulse Temperature Respirations Pulse Oximetry BMI (Body  Mass Index)       63 97.8  F (36.6  C) (Oral) 20 97% 27.48 kg/m2        Blood Pressure from Last 3 Encounters:   10/02/17 124/70   09/29/17 128/72   09/15/17 132/78    Weight from Last 3 Encounters:   10/02/17 85.6 kg (188 lb 12.8 oz)   09/29/17 84.8 kg (187 lb)   09/15/17 82.6 kg (182 lb)              We Performed the Following     Prothrombin - INR ([facility])        Primary Care Provider Office Phone # Fax #    Jermain Helm [phone] [address]  [place] [address]        Equal Access to Services     BECKA BUENROSTRO : Hadii farhan edmonds hadasho Soradha, waaxda luqadaha, qaybta kaalmada adeegyada, jennifer humphrey . So [facility] [phone].    ATENCIÓN: Si habla español, tiene a singh disposición servicios gratuitos de asistencia lingüística. Llame al [phone].    We comply with applicable federal civil rights laws and [place] laws. We do not discriminate on the basis of race, color, national origin, age, disability, sex, sexual orientation, or gender identity.            Thank you!     Thank you for choosing [facility]  for your care. Our goal is always to provide you with excellent care. Hearing back from our patients is one way we can continue to improve our services. Please take a few minutes to complete the written survey that you may receive in the mail after your visit with us. Thank you!             Your Updated Medication List - Protect others around you: Learn how to safely use, store and throw away your medicines at www.disposemymeds.org.          This list is accurate as of: 10/2/17  3:08 PM.  Always use your most recent med list.                   Brand Name Dispense Instructions for use Diagnosis    ACE/ARB NOT PRESCRIBED (INTENTIONAL)      ACE & ARB not prescribed due to Symptomatic hypotension not due to excessive diuresis    Chronic systolic congestive heart failure (H)       aspirin 81 MG chewable tablet     36 tablet    Take 1  tablet (81 mg) by mouth daily    History of MI (myocardial infarction)       atorvastatin 20 MG tablet    LIPITOR    90 tablet    TAKE 1 TABLET EVERY DAY    Encounter for medication refill       benzocaine-menthol 6-10 MG lozenge    CHLORASEPTIC    84 lozenge    Place 1 lozenge inside cheek every hour as needed for sore throat    Throat pain       bicalutamide 50 MG tablet    CASODEX    90 tablet    Take 1 tablet (50 mg) by mouth daily    Encounter for medication refill       Blood Glucose Monitoring Suppl W/DEVICE Kit     1 kit    Test blood glucose every other day    Type II or unspecified type diabetes mellitus without mention of complication, not stated as uncontrolled, Rhabdomyolysis, UTI (urinary tract infection)       D3-1000 PO      Take 1 capsule by mouth daily        glimepiride 1 MG tablet    AMARYL    90 tablet    TAKE 1 TABLET (1 MG) BY MOUTH EVERY MORNING (BEFORE BREAKFAST)    Encounter for medication refill       metFORMIN 500 MG tablet    GLUCOPHAGE    360 tablet    TAKE 2 TABLETS TWICE DAILY WITH MEALS    Encounter for medication refill       metoprolol 25 MG 24 hr tablet    TOPROL-XL    90 tablet    TAKE 1 TABLET EVERY DAY    Encounter for medication refill       multivitamin, therapeutic Tabs tablet      Take 1 tablet by mouth daily        sertraline 50 MG tablet    ZOLOFT    90 tablet    TAKE 1 TABLET EVERY DAY    Encounter for medication refill       warfarin 2 MG tablet    COUMADIN    135 tablet    TAKE 1 AND 1/2 TABLETS EVERY DAY    Encounter for medication refill

## 2017-10-02 NOTE — PATIENT INSTRUCTIONS
INR 4.1  Goal 2-3  Dx TIA  Current dosing Sat skipped, Sun 3, Mon 4 mg this morning  Today's value 4.1  No bleeding no bruise  Plan: Hold Tues/Wed F/u Thurs

## 2017-10-06 VITALS — WEIGHT: 187 LBS | BODY MASS INDEX: 27.22 KG/M2 | SYSTOLIC BLOOD PRESSURE: 114 MMHG | DIASTOLIC BLOOD PRESSURE: 76 MMHG

## 2017-10-06 DIAGNOSIS — Z79.01 LONG TERM CURRENT USE OF ANTICOAGULANT THERAPY: ICD-10-CM

## 2017-10-06 DIAGNOSIS — I48.20 CHRONIC ATRIAL FIBRILLATION (H): Primary | ICD-10-CM

## 2017-10-06 LAB — INR PPP: 1.9 (ref 2–3)

## 2017-10-06 PROCEDURE — 99213 OFFICE O/P EST LOW 20 MIN: CPT | Performed by: FAMILY MEDICINE

## 2017-10-06 PROCEDURE — 36415 COLL VENOUS BLD VENIPUNCTURE: CPT | Performed by: FAMILY MEDICINE

## 2017-10-06 PROCEDURE — 85610 PROTHROMBIN TIME: CPT | Performed by: FAMILY MEDICINE

## 2017-10-06 NOTE — MR AVS SNAPSHOT
After Visit Summary   10/6/2017    Jose Lees    MRN: [id number]           Patient Information     Date Of Birth          1940        Visit Information        Provider Department      10/6/2017 10:00 AM Jermain Helm MD [facility]        Today's Diagnoses     Chronic atrial fibrillation (H)    -  1    Long term current use of anticoagulant therapy          Care Instructions    See what the new medication would cost, if you start then don't take the coumadin any more.  The dose will change to 3 mg daily.    Recheck next INR in 1  week    Jermain Helm           Follow-ups after your visit        Who to contact     If you have questions or need follow up information about today's clinic visit or your schedule please contact [facility] directly at [phone].  Normal or non-critical lab and imaging results will be communicated to you by MyChart, letter or phone within 4 business days after the clinic has received the results. If you do not hear from us within 7 days, please contact the clinic through [employer] or phone. If you have a critical or abnormal lab result, we will notify you by phone as soon as possible.  Submit refill requests through fruux or call your pharmacy and they will forward the refill request to us. Please allow 3 business days for your refill to be completed.          Additional Information About Your Visit        MyChart Information     fruux gives you secure access to your electronic health record. If you see a primary care provider, you can also send messages to your care team and make appointments. If you have questions, please call your primary care clinic.  If you do not have a primary care provider, please call [phone] and they will assist you.        Care EveryWhere ID     This is your Care EveryWhere ID. This could be used by other organizations to access your [place] medical records  [id number]        Your  Vitals Were     BMI (Body Mass Index)                   27.22 kg/m2            Blood Pressure from Last 3 Encounters:   10/06/17 114/76   10/02/17 124/70   09/29/17 128/72    Weight from Last 3 Encounters:   10/06/17 84.8 kg (187 lb)   10/02/17 85.6 kg (188 lb 12.8 oz)   09/29/17 84.8 kg (187 lb)              We Performed the Following     Prothrombin - INR ([facility])          Today's Medication Changes          These changes are accurate as of: 10/6/17 10:44 AM.  If you have any questions, ask your nurse or doctor.               Start taking these medicines.        Dose/Directions    apixaban ANTICOAGULANT 5 MG tablet   Commonly known as:  ELIQUIS   Used for:  Chronic atrial fibrillation (H)   Started by:  Jermain Helm MD        Dose:  5 mg   Take 1 tablet (5 mg) by mouth 2 times daily   Quantity:  60 tablet   Refills:  11            Where to get your medicines      Some of these will need a paper prescription and others can be bought over the counter.  Ask your nurse if you have questions.     Bring a paper prescription for each of these medications     apixaban ANTICOAGULANT 5 MG tablet                Primary Care Provider Office Phone # Fax #    Jermain Helm [phone] [address]  [facility] [address]        Equal Access to Services     KARO BUENROSTRO AH: Hadii farhan ku hadasho Soomaali, waaxda luqadaha, qaybta kaalmada adeegyada, waxay idiin hayaan ashwin kharayogesh riggs. So [facility] [phone].    ATENCIÓN: Si habla español, tiene a singh disposición servicios gratuitos de asistencia lingüística. Llame al [phone].    We comply with applicable federal civil rights laws and [place] laws. We do not discriminate on the basis of race, color, national origin, age, disability, sex, sexual orientation, or gender identity.            Thank you!     Thank you for choosing [facility]  for your care. Our goal is always to provide you with excellent care. Hearing back  from our patients is one way we can continue to improve our services. Please take a few minutes to complete the written survey that you may receive in the mail after your visit with us. Thank you!             Your Updated Medication List - Protect others around you: Learn how to safely use, store and throw away your medicines at www.disposemymeds.org.          This list is accurate as of: 10/6/17 10:44 AM.  Always use your most recent med list.                   Brand Name Dispense Instructions for use Diagnosis    ACE/ARB NOT PRESCRIBED (INTENTIONAL)      ACE & ARB not prescribed due to Symptomatic hypotension not due to excessive diuresis    Chronic systolic congestive heart failure (H)       apixaban ANTICOAGULANT 5 MG tablet    ELIQUIS    60 tablet    Take 1 tablet (5 mg) by mouth 2 times daily    Chronic atrial fibrillation (H)       aspirin 81 MG chewable tablet     36 tablet    Take 1 tablet (81 mg) by mouth daily    History of MI (myocardial infarction)       atorvastatin 20 MG tablet    LIPITOR    90 tablet    TAKE 1 TABLET EVERY DAY    Encounter for medication refill       benzocaine-menthol 6-10 MG lozenge    CHLORASEPTIC    84 lozenge    Place 1 lozenge inside cheek every hour as needed for sore throat    Throat pain       bicalutamide 50 MG tablet    CASODEX    90 tablet    Take 1 tablet (50 mg) by mouth daily    Encounter for medication refill       Blood Glucose Monitoring Suppl W/DEVICE Kit     1 kit    Test blood glucose every other day    Type II or unspecified type diabetes mellitus without mention of complication, not stated as uncontrolled, Rhabdomyolysis, UTI (urinary tract infection)       D3-1000 PO      Take 1 capsule by mouth daily        glimepiride 1 MG tablet    AMARYL    90 tablet    TAKE 1 TABLET (1 MG) BY MOUTH EVERY MORNING (BEFORE BREAKFAST)    Encounter for medication refill       metFORMIN 500 MG tablet    GLUCOPHAGE    360 tablet    TAKE 2 TABLETS TWICE DAILY WITH MEALS     Encounter for medication refill       metoprolol 25 MG 24 hr tablet    TOPROL-XL    90 tablet    TAKE 1 TABLET EVERY DAY    Encounter for medication refill       multivitamin, therapeutic Tabs tablet      Take 1 tablet by mouth daily        sertraline 50 MG tablet    ZOLOFT    90 tablet    TAKE 1 TABLET EVERY DAY    Encounter for medication refill       warfarin 2 MG tablet    COUMADIN    135 tablet    TAKE 1 AND 1/2 TABLETS EVERY DAY    Encounter for medication refill

## 2017-10-06 NOTE — PROGRESS NOTES
Jose Lees is a 76 year old male here for an INR check.  Feeling pretty well, no signs of bleeding.    /76  Wt 84.8 kg (187 lb)  BMI 27.22 kg/m2       Todays and previous results are:    Lab Results   Component Value Date    INR 1.9 10/06/2017    INR 4.1 10/02/2017    INR 4.1 09/29/2017    INR 2.8 09/15/2017    INR 5.5 09/13/2017       The dose will change to 3 mg daily.    Recheck next INR in 1  week    Jermain Helm   /Assessment/Plan:  Jose was seen today for inr followup.    Diagnoses and all orders for this visit:    Chronic atrial fibrillation (H)  -     apixaban ANTICOAGULANT (ELIQUIS) 5 MG tablet; Take 1 tablet (5 mg) by mouth 2 times daily    Long term current use of anticoagulant therapy  -     Prothrombin - INR ([facility])      Jermain Helm MD  [facility]  [phone]

## 2017-10-09 DIAGNOSIS — Z76.0 ENCOUNTER FOR MEDICATION REFILL: ICD-10-CM

## 2017-10-10 NOTE — TELEPHONE ENCOUNTER
Pending Prescriptions:                       Disp   Refills    metFORMIN (GLUCOPHAGE) 500 MG tablet [Phar*360 ta*1        Sig: TAKE 2 TABLETS TWICE DAILY WITH MEALS    Last OV 10/2/17  Lab Results   Component Value Date    A1C 8.2 07/25/2017    A1C 7.6 01/25/2017    A1C 7.1 07/27/2016    A1C 8.6 03/17/2016    A1C 8.6 02/03/2016       Lab Results   Component Value Date    WBC 7.4 02/09/2017     Lab Results   Component Value Date    RBC 3.94 02/09/2017     Lab Results   Component Value Date    HGB 12.1 02/09/2017     Lab Results   Component Value Date    HCT 35.9 02/09/2017     No components found for: MCT  Lab Results   Component Value Date    MCV 91 02/09/2017     Lab Results   Component Value Date    MCH 30.7 02/09/2017     Lab Results   Component Value Date    MCHC 33.7 02/09/2017     Lab Results   Component Value Date    RDW 13.4 02/09/2017     Lab Results   Component Value Date     02/09/2017     Last Basic Metabolic Panel:  Lab Results   Component Value Date     02/16/2017      Lab Results   Component Value Date    POTASSIUM 4.0 02/16/2017     Lab Results   Component Value Date    CHLORIDE 105 02/16/2017     Lab Results   Component Value Date    TENZIN 9.0 02/16/2017     Lab Results   Component Value Date    CO2 24 02/16/2017     Lab Results   Component Value Date    BUN 15 02/16/2017     Lab Results   Component Value Date    CR 0.81 02/16/2017     Lab Results   Component Value Date     02/16/2017     .  Lab Results   Component Value Date    CHOL 161 07/25/2017     Lab Results   Component Value Date    HDL 52 07/25/2017     Lab Results   Component Value Date    LDL 81 07/25/2017     Lab Results   Component Value Date    TRIG 141 07/25/2017     Lab Results   Component Value Date    CHOLHDLRATIO 3.1 04/10/2014     TSH   Date Value Ref Range Status   02/16/2017 3.39 0.30 - 5.00 uIU/mL Final

## 2017-10-12 VITALS — WEIGHT: 189 LBS | BODY MASS INDEX: 27.51 KG/M2 | DIASTOLIC BLOOD PRESSURE: 68 MMHG | SYSTOLIC BLOOD PRESSURE: 120 MMHG

## 2017-10-12 DIAGNOSIS — Z79.01 LONG TERM CURRENT USE OF ANTICOAGULANT THERAPY: Primary | ICD-10-CM

## 2017-10-12 LAB — INR PPP: 2.6 (ref 2–3)

## 2017-10-12 PROCEDURE — 36415 COLL VENOUS BLD VENIPUNCTURE: CPT | Performed by: FAMILY MEDICINE

## 2017-10-12 PROCEDURE — 85610 PROTHROMBIN TIME: CPT | Performed by: FAMILY MEDICINE

## 2017-10-12 PROCEDURE — 99212 OFFICE O/P EST SF 10 MIN: CPT | Performed by: FAMILY MEDICINE

## 2017-10-12 NOTE — PROGRESS NOTES
Problem(s) Oriented visit        SUBJECTIVE:                                                    Jose Lees is a 76 year old male who presents to clinic today for INR check. He was over goal at the beginning of the month, held two days and was then slightly under goal. He is now taking 3 mg daily.     Problem list, Medication list, Allergies, and Medical/Social/Surgical histories reviewed in EPIC and updated as appropriate.   Additional history: as documented    ROS:  5 point ROS completed and negative except noted above, including Gen, CV, Resp, GI, MS      Histories:   Patient Active Problem List   Diagnosis     Diabetes mellitus, type 2 (H)     Prostate cancer (H)     Autism disorder     ACP (advance care planning)     Health Care Home     History of MI (myocardial infarction)     History of stroke     CHF (congestive heart failure) (H)     Long term current use of anticoagulant therapy     Stricture and stenosis of esophagus     History of TIA (transient ischemic attack) and stroke     Influenza A     Physical deconditioning     Type 2 diabetes mellitus without complication, without long-term current use of insulin (H)     Past Surgical History:   Procedure Laterality Date     CORONARY ANGIOGRAPHY ADULT ORDER       ESOPHAGOSCOPY, GASTROSCOPY, DUODENOSCOPY (EGD), COMBINED N/A 8/6/2015    Procedure: COMBINED ESOPHAGOSCOPY, GASTROSCOPY, DUODENOSCOPY (EGD), REMOVE FOREIGN BODY;  Surgeon: Yu Tapia MD;  Location:  GI     HEART CATH, ANGIOPLASTY  4/14    BMS to LAD       Social History   Substance Use Topics     Smoking status: Never Smoker     Smokeless tobacco: Never Used     Alcohol use No      Comment: never     Family History   Problem Relation Age of Onset     CEREBROVASCULAR DISEASE Mother 92     C.A.D. Father 48           OBJECTIVE:                                                    /68  Wt 85.7 kg (189 lb)  BMI 27.51 kg/m2  Body mass index is 27.51 kg/(m^2).   GENERAL APPEARANCE:  Alert, no acute distress  NEURO: Alert, oriented, speech and mentation normal  PSYCH: mentation appears normal, affect and mood normal   Labs Resulted Today:   Results for orders placed or performed in visit on 10/12/17   Prothrombin - INR ([facility])   Result Value Ref Range    INR 2.6 2.0 - 3.0     ASSESSMENT/PLAN:                                                        Jose was seen today for inr followup.    Diagnoses and all orders for this visit:    Long term current use of anticoagulant therapy  -     Prothrombin - INR ([facility])        Patient Instructions   Please continue Coumadin 3 mg daily and recheck INR in 2 weeks.      The following health maintenance items are reviewed in Epic and correct as of today:  Health Maintenance   Topic Date Due     COLON CANCER SCREEN (SYSTEM ASSIGNED)  11/13/1991     AORTIC ANEURYSM SCREENING (SYSTEM ASSIGNED)  11/13/2006     EYE EXAM Q1 YEAR  04/01/2013     FALL RISK ASSESSMENT  01/07/2015     FOOT EXAM Q1 YEAR  02/18/2017     MICROALBUMIN Q1 YEAR  02/18/2017     BMP Q6 MOS  08/16/2017     A1C Q6 MO  01/25/2018     ALT Q1 YR  02/07/2018     CBC Q1 YR  02/09/2018     CREATININE Q1 YEAR  02/16/2018     LIPID MONITORING Q1 YEAR  07/25/2018     TSH W/ FREE T4 REFLEX Q2 YEAR  02/16/2019     HF ACTION PLAN Q3 YR  04/06/2019     TETANUS IMMUNIZATION (SYSTEM ASSIGNED)  09/16/2021     ADVANCE DIRECTIVE PLANNING Q5 YRS  05/12/2022     INFLUENZA VACCINE (SYSTEM ASSIGNED)  Completed     PNEUMOCOCCAL  Completed       Arlene Mcdermott MD  [facility]  [phone]    For any issues my office # is [phone]

## 2017-10-12 NOTE — MR AVS SNAPSHOT
After Visit Summary   10/12/2017    Jose Lees    MRN: [id number]           Patient Information     Date Of Birth          1940        Visit Information        Provider Department      10/12/2017 1:45 PM Arlene Mcdermott MD [facility]        Today's Diagnoses     Long term current use of anticoagulant therapy    -  1      Care Instructions    Please continue Coumadin 3 mg daily and recheck INR in 2 weeks.          Follow-ups after your visit        Who to contact     If you have questions or need follow up information about today's clinic visit or your schedule please contact [facility] directly at [phone].  Normal or non-critical lab and imaging results will be communicated to you by Catawikihart, letter or phone within 4 business days after the clinic has received the results. If you do not hear from us within 7 days, please contact the clinic through Soicost or phone. If you have a critical or abnormal lab result, we will notify you by phone as soon as possible.  Submit refill requests through Oxtex or call your pharmacy and they will forward the refill request to us. Please allow 3 business days for your refill to be completed.          Additional Information About Your Visit        MyChart Information     Oxtex gives you secure access to your electronic health record. If you see a primary care provider, you can also send messages to your care team and make appointments. If you have questions, please call your primary care clinic.  If you do not have a primary care provider, please call [phone] and they will assist you.        Care EveryWhere ID     This is your Care EveryWhere ID. This could be used by other organizations to access your [place] medical records  [id number]        Your Vitals Were     BMI (Body Mass Index)                   27.51 kg/m2            Blood Pressure from Last 3 Encounters:   10/12/17 120/68   10/06/17 114/76    10/02/17 124/70    Weight from Last 3 Encounters:   10/12/17 85.7 kg (189 lb)   10/06/17 84.8 kg (187 lb)   10/02/17 85.6 kg (188 lb 12.8 oz)              We Performed the Following     Prothrombin - INR ([facility])        Primary Care Provider Office Phone # Fax #    Jermain Helm [phone] [address]  [facility] [place] [address]        Equal Access to Services     [facility]: Hadii aad ku hadasho Soomaali, waaxda luqadaha, qaybta kaalmada adeegyada, waxay idiin hayaan adeeg kharayogesh humphrey . So [facility] [phone].    ATENCIÓN: Si habla español, tiene a singh disposición servicios gratuitos de asistencia lingüística. Llame al [phone].    We comply with applicable federal civil rights laws and [place] laws. We do not discriminate on the basis of race, color, national origin, age, disability, sex, sexual orientation, or gender identity.            Thank you!     Thank you for choosing [facility]  for your care. Our goal is always to provide you with excellent care. Hearing back from our patients is one way we can continue to improve our services. Please take a few minutes to complete the written survey that you may receive in the mail after your visit with us. Thank you!             Your Updated Medication List - Protect others around you: Learn how to safely use, store and throw away your medicines at www.disposemymeds.org.          This list is accurate as of: 10/12/17  2:27 PM.  Always use your most recent med list.                   Brand Name Dispense Instructions for use Diagnosis    ACE/ARB NOT PRESCRIBED (INTENTIONAL)      ACE & ARB not prescribed due to Symptomatic hypotension not due to excessive diuresis    Chronic systolic congestive heart failure (H)       apixaban ANTICOAGULANT 5 MG tablet    ELIQUIS    60 tablet    Take 1 tablet (5 mg) by mouth 2 times daily    Chronic atrial fibrillation (H)       aspirin 81 MG chewable tablet     36 tablet    Take 1  tablet (81 mg) by mouth daily    History of MI (myocardial infarction)       atorvastatin 20 MG tablet    LIPITOR    90 tablet    TAKE 1 TABLET EVERY DAY    Encounter for medication refill       benzocaine-menthol 6-10 MG lozenge    CHLORASEPTIC    84 lozenge    Place 1 lozenge inside cheek every hour as needed for sore throat    Throat pain       bicalutamide 50 MG tablet    CASODEX    90 tablet    Take 1 tablet (50 mg) by mouth daily    Encounter for medication refill       Blood Glucose Monitoring Suppl W/DEVICE Kit     1 kit    Test blood glucose every other day    Type II or unspecified type diabetes mellitus without mention of complication, not stated as uncontrolled, Rhabdomyolysis, UTI (urinary tract infection)       D3-1000 PO      Take 1 capsule by mouth daily        glimepiride 1 MG tablet    AMARYL    90 tablet    TAKE 1 TABLET (1 MG) BY MOUTH EVERY MORNING (BEFORE BREAKFAST)    Encounter for medication refill       metFORMIN 500 MG tablet    GLUCOPHAGE    360 tablet    TAKE 2 TABLETS TWICE DAILY WITH MEALS    Encounter for medication refill       metoprolol 25 MG 24 hr tablet    TOPROL-XL    90 tablet    TAKE 1 TABLET EVERY DAY    Encounter for medication refill       multivitamin, therapeutic Tabs tablet      Take 1 tablet by mouth daily        sertraline 50 MG tablet    ZOLOFT    90 tablet    TAKE 1 TABLET EVERY DAY    Encounter for medication refill       warfarin 2 MG tablet    COUMADIN    135 tablet    TAKE 1 AND 1/2 TABLETS EVERY DAY    Encounter for medication refill

## 2017-10-31 VITALS — BODY MASS INDEX: 27.07 KG/M2 | SYSTOLIC BLOOD PRESSURE: 118 MMHG | DIASTOLIC BLOOD PRESSURE: 68 MMHG | WEIGHT: 186 LBS

## 2017-10-31 DIAGNOSIS — Z79.01 LONG TERM CURRENT USE OF ANTICOAGULANT THERAPY: Primary | ICD-10-CM

## 2017-10-31 LAB — INR PPP: 3.3 (ref 2–3)

## 2017-10-31 PROCEDURE — 36415 COLL VENOUS BLD VENIPUNCTURE: CPT | Performed by: FAMILY MEDICINE

## 2017-10-31 PROCEDURE — 99213 OFFICE O/P EST LOW 20 MIN: CPT | Performed by: FAMILY MEDICINE

## 2017-10-31 PROCEDURE — 85610 PROTHROMBIN TIME: CPT | Performed by: FAMILY MEDICINE

## 2017-10-31 NOTE — MR AVS SNAPSHOT
After Visit Summary   10/31/2017    Jose Lees    MRN: [id number]           Patient Information     Date Of Birth          1940        Visit Information        Provider Department      10/31/2017 10:15 AM Mariela Stauffer MD [facility]        Today's Diagnoses     Long term current use of anticoagulant therapy    -  1      Care Instructions    INR  Goal 2-3  Dx TIA  Was taking 3 mg daily (coumadin and eliquis)  Today 3.3  Supra  Plan: 2 mg today, the 3 mg daily  Recheck one week          Follow-ups after your visit        Who to contact     If you have questions or need follow up information about today's clinic visit or your schedule please contact [facility] directly at [phone].  Normal or non-critical lab and imaging results will be communicated to you by Triton Algae Innovationshart, letter or phone within 4 business days after the clinic has received the results. If you do not hear from us within 7 days, please contact the clinic through LocBoxt or phone. If you have a critical or abnormal lab result, we will notify you by phone as soon as possible.  Submit refill requests through SpotOnWay or call your pharmacy and they will forward the refill request to us. Please allow 3 business days for your refill to be completed.          Additional Information About Your Visit        Triton [employer] InnovationsharSMTDP Technology Information     SpotOnWay gives you secure access to your electronic health record. If you see a primary care provider, you can also send messages to your care team and make appointments. If you have questions, please call your primary care clinic.  If you do not have a primary care provider, please call [phone] and they will assist you.        Care EveryWhere ID     This is your Care EveryWhere ID. This could be used by other organizations to access your [place] medical records  [id number]        Your Vitals Were     BMI (Body Mass Index)                   27.07 kg/m2            Blood  Pressure from Last 3 Encounters:   10/31/17 118/68   10/12/17 120/68   10/06/17 114/76    Weight from Last 3 Encounters:   10/31/17 84.4 kg (186 lb)   10/12/17 85.7 kg (189 lb)   10/06/17 84.8 kg (187 lb)              We Performed the Following     Prothrombin - INR ([facility])        Primary Care Provider Office Phone # Fax #    Jermain Helm [phone] [address]  [facility] [place] [facility] [address]        Equal Access to Services     [facility]: Hadii aad ku hadasho Soomaali, waaxda luqadaha, qaybta kaalmada adeegyada, jennifer humphrey . So [facility] [phone].    ATENCIÓN: Si habla español, tiene a singh disposición servicios gratuitos de asistencia lingüística. [facility] [phone].    We comply with applicable federal civil rights laws and [place] laws. We do not discriminate on the basis of race, color, national origin, age, disability, sex, sexual orientation, or gender identity.            Thank you!     Thank you for choosing [facility]  for your care. Our goal is always to provide you with excellent care. Hearing back from our patients is one way we can continue to improve our services. Please take a few minutes to complete the written survey that you may receive in the mail after your visit with us. Thank you!             Your Updated Medication List - Protect others around you: Learn how to safely use, store and throw away your medicines at www.disposemymeds.org.          This list is accurate as of: 10/31/17 10:48 AM.  Always use your most recent med list.                   Brand Name Dispense Instructions for use Diagnosis    ACE/ARB/ARNI NOT PRESCRIBED (INTENTIONAL)      ACE & ARB not prescribed due to Symptomatic hypotension not due to excessive diuresis    Chronic systolic congestive heart failure (H)       apixaban ANTICOAGULANT 5 MG tablet    ELIQUIS    60 tablet    Take 1 tablet (5 mg) by mouth 2 times daily    Chronic atrial fibrillation  (H)       aspirin 81 MG chewable tablet     36 tablet    Take 1 tablet (81 mg) by mouth daily    History of MI (myocardial infarction)       atorvastatin 20 MG tablet    LIPITOR    90 tablet    TAKE 1 TABLET EVERY DAY    Encounter for medication refill       benzocaine-menthol 6-10 MG lozenge    CHLORASEPTIC    84 lozenge    Place 1 lozenge inside cheek every hour as needed for sore throat    Throat pain       bicalutamide 50 MG tablet    CASODEX    90 tablet    Take 1 tablet (50 mg) by mouth daily    Encounter for medication refill       Blood Glucose Monitoring Suppl W/DEVICE Kit     1 kit    Test blood glucose every other day    Type II or unspecified type diabetes mellitus without mention of complication, not stated as uncontrolled, Rhabdomyolysis, UTI (urinary tract infection)       D3-1000 PO      Take 1 capsule by mouth daily        glimepiride 1 MG tablet    AMARYL    90 tablet    TAKE 1 TABLET (1 MG) BY MOUTH EVERY MORNING (BEFORE BREAKFAST)    Encounter for medication refill       metFORMIN 500 MG tablet    GLUCOPHAGE    360 tablet    TAKE 2 TABLETS TWICE DAILY WITH MEALS    Encounter for medication refill       metoprolol 25 MG 24 hr tablet    TOPROL-XL    90 tablet    TAKE 1 TABLET EVERY DAY    Encounter for medication refill       multivitamin, therapeutic Tabs tablet      Take 1 tablet by mouth daily        sertraline 50 MG tablet    ZOLOFT    90 tablet    TAKE 1 TABLET EVERY DAY    Encounter for medication refill       warfarin 2 MG tablet    COUMADIN    135 tablet    TAKE 1 AND 1/2 TABLETS EVERY DAY    Encounter for medication refill

## 2017-10-31 NOTE — PROGRESS NOTES
INR  Goal 2-3  Dx TIA  Was taking 3 mg daily (coumadin)  Today 3.3  Supra  Plan:(he already took 3 mg today) 2 mg tomorrow, the 3 mg daily  Recheck one week    Mariela Stauffer MD, MEd

## 2017-10-31 NOTE — PATIENT INSTRUCTIONS
INR  Goal 2-3  Dx TIA  Was taking 3 mg daily (coumadin and eliquis)  Today 3.3  Supra  Plan: 2 mg today, the 3 mg daily  Recheck one week

## 2017-11-08 DIAGNOSIS — Z79.01 LONG TERM CURRENT USE OF ANTICOAGULANT THERAPY: Primary | ICD-10-CM

## 2017-11-08 DIAGNOSIS — Z86.73 HISTORY OF TIA (TRANSIENT ISCHEMIC ATTACK) AND STROKE: ICD-10-CM

## 2017-11-08 LAB — INR PPP: 3.2 (ref 2–3)

## 2017-11-08 PROCEDURE — 85610 PROTHROMBIN TIME: CPT | Performed by: FAMILY MEDICINE

## 2017-11-08 PROCEDURE — 36415 COLL VENOUS BLD VENIPUNCTURE: CPT | Performed by: FAMILY MEDICINE

## 2017-11-08 PROCEDURE — 99212 OFFICE O/P EST SF 10 MIN: CPT | Performed by: FAMILY MEDICINE

## 2017-12-12 VITALS — DIASTOLIC BLOOD PRESSURE: 68 MMHG | BODY MASS INDEX: 26.78 KG/M2 | SYSTOLIC BLOOD PRESSURE: 104 MMHG | WEIGHT: 184 LBS

## 2017-12-12 DIAGNOSIS — Z86.73 HISTORY OF TIA (TRANSIENT ISCHEMIC ATTACK) AND STROKE: Primary | ICD-10-CM

## 2017-12-12 DIAGNOSIS — Z79.01 LONG TERM CURRENT USE OF ANTICOAGULANT THERAPY: ICD-10-CM

## 2017-12-12 LAB — INR PPP: 2.3 (ref 2–3)

## 2017-12-12 PROCEDURE — 36415 COLL VENOUS BLD VENIPUNCTURE: CPT | Performed by: FAMILY MEDICINE

## 2017-12-12 PROCEDURE — 85610 PROTHROMBIN TIME: CPT | Performed by: FAMILY MEDICINE

## 2018-01-16 ENCOUNTER — TRANSFERRED RECORDS (OUTPATIENT)
Dept: FAMILY MEDICINE | Facility: CLINIC | Age: 78
End: 2018-01-16

## 2018-01-18 DIAGNOSIS — I25.2 HISTORY OF MI (MYOCARDIAL INFARCTION): Primary | ICD-10-CM

## 2018-01-18 DIAGNOSIS — Z79.01 LONG TERM CURRENT USE OF ANTICOAGULANT THERAPY: ICD-10-CM

## 2018-01-18 LAB — INR PPP: 4.7 0 (ref 2–3)

## 2018-01-18 PROCEDURE — 85610 PROTHROMBIN TIME: CPT | Performed by: FAMILY MEDICINE

## 2018-01-18 PROCEDURE — 99213 OFFICE O/P EST LOW 20 MIN: CPT | Performed by: FAMILY MEDICINE

## 2018-01-18 PROCEDURE — 36415 COLL VENOUS BLD VENIPUNCTURE: CPT | Performed by: FAMILY MEDICINE

## 2018-01-18 NOTE — PROGRESS NOTES
INR recheck  Dx TIA  Goal 2-3  Today 4.7 HIGH  Has been taking 2 mg MF and 3 mg otherwise (today he already took 3 mg)  No bleeding/bruising  No Coumadin interfering foods  Plan:   2 mg Friday/ Sat/Sun  Recheck Monday    Mariela Stauffer MD  [facility]

## 2018-01-18 NOTE — PATIENT INSTRUCTIONS
INR recheck  Dx TIA  Goal 2-3  Today 4.7 HIGH  Has been taking 2 mg MF and 3 mg otherwise (today took 3 mg)  No bleeding/bruising  No Coumadin interfering foods  Plan:   2 mg Friday / Sat/Sun  Recheck Monday

## 2018-01-18 NOTE — MR AVS SNAPSHOT
After Visit Summary   1/18/2018    Jose Lees    MRN: [id number]           Patient Information     Date Of Birth          1940        Visit Information        Provider Department      1/18/2018 10:30 AM Mariela Stauffer MD [facility]        Today's Diagnoses     History of MI (myocardial infarction)    -  1    Long term current use of anticoagulant therapy          Care Instructions    INR recheck  Dx TIA  Goal 2-3  Today 4.7 HIGH  Has been taking 2 mg MF and 3 mg otherwise (today took 3 mg)  No bleeding/bruising  No Coumadin interfering foods  Plan:   Hold Friday   2 mg Sat/Sun  Recheck Monday          Follow-ups after your visit        Who to contact     If you have questions or need follow up information about today's clinic visit or your schedule please contact [facility] directly at [phone].  Normal or non-critical lab and imaging results will be communicated to you by [employer] Windhart, letter or phone within 4 business days after the clinic has received the results. If you do not hear from us within 7 days, please contact the clinic through [employer] or phone. If you have a critical or abnormal lab result, we will notify you by phone as soon as possible.  Submit refill requests through Neighbor.ly or call your pharmacy and they will forward the refill request to us. Please allow 3 business days for your refill to be completed.          Additional Information About Your Visit        MyChart Information     Neighbor.ly gives you secure access to your electronic health record. If you see a primary care provider, you can also send messages to your care team and make appointments. If you have questions, please call your primary care clinic.  If you do not have a primary care provider, please call [phone] and they will assist you.        Care EveryWhere ID     This is your Care EveryWhere ID. This could be used by other organizations to access your [facility]  records  [id number]         Blood Pressure from Last 3 Encounters:   12/12/17 104/68   10/31/17 118/68   10/12/17 120/68    Weight from Last 3 Encounters:   12/12/17 83.5 kg (184 lb)   10/31/17 84.4 kg (186 lb)   10/12/17 85.7 kg (189 lb)              We Performed the Following     Prothrombin - INR ([facility])        Primary Care Provider Office Phone # Fax #    Jermain Helm [phone] [address]  [facility] [address]        Equal Access to Services     [facility]: Hadii aad ku hadasho Soomaali, waaxda luqadaha, qaybta kaalmada adeegyada, jennifer brand haynolan humphrey . So [facility] [phone].    ATENCIÓN: Si habla español, tiene a signh disposición servicios gratuitos de asistencia lingüística. Llame al [phone].    We comply with applicable federal civil rights laws and [place] laws. We do not discriminate on the basis of race, color, national origin, age, disability, sex, sexual orientation, or gender identity.            Thank you!     Thank you for choosing [facility]  for your care. Our goal is always to provide you with excellent care. Hearing back from our patients is one way we can continue to improve our services. Please take a few minutes to complete the written survey that you may receive in the mail after your visit with us. Thank you!             Your Updated Medication List - Protect others around you: Learn how to safely use, store and throw away your medicines at www.disposemymeds.org.          This list is accurate as of: 1/18/18 11:09 AM.  Always use your most recent med list.                   Brand Name Dispense Instructions for use Diagnosis    ACE/ARB/ARNI NOT PRESCRIBED (INTENTIONAL)      ACE & ARB not prescribed due to Symptomatic hypotension not due to excessive diuresis    Chronic systolic congestive heart failure (H)       aspirin 81 MG chewable tablet     36 tablet    Take 1 tablet (81 mg) by mouth daily    History of MI  (myocardial infarction)       atorvastatin 20 MG tablet    LIPITOR    90 tablet    TAKE 1 TABLET EVERY DAY    Encounter for medication refill       benzocaine-menthol 6-10 MG lozenge    CHLORASEPTIC    84 lozenge    Place 1 lozenge inside cheek every hour as needed for sore throat    Throat pain       bicalutamide 50 MG tablet    CASODEX    90 tablet    Take 1 tablet (50 mg) by mouth daily    Encounter for medication refill       Blood Glucose Monitoring Suppl W/DEVICE Kit     1 kit    Test blood glucose every other day    Type II or unspecified type diabetes mellitus without mention of complication, not stated as uncontrolled, Rhabdomyolysis, UTI (urinary tract infection)       D3-1000 PO      Take 1 capsule by mouth daily        glimepiride 1 MG tablet    AMARYL    90 tablet    TAKE 1 TABLET (1 MG) BY MOUTH EVERY MORNING (BEFORE BREAKFAST)    Encounter for medication refill       metFORMIN 500 MG tablet    GLUCOPHAGE    360 tablet    TAKE 2 TABLETS TWICE DAILY WITH MEALS    Encounter for medication refill       metoprolol succinate 25 MG 24 hr tablet    TOPROL-XL    90 tablet    TAKE 1 TABLET EVERY DAY    Encounter for medication refill       multivitamin, therapeutic Tabs tablet      Take 1 tablet by mouth daily        sertraline 50 MG tablet    ZOLOFT    90 tablet    TAKE 1 TABLET EVERY DAY    Encounter for medication refill       warfarin 2 MG tablet    COUMADIN    135 tablet    TAKE 1 AND 1/2 TABLETS EVERY DAY    Encounter for medication refill

## 2018-01-22 ENCOUNTER — OFFICE VISIT (OUTPATIENT)
Dept: FAMILY MEDICINE | Facility: CLINIC | Age: 78
End: 2018-01-22

## 2018-01-22 VITALS
WEIGHT: 187 LBS | DIASTOLIC BLOOD PRESSURE: 68 MMHG | SYSTOLIC BLOOD PRESSURE: 102 MMHG | RESPIRATION RATE: 16 BRPM | BODY MASS INDEX: 27.22 KG/M2

## 2018-01-22 DIAGNOSIS — Z86.73 HISTORY OF TIA (TRANSIENT ISCHEMIC ATTACK) AND STROKE: Primary | ICD-10-CM

## 2018-01-22 LAB — INR PPP: 3.6 (ref 2–3)

## 2018-01-22 PROCEDURE — 99212 OFFICE O/P EST SF 10 MIN: CPT | Performed by: FAMILY MEDICINE

## 2018-01-22 PROCEDURE — 36415 COLL VENOUS BLD VENIPUNCTURE: CPT | Performed by: FAMILY MEDICINE

## 2018-01-22 PROCEDURE — 85610 PROTHROMBIN TIME: CPT | Performed by: FAMILY MEDICINE

## 2018-01-22 NOTE — MR AVS SNAPSHOT
After Visit Summary   1/22/2018    Jose Lees    MRN: [id number]           Patient Information     Date Of Birth          1940        Visit Information        Provider Department      1/22/2018 2:00 PM Kamar Nieto MD [facility]        Today's Diagnoses     History of TIA (transient ischemic attack) and stroke    -  1      Care Instructions    none today; 3 mg MF, and 2 mg the other days. Check in a week.          Follow-ups after your visit        Who to contact     If you have questions or need follow up information about today's clinic visit or your schedule please contact [facility] directly at [phone].  Normal or non-critical lab and imaging results will be communicated to you by [employer], letter or phone within 4 business days after the clinic has received the results. If you do not hear from us within 7 days, please contact the clinic through [employer] or phone. If you have a critical or abnormal lab result, we will notify you by phone as soon as possible.  Submit refill requests through Trippin In or call your pharmacy and they will forward the refill request to us. Please allow 3 business days for your refill to be completed.          Additional Information About Your Visit        MyChart Information     Trippin In gives you secure access to your electronic health record. If you see a primary care provider, you can also send messages to your care team and make appointments. If you have questions, please call your primary care clinic.  If you do not have a primary care provider, please call [phone] and they will assist you.        Care EveryWhere ID     This is your Care EveryWhere ID. This could be used by other organizations to access your [place] medical records  [id number]        Your Vitals Were     Respirations BMI (Body Mass Index)                16 27.22 kg/m2           Blood Pressure from Last 3 Encounters:   01/22/18 102/68   12/12/17  104/68   10/31/17 118/68    Weight from Last 3 Encounters:   01/22/18 84.8 kg (187 lb)   12/12/17 83.5 kg (184 lb)   10/31/17 84.4 kg (186 lb)              We Performed the Following     Prothrombin - INR (RMG)        Primary Care Provider Office Phone # Fax #    Jermain Helm [phone] [address]  [facility] [address]        Equal Access to Services     [facility] : Hadii aad ku hadasho Soomaali, waaxda luqadaha, qaybta kaalmada adeegyada, waxay idiin hayaan adeeg kharash kam . So [facility] [phone].    ATENCIÓN: Si habla español, tiene a singh disposición servicios gratuitos de asistencia lingüística. Llame al [phone].    We comply with applicable federal civil rights laws and [place] laws. We do not discriminate on the basis of race, color, national origin, age, disability, sex, sexual orientation, or gender identity.            Thank you!     Thank you for choosing [facility]  for your care. Our goal is always to provide you with excellent care. Hearing back from our patients is one way we can continue to improve our services. Please take a few minutes to complete the written survey that you may receive in the mail after your visit with us. Thank you!             Your Updated Medication List - Protect others around you: Learn how to safely use, store and throw away your medicines at www.disposemymeds.org.          This list is accurate as of: 1/22/18  3:03 PM.  Always use your most recent med list.                   Brand Name Dispense Instructions for use Diagnosis    ACE/ARB/ARNI NOT PRESCRIBED (INTENTIONAL)      ACE & ARB not prescribed due to Symptomatic hypotension not due to excessive diuresis    Chronic systolic congestive heart failure (H)       aspirin 81 MG chewable tablet     36 tablet    Take 1 tablet (81 mg) by mouth daily    History of MI (myocardial infarction)       atorvastatin 20 MG tablet    LIPITOR    90 tablet    TAKE 1 TABLET  EVERY DAY    Encounter for medication refill       benzocaine-menthol 6-10 MG lozenge    CHLORASEPTIC    84 lozenge    Place 1 lozenge inside cheek every hour as needed for sore throat    Throat pain       bicalutamide 50 MG tablet    CASODEX    90 tablet    Take 1 tablet (50 mg) by mouth daily    Encounter for medication refill       Blood Glucose Monitoring Suppl W/DEVICE Kit     1 kit    Test blood glucose every other day    Type II or unspecified type diabetes mellitus without mention of complication, not stated as uncontrolled, Rhabdomyolysis, UTI (urinary tract infection)       D3-1000 PO      Take 1 capsule by mouth daily        glimepiride 1 MG tablet    AMARYL    90 tablet    TAKE 1 TABLET (1 MG) BY MOUTH EVERY MORNING (BEFORE BREAKFAST)    Encounter for medication refill       metFORMIN 500 MG tablet    GLUCOPHAGE    360 tablet    TAKE 2 TABLETS TWICE DAILY WITH MEALS    Encounter for medication refill       metoprolol succinate 25 MG 24 hr tablet    TOPROL-XL    90 tablet    TAKE 1 TABLET EVERY DAY    Encounter for medication refill       multivitamin, therapeutic Tabs tablet      Take 1 tablet by mouth daily        sertraline 50 MG tablet    ZOLOFT    90 tablet    TAKE 1 TABLET EVERY DAY    Encounter for medication refill       warfarin 2 MG tablet    COUMADIN    135 tablet    TAKE 1 AND 1/2 TABLETS EVERY DAY    Encounter for medication refill

## 2018-01-31 DIAGNOSIS — Z79.01 LONG TERM CURRENT USE OF ANTICOAGULANT THERAPY: Primary | ICD-10-CM

## 2018-01-31 LAB — INR PPP: 1.2 (ref 2–3)

## 2018-01-31 PROCEDURE — 85610 PROTHROMBIN TIME: CPT | Performed by: FAMILY MEDICINE

## 2018-01-31 PROCEDURE — 99212 OFFICE O/P EST SF 10 MIN: CPT | Performed by: FAMILY MEDICINE

## 2018-01-31 PROCEDURE — 36415 COLL VENOUS BLD VENIPUNCTURE: CPT | Performed by: FAMILY MEDICINE

## 2018-01-31 NOTE — MR AVS SNAPSHOT
After Visit Summary   1/31/2018    Jose Lees    MRN: [id number]           Patient Information     Date Of Birth          1940        Visit Information        Provider Department      1/31/2018 12:45 PM Arlene Mcdermott MD [facility]        Today's Diagnoses     Long term current use of anticoagulant therapy    -  1      Care Instructions    INR today is 1.2. You are safe to have procedure on 2/2/18.   After the procedure, please restart regular coumadin dosing and recheck a week from Friday.          Follow-ups after your visit        Who to contact     If you have questions or need follow up information about today's clinic visit or your schedule please contact [facility] directly at [phone].  Normal or non-critical lab and imaging results will be communicated to you by [employer], letter or phone within 4 business days after the clinic has received the results. If you do not hear from us within 7 days, please contact the clinic through [employer] or phone. If you have a critical or abnormal lab result, we will notify you by phone as soon as possible.  Submit refill requests through BitGo or call your pharmacy and they will forward the refill request to us. Please allow 3 business days for your refill to be completed.          Additional Information About Your Visit        [employer] Information     BitGo gives you secure access to your electronic health record. If you see a primary care provider, you can also send messages to your care team and make appointments. If you have questions, please call your primary care clinic.  If you do not have a primary care provider, please call [phone] and they will assist you.        Care EveryWhere ID     This is your Care EveryWhere ID. This could be used by other organizations to access your [place] medical records  [id number]         Blood Pressure from Last 3 Encounters:   01/22/18 102/68   12/12/17  104/68   10/31/17 118/68    Weight from Last 3 Encounters:   01/22/18 84.8 kg (187 lb)   12/12/17 83.5 kg (184 lb)   10/31/17 84.4 kg (186 lb)              We Performed the Following     Prothrombin - INR (RMG)        Primary Care Provider Office Phone # Fax #    Jermain Helm [phone] [address]  [facility] [address]        Equal Access to Services     [facility] : Hadii aad ku hadasho Soomaali, waaxda luqadaha, qaybta kaalmada adeegyada, waxay idiin hayaan adeeg kharash kam . So [facility] [phone].    ATENCIÓN: Si habla español, tiene a singh disposición servicios gratuitos de asistencia lingüística. Llame al [phone].    We comply with applicable federal civil rights laws and [place] laws. We do not discriminate on the basis of race, color, national origin, age, disability, sex, sexual orientation, or gender identity.            Thank you!     Thank you for choosing [facility]  for your care. Our goal is always to provide you with excellent care. Hearing back from our patients is one way we can continue to improve our services. Please take a few minutes to complete the written survey that you may receive in the mail after your visit with us. Thank you!             Your Updated Medication List - Protect others around you: Learn how to safely use, store and throw away your medicines at www.disposemymeds.org.          This list is accurate as of 1/31/18  1:36 PM.  Always use your most recent med list.                   Brand Name Dispense Instructions for use Diagnosis    ACE/ARB/ARNI NOT PRESCRIBED (INTENTIONAL)      ACE & ARB not prescribed due to Symptomatic hypotension not due to excessive diuresis    Chronic systolic congestive heart failure (H)       aspirin 81 MG chewable tablet     36 tablet    Take 1 tablet (81 mg) by mouth daily    History of MI (myocardial infarction)       atorvastatin 20 MG tablet    LIPITOR    90 tablet    TAKE 1 TABLET  EVERY DAY    Encounter for medication refill       benzocaine-menthol 6-10 MG lozenge    CHLORASEPTIC    84 lozenge    Place 1 lozenge inside cheek every hour as needed for sore throat    Throat pain       bicalutamide 50 MG tablet    CASODEX    90 tablet    Take 1 tablet (50 mg) by mouth daily    Encounter for medication refill       Blood Glucose Monitoring Suppl W/DEVICE Kit     1 kit    Test blood glucose every other day    Type II or unspecified type diabetes mellitus without mention of complication, not stated as uncontrolled, Rhabdomyolysis, UTI (urinary tract infection)       D3-1000 PO      Take 1 capsule by mouth daily        glimepiride 1 MG tablet    AMARYL    90 tablet    TAKE 1 TABLET (1 MG) BY MOUTH EVERY MORNING (BEFORE BREAKFAST)    Encounter for medication refill       metFORMIN 500 MG tablet    GLUCOPHAGE    360 tablet    TAKE 2 TABLETS TWICE DAILY WITH MEALS    Encounter for medication refill       metoprolol succinate 25 MG 24 hr tablet    TOPROL-XL    90 tablet    TAKE 1 TABLET EVERY DAY    Encounter for medication refill       multivitamin, therapeutic Tabs tablet      Take 1 tablet by mouth daily        sertraline 50 MG tablet    ZOLOFT    90 tablet    TAKE 1 TABLET EVERY DAY    Encounter for medication refill       warfarin 2 MG tablet    COUMADIN    135 tablet    TAKE 1 AND 1/2 TABLETS EVERY DAY    Encounter for medication refill

## 2018-01-31 NOTE — PATIENT INSTRUCTIONS
INR today is 1.2. You are safe to have procedure on 2/2/18.   After the procedure, please restart regular coumadin dosing and recheck a week from Friday.

## 2018-02-02 ENCOUNTER — TRANSFERRED RECORDS (OUTPATIENT)
Dept: FAMILY MEDICINE | Facility: CLINIC | Age: 78
End: 2018-02-02

## 2018-02-14 VITALS — WEIGHT: 178 LBS | BODY MASS INDEX: 25.91 KG/M2

## 2018-02-14 DIAGNOSIS — Z79.01 LONG TERM CURRENT USE OF ANTICOAGULANT THERAPY: Primary | ICD-10-CM

## 2018-02-14 LAB — INR PPP: 3.7 (ref 2–3)

## 2018-02-14 PROCEDURE — 85610 PROTHROMBIN TIME: CPT | Performed by: FAMILY MEDICINE

## 2018-02-14 PROCEDURE — 36415 COLL VENOUS BLD VENIPUNCTURE: CPT | Performed by: FAMILY MEDICINE

## 2018-02-21 VITALS — BODY MASS INDEX: 25.5 KG/M2 | DIASTOLIC BLOOD PRESSURE: 58 MMHG | SYSTOLIC BLOOD PRESSURE: 104 MMHG | WEIGHT: 175.2 LBS

## 2018-02-21 DIAGNOSIS — Z79.01 LONG TERM CURRENT USE OF ANTICOAGULANT THERAPY: ICD-10-CM

## 2018-02-21 DIAGNOSIS — Z86.73 HISTORY OF TIA (TRANSIENT ISCHEMIC ATTACK) AND STROKE: Primary | ICD-10-CM

## 2018-02-21 LAB — INR PPP: 6.9 (ref 2–3)

## 2018-02-21 PROCEDURE — 36415 COLL VENOUS BLD VENIPUNCTURE: CPT | Performed by: FAMILY MEDICINE

## 2018-02-21 PROCEDURE — 99212 OFFICE O/P EST SF 10 MIN: CPT | Performed by: FAMILY MEDICINE

## 2018-02-21 PROCEDURE — 85610 PROTHROMBIN TIME: CPT | Performed by: FAMILY MEDICINE

## 2018-02-22 LAB
INR PPP: 6.4 (ref 0.8–1.2)
PT BLD: 60.6 SEC (ref 9.1–12)

## 2018-02-23 VITALS — BODY MASS INDEX: 25.33 KG/M2 | WEIGHT: 174 LBS | SYSTOLIC BLOOD PRESSURE: 100 MMHG | DIASTOLIC BLOOD PRESSURE: 68 MMHG

## 2018-02-23 DIAGNOSIS — E11.9 TYPE 2 DIABETES MELLITUS WITHOUT COMPLICATION, WITHOUT LONG-TERM CURRENT USE OF INSULIN (H): Primary | ICD-10-CM

## 2018-02-23 DIAGNOSIS — Z86.73 HISTORY OF TIA (TRANSIENT ISCHEMIC ATTACK) AND STROKE: ICD-10-CM

## 2018-02-23 DIAGNOSIS — Z79.01 LONG TERM CURRENT USE OF ANTICOAGULANT THERAPY: ICD-10-CM

## 2018-02-23 LAB — INR PPP: 5.3 (ref 2–3)

## 2018-02-23 PROCEDURE — 99212 OFFICE O/P EST SF 10 MIN: CPT | Performed by: FAMILY MEDICINE

## 2018-02-23 PROCEDURE — 36415 COLL VENOUS BLD VENIPUNCTURE: CPT | Performed by: FAMILY MEDICINE

## 2018-02-24 LAB
HBA1C MFR BLD: 10.3 % (ref 4.8–5.6)
INR PPP: 4.9 (ref 0.8–1.2)
PT BLD: 47.1 SEC (ref 9.1–12)

## 2018-02-26 VITALS — BODY MASS INDEX: 25.62 KG/M2 | WEIGHT: 176 LBS | SYSTOLIC BLOOD PRESSURE: 124 MMHG | DIASTOLIC BLOOD PRESSURE: 68 MMHG

## 2018-02-26 DIAGNOSIS — Z79.01 LONG TERM CURRENT USE OF ANTICOAGULANT THERAPY: ICD-10-CM

## 2018-02-26 DIAGNOSIS — Z86.73 HISTORY OF TIA (TRANSIENT ISCHEMIC ATTACK) AND STROKE: ICD-10-CM

## 2018-02-26 LAB — INR PPP: 3 (ref 2–3)

## 2018-02-26 PROCEDURE — 99213 OFFICE O/P EST LOW 20 MIN: CPT | Performed by: FAMILY MEDICINE

## 2018-02-26 PROCEDURE — 36415 COLL VENOUS BLD VENIPUNCTURE: CPT | Performed by: FAMILY MEDICINE

## 2018-02-26 PROCEDURE — 85610 PROTHROMBIN TIME: CPT | Performed by: FAMILY MEDICINE

## 2018-02-26 NOTE — PROGRESS NOTES
Here for INR. No complaints of bleeding. See flowsheet.  He also wished to discuss his elevated A1c of 10.3. He is not sure he is taking his medications. This needs to be rechecked in 3 months.  >15 min>50% counseling re: diet, compliance issues. His wife will try to monitor.

## 2018-03-06 VITALS — DIASTOLIC BLOOD PRESSURE: 70 MMHG | WEIGHT: 177 LBS | SYSTOLIC BLOOD PRESSURE: 110 MMHG | BODY MASS INDEX: 25.76 KG/M2

## 2018-03-06 DIAGNOSIS — Z86.73 HISTORY OF TIA (TRANSIENT ISCHEMIC ATTACK) AND STROKE: ICD-10-CM

## 2018-03-06 DIAGNOSIS — Z79.01 LONG TERM CURRENT USE OF ANTICOAGULANT THERAPY: ICD-10-CM

## 2018-03-06 LAB — INR PPP: 1.5 (ref 2–3)

## 2018-03-06 PROCEDURE — 99212 OFFICE O/P EST SF 10 MIN: CPT | Performed by: FAMILY MEDICINE

## 2018-03-06 PROCEDURE — 85610 PROTHROMBIN TIME: CPT | Performed by: FAMILY MEDICINE

## 2018-03-06 PROCEDURE — 36415 COLL VENOUS BLD VENIPUNCTURE: CPT | Performed by: FAMILY MEDICINE

## 2018-03-06 NOTE — MR AVS SNAPSHOT
After Visit Summary   3/6/2018    Jose Lees    MRN: [id number]           Patient Information     Date Of Birth          1940        Visit Information        Provider Department      3/6/2018 10:00 AM Kamar Nieto MD [facility]        Today's Diagnoses     Long term current use of anticoagulant therapy        History of TIA (transient ischemic attack) and stroke          Care Instructions    3 mg MF, and 2 mg ther rest of the days. Recheck in 2 weeks.          Follow-ups after your visit        Who to contact     If you have questions or need follow up information about today's clinic visit or your schedule please contact [facility] directly at [phone].  Normal or non-critical lab and imaging results will be communicated to you by [employer]Cohart, letter or phone within 4 business days after the clinic has received the results. If you do not hear from us within 7 days, please contact the clinic through [employer] or phone. If you have a critical or abnormal lab result, we will notify you by phone as soon as possible.  Submit refill requests through Polleverywhere or call your pharmacy and they will forward the refill request to us. Please allow 3 business days for your refill to be completed.          Additional Information About Your Visit        MyChart Information     Polleverywhere gives you secure access to your electronic health record. If you see a primary care provider, you can also send messages to your care team and make appointments. If you have questions, please call your primary care clinic.  If you do not have a primary care provider, please call [phone] and they will assist you.        Care EveryWhere ID     This is your Care EveryWhere ID. This could be used by other organizations to access your [place] medical records  [id number]        Your Vitals Were     BMI (Body Mass Index)                   25.76 kg/m2            Blood Pressure from Last 3  Encounters:   03/06/18 110/70   02/26/18 124/68   02/23/18 100/68    Weight from Last 3 Encounters:   03/06/18 80.3 kg (177 lb)   02/26/18 79.8 kg (176 lb)   02/23/18 78.9 kg (174 lb)              We Performed the Following     Prothrombin - INR (RMG)        Primary Care Provider Office Phone # Fax #    Jermain Helm [phone] [address]  [facility] [address]        Equal Access to Services     [facility]: Hadii aad ku hadasho Soomaali, waaxda luqadaha, qaybta kaalmada adeegyahadley, jennifer humphrey . So [facility] [phone].    ATENCIÓN: Si habla español, tiene a singh disposición servicios gratuitos de asistencia lingüística. LlCommunity [facility] [phone].    We comply with applicable federal civil rights laws and [place] laws. We do not discriminate on the basis of race, color, national origin, age, disability, sex, sexual orientation, or gender identity.            Thank you!     Thank you for choosing [facility]  for your care. Our goal is always to provide you with excellent care. Hearing back from our patients is one way we can continue to improve our services. Please take a few minutes to complete the written survey that you may receive in the mail after your visit with us. Thank you!             Your Updated Medication List - Protect others around you: Learn how to safely use, store and throw away your medicines at www.disposemymeds.org.          This list is accurate as of 3/6/18 10:58 AM.  Always use your most recent med list.                   Brand Name Dispense Instructions for use Diagnosis    ACE/ARB/ARNI NOT PRESCRIBED (INTENTIONAL)      ACE & ARB not prescribed due to Symptomatic hypotension not due to excessive diuresis    Chronic systolic congestive heart failure (H)       aspirin 81 MG chewable tablet     36 tablet    Take 1 tablet (81 mg) by mouth daily    History of MI (myocardial infarction)       atorvastatin 20 MG tablet     LIPITOR    90 tablet    TAKE 1 TABLET EVERY DAY    Encounter for medication refill       benzocaine-menthol 6-10 MG lozenge    CHLORASEPTIC    84 lozenge    Place 1 lozenge inside cheek every hour as needed for sore throat    Throat pain       bicalutamide 50 MG tablet    CASODEX    90 tablet    Take 1 tablet (50 mg) by mouth daily    Encounter for medication refill       Blood Glucose Monitoring Suppl W/DEVICE Kit     1 kit    Test blood glucose every other day    Type II or unspecified type diabetes mellitus without mention of complication, not stated as uncontrolled, Rhabdomyolysis, UTI (urinary tract infection)       D3-1000 PO      Take 1 capsule by mouth daily        glimepiride 1 MG tablet    AMARYL    90 tablet    TAKE 1 TABLET (1 MG) BY MOUTH EVERY MORNING (BEFORE BREAKFAST)    Encounter for medication refill       metFORMIN 500 MG tablet    GLUCOPHAGE    360 tablet    TAKE 2 TABLETS TWICE DAILY WITH MEALS    Encounter for medication refill       metoprolol succinate 25 MG 24 hr tablet    TOPROL-XL    90 tablet    TAKE 1 TABLET EVERY DAY    Encounter for medication refill       multivitamin, therapeutic Tabs tablet      Take 1 tablet by mouth daily        sertraline 50 MG tablet    ZOLOFT    90 tablet    TAKE 1 TABLET EVERY DAY    Encounter for medication refill       warfarin 2 MG tablet    COUMADIN    135 tablet    TAKE 1 AND 1/2 TABLETS EVERY DAY    Encounter for medication refill

## 2018-03-09 ENCOUNTER — DOCUMENTATION ONLY (OUTPATIENT)
Dept: OTHER | Facility: CLINIC | Age: 78
End: 2018-03-09

## 2018-03-09 DIAGNOSIS — Z71.89 ACP (ADVANCE CARE PLANNING): Chronic | ICD-10-CM

## 2018-03-23 VITALS — DIASTOLIC BLOOD PRESSURE: 70 MMHG | WEIGHT: 138 LBS | BODY MASS INDEX: 20.09 KG/M2 | SYSTOLIC BLOOD PRESSURE: 100 MMHG

## 2018-03-23 DIAGNOSIS — Z79.01 LONG TERM CURRENT USE OF ANTICOAGULANT THERAPY: ICD-10-CM

## 2018-03-23 DIAGNOSIS — Z86.73 HISTORY OF TIA (TRANSIENT ISCHEMIC ATTACK) AND STROKE: ICD-10-CM

## 2018-03-23 LAB — INR PPP: 1.9 (ref 2–3)

## 2018-03-23 PROCEDURE — 36415 COLL VENOUS BLD VENIPUNCTURE: CPT | Performed by: FAMILY MEDICINE

## 2018-03-23 PROCEDURE — 85610 PROTHROMBIN TIME: CPT | Performed by: FAMILY MEDICINE

## 2018-03-23 PROCEDURE — 99213 OFFICE O/P EST LOW 20 MIN: CPT | Performed by: FAMILY MEDICINE

## 2018-03-23 NOTE — PATIENT INSTRUCTIONS
[facility] Latest Ref Rng & Units 3/23/2018   INR 2.0 - 3.0 1.9 (A)   ASSESS  SUB   INR GOAL  2.0-3.0   WEEKLY DOSE  3 mg MF, and 2 mg ther rest of the days   PT INSTRUCT  continue same   RECHECK  2 WEEKS     TIA   LOCATION  Clinic   Comments  NO change in diet or meds. Pt was informed MT/RT        No bleeding/bruising  No coumadin interfering foods    BP Readings from Last 3 Encounters:   03/23/18 100/70   03/06/18 110/70   02/26/18 124/68

## 2018-03-23 NOTE — MR AVS SNAPSHOT
After Visit Summary   3/23/2018    Jose Lees    MRN: [id number]           Patient Information     Date Of Birth          1940        Visit Information        Provider Department      3/23/2018 10:30 AM Mariela Stauffer MD [facility]        Today's Diagnoses     Long term current use of anticoagulant therapy        History of TIA (transient ischemic attack) and stroke          Care Instructions    [facility] Latest Ref Rng & Units 3/23/2018   INR 2.0 - 3.0 1.9 (A)   ASSESS  SUB   INR GOAL  2.0-3.0   WEEKLY DOSE  3 mg MF, and 2 mg ther rest of the days   PT INSTRUCT  continue same   RECHECK  2 WEEKS     TIA   LOCATION  Clinic   Comments  NO change in diet or meds. Pt was informed MT/RT        No bleeding/bruising  No coumadin interfering foods    BP Readings from Last 3 Encounters:   03/23/18 100/70   03/06/18 110/70   02/26/18 124/68               Follow-ups after your visit        Who to contact     If you have questions or need follow up information about today's clinic visit or your schedule please contact [facility] directly at [phone].  Normal or non-critical lab and imaging results will be communicated to you by Firethornhart, letter or phone within 4 business days after the clinic has received the results. If you do not hear from us within 7 days, please contact the clinic through Imp[employer]t or phone. If you have a critical or abnormal lab result, we will notify you by phone as soon as possible.  Submit refill requests through Signix or call your pharmacy and they will forward the refill request to us. Please allow 3 business days for your refill to be completed.          Additional Information About Your Visit        MyChart Information     Signix gives you secure access to your electronic health record. If you see a primary care provider, you can also send messages to your care team and make appointments. If you have questions, please call  your primary care clinic.  If you do not have a primary care provider, please call [phone] and they will assist you.        Care EveryWhere ID     This is your Care EveryWhere ID. This could be used by other organizations to access your [place] medical records  [id number]        Your Vitals Were     BMI (Body Mass Index)                   20.09 kg/m2            Blood Pressure from Last 3 Encounters:   03/23/18 100/70   03/06/18 110/70   02/26/18 124/68    Weight from Last 3 Encounters:   03/23/18 62.6 kg (138 lb)   03/06/18 80.3 kg (177 lb)   02/26/18 79.8 kg (176 lb)              We Performed the Following     Prothrombin - INR (RMG)        Primary Care Provider Office Phone # Fax #    Jermain Helm [phone] [address]  [facility] [address]        Equal Access to Services     [facility] : Hadii aad ku hadasho Soomaali, waaxda luqadaha, qaybta kaalmada adeegyada, waxay garrettin haymayran ashwin khkateryna humphrey . So [facility] [phone].    ATENCIÓN: Si habla español, tiene a singh disposición servicios gratuitos de asistencia lingüística. Llame al [phone].    We comply with applicable federal civil rights laws and [place] laws. We do not discriminate on the basis of race, color, national origin, age, disability, sex, sexual orientation, or gender identity.            Thank you!     Thank you for choosing [facility]  for your care. Our goal is always to provide you with excellent care. Hearing back from our patients is one way we can continue to improve our services. Please take a few minutes to complete the written survey that you may receive in the mail after your visit with us. Thank you!             Your Updated Medication List - Protect others around you: Learn how to safely use, store and throw away your medicines at www.disposemymeds.org.          This list is accurate as of 3/23/18 11:18 AM.  Always use your most recent med list.                    Brand Name Dispense Instructions for use Diagnosis    ACE/ARB/ARNI NOT PRESCRIBED (INTENTIONAL)      ACE & ARB not prescribed due to Symptomatic hypotension not due to excessive diuresis    Chronic systolic congestive heart failure (H)       aspirin 81 MG chewable tablet     36 tablet    Take 1 tablet (81 mg) by mouth daily    History of MI (myocardial infarction)       atorvastatin 20 MG tablet    LIPITOR    90 tablet    TAKE 1 TABLET EVERY DAY    Encounter for medication refill       benzocaine-menthol 6-10 MG lozenge    CHLORASEPTIC    84 lozenge    Place 1 lozenge inside cheek every hour as needed for sore throat    Throat pain       bicalutamide 50 MG tablet    CASODEX    90 tablet    Take 1 tablet (50 mg) by mouth daily    Encounter for medication refill       Blood Glucose Monitoring Suppl W/DEVICE Kit     1 kit    Test blood glucose every other day    Type II or unspecified type diabetes mellitus without mention of complication, not stated as uncontrolled, Rhabdomyolysis, UTI (urinary tract infection)       D3-1000 PO      Take 1 capsule by mouth daily        glimepiride 1 MG tablet    AMARYL    90 tablet    TAKE 1 TABLET (1 MG) BY MOUTH EVERY MORNING (BEFORE BREAKFAST)    Encounter for medication refill       metFORMIN 500 MG tablet    GLUCOPHAGE    360 tablet    TAKE 2 TABLETS TWICE DAILY WITH MEALS    Encounter for medication refill       metoprolol succinate 25 MG 24 hr tablet    TOPROL-XL    90 tablet    TAKE 1 TABLET EVERY DAY    Encounter for medication refill       multivitamin, therapeutic Tabs tablet      Take 1 tablet by mouth daily        sertraline 50 MG tablet    ZOLOFT    90 tablet    TAKE 1 TABLET EVERY DAY    Encounter for medication refill       warfarin 2 MG tablet    COUMADIN    135 tablet    TAKE 1 AND 1/2 TABLETS EVERY DAY    Encounter for medication refill

## 2018-04-10 VITALS — DIASTOLIC BLOOD PRESSURE: 60 MMHG | BODY MASS INDEX: 26.93 KG/M2 | WEIGHT: 185 LBS | SYSTOLIC BLOOD PRESSURE: 110 MMHG

## 2018-04-10 DIAGNOSIS — Z86.73 HISTORY OF TIA (TRANSIENT ISCHEMIC ATTACK) AND STROKE: ICD-10-CM

## 2018-04-10 DIAGNOSIS — Z79.01 LONG TERM CURRENT USE OF ANTICOAGULANT THERAPY: ICD-10-CM

## 2018-04-10 LAB — INR PPP: 1.6 (ref 2–3)

## 2018-04-10 PROCEDURE — 99212 OFFICE O/P EST SF 10 MIN: CPT | Performed by: FAMILY MEDICINE

## 2018-04-10 PROCEDURE — 36415 COLL VENOUS BLD VENIPUNCTURE: CPT | Performed by: FAMILY MEDICINE

## 2018-04-10 PROCEDURE — 85610 PROTHROMBIN TIME: CPT | Performed by: FAMILY MEDICINE

## 2018-04-26 DIAGNOSIS — Z76.0 ENCOUNTER FOR MEDICATION REFILL: ICD-10-CM

## 2018-04-26 RX ORDER — GLIMEPIRIDE 1 MG/1
TABLET ORAL
Qty: 90 TABLET | Refills: 1 | Status: SHIPPED | OUTPATIENT
Start: 2018-04-26 | End: 2018-11-26

## 2018-05-01 VITALS — SYSTOLIC BLOOD PRESSURE: 126 MMHG | BODY MASS INDEX: 26.78 KG/M2 | WEIGHT: 184 LBS | DIASTOLIC BLOOD PRESSURE: 68 MMHG

## 2018-05-01 DIAGNOSIS — Z79.01 LONG TERM CURRENT USE OF ANTICOAGULANT THERAPY: ICD-10-CM

## 2018-05-01 DIAGNOSIS — Z86.73 HISTORY OF TIA (TRANSIENT ISCHEMIC ATTACK) AND STROKE: ICD-10-CM

## 2018-05-01 LAB — INR PPP: 1.7 (ref 2–3)

## 2018-05-01 PROCEDURE — 85610 PROTHROMBIN TIME: CPT | Performed by: FAMILY MEDICINE

## 2018-05-01 PROCEDURE — 36415 COLL VENOUS BLD VENIPUNCTURE: CPT | Performed by: FAMILY MEDICINE

## 2018-05-01 PROCEDURE — 99213 OFFICE O/P EST LOW 20 MIN: CPT | Performed by: FAMILY MEDICINE

## 2018-05-01 NOTE — MR AVS SNAPSHOT
After Visit Summary   5/1/2018    Jose Lees    MRN: [id number]           Patient Information     Date Of Birth          1940        Visit Information        Provider Department      5/1/2018 11:15 AM Arlene Mcdermott MD [facility]        Today's Diagnoses     Long term current use of anticoagulant therapy        History of TIA (transient ischemic attack) and stroke          Care Instructions    Continue coumadin 3 mg on Mondays, Wednesdays, and Fridays and coumadin 2 mg all other days.  DO NOT MISS DOSES. If you realize later in the day that you have missed a dose, you may take it when you realize the coumadin has been missed.          Follow-ups after your visit        Who to contact     If you have questions or need follow up information about today's clinic visit or your schedule please contact [facility] directly at [phone].  Normal or non-critical lab and imaging results will be communicated to you by PlayGigahart, letter or phone within 4 business days after the clinic has received the results. If you do not hear from us within 7 days, please contact the clinic through PlayGigahart or phone. If you have a critical or abnormal lab result, we will notify you by phone as soon as possible.  Submit refill requests through [employer] Stats or call your pharmacy and they will forward the refill request to us. Please allow 3 business days for your refill to be completed.          Additional Information About Your Visit        MyChart Information     [employer] Stats gives you secure access to your electronic health record. If you see a primary care provider, you can also send messages to your care team and make appointments. If you have questions, please call your primary care clinic.  If you do not have a primary care provider, please call [phone] and they will assist you.        Care EveryWhere ID     This is your Care EveryWhere ID. This could be used by other  organizations to access your [facility] medical records  [id number]        Your Vitals Were     BMI (Body Mass Index)                   26.78 kg/m2            Blood Pressure from Last 3 Encounters:   05/01/18 126/68   04/10/18 110/60   03/23/18 100/70    Weight from Last 3 Encounters:   05/01/18 83.5 kg (184 lb)   04/10/18 83.9 kg (185 lb)   03/23/18 62.6 kg (138 lb)              We Performed the Following     Prothrombin - INR ([facility])        Primary Care Provider Office Phone # Fax #    Jermain Helm [phone] [address]  [facility] [address]        Equal Access to Services     KARO BUENROSTRO : Hadii farhan edmonds hadasho Soradha, waaxda luqadaha, qaybta kaalmada adeegyada, jennifer humphrey . So [facility] [phone].    ATENCIÓN: Si habla español, tiene a singh disposición servicios gratuitos de asistencia lingüística. Yehuda al [phone].    We comply with applicable federal civil rights laws and [place] laws. We do not discriminate on the basis of race, color, national origin, age, disability, sex, sexual orientation, or gender identity.            Thank you!     Thank you for choosing [facility]  for your care. Our goal is always to provide you with excellent care. Hearing back from our patients is one way we can continue to improve our services. Please take a few minutes to complete the written survey that you may receive in the mail after your visit with us. Thank you!             Your Updated Medication List - Protect others around you: Learn how to safely use, store and throw away your medicines at www.disposemymeds.org.          This list is accurate as of 5/1/18 11:56 AM.  Always use your most recent med list.                   Brand Name Dispense Instructions for use Diagnosis    ACE/ARB/ARNI NOT PRESCRIBED (INTENTIONAL)      ACE & ARB not prescribed due to Symptomatic hypotension not due to excessive diuresis    Chronic systolic congestive  heart failure (H)       aspirin 81 MG chewable tablet     36 tablet    Take 1 tablet (81 mg) by mouth daily    History of MI (myocardial infarction)       atorvastatin 20 MG tablet    LIPITOR    90 tablet    TAKE 1 TABLET EVERY DAY    Encounter for medication refill       benzocaine-menthol 6-10 MG lozenge    CHLORASEPTIC    84 lozenge    Place 1 lozenge inside cheek every hour as needed for sore throat    Throat pain       bicalutamide 50 MG tablet    CASODEX    90 tablet    Take 1 tablet (50 mg) by mouth daily    Encounter for medication refill       Blood Glucose Monitoring Suppl w/Device Kit     1 kit    Test blood glucose every other day    Type II or unspecified type diabetes mellitus without mention of complication, not stated as uncontrolled, Rhabdomyolysis, UTI (urinary tract infection)       D3-1000 PO      Take 1 capsule by mouth daily        glimepiride 1 MG tablet    AMARYL    90 tablet    TAKE 1 TABLET (1 MG) BY MOUTH EVERY MORNING (BEFORE BREAKFAST)    Encounter for medication refill       metFORMIN 500 MG tablet    GLUCOPHAGE    360 tablet    TAKE 2 TABLETS TWICE DAILY WITH MEALS    Encounter for medication refill       metoprolol succinate 25 MG 24 hr tablet    TOPROL-XL    90 tablet    TAKE 1 TABLET EVERY DAY    Encounter for medication refill       multivitamin, therapeutic Tabs tablet      Take 1 tablet by mouth daily        sertraline 50 MG tablet    ZOLOFT    90 tablet    TAKE 1 TABLET EVERY DAY    Encounter for medication refill       warfarin 2 MG tablet    COUMADIN    135 tablet    TAKE 1 AND 1/2 TABLETS EVERY DAY    Encounter for medication refill

## 2018-05-01 NOTE — PATIENT INSTRUCTIONS
Continue coumadin 3 mg on Mondays, Wednesdays, and Fridays and coumadin 2 mg all other days.  DO NOT MISS DOSES. If you realize later in the day that you have missed a dose, you may take it when you realize the coumadin has been missed.

## 2018-05-01 NOTE — PROGRESS NOTES
"Problem(s) Oriented visit        SUBJECTIVE:                                                    Jose Lees is a 77 year old male who presents to clinic today for recheck of INR. He states that he has missed 2-3 this week. He gets \"too busy with crossword puzzles and everything else.\" His sister tries to be on his case to take his medications. He is forgetful.     Problem list, Medication list, Allergies, and Medical/Social/Surgical histories reviewed in EPIC and updated as appropriate.   Additional history: as documented    ROS:  5 point ROS completed and negative except noted above, including Gen, CV, Resp, GI, MS      Histories:   Patient Active Problem List   Diagnosis     Diabetes mellitus, type 2 (H)     Prostate cancer (H)     Autism disorder     ACP (advance care planning)     Health Care Home     History of MI (myocardial infarction)     History of stroke     CHF (congestive heart failure) (H)     Long term current use of anticoagulant therapy     Stricture and stenosis of esophagus     History of TIA (transient ischemic attack) and stroke     Influenza A     Physical deconditioning     Type 2 diabetes mellitus without complication, without long-term current use of insulin (H)     Past Surgical History:   Procedure Laterality Date     CORONARY ANGIOGRAPHY ADULT ORDER       ESOPHAGOSCOPY, GASTROSCOPY, DUODENOSCOPY (EGD), COMBINED N/A 8/6/2015    Procedure: COMBINED ESOPHAGOSCOPY, GASTROSCOPY, DUODENOSCOPY (EGD), REMOVE FOREIGN BODY;  Surgeon: Yu Tapia MD;  Location:  GI     HEART CATH, ANGIOPLASTY  4/14    BMS to LAD       Social History   Substance Use Topics     Smoking status: Never Smoker     Smokeless tobacco: Never Used     Alcohol use No      Comment: never     Family History   Problem Relation Age of Onset     CEREBROVASCULAR DISEASE Mother 92     C.A.D. Father 48           OBJECTIVE:                                                    /68  Wt 83.5 kg (184 lb)  BMI 26.78 " kg/m2  Body mass index is 26.78 kg/(m^2).   GENERAL APPEARANCE: Alert, no acute distress  NEURO: Alert, oriented, speech and mentation appears normal, yet is is seemingly unconcerned about the frequency of his missed doses.  Psych: affect is normal   Labs Resulted Today:   Results for orders placed or performed in visit on 05/01/18   Prothrombin - INR (RMG)   Result Value Ref Range    INR 1.7 (A) 2.0 - 3.0     ASSESSMENT/PLAN:                                                        Jose was seen today for inr followup.    Diagnoses and all orders for this visit:    Long term current use of anticoagulant therapy  -     Prothrombin - INR (RMG)    History of TIA (transient ischemic attack) and stroke  -     Prothrombin - INR ([facility])        Patient Instructions   Continue coumadin 3 mg on Mondays, Wednesdays, and Fridays and coumadin 2 mg all other days.  DO NOT MISS DOSES. If you realize later in the day that you have missed a dose, you may take it when you realize the coumadin has been missed.      The following health maintenance items are reviewed in Epic and correct as of today:  Health Maintenance   Topic Date Due     EYE EXAM Q1 YEAR  04/01/2013     FALL RISK ASSESSMENT  01/07/2015     FOOT EXAM Q1 YEAR  02/18/2017     MICROALBUMIN Q1 YEAR  02/18/2017     BMP Q6 MOS  08/16/2017     ALT Q1 YR  02/07/2018     CBC Q1 YR  02/09/2018     CREATININE Q1 YEAR  02/16/2018     LIPID MONITORING Q1 YEAR  07/25/2018     A1C Q6 MO  08/23/2018     TSH W/ FREE T4 REFLEX Q2 YEAR  02/16/2019     HF ACTION PLAN Q3 YR  04/06/2019     TETANUS IMMUNIZATION (SYSTEM ASSIGNED)  09/16/2021     ADVANCE DIRECTIVE PLANNING Q5 YRS  03/09/2023     PNEUMOCOCCAL  Completed     INFLUENZA VACCINE  Completed       Arlene Mcdermott MD  [facility]  [phone]    For any issues my office # is [phone]

## 2018-05-03 ENCOUNTER — PATIENT OUTREACH (OUTPATIENT)
Dept: CARE COORDINATION | Facility: CLINIC | Age: 78
End: 2018-05-03

## 2018-05-03 DIAGNOSIS — Z79.01 LONG TERM CURRENT USE OF ANTICOAGULANT THERAPY: Primary | ICD-10-CM

## 2018-05-03 NOTE — PROGRESS NOTES
Clinic Care Coordination Contact    OUTREACH    Referral Information:  Referral Source: PCP    Primary Diagnosis: Medication Management    Chief Complaint   Patient presents with     Clinic Care Coordination - Initial     Medication Compliance     Universal Utilization: No concerns  Utilization    Last refreshed: 5/3/2018  8:39 AM:  No Show Count (past year) 0       Last refreshed: 5/3/2018  8:39 AM:  ED visits 0       Last refreshed: 5/3/2018  8:39 AM:  Hospital admissions 0          Current as of: 5/3/2018  8:39 AM         Clinical Concerns:  Current Medical Concerns: Pt has a tendency to be forgetful about taking medications daily. This has raised concerns as patient has Long term Use of anticoagulation therapy. PCP suggested that pt order a medication dispensing machine. Outreach today to review with pt. Pt's sister is financial POA and handles all of pt's affairs due to autism spectrum. CHARLIE RAMSAY reviewed available options and pricing for medication dispensing machine and sent e-mail with various machines available. Also, discussed having home care involved to provide patient training on set-up and medication management once the machine was purchased. Pt's sister will contact CHARLIE RAMSAY once a machine is in place.  Current Behavioral Concerns: Missed medication doses    Education Provided to patient: Medication dispensing machines, Care Coordination, Home Care  Health Maintenance Reviewed: Due/Overdue     Medication Management:  Pt has reported missed doses. Provided resources to improve compliance.     Functional Status:  Mobility Status: Independent w/Device  Equipment Currently Used at Home: walker, standard  Transportation means:: Family     Psychosocial:  Current living arrangement:: I live alone ([facility])  Type of residence:: Apartment - handicap accessible  Financial/Insurance: [employer], Reviewed [place] Choices [place] and waiver services-pt is not income qualified- inherited a large  sum of money from his parents     Resources and Interventions:  Current Resources: No formal supports  Advanced Care Plans/Directives on file:: Yes  Referrals Placed: Community Resources  Patient/Caregiver understanding: Pt reports understanding and denies any additional questions or concerns at this times. CHARLIE CC engaged in AIDET communication during encounter.      Plan: No further outreaches will be made at this time unless a new referral is made or a change in the pt's status occurs. Pt is not in a shared savings network. Patient was provided with information and  this writer's contact information and encouraged to call with any questions or concerns.    Dilia Mohamud \[facility]\""  Clinic Care Coordinator  [phone]  [email or web address]

## 2018-05-09 ENCOUNTER — MYC REFILL (OUTPATIENT)
Dept: FAMILY MEDICINE | Facility: CLINIC | Age: 78
End: 2018-05-09

## 2018-05-09 DIAGNOSIS — Z76.0 ENCOUNTER FOR MEDICATION REFILL: ICD-10-CM

## 2018-05-09 NOTE — TELEPHONE ENCOUNTER
[facility]  Tish Parra LPN Wed May 9, 2018 1:18 PM    yu  ----- Message -----   From: Yomi Messina   Sent: 5/9/2018 1:02 PM   To: Tish Parra LPN  Subject: FW: Medication Renewal Request     Can I enter as #90 and no refills? This is a mailorder.    Yomi  ----- Message -----   From: Tish Parra LPN   Sent: 5/9/2018 12:08 PM   To: [facility]   Subject: FW: Medication Renewal Request      Please enter refill #30 and 1 refill, route to Dr. Helm   ----- Message -----   From: Jose Lees   Sent: 5/9/2018 10:56 AM   To: [facility] Triage  Subject: Medication Renewal Request     Original authorizing provider: MD Jose Finney would like a refill of the following medications:  atorvastatin (LIPITOR) 20 MG tablet [Jermain Helm MD]    Preferred pharmacy: [facility] - [facility] [address]    Comment:  This message is being sent by Judith Hollins on behalf of Jose Lees

## 2018-05-10 RX ORDER — ATORVASTATIN CALCIUM 20 MG/1
TABLET, FILM COATED ORAL
Qty: 90 TABLET | Refills: 0 | Status: SHIPPED | OUTPATIENT
Start: 2018-05-10 | End: 2018-08-06

## 2018-05-29 DIAGNOSIS — Z79.01 LONG TERM CURRENT USE OF ANTICOAGULANT THERAPY: ICD-10-CM

## 2018-05-29 DIAGNOSIS — Z86.73 HISTORY OF TIA (TRANSIENT ISCHEMIC ATTACK) AND STROKE: ICD-10-CM

## 2018-05-29 LAB — INR PPP: 2.5 (ref 2–3)

## 2018-05-29 PROCEDURE — 99212 OFFICE O/P EST SF 10 MIN: CPT | Performed by: FAMILY MEDICINE

## 2018-05-29 PROCEDURE — 36415 COLL VENOUS BLD VENIPUNCTURE: CPT | Performed by: FAMILY MEDICINE

## 2018-05-29 PROCEDURE — 85610 PROTHROMBIN TIME: CPT | Performed by: FAMILY MEDICINE

## 2018-05-29 NOTE — MR AVS SNAPSHOT
After Visit Summary   5/29/2018    Jose Lees    MRN: [id number]           Patient Information     Date Of Birth          1940        Visit Information        Provider Department      5/29/2018 11:15 AM Arlene Mcdermott MD [facility]        Today's Diagnoses     Long term current use of anticoagulant therapy        History of TIA (transient ischemic attack) and stroke          Care Instructions    Please continue coumadin 2 mg daily. Recheck INR in 2 weeks.          Follow-ups after your visit        Who to contact     If you have questions or need follow up information about today's clinic visit or your schedule please contact [facility] directly at [phone].  Normal or non-critical lab and imaging results will be communicated to you by Heroes2uhart, letter or phone within 4 business days after the clinic has received the results. If you do not hear from us within 7 days, please contact the clinic through mysportgroupt or phone. If you have a critical or abnormal lab result, we will notify you by phone as soon as possible.  Submit refill requests through [employer] or call your pharmacy and they will forward the refill request to us. Please allow 3 business days for your refill to be completed.          Additional Information About Your Visit        MyChart Information     Book [employer]back gives you secure access to your electronic health record. If you see a primary care provider, you can also send messages to your care team and make appointments. If you have questions, please call your primary care clinic.  If you do not have a primary care provider, please call [phone] and they will assist you.        Care EveryWhere ID     This is your Care EveryWhere ID. This could be used by other organizations to access your [place] medical records  [id number]         Blood Pressure from Last 3 Encounters:   05/21/18 120/70   05/01/18 126/68   04/10/18 110/60    Weight from  Last 3 Encounters:   05/21/18 83.5 kg (184 lb)   05/01/18 83.5 kg (184 lb)   04/10/18 83.9 kg (185 lb)              We Performed the Following     Prothrombin - INR (RMG)        Primary Care Provider Office Phone # Fax #    Jermain Helm [phone] [address]  [facility] [address]        Equal Access to Services     [facility]: Hadii aad ku hadasho Soomaali, waaxda luqadaha, qaybta kaalmada adeegyada, waxay idiin hayaan adeeg kharash la'aan . So [facility] [phone].    ATENCIÓN: Si habla español, tiene a singh disposición servicios gratuitos de asistencia lingüística. Llame al [phone].    We comply with applicable federal civil rights laws and [place] laws. We do not discriminate on the basis of race, color, national origin, age, disability, sex, sexual orientation, or gender identity.            Thank you!     Thank you for choosing [facility]  for your care. Our goal is always to provide you with excellent care. Hearing back from our patients is one way we can continue to improve our services. Please take a few minutes to complete the written survey that you may receive in the mail after your visit with us. Thank you!             Your Updated Medication List - Protect others around you: Learn how to safely use, store and throw away your medicines at www.disposemymeds.org.          This list is accurate as of 5/29/18 11:50 AM.  Always use your most recent med list.                   Brand Name Dispense Instructions for use Diagnosis    ACE/ARB/ARNI NOT PRESCRIBED (INTENTIONAL)      ACE & ARB not prescribed due to Symptomatic hypotension not due to excessive diuresis    Chronic systolic congestive heart failure (H)       aspirin 81 MG chewable tablet     36 tablet    Take 1 tablet (81 mg) by mouth daily    History of MI (myocardial infarction)       atorvastatin 20 MG tablet    LIPITOR    90 tablet    Take 1 tablet by mouth daily    Encounter for medication  refill       benzocaine-menthol 6-10 MG lozenge    CHLORASEPTIC    84 lozenge    Place 1 lozenge inside cheek every hour as needed for sore throat    Throat pain       bicalutamide 50 MG tablet    CASODEX    90 tablet    Take 1 tablet (50 mg) by mouth daily    Encounter for medication refill       Blood Glucose Monitoring Suppl w/Device Kit     1 kit    Test blood glucose every other day    Type II or unspecified type diabetes mellitus without mention of complication, not stated as uncontrolled, Rhabdomyolysis, UTI (urinary tract infection)       D3-1000 PO      Take 1 capsule by mouth daily        glimepiride 1 MG tablet    AMARYL    90 tablet    TAKE 1 TABLET (1 MG) BY MOUTH EVERY MORNING (BEFORE BREAKFAST)    Encounter for medication refill       metFORMIN 500 MG tablet    GLUCOPHAGE    360 tablet    TAKE 2 TABLETS TWICE DAILY WITH MEALS    Encounter for medication refill       metoprolol succinate 25 MG 24 hr tablet    TOPROL-XL    90 tablet    TAKE 1 TABLET EVERY DAY    Encounter for medication refill       multivitamin, therapeutic Tabs tablet      Take 1 tablet by mouth daily        sertraline 50 MG tablet    ZOLOFT    90 tablet    TAKE 1 TABLET EVERY DAY    Encounter for medication refill       warfarin 2 MG tablet    COUMADIN    135 tablet    TAKE 1 AND 1/2 TABLETS EVERY DAY    Encounter for medication refill

## 2018-05-29 NOTE — PROGRESS NOTES
Problem(s) Oriented visit        SUBJECTIVE:                                                    Jose Lees is a 77 year old male who presents to clinic today for recheck of INR. He was quit elevated at his last 2 checks. He inadvertently took too high a dose. He is now taking it correctly and his INR is good. No increased bruising or bleeding.       Problem list, Medication list, Allergies, and Medical/Social/Surgical histories reviewed in [facility] and updated as appropriate.   Additional history: as documented    ROS:  5 point ROS completed and negative except noted above, including Gen, CV, Resp, GI, MS      Histories:   Patient Active Problem List   Diagnosis     Diabetes mellitus, type 2 (H)     Prostate cancer (H)     Autism disorder     ACP (advance care planning)     Health Care Home     History of MI (myocardial infarction)     History of stroke     CHF (congestive heart failure) (H)     Long term current use of anticoagulant therapy     Stricture and stenosis of esophagus     History of TIA (transient ischemic attack) and stroke     Influenza A     Physical deconditioning     Type 2 diabetes mellitus without complication, without long-term current use of insulin (H)     Past Surgical History:   Procedure Laterality Date     CORONARY ANGIOGRAPHY ADULT ORDER       ESOPHAGOSCOPY, GASTROSCOPY, DUODENOSCOPY (EGD), COMBINED N/A 8/6/2015    Procedure: COMBINED ESOPHAGOSCOPY, GASTROSCOPY, DUODENOSCOPY (EGD), REMOVE FOREIGN BODY;  Surgeon: Yu Tapia MD;  Location:  GI     HEART CATH, ANGIOPLASTY  4/14    BMS to LAD       Social History   Substance Use Topics     Smoking status: Never Smoker     Smokeless tobacco: Never Used     Alcohol use No      Comment: never     Family History   Problem Relation Age of Onset     CEREBROVASCULAR DISEASE Mother 92     C.A.D. Father 48           OBJECTIVE:                                                    There were no vitals taken for this visit.  There is no  height or weight on file to calculate BMI.   GENERAL APPEARANCE: Alert, no acute distress  NEURO: Alert, oriented, speech and mentation normal  PSYCH: mentation appears normal, affect and mood normal   Labs Resulted Today:   Results for orders placed or performed in visit on 05/29/18   Prothrombin - INR (RMG)   Result Value Ref Range    INR 2.5 2.0 - 3.0     ASSESSMENT/PLAN:                                                        Diagnoses and all orders for this visit:    Long term current use of anticoagulant therapy  -     Prothrombin - INR (RMG)    History of TIA (transient ischemic attack) and stroke  -     Prothrombin - INR (RMG)        Patient Instructions   Please continue coumadin 2 mg daily. Recheck INR in 2 weeks.      The following health maintenance items are reviewed in Epic and correct as of today:  Health Maintenance   Topic Date Due     EYE EXAM Q1 YEAR  04/01/2013     FOOT EXAM Q1 YEAR  02/18/2017     MICROALBUMIN Q1 YEAR  02/18/2017     BMP Q6 MOS  08/16/2017     ALT Q1 YR  02/07/2018     CBC Q1 YR  02/09/2018     CREATININE Q1 YEAR  02/16/2018     LIPID MONITORING Q1 YEAR  07/25/2018     A1C Q6 MO  08/23/2018     TSH W/ FREE T4 REFLEX Q2 YEAR  02/16/2019     HF ACTION PLAN Q3 YR  04/06/2019     FALL RISK ASSESSMENT  05/03/2019     TETANUS IMMUNIZATION (SYSTEM ASSIGNED)  09/16/2021     ADVANCE DIRECTIVE PLANNING Q5 YRS  03/09/2023     PNEUMOCOCCAL  Completed     INFLUENZA VACCINE  Completed       Arlene Mcdermott MD  [facility]  [phone]    For any issues my office # is [phone]

## 2018-06-18 VITALS — BODY MASS INDEX: 26.49 KG/M2 | SYSTOLIC BLOOD PRESSURE: 138 MMHG | DIASTOLIC BLOOD PRESSURE: 80 MMHG | WEIGHT: 182 LBS

## 2018-06-18 DIAGNOSIS — Z79.01 LONG TERM CURRENT USE OF ANTICOAGULANT THERAPY: ICD-10-CM

## 2018-06-18 DIAGNOSIS — Z86.73 HISTORY OF TIA (TRANSIENT ISCHEMIC ATTACK) AND STROKE: ICD-10-CM

## 2018-06-18 LAB — INR PPP: 2.3 (ref 2–3)

## 2018-06-18 PROCEDURE — 36415 COLL VENOUS BLD VENIPUNCTURE: CPT | Performed by: FAMILY MEDICINE

## 2018-06-18 PROCEDURE — 85610 PROTHROMBIN TIME: CPT | Performed by: FAMILY MEDICINE

## 2018-07-05 VITALS — WEIGHT: 181 LBS | BODY MASS INDEX: 26.35 KG/M2 | SYSTOLIC BLOOD PRESSURE: 110 MMHG | DIASTOLIC BLOOD PRESSURE: 62 MMHG

## 2018-07-05 DIAGNOSIS — Z86.73 HISTORY OF TIA (TRANSIENT ISCHEMIC ATTACK) AND STROKE: ICD-10-CM

## 2018-07-05 DIAGNOSIS — Z79.01 LONG TERM CURRENT USE OF ANTICOAGULANT THERAPY: ICD-10-CM

## 2018-07-05 LAB — INR PPP: 2.7 (ref 2–3)

## 2018-07-05 PROCEDURE — 36415 COLL VENOUS BLD VENIPUNCTURE: CPT | Performed by: FAMILY MEDICINE

## 2018-07-05 PROCEDURE — 85610 PROTHROMBIN TIME: CPT | Performed by: FAMILY MEDICINE

## 2018-07-24 ENCOUNTER — TRANSFERRED RECORDS (OUTPATIENT)
Dept: FAMILY MEDICINE | Facility: CLINIC | Age: 78
End: 2018-07-24

## 2018-08-06 DIAGNOSIS — Z76.0 ENCOUNTER FOR MEDICATION REFILL: ICD-10-CM

## 2018-08-06 RX ORDER — ATORVASTATIN CALCIUM 20 MG/1
TABLET, FILM COATED ORAL
Qty: 90 TABLET | Refills: 0 | Status: SHIPPED | OUTPATIENT
Start: 2018-08-06 | End: 2018-10-12

## 2018-08-20 DIAGNOSIS — Z76.0 ENCOUNTER FOR MEDICATION REFILL: ICD-10-CM

## 2018-08-20 NOTE — TELEPHONE ENCOUNTER
sertraline---last seen 3/10/17 for an office visit.  Seen many times for INR.  Needs appointment, will contact patient to schedule.

## 2018-08-24 DIAGNOSIS — E11.9 TYPE 2 DIABETES MELLITUS WITHOUT COMPLICATION, WITHOUT LONG-TERM CURRENT USE OF INSULIN (H): Primary | ICD-10-CM

## 2018-08-24 DIAGNOSIS — Z79.01 LONG TERM CURRENT USE OF ANTICOAGULANT THERAPY: ICD-10-CM

## 2018-08-24 DIAGNOSIS — Z86.73 HISTORY OF TIA (TRANSIENT ISCHEMIC ATTACK) AND STROKE: ICD-10-CM

## 2018-08-24 LAB — INR PPP: 1.5 (ref 2–3)

## 2018-08-24 PROCEDURE — 36415 COLL VENOUS BLD VENIPUNCTURE: CPT | Performed by: NURSE PRACTITIONER

## 2018-08-24 PROCEDURE — 99212 OFFICE O/P EST SF 10 MIN: CPT | Performed by: NURSE PRACTITIONER

## 2018-08-24 PROCEDURE — 85610 PROTHROMBIN TIME: CPT | Performed by: NURSE PRACTITIONER

## 2018-08-24 NOTE — PATIENT INSTRUCTIONS
Today's  INR  1.5    Plan   Take 1.5 tab (3mg) F,M       Take 1 tab (2 mg other days)      F/u 1 week

## 2018-08-24 NOTE — PROGRESS NOTES
INR  Goal 2-3  Diagnosis TIA    Last visit 7/5/18  INR 2.7  What they were taking 2 mg daily  What it was changed to 2 mg daily  Pt was only taking 1 tab  F/u 1 month    Today's  INR  1.5  Plan   Take 1.5 tab (3mg) F,M       Take 1 tab (2 mg other days)  F/u 1 week    ANUSHA Underwood

## 2018-08-24 NOTE — MR AVS SNAPSHOT
After Visit Summary   8/24/2018    Jose Lees    MRN: [id number]           Patient Information     Date Of Birth          1940        Visit Information        Provider Department      8/24/2018 10:00 AM Karyna Wells APRN CNP [facility]        Today's Diagnoses     Type 2 diabetes mellitus without complication, without long-term current use of insulin (H)    -  1    Long term current use of anticoagulant therapy        History of TIA (transient ischemic attack) and stroke          Care Instructions    Today's  INR  1.5    Plan   Take 1.5 tab (3mg) F,M       Take 1 tab (2 mg other days)      F/u 1 week            Follow-ups after your visit        Who to contact     If you have questions or need follow up information about today's clinic visit or your schedule please contact [facility] directly at [phone].  Normal or non-critical lab and imaging results will be communicated to you by [employer] Local[employer], letter or phone within 4 business days after the clinic has received the results. If you do not hear from us within 7 days, please contact the clinic through [employer] or phone. If you have a critical or abnormal lab result, we will notify you by phone as soon as possible.  Submit refill requests through [employer] or call your pharmacy and they will forward the refill request to us. Please allow 3 business days for your refill to be completed.          Additional Information About Your Visit        MyChart Information     [employer] gives you secure access to your electronic health record. If you see a primary care provider, you can also send messages to your care team and make appointments. If you have questions, please call your primary care clinic.  If you do not have a primary care provider, please call [phone] and they will assist you.        Care EveryWhere ID     This is your Care EveryWhere ID. This could be used by other organizations to access your  [place] medical records  [id number]         Blood Pressure from Last 3 Encounters:   07/05/18 110/62   06/18/18 138/80   05/21/18 120/70    Weight from Last 3 Encounters:   07/05/18 82.1 kg (181 lb)   06/18/18 82.6 kg (182 lb)   05/21/18 83.5 kg (184 lb)              We Performed the Following     Lipid Panel ([facility])     Prothrombin - INR ([facility])        Primary Care Provider Office Phone # Fax #    Jermain Helm [phone] [address]  [facility] [address]        Equal Access to Services     BECKA BUENROSTRO : Hadii farhan edmonds hadasho Soradha, waaxda luqadaha, qaybta kaalmada adeegyada, jennifer riggs. So [facility] [phone].    ATENCIÓN: Si habla español, tiene a singh disposición servicios gratuitos de asistencia lingüística. Llame al [phone].    We comply with applicable federal civil rights laws and [place] laws. We do not discriminate on the basis of race, color, national origin, age, disability, sex, sexual orientation, or gender identity.            Thank you!     Thank you for choosing [facility]  for your care. Our goal is always to provide you with excellent care. Hearing back from our patients is one way we can continue to improve our services. Please take a few minutes to complete the written survey that you may receive in the mail after your visit with us. Thank you!             Your Updated Medication List - Protect others around you: Learn how to safely use, store and throw away your medicines at www.disposemymeds.org.          This list is accurate as of 8/24/18 11:00 AM.  Always use your most recent med list.                   Brand Name Dispense Instructions for use Diagnosis    ACE/ARB/ARNI NOT PRESCRIBED (INTENTIONAL)      ACE & ARB not prescribed due to Symptomatic hypotension not due to excessive diuresis    Chronic systolic congestive heart failure (H)       aspirin 81 MG chewable tablet     36 tablet    Take 1 tablet  (81 mg) by mouth daily    History of MI (myocardial infarction)       atorvastatin 20 MG tablet    LIPITOR    90 tablet    Take 1 tablet by mouth daily    Encounter for medication refill       benzocaine-menthol 6-10 MG lozenge    CHLORASEPTIC    84 lozenge    Place 1 lozenge inside cheek every hour as needed for sore throat    Throat pain       bicalutamide 50 MG tablet    CASODEX    90 tablet    Take 1 tablet (50 mg) by mouth daily    Encounter for medication refill       Blood Glucose Monitoring Suppl w/Device Kit     1 kit    Test blood glucose every other day    Type II or unspecified type diabetes mellitus without mention of complication, not stated as uncontrolled, Rhabdomyolysis, UTI (urinary tract infection)       D3-1000 PO      Take 1 capsule by mouth daily        glimepiride 1 MG tablet    AMARYL    90 tablet    TAKE 1 TABLET (1 MG) BY MOUTH EVERY MORNING (BEFORE BREAKFAST)    Encounter for medication refill       metFORMIN 500 MG tablet    GLUCOPHAGE    360 tablet    TAKE 2 TABLETS TWICE DAILY WITH MEALS    Encounter for medication refill       metoprolol succinate 25 MG 24 hr tablet    TOPROL-XL    90 tablet    TAKE 1 TABLET EVERY DAY    Encounter for medication refill       multivitamin, therapeutic Tabs tablet      Take 1 tablet by mouth daily        sertraline 50 MG tablet    ZOLOFT    90 tablet    TAKE 1 TABLET EVERY DAY    Encounter for medication refill       warfarin 2 MG tablet    COUMADIN    135 tablet    TAKE 1 AND 1/2 TABLETS EVERY DAY    Encounter for medication refill

## 2018-08-25 LAB
CHOLEST SERPL-MCNC: 215 MG/DL (ref 100–199)
HDLC SERPL-MCNC: 48 MG/DL
LDL/HDL RATIO: 2.4 RATIO (ref 0–3.6)
LDLC SERPL CALC-MCNC: 117 MG/DL (ref 0–99)
TRIGL SERPL-MCNC: 250 MG/DL (ref 0–149)
VLDLC SERPL CALC-MCNC: 50 MG/DL (ref 5–40)

## 2018-08-27 NOTE — PROGRESS NOTES
Dear Jose,   I am writing to report that your included test results are within expected ranges. I do not suggest that we make any changes at this time.    Jermain Helm M.D.

## 2018-09-06 VITALS — BODY MASS INDEX: 25.47 KG/M2 | SYSTOLIC BLOOD PRESSURE: 108 MMHG | DIASTOLIC BLOOD PRESSURE: 62 MMHG | WEIGHT: 175 LBS

## 2018-09-06 DIAGNOSIS — Z86.73 HISTORY OF TIA (TRANSIENT ISCHEMIC ATTACK) AND STROKE: ICD-10-CM

## 2018-09-06 DIAGNOSIS — Z79.01 LONG TERM CURRENT USE OF ANTICOAGULANT THERAPY: ICD-10-CM

## 2018-09-06 LAB — INR PPP: 2.4 (ref 2–3)

## 2018-09-06 PROCEDURE — 36415 COLL VENOUS BLD VENIPUNCTURE: CPT | Performed by: FAMILY MEDICINE

## 2018-09-06 PROCEDURE — 99212 OFFICE O/P EST SF 10 MIN: CPT | Performed by: FAMILY MEDICINE

## 2018-09-06 PROCEDURE — 85610 PROTHROMBIN TIME: CPT | Performed by: FAMILY MEDICINE

## 2018-09-06 NOTE — PATIENT INSTRUCTIONS
PLAN:      The Warfarin (Coumadin) dose will not change.      Recheck next INR in Follow up in 2 weeks.

## 2018-09-06 NOTE — PROGRESS NOTES
Jose Lees is a 77 year old male here for an INR check.  Feeling pretty well, no signs of bleeding.    /62  Wt 79.4 kg (175 lb)  BMI 25.47 kg/m2     Todays and previous results are:    Lab Results   Component Value Date    INR 2.4 09/06/2018    INR 1.5 08/24/2018    INR 2.7 07/05/2018    INR 2.3 06/18/2018    INR 2.5 05/29/2018     ASSESSMENT:                                                      Jose was seen today for inr followup.    Diagnoses and all orders for this visit:    Long term current use of anticoagulant therapy  -     Prothrombin - INR ([facility])    History of TIA (transient ischemic attack) and stroke  -     Prothrombin - INR ([facility])          PLAN:                                                    The Warfarin (Coumadin) dose will not change.     Recheck next INR in Follow up in 2 weeks.    Reminded not to miss the atorvastatin    Jermain Helm MD  [facility]

## 2018-09-20 VITALS — WEIGHT: 184 LBS | DIASTOLIC BLOOD PRESSURE: 70 MMHG | BODY MASS INDEX: 26.78 KG/M2 | SYSTOLIC BLOOD PRESSURE: 124 MMHG

## 2018-09-20 DIAGNOSIS — Z79.01 LONG TERM CURRENT USE OF ANTICOAGULANT THERAPY: ICD-10-CM

## 2018-09-20 DIAGNOSIS — Z23 NEED FOR PROPHYLACTIC VACCINATION AND INOCULATION AGAINST INFLUENZA: Primary | ICD-10-CM

## 2018-09-20 DIAGNOSIS — Z86.73 HISTORY OF TIA (TRANSIENT ISCHEMIC ATTACK) AND STROKE: ICD-10-CM

## 2018-09-20 LAB — INR PPP: 2.2 (ref 2–3)

## 2018-09-20 PROCEDURE — 36415 COLL VENOUS BLD VENIPUNCTURE: CPT | Performed by: FAMILY MEDICINE

## 2018-09-20 PROCEDURE — 90662 IIV NO PRSV INCREASED AG IM: CPT | Performed by: FAMILY MEDICINE

## 2018-09-20 PROCEDURE — G0008 ADMIN INFLUENZA VIRUS VAC: HCPCS | Performed by: FAMILY MEDICINE

## 2018-09-20 PROCEDURE — 85610 PROTHROMBIN TIME: CPT | Performed by: FAMILY MEDICINE

## 2018-09-20 NOTE — PROGRESS NOTES

## 2018-10-12 DIAGNOSIS — Z76.0 ENCOUNTER FOR MEDICATION REFILL: ICD-10-CM

## 2018-10-12 RX ORDER — WARFARIN SODIUM 2 MG/1
TABLET ORAL
Qty: 135 TABLET | Refills: 2 | Status: SHIPPED | OUTPATIENT
Start: 2018-10-12 | End: 2019-05-20

## 2018-10-12 RX ORDER — ATORVASTATIN CALCIUM 20 MG/1
TABLET, FILM COATED ORAL
Qty: 90 TABLET | Refills: 3 | Status: SHIPPED | OUTPATIENT
Start: 2018-10-12 | End: 2019-05-24

## 2018-10-24 ENCOUNTER — TRANSFERRED RECORDS (OUTPATIENT)
Dept: HEALTH INFORMATION MANAGEMENT | Facility: CLINIC | Age: 78
End: 2018-10-24

## 2018-10-29 ENCOUNTER — TRANSFERRED RECORDS (OUTPATIENT)
Dept: HEALTH INFORMATION MANAGEMENT | Facility: CLINIC | Age: 78
End: 2018-10-29

## 2018-11-06 VITALS — WEIGHT: 177 LBS | BODY MASS INDEX: 25.76 KG/M2 | DIASTOLIC BLOOD PRESSURE: 66 MMHG | SYSTOLIC BLOOD PRESSURE: 104 MMHG

## 2018-11-06 DIAGNOSIS — Z86.73 HISTORY OF TIA (TRANSIENT ISCHEMIC ATTACK) AND STROKE: ICD-10-CM

## 2018-11-06 DIAGNOSIS — Z79.01 LONG TERM CURRENT USE OF ANTICOAGULANT THERAPY: ICD-10-CM

## 2018-11-06 LAB — INR PPP: 2.2 (ref 2–3)

## 2018-11-06 PROCEDURE — 36415 COLL VENOUS BLD VENIPUNCTURE: CPT | Performed by: FAMILY MEDICINE

## 2018-11-06 PROCEDURE — 85610 PROTHROMBIN TIME: CPT | Performed by: FAMILY MEDICINE

## 2018-11-13 ENCOUNTER — TRANSFERRED RECORDS (OUTPATIENT)
Dept: HEALTH INFORMATION MANAGEMENT | Facility: CLINIC | Age: 78
End: 2018-11-13

## 2018-11-26 ENCOUNTER — OFFICE VISIT (OUTPATIENT)
Dept: FAMILY MEDICINE | Facility: CLINIC | Age: 78
End: 2018-11-26

## 2018-11-26 VITALS
SYSTOLIC BLOOD PRESSURE: 122 MMHG | DIASTOLIC BLOOD PRESSURE: 78 MMHG | OXYGEN SATURATION: 97 % | BODY MASS INDEX: 25.18 KG/M2 | WEIGHT: 173 LBS | HEART RATE: 66 BPM | RESPIRATION RATE: 16 BRPM

## 2018-11-26 DIAGNOSIS — I50.22 CHRONIC SYSTOLIC CONGESTIVE HEART FAILURE (H): ICD-10-CM

## 2018-11-26 DIAGNOSIS — Z79.01 LONG TERM CURRENT USE OF ANTICOAGULANT THERAPY: ICD-10-CM

## 2018-11-26 DIAGNOSIS — F84.0 AUTISM DISORDER: ICD-10-CM

## 2018-11-26 DIAGNOSIS — I25.2 HISTORY OF MI (MYOCARDIAL INFARCTION): ICD-10-CM

## 2018-11-26 DIAGNOSIS — E11.9 TYPE 2 DIABETES MELLITUS WITHOUT COMPLICATION, WITHOUT LONG-TERM CURRENT USE OF INSULIN (H): ICD-10-CM

## 2018-11-26 DIAGNOSIS — K21.9 GASTROESOPHAGEAL REFLUX DISEASE, ESOPHAGITIS PRESENCE NOT SPECIFIED: ICD-10-CM

## 2018-11-26 DIAGNOSIS — Z86.73 HISTORY OF TIA (TRANSIENT ISCHEMIC ATTACK) AND STROKE: ICD-10-CM

## 2018-11-26 DIAGNOSIS — Z76.0 ENCOUNTER FOR MEDICATION REFILL: ICD-10-CM

## 2018-11-26 DIAGNOSIS — E55.9 VITAMIN D DEFICIENCY: ICD-10-CM

## 2018-11-26 DIAGNOSIS — Z01.818 PRE-OP EXAM: ICD-10-CM

## 2018-11-26 DIAGNOSIS — K22.2 STRICTURE AND STENOSIS OF ESOPHAGUS: ICD-10-CM

## 2018-11-26 DIAGNOSIS — Z86.73 HISTORY OF STROKE: ICD-10-CM

## 2018-11-26 DIAGNOSIS — H61.22 IMPACTED CERUMEN OF LEFT EAR: ICD-10-CM

## 2018-11-26 DIAGNOSIS — I50.9 CONGESTIVE HEART FAILURE, UNSPECIFIED HF CHRONICITY, UNSPECIFIED HEART FAILURE TYPE (H): ICD-10-CM

## 2018-11-26 DIAGNOSIS — C61 PROSTATE CANCER (H): ICD-10-CM

## 2018-11-26 DIAGNOSIS — Z01.818 PREOP GENERAL PHYSICAL EXAM: Primary | ICD-10-CM

## 2018-11-26 DIAGNOSIS — E78.5 HYPERLIPIDEMIA LDL GOAL <70: ICD-10-CM

## 2018-11-26 LAB
% GRANULOCYTES: 66.8 % (ref 42.2–75.2)
HCT VFR BLD AUTO: 42.6 % (ref 39–51)
HEMOGLOBIN: 14.1 G/DL (ref 13.4–17.5)
INR PPP: 2.1 (ref 2–3)
LYMPHOCYTES NFR BLD AUTO: 25.9 % (ref 20.5–51.1)
MCH RBC QN AUTO: 29.4 PG (ref 27–31)
MCHC RBC AUTO-ENTMCNC: 33.2 G/DL (ref 33–37)
MCV RBC AUTO: 88.5 FL (ref 80–100)
MONOCYTES NFR BLD AUTO: 7.3 % (ref 1.7–9.3)
PLATELET # BLD AUTO: 186 K/UL (ref 140–450)
RBC # BLD AUTO: 4.82 X10/CMM (ref 4.2–5.9)
WBC # BLD AUTO: 6.4 X10/CMM (ref 3.8–11)

## 2018-11-26 PROCEDURE — 99215 OFFICE O/P EST HI 40 MIN: CPT | Performed by: FAMILY MEDICINE

## 2018-11-26 PROCEDURE — 85610 PROTHROMBIN TIME: CPT | Performed by: FAMILY MEDICINE

## 2018-11-26 PROCEDURE — 93000 ELECTROCARDIOGRAM COMPLETE: CPT | Performed by: FAMILY MEDICINE

## 2018-11-26 PROCEDURE — 85025 COMPLETE CBC W/AUTO DIFF WBC: CPT | Performed by: FAMILY MEDICINE

## 2018-11-26 PROCEDURE — 36415 COLL VENOUS BLD VENIPUNCTURE: CPT | Performed by: FAMILY MEDICINE

## 2018-11-26 RX ORDER — GLIMEPIRIDE 1 MG/1
TABLET ORAL
Qty: 90 TABLET | Refills: 1 | Status: SHIPPED | OUTPATIENT
Start: 2018-11-26 | End: 2018-11-26

## 2018-11-26 RX ORDER — BICALUTAMIDE 50 MG/1
50 TABLET, FILM COATED ORAL DAILY
Qty: 90 TABLET | Refills: 3 | Status: SHIPPED | OUTPATIENT
Start: 2018-11-26 | End: 2018-11-26

## 2018-11-26 NOTE — MR AVS SNAPSHOT
After Visit Summary   11/26/2018    Jose Lees    MRN: [id number]           Patient Information     Date Of Birth          1940        Visit Information        Provider Department      11/26/2018 10:00 AM Mariela Stauffer MD [facility]        Today's Diagnoses     Preop general physical exam    -  1    Type 2 diabetes mellitus without complication, without long-term current use of insulin (H)        History of TIA (transient ischemic attack) and stroke        Long term current use of anticoagulant therapy        History of MI (myocardial infarction)        Stricture and stenosis of esophagus        History of stroke        Congestive heart failure, unspecified HF chronicity, unspecified heart failure type (H)        Autism disorder        Prostate cancer (H)        Pre-op exam        Chronic systolic congestive heart failure (H)        Encounter for medication refill        Hyperlipidemia LDL goal <70        Vitamin D deficiency        Gastroesophageal reflux disease, esophagitis presence not specified        Impacted cerumen of left ear          Care Instructions      Before Your Surgery      Call your surgeon if there is any change in your health. This includes signs of a cold or flu (such as a sore throat, runny nose, cough, rash or fever).    Do not smoke, drink alcohol or take over the counter medicine (unless your surgeon or primary care doctor tells you to) for the 24 hours before and after surgery.    If you take prescribed drugs: Follow your doctor s orders about which medicines to take and which to stop until after surgery.    Eating and drinking prior to surgery: follow the instructions from your surgeon    Take a shower or bath the night before surgery. Use the soap your surgeon gave you to gently clean your skin. If you do not have soap from your surgeon, use your regular soap. Do not shave or scrub the surgery site.  Wear clean pajamas and have clean sheets  on your bed.     Hold when not eating for surgery: Glimepiride, metformin, multi vitamin/vitamin D, omeprazole  Take all your other medications in the morning as discussed. ASA/Coumadin/Bicalutamide, Metoprolol, Sertraline, Atorvastatin  Then when eating again resume medications    Labs today  EKG today  Left ear wax wash              Follow-ups after your visit        Your next 10 appointments already scheduled     Dec 04, 2018   Procedure with Kamar Kyle MD   [facility] (--)    [address]  [facility] [id number] [phone]            Dec 19, 2018   Procedure with Kamar Kyle MD   [facility] (--)    [address]  [facility] [id number] [phone]              Who to contact     If you have questions or need follow up information about today's clinic visit or your schedule please contact [facility] directly at [phone].  Normal or non-critical lab and imaging results will be communicated to you by Abcodiahart, letter or phone within 4 business days after the clinic has received the results. If you do not hear from us within 7 days, please contact the clinic through [email or web address] or phone. If you have a critical or abnormal lab result, we will notify you by phone as soon as possible.  Submit refill requests through [employer] or call your pharmacy and they will forward the refill request to us. Please allow 3 business days for your refill to be completed.          Additional Information About Your Visit        [employer] Information     [employer] gives you secure access to your electronic health record. If you see a primary care provider, you can also send messages to your care team and make appointments. If you have questions, please call your primary care clinic.  If you do not have a primary care provider, please call [phone] and they will assist you.        Care EveryWhere ID     This is your Care  EveryWhere ID. This could be used by other organizations to access your [place] medical records  [id number]        Your Vitals Were     Pulse Respirations Pulse Oximetry BMI (Body Mass Index)          66 16 97% 25.18 kg/m2         Blood Pressure from Last 3 Encounters:   11/26/18 122/78   11/06/18 104/66   09/20/18 124/70    Weight from Last 3 Encounters:   11/26/18 78.5 kg (173 lb)   11/06/18 80.3 kg (177 lb)   09/20/18 83.5 kg (184 lb)              We Performed the Following     Basic Metabolic Panel (8) ([facility])     CBC with Diff/Plt ([facility])     EKG 12-lead complete w/read - Clinics     Hemoglobin A1C ([facility])     Hepatic Function Panel (7) ([facility])     Lipid Panel ([facility])     Microalbumin ([facility])     Prothrombin - INR ([facility])     Thyroxine (T4) Free  Direct  S ([facility])     TSH ([facility])     Vitamin D  25-Hydroxy ([facility])          Where to get your medicines      These medications were sent to [facility] [address]  [address], [facility] [address]     Phone:  [phone]     bicalutamide 50 MG tablet    glimepiride 1 MG tablet    sertraline 50 MG tablet         Some of these will need a paper prescription and others can be bought over the counter.  Ask your nurse if you have questions.     You don't need a prescription for these medications     ACE/ARB/ARNI NOT PRESCRIBED (INTENTIONAL)          Primary Care Provider Office Phone # Fax #    Jermain Helm [phone] [address]  [facility] [address]        Equal Access to Services     BECKA [facility] AH: Hadii farhan lopez Soradha, waaxda luqadaha, qaybta kaalmada comfort, jennifer riggs. So [facility] [phone].    ATENCIÓN: Si habla español, tiene a singh disposición servicios gratuitos de asistencia lingüística. Llame al [phone].    We comply with applicable federal civil rights laws and [place] laws. We do not discriminate on the basis  of race, color, national origin, age, disability, sex, sexual orientation, or gender identity.            Thank you!     Thank you for choosing [facility]  for your care. Our goal is always to provide you with excellent care. Hearing back from our patients is one way we can continue to improve our services. Please take a few minutes to complete the written survey that you may receive in the mail after your visit with us. Thank you!             Your Updated Medication List - Protect others around you: Learn how to safely use, store and throw away your medicines at www.disposemymeds.org.          This list is accurate as of 11/26/18 11:05 AM.  Always use your most recent med list.                   Brand Name Dispense Instructions for use Diagnosis    ACE/ARB/ARNI NOT PRESCRIBED (INTENTIONAL)      ACE & ARB not prescribed due to Symptomatic hypotension not due to excessive diuresis    Chronic systolic congestive heart failure (H)       aspirin 81 MG chewable tablet    ASA    36 tablet    Take 1 tablet (81 mg) by mouth daily    History of MI (myocardial infarction)       atorvastatin 20 MG tablet    LIPITOR    90 tablet    Take 1 tablet by mouth daily    Encounter for medication refill       benzocaine-menthol 6-10 MG lozenge    CHLORASEPTIC    84 lozenge    Place 1 lozenge inside cheek every hour as needed for sore throat    Throat pain       bicalutamide 50 MG tablet    CASODEX    90 tablet    Take 1 tablet (50 mg) by mouth daily    Encounter for medication refill       Blood Glucose Monitoring Suppl w/Device Kit     1 kit    Test blood glucose every other day    Type II or unspecified type diabetes mellitus without mention of complication, not stated as uncontrolled, Rhabdomyolysis, UTI (urinary tract infection)       D3-1000 PO      Take 1 capsule by mouth daily        glimepiride 1 MG tablet    AMARYL    90 tablet    TAKE 1 TABLET (1 MG) BY MOUTH EVERY MORNING (BEFORE BREAKFAST)    Encounter for  medication refill       metFORMIN 500 MG tablet    GLUCOPHAGE    360 tablet    TAKE 2 TABLETS TWICE DAILY WITH MEALS    Encounter for medication refill       metoprolol succinate 25 MG 24 hr tablet    TOPROL-XL    90 tablet    TAKE 1 TABLET EVERY DAY    Encounter for medication refill       multivitamin, therapeutic Tabs tablet      Take 1 tablet by mouth daily        omeprazole 20 MG CR capsule    priLOSEC    30 capsule    Take 1 capsule (20 mg) by mouth daily    Gastroesophageal reflux disease, esophagitis presence not specified       sertraline 50 MG tablet    ZOLOFT    90 tablet    TAKE 1 TABLET EVERY DAY    Encounter for medication refill       warfarin 2 MG tablet    COUMADIN    135 tablet    TAKE 1 AND 1/2 TABLETS EVERY DAY    Encounter for medication refill

## 2018-11-26 NOTE — NURSING NOTE
Successful ear lavage - both ears- with ear wax softener used  Anyi Ansari MA November 26, 2018 11:50 AM

## 2018-11-26 NOTE — PATIENT INSTRUCTIONS
Before Your Surgery      Call your surgeon if there is any change in your health. This includes signs of a cold or flu (such as a sore throat, runny nose, cough, rash or fever).    Do not smoke, drink alcohol or take over the counter medicine (unless your surgeon or primary care doctor tells you to) for the 24 hours before and after surgery.    If you take prescribed drugs: Follow your doctor s orders about which medicines to take and which to stop until after surgery.    Eating and drinking prior to surgery: follow the instructions from your surgeon    Take a shower or bath the night before surgery. Use the soap your surgeon gave you to gently clean your skin. If you do not have soap from your surgeon, use your regular soap. Do not shave or scrub the surgery site.  Wear clean pajamas and have clean sheets on your bed.     Hold when not eating for surgery: Glimepiride, metformin, multi vitamin/vitamin D, omeprazole  Take all your other medications in the morning as discussed. ASA/Coumadin/Bicalutamide, Metoprolol, Sertraline, Atorvastatin  Then when eating again resume medications    Labs today  EKG today  Left ear wax wash

## 2018-11-26 NOTE — PROGRESS NOTES
White male glasses  Upper and lower denture  Walker with seat and brakes  No sleep devices    Here with sister        [facility]  [address] [place] [address] [phone]  Dept: [phone]    PRE-OP EVALUATION:  Today's date: 2018    Jose Lees (: 1940) presents for pre-operative evaluation assessment as requested by Dr. Kyle.  He requires evaluation and anesthesia risk assessment prior to undergoing surgery/procedure for treatment of eye .    Proposed Surgery/ Procedure: RIGHT EYE PHACOEMULSIFICATION CLEAR CORNEA WITH STANDARD INTRAOCULAR LENS IMPLANT (GENERAL) POSSIBLE MALYGIN RING   Date of Surgery/ Procedure:Right eye 2018- Left eye 2018  Time of Surgery/ Procedure: 1235  Hospital/Surgical Facility: [facility]  Fax number for surgical facility:  Primary Physician: Jermain Helm  Type of Anesthesia Anticipated: to be determined    Patient has a Health Care Directive or Living Will:  YES     1. YES - DO YOU HAVE A HISTORY OF HEART ATTACK, STROKE, STENT, BYPASS OR SURGERY ON AN ARTERY IN THE HEAD, NECK, HEART OR LEG? Heart attack, stroke, stent. No CP today, No edema. PND no  1. NO - Do you have a history of heart attack, stroke, stent, bypass or surgery on an artery in the head, neck, heart or legs?  2. NO - Do you ever have any pain or discomfort in your chest?  3. NO - Do you have a history of  Heart Failure?  4. NO - Are you troubled by shortness of breath when: walking on the level, up a slight hill or at night?  5. NO - Do you currently have a cold, bronchitis or other respiratory infection?  6. NO - Do you have a cough, shortness of breath or wheezing?  7. NO - Do you sometimes get pains in the calves of your legs when you walk?  8. NO - Do you or anyone in your family have previous history of blood clots?  9. NO - Do you or does anyone in your family have a serious bleeding problem such as prolonged bleeding following surgeries  or cuts?  10. NO - Have you ever had problems with anemia or been told to take iron pills?  11. NO - Have you had any abnormal blood loss such as black, tarry or bloody stools, or abnormal vaginal bleeding?  12. NO - Have you ever had a blood transfusion?  13. NO - Have you or any of your relatives ever had problems with anesthesia?  14. NO - Do you have sleep apnea, excessive snoring or daytime drowsiness?  15. NO - Do you have any prosthetic heart valves?  16. NO - Do you have prosthetic joints?  17. NO - Is there any chance that you may be pregnant?    Lab Results   Component Value Date    A1C 10.3 02/23/2018    A1C 8.2 07/25/2017    A1C 7.6 01/25/2017    A1C 7.1 07/27/2016    A1C 8.6 03/17/2016     LDL Cholesterol Calculated   Date Value Ref Range Status   08/24/2018 117 (H) 0 - 99 mg/dL Final       EKG  NSR, LAfasicular black  Septal infarct undetermined age  Consider anterior ischemia T wave  HR 68      INR  Goal 2-3  Diagnosis TIA        Today's  INR  2.1  Plan   [facility] Anticoaglution WEEKLY DOSE   Latest Ref Rng & Units    11/6/2018 3mg MF, Otherwise 2mg     F/u 4 weeks    No bleeding/bruising  No coumadin interfering foods          HPI:     HPI related to upcoming procedure: cataract surgery per eye provider      See problem list for active medical problems.  Problems all longstanding and stable, except as noted/documented.  See ROS for pertinent symptoms related to these conditions.                                                                                                                                                          .    MEDICAL HISTORY:     Patient Active Problem List    Diagnosis Date Noted     Type 2 diabetes mellitus without complication, without long-term current use of insulin (H) 07/25/2017     Priority: Medium     Physical deconditioning 02/14/2017     Priority: Medium     Influenza A 02/08/2017     Priority: Medium     History of TIA (transient ischemic attack) and stroke  02/05/2017     Priority: Medium     Stricture and stenosis of esophagus 10/22/2015     Priority: Medium     Long term current use of anticoagulant therapy 10/23/2014     Priority: Medium     Problem list name updated by automated process. Provider to review       History of MI (myocardial infarction) 05/12/2014     Priority: Medium     History of stroke 05/12/2014     Priority: Medium     CHF (congestive heart failure) (H) 05/12/2014     Priority: Medium     Health Care Home 01/07/2014     Priority: Medium     State Tier Level:  Tier 3                 ACP (advance care planning)      Priority: Medium     Advance Care Planning 5/12/2017: Receipt of ACP document:  Received: POLST which was signed and dated by provider on 2-14-17.  Document previously scanned on 2-16-17.  Order reviewed and found to be valid.  Code Status reflects choices in most recent ACP document..  Confirmed/documented designated decision maker(s).  Added by Quiana Alfonso RN Advance Care Planning Liaison with [facility]  Advanced Care Planning 8/12/2015: Patient states they have received and completed their Advanced Care directive and the document has already been scanned into their chart on 6/14/12.  Edelmira Tran [facility]  8:25 AM August 12, 2015           Autism disorder 06/29/2012     Priority: Medium     Prostate cancer (H) 10/13/2011     Priority: Medium     Diabetes mellitus, type 2 (H)      Priority: Medium     Diabetes mellitus  Problem list name updated by automated process. Provider to review        Past Medical History:   Diagnosis Date     ACP (advance care planning)     Form given     Acute anterior wall MI (H) 4/7/2014     Autism disorder 6/29/2012     CHF (congestive heart failure) (H)     ECHO EF=25-30% on 4/7/14     Coronary artery disease 4/2014 4/2014 HEART CATH - 70% mid LAD -- BMS placed, small 1st diagonal 80%, RCA 60-70% before crux, 70% mid PDA     Dementia      Hyperlipidaemia      Hyperlipidaemia LDL goal <  100      [facility] (H) [facility]     Stricture and stenosis of esophagus 10/22/2015     Stroke, 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     Type II or unspecified type diabetes mellitus without mention of complication, not stated as uncontrolled     Diabetes mellitus     Past Surgical History:   Procedure Laterality Date     CORONARY ANGIOGRAPHY ADULT ORDER       ESOPHAGOSCOPY, GASTROSCOPY, DUODENOSCOPY (EGD), COMBINED N/A 8/6/2015    Procedure: COMBINED ESOPHAGOSCOPY, GASTROSCOPY, DUODENOSCOPY (EGD), REMOVE FOREIGN BODY;  Surgeon: Yu Tapia MD;  Location:  GI     HEART CATH, ANGIOPLASTY  4/14    BMS to LAD     Current Outpatient Prescriptions   Medication Sig Dispense Refill     ACE/ARB NOT PRESCRIBED, INTENTIONAL, ACE & ARB not prescribed due to Symptomatic hypotension not due to excessive diuresis       aspirin 81 MG chewable tablet Take 1 tablet (81 mg) by mouth daily 36 tablet 12     atorvastatin (LIPITOR) 20 MG tablet Take 1 tablet by mouth daily 90 tablet 3     benzocaine-menthol (CHLORASEPTIC) 6-10 MG lozenge Place 1 lozenge inside cheek every hour as needed for sore throat (Patient not taking: Reported on 10/2/2017) 84 lozenge      bicalutamide (CASODEX) 50 MG tablet Take 1 tablet (50 mg) by mouth daily 90 tablet 3     Blood Glucose Monitoring Suppl W/DEVICE KIT Test blood glucose every other day     1 kit 0     Cholecalciferol (D3-1000 PO) Take 1 capsule by mouth daily       glimepiride (AMARYL) 1 MG tablet TAKE 1 TABLET (1 MG) BY MOUTH EVERY MORNING (BEFORE BREAKFAST) 90 tablet 1     metFORMIN (GLUCOPHAGE) 500 MG tablet TAKE 2 TABLETS TWICE DAILY WITH MEALS 360 tablet 1     metoprolol (TOPROL-XL) 25 MG 24 hr tablet TAKE 1 TABLET EVERY DAY 90 tablet 3     multivitamin, therapeutic (THERA-VIT) TABS Take 1 tablet by mouth daily       sertraline (ZOLOFT) 50 MG tablet TAKE 1 TABLET EVERY DAY 90 tablet 0     warfarin (COUMADIN) 2 MG tablet TAKE 1 AND 1/2 TABLETS EVERY   tablet 2     OTC products: Vit D3 and Vitamin    No Known Allergies   Latex Allergy: NO    Social History   Substance Use Topics     Smoking status: Never Smoker     Smokeless tobacco: Never Used     Alcohol use No      Comment: never     History   Drug Use No       REVIEW OF SYSTEMS:   Constitutional, neuro, ENT, endocrine, pulmonary, cardiac, gastrointestinal, genitourinary, musculoskeletal, integument and psychiatric systems are negative, except as otherwise noted.    EXAM:   /78  Pulse 66  Resp 16  Wt 78.5 kg (173 lb)  SpO2 97%  BMI 25.18 kg/m2    GENERAL APPEARANCE: healthy, alert and no distress     EYES: EOMI,  PERRL (bilateral cataracts per opthalmology)      HENT: ear canals and TM's normal and nose and mouth without ulcers or lesions     NECK: no adenopathy, no asymmetry, masses, or scars and thyroid normal to palpation     RESP: lungs clear to auscultation - no rales, rhonchi or wheezes     CV: regular rates and rhythm, normal S1 S2, no S3 or S4 and no murmur, click or rub no edema no JVD no HJR     ABDOMEN:  soft, nontender, no HSM or masses and bowel sounds normal     MS: extremities normal- no gross deformities noted, no evidence of inflammation in joints, FROM in all extremities.     SKIN: no suspicious lesions or rashes     NEURO: Normal strength and tone, sensory exam grossly normal, mentation intact and speech normal     PSYCH: mentation appears normal. and affect normal/bright     LYMPHATICS: No cervical adenopathy    DIAGNOSTICS:     Labs Drawn and in Process:   Unresulted Labs Ordered in the Past 30 Days of this Admission     No orders found from 9/27/2018 to 11/27/2018.          Recent Labs   Lab Test  11/06/18   1014  09/20/18   1534   02/23/18   1126   07/25/17   1113  02/16/17 02/09/17   0641   02/07/17   2300   HGB   --    --    --    --    --    --    --    --    --   12.1*   --   12.7*   PLT   --    --    --    --    --    --    --    --    --   131*   --   142*   INR   2.2  2.2   < >  4.9*   < >   --    < >  3.1   < >  1.96*   < >  1.87*   NA   --    --    --    --    --    --    --   138   --   137   --   139   POTASSIUM   --    --    --    --    --    --    --   4.0   --   3.5   --   3.9   CR   --    --    --    --    --    --    --   0.81   --   0.88   --   0.97   A1C   --    --    --   10.3*   --   8.2*   --    --    --    --    --    --     < > = values in this interval not displayed.        IMPRESSION:   Reason for surgery/procedure: bilateral cataract surgery. Planning to one eye at time.  Diagnosis/reason for consult: preoperative clearance    The proposed surgical procedure is considered LOW risk.    REVISED CARDIAC RISK INDEX  The patient has the following serious cardiovascular risks for perioperative complications such as (MI, PE, VFib and 3  AV Block):  Coronary Artery Disease (MI, positive stress test, angina, Qs on EKG)  INTERPRETATION: 1 risks: Class II (low risk - 0.9% complication rate)    The patient has the following additional risks for perioperative complications:  DM on oral medications  Lab Results   Component Value Date    A1C 10.3 02/23/2018    A1C 8.2 07/25/2017    A1C 7.6 01/25/2017    A1C 7.1 07/27/2016    A1C 8.6 03/17/2016   H/o TIA/stroke  Esophageal stricture doing well  Autism patient  H/o CHF, no findings today  GERD controlled  LDL Cholesterol Calculated   Date Value Ref Range Status   08/24/2018 117 (H) 0 - 99 mg/dL Final             ICD-10-CM    1. Preop general physical exam Z01.818    2. Type 2 diabetes mellitus without complication, without long-term current use of insulin (H) E11.9 Hemoglobin A1C ([facility])     Basic Metabolic Panel (8) ([facility])     CBC with Diff/Plt ([facility])     TSH ([facility])     Thyroxine (T4) Free  Direct  S ([facility])     Microalbumin ([facility])     CANCELED: Microalbumin ([facility])   3. History of TIA (transient ischemic attack) and stroke Z86.73 Prothrombin - INR ([facility])   4. Long term current use of anticoagulant therapy Z79.01  Prothrombin - INR ([facility])   5. History of MI (myocardial infarction) I25.2 EKG 12-lead complete w/read - Clinics   6. Stricture and stenosis of esophagus K22.2    7. History of stroke Z86.73    8. Congestive heart failure, unspecified HF chronicity, unspecified heart failure type (H) I50.9    9. Autism disorder F84.0    10. Prostate cancer (H) C61    11. Pre-op exam Z01.818    12. Chronic systolic congestive heart failure (H) I50.22 ACE/ARB/ARNI NOT PRESCRIBED, INTENTIONAL,   13. Encounter for medication refill Z76.0 bicalutamide (CASODEX) 50 MG tablet     glimepiride (AMARYL) 1 MG tablet     sertraline (ZOLOFT) 50 MG tablet     Hepatic Function Panel (7) ([facility])   14. Hyperlipidemia LDL goal <70 E78.5 Lipid Panel ([facility])   15. Vitamin D deficiency E55.9 Vitamin D  25-Hydroxy ([facility])   16. Gastroesophageal reflux disease, esophagitis presence not specified K21.9 omeprazole (PRILOSEC) 20 MG CR capsule   17. Impacted cerumen of left ear H61.22        RECOMMENDATIONS:     --Consult hospital rounder / IM to assist post-op medical management    Cardiovascular Risk  Patient is already on a Beta Blocker. Continue Betablocker therapy after surgery, using Beta blocker order set as necessary for NPO status.      --Patient is to take all scheduled medications on the day of surgery EXCEPT for modifications listed below.      Before Your Surgery      Call your surgeon if there is any change in your health. This includes signs of a cold or flu (such as a sore throat, runny nose, cough, rash or fever).    Do not smoke, drink alcohol or take over the counter medicine (unless your surgeon or primary care doctor tells you to) for the 24 hours before and after surgery.    If you take prescribed drugs: Follow your doctor s orders about which medicines to take and which to stop until after surgery.    Eating and drinking prior to surgery: follow the instructions from your surgeon    Take a shower or bath the night before surgery.  Use the soap your surgeon gave you to gently clean your skin. If you do not have soap from your surgeon, use your regular soap. Do not shave or scrub the surgery site.  Wear clean pajamas and have clean sheets on your bed.     Hold when not eating for surgery: Glimepiride, metformin, multi vitamin/vitamin D, omeprazole  Take all your other medications in the morning as discussed. ASA/Coumadin/Bicalutamide, Metoprolol, Sertraline, Atorvastatin  Then when eating again resume medications    Labs today  EKG today  Left ear wax wash        APPROVAL GIVEN to proceed with proposed procedure, without further diagnostic evaluation    ASSESSMENT / PLAN:  (Z01.818) Preop general physical exam  (primary encounter diagnosis)  Comment:   Plan: ok for cataract surgery    (E11.9) Type 2 diabetes mellitus without complication, without long-term current use of insulin (H)  Comment: will hold DM medications when NPO, Follow with sliding scale. Resume medications when eating again.  Plan: Hemoglobin A1C ([facility]), Basic Metabolic Panel        (8) ([facility]), CBC with Diff/Plt ([facility]), TSH         ([facility]), Thyroxine (T4) Free  Direct  S         ([facility]), Microalbumin ([facility]), CANCELED:         Microalbumin ([facility])            (Z86.73) History of TIA (transient ischemic attack) and stroke  Comment: stable  Plan: Prothrombin - INR ([facility])            (Z79.01) Long term current use of anticoagulant therapy  Comment:   Plan: Prothrombin - INR ([facility])            (I25.2) History of MI (myocardial infarction)  Comment: no CP today  Plan: EKG 12-lead complete w/read - Clinics            (K22.2) Stricture and stenosis of esophagus  Comment: no swallowing problems reported today  Plan: observe    (Z86.73) History of stroke  Comment:   Plan: observe    (I50.9) Congestive heart failure, unspecified HF chronicity, unspecified heart failure type (H)  Comment: no CHF finding today on exam  Plan: observe    (F84.0) Autism disorder  Comment:   Plan: Mental  function need to make sure clear instruction and also given to another care provider like his sister here today. She is setting up the medications.    (C61) Prostate cancer (H)  Comment:   Plan: per oncology    (Z01.818) Pre-op exam  Comment:   Plan: OK for surgery    (I50.22) Chronic systolic congestive heart failure (H)  Comment:   Plan: ACE/ARB/ARNI NOT PRESCRIBED, INTENTIONAL,            (Z76.0) Encounter for medication refill  Comment:   Plan: bicalutamide (CASODEX) 50 MG tablet,         glimepiride (AMARYL) 1 MG tablet, sertraline         (ZOLOFT) 50 MG tablet, Hepatic Function Panel         (7) ([facility])            (E78.5) Hyperlipidemia LDL goal <70  Comment:   Plan: Lipid Panel ([facility])            (E55.9) Vitamin D deficiency  Comment:   Plan: Vitamin D  25-Hydroxy ([facility])            (K21.9) Gastroesophageal reflux disease, esophagitis presence not specified  Comment:   Plan: omeprazole (PRILOSEC) 20 MG CR capsule            (H61.22) Impacted cerumen of left ear  Comment: Successful on inspection  Plan: Left ear wash.           Signed Electronically by: Mariela Stauffer MD    Copy of this evaluation report is provided to requesting physician.    [facility] Preop Guidelines    Revised Cardiac Risk Index  Mariela Stauffer MD  [facility]

## 2018-11-27 LAB
ALBUMIN SERPL-MCNC: 4 G/DL (ref 3.5–4.8)
ALP SERPL-CCNC: 64 IU/L (ref 39–117)
ALT SERPL-CCNC: 18 IU/L (ref 0–44)
AST SERPL-CCNC: 19 IU/L (ref 0–40)
BILIRUB SERPL-MCNC: 0.5 MG/DL (ref 0–1.2)
BILIRUBIN, DIRECT: 0.13 MG/DL (ref 0–0.4)
BUN SERPL-MCNC: 9 MG/DL (ref 8–27)
BUN/CREATININE RATIO: 9 (ref 10–24)
CALCIUM SERPL-MCNC: 9.6 MG/DL (ref 8.6–10.2)
CHLORIDE SERPLBLD-SCNC: 98 MMOL/L (ref 96–106)
CHOLEST SERPL-MCNC: 254 MG/DL (ref 100–199)
CREAT SERPL-MCNC: 0.95 MG/DL (ref 0.76–1.27)
EGFR IF AFRICN AM: 89 ML/MIN/1.73
EGFR IF NONAFRICN AM: 77 ML/MIN/1.73
GLUCOSE SERPL-MCNC: 339 MG/DL (ref 65–99)
HBA1C MFR BLD: 12.6 % (ref 4.8–5.6)
HDLC SERPL-MCNC: 49 MG/DL
LDL/HDL RATIO: 3.2 RATIO (ref 0–3.6)
LDLC SERPL CALC-MCNC: 158 MG/DL (ref 0–99)
POTASSIUM SERPL-SCNC: 4.8 MMOL/L (ref 3.5–5.2)
PROT SERPL-MCNC: 7 G/DL (ref 6–8.5)
SODIUM SERPL-SCNC: 138 MMOL/L (ref 134–144)
T4 FREE SERPL-MCNC: 1.11 NG/DL (ref 0.82–1.77)
TOTAL CO2: 25 MMOL/L (ref 20–29)
TRIGL SERPL-MCNC: 234 MG/DL (ref 0–149)
TSH BLD-ACNC: 4.2 UIU/ML (ref 0.45–4.5)
VITAMIN D, 25-HYDROXY: 15.1 NG/ML (ref 30–100)
VLDLC SERPL CALC-MCNC: 47 MG/DL (ref 5–40)

## 2018-11-27 RX ORDER — BICALUTAMIDE 50 MG/1
50 TABLET, FILM COATED ORAL DAILY
Qty: 90 TABLET | Refills: 3 | Status: SHIPPED | OUTPATIENT
Start: 2018-11-27 | End: 2019-11-08

## 2018-11-27 RX ORDER — GLIMEPIRIDE 1 MG/1
TABLET ORAL
Qty: 90 TABLET | Refills: 3 | Status: SHIPPED | OUTPATIENT
Start: 2018-11-27 | End: 2019-01-07

## 2018-11-27 NOTE — PROGRESS NOTES
BMP HIGH SUGAR otherwise good  Lipids are high Triglycerides and LDL high THIS NEEDS WORK  Thyroid tests were fine  Vitamin D LOW Take a supplement 2000 international units per day  Liver tests were fine  A1C 12.6 THIS NEEDS WORK  CBC was ok    RN call patient  See if he is taking his lipid and DM medications  Schedule ASAP with MTM and myself again

## 2018-11-28 ENCOUNTER — TELEPHONE (OUTPATIENT)
Dept: FAMILY MEDICINE | Facility: CLINIC | Age: 78
End: 2018-11-28

## 2018-11-28 NOTE — TELEPHONE ENCOUNTER
Trying to reach patient's sister/guardian, Judith, regarding abnormal lab results. Missed Judith's return call this am. Left message for her to call nurse to discuss results and get appt with Dr. Stauffer and MTM scheduled.   Does look like Judith has read Dr. Stauffer's result note on [facility].   Agata Whitfield RN

## 2018-11-28 NOTE — TELEPHONE ENCOUNTER
----- Message from Mariela Stauffer MD sent at 11/27/2018  1:14 PM CST -----  BMP HIGH SUGAR otherwise good  Lipids are high Triglycerides and LDL high THIS NEEDS WORK  Thyroid tests were fine  Vitamin D LOW Take a supplement 2000 international units per day  Liver tests were fine  A1C 12.6 THIS NEEDS WORK  CBC was ok    RN call patient  See if he is taking his lipid and DM medications  Schedule ASAP with MTM and myself again

## 2018-11-29 ENCOUNTER — TRANSFERRED RECORDS (OUTPATIENT)
Dept: HEALTH INFORMATION MANAGEMENT | Facility: CLINIC | Age: 78
End: 2018-11-29

## 2018-11-29 NOTE — H&P (VIEW-ONLY)
White male glasses  Upper and lower denture  Walker with seat and brakes  No sleep devices    Here with sister        [facility]  [address] [place] [address] [phone]  Dept: [phone]    PRE-OP EVALUATION:  Today's date: 2018    Jose Lees (: 1940) presents for pre-operative evaluation assessment as requested by Dr. Kyle.  He requires evaluation and anesthesia risk assessment prior to undergoing surgery/procedure for treatment of eye .    Proposed Surgery/ Procedure: RIGHT EYE PHACOEMULSIFICATION CLEAR CORNEA WITH STANDARD INTRAOCULAR LENS IMPLANT (GENERAL) POSSIBLE MALYGIN RING   Date of Surgery/ Procedure:Right eye 2018- Left eye 2018  Time of Surgery/ Procedure: 1235  Hospital/Surgical Facility: [facility]  Fax number for surgical facility:  Primary Physician: Jermain Helm  Type of Anesthesia Anticipated: to be determined    Patient has a Health Care Directive or Living Will:  YES     1. YES - DO YOU HAVE A HISTORY OF HEART ATTACK, STROKE, STENT, BYPASS OR SURGERY ON AN ARTERY IN THE HEAD, NECK, HEART OR LEG? Heart attack, stroke, stent. No CP today, No edema. PND no  1. NO - Do you have a history of heart attack, stroke, stent, bypass or surgery on an artery in the head, neck, heart or legs?  2. NO - Do you ever have any pain or discomfort in your chest?  3. NO - Do you have a history of  Heart Failure?  4. NO - Are you troubled by shortness of breath when: walking on the level, up a slight hill or at night?  5. NO - Do you currently have a cold, bronchitis or other respiratory infection?  6. NO - Do you have a cough, shortness of breath or wheezing?  7. NO - Do you sometimes get pains in the calves of your legs when you walk?  8. NO - Do you or anyone in your family have previous history of blood clots?  9. NO - Do you or does anyone in your family have a serious bleeding problem such as prolonged bleeding following surgeries  or cuts?  10. NO - Have you ever had problems with anemia or been told to take iron pills?  11. NO - Have you had any abnormal blood loss such as black, tarry or bloody stools, or abnormal vaginal bleeding?  12. NO - Have you ever had a blood transfusion?  13. NO - Have you or any of your relatives ever had problems with anesthesia?  14. NO - Do you have sleep apnea, excessive snoring or daytime drowsiness?  15. NO - Do you have any prosthetic heart valves?  16. NO - Do you have prosthetic joints?  17. NO - Is there any chance that you may be pregnant?    Lab Results   Component Value Date    A1C 10.3 02/23/2018    A1C 8.2 07/25/2017    A1C 7.6 01/25/2017    A1C 7.1 07/27/2016    A1C 8.6 03/17/2016     LDL Cholesterol Calculated   Date Value Ref Range Status   08/24/2018 117 (H) 0 - 99 mg/dL Final       EKG  NSR, LAfasicular black  Septal infarct undetermined age  Consider anterior ischemia T wave  HR 68      INR  Goal 2-3  Diagnosis TIA        Today's  INR  2.1  Plan   [facility] Anticoaglution WEEKLY DOSE   Latest Ref Rng & Units    11/6/2018 3mg MF, Otherwise 2mg     F/u 4 weeks    No bleeding/bruising  No coumadin interfering foods          HPI:     HPI related to upcoming procedure: cataract surgery per eye provider      See problem list for active medical problems.  Problems all longstanding and stable, except as noted/documented.  See ROS for pertinent symptoms related to these conditions.                                                                                                                                                          .    MEDICAL HISTORY:     Patient Active Problem List    Diagnosis Date Noted     Type 2 diabetes mellitus without complication, without long-term current use of insulin (H) 07/25/2017     Priority: Medium     Physical deconditioning 02/14/2017     Priority: Medium     Influenza A 02/08/2017     Priority: Medium     History of TIA (transient ischemic attack) and stroke  02/05/2017     Priority: Medium     Stricture and stenosis of esophagus 10/22/2015     Priority: Medium     Long term current use of anticoagulant therapy 10/23/2014     Priority: Medium     Problem list name updated by automated process. Provider to review       History of MI (myocardial infarction) 05/12/2014     Priority: Medium     History of stroke 05/12/2014     Priority: Medium     CHF (congestive heart failure) (H) 05/12/2014     Priority: Medium     Health Care Home 01/07/2014     Priority: Medium     State Tier Level:  Tier 3                 ACP (advance care planning)      Priority: Medium     Advance Care Planning 5/12/2017: Receipt of ACP document:  Received: POLST which was signed and dated by provider on 2-14-17.  Document previously scanned on 2-16-17.  Order reviewed and found to be valid.  Code Status reflects choices in most recent ACP document..  Confirmed/documented designated decision maker(s).  Added by Quiana Alfonso RN Advance Care Planning Liaison with [facility]  Advanced Care Planning 8/12/2015: Patient states they have received and completed their Advanced Care directive and the document has already been scanned into their chart on 6/14/12.  Edelmira Tran [facility]  8:25 AM August 12, 2015           Autism disorder 06/29/2012     Priority: Medium     Prostate cancer (H) 10/13/2011     Priority: Medium     Diabetes mellitus, type 2 (H)      Priority: Medium     Diabetes mellitus  Problem list name updated by automated process. 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     Type II or unspecified type diabetes mellitus without mention of complication, not stated as uncontrolled     Diabetes mellitus     Past Surgical History:   Procedure Laterality Date     CORONARY ANGIOGRAPHY ADULT ORDER       ESOPHAGOSCOPY, GASTROSCOPY, DUODENOSCOPY (EGD), COMBINED N/A 8/6/2015    Procedure: COMBINED ESOPHAGOSCOPY, GASTROSCOPY, DUODENOSCOPY (EGD), REMOVE FOREIGN BODY;  Surgeon: Yu Tapia MD;  Location:  GI     HEART CATH, ANGIOPLASTY  4/14    BMS to LAD     Current Outpatient Prescriptions   Medication Sig Dispense Refill     ACE/ARB NOT PRESCRIBED, INTENTIONAL, ACE & ARB not prescribed due to Symptomatic hypotension not due to excessive diuresis       aspirin 81 MG chewable tablet Take 1 tablet (81 mg) by mouth daily 36 tablet 12     atorvastatin (LIPITOR) 20 MG tablet Take 1 tablet by mouth daily 90 tablet 3     benzocaine-menthol (CHLORASEPTIC) 6-10 MG lozenge Place 1 lozenge inside cheek every hour as needed for sore throat (Patient not taking: Reported on 10/2/2017) 84 lozenge      bicalutamide (CASODEX) 50 MG tablet Take 1 tablet (50 mg) by mouth daily 90 tablet 3     Blood Glucose Monitoring Suppl W/DEVICE KIT Test blood glucose every other day     1 kit 0     Cholecalciferol (D3-1000 PO) Take 1 capsule by mouth daily       glimepiride (AMARYL) 1 MG tablet TAKE 1 TABLET (1 MG) BY MOUTH EVERY MORNING (BEFORE BREAKFAST) 90 tablet 1     metFORMIN (GLUCOPHAGE) 500 MG tablet TAKE 2 TABLETS TWICE DAILY WITH MEALS 360 tablet 1     metoprolol (TOPROL-XL) 25 MG 24 hr tablet TAKE 1 TABLET EVERY DAY 90 tablet 3     multivitamin, therapeutic (THERA-VIT) TABS Take 1 tablet by mouth daily       sertraline (ZOLOFT) 50 MG tablet TAKE 1 TABLET EVERY DAY 90 tablet 0     warfarin (COUMADIN) 2 MG tablet TAKE 1 AND 1/2 TABLETS EVERY   tablet 2     OTC products: Vit D3 and Vitamin    No Known Allergies   Latex Allergy: NO    Social History   Substance Use Topics     Smoking status: Never Smoker     Smokeless tobacco: Never Used     Alcohol use No      Comment: never     History   Drug Use No       REVIEW OF SYSTEMS:   Constitutional, neuro, ENT, endocrine, pulmonary, cardiac, gastrointestinal, genitourinary, musculoskeletal, integument and psychiatric systems are negative, except as otherwise noted.    EXAM:   /78  Pulse 66  Resp 16  Wt 78.5 kg (173 lb)  SpO2 97%  BMI 25.18 kg/m2    GENERAL APPEARANCE: healthy, alert and no distress     EYES: EOMI,  PERRL (bilateral cataracts per opthalmology)      HENT: ear canals and TM's normal and nose and mouth without ulcers or lesions     NECK: no adenopathy, no asymmetry, masses, or scars and thyroid normal to palpation     RESP: lungs clear to auscultation - no rales, rhonchi or wheezes     CV: regular rates and rhythm, normal S1 S2, no S3 or S4 and no murmur, click or rub no edema no JVD no HJR     ABDOMEN:  soft, nontender, no HSM or masses and bowel sounds normal     MS: extremities normal- no gross deformities noted, no evidence of inflammation in joints, FROM in all extremities.     SKIN: no suspicious lesions or rashes     NEURO: Normal strength and tone, sensory exam grossly normal, mentation intact and speech normal     PSYCH: mentation appears normal. and affect normal/bright     LYMPHATICS: No cervical adenopathy    DIAGNOSTICS:     Labs Drawn and in Process:   Unresulted Labs Ordered in the Past 30 Days of this Admission     No orders found from 9/27/2018 to 11/27/2018.          Recent Labs   Lab Test  11/06/18   1014  09/20/18   1534   02/23/18   1126   07/25/17   1113  02/16/17 02/09/17   0641   02/07/17   2300   HGB   --    --    --    --    --    --    --    --    --   12.1*   --   12.7*   PLT   --    --    --    --    --    --    --    --    --   131*   --   142*   INR   2.2  2.2   < >  4.9*   < >   --    < >  3.1   < >  1.96*   < >  1.87*   NA   --    --    --    --    --    --    --   138   --   137   --   139   POTASSIUM   --    --    --    --    --    --    --   4.0   --   3.5   --   3.9   CR   --    --    --    --    --    --    --   0.81   --   0.88   --   0.97   A1C   --    --    --   10.3*   --   8.2*   --    --    --    --    --    --     < > = values in this interval not displayed.        IMPRESSION:   Reason for surgery/procedure: bilateral cataract surgery. Planning to one eye at time.  Diagnosis/reason for consult: preoperative clearance    The proposed surgical procedure is considered LOW risk.    REVISED CARDIAC RISK INDEX  The patient has the following serious cardiovascular risks for perioperative complications such as (MI, PE, VFib and 3  AV Block):  Coronary Artery Disease (MI, positive stress test, angina, Qs on EKG)  INTERPRETATION: 1 risks: Class II (low risk - 0.9% complication rate)    The patient has the following additional risks for perioperative complications:  DM on oral medications  Lab Results   Component Value Date    A1C 10.3 02/23/2018    A1C 8.2 07/25/2017    A1C 7.6 01/25/2017    A1C 7.1 07/27/2016    A1C 8.6 03/17/2016   H/o TIA/stroke  Esophageal stricture doing well  Autism patient  H/o CHF, no findings today  GERD controlled  LDL Cholesterol Calculated   Date Value Ref Range Status   08/24/2018 117 (H) 0 - 99 mg/dL Final             ICD-10-CM    1. Preop general physical exam Z01.818    2. Type 2 diabetes mellitus without complication, without long-term current use of insulin (H) E11.9 Hemoglobin A1C ([facility])     Basic Metabolic Panel (8) ([facility])     CBC with Diff/Plt ([facility])     TSH ([facility])     Thyroxine (T4) Free  Direct  S ([facility])     Microalbumin ([facility])     CANCELED: Microalbumin ([facility])   3. History of TIA (transient ischemic attack) and stroke Z86.73 Prothrombin - INR ([facility])   4. Long term current use of anticoagulant therapy Z79.01  Prothrombin - INR ([facility])   5. History of MI (myocardial infarction) I25.2 EKG 12-lead complete w/read - Clinics   6. Stricture and stenosis of esophagus K22.2    7. History of stroke Z86.73    8. Congestive heart failure, unspecified HF chronicity, unspecified heart failure type (H) I50.9    9. Autism disorder F84.0    10. Prostate cancer (H) C61    11. Pre-op exam Z01.818    12. Chronic systolic congestive heart failure (H) I50.22 ACE/ARB/ARNI NOT PRESCRIBED, INTENTIONAL,   13. Encounter for medication refill Z76.0 bicalutamide (CASODEX) 50 MG tablet     glimepiride (AMARYL) 1 MG tablet     sertraline (ZOLOFT) 50 MG tablet     Hepatic Function Panel (7) ([facility])   14. Hyperlipidemia LDL goal <70 E78.5 Lipid Panel ([facility])   15. Vitamin D deficiency E55.9 Vitamin D  25-Hydroxy ([facility])   16. Gastroesophageal reflux disease, esophagitis presence not specified K21.9 omeprazole (PRILOSEC) 20 MG CR capsule   17. Impacted cerumen of left ear H61.22        RECOMMENDATIONS:     --Consult hospital rounder / IM to assist post-op medical management    Cardiovascular Risk  Patient is already on a Beta Blocker. Continue Betablocker therapy after surgery, using Beta blocker order set as necessary for NPO status.      --Patient is to take all scheduled medications on the day of surgery EXCEPT for modifications listed below.      Before Your Surgery      Call your surgeon if there is any change in your health. This includes signs of a cold or flu (such as a sore throat, runny nose, cough, rash or fever).    Do not smoke, drink alcohol or take over the counter medicine (unless your surgeon or primary care doctor tells you to) for the 24 hours before and after surgery.    If you take prescribed drugs: Follow your doctor s orders about which medicines to take and which to stop until after surgery.    Eating and drinking prior to surgery: follow the instructions from your surgeon    Take a shower or bath the night before surgery.  Use the soap your surgeon gave you to gently clean your skin. If you do not have soap from your surgeon, use your regular soap. Do not shave or scrub the surgery site.  Wear clean pajamas and have clean sheets on your bed.     Hold when not eating for surgery: Glimepiride, metformin, multi vitamin/vitamin D, omeprazole  Take all your other medications in the morning as discussed. ASA/Coumadin/Bicalutamide, Metoprolol, Sertraline, Atorvastatin  Then when eating again resume medications    Labs today  EKG today  Left ear wax wash        APPROVAL GIVEN to proceed with proposed procedure, without further diagnostic evaluation    ASSESSMENT / PLAN:  (Z01.818) Preop general physical exam  (primary encounter diagnosis)  Comment:   Plan: ok for cataract surgery    (E11.9) Type 2 diabetes mellitus without complication, without long-term current use of insulin (H)  Comment: will hold DM medications when NPO, Follow with sliding scale. Resume medications when eating again.  Plan: Hemoglobin A1C ([facility]), Basic Metabolic Panel        (8) ([facility]), CBC with Diff/Plt ([facility]), TSH         ([facility]), Thyroxine (T4) Free  Direct  S         ([facility]), Microalbumin ([facility]), CANCELED:         Microalbumin ([facility])            (Z86.73) History of TIA (transient ischemic attack) and stroke  Comment: stable  Plan: Prothrombin - INR ([facility])            (Z79.01) Long term current use of anticoagulant therapy  Comment:   Plan: Prothrombin - INR ([facility])            (I25.2) History of MI (myocardial infarction)  Comment: no CP today  Plan: EKG 12-lead complete w/read - Clinics            (K22.2) Stricture and stenosis of esophagus  Comment: no swallowing problems reported today  Plan: observe    (Z86.73) History of stroke  Comment:   Plan: observe    (I50.9) Congestive heart failure, unspecified HF chronicity, unspecified heart failure type (H)  Comment: no CHF finding today on exam  Plan: observe    (F84.0) Autism disorder  Comment:   Plan: Mental  function need to make sure clear instruction and also given to another care provider like his sister here today. She is setting up the medications.    (C61) Prostate cancer (H)  Comment:   Plan: per oncology    (Z01.818) Pre-op exam  Comment:   Plan: OK for surgery    (I50.22) Chronic systolic congestive heart failure (H)  Comment:   Plan: ACE/ARB/ARNI NOT PRESCRIBED, INTENTIONAL,            (Z76.0) Encounter for medication refill  Comment:   Plan: bicalutamide (CASODEX) 50 MG tablet,         glimepiride (AMARYL) 1 MG tablet, sertraline         (ZOLOFT) 50 MG tablet, Hepatic Function Panel         (7) ([facility])            (E78.5) Hyperlipidemia LDL goal <70  Comment:   Plan: Lipid Panel ([facility])            (E55.9) Vitamin D deficiency  Comment:   Plan: Vitamin D  25-Hydroxy ([facility])            (K21.9) Gastroesophageal reflux disease, esophagitis presence not specified  Comment:   Plan: omeprazole (PRILOSEC) 20 MG CR capsule            (H61.22) Impacted cerumen of left ear  Comment: Successful on inspection  Plan: Left ear wash.           Signed Electronically by: Mariela Stauffer MD    Copy of this evaluation report is provided to requesting physician.    [facility] Preop Guidelines    Revised Cardiac Risk Index  Mariela Stauffer MD  [facility]

## 2018-11-29 NOTE — TELEPHONE ENCOUNTER
Spoke with sister Judith.  She had read the labs on [facility].  She will get patient to take Vit D3 2000 units daily.  Appointment was made for patient to see Jennifer and Dr Stauffer.

## 2018-12-04 ENCOUNTER — SURGERY (OUTPATIENT)
Age: 78
End: 2018-12-04

## 2018-12-04 ENCOUNTER — HOSPITAL ENCOUNTER (OUTPATIENT)
Facility: CLINIC | Age: 78
Discharge: HOME | End: 2018-12-04
Attending: STUDENT IN AN ORGANIZED HEALTH CARE EDUCATION/TRAINING PROGRAM | Admitting: STUDENT IN AN ORGANIZED HEALTH CARE EDUCATION/TRAINING PROGRAM
Payer: MEDICARE

## 2018-12-04 ENCOUNTER — ANESTHESIA EVENT (OUTPATIENT)
Dept: SURGERY | Facility: CLINIC | Age: 78
End: 2018-12-04
Payer: MEDICARE

## 2018-12-04 ENCOUNTER — ANESTHESIA (OUTPATIENT)
Dept: SURGERY | Facility: CLINIC | Age: 78
End: 2018-12-04
Payer: MEDICARE

## 2018-12-04 VITALS
TEMPERATURE: 98.1 F | DIASTOLIC BLOOD PRESSURE: 68 MMHG | HEIGHT: 69 IN | OXYGEN SATURATION: 98 % | SYSTOLIC BLOOD PRESSURE: 114 MMHG | RESPIRATION RATE: 18 BRPM | BODY MASS INDEX: 25.62 KG/M2 | WEIGHT: 173 LBS

## 2018-12-04 LAB
GLUCOSE BLDC GLUCOMTR-MCNC: 154 MG/DL (ref 70–99)
GLUCOSE BLDC GLUCOMTR-MCNC: 159 MG/DL (ref 70–99)
GLUCOSE BLDC GLUCOMTR-MCNC: 201 MG/DL (ref 70–99)

## 2018-12-04 PROCEDURE — 25000128 H RX IP 250 OP 636: Performed by: STUDENT IN AN ORGANIZED HEALTH CARE EDUCATION/TRAINING PROGRAM

## 2018-12-04 PROCEDURE — V2632 POST CHMBR INTRAOCULAR LENS: HCPCS | Performed by: STUDENT IN AN ORGANIZED HEALTH CARE EDUCATION/TRAINING PROGRAM

## 2018-12-04 PROCEDURE — 71000028 ZZH EYE RECOVERY PHASE 2 EACH 15 MINS: Performed by: STUDENT IN AN ORGANIZED HEALTH CARE EDUCATION/TRAINING PROGRAM

## 2018-12-04 PROCEDURE — 25000125 ZZHC RX 250: Performed by: STUDENT IN AN ORGANIZED HEALTH CARE EDUCATION/TRAINING PROGRAM

## 2018-12-04 PROCEDURE — 36000102 ZZH EYE SURGERY LEVEL 3 EA 15 ADDTL MIN: Performed by: STUDENT IN AN ORGANIZED HEALTH CARE EDUCATION/TRAINING PROGRAM

## 2018-12-04 PROCEDURE — 25000566 ZZH SEVOFLURANE, EA 15 MIN: Performed by: STUDENT IN AN ORGANIZED HEALTH CARE EDUCATION/TRAINING PROGRAM

## 2018-12-04 PROCEDURE — 71000004 ZZH RECOVERY EYE PHASE 1 LEVEL 1 FIRST HR: Performed by: STUDENT IN AN ORGANIZED HEALTH CARE EDUCATION/TRAINING PROGRAM

## 2018-12-04 PROCEDURE — A9270 NON-COVERED ITEM OR SERVICE: HCPCS | Mod: GY | Performed by: STUDENT IN AN ORGANIZED HEALTH CARE EDUCATION/TRAINING PROGRAM

## 2018-12-04 PROCEDURE — 82962 GLUCOSE BLOOD TEST: CPT | Mod: 91

## 2018-12-04 PROCEDURE — 27210794 ZZH OR GENERAL SUPPLY STERILE: Performed by: STUDENT IN AN ORGANIZED HEALTH CARE EDUCATION/TRAINING PROGRAM

## 2018-12-04 PROCEDURE — 25000125 ZZHC RX 250: Performed by: NURSE ANESTHETIST, CERTIFIED REGISTERED

## 2018-12-04 PROCEDURE — 25000128 H RX IP 250 OP 636: Performed by: NURSE ANESTHETIST, CERTIFIED REGISTERED

## 2018-12-04 PROCEDURE — 37000008 ZZH ANESTHESIA TECHNICAL FEE, 1ST 30 MIN: Performed by: STUDENT IN AN ORGANIZED HEALTH CARE EDUCATION/TRAINING PROGRAM

## 2018-12-04 PROCEDURE — 25000128 H RX IP 250 OP 636: Performed by: ANESTHESIOLOGY

## 2018-12-04 PROCEDURE — 25000125 ZZHC RX 250: Performed by: ANESTHESIOLOGY

## 2018-12-04 PROCEDURE — 25000132 ZZH RX MED GY IP 250 OP 250 PS 637: Mod: GY | Performed by: STUDENT IN AN ORGANIZED HEALTH CARE EDUCATION/TRAINING PROGRAM

## 2018-12-04 PROCEDURE — 36000101 ZZH EYE SURGERY LEVEL 3 1ST 30 MIN: Performed by: STUDENT IN AN ORGANIZED HEALTH CARE EDUCATION/TRAINING PROGRAM

## 2018-12-04 PROCEDURE — 37000009 ZZH ANESTHESIA TECHNICAL FEE, EACH ADDTL 15 MIN: Performed by: STUDENT IN AN ORGANIZED HEALTH CARE EDUCATION/TRAINING PROGRAM

## 2018-12-04 PROCEDURE — 40000170 ZZH STATISTIC PRE-PROCEDURE ASSESSMENT II: Performed by: STUDENT IN AN ORGANIZED HEALTH CARE EDUCATION/TRAINING PROGRAM

## 2018-12-04 DEVICE — EYE IMP IOL AMO PCL TECNIS ZCB00 11.5: Type: IMPLANTABLE DEVICE | Site: EYE | Status: FUNCTIONAL

## 2018-12-04 RX ORDER — SODIUM CHLORIDE, SODIUM LACTATE, POTASSIUM CHLORIDE, CALCIUM CHLORIDE 600; 310; 30; 20 MG/100ML; MG/100ML; MG/100ML; MG/100ML
INJECTION, SOLUTION INTRAVENOUS CONTINUOUS
Status: DISCONTINUED | OUTPATIENT
Start: 2018-12-04 | End: 2018-12-04 | Stop reason: HOSPADM

## 2018-12-04 RX ORDER — FENTANYL CITRATE 50 UG/ML
50-100 INJECTION, SOLUTION INTRAMUSCULAR; INTRAVENOUS
Status: DISCONTINUED | OUTPATIENT
Start: 2018-12-04 | End: 2018-12-04 | Stop reason: HOSPADM

## 2018-12-04 RX ORDER — HYDROMORPHONE HYDROCHLORIDE 1 MG/ML
.3-.5 INJECTION, SOLUTION INTRAMUSCULAR; INTRAVENOUS; SUBCUTANEOUS EVERY 5 MIN PRN
Status: DISCONTINUED | OUTPATIENT
Start: 2018-12-04 | End: 2018-12-04

## 2018-12-04 RX ORDER — FENTANYL CITRATE 50 UG/ML
INJECTION, SOLUTION INTRAMUSCULAR; INTRAVENOUS PRN
Status: DISCONTINUED | OUTPATIENT
Start: 2018-12-04 | End: 2018-12-04

## 2018-12-04 RX ORDER — PROPARACAINE HYDROCHLORIDE 5 MG/ML
1 SOLUTION/ DROPS OPHTHALMIC ONCE
Status: DISCONTINUED | OUTPATIENT
Start: 2018-12-04 | End: 2018-12-04 | Stop reason: HOSPADM

## 2018-12-04 RX ORDER — BALANCED SALT SOLUTION 6.4; .75; .48; .3; 3.9; 1.7 MG/ML; MG/ML; MG/ML; MG/ML; MG/ML; MG/ML
SOLUTION OPHTHALMIC PRN
Status: DISCONTINUED | OUTPATIENT
Start: 2018-12-04 | End: 2018-12-04 | Stop reason: HOSPADM

## 2018-12-04 RX ORDER — FENTANYL CITRATE 50 UG/ML
25-50 INJECTION, SOLUTION INTRAMUSCULAR; INTRAVENOUS
Status: DISCONTINUED | OUTPATIENT
Start: 2018-12-04 | End: 2018-12-04 | Stop reason: HOSPADM

## 2018-12-04 RX ORDER — DICLOFENAC SODIUM 1 MG/ML
1 SOLUTION/ DROPS OPHTHALMIC
Status: COMPLETED | OUTPATIENT
Start: 2018-12-04 | End: 2018-12-04

## 2018-12-04 RX ORDER — MEPERIDINE HYDROCHLORIDE 25 MG/ML
12.5 INJECTION INTRAMUSCULAR; INTRAVENOUS; SUBCUTANEOUS
Status: DISCONTINUED | OUTPATIENT
Start: 2018-12-04 | End: 2018-12-04 | Stop reason: HOSPADM

## 2018-12-04 RX ORDER — ACETAMINOPHEN 325 MG/1
650 TABLET ORAL EVERY 4 HOURS PRN
Status: DISCONTINUED | OUTPATIENT
Start: 2018-12-04 | End: 2018-12-04 | Stop reason: HOSPADM

## 2018-12-04 RX ORDER — TETRACAINE HYDROCHLORIDE 5 MG/ML
SOLUTION OPHTHALMIC PRN
Status: DISCONTINUED | OUTPATIENT
Start: 2018-12-04 | End: 2018-12-04 | Stop reason: HOSPADM

## 2018-12-04 RX ORDER — ONDANSETRON 2 MG/ML
4 INJECTION INTRAMUSCULAR; INTRAVENOUS EVERY 30 MIN PRN
Status: DISCONTINUED | OUTPATIENT
Start: 2018-12-04 | End: 2018-12-04 | Stop reason: HOSPADM

## 2018-12-04 RX ORDER — ONDANSETRON 4 MG/1
4 TABLET, ORALLY DISINTEGRATING ORAL EVERY 30 MIN PRN
Status: DISCONTINUED | OUTPATIENT
Start: 2018-12-04 | End: 2018-12-04 | Stop reason: HOSPADM

## 2018-12-04 RX ORDER — LIDOCAINE HYDROCHLORIDE 10 MG/ML
INJECTION, SOLUTION EPIDURAL; INFILTRATION; INTRACAUDAL; PERINEURAL PRN
Status: DISCONTINUED | OUTPATIENT
Start: 2018-12-04 | End: 2018-12-04 | Stop reason: HOSPADM

## 2018-12-04 RX ORDER — CYCLOPENTOLATE HYDROCHLORIDE 10 MG/ML
1 SOLUTION/ DROPS OPHTHALMIC
Status: COMPLETED | OUTPATIENT
Start: 2018-12-04 | End: 2018-12-04

## 2018-12-04 RX ORDER — PHENYLEPHRINE HYDROCHLORIDE 25 MG/ML
1 SOLUTION/ DROPS OPHTHALMIC
Status: COMPLETED | OUTPATIENT
Start: 2018-12-04 | End: 2018-12-04

## 2018-12-04 RX ORDER — NEOSTIGMINE METHYLSULFATE 1 MG/ML
VIAL (ML) INJECTION PRN
Status: DISCONTINUED | OUTPATIENT
Start: 2018-12-04 | End: 2018-12-04

## 2018-12-04 RX ORDER — HYDROMORPHONE HYDROCHLORIDE 1 MG/ML
.3-.5 INJECTION, SOLUTION INTRAMUSCULAR; INTRAVENOUS; SUBCUTANEOUS EVERY 10 MIN PRN
Status: DISCONTINUED | OUTPATIENT
Start: 2018-12-04 | End: 2018-12-04 | Stop reason: HOSPADM

## 2018-12-04 RX ORDER — LIDOCAINE HYDROCHLORIDE 20 MG/ML
INJECTION, SOLUTION INFILTRATION; PERINEURAL PRN
Status: DISCONTINUED | OUTPATIENT
Start: 2018-12-04 | End: 2018-12-04

## 2018-12-04 RX ORDER — ONDANSETRON 2 MG/ML
INJECTION INTRAMUSCULAR; INTRAVENOUS PRN
Status: DISCONTINUED | OUTPATIENT
Start: 2018-12-04 | End: 2018-12-04

## 2018-12-04 RX ORDER — ETOMIDATE 2 MG/ML
INJECTION INTRAVENOUS PRN
Status: DISCONTINUED | OUTPATIENT
Start: 2018-12-04 | End: 2018-12-04

## 2018-12-04 RX ORDER — PROPARACAINE HYDROCHLORIDE 5 MG/ML
1 SOLUTION/ DROPS OPHTHALMIC ONCE
Status: COMPLETED | OUTPATIENT
Start: 2018-12-04 | End: 2018-12-04

## 2018-12-04 RX ORDER — GLYCOPYRROLATE 0.2 MG/ML
INJECTION, SOLUTION INTRAMUSCULAR; INTRAVENOUS PRN
Status: DISCONTINUED | OUTPATIENT
Start: 2018-12-04 | End: 2018-12-04

## 2018-12-04 RX ORDER — NALOXONE HYDROCHLORIDE 0.4 MG/ML
.1-.4 INJECTION, SOLUTION INTRAMUSCULAR; INTRAVENOUS; SUBCUTANEOUS
Status: DISCONTINUED | OUTPATIENT
Start: 2018-12-04 | End: 2018-12-04 | Stop reason: HOSPADM

## 2018-12-04 RX ORDER — EPHEDRINE SULFATE 50 MG/ML
INJECTION, SOLUTION INTRAMUSCULAR; INTRAVENOUS; SUBCUTANEOUS PRN
Status: DISCONTINUED | OUTPATIENT
Start: 2018-12-04 | End: 2018-12-04

## 2018-12-04 RX ORDER — TROPICAMIDE 10 MG/ML
1 SOLUTION/ DROPS OPHTHALMIC
Status: COMPLETED | OUTPATIENT
Start: 2018-12-04 | End: 2018-12-04

## 2018-12-04 RX ADMIN — ROCURONIUM BROMIDE 25 MG: 10 INJECTION INTRAVENOUS at 12:45

## 2018-12-04 RX ADMIN — Medication 2.5 MG: at 13:26

## 2018-12-04 RX ADMIN — EPINEPHRINE 500 ML: 1 INJECTION, SOLUTION, CONCENTRATE INTRAVENOUS at 12:59

## 2018-12-04 RX ADMIN — EPINEPHRINE 500 ML: 1 INJECTION, SOLUTION, CONCENTRATE INTRAVENOUS at 13:34

## 2018-12-04 RX ADMIN — GLYCOPYRROLATE 0.6 MG: 0.2 INJECTION, SOLUTION INTRAMUSCULAR; INTRAVENOUS at 14:03

## 2018-12-04 RX ADMIN — CEFUROXIME 0.1 ML: 1.5 INJECTION, POWDER, FOR SOLUTION INTRAVENOUS at 13:56

## 2018-12-04 RX ADMIN — LIDOCAINE HYDROCHLORIDE 1 ML: 10 INJECTION, SOLUTION EPIDURAL; INFILTRATION; INTRACAUDAL; PERINEURAL at 12:59

## 2018-12-04 RX ADMIN — SODIUM CHONDROITIN SULFATE / SODIUM HYALURONATE 1 ML: 0.55-0.5 INJECTION INTRAOCULAR at 13:00

## 2018-12-04 RX ADMIN — BALANCED SALT SOLUTION 15 ML: 6.4; .75; .48; .3; 3.9; 1.7 SOLUTION OPHTHALMIC at 13:35

## 2018-12-04 RX ADMIN — PROPARACAINE HYDROCHLORIDE 1 DROP: 5 SOLUTION/ DROPS OPHTHALMIC at 11:24

## 2018-12-04 RX ADMIN — PHENYLEPHRINE HYDROCHLORIDE 1 DROP: 2.5 SOLUTION/ DROPS OPHTHALMIC at 11:25

## 2018-12-04 RX ADMIN — Medication 1 DROP: at 11:26

## 2018-12-04 RX ADMIN — CYCLOPENTOLATE HYDROCHLORIDE 1 DROP: 10 SOLUTION/ DROPS OPHTHALMIC at 11:37

## 2018-12-04 RX ADMIN — CYCLOPENTOLATE HYDROCHLORIDE 1 DROP: 10 SOLUTION/ DROPS OPHTHALMIC at 11:25

## 2018-12-04 RX ADMIN — TROPICAMIDE 1 DROP: 10 SOLUTION/ DROPS OPHTHALMIC at 11:25

## 2018-12-04 RX ADMIN — LIDOCAINE HYDROCHLORIDE 100 MG: 20 INJECTION, SOLUTION INFILTRATION; PERINEURAL at 12:44

## 2018-12-04 RX ADMIN — TETRACAINE HYDROCHLORIDE 2 DROP: 5 SOLUTION OPHTHALMIC at 12:48

## 2018-12-04 RX ADMIN — DICLOFENAC SODIUM 1 DROP: 1 SOLUTION OPHTHALMIC at 11:48

## 2018-12-04 RX ADMIN — PHENYLEPHRINE HYDROCHLORIDE 1 DROP: 2.5 SOLUTION/ DROPS OPHTHALMIC at 11:48

## 2018-12-04 RX ADMIN — ETOMIDATE 16 MG: 2 INJECTION, SOLUTION INTRAVENOUS at 12:44

## 2018-12-04 RX ADMIN — DICLOFENAC SODIUM 1 DROP: 1 SOLUTION OPHTHALMIC at 11:37

## 2018-12-04 RX ADMIN — DICLOFENAC SODIUM 1 DROP: 1 SOLUTION OPHTHALMIC at 11:25

## 2018-12-04 RX ADMIN — SODIUM CHLORIDE, POTASSIUM CHLORIDE, SODIUM LACTATE AND CALCIUM CHLORIDE: 600; 310; 30; 20 INJECTION, SOLUTION INTRAVENOUS at 11:50

## 2018-12-04 RX ADMIN — PHENYLEPHRINE HYDROCHLORIDE 50 MCG: 10 INJECTION, SOLUTION INTRAMUSCULAR; INTRAVENOUS; SUBCUTANEOUS at 13:13

## 2018-12-04 RX ADMIN — PHENYLEPHRINE HYDROCHLORIDE 1 DROP: 2.5 SOLUTION/ DROPS OPHTHALMIC at 11:37

## 2018-12-04 RX ADMIN — FENTANYL CITRATE 50 MCG: 50 INJECTION, SOLUTION INTRAMUSCULAR; INTRAVENOUS at 12:44

## 2018-12-04 RX ADMIN — TROPICAMIDE 1 DROP: 10 SOLUTION/ DROPS OPHTHALMIC at 11:37

## 2018-12-04 RX ADMIN — ACETAMINOPHEN 650 MG: 325 TABLET, FILM COATED ORAL at 15:13

## 2018-12-04 RX ADMIN — TROPICAMIDE 1 DROP: 10 SOLUTION/ DROPS OPHTHALMIC at 11:48

## 2018-12-04 RX ADMIN — CYCLOPENTOLATE HYDROCHLORIDE 1 DROP: 10 SOLUTION/ DROPS OPHTHALMIC at 11:49

## 2018-12-04 RX ADMIN — ONDANSETRON 4 MG: 2 INJECTION INTRAMUSCULAR; INTRAVENOUS at 12:52

## 2018-12-04 RX ADMIN — LIDOCAINE HYDROCHLORIDE 1 ML: 10 INJECTION, SOLUTION EPIDURAL; INFILTRATION; INTRACAUDAL; PERINEURAL at 11:50

## 2018-12-04 RX ADMIN — Medication 2.5 MG: at 13:45

## 2018-12-04 RX ADMIN — BALANCED SALT SOLUTION 15 ML: 6.4; .75; .48; .3; 3.9; 1.7 SOLUTION OPHTHALMIC at 13:00

## 2018-12-04 RX ADMIN — ETOMIDATE 4 MG: 2 INJECTION, SOLUTION INTRAVENOUS at 12:46

## 2018-12-04 RX ADMIN — NEOSTIGMINE METHYLSULFATE 4 MG: 1 INJECTION, SOLUTION INTRAVENOUS at 14:03

## 2018-12-04 ASSESSMENT — COPD QUESTIONNAIRES: COPD: 0

## 2018-12-04 ASSESSMENT — LIFESTYLE VARIABLES: TOBACCO_USE: 0

## 2018-12-04 NOTE — IP AVS SNAPSHOT
MRN:[id number]                      After Visit Summary   12/4/2018    Jose Lees    MRN: [id number]           Thank you!     Thank you for choosing [facility] for your care. Our goal is always to provide you with excellent care. Hearing back from our patients is one way we can continue to improve our services. Please take a few minutes to complete the written survey that you may receive in the mail after you visit with us. Thank you!        Patient Information     Date Of Birth          1940        About your hospital stay     You were admitted on:  December 4, 2018 You last received care in the:  [facility]    You were discharged on:  December 4, 2018       Who to Call     For medical emergencies, please call 911.  For non-urgent questions about your medical care, please call your primary care provider or clinic, [phone]  For questions related to your surgery, please call your surgery clinic        Attending Provider     Provider Specialty    Kamar Kyle MD Ophthalmology       Primary Care Provider Office Phone # Fax #    Jermain Matias Helm [phone]      Your next 10 appointments already scheduled     Dec 19, 2018   Procedure with Kamar Kyle MD   [facility] (--)    [address] [place] [address] [address], [address]  [facility] [address] [phone]            Jan 07, 2019 10:00 AM CST   Office Visit with Jennifer Gonzalez [facility] ([facility])    [address] [place] [address] [phone]           Bring a current list of meds and any records pertaining to this visit. For Physicals, please bring immunization records and any forms needing to be filled out. Please arrive 10 minutes early to complete paperwork.            Jan 07, 2019 11:00 AM CST   Office Visit with Mariela Stauffer MD   [facility] ([facility]) [address]  [facility] [address] [phone]           Bring a current list of meds and any records pertaining to this visit. For Physicals, please bring immunization records and any forms needing to be filled out. Please arrive 10 minutes early to complete paperwork.              Further instructions from your care team       [facility]est[facility] Discharge  Instructions  Anesthesia (Eye Care Center)   Adult Discharge Instructions (General)    For 24 hours after surgery    1. Get plenty of rest.  Make arrangements to have a responsible adult stay with you for at least 24 hours.  2. Do not drive or use heavy equipment for 24 hours.    3. Do not drink alcohol for 24 hours.  4. Do not sign legal documents or make important decisions for 24 hours.  5. Avoid strenuous or risky activities. You may feel lightheaded.  If so, sit for a few minutes before standing.  Have someone help you get up.   6. General anesthesia patients should drink only clear liquids (apple juice, ginger ale, broth or 7-Up). Be sure to drink enough fluids.  Move to a regular diet as you feel able.  7. Any questions of medical nature, call your physician.    Dr. Kamar Kyle  [facility]  [phone]  Post Operative Cataract Instructions        If you have a gauze eye patch on, please do not remove it until it is removed by your physician at your first post-operative visit.  You will start your eye drops the next day.    OR      If you only have a clear eye shield on, you may remove the eye shield on arrival home and begin eye drops today.      Wear the clear eye shield when sleeping for protection for 5 days.      Do not rub the operated eye.      Light sensitivity may be noticed. Sunglasses may be worn for comfort.      Some discomfort and irritation may be noticed. Acetaminophen (Tylenol) or Ibuprofen (Advil) may be taken for discomfort. If pain persists please call Dr. Machado's office.      Keep the operated  "eye dry. You may wash your hair, bathe or shower, but keep the operated eye closed while doing so.       Avoid excessive bending over, strenuous activity, or heavy lifting for 1 week.      Bring any eye medications with you to the clinic on your first post operative visit.      You have a follow-up appointment with your doctor tomorrow at [facility].  Bring your prescribed eye drops with you.      Use medication exactly as prescribed by your doctor. You may restart your regular home medications.       Call Dr. Kyle (cell) after hours at [phone] if any of the following should occur:    - Any sudden vision changes, including decreased vision  - Nausea or severe headache  - Increase in pain not controlled  - Signs of infection (pus, increasing redness or tenderness)        **Acetaminophen (Tylenol)  650mg taken at 3:15pm.                Pending Results     No orders found from 12/2/2018 to 12/5/2018.            Admission Information     Date & Time Provider Department Dept. Phone    12/4/2018 Kamar Kyle MD [facility] [phone]      Your Vitals Were     Blood Pressure Temperature Respirations Height Weight Pulse Oximetry    114/68 98.1  F (36.7  C) (Temporal) 18 1.753 m (5' 9\") 78.5 kg (173 lb) 98%    BMI (Body Mass Index)                   25.55 kg/m2           MyChart Information     [facility] gives you secure access to your electronic health record. If you see a primary care provider, you can also send messages to your care team and make appointments. If you have questions, please call your primary care clinic.  If you do not have a primary care provider, please call [phone] and they will assist you.        Care EveryWhere ID     This is your Care EveryWhere ID. This could be used by other organizations to access your [place] medical records  [id number]        Equal Access to Services     KARO BUENROSTRO : rigoberto Hauser qaybta " jennifer ramon letyivania ingramcandice maiaan ah. Arleen [facility] [phone].    ATENCIÓN: Si ngoc acosta, tiene a singh disposición servicios gratuitos de asistencia lingüística. Llmian al [phone].    We comply with applicable federal civil rights laws and [place] laws. We do not discriminate on the basis of race, color, national origin, age, disability, sex, sexual orientation, or gender identity.               Review of your medicines      UNREVIEWED medicines. Ask your doctor about these medicines        Dose / Directions    ACE/ARB/ARNI NOT PRESCRIBED   Commonly known as:  INTENTIONAL   Used for:  Chronic systolic congestive heart failure (H)        ACE & ARB not prescribed due to Symptomatic hypotension not due to excessive diuresis   Refills:  0       aspirin 81 MG chewable tablet   Commonly known as:  ASA   Used for:  History of MI (myocardial infarction)        Dose:  81 mg   Take 1 tablet (81 mg) by mouth daily   Quantity:  36 tablet   Refills:  12       atorvastatin 20 MG tablet   Commonly known as:  LIPITOR   Used for:  Encounter for medication refill        Take 1 tablet by mouth daily   Quantity:  90 tablet   Refills:  3       benzocaine-menthol 6-10 MG lozenge   Commonly known as:  CHLORASEPTIC   Used for:  Throat pain        Dose:  1 lozenge   Place 1 lozenge inside cheek every hour as needed for sore throat   Quantity:  84 lozenge   Refills:  0       bicalutamide 50 MG tablet   Commonly known as:  CASODEX   Used for:  Encounter for medication refill        Dose:  50 mg   Take 1 tablet (50 mg) by mouth daily   Quantity:  90 tablet   Refills:  3       D3-1000 PO        Dose:  1 capsule   Take 1 capsule by mouth daily   Refills:  0       glimepiride 1 MG tablet   Commonly known as:  AMARYL   Used for:  Encounter for medication refill        TAKE 1 TABLET (1 MG) BY MOUTH EVERY MORNING (BEFORE BREAKFAST)   Quantity:  90 tablet   Refills:  3       metFORMIN 500 MG tablet   Commonly known as:   GLUCOPHAGE   Used for:  Encounter for medication refill        TAKE 2 TABLETS TWICE DAILY WITH MEALS   Quantity:  360 tablet   Refills:  1       metoprolol succinate ER 25 MG 24 hr tablet   Commonly known as:  TOPROL-XL   Used for:  Encounter for medication refill        TAKE 1 TABLET EVERY DAY   Quantity:  90 tablet   Refills:  3       multivitamin, therapeutic Tabs tablet        Dose:  1 tablet   Take 1 tablet by mouth daily   Refills:  0       omeprazole 20 MG DR capsule   Commonly known as:  priLOSEC   Used for:  Gastroesophageal reflux disease, esophagitis presence not specified        Dose:  20 mg   Take 1 capsule (20 mg) by mouth daily   Quantity:  30 capsule   Refills:  1       sertraline 50 MG tablet   Commonly known as:  ZOLOFT   Used for:  Encounter for medication refill        TAKE 1 TABLET EVERY DAY   Quantity:  90 tablet   Refills:  1       warfarin 2 MG tablet   Commonly known as:  COUMADIN   Used for:  Encounter for medication refill        TAKE 1 AND 1/2 TABLETS EVERY DAY   Quantity:  135 tablet   Refills:  2         CONTINUE these medicines which have NOT CHANGED        Dose / Directions    Blood Glucose Monitoring Suppl w/Device Kit   Used for:  Type II or unspecified type diabetes mellitus without mention of complication, not stated as uncontrolled, Rhabdomyolysis, UTI (urinary tract infection)        Test blood glucose every other day   Quantity:  1 kit   Refills:  0                Protect others around you: Learn how to safely use, store and throw away your medicines at www.disposemymeds.org.             Medication List: This is a list of all your medications and when to take them. Check marks below indicate your daily home schedule. Keep this list as a reference.      Medications           Morning Afternoon Evening Bedtime As Needed    ACE/ARB/ARNI NOT PRESCRIBED   Commonly known as:  INTENTIONAL   ACE & ARB not prescribed due to Symptomatic hypotension not due to excessive diuresis                                 aspirin 81 MG chewable tablet   Commonly known as:  ASA   Take 1 tablet (81 mg) by mouth daily                                atorvastatin 20 MG tablet   Commonly known as:  LIPITOR   Take 1 tablet by mouth daily                                benzocaine-menthol 6-10 MG lozenge   Commonly known as:  CHLORASEPTIC   Place 1 lozenge inside cheek every hour as needed for sore throat                                bicalutamide 50 MG tablet   Commonly known as:  CASODEX   Take 1 tablet (50 mg) by mouth daily                                Blood Glucose Monitoring Suppl w/Device Kit   Test blood glucose every other day                                D3-1000 PO   Take 1 capsule by mouth daily                                glimepiride 1 MG tablet   Commonly known as:  AMARYL   TAKE 1 TABLET (1 MG) BY MOUTH EVERY MORNING (BEFORE BREAKFAST)                                metFORMIN 500 MG tablet   Commonly known as:  GLUCOPHAGE   TAKE 2 TABLETS TWICE DAILY WITH MEALS                                metoprolol succinate ER 25 MG 24 hr tablet   Commonly known as:  TOPROL-XL   TAKE 1 TABLET EVERY DAY                                multivitamin, therapeutic Tabs tablet   Take 1 tablet by mouth daily                                omeprazole 20 MG DR capsule   Commonly known as:  priLOSEC   Take 1 capsule (20 mg) by mouth daily                                sertraline 50 MG tablet   Commonly known as:  ZOLOFT   TAKE 1 TABLET EVERY DAY                                warfarin 2 MG tablet   Commonly known as:  COUMADIN   TAKE 1 AND 1/2 TABLETS EVERY DAY

## 2018-12-04 NOTE — ANESTHESIA PREPROCEDURE EVALUATION
Anesthesia Evaluation     . Pt has had prior anesthetic. Type: General    No history of anesthetic complications          ROS/MED HX    ENT/Pulmonary:      (-) tobacco use, asthma, COPD and sleep apnea   Neurologic: Comment: autism    (+)dementia, CVA date: 2014 with deficits    Cardiovascular: Comment:       Interpretation Summary  Compared to prior study, there is no significant change.  Technically difficult, suboptimal study  The dital half of the LV is akinetic, suggestive of a possible stress   cardiomyopathy.  Contrast was used without apparent complications.  Left ventricular systolic function is moderate to severely reduced. The   visual ejection fraction is estimated at 25-30%. There is anterior, septal,   and apical wall akinesis extending to the distal half of the inferior wall..   The left ventricle is mildly dilated.  PatientHeight: 71 in  PatientWeight: 171 lbs  SystolicPressure: 106 mmHg  DiastolicPressure: 70 mmHg  HeartRate: 95 bpm  BSA 2.0 m^2        Left Ventricle  The left ventricle is mildly dilated.  There is borderline concentric left ventricular hypertrophy.  Left ventricular systolic function is moderate to severely reduced.  The visual ejection fraction is estimated at 25-30%.  There is anterior, septal, and apical wall akinesis.     Right Ventricle  The right ventricle is normal size.  The right ventricular systolic function is normal.  The right ventricle is not well visualized.     Atria  The left atrium is mildly dilated.  Right atrial size is normal.     Mitral Valve  There is moderate (2+) mitral regurgitation.  MR was imaged only with contrast present.     Tricuspid Valve  There is mild to moderate (1-2+) tricuspid regurgitation.  The right ventricular systolic pressure is approximated at 39mmHg plus the   right atrial pressure.     Aortic Valve  The aortic valve is not well visualized.  Thickened aortic valve leaflets.  There is trace aortic regurgitation.     Pulmonic Valve  The  pulmonic valve is not well visualized.     Vessels  Normal size aorta.  Inferior vena cava not well visualized for estimation of right atrial   pressure.     Pericardium  The pericardium appears normal.     Rhythm  The rhythm was normal sinus.     Procedure  Limited Portable Echo Adult.  Contrast Definity.     Doppler Measurements & Calculations  TR Max ochoa: 312 cm/sec  TR Max P mmHg          (+) --CAD, --stent,. : . CHF Last EF: 35% . . :. .      (-) hypertension   METS/Exercise Tolerance:     Hematologic:         Musculoskeletal:         GI/Hepatic:     (+) GERD Asymptomatic on medication,      (-) liver disease   Renal/Genitourinary:      (-) renal disease   Endo:     (+) type II DM .   (-) Type I DM   Psychiatric:         Infectious Disease:         Malignancy:   (+) Malignancy History of Prostate          Other:                     Physical Exam      Airway   Mallampati: III  TM distance: >3 FB  Neck ROM: full    Dental   (+) lower dentures and upper dentures    Cardiovascular   Rhythm and rate: regular and normal  (-) no murmur    Pulmonary    breath sounds clear to auscultation                    Anesthesia Plan      History & Physical Review  History and physical reviewed and following examination; no interval change.    ASA Status:  3 .    NPO Status:  > 8 hours    Plan for General and ETT with Intravenous induction. Maintenance will be Inhalation.    PONV prophylaxis:  Ondansetron (or other 5HT-3)       Postoperative Care  Postoperative pain management:  Multi-modal analgesia.      Consents  Anesthetic plan, risks, benefits and alternatives discussed with:  Patient..                          .

## 2018-12-04 NOTE — OP NOTE
[facility]  Ophthalmology Operative Note    PREOPERATIVE DIAGNOSIS:  Visually significant cataract of the Right eye. Poor pupillary dilation, right eye      POSTOPERATIVE DIAGNOSIS:  Visually significant cataract of the Right eye. Poor pupillary dilation, right eye      OPERATION:  Clear corneal phacoemulsification with intraocular lens implant of the Right eye. Malyugin ring - Complex cataract due to poor pupillary dilation      PROCEDURE:  Jose Lees was brought into the operating room.  A time-out was performed verifying the correct patient, procedure, side, and any special equipment needed.  The patient was induced with general anesthesia due to having autism and questionable ability to comply with commands and lay still during surgery. Topical anesthetic was applied.  Under the microscope, after a sterile ophthalmic prep, a lid speculum was put into place.  The anterior chamber was entered with a sideport blade.  A mixture of preservative free epinephrine and lidocaine was instilled into the anterior chamber followed by viscoelastic material.  A keratome blade was then used to fashion a 2.5 mm temporal clear cornea incision. A 6.25 mm malyugin ring was inserted.  The anterior capsule of the lens was opened using a circular capsulorrhexis.  The lens was carefully hydrodissected. At this point, iris prolapse of the peripheral iris occurred at the site of the main incision and was noted to be very thin and atrophic.  This was reposited without further issue. The nuclear material was removed with phacoemulsification with a combination of grooving and chopping methods.  The dense lens setting was needed as the nucleus was extremely dense.  Viscoeleastic was instilled into the AC multiple times to protect the endothelium.   Irrigation-aspiration was then utilized to remove the remaining cortical material.  The posterior capsule was polished but a dense PSC remnant was strongly adherent to  the bag and so was left in place.   A foldable posterior chamber intraocular lens with appropriate power was then introduced into the capsular bag.  The malyugin ring was removed.  The viscoelastic material was aspirated.  The wounds were hydro-dissected and found to be watertight.  No sutures were necessary to close the incision site.  Intracameral cefuroxime was administered into the anterior chamber.  A drop of povidone was instilled onto the surface of the eye.  A clear shield was put over the eye before the patient was dismissed from the operating room.  The patient tolerated the procedure without difficulty. EBL: 0 mL      Implant Name Type Inv. Item Serial No.  Lot No. LRB No. Used   EYE IMP IOL SOBIA PCL TECNIS [id number] 11.5 Lens/Eye Implant EYE IMP IOL SOBIA PCL TECNIS ZCB00 11.5 [id number] [employer]   Right 1           Kamar Kyle MD

## 2018-12-04 NOTE — IP AVS SNAPSHOT
[facility]    [address]    [place] [address]    Phone:  [phone]    Fax:  [phone]                                       After Visit Summary   12/4/2018    Jose Lees    MRN: [id number]           After Visit Summary Signature Page     I have received my discharge instructions, and my questions have been answered. I have discussed any challenges I see with this plan with the nurse or doctor.    ..........................................................................................................................................  Patient/Patient Representative Signature      ..........................................................................................................................................  Patient Representative Print Name and Relationship to Patient    ..................................................               ................................................  Date                                   Time    ..........................................................................................................................................  Reviewed by Signature/Title    ...................................................              ..............................................  Date                                               Time          22EPIC Rev 08/18

## 2018-12-04 NOTE — DISCHARGE INSTRUCTIONS
[facility] Anesthesia [facility] Care [facility] Discharge  Instructions  Anesthesia (Eye Care Center)   Adult Discharge Instructions (General)    For 24 hours after surgery    1. Get plenty of rest.  Make arrangements to have a responsible adult stay with you for at least 24 hours.  2. Do not drive or use heavy equipment for 24 hours.    3. Do not drink alcohol for 24 hours.  4. Do not sign legal documents or make important decisions for 24 hours.  5. Avoid strenuous or risky activities. You may feel lightheaded.  If so, sit for a few minutes before standing.  Have someone help you get up.   6. General anesthesia patients should drink only clear liquids (apple juice, ginger ale, broth or 7-Up). Be sure to drink enough fluids.  Move to a regular diet as you feel able.  7. Any questions of medical nature, call your physician.    Dr. Kamar Kyle  [facility]  [phone]  Post Operative Cataract Instructions        If you have a gauze eye patch on, please do not remove it until it is removed by your physician at your first post-operative visit.  You will start your eye drops the next day.    OR      If you only have a clear eye shield on, you may remove the eye shield on arrival home and begin eye drops today.      Wear the clear eye shield when sleeping for protection for 5 days.      Do not rub the operated eye.      Light sensitivity may be noticed. Sunglasses may be worn for comfort.      Some discomfort and irritation may be noticed. Acetaminophen (Tylenol) or Ibuprofen (Advil) may be taken for discomfort. If pain persists please call Dr. Machado's office.      Keep the operated eye dry. You may wash your hair, bathe or shower, but keep the operated eye closed while doing so.       Avoid excessive bending over, strenuous activity, or heavy lifting for 1 week.      Bring any eye medications with you to the clinic on your first post operative visit.      You have a follow-up appointment with your doctor  tomorrow at [facility].  Bring your prescribed eye drops with you.      Use medication exactly as prescribed by your doctor. You may restart your regular home medications.       Call Dr. Kyle (cell) after hours at [phone] if any of the following should occur:    - Any sudden vision changes, including decreased vision  - Nausea or severe headache  - Increase in pain not controlled  - Signs of infection (pus, increasing redness or tenderness)        **Acetaminophen (Tylenol)  650mg taken at 3:15pm.

## 2018-12-04 NOTE — ANESTHESIA CARE TRANSFER NOTE
Patient: Jose Lees    Procedure(s):  COMPLEX RIGHT EYE PHACOEMULSIFICATION CLEAR CORNEA WITH STANDARD INTRAOCULAR LENS IMPLANT    Diagnosis: RIGHT EYE CATARACT   Diagnosis Additional Information: No value filed.    Anesthesia Type:   General, ETT     Note:  Airway :Face Mask  Patient transferred to:PACU  Comments: Neuromuscular blockade reversed after TOF 4/4, spontaneous respirations, adequate tidal volumes, followed commands to voice, oropharynx suctioned with soft flexible catheter, extubated atraumatically, extubated with suction, airway patent after extubation.  Oxygen via facemask at 8 liters per minute to PACU. Oxygen tubing connected to wall O2 in PACU, SpO2, NiBP, and EKG monitors and alarms on and functioning, Alex Hugger warmer connected to patient gown, report on patient's clinical status given to PACU RN, RN questions answered. Handoff Report: Identifed the Patient, Identified the Reponsible Provider, Reviewed the pertinent medical history, Discussed the surgical course, Reviewed Intra-OP anesthesia mangement and issues during anesthesia, Set expectations for post-procedure period and Allowed opportunity for questions and acknowledgement of understanding      Vitals: (Last set prior to Anesthesia Care Transfer)    CRNA VITALS  12/4/2018 1335 - 12/4/2018 1411      12/4/2018             Pulse: 98    SpO2: 99 %    Resp Rate (observed):     Resp Rate (set): 10                Electronically Signed By: SARATH Bowden CRNA  December 4, 2018  2:11 PM

## 2018-12-05 NOTE — ANESTHESIA POSTPROCEDURE EVALUATION
Patient: Jose Lees    Procedure(s):  COMPLEX RIGHT EYE PHACOEMULSIFICATION CLEAR CORNEA WITH STANDARD INTRAOCULAR LENS IMPLANT    Diagnosis:RIGHT EYE CATARACT   Diagnosis Additional Information: No value filed.    Anesthesia Type:  General, ETT    Note:  Anesthesia Post Evaluation    Patient location during evaluation: PACU  Patient participation: Able to fully participate in evaluation  Level of consciousness: awake and alert  Pain management: adequate  Airway patency: patent  Cardiovascular status: acceptable  Respiratory status: acceptable  Hydration status: acceptable  PONV: none     Anesthetic complications: None          Last vitals:  Vitals:    12/04/18 1505 12/04/18 1515 12/04/18 1530   BP: 117/63 108/65 114/68   Resp: 16 16 18   Temp:      SpO2: 97% 98% 98%         Electronically Signed By: Guillermo Brewster MD  December 4, 2018  6:05 PM

## 2018-12-19 ENCOUNTER — HOSPITAL ENCOUNTER (OUTPATIENT)
Facility: CLINIC | Age: 78
Discharge: HOME | End: 2018-12-19
Attending: STUDENT IN AN ORGANIZED HEALTH CARE EDUCATION/TRAINING PROGRAM | Admitting: STUDENT IN AN ORGANIZED HEALTH CARE EDUCATION/TRAINING PROGRAM
Payer: MEDICARE

## 2018-12-19 ENCOUNTER — ANESTHESIA EVENT (OUTPATIENT)
Dept: SURGERY | Facility: CLINIC | Age: 78
End: 2018-12-19
Payer: MEDICARE

## 2018-12-19 ENCOUNTER — ANESTHESIA (OUTPATIENT)
Dept: SURGERY | Facility: CLINIC | Age: 78
End: 2018-12-19
Payer: MEDICARE

## 2018-12-19 VITALS
HEART RATE: 67 BPM | OXYGEN SATURATION: 93 % | DIASTOLIC BLOOD PRESSURE: 80 MMHG | TEMPERATURE: 97.5 F | SYSTOLIC BLOOD PRESSURE: 113 MMHG | RESPIRATION RATE: 14 BRPM | WEIGHT: 173 LBS | BODY MASS INDEX: 25.62 KG/M2 | HEIGHT: 69 IN

## 2018-12-19 LAB
GLUCOSE BLDC GLUCOMTR-MCNC: 182 MG/DL (ref 70–99)
GLUCOSE BLDC GLUCOMTR-MCNC: 204 MG/DL (ref 70–99)

## 2018-12-19 PROCEDURE — 71000005 ZZH RECOVERY EYE PHASE 1 LEVEL 1 EA ADDTL HR: Performed by: STUDENT IN AN ORGANIZED HEALTH CARE EDUCATION/TRAINING PROGRAM

## 2018-12-19 PROCEDURE — A9270 NON-COVERED ITEM OR SERVICE: HCPCS | Performed by: STUDENT IN AN ORGANIZED HEALTH CARE EDUCATION/TRAINING PROGRAM

## 2018-12-19 PROCEDURE — 25000128 H RX IP 250 OP 636: Performed by: ANESTHESIOLOGY

## 2018-12-19 PROCEDURE — 71000004 ZZH RECOVERY EYE PHASE 1 LEVEL 1 FIRST HR: Performed by: STUDENT IN AN ORGANIZED HEALTH CARE EDUCATION/TRAINING PROGRAM

## 2018-12-19 PROCEDURE — 82962 GLUCOSE BLOOD TEST: CPT | Mod: 91

## 2018-12-19 PROCEDURE — 37000008 ZZH ANESTHESIA TECHNICAL FEE, 1ST 30 MIN: Performed by: STUDENT IN AN ORGANIZED HEALTH CARE EDUCATION/TRAINING PROGRAM

## 2018-12-19 PROCEDURE — 36000101 ZZH EYE SURGERY LEVEL 3 1ST 30 MIN: Performed by: STUDENT IN AN ORGANIZED HEALTH CARE EDUCATION/TRAINING PROGRAM

## 2018-12-19 PROCEDURE — 25000125 ZZHC RX 250: Performed by: NURSE ANESTHETIST, CERTIFIED REGISTERED

## 2018-12-19 PROCEDURE — 25000128 H RX IP 250 OP 636: Performed by: NURSE ANESTHETIST, CERTIFIED REGISTERED

## 2018-12-19 PROCEDURE — 36000102 ZZH EYE SURGERY LEVEL 3 EA 15 ADDTL MIN: Performed by: STUDENT IN AN ORGANIZED HEALTH CARE EDUCATION/TRAINING PROGRAM

## 2018-12-19 PROCEDURE — 25000125 ZZHC RX 250: Performed by: STUDENT IN AN ORGANIZED HEALTH CARE EDUCATION/TRAINING PROGRAM

## 2018-12-19 PROCEDURE — 71000028 ZZH EYE RECOVERY PHASE 2 EACH 15 MINS: Performed by: STUDENT IN AN ORGANIZED HEALTH CARE EDUCATION/TRAINING PROGRAM

## 2018-12-19 PROCEDURE — 25000128 H RX IP 250 OP 636: Performed by: STUDENT IN AN ORGANIZED HEALTH CARE EDUCATION/TRAINING PROGRAM

## 2018-12-19 PROCEDURE — 27210794 ZZH OR GENERAL SUPPLY STERILE: Performed by: STUDENT IN AN ORGANIZED HEALTH CARE EDUCATION/TRAINING PROGRAM

## 2018-12-19 PROCEDURE — 37000009 ZZH ANESTHESIA TECHNICAL FEE, EACH ADDTL 15 MIN: Performed by: STUDENT IN AN ORGANIZED HEALTH CARE EDUCATION/TRAINING PROGRAM

## 2018-12-19 PROCEDURE — 25000566 ZZH SEVOFLURANE, EA 15 MIN: Performed by: STUDENT IN AN ORGANIZED HEALTH CARE EDUCATION/TRAINING PROGRAM

## 2018-12-19 PROCEDURE — V2632 POST CHMBR INTRAOCULAR LENS: HCPCS | Performed by: STUDENT IN AN ORGANIZED HEALTH CARE EDUCATION/TRAINING PROGRAM

## 2018-12-19 PROCEDURE — 40000170 ZZH STATISTIC PRE-PROCEDURE ASSESSMENT II: Performed by: STUDENT IN AN ORGANIZED HEALTH CARE EDUCATION/TRAINING PROGRAM

## 2018-12-19 DEVICE — EYE IMP IOL AMO PCL TECNIS ZCB00 12.0: Type: IMPLANTABLE DEVICE | Site: EYE | Status: FUNCTIONAL

## 2018-12-19 RX ORDER — SODIUM CHLORIDE, SODIUM LACTATE, POTASSIUM CHLORIDE, CALCIUM CHLORIDE 600; 310; 30; 20 MG/100ML; MG/100ML; MG/100ML; MG/100ML
INJECTION, SOLUTION INTRAVENOUS CONTINUOUS
Status: DISCONTINUED | OUTPATIENT
Start: 2018-12-19 | End: 2018-12-19 | Stop reason: HOSPADM

## 2018-12-19 RX ORDER — GLYCOPYRROLATE 0.2 MG/ML
INJECTION, SOLUTION INTRAMUSCULAR; INTRAVENOUS PRN
Status: DISCONTINUED | OUTPATIENT
Start: 2018-12-19 | End: 2018-12-19

## 2018-12-19 RX ORDER — ONDANSETRON 4 MG/1
4 TABLET, ORALLY DISINTEGRATING ORAL EVERY 30 MIN PRN
Status: DISCONTINUED | OUTPATIENT
Start: 2018-12-19 | End: 2018-12-19 | Stop reason: HOSPADM

## 2018-12-19 RX ORDER — FENTANYL CITRATE 50 UG/ML
25-50 INJECTION, SOLUTION INTRAMUSCULAR; INTRAVENOUS
Status: DISCONTINUED | OUTPATIENT
Start: 2018-12-19 | End: 2018-12-19 | Stop reason: HOSPADM

## 2018-12-19 RX ORDER — LIDOCAINE HYDROCHLORIDE 20 MG/ML
INJECTION, SOLUTION INFILTRATION; PERINEURAL PRN
Status: DISCONTINUED | OUTPATIENT
Start: 2018-12-19 | End: 2018-12-19

## 2018-12-19 RX ORDER — PROPOFOL 10 MG/ML
INJECTION, EMULSION INTRAVENOUS PRN
Status: DISCONTINUED | OUTPATIENT
Start: 2018-12-19 | End: 2018-12-19

## 2018-12-19 RX ORDER — FENTANYL CITRATE 50 UG/ML
INJECTION, SOLUTION INTRAMUSCULAR; INTRAVENOUS PRN
Status: DISCONTINUED | OUTPATIENT
Start: 2018-12-19 | End: 2018-12-19

## 2018-12-19 RX ORDER — NEOSTIGMINE METHYLSULFATE 1 MG/ML
VIAL (ML) INJECTION PRN
Status: DISCONTINUED | OUTPATIENT
Start: 2018-12-19 | End: 2018-12-19

## 2018-12-19 RX ORDER — TROPICAMIDE 10 MG/ML
1 SOLUTION/ DROPS OPHTHALMIC
Status: COMPLETED | OUTPATIENT
Start: 2018-12-19 | End: 2018-12-19

## 2018-12-19 RX ORDER — EPHEDRINE SULFATE 50 MG/ML
INJECTION, SOLUTION INTRAMUSCULAR; INTRAVENOUS; SUBCUTANEOUS PRN
Status: DISCONTINUED | OUTPATIENT
Start: 2018-12-19 | End: 2018-12-19

## 2018-12-19 RX ORDER — DICLOFENAC SODIUM 1 MG/ML
1 SOLUTION/ DROPS OPHTHALMIC
Status: COMPLETED | OUTPATIENT
Start: 2018-12-19 | End: 2018-12-19

## 2018-12-19 RX ORDER — NALOXONE HYDROCHLORIDE 0.4 MG/ML
.1-.4 INJECTION, SOLUTION INTRAMUSCULAR; INTRAVENOUS; SUBCUTANEOUS
Status: DISCONTINUED | OUTPATIENT
Start: 2018-12-19 | End: 2018-12-19 | Stop reason: HOSPADM

## 2018-12-19 RX ORDER — TETRACAINE HYDROCHLORIDE 5 MG/ML
SOLUTION OPHTHALMIC PRN
Status: DISCONTINUED | OUTPATIENT
Start: 2018-12-19 | End: 2018-12-19 | Stop reason: HOSPADM

## 2018-12-19 RX ORDER — PROPARACAINE HYDROCHLORIDE 5 MG/ML
1 SOLUTION/ DROPS OPHTHALMIC ONCE
Status: COMPLETED | OUTPATIENT
Start: 2018-12-19 | End: 2018-12-19

## 2018-12-19 RX ORDER — MEPERIDINE HYDROCHLORIDE 25 MG/ML
12.5 INJECTION INTRAMUSCULAR; INTRAVENOUS; SUBCUTANEOUS
Status: DISCONTINUED | OUTPATIENT
Start: 2018-12-19 | End: 2018-12-19 | Stop reason: HOSPADM

## 2018-12-19 RX ORDER — CYCLOPENTOLATE HYDROCHLORIDE 10 MG/ML
1 SOLUTION/ DROPS OPHTHALMIC
Status: COMPLETED | OUTPATIENT
Start: 2018-12-19 | End: 2018-12-19

## 2018-12-19 RX ORDER — ONDANSETRON 2 MG/ML
INJECTION INTRAMUSCULAR; INTRAVENOUS PRN
Status: DISCONTINUED | OUTPATIENT
Start: 2018-12-19 | End: 2018-12-19

## 2018-12-19 RX ORDER — DEXAMETHASONE SODIUM PHOSPHATE 4 MG/ML
INJECTION, SOLUTION INTRA-ARTICULAR; INTRALESIONAL; INTRAMUSCULAR; INTRAVENOUS; SOFT TISSUE PRN
Status: DISCONTINUED | OUTPATIENT
Start: 2018-12-19 | End: 2018-12-19

## 2018-12-19 RX ORDER — ONDANSETRON 2 MG/ML
4 INJECTION INTRAMUSCULAR; INTRAVENOUS EVERY 30 MIN PRN
Status: DISCONTINUED | OUTPATIENT
Start: 2018-12-19 | End: 2018-12-19 | Stop reason: HOSPADM

## 2018-12-19 RX ORDER — PHENYLEPHRINE HYDROCHLORIDE 25 MG/ML
1 SOLUTION/ DROPS OPHTHALMIC
Status: COMPLETED | OUTPATIENT
Start: 2018-12-19 | End: 2018-12-19

## 2018-12-19 RX ORDER — PROPARACAINE HYDROCHLORIDE 5 MG/ML
1 SOLUTION/ DROPS OPHTHALMIC ONCE
Status: DISCONTINUED | OUTPATIENT
Start: 2018-12-19 | End: 2018-12-19 | Stop reason: HOSPADM

## 2018-12-19 RX ORDER — HYDROMORPHONE HYDROCHLORIDE 1 MG/ML
.3-.5 INJECTION, SOLUTION INTRAMUSCULAR; INTRAVENOUS; SUBCUTANEOUS EVERY 10 MIN PRN
Status: DISCONTINUED | OUTPATIENT
Start: 2018-12-19 | End: 2018-12-19 | Stop reason: HOSPADM

## 2018-12-19 RX ORDER — LIDOCAINE HYDROCHLORIDE 10 MG/ML
INJECTION, SOLUTION EPIDURAL; INFILTRATION; INTRACAUDAL; PERINEURAL PRN
Status: DISCONTINUED | OUTPATIENT
Start: 2018-12-19 | End: 2018-12-19 | Stop reason: HOSPADM

## 2018-12-19 RX ADMIN — PHENYLEPHRINE HYDROCHLORIDE 1 DROP: 2.5 SOLUTION/ DROPS OPHTHALMIC at 06:56

## 2018-12-19 RX ADMIN — NEOSTIGMINE METHYLSULFATE 3.5 MG: 1 INJECTION, SOLUTION INTRAVENOUS at 08:49

## 2018-12-19 RX ADMIN — FENTANYL CITRATE 50 MCG: 50 INJECTION, SOLUTION INTRAMUSCULAR; INTRAVENOUS at 09:39

## 2018-12-19 RX ADMIN — PROPOFOL 50 MG: 10 INJECTION, EMULSION INTRAVENOUS at 07:44

## 2018-12-19 RX ADMIN — CYCLOPENTOLATE HYDROCHLORIDE 1 DROP: 10 SOLUTION/ DROPS OPHTHALMIC at 06:49

## 2018-12-19 RX ADMIN — TROPICAMIDE 1 DROP: 10 SOLUTION/ DROPS OPHTHALMIC at 06:56

## 2018-12-19 RX ADMIN — LIDOCAINE HYDROCHLORIDE 1 ML: 10 INJECTION, SOLUTION EPIDURAL; INFILTRATION; INTRACAUDAL; PERINEURAL at 07:03

## 2018-12-19 RX ADMIN — DICLOFENAC SODIUM 1 DROP: 1 SOLUTION OPHTHALMIC at 06:49

## 2018-12-19 RX ADMIN — CYCLOPENTOLATE HYDROCHLORIDE 1 DROP: 10 SOLUTION/ DROPS OPHTHALMIC at 06:56

## 2018-12-19 RX ADMIN — ROCURONIUM BROMIDE 40 MG: 10 INJECTION INTRAVENOUS at 07:44

## 2018-12-19 RX ADMIN — DICLOFENAC SODIUM 1 DROP: 1 SOLUTION OPHTHALMIC at 07:01

## 2018-12-19 RX ADMIN — TROPICAMIDE 1 DROP: 10 SOLUTION/ DROPS OPHTHALMIC at 06:49

## 2018-12-19 RX ADMIN — FENTANYL CITRATE 50 MCG: 50 INJECTION, SOLUTION INTRAMUSCULAR; INTRAVENOUS at 07:44

## 2018-12-19 RX ADMIN — SODIUM CHLORIDE, POTASSIUM CHLORIDE, SODIUM LACTATE AND CALCIUM CHLORIDE: 600; 310; 30; 20 INJECTION, SOLUTION INTRAVENOUS at 07:02

## 2018-12-19 RX ADMIN — ONDANSETRON 4 MG: 2 INJECTION INTRAMUSCULAR; INTRAVENOUS at 07:56

## 2018-12-19 RX ADMIN — PHENYLEPHRINE HYDROCHLORIDE 1 DROP: 2.5 SOLUTION/ DROPS OPHTHALMIC at 07:01

## 2018-12-19 RX ADMIN — TROPICAMIDE 1 DROP: 10 SOLUTION/ DROPS OPHTHALMIC at 07:01

## 2018-12-19 RX ADMIN — GLYCOPYRROLATE 0.5 MG: 0.2 INJECTION, SOLUTION INTRAMUSCULAR; INTRAVENOUS at 08:49

## 2018-12-19 RX ADMIN — DEXAMETHASONE SODIUM PHOSPHATE 4 MG: 4 INJECTION, SOLUTION INTRA-ARTICULAR; INTRALESIONAL; INTRAMUSCULAR; INTRAVENOUS; SOFT TISSUE at 07:56

## 2018-12-19 RX ADMIN — DICLOFENAC SODIUM 1 DROP: 1 SOLUTION OPHTHALMIC at 06:56

## 2018-12-19 RX ADMIN — PROPARACAINE HYDROCHLORIDE 1 DROP: 5 SOLUTION/ DROPS OPHTHALMIC at 06:49

## 2018-12-19 RX ADMIN — LIDOCAINE HYDROCHLORIDE 100 MG: 20 INJECTION, SOLUTION INFILTRATION; PERINEURAL at 07:44

## 2018-12-19 RX ADMIN — PHENYLEPHRINE HYDROCHLORIDE 1 DROP: 2.5 SOLUTION/ DROPS OPHTHALMIC at 06:48

## 2018-12-19 RX ADMIN — Medication 5 MG: at 07:44

## 2018-12-19 RX ADMIN — CYCLOPENTOLATE HYDROCHLORIDE 1 DROP: 10 SOLUTION/ DROPS OPHTHALMIC at 07:01

## 2018-12-19 ASSESSMENT — COPD QUESTIONNAIRES: COPD: 0

## 2018-12-19 ASSESSMENT — MIFFLIN-ST. JEOR: SCORE: 1500.1

## 2018-12-19 ASSESSMENT — LIFESTYLE VARIABLES: TOBACCO_USE: 0

## 2018-12-19 NOTE — ANESTHESIA PREPROCEDURE EVALUATION
Anesthesia Pre-Procedure Evaluation    Patient: Jose Lees   MRN: [id number] : 1940          Preoperative Diagnosis: LEFT EYE CATARACT     Procedure(s):  LEFT EYE PHACOEMULSIFICATION CLEAR CORNEA WITH STANDARD INTRAOCULAR LENS IMPLANT (GENERAL) WITH POSSIBLE MALYGIN RING    Past Medical History:   Diagnosis Date     ACP (advance care planning)     Form given     Acute anterior wall MI (H) 2014     Autism disorder 2012     CHF (congestive heart failure) (H)     ECHO EF=25-30% on 14     Coronary artery disease 2014 HEART CATH - 70% mid LAD -- BMS placed, small 1st diagonal 80%, RCA 60-70% before crux, 70% mid PDA     Dementia      Hyperlipidaemia      Hyperlipidaemia LDL goal < 100      Prostate cancer (H) 10/11    GLEASOR 8     Stricture and stenosis of esophagus 10/22/2015     Stroke, embolic (H) 2014    bilateral cerebral embolic CVAs     Type II or unspecified type diabetes mellitus without mention of complication, not stated as uncontrolled     Diabetes mellitus     Past Surgical History:   Procedure Laterality Date     CORONARY ANGIOGRAPHY ADULT ORDER       ESOPHAGOSCOPY, GASTROSCOPY, DUODENOSCOPY (EGD), COMBINED N/A 2015    Procedure: COMBINED ESOPHAGOSCOPY, GASTROSCOPY, DUODENOSCOPY (EGD), REMOVE FOREIGN BODY;  Surgeon: Yu Tapia MD;  Location:  GI     HEART CATH, ANGIOPLASTY      BMS to LAD     PHACOEMULSIFICATION CLEAR CORNEA WITH STANDARD INTRAOCULAR LENS IMPLANT Right 2018    Procedure: COMPLEX RIGHT EYE PHACOEMULSIFICATION CLEAR CORNEA WITH STANDARD INTRAOCULAR LENS IMPLANT;  Surgeon: Kamar Kyle MD;  Location:  [facility]       Anesthesia Evaluation     . Pt has had prior anesthetic. Type: General    No history of anesthetic complications          ROS/MED HX    ENT/Pulmonary:      (-) tobacco use, asthma, COPD and sleep apnea   Neurologic: Comment: autism    (+)dementia, CVA date:  with deficits   (-) TIA   Cardiovascular:      (+) Dyslipidemia, --CAD, --stent,. : . CHF Last EF: 35% . . :. . pulmonary hypertension (RVSP 39mmHg), Previous cardiac testing Echodate:7/28/14results:The dital half of the LV is akinetic, suggestive of a possible stress cardiomyopathy.  Contrast was used without apparent complications.  Left ventricular systolic function is moderate to severely reduced. The visual ejection fraction is estimated at 25-30%. There is anterior, septal, and apical wall akinesis extending to the distal half of the inferior wall. The left ventricle is mildly dilated.date: results:ECG reviewed date:11/26/18 results:NSR w/ L anterior fascicular block date: results:         (-) hypertension   METS/Exercise Tolerance:  3 - Able to walk 1-2 blocks without stopping   Hematologic:        (-) anemia   Musculoskeletal:         GI/Hepatic:     (+) GERD Asymptomatic on medication, Other GI/Hepatic Esophageal stenosis     (-) liver disease   Renal/Genitourinary:      (-) renal disease   Endo:     (+) type II DM .   (-) Type I DM   Psychiatric:         Infectious Disease:         Malignancy:   (+) Malignancy History of Prostate          Other:                          Physical Exam      Airway   Mallampati: III  TM distance: >3 FB  Neck ROM: full    Dental   (+) lower dentures and upper dentures    Cardiovascular   Rhythm and rate: regular and normal  (-) no murmur    Pulmonary    breath sounds clear to auscultation            Lab Results   Component Value Date    WBC 6.4 11/26/2018    HGB 14.1 11/26/2018    HCT 42.6 11/26/2018     11/26/2018     11/26/2018    POTASSIUM 4.8 11/26/2018    CHLORIDE 98 11/26/2018    CO2 24 02/16/2017    BUN 9 11/26/2018    BUN 9 (L) 11/26/2018    CR 0.95 11/26/2018     (H) 11/26/2018    TENZIN 9.6 11/26/2018    PHOS 2.6 08/06/2015    MAG 2.2 02/09/2017    ALBUMIN 4.0 11/26/2018    PROTTOTAL 7.0 11/26/2018    ALT 18 11/26/2018    AST 19 11/26/2018    ALKPHOS 64 11/26/2018    BILITOTAL 0.5 11/26/2018  "   BILIDIRECT 0.13 11/26/2018    LIPASE 52 06/01/2012    INR 2.1 11/26/2018    TSH 3.39 02/16/2017    T4 1.11 11/26/2018       Preop Vitals  BP Readings from Last 3 Encounters:   12/04/18 114/68   11/26/18 122/78   11/06/18 104/66    Pulse Readings from Last 3 Encounters:   11/26/18 66   10/02/17 63   03/10/17 77      Resp Readings from Last 3 Encounters:   12/04/18 18   11/26/18 16   01/22/18 16    SpO2 Readings from Last 3 Encounters:   12/04/18 98%   11/26/18 97%   10/02/17 97%      Temp Readings from Last 1 Encounters:   12/04/18 36.7  C (98.1  F) (Temporal)    Ht Readings from Last 1 Encounters:   12/04/18 1.753 m (5' 9\")      Wt Readings from Last 1 Encounters:   12/04/18 78.5 kg (173 lb)    Estimated body mass index is 25.55 kg/m  as calculated from the following:    Height as of 12/4/18: 1.753 m (5' 9\").    Weight as of 12/4/18: 78.5 kg (173 lb).       Anesthesia Plan      History & Physical Review  History and physical reviewed and following examination; no interval change.    ASA Status:  3 .    NPO Status:  > 8 hours    Plan for General and ETT with Intravenous and Propofol induction. Maintenance will be Inhalation.    PONV prophylaxis:  Ondansetron (or other 5HT-3)       Postoperative Care  Postoperative pain management:  Multi-modal analgesia, IV analgesics and Oral pain medications.      Consents  Anesthetic plan, risks, benefits and alternatives discussed with:  Patient..                 Singh Velasco MD  "

## 2018-12-19 NOTE — ANESTHESIA CARE TRANSFER NOTE
Patient: Jose LUCAS Yoana    Procedure(s):  COMPLEX LEFT EYE PHACOEMULSIFICATION CLEAR CORNEA WITH STANDARD INTRAOCULAR LENS IMPLANT    Diagnosis: LEFT EYE CATARACT   Diagnosis Additional Information: No value filed.    Anesthesia Type:   General, ETT     Note:  Airway :Face Mask  Patient transferred to:PACU  Handoff Report: Identifed the Patient, Identified the Reponsible Provider, Reviewed the pertinent medical history, Discussed the surgical course, Reviewed Intra-OP anesthesia mangement and issues during anesthesia, Set expectations for post-procedure period and Allowed opportunity for questions and acknowledgement of understanding      Vitals: (Last set prior to Anesthesia Care Transfer)    CRNA VITALS  12/19/2018 0823 - 12/19/2018 0859      12/19/2018             Resp Rate (set):  10                Electronically Signed By: SARATH Melgar CRNA  December 19, 2018  8:59 AM

## 2018-12-19 NOTE — DISCHARGE INSTRUCTIONS
Same Day Surgery Discharge Instructions for  Sedation and General Anesthesia       It's not unusual to feel dizzy, light-headed or faint for up to 24 hours after surgery or while taking pain medication.  If you have these symptoms: sit for a few minutes before standing and have someone assist you when you get up to walk or use the bathroom.      You should rest and relax for the next 24 hours. We recommend you make arrangements to have an adult stay with you for at least 24 hours after your discharge.  Avoid hazardous and strenuous activity.      DO NOT DRIVE any vehicle or operate mechanical equipment for 24 hours following the end of your surgery.  Even though you may feel normal, your reactions may be affected by the medication you have received.      Do not drink alcoholic beverages for 24 hours following surgery.       Slowly progress to your regular diet as you feel able. It's not unusual to feel nauseated and/or vomit after receiving anesthesia.  If you develop these symptoms, drink clear liquids (apple juice, ginger ale, broth, 7-up, etc. ) until you feel better.  If your nausea and vomiting persists for 24 hours, please notify your surgeon.        All narcotic pain medications, along with inactivity and anesthesia, can cause constipation. Drinking plenty of liquids and increasing fiber intake will help.      For any questions of a medical nature, call your surgeon.      Do not make important decisions for 24 hours.      If you had general anesthesia, you may have a sore throat for a couple of days related to the breathing tube used during surgery.  You may use Cepacol lozenges to help with this discomfort.  If it worsens or if you develop a fever, contact your surgeon.       If you feel your pain is not well managed with the pain medications prescribed by your surgeon, please contact your surgeon's office to let them know so they can address your concerns.         Dr. Kamar [facility]  [facility]  [phone]  Post Operative Cataract Instructions        If you have a gauze eye patch on, please do not remove it until it is removed by your physician at your first post-operative visit.  You will start your eye drops the next day.    OR      If you only have a clear eye shield on, you may remove the eye shield on arrival home and begin eye drops today.      Wear the clear eye shield when sleeping for protection for 5 days.      Do not rub the operated eye.      Light sensitivity may be noticed. Sunglasses may be worn for comfort.      Some discomfort and irritation may be noticed. Acetaminophen (Tylenol) or Ibuprofen (Advil) may be taken for discomfort. If pain persists please call Dr. Machado's office.      Keep the operated eye dry. You may wash your hair, bathe or shower, but keep the operated eye closed while doing so.       Avoid excessive bending over, strenuous activity, or heavy lifting for 1 week.      Bring any eye medications with you to the clinic on your first post operative visit.      You have a follow-up appointment with your doctor tomorrow at [facility].  Bring your prescribed eye drops with you.      Use medication exactly as prescribed by your doctor. You may restart your regular home medications.       Call Dr. Kyle (cell) after hours at [phone] if any of the following should occur:    - Any sudden vision changes, including decreased vision  - Nausea or severe headache  - Increase in pain not controlled  - Signs of infection (pus, increasing redness or tenderness)                  Revised 9/16/16

## 2018-12-19 NOTE — ANESTHESIA POSTPROCEDURE EVALUATION
Patient: Jose Lees    Procedure(s):  COMPLEX LEFT EYE PHACOEMULSIFICATION CLEAR CORNEA WITH STANDARD INTRAOCULAR LENS IMPLANT    Diagnosis:LEFT EYE CATARACT   Diagnosis Additional Information: No value filed.    Anesthesia Type:  General, ETT    Note:  Anesthesia Post Evaluation    Patient location during evaluation: PACU  Patient participation: Able to fully participate in evaluation  Level of consciousness: awake  Pain management: adequate  Airway patency: patent  Cardiovascular status: acceptable  Respiratory status: acceptable  Hydration status: acceptable  PONV: none             Last vitals:  Vitals:    12/19/18 0646 12/19/18 0855 12/19/18 0900   BP: 138/71 140/76 138/72   Pulse:  72 70   Resp: 16 18 13   Temp: 36.2  C (97.1  F) 36.4  C (97.5  F)    SpO2: 100% 100% 100%         Electronically Signed By: Singh Velasco MD  December 19, 2018  9:13 AM

## 2019-01-06 NOTE — PROGRESS NOTES
Recheck medications       SUBJECTIVE:   Jose Lees is a 78 year old male who presents to clinic today for the following health issues:  INR FOLLOW UP  Goal 2.0-3.0  DX TIA    Last visit  Date 11/26/18  INR = 2.1  Dosing they were taking 3mg MF, Otherwise 2mg  Plan 4 WEEKS    Today  INR =3.0  Plan: 3mg MF, Otherwise 2mg  F/u 1 months    No bleeding/bruising  No coumadin interfering foods.      Mariela Stauffer MD, [facility]    H/o Autism, MI  S/p catarct surgery  Wheeled walker with seat and brakes    Here with sister  White male    No CP  No SOB  Edema no  BM yesterday morning  Urine pullups    Fall/seizure/LOC no    Wheeled walker        Diabetes Follow-up  Lab Results   Component Value Date    A1C 12.6 11/26/2018    A1C 10.3 02/23/2018    A1C 8.2 07/25/2017    A1C 7.6 01/25/2017    A1C 7.1 07/27/2016   Apple juice, gingerale, lemon- lime. Diet cola.  Had not been taking medications when A1C was high, now compliance    Patient is checking blood sugars: not at all    Diabetic concerns: None     Symptoms of hypoglycemia (low blood sugar): none     Paresthesias (numbness or burning in feet) or sores: No     Date of last diabetic eye exam: Cataract surgeries were good    Diabetes Management Resources    Hyperlipidemia Follow-Up  LDL Cholesterol Calculated   Date Value Ref Range Status   11/26/2018 158 (H) 0 - 99 mg/dL Final         Rate your low fat/cholesterol diet?: fair. Rico and medium poached eggs, grapefruit.     Taking statin?  Yes, no muscle aches from statin    Other lipid medications/supplements?:  none    Hypertension Follow-up  BP Readings from Last 3 Encounters:   01/07/19 116/68   12/19/18 113/80   12/04/18 114/68     Creatinine   Date Value Ref Range Status   11/26/2018 0.95 0.76 - 1.27 mg/dL Final           BP Readings from Last 2 Encounters:   01/07/19 116/68   12/19/18 113/80     Hemoglobin A1C (%)   Date Value   11/26/2018 12.6 (H)   02/23/2018 10.3 (H)     LDL Cholesterol  Calculated (mg/dL)   Date Value   11/26/2018 158 (H)   08/24/2018 117 (H)       Amount of exercise or physical activity: walking in the building    Problems taking medications regularly: No    Medication side effects: none    Diet: regular (no restrictions)            Problem list and histories reviewed & adjusted, as indicated.  Additional history: as documented    Patient Active Problem List   Diagnosis     Diabetes mellitus, type 2 (H)     Prostate cancer (H)     Autism disorder     ACP (advance care planning)     Health Care Home     History of MI (myocardial infarction)     History of stroke     CHF (congestive heart failure) (H)     Long term current use of anticoagulant therapy     Stricture and stenosis of esophagus     History of TIA (transient ischemic attack) and stroke     Influenza A     Physical deconditioning     Type 2 diabetes mellitus without complication, without long-term current use of insulin (H)     Past Surgical History:   Procedure Laterality Date     CORONARY ANGIOGRAPHY ADULT ORDER       ESOPHAGOSCOPY, GASTROSCOPY, DUODENOSCOPY (EGD), COMBINED N/A 8/6/2015    Procedure: COMBINED ESOPHAGOSCOPY, GASTROSCOPY, DUODENOSCOPY (EGD), REMOVE FOREIGN BODY;  Surgeon: Yu Tapia MD;  Location:  GI     HEART CATH, ANGIOPLASTY  4/14    BMS to LAD     PHACOEMULSIFICATION CLEAR CORNEA WITH STANDARD INTRAOCULAR LENS IMPLANT Right 12/4/2018    Procedure: COMPLEX RIGHT EYE PHACOEMULSIFICATION CLEAR CORNEA WITH STANDARD INTRAOCULAR LENS IMPLANT;  Surgeon: Kamar Kyle MD;  Location: [facility]     PHACOEMULSIFICATION CLEAR CORNEA WITH STANDARD INTRAOCULAR LENS IMPLANT Left 12/19/2018    Procedure: COMPLEX LEFT EYE PHACOEMULSIFICATION CLEAR CORNEA WITH STANDARD INTRAOCULAR LENS IMPLANT;  Surgeon: Kamar Kyle MD;  Location: [facility]       Social History     Tobacco Use     Smoking status: Never Smoker     Smokeless tobacco: Never Used   Substance Use Topics     Alcohol use: No      Comment: never     Family History   Problem Relation Age of Onset     Cerebrovascular Disease Mother 92     C.A.D. Father 48         Current Outpatient Medications   Medication Sig Dispense Refill     ACE/ARB/ARNI NOT PRESCRIBED, INTENTIONAL, ACE & ARB not prescribed due to Symptomatic hypotension not due to excessive diuresis       aspirin 81 MG chewable tablet Take 1 tablet (81 mg) by mouth daily 36 tablet 12     atorvastatin (LIPITOR) 20 MG tablet Take 1 tablet by mouth daily 90 tablet 3     benzocaine-menthol (CHLORASEPTIC) 6-10 MG lozenge Place 1 lozenge inside cheek every hour as needed for sore throat (Patient not taking: Reported on 10/2/2017) 84 lozenge      bicalutamide (CASODEX) 50 MG tablet Take 1 tablet (50 mg) by mouth daily 90 tablet 3     Blood Glucose Monitoring Suppl W/DEVICE KIT Test blood glucose every other day     1 kit 0     Cholecalciferol (D3-1000 PO) Take 1 capsule by mouth daily       glimepiride (AMARYL) 1 MG tablet TAKE 1 TABLET (1 MG) BY MOUTH EVERY MORNING (BEFORE BREAKFAST) 90 tablet 3     metFORMIN (GLUCOPHAGE) 500 MG tablet TAKE 2 TABLETS TWICE DAILY WITH MEALS 360 tablet 1     metoprolol (TOPROL-XL) 25 MG 24 hr tablet TAKE 1 TABLET EVERY DAY 90 tablet 3     multivitamin, therapeutic (THERA-VIT) TABS Take 1 tablet by mouth daily       omeprazole (PRILOSEC) 20 MG CR capsule Take 1 capsule (20 mg) by mouth daily 30 capsule 1     sertraline (ZOLOFT) 50 MG tablet TAKE 1 TABLET EVERY DAY 90 tablet 1     warfarin (COUMADIN) 2 MG tablet TAKE 1 AND 1/2 TABLETS EVERY  tablet 2     No Known Allergies  Recent Labs   Lab Test 11/26/18  1625 08/24/18  1137 02/23/18  1126 07/25/17  1114 07/25/17  1113 02/16/17 02/09/17  0641 02/07/17  2300  01/04/16  1124  06/03/12  0610   A1C 12.6*  --  10.3*  --  8.2*  --   --   --    < >  --    < >  --    * 117*  --  81  --   --   --   --   --  65   < >  --    HDL 49 48  --  52  --   --   --   --   --  51   < >  --    TRIG 234* 250*  --  141  --   " --   --   --   --  117   < >  --    ALT 18  --   --   --   --   --   --  25  --  18   < >  --    CR 0.95  --   --   --   --  0.81 0.88 0.97   < > 0.93   < > 0.68   GFRESTIMATED  --   --   --   --   --  >60 84 75  --   --    < > >90   GFRESTBLACK  --   --   --   --   --  >60 >90   GFR Calc   >90   GFR Calc    --   --    < > >90   POTASSIUM 4.8  --   --   --   --  4.0 3.5 3.9   < > 4.0   < > 3.8   TSH  --   --   --   --   --  3.39  --   --   --   --   --  4.41    < > = values in this interval not displayed.      BP Readings from Last 3 Encounters:   01/07/19 116/68   12/19/18 113/80   12/04/18 114/68    Wt Readings from Last 3 Encounters:   01/07/19 79.4 kg (175 lb)   01/07/19 79.4 kg (175 lb)   12/19/18 78.5 kg (173 lb)       Estimated body mass index is 25.84 kg/m  as calculated from the following:    Height as of 12/19/18: 1.753 m (5' 9\").    Weight as of this encounter: 79.4 kg (175 lb).        Sleep ok  Appetite ok  Exercise limited    Smoking no  ETOH no  Street drugs/MJ no  Caffeine limited       Labs reviewed in EPIC    Reviewed and updated as needed this visit by clinical staff       Reviewed and updated as needed this visit by Provider         ROS:  Constitutional, HEENT, cardiovascular, pulmonary, GI, , musculoskeletal, neuro, skin, endocrine and psych systems are negative, except as otherwise noted.    OBJECTIVE:     /68   Pulse 72   Resp 16   Wt 79.4 kg (175 lb)   SpO2 97%   BMI 25.84 kg/m    There is no height or weight on file to calculate BMI.  GENERAL: healthy, alert and no distress  EYES: Eyes grossly normal to inspection, PERRL and conjunctivae and sclerae normal  HENT: ear canals and TM's normal, nose and mouth without ulcers or lesions  NECK: no adenopathy, no asymmetry, masses, or scars and thyroid normal to palpation  RESP: lungs clear to auscultation - no rales, rhonchi or wheezes  CV: regular rate and rhythm, normal S1 S2, no S3 or S4, no murmur, " click or rub, trace peripheral edema and peripheral pulses strong  ABDOMEN: soft, nontender, no hepatosplenomegaly, no masses and bowel sounds normal  MS: no gross musculoskeletal defects noted, no edema  SKIN: no suspicious lesions or rashes Right healing skin lesion without infection  NEURO: Normal strength and tone, mentation intact and speech normal  PSYCH: mentation appears normal, affect normal/bright  LYMPH: no cervical, supraclavicular, axillary, or inguinal adenopathy  DM Foot exam : normal monofilament testing hammer toe    Diagnostic Test Results:  Results for orders placed or performed during the hospital encounter of 12/19/18   Glucose by meter   Result Value Ref Range    Glucose 182 (H) 70 - 99 mg/dL   Glucose by meter   Result Value Ref Range    Glucose 204 (H) 70 - 99 mg/dL       ASSESSMENT/PLAN:     ASSESSMENT / PLAN:  (Z79.01) Long term current use of anticoagulant therapy  (primary encounter diagnosis)  Comment:   Plan: Prothrombin - INR ([facility]), Echocardiogram         Complete        Continue same    (E11.9) Diabetes mellitus (H)  Comment:   Lab Results   Component Value Date    A1C 12.6 11/26/2018    A1C 10.3 02/23/2018    A1C 8.2 07/25/2017    A1C 7.6 01/25/2017    A1C 7.1 07/27/2016       Plan: Echocardiogram Complete              Patient Instructions   Today  INR =3.0  Plan: 3mg MF, Otherwise 2mg  F/u 1 months    Fasting labs in one month    Schedule cardiac echo.    Wear support hose or ace wrap        Mariela Stauffer MD  [facility]

## 2019-01-07 ENCOUNTER — OFFICE VISIT (OUTPATIENT)
Dept: FAMILY MEDICINE | Facility: CLINIC | Age: 79
End: 2019-01-07

## 2019-01-07 ENCOUNTER — OFFICE VISIT (OUTPATIENT)
Dept: PHARMACY | Facility: PHYSICIAN GROUP | Age: 79
End: 2019-01-07

## 2019-01-07 VITALS
HEART RATE: 72 BPM | WEIGHT: 175 LBS | RESPIRATION RATE: 16 BRPM | DIASTOLIC BLOOD PRESSURE: 68 MMHG | SYSTOLIC BLOOD PRESSURE: 116 MMHG | OXYGEN SATURATION: 97 % | BODY MASS INDEX: 25.84 KG/M2

## 2019-01-07 VITALS — BODY MASS INDEX: 25.84 KG/M2 | WEIGHT: 175 LBS

## 2019-01-07 DIAGNOSIS — I10 BENIGN ESSENTIAL HYPERTENSION: ICD-10-CM

## 2019-01-07 DIAGNOSIS — I50.22 CHRONIC SYSTOLIC CONGESTIVE HEART FAILURE (H): Primary | ICD-10-CM

## 2019-01-07 DIAGNOSIS — E11.9 TYPE 2 DIABETES MELLITUS WITHOUT COMPLICATION, WITHOUT LONG-TERM CURRENT USE OF INSULIN (H): ICD-10-CM

## 2019-01-07 DIAGNOSIS — H26.9 CATARACT, UNSPECIFIED CATARACT TYPE, UNSPECIFIED LATERALITY: ICD-10-CM

## 2019-01-07 DIAGNOSIS — Z86.73 HISTORY OF STROKE: ICD-10-CM

## 2019-01-07 DIAGNOSIS — Z79.01 LONG TERM CURRENT USE OF ANTICOAGULANT THERAPY: Primary | ICD-10-CM

## 2019-01-07 DIAGNOSIS — K22.2 STRICTURE AND STENOSIS OF ESOPHAGUS: ICD-10-CM

## 2019-01-07 DIAGNOSIS — C61 PROSTATE CANCER (H): ICD-10-CM

## 2019-01-07 DIAGNOSIS — Z13.6 SCREENING FOR CARDIOVASCULAR CONDITION: ICD-10-CM

## 2019-01-07 DIAGNOSIS — F39 MOOD DISORDER (H): ICD-10-CM

## 2019-01-07 LAB — INR PPP: 3 (ref 2–3)

## 2019-01-07 PROCEDURE — 99605 MTMS BY PHARM NP 15 MIN: CPT | Performed by: PHARMACIST

## 2019-01-07 PROCEDURE — 85610 PROTHROMBIN TIME: CPT | Performed by: FAMILY MEDICINE

## 2019-01-07 PROCEDURE — 36415 COLL VENOUS BLD VENIPUNCTURE: CPT | Performed by: FAMILY MEDICINE

## 2019-01-07 PROCEDURE — 99607 MTMS BY PHARM ADDL 15 MIN: CPT | Performed by: PHARMACIST

## 2019-01-07 PROCEDURE — 99214 OFFICE O/P EST MOD 30 MIN: CPT | Performed by: FAMILY MEDICINE

## 2019-01-07 RX ORDER — MULTIVIT-MIN/IRON/FOLIC ACID/K 18-600-40
3000 CAPSULE ORAL DAILY
Status: ON HOLD | COMMUNITY
End: 2021-01-29

## 2019-01-07 RX ORDER — GLIMEPIRIDE 1 MG/1
2 TABLET ORAL
Qty: 180 TABLET | Refills: 0
Start: 2019-01-07 | End: 2019-02-11

## 2019-01-07 NOTE — PROGRESS NOTES
SUBJECTIVE/OBJECTIVE:                           Jose Lees is a 78 year old male coming in for an initial visit for Medication Therapy Management.  He was referred to me from .    Chief Complaint: Last saw MTM in 2017. Seeing PCP after MTM today. Here with his sister Judith today.     Allergies/ADRs: Reviewed in Epic  Tobacco: No tobacco use  Alcohol: not currently using  Caffeine: 1-2 sodas/day  Activity: limited  PMH: Reviewed in Epic  Social: has autism, lives at [facility]    Medication Adherence/Access:  Judith sets up pill box AM/PM, only PM are the 2 extra metformin.     HFrEF: Current medications include metoprolol succinate 25mg daily.  Patient reports no current medication side effects.     ECHO:  Date 4/7/14, EF 25-30%, has not been rechecked. Pt is not complaining of sx of HF.    Pt is not measuring daily weights now- clinic weights have been stable.    Patient does not self-monitor BP.   Pt is not following a low sodium diet, is avoiding EtOH.  Not on ACE inhib- BP low to start and unlikely able to tolerate additional BP lowering medication at this time.     Diabetes:  Pt currently taking metformin 1000mg BID (500mg tabs- easier to swallow) and glimepiride 1mg daily.  Pt is not experiencing side effects. Just getting back to taking these pills on a regular basis since his November A1c.   SMBG: never. Ranges (patient reported): none- not checking, would cost a lot of money to have help to get this checked by care takers.   Symptoms of low blood sugar? none. Frequency of hypoglycemia? never.  Recent symptoms of high blood sugar? none  Microalbumin is < 30 mg/g. Pt is not taking an ACEi/ARB.  Aspirin: Taking 81mg daily and denies side effects  Diet/Exercise: eats two meals a day with [facility] homes and isn't watching his diet well, but has a hard time choosing the right foods.    Lab Results   Component Value Date    A1C 12.6 11/26/2018    A1C 10.3 02/23/2018    A1C 8.2  "07/25/2017    A1C 7.6 01/25/2017    A1C 7.1 07/27/2016       Hypertension/Stroke: Current medications include metoprolol succinate 25mg daily, warfarin as directed. Patient does not self-monitor BP.  Patient reports no current medication side effects.    Prostate Cancer: currently getting Eligard every 6 months from urology, due to follow up this month (Jan 2019) and bicalutamide daily. PSA recheck will be done in Jan.     Cataracts: one more week of eye drops. Surgery went well.     GERD: Current medications include: Prilosec (omeprazole) 20mg once daily. Pt c/o no current symptoms.  Patient feels that current regimen is effective. Has had 2 different dilations, these were done in the past.     Mood: Currently taking sertraline 50mg daily. Denies issues. Feels this is working well.     BP Readings from Last 1 Encounters:   01/07/19 116/68     Pulse Readings from Last 1 Encounters:   01/07/19 72     Wt Readings from Last 1 Encounters:   01/07/19 175 lb (79.4 kg)     Ht Readings from Last 1 Encounters:   12/19/18 5' 9\" (1.753 m)     Estimated body mass index is 25.84 kg/m  as calculated from the following:    Height as of 12/19/18: 5' 9\" (1.753 m).    Weight as of this encounter: 175 lb (79.4 kg).    Temp Readings from Last 1 Encounters:   12/19/18 97.5  F (36.4  C) (Temporal)       ASSESSMENT:                             Current medications were reviewed today.    Medication Adherence: good, no issues identified    HFrEF: Needs improvement, discussed with PCP to consider rechecking ECHO and follow up with cardiology.     Diabetes: Needs Improvement. Patient is not meeting A1c goal of < 8%. Unable to get home monitoring due to ability and costs. Not willing to pay out of pocket for CGM. pt would benefit from SFU (glimepiride) :  increase dose to 2mg daily and recheck a1c end of Feb with better compliance of regimen. After evaluation of HF status, may be able to consider DPP4 as another option with lower risks of lows " and not monitoring.     Hypertension/Stroke: Stable.    Prostate Cancer: Stable, has planned follow up with urology.     Cataracts: Stable.    GERD: Stable.    Mood: Stable.    PLAN:                            1. Increase glimepiride 2mg - 2 tablets in the morning with food. Discussed hypoglycemia. Not able to order monitoring.     2. Will recheck the a1c end of Feb.    3. Call [employer] to refill the Atorvastatin    4. Try to watch the beverages and sweets to cut out extra sugar from the diet.     5. Need follow up with cardiology? PCP seeing patient after MTM today and will review.    I spent 45 minutes with this patient today. All changes were made via collaborative practice agreement with Dr. Stauffer. A copy of the visit note was provided to the patient's primary care provider.    Will follow up in 6 weeks.    The patient was given a summary of these recommendations as an after visit summary.     Jennifer Gonzalez, Pharm.D, BCACP  Medication Therapy Management Pharmacist  [phone]

## 2019-01-07 NOTE — PATIENT INSTRUCTIONS
Today  INR =3.0  Plan: 3mg MF, Otherwise 2mg  F/u 1 months    Fasting labs in one month    Schedule cardiac echo.    Wear support hose or ace wrap

## 2019-01-07 NOTE — PATIENT INSTRUCTIONS
Recommendations from today's MTM visit:                                                      1. Increase glimepiride 2mg - 2 tablets in the morning with food.     2. Will recheck the a1c end of Feb.    3. Call [employer] to refill the Atorvastatin    4. Try to watch the beverages and sweets to cut out extra sugar from the diet.     Next MTM visit: End of February.     To schedule another MTM appointment, please call the clinic directly or you may call the MTM scheduling line at [phone] or toll-free at [phone].     My Clinical Pharmacist's contact information:                                                      It was a pleasure talking with you today!  Please feel free to contact me with any questions or concerns you have.      Jennifer Gonzalez, Pharm.D, [facility]  Medication Therapy Management Pharmacist  [phone]    You may receive a survey about the MTM services you received.  I would appreciate your feedback to help me serve you better in the future. Please fill it out and return it when you can. Your comments will be anonymous.

## 2019-01-23 ENCOUNTER — HOSPITAL ENCOUNTER (OUTPATIENT)
Dept: CARDIOLOGY | Facility: CLINIC | Age: 79
Discharge: HOME | End: 2019-01-23
Attending: FAMILY MEDICINE | Admitting: FAMILY MEDICINE
Payer: COMMERCIAL

## 2019-01-23 DIAGNOSIS — Z13.6 SCREENING FOR CARDIOVASCULAR CONDITION: ICD-10-CM

## 2019-01-23 PROCEDURE — 40000264 ECHOCARDIOGRAM COMPLETE

## 2019-01-23 PROCEDURE — 25500064 ZZH RX 255 OP 636: Performed by: FAMILY MEDICINE

## 2019-01-23 PROCEDURE — 93306 TTE W/DOPPLER COMPLETE: CPT | Mod: 26 | Performed by: INTERNAL MEDICINE

## 2019-01-23 RX ADMIN — HUMAN ALBUMIN MICROSPHERES AND PERFLUTREN 9 ML: 10; .22 INJECTION, SOLUTION INTRAVENOUS at 16:00

## 2019-01-25 DIAGNOSIS — K21.9 GASTROESOPHAGEAL REFLUX DISEASE, ESOPHAGITIS PRESENCE NOT SPECIFIED: ICD-10-CM

## 2019-01-25 DIAGNOSIS — Z76.0 ENCOUNTER FOR MEDICATION REFILL: ICD-10-CM

## 2019-01-25 NOTE — TELEPHONE ENCOUNTER
Metformin, Metoprolol, Omeprazole - OV scheduled 2/8/19  Last Labs 11/26/18    BP Readings from Last 3 Encounters:   01/07/19 116/68   12/19/18 113/80   12/04/18 114/68     Lab Results   Component Value Date    A1C 12.6 11/26/2018    A1C 10.3 02/23/2018    A1C 8.2 07/25/2017    A1C 7.6 01/25/2017    A1C 7.1 07/27/2016     Last Comprehensive Metabolic Panel:  Sodium   Date Value Ref Range Status   11/26/2018 138 134 - 144 mmol/L Final     Potassium   Date Value Ref Range Status   11/26/2018 4.8 3.5 - 5.2 mmol/L Final     Chloride   Date Value Ref Range Status   11/26/2018 98 96 - 106 mmol/L Final     Carbon Dioxide   Date Value Ref Range Status   02/16/2017 24 22 - 31 mmol/L Final     Anion Gap   Date Value Ref Range Status   02/16/2017 9 5 - 18 mmol/L Final     Glucose   Date Value Ref Range Status   11/26/2018 339 (H) 65 - 99 mg/dL Final     Urea Nitrogen   Date Value Ref Range Status   11/26/2018 9 8 - 27 mg/dL Final     BUN/Creatinine Ratio   Date Value Ref Range Status   11/26/2018 9 (L) 10 - 24 Final     Creatinine   Date Value Ref Range Status   11/26/2018 0.95 0.76 - 1.27 mg/dL Final     GFR Estimate   Date Value Ref Range Status   02/16/2017 >60 >60 ml/min/1.73m2 Final     Calcium   Date Value Ref Range Status   11/26/2018 9.6 8.6 - 10.2 mg/dL Final

## 2019-01-27 RX ORDER — METOPROLOL SUCCINATE 25 MG/1
TABLET, EXTENDED RELEASE ORAL
Qty: 90 TABLET | Refills: 3 | Status: SHIPPED | OUTPATIENT
Start: 2019-01-27 | End: 2020-02-21

## 2019-01-29 ENCOUNTER — TRANSFERRED RECORDS (OUTPATIENT)
Dept: FAMILY MEDICINE | Facility: CLINIC | Age: 79
End: 2019-01-29

## 2019-02-04 ENCOUNTER — TELEPHONE (OUTPATIENT)
Dept: FAMILY MEDICINE | Facility: CLINIC | Age: 79
End: 2019-02-04

## 2019-02-04 DIAGNOSIS — I25.2 HISTORY OF MI (MYOCARDIAL INFARCTION): Primary | ICD-10-CM

## 2019-02-04 DIAGNOSIS — R93.1 ABNORMAL ECHOCARDIOGRAM: ICD-10-CM

## 2019-02-04 DIAGNOSIS — I50.9 CHF (CONGESTIVE HEART FAILURE) (H): ICD-10-CM

## 2019-02-04 NOTE — TELEPHONE ENCOUNTER
Called Judith, sister/caretaker/POA with results and plan. She readily agrees to cardiology consult. Referral faxed to [facility] and patient will await their call. Patient does have appt with Dr. Stauffer 2/8/19.   Agata Whitfield RN

## 2019-02-04 NOTE — TELEPHONE ENCOUNTER
----- Message from Arlene Mcdermott MD sent at 2/1/2019  2:31 PM CST -----  I am covering for Dr. Stauffer in her absence.  It appears ECHO remains abnormal but not significantly changed from study done in 2014. If it does not already exist, patient should have an established relationship with cardiology to manage. Please refer.

## 2019-02-06 NOTE — PROGRESS NOTES
F/u and INR    INR  Goal 2-3  Diagnosis TIA    Last visit 1/7/19  INR 3  What they were taking   [facility] Anticoaglution WEEKLY DOSE   Latest Ref Rng & Units    1/7/2019 3 MG MF, 2 mg all other     What it was changed to same  F/u 1 month    Today's 2/8/19  INR  3.1  Plan Continue same  F/u 1 month    No bleeding/bruising  No coumadin interfering foods    Mariela Stauffer MD  [facility]    [facility] [employer] [facility] today  Heart appointment in March coming up  Walker with large wheels    Taking medications ok  Fasting today      H/o Autism, MI  S/p catarct surgery  Wheeled walker with seat and brakes    White male  Here with sister    Sleep -really good  Appetite -really good  Exercise -good  Walker walking to breakfast and evening meal.    Smoking -no  ETOH -no  Street drugs/MJ -no  Caffeine -coffee    Fall/seizure/LOC? -no    Mood changes -none (on Zoloft)    Lab Results   Component Value Date    A1C 12.6 11/26/2018    A1C 10.3 02/23/2018    A1C 8.2 07/25/2017    A1C 7.6 01/25/2017    A1C 7.1 07/27/2016     BP Readings from Last 3 Encounters:   02/08/19 122/72   01/07/19 116/68   12/19/18 113/80     Creatinine   Date Value Ref Range Status   11/26/2018 0.95 0.76 - 1.27 mg/dL Final     LDL Cholesterol Calculated   Date Value Ref Range Status   11/26/2018 158 (H) 0 - 99 mg/dL Final   on lipitor     ROS: 10 point ROS neg other than the symptoms noted above in the HPI.  /72   Pulse 72   Resp 16   Wt 80.8 kg (178 lb 3.2 oz)   SpO2 98%   BMI 26.32 kg/m    Exam:  Constitutional: healthy, alert and no distress  Head: Normocephalic. No masses, lesions, tenderness or abnormalities  Neck: Neck supple. No adenopathy. Thyroid symmetric, normal size,, Carotids without bruits.  ENT: ENT exam normal, no neck nodes or sinus tenderness  Cardiovascular: negative, PMI normal. No lifts, heaves, or thrills. RRR. No murmurs, clicks gallops or rub, trace edema at ankle  Respiratory: negative, Percussion normal. Good diaphragmatic excursion. Lungs  clear  Gastrointestinal: Abdomen soft, non-tender. BS normal. No masses, organomegaly  : Deferred  Musculoskeletal: extremities normal- no gross deformities noted, gait normal and normal muscle tone  Skin: no suspicious lesions or rashes  Neurologic: Gait normal. Reflexes normal and symmetric. Sensation grossly WNL.  Psychiatric: mentation appears normal and affect normal/bright  Hematologic/Lymphatic/Immunologic: Normal cervical lymph nodes  DM normal foot exam monofilament, no open sores  Normal pulses    Assessment/Plan:    Creatinine   Date Value Ref Range Status   11/26/2018 0.95 0.76 - 1.27 mg/dL Final     ASSESSMENT / PLAN:  (Z86.73) History of TIA (transient ischemic attack) and stroke  (primary encounter diagnosis)  Comment: stable  Plan: Prothrombin - INR ([facility]), VENOUS COLLECTION            (Z79.01) Long term current use of anticoagulant therapy  Comment:   Plan: Prothrombin - INR ([facility])            (E11.9) Type 2 diabetes mellitus without complication, without long-term current use of insulin (H)  Comment:   Lab Results   Component Value Date    A1C 12.6 11/26/2018    A1C 10.3 02/23/2018    A1C 8.2 07/25/2017    A1C 7.6 01/25/2017    A1C 7.1 07/27/2016     Taking medications now  Plan: Hemoglobin A1C ([facility]), VENOUS COLLECTION            (E78.5) Hyperlipidemia LDL goal <100  Comment:   LDL Cholesterol Calculated   Date Value Ref Range Status   11/26/2018 158 (H) 0 - 99 mg/dL Final       Plan: Lipid Panel ([facility]), VENOUS COLLECTION        Taking medications now    (Z76.0) Encounter for medication refill  Comment: mood stable  Plan: sertraline (ZOLOFT) 50 MG tablet            Mariela Stauffer MD, MEd  [facility]

## 2019-02-08 ENCOUNTER — OFFICE VISIT (OUTPATIENT)
Dept: FAMILY MEDICINE | Facility: CLINIC | Age: 79
End: 2019-02-08

## 2019-02-08 VITALS
RESPIRATION RATE: 16 BRPM | WEIGHT: 178.2 LBS | SYSTOLIC BLOOD PRESSURE: 122 MMHG | HEART RATE: 72 BPM | BODY MASS INDEX: 26.32 KG/M2 | OXYGEN SATURATION: 98 % | DIASTOLIC BLOOD PRESSURE: 72 MMHG

## 2019-02-08 DIAGNOSIS — E78.5 HYPERLIPIDEMIA LDL GOAL <100: ICD-10-CM

## 2019-02-08 DIAGNOSIS — E11.9 TYPE 2 DIABETES MELLITUS WITHOUT COMPLICATION, WITHOUT LONG-TERM CURRENT USE OF INSULIN (H): ICD-10-CM

## 2019-02-08 DIAGNOSIS — Z86.73 HISTORY OF TIA (TRANSIENT ISCHEMIC ATTACK) AND STROKE: Primary | ICD-10-CM

## 2019-02-08 DIAGNOSIS — Z79.01 LONG TERM CURRENT USE OF ANTICOAGULANT THERAPY: ICD-10-CM

## 2019-02-08 DIAGNOSIS — Z76.0 ENCOUNTER FOR MEDICATION REFILL: ICD-10-CM

## 2019-02-08 LAB — INR PPP: 3.1 (ref 2–3)

## 2019-02-08 PROCEDURE — 36415 COLL VENOUS BLD VENIPUNCTURE: CPT | Performed by: FAMILY MEDICINE

## 2019-02-08 PROCEDURE — 99214 OFFICE O/P EST MOD 30 MIN: CPT | Performed by: FAMILY MEDICINE

## 2019-02-08 PROCEDURE — 85610 PROTHROMBIN TIME: CPT | Performed by: FAMILY MEDICINE

## 2019-02-09 LAB
CHOLEST SERPL-MCNC: 190 MG/DL (ref 100–199)
HBA1C MFR BLD: 9.9 % (ref 4.8–5.6)
HDLC SERPL-MCNC: 55 MG/DL
LDL/HDL RATIO: 1.8 RATIO (ref 0–3.6)
LDLC SERPL CALC-MCNC: 99 MG/DL (ref 0–99)
TRIGL SERPL-MCNC: 182 MG/DL (ref 0–149)
VLDLC SERPL CALC-MCNC: 36 MG/DL (ref 5–40)

## 2019-02-11 RX ORDER — GLIMEPIRIDE 1 MG/1
TABLET ORAL
Qty: 180 TABLET | Refills: 0 | COMMUNITY
Start: 2019-02-11 | End: 2019-05-20

## 2019-02-11 NOTE — RESULT ENCOUNTER NOTE
a1c is coming down, but not at goal still. recheck one month  Lipids LDL at goal. Triglycerides are coming down. Eat more fish, fish oil, ground flax seed and oatmeal. Less sugar  INR reviewed

## 2019-03-01 ENCOUNTER — OFFICE VISIT (OUTPATIENT)
Dept: CARDIOLOGY | Facility: CLINIC | Age: 79
End: 2019-03-01
Attending: FAMILY MEDICINE
Payer: COMMERCIAL

## 2019-03-01 VITALS
DIASTOLIC BLOOD PRESSURE: 62 MMHG | HEART RATE: 70 BPM | WEIGHT: 176.7 LBS | BODY MASS INDEX: 25.3 KG/M2 | SYSTOLIC BLOOD PRESSURE: 110 MMHG | HEIGHT: 70 IN

## 2019-03-01 DIAGNOSIS — I25.5 ISCHEMIC CARDIOMYOPATHY: Primary | ICD-10-CM

## 2019-03-01 PROCEDURE — 99203 OFFICE O/P NEW LOW 30 MIN: CPT | Performed by: INTERNAL MEDICINE

## 2019-03-01 ASSESSMENT — MIFFLIN-ST. JEOR: SCORE: 1528.79

## 2019-03-01 NOTE — PATIENT INSTRUCTIONS
I think you are doing very well. You seem to be well compensated from a heart standpoint.    Your current medications I think are just right.    We will continue them without changes unless you notice shortness of breath when exercising, leg swelling that's bothering you, lightheadedness or chest tightness / discomfort.    If you notice any of those symptoms or feeling like you're going going to pass out, we want to know right away.    Otherwise, you can see us every couple of years if things are going well.    We can consider a defibrillator (special pacemaker) that can shock you if you needed it. We can look at that anytime you like.

## 2019-03-01 NOTE — LETTER
"3/1/2019    Mariela Stauffer MD  [address]  [facility] [address]    RE: Jose Lees       Dear Colleague,    I had the pleasure of seeing Jose Lees in the [facility].    Cardiology Consultation      Jose Lees MRN# [id number]   YOB: 1940 Age: 78 year old   Date of Visit 03/01/2019     Reason for consult:  Reestablish care           Assessment and Plan:     1. Ischemic cardiomyopathy, well compensated    No current symptoms.  Continue current medications.  Does not need to follow-up frequently if remains compensated.    Did discuss he would qualify for AICD at any time he would like to pursue this.  At this point, since he has no symptoms and has not had any identifiable ventricular tachycardia, can defer this for the future.    Routine follow-up in 2 years    This note was transcribed using electronic voice recognition software, typographical errors may be present.                Chief Complaint:   No chief complaint on file.           History of Present Illness:   This patient is a pleasant 78 year old male with history of anterior infarct and ischemic cardiomyopathy.  He has been extremely well compensated over the years.  He is on a good medical regimen.  He is on anticoagulation for presumptive LV apical embolic cerebral events.  He is tolerating that well.  He is here accompanied by his sister who is his main family support.    He denies any decompensated congestive heart failure or dyspnea on exertion.  Denies exertional chest discomfort.  Has had recent echocardiogram that has been stable.    He does not have a defibrillator.  He has not had any presyncope or syncope.    No exertional chest pain  No PND / orthopnea  No presyncope / syncope  No significant palpitations           Physical Exam:     Vitals: /62   Pulse 70   Ht 1.772 m (5' 9.75\")   Wt 80.2 kg (176 lb 11.2 oz)   BMI 25.54 kg/m     Constitutional:  " cooperative, alert and oriented, well developed, well nourished, in no acute distress        Skin:  warm and dry to the touch, no apparent skin lesions or masses noted        Head:  normocephalic        Eyes:  pupils equal and round, conjunctivae and lids unremarkable, sclera white, no xanthalasma, EOMS intact, no nystagmus        ENT:  no pallor or cyanosis, dentition good        Neck:  JVP normal        Chest:  normal breath sounds, clear to auscultation, normal A-P diameter, normal symmetry, normal respiratory excursion, no use of accessory muscles        Cardiac: regular rhythm;no murmurs, gallops or rubs detected                  Abdomen:      benign    Extremities and Back:  no edema;normal muscle strength and tone        Neurological:  no gross motor deficits;affect appropriate                    Past Medical History:   I have reviewed this patient's past medical history  Past Medical History:   Diagnosis Date     ACP (advance care planning)     Form given     Acute anterior wall MI (H) 4/7/2014     Autism disorder 6/29/2012     CHF (congestive heart failure) (H)     ECHO EF=25-30% on 4/7/14     Coronary artery disease 4/2014 4/2014 HEART CATH - 70% mid LAD -- BMS placed, small 1st diagonal 80%, RCA 60-70% before crux, 70% mid PDA     Dementia      Hyperlipidaemia      Hyperlipidaemia LDL goal < 100      Prostate cancer (H) 10/11    GLEASOR 8     Stricture and stenosis of esophagus 10/22/2015     Stroke, 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     Type II or unspecified type diabetes mellitus without mention of complication, not stated as uncontrolled     Diabetes mellitus             Past Surgical History:   I have reviewed this patient's past surgical history  Past Surgical History:   Procedure Laterality Date     CORONARY ANGIOGRAPHY ADULT ORDER       ESOPHAGOSCOPY, GASTROSCOPY, DUODENOSCOPY (EGD), COMBINED N/A 8/6/2015    Procedure: COMBINED ESOPHAGOSCOPY, GASTROSCOPY, DUODENOSCOPY  (EGD), REMOVE FOREIGN BODY;  Surgeon: Yu Tapia MD;  Location:  GI     HEART CATH, ANGIOPLASTY  4/14    BMS to LAD     PHACOEMULSIFICATION CLEAR CORNEA WITH STANDARD INTRAOCULAR LENS IMPLANT Right 12/4/2018    Procedure: COMPLEX RIGHT EYE PHACOEMULSIFICATION CLEAR CORNEA WITH STANDARD INTRAOCULAR LENS IMPLANT;  Surgeon: Kamar Kyle MD;  Location:  EC     PHACOEMULSIFICATION CLEAR CORNEA WITH STANDARD INTRAOCULAR LENS IMPLANT Left 12/19/2018    Procedure: COMPLEX LEFT EYE PHACOEMULSIFICATION CLEAR CORNEA WITH STANDARD INTRAOCULAR LENS IMPLANT;  Surgeon: Kamar Kyle MD;  Location: [facility]               Social History:   I have reviewed this patient's social history  Social History     Tobacco Use     Smoking status: Never Smoker     Smokeless tobacco: Never Used   Substance Use Topics     Alcohol use: No     Comment: never             Family History:   I have reviewed this patient's family history  Family History   Problem Relation Age of Onset     Cerebrovascular Disease Mother 92     C.A.D. Father 48             Allergies:   No Known Allergies          Medications:   I have reviewed this patient's current medications  Current Outpatient Medications   Medication Sig Dispense Refill     ACE/ARB/ARNI NOT PRESCRIBED, INTENTIONAL, ACE & ARB not prescribed due to Symptomatic hypotension not due to excessive diuresis       aspirin 81 MG chewable tablet Take 1 tablet (81 mg) by mouth daily 36 tablet 12     atorvastatin (LIPITOR) 20 MG tablet Take 1 tablet by mouth daily 90 tablet 3     bicalutamide (CASODEX) 50 MG tablet Take 1 tablet (50 mg) by mouth daily 90 tablet 3     Cholecalciferol (VITAMIN D) 2000 units CAPS Take 1 capsule by mouth daily       glimepiride (AMARYL) 1 MG tablet Take 2 tabs by mouth every am. 180 tablet 0     Leuprolide Acetate, 6 Month, (LUPRON DEPOT, 6-MONTH, IM)        metFORMIN (GLUCOPHAGE) 500 MG tablet TAKE 2 TABLETS TWICE DAILY WITH MEALS 360 tablet 1      metoprolol succinate ER (TOPROL-XL) 25 MG 24 hr tablet TAKE 1 TABLET EVERY DAY 90 tablet 3     omeprazole (PRILOSEC) 20 MG DR capsule Take 1 capsule (20 mg) by mouth daily 30 capsule 1     sertraline (ZOLOFT) 50 MG tablet TAKE 1 TABLET EVERY DAY 90 tablet 3     warfarin (COUMADIN) 2 MG tablet TAKE 1 AND 1/2 TABLETS EVERY  tablet 2               Review of Systems:     Review of Systems:  Skin:  Negative     Eyes:  Positive for glasses(reading only)  ENT:  Negative    Respiratory:  Negative    Cardiovascular:  Negative    Gastroenterology: Negative    Genitourinary:  Negative    Musculoskeletal:  Negative    Neurologic:  Positive for stroke  Psychiatric:  Negative    Heme/Lymph/Imm:  Negative    Endocrine:  Positive for diabetes                     Data:   All laboratory data reviewed  Lab Results   Component Value Date    CHOL 190 02/08/2019     Lab Results   Component Value Date    HDL 55 02/08/2019     Lab Results   Component Value Date    LDL 99 02/08/2019     Lab Results   Component Value Date    TRIG 182 02/08/2019     Lab Results   Component Value Date    CHOLHDLRATIO 3.1 04/10/2014     TSH   Date Value Ref Range Status   02/16/2017 3.39 0.30 - 5.00 uIU/mL Final     Last Basic Metabolic Panel:  Lab Results   Component Value Date     11/26/2018      Lab Results   Component Value Date    POTASSIUM 4.8 11/26/2018     Lab Results   Component Value Date    CHLORIDE 98 11/26/2018     Lab Results   Component Value Date    TENZIN 9.6 11/26/2018     Lab Results   Component Value Date    CO2 24 02/16/2017     Lab Results   Component Value Date    BUN 9 11/26/2018    BUN 9 11/26/2018     Lab Results   Component Value Date    CR 0.95 11/26/2018     Lab Results   Component Value Date     11/26/2018     Lab Results   Component Value Date    WBC 6.4 11/26/2018    WBC 7.4 02/09/2017     Lab Results   Component Value Date    RBC 4.82 11/26/2018    RBC 3.94 02/09/2017     Lab Results   Component Value Date     HGB 14.1 11/26/2018     Lab Results   Component Value Date    HCT 42.6 11/26/2018     Lab Results   Component Value Date    MCV 88.5 11/26/2018     Lab Results   Component Value Date    MCH 29.4 11/26/2018     Lab Results   Component Value Date    MCHC 33.2 11/26/2018     Lab Results   Component Value Date    RDW 13.4 02/09/2017     Lab Results   Component Value Date     11/26/2018       Thank you for allowing me to participate in the care of your patient.    Sincerely,     Jonny Dominguez MD     [facility]    cc:   Jermain Helm MD  [address] [place] [address]

## 2019-03-01 NOTE — PROGRESS NOTES
"Cardiology Consultation      Jose Lees MRN# [id number]   YOB: 1940 Age: 78 year old   Date of Visit 03/01/2019     Reason for consult:  Reestablish care           Assessment and Plan:     1. Ischemic cardiomyopathy, well compensated    No current symptoms.  Continue current medications.  Does not need to follow-up frequently if remains compensated.    Did discuss he would qualify for AICD at any time he would like to pursue this.  At this point, since he has no symptoms and has not had any identifiable ventricular tachycardia, can defer this for the future.    Routine follow-up in 2 years    This note was transcribed using electronic voice recognition software, typographical errors may be present.                Chief Complaint:   No chief complaint on file.           History of Present Illness:   This patient is a pleasant 78 year old male with history of anterior infarct and ischemic cardiomyopathy.  He has been extremely well compensated over the years.  He is on a good medical regimen.  He is on anticoagulation for presumptive LV apical embolic cerebral events.  He is tolerating that well.  He is here accompanied by his sister who is his main family support.    He denies any decompensated congestive heart failure or dyspnea on exertion.  Denies exertional chest discomfort.  Has had recent echocardiogram that has been stable.    He does not have a defibrillator.  He has not had any presyncope or syncope.    No exertional chest pain  No PND / orthopnea  No presyncope / syncope  No significant palpitations           Physical Exam:     Vitals: /62   Pulse 70   Ht 1.772 m (5' 9.75\")   Wt 80.2 kg (176 lb 11.2 oz)   BMI 25.54 kg/m    Constitutional:  cooperative, alert and oriented, well developed, well nourished, in no acute distress        Skin:  warm and dry to the touch, no apparent skin lesions or masses noted        Head:  normocephalic        Eyes:  pupils equal and round, " conjunctivae and lids unremarkable, sclera white, no xanthalasma, EOMS intact, no nystagmus        ENT:  no pallor or cyanosis, dentition good        Neck:  JVP normal        Chest:  normal breath sounds, clear to auscultation, normal A-P diameter, normal symmetry, normal respiratory excursion, no use of accessory muscles        Cardiac: regular rhythm;no murmurs, gallops or rubs detected                  Abdomen:      benign    Extremities and Back:  no edema;normal muscle strength and tone        Neurological:  no gross motor deficits;affect appropriate                    Past Medical History:   I have reviewed this patient's past medical history  Past Medical History:   Diagnosis Date     ACP (advance care planning)     Form given     Acute anterior wall MI (H) 4/7/2014     Autism disorder 6/29/2012     CHF (congestive heart failure) (H)     ECHO EF=25-30% on 4/7/14     Coronary artery disease 4/2014 4/2014 HEART CATH - 70% mid LAD -- BMS placed, small 1st diagonal 80%, RCA 60-70% before crux, 70% mid PDA     Dementia      Hyperlipidaemia      Hyperlipidaemia LDL goal < 100      Prostate cancer (H) 10/11    GLEASOR 8     Stricture and stenosis of esophagus 10/22/2015     Stroke, 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     Type II or unspecified type diabetes mellitus without mention of complication, not stated as uncontrolled     Diabetes mellitus             Past Surgical History:   I have reviewed this patient's past surgical history  Past Surgical History:   Procedure Laterality Date     CORONARY ANGIOGRAPHY ADULT ORDER       ESOPHAGOSCOPY, GASTROSCOPY, DUODENOSCOPY (EGD), COMBINED N/A 8/6/2015    Procedure: COMBINED ESOPHAGOSCOPY, GASTROSCOPY, DUODENOSCOPY (EGD), REMOVE FOREIGN BODY;  Surgeon: Yu Tapia MD;  Location:  GI     HEART CATH, ANGIOPLASTY  4/14    BMS to LAD     PHACOEMULSIFICATION CLEAR CORNEA WITH STANDARD INTRAOCULAR LENS IMPLANT Right 12/4/2018    Procedure:  COMPLEX RIGHT EYE PHACOEMULSIFICATION CLEAR CORNEA WITH STANDARD INTRAOCULAR LENS IMPLANT;  Surgeon: Kamar Kyle MD;  Location: [facility]     PHACOEMULSIFICATION CLEAR CORNEA WITH STANDARD INTRAOCULAR LENS IMPLANT Left 12/19/2018    Procedure: COMPLEX LEFT EYE PHACOEMULSIFICATION CLEAR CORNEA WITH STANDARD INTRAOCULAR LENS IMPLANT;  Surgeon: Kamar Kyle MD;  Location: [facility]               Social History:   I have reviewed this patient's social history  Social History     Tobacco Use     Smoking status: Never Smoker     Smokeless tobacco: Never Used   Substance Use Topics     Alcohol use: No     Comment: never             Family History:   I have reviewed this patient's family history  Family History   Problem Relation Age of Onset     Cerebrovascular Disease Mother 92     C.A.D. Father 48             Allergies:   No Known Allergies          Medications:   I have reviewed this patient's current medications  Current Outpatient Medications   Medication Sig Dispense Refill     ACE/ARB/ARNI NOT PRESCRIBED, INTENTIONAL, ACE & ARB not prescribed due to Symptomatic hypotension not due to excessive diuresis       aspirin 81 MG chewable tablet Take 1 tablet (81 mg) by mouth daily 36 tablet 12     atorvastatin (LIPITOR) 20 MG tablet Take 1 tablet by mouth daily 90 tablet 3     bicalutamide (CASODEX) 50 MG tablet Take 1 tablet (50 mg) by mouth daily 90 tablet 3     Cholecalciferol (VITAMIN D) 2000 units CAPS Take 1 capsule by mouth daily       glimepiride (AMARYL) 1 MG tablet Take 2 tabs by mouth every am. 180 tablet 0     Leuprolide Acetate, 6 Month, (LUPRON DEPOT, 6-MONTH, IM)        metFORMIN (GLUCOPHAGE) 500 MG tablet TAKE 2 TABLETS TWICE DAILY WITH MEALS 360 tablet 1     metoprolol succinate ER (TOPROL-XL) 25 MG 24 hr tablet TAKE 1 TABLET EVERY DAY 90 tablet 3     omeprazole (PRILOSEC) 20 MG DR capsule Take 1 capsule (20 mg) by mouth daily 30 capsule 1     sertraline (ZOLOFT) 50 MG tablet TAKE 1 TABLET  EVERY DAY 90 tablet 3     warfarin (COUMADIN) 2 MG tablet TAKE 1 AND 1/2 TABLETS EVERY  tablet 2               Review of Systems:     Review of Systems:  Skin:  Negative     Eyes:  Positive for glasses(reading only)  ENT:  Negative    Respiratory:  Negative    Cardiovascular:  Negative    Gastroenterology: Negative    Genitourinary:  Negative    Musculoskeletal:  Negative    Neurologic:  Positive for stroke  Psychiatric:  Negative    Heme/Lymph/Imm:  Negative    Endocrine:  Positive for diabetes                     Data:   All laboratory data reviewed  Lab Results   Component Value Date    CHOL 190 02/08/2019     Lab Results   Component Value Date    HDL 55 02/08/2019     Lab Results   Component Value Date    LDL 99 02/08/2019     Lab Results   Component Value Date    TRIG 182 02/08/2019     Lab Results   Component Value Date    CHOLHDLRATIO 3.1 04/10/2014     TSH   Date Value Ref Range Status   02/16/2017 3.39 0.30 - 5.00 uIU/mL Final     Last Basic Metabolic Panel:  Lab Results   Component Value Date     11/26/2018      Lab Results   Component Value Date    POTASSIUM 4.8 11/26/2018     Lab Results   Component Value Date    CHLORIDE 98 11/26/2018     Lab Results   Component Value Date    TENZIN 9.6 11/26/2018     Lab Results   Component Value Date    CO2 24 02/16/2017     Lab Results   Component Value Date    BUN 9 11/26/2018    BUN 9 11/26/2018     Lab Results   Component Value Date    CR 0.95 11/26/2018     Lab Results   Component Value Date     11/26/2018     Lab Results   Component Value Date    WBC 6.4 11/26/2018    WBC 7.4 02/09/2017     Lab Results   Component Value Date    RBC 4.82 11/26/2018    RBC 3.94 02/09/2017     Lab Results   Component Value Date    HGB 14.1 11/26/2018     Lab Results   Component Value Date    HCT 42.6 11/26/2018     Lab Results   Component Value Date    MCV 88.5 11/26/2018     Lab Results   Component Value Date    MCH 29.4 11/26/2018     Lab Results   Component  Value Date    MCHC 33.2 11/26/2018     Lab Results   Component Value Date    RDW 13.4 02/09/2017     Lab Results   Component Value Date     11/26/2018

## 2019-03-01 NOTE — LETTER
"3/1/2019    Mariela Stauffer MD  [address]  [facility] [address]    RE: Jose Lees       Dear Colleague,    I had the pleasure of seeing Jose Lees in the [facility].    Cardiology Consultation      Jose Lees MRN# [id number]   YOB: 1940 Age: 78 year old   Date of Visit 03/01/2019     Reason for consult:  Reestablish care           Assessment and Plan:     1. Ischemic cardiomyopathy, well compensated    No current symptoms.  Continue current medications.  Does not need to follow-up frequently if remains compensated.    Did discuss he would qualify for AICD at any time he would like to pursue this.  At this point, since he has no symptoms and has not had any identifiable ventricular tachycardia, can defer this for the future.    Routine follow-up in 2 years    This note was transcribed using electronic voice recognition software, typographical errors may be present.                Chief Complaint:   No chief complaint on file.           History of Present Illness:   This patient is a pleasant 78 year old male with history of anterior infarct and ischemic cardiomyopathy.  He has been extremely well compensated over the years.  He is on a good medical regimen.  He is on anticoagulation for presumptive LV apical embolic cerebral events.  He is tolerating that well.  He is here accompanied by his sister who is his main family support.    He denies any decompensated congestive heart failure or dyspnea on exertion.  Denies exertional chest discomfort.  Has had recent echocardiogram that has been stable.    He does not have a defibrillator.  He has not had any presyncope or syncope.    No exertional chest pain  No PND / orthopnea  No presyncope / syncope  No significant palpitations           Physical Exam:     Vitals: /62   Pulse 70   Ht 1.772 m (5' 9.75\")   Wt 80.2 kg (176 lb 11.2 oz)   BMI 25.54 kg/m     Constitutional:  " cooperative, alert and oriented, well developed, well nourished, in no acute distress        Skin:  warm and dry to the touch, no apparent skin lesions or masses noted        Head:  normocephalic        Eyes:  pupils equal and round, conjunctivae and lids unremarkable, sclera white, no xanthalasma, EOMS intact, no nystagmus        ENT:  no pallor or cyanosis, dentition good        Neck:  JVP normal        Chest:  normal breath sounds, clear to auscultation, normal A-P diameter, normal symmetry, normal respiratory excursion, no use of accessory muscles        Cardiac: regular rhythm;no murmurs, gallops or rubs detected                  Abdomen:      benign    Extremities and Back:  no edema;normal muscle strength and tone        Neurological:  no gross motor deficits;affect appropriate                    Past Medical History:   I have reviewed this patient's past medical history  Past Medical History:   Diagnosis Date     ACP (advance care planning)     Form given     Acute anterior wall MI (H) 4/7/2014     Autism disorder 6/29/2012     CHF (congestive heart failure) (H)     ECHO EF=25-30% on 4/7/14     Coronary artery disease 4/2014 4/2014 HEART CATH - 70% mid LAD -- BMS placed, small 1st diagonal 80%, RCA 60-70% before crux, 70% mid PDA     Dementia      Hyperlipidaemia      Hyperlipidaemia LDL goal < 100      Prostate cancer (H) 10/11    GLEASOR 8     Stricture and stenosis of esophagus 10/22/2015     Stroke, 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     Type II or unspecified type diabetes mellitus without mention of complication, not stated as uncontrolled     Diabetes mellitus             Past Surgical History:   I have reviewed this patient's past surgical history  Past Surgical History:   Procedure Laterality Date     CORONARY ANGIOGRAPHY ADULT ORDER       ESOPHAGOSCOPY, GASTROSCOPY, DUODENOSCOPY (EGD), COMBINED N/A 8/6/2015    Procedure: COMBINED ESOPHAGOSCOPY, GASTROSCOPY, DUODENOSCOPY  (EGD), REMOVE FOREIGN BODY;  Surgeon: Yu Tapia MD;  Location:  GI     HEART CATH, ANGIOPLASTY  4/14    BMS to LAD     PHACOEMULSIFICATION CLEAR CORNEA WITH STANDARD INTRAOCULAR LENS IMPLANT Right 12/4/2018    Procedure: COMPLEX RIGHT EYE PHACOEMULSIFICATION CLEAR CORNEA WITH STANDARD INTRAOCULAR LENS IMPLANT;  Surgeon: Kamar Kyle MD;  Location:  [facility]     PHACOEMULSIFICATION CLEAR CORNEA WITH STANDARD INTRAOCULAR LENS IMPLANT Left 12/19/2018    Procedure: COMPLEX LEFT EYE PHACOEMULSIFICATION CLEAR CORNEA WITH STANDARD INTRAOCULAR LENS IMPLANT;  Surgeon: Kamar Kyle MD;  Location: [facility]               Social History:   I have reviewed this patient's social history  Social History     Tobacco Use     Smoking status: Never Smoker     Smokeless tobacco: Never Used   Substance Use Topics     Alcohol use: No     Comment: never             Family History:   I have reviewed this patient's family history  Family History   Problem Relation Age of Onset     Cerebrovascular Disease Mother 92     C.A.D. Father 48             Allergies:   No Known Allergies          Medications:   I have reviewed this patient's current medications  Current Outpatient Medications   Medication Sig Dispense Refill     ACE/ARB/ARNI NOT PRESCRIBED, INTENTIONAL, ACE & ARB not prescribed due to Symptomatic hypotension not due to excessive diuresis       aspirin 81 MG chewable tablet Take 1 tablet (81 mg) by mouth daily 36 tablet 12     atorvastatin (LIPITOR) 20 MG tablet Take 1 tablet by mouth daily 90 tablet 3     bicalutamide (CASODEX) 50 MG tablet Take 1 tablet (50 mg) by mouth daily 90 tablet 3     Cholecalciferol (VITAMIN D) 2000 units CAPS Take 1 capsule by mouth daily       glimepiride (AMARYL) 1 MG tablet Take 2 tabs by mouth every am. 180 tablet 0     Leuprolide Acetate, 6 Month, (LUPRON DEPOT, 6-MONTH, IM)        metFORMIN (GLUCOPHAGE) 500 MG tablet TAKE 2 TABLETS TWICE DAILY WITH MEALS 360 tablet 1      metoprolol succinate ER (TOPROL-XL) 25 MG 24 hr tablet TAKE 1 TABLET EVERY DAY 90 tablet 3     omeprazole (PRILOSEC) 20 MG DR capsule Take 1 capsule (20 mg) by mouth daily 30 capsule 1     sertraline (ZOLOFT) 50 MG tablet TAKE 1 TABLET EVERY DAY 90 tablet 3     warfarin (COUMADIN) 2 MG tablet TAKE 1 AND 1/2 TABLETS EVERY  tablet 2               Review of Systems:     Review of Systems:  Skin:  Negative     Eyes:  Positive for glasses(reading only)  ENT:  Negative    Respiratory:  Negative    Cardiovascular:  Negative    Gastroenterology: Negative    Genitourinary:  Negative    Musculoskeletal:  Negative    Neurologic:  Positive for stroke  Psychiatric:  Negative    Heme/Lymph/Imm:  Negative    Endocrine:  Positive for diabetes                     Data:   All laboratory data reviewed  Lab Results   Component Value Date    CHOL 190 02/08/2019     Lab Results   Component Value Date    HDL 55 02/08/2019     Lab Results   Component Value Date    LDL 99 02/08/2019     Lab Results   Component Value Date    TRIG 182 02/08/2019     Lab Results   Component Value Date    CHOLHDLRATIO 3.1 04/10/2014     TSH   Date Value Ref Range Status   02/16/2017 3.39 0.30 - 5.00 uIU/mL Final     Last Basic Metabolic Panel:  Lab Results   Component Value Date     11/26/2018      Lab Results   Component Value Date    POTASSIUM 4.8 11/26/2018     Lab Results   Component Value Date    CHLORIDE 98 11/26/2018     Lab Results   Component Value Date    TENZIN 9.6 11/26/2018     Lab Results   Component Value Date    CO2 24 02/16/2017     Lab Results   Component Value Date    BUN 9 11/26/2018    BUN 9 11/26/2018     Lab Results   Component Value Date    CR 0.95 11/26/2018     Lab Results   Component Value Date     11/26/2018     Lab Results   Component Value Date    WBC 6.4 11/26/2018    WBC 7.4 02/09/2017     Lab Results   Component Value Date    RBC 4.82 11/26/2018    RBC 3.94 02/09/2017     Lab Results   Component Value Date     HGB 14.1 11/26/2018     Lab Results   Component Value Date    HCT 42.6 11/26/2018     Lab Results   Component Value Date    MCV 88.5 11/26/2018     Lab Results   Component Value Date    MCH 29.4 11/26/2018     Lab Results   Component Value Date    MCHC 33.2 11/26/2018     Lab Results   Component Value Date    RDW 13.4 02/09/2017     Lab Results   Component Value Date     11/26/2018       Thank you for allowing me to participate in the care of your patient.      Sincerely,     Jonny Dominguez MD     [facility]    cc:   Jermain Helm MD  [address] [place] [address]

## 2019-03-15 VITALS — WEIGHT: 176.4 LBS | BODY MASS INDEX: 25.49 KG/M2 | DIASTOLIC BLOOD PRESSURE: 70 MMHG | SYSTOLIC BLOOD PRESSURE: 124 MMHG

## 2019-03-15 DIAGNOSIS — Z79.01 LONG TERM CURRENT USE OF ANTICOAGULANT THERAPY: ICD-10-CM

## 2019-03-15 DIAGNOSIS — Z86.73 HISTORY OF TIA (TRANSIENT ISCHEMIC ATTACK) AND STROKE: ICD-10-CM

## 2019-03-15 LAB — INR PPP: 3.9 (ref 2–3)

## 2019-03-15 PROCEDURE — 99213 OFFICE O/P EST LOW 20 MIN: CPT | Performed by: FAMILY MEDICINE

## 2019-03-15 PROCEDURE — 36415 COLL VENOUS BLD VENIPUNCTURE: CPT | Performed by: FAMILY MEDICINE

## 2019-03-15 PROCEDURE — 85610 PROTHROMBIN TIME: CPT | Performed by: FAMILY MEDICINE

## 2019-03-15 NOTE — PROGRESS NOTES
Jose Lees is a 78 year old male here for an INR check.  Feeling pretty well, no signs of bleeding.    /70   Wt 80 kg (176 lb 6.4 oz)   BMI 25.49 kg/m       Todays and previous results are:    Lab Results   Component Value Date    INR 3.9 03/15/2019    INR 3.1 02/08/2019    INR 3.0 01/07/2019    INR 2.1 11/26/2018    INR 2.2 11/06/2018     ASSESSMENT:                                                    Took some extra pills by mistake.    Jose was seen today for inr followup.    Diagnoses and all orders for this visit:    Long term current use of anticoagulant therapy  -     Prothrombin - INR ([facility])    History of TIA (transient ischemic attack) and stroke  -     Prothrombin - INR ([facility])          PLAN:                                                    The Warfarin (Coumadin) dose will change to skip two days and then resume same as before     Recheck next INR in Follow up in 4 weeks.        Jermain Helm MD  [facility]

## 2019-03-15 NOTE — PATIENT INSTRUCTIONS
PLAN:                                                    The Warfarin (Coumadin) dose will change to skip two days and then resume same as before     Recheck next INR in Follow up in 4 weeks.        Jermain Helm MD  [facility]

## 2019-04-22 DIAGNOSIS — K21.9 GASTROESOPHAGEAL REFLUX DISEASE, ESOPHAGITIS PRESENCE NOT SPECIFIED: ICD-10-CM

## 2019-04-23 VITALS — DIASTOLIC BLOOD PRESSURE: 62 MMHG | SYSTOLIC BLOOD PRESSURE: 122 MMHG | WEIGHT: 174.6 LBS | BODY MASS INDEX: 25.23 KG/M2

## 2019-04-23 DIAGNOSIS — Z86.73 HISTORY OF TIA (TRANSIENT ISCHEMIC ATTACK) AND STROKE: ICD-10-CM

## 2019-04-23 DIAGNOSIS — Z79.01 LONG TERM CURRENT USE OF ANTICOAGULANT THERAPY: Primary | ICD-10-CM

## 2019-04-23 LAB — INR PPP: 3.4 (ref 2–3)

## 2019-04-23 PROCEDURE — 36415 COLL VENOUS BLD VENIPUNCTURE: CPT | Performed by: FAMILY MEDICINE

## 2019-04-23 PROCEDURE — 85610 PROTHROMBIN TIME: CPT | Performed by: FAMILY MEDICINE

## 2019-04-23 NOTE — PROGRESS NOTES
INR FOLLOW UP  Goal 2-3  DX TIA    Last visit  Date 3/15/19  INR = 3.9  Dosing they were taking 3 mg MF, 2 mg all other  Plan hold 2 doses, then return to 3 mg MF, 2 mg all other    Today  INR =3.4 HIGH  Plan: , 2 mg all days  F/u 1 week    No bleeding/bruising  No coumadin interfering foods.      Mariela Stauffer MD, [facility]

## 2019-05-01 VITALS
DIASTOLIC BLOOD PRESSURE: 80 MMHG | RESPIRATION RATE: 16 BRPM | WEIGHT: 176.2 LBS | SYSTOLIC BLOOD PRESSURE: 124 MMHG | BODY MASS INDEX: 25.46 KG/M2

## 2019-05-01 DIAGNOSIS — Z79.01 LONG TERM CURRENT USE OF ANTICOAGULANT THERAPY: ICD-10-CM

## 2019-05-01 DIAGNOSIS — Z86.73 HISTORY OF TIA (TRANSIENT ISCHEMIC ATTACK) AND STROKE: ICD-10-CM

## 2019-05-01 LAB — INR PPP: 3 (ref 2–3)

## 2019-05-01 PROCEDURE — 99213 OFFICE O/P EST LOW 20 MIN: CPT | Performed by: FAMILY MEDICINE

## 2019-05-01 PROCEDURE — 36415 COLL VENOUS BLD VENIPUNCTURE: CPT | Performed by: FAMILY MEDICINE

## 2019-05-01 PROCEDURE — 85610 PROTHROMBIN TIME: CPT | Performed by: FAMILY MEDICINE

## 2019-05-01 NOTE — PROGRESS NOTES
INR FOLLOW UP  Goal 2.0-3.0  DX TIA    Last visit  Date 4/23/19  INR = 3.4  Dosing they were taking Held 3/15 & 3/16. 3 mg M/F, 2 mg otherwise  Plan 2 mg daily    Today  INR =3.0  Plan: 2 mg daily  F/u 2-4 weeks    No bleeding/bruising  No coumadin interfering foods.      Mariela Stauffer MD, [facility]

## 2019-05-16 NOTE — PROGRESS NOTES
SUBJECTIVE:   Joes Lees is a 78 year old male who presents for Preventive Visit. Medicare wellness  White male  Here with sister Judith    Will visit Sister and brother in law  at [facility]. Bingo was canceled. Wednesday is Scrabble    PHQ=0  AJAY=0    Sleep ok  Appetite ok  Exercise walking with walker with seat and brakes  Thinking going to exercise class and pool. (for three years)  Computer use    Smoking no never  ETOH no  Street drugs/MJ no  Caffeine coffee/tea    Fasting today    Fall/Seizure/LOC no    Travel plans-no  Exposure no    Are you in the first 12 months of your Medicare coverage?  No    HPI  Do you feel safe in your environment? Yes    Do you have a Health Care Directive? Yes: Advance Directive has been received and scanned.    No new hearing issues identified today         Fall risk       Cognitive Screening   1) Repeat 3 items (Leader, Season, Table)    2) Clock draw: ABNORMAL Could not draw  3) 3 item recall: Recalls 1 object   Results: ABNORMAL clock, 1-2 items recalled: PROBABLE COGNITIVE IMPAIRMENT, **INFORM PROVIDER**    Mini-CogTM Copyright VANCE Angel. Licensed by the author for use in [facility]; reprinted with permission (soob@.[facility]). All rights reserved.      Do you have sleep apnea, excessive snoring or daytime drowsiness?: no    Reviewed and updated as needed this visit by clinical staff         Reviewed and updated as needed this visit by Provider        Social History     Tobacco Use     Smoking status: Never Smoker     Smokeless tobacco: Never Used   Substance Use Topics     Alcohol use: No     Comment: never     If you drink alcohol do you typically have >3 drinks per day or >7 drinks per week? No    No flowsheet data found.            Current providers sharing in care for this patient include:   Patient Care Team:  Mariela Stauffer MD as PCP - General (Family Practice)  Mariela Stauffer MD as Assigned PCP    The following health maintenance items are  reviewed in Epic and correct as of today:  Health Maintenance   Topic Date Due     MEDICARE ANNUAL WELLNESS VISIT  09/16/2012     EYE EXAM Q1 YEAR  04/01/2013     ZOSTER IMMUNIZATION (2 of 3) 04/28/2015     FOOT EXAM Q1 YEAR  02/18/2017     MICROALBUMIN Q1 YEAR  02/18/2017     PHQ-2  01/01/2019     HF ACTION PLAN Q3 YR  04/06/2019     FALL RISK ASSESSMENT  05/03/2019     BMP Q6 MOS  05/26/2019     A1C Q6 MO  08/08/2019     ALT Q1 YR  11/26/2019     CREATININE Q1 YEAR  11/26/2019     CBC Q1 YR  11/26/2019     LIPID MONITORING Q1 YEAR  02/08/2020     TSH W/ FREE T4 REFLEX Q2 YEAR  11/26/2020     DTAP/TDAP/TD IMMUNIZATION (2 - Td) 09/16/2021     ADVANCE DIRECTIVE PLANNING Q5 YRS  03/09/2023     INFLUENZA VACCINE  Completed     IPV IMMUNIZATION  Aged Out     MENINGITIS IMMUNIZATION  Aged Out     Lab work is in process  Labs reviewed in EPIC  BP Readings from Last 3 Encounters:   05/20/19 108/64   05/01/19 124/80   04/23/19 122/62    Wt Readings from Last 3 Encounters:   05/20/19 79.8 kg (176 lb)   05/01/19 79.9 kg (176 lb 3.2 oz)   04/23/19 79.2 kg (174 lb 9.6 oz)                  Patient Active Problem List   Diagnosis     Diabetes mellitus, type 2 (H)     Prostate cancer (H)     Autism disorder     ACP (advance care planning)     Health Care Home     History of MI (myocardial infarction)     History of stroke     CHF (congestive heart failure) (H)     Long term current use of anticoagulant therapy     Stricture and stenosis of esophagus     History of TIA (transient ischemic attack) and stroke     Influenza A     Physical deconditioning     Type 2 diabetes mellitus without complication, without long-term current use of insulin (H)     Past Surgical History:   Procedure Laterality Date     CORONARY ANGIOGRAPHY ADULT ORDER       ESOPHAGOSCOPY, GASTROSCOPY, DUODENOSCOPY (EGD), COMBINED N/A 8/6/2015    Procedure: COMBINED ESOPHAGOSCOPY, GASTROSCOPY, DUODENOSCOPY (EGD), REMOVE FOREIGN BODY;  Surgeon: Yu Tapia,  MD;  Location:  GI     HEART CATH, ANGIOPLASTY  4/14    BMS to LAD     PHACOEMULSIFICATION CLEAR CORNEA WITH STANDARD INTRAOCULAR LENS IMPLANT Right 12/4/2018    Procedure: COMPLEX RIGHT EYE PHACOEMULSIFICATION CLEAR CORNEA WITH STANDARD INTRAOCULAR LENS IMPLANT;  Surgeon: Kamar Kyle MD;  Location:  EC     PHACOEMULSIFICATION CLEAR CORNEA WITH STANDARD INTRAOCULAR LENS IMPLANT Left 12/19/2018    Procedure: COMPLEX LEFT EYE PHACOEMULSIFICATION CLEAR CORNEA WITH STANDARD INTRAOCULAR LENS IMPLANT;  Surgeon: Kamar Kyle MD;  Location: [facility]       Social History     Tobacco Use     Smoking status: Never Smoker     Smokeless tobacco: Never Used   Substance Use Topics     Alcohol use: No     Comment: never     Family History   Problem Relation Age of Onset     Cerebrovascular Disease Mother 92     C.A.D. Father 48         Current Outpatient Medications   Medication Sig Dispense Refill     ACE/ARB/ARNI NOT PRESCRIBED, INTENTIONAL, ACE & ARB not prescribed due to Symptomatic hypotension not due to excessive diuresis       aspirin 81 MG chewable tablet Take 1 tablet (81 mg) by mouth daily 36 tablet 12     atorvastatin (LIPITOR) 20 MG tablet Take 1 tablet by mouth daily 90 tablet 3     bicalutamide (CASODEX) 50 MG tablet Take 1 tablet (50 mg) by mouth daily 90 tablet 3     Cholecalciferol (VITAMIN D) 2000 units CAPS Take 1 capsule by mouth daily       glimepiride (AMARYL) 1 MG tablet Take 2 tabs by mouth every am. 180 tablet 0     Leuprolide Acetate, 6 Month, (LUPRON DEPOT, 6-MONTH, IM)        metFORMIN (GLUCOPHAGE) 500 MG tablet TAKE 2 TABLETS TWICE DAILY WITH MEALS 360 tablet 1     metoprolol succinate ER (TOPROL-XL) 25 MG 24 hr tablet TAKE 1 TABLET EVERY DAY 90 tablet 3     omeprazole (PRILOSEC) 20 MG DR capsule TAKE 1 CAPSULE (20 MG) BY MOUTH DAILY 90 capsule 3     sertraline (ZOLOFT) 50 MG tablet TAKE 1 TABLET EVERY DAY 90 tablet 3     warfarin (COUMADIN) 2 MG tablet TAKE 1 AND 1/2 TABLETS  "EVERY  tablet 2     No Known Allergies  Recent Labs   Lab Test 02/08/19  1030 11/26/18  1625 08/24/18  1137 02/23/18  1126  02/16/17 02/09/17  0641 02/07/17  2300  01/04/16  1124  06/03/12  0610   A1C 9.9* 12.6*  --  10.3*   < >  --   --   --    < >  --    < >  --    LDL 99 158* 117*  --    < >  --   --   --   --  65   < >  --    HDL 55 49 48  --    < >  --   --   --   --  51   < >  --    TRIG 182* 234* 250*  --    < >  --   --   --   --  117   < >  --    ALT  --  18  --   --   --   --   --  25  --  18   < >  --    CR  --  0.95  --   --   --  0.81 0.88 0.97   < > 0.93   < > 0.68   GFRESTIMATED  --   --   --   --   --  >60 84 75  --   --    < > >90   GFRESTBLACK  --   --   --   --   --  >60 >90   GFR Calc   >90   GFR Calc    --   --    < > >90   POTASSIUM  --  4.8  --   --   --  4.0 3.5 3.9   < > 4.0   < > 3.8   TSH  --   --   --   --   --  3.39  --   --   --   --   --  4.41    < > = values in this interval not displayed.          Review of Systems  Constitutional, HEENT, cardiovascular, pulmonary, GI, , musculoskeletal, neuro, skin, endocrine and psych systems are negative, except as otherwise noted.  Eye exam Dec/Emile no glasses since surgery  Dental UTD    OBJECTIVE:   /64   Pulse 59   Resp 16   Wt 79.8 kg (176 lb)   SpO2 97%   BMI 25.43 kg/m   Estimated body mass index is 25.46 kg/m  as calculated from the following:    Height as of 3/1/19: 1.772 m (5' 9.75\").    Weight as of 5/1/19: 79.9 kg (176 lb 3.2 oz).  Physical Exam  GENERAL: healthy, alert and no distress  EYES: Eyes grossly normal to inspection, PERRL and conjunctivae and sclerae normal  HENT: ear canals and TM's normal, nose and mouth without ulcers or lesions  NECK: no adenopathy, no asymmetry, masses, or scars and thyroid normal to palpation  RESP: lungs clear to auscultation - no rales, rhonchi or wheezes  CV: regular rate and rhythm, normal S1 S2, no S3 or S4, no murmur, click or rub, no peripheral " edema and peripheral pulses strong  ABDOMEN: soft, nontender, no hepatosplenomegaly, no masses and bowel sounds normal  Genital/rectal deferred by patient  MS: no gross musculoskeletal defects noted, no edema  SKIN: no suspicious lesions or rashes  NEURO: Normal strength and tone, mentation intact and speech normal  PSYCH: mentation appears normal, affect normal/bright  LYMPH: no cervical, supraclavicular, axillary, or inguinal adenopathy  DM foot exam monofilament testing ok. Thick nails, left one with black clot at base (ref to podiatry)    Diagnostic Test Results:  Results for orders placed or performed in visit on 05/01/19   Prothrombin - INR ([facility])   Result Value Ref Range    INR 3.0 2.0 - 3.0       ASSESSMENT / PLAN:       ICD-10-CM    1. Encounter for general adult medical examination with abnormal findings Z00.01    2. Type 2 diabetes mellitus without complication, without long-term current use of insulin (H) E11.9 Hemoglobin A1C ([facility])     Comp. Metabolic Panel (14) ([facility])     Microalbumin ([facility])     glimepiride (AMARYL) 1 MG tablet   3. Stricture and stenosis of esophagus K22.2    4. Congestive heart failure, unspecified HF chronicity, unspecified heart failure type (H) I50.9    5. Prostate cancer (H) C61 Vitamin D  25-Hydroxy ([facility])   6. Long term current use of anticoagulant therapy Z79.01 Prothrombin - INR ([facility])   7. Physical deconditioning R53.81    8. History of MI (myocardial infarction) I25.2    9. History of TIA (transient ischemic attack) and stroke Z86.73    10. Autism disorder F84.0    11. Type 2 diabetes mellitus with hyperglycemia, without long-term current use of insulin (H) E11.65    12. Chronic atrial fibrillation (H) I48.2 warfarin (COUMADIN) 2 MG tablet     CANCELED: TSH ([facility])     CANCELED: Thyroxine (T4) Free  Direct  S ([facility])   13. Encounter for medication refill Z76.0 metFORMIN (GLUCOPHAGE) 500 MG tablet   14. Hyperlipidemia LDL goal <70 E78.5 Lipid Panel ([facility])   15.  "Encounter for therapeutic drug monitoring Z51.81 Hemoglobin A1C ([facility])     Lipid Panel ([facility])     Vitamin D  25-Hydroxy ([facility])     PSA Serum ([facility])     CBC with Diff/Plt ([facility])     Comp. Metabolic Panel (14) ([facility])     CANCELED: TSH ([facility])     CANCELED: Thyroxine (T4) Free  Direct  S ([facility])   16. Vitamin D deficiency E55.9 Vitamin D  25-Hydroxy ([facility])       End of Life Planning:  Patient currently has an advanced directive: Yes.  Practitioner is supportive of decision.    COUNSELING:  Reviewed preventive health counseling, as reflected in patient instructions       Regular exercise       Healthy diet/nutrition       Immunizations    Reviewed and advised          Estimated body mass index is 25.43 kg/m  as calculated from the following:    Height as of 3/1/19: 1.772 m (5' 9.75\").    Weight as of this encounter: 79.8 kg (176 lb).         reports that he has never smoked. He has never used smokeless tobacco.      Appropriate preventive services were discussed with this patient, including applicable screening as appropriate for cardiovascular disease, diabetes, osteopenia/osteoporosis, and glaucoma.  As appropriate for age/gender, discussed screening for colorectal cancer, prostate cancer, breast cancer, and cervical cancer. Checklist reviewing preventive services available has been given to the patient.    Reviewed patients plan of care and provided an AVS. The Basic Care Plan (routine screening as documented in Health Maintenance) for Jose meets the Care Plan requirement. This Care Plan has been established and reviewed with the Patient.    INR  Goal 2-3  Diagnosis TIA    Last visit  INR 3   What they were taking 2 mg daily (14 mg/week)  What it was changed to same  F/u 3-4 weeks    Today's  INR  3.6  He took 2 mg today Monday  Plan Skip Tuesday Wednesday, then Thur-Sun 2 mg (10 mg/week)  F/u one week next Monday    No bleeding/bruising  No coumadin interfering foods    Mariela Stauffer " MD [facility]    ASSESSMENT / PLAN:  (Z00.01) Encounter for general adult medical examination with abnormal findings  (primary encounter diagnosis)  Comment:   Plan: f/u one year    (E11.9) Type 2 diabetes mellitus without complication, without long-term current use of insulin (H)  Comment:   Lab Results   Component Value Date    A1C 9.9 02/08/2019    A1C 12.6 11/26/2018    A1C 10.3 02/23/2018    A1C 8.2 07/25/2017    A1C 7.6 01/25/2017       Plan: Hemoglobin A1C ([facility]), Comp. Metabolic Panel        (14) ([facility]), Microalbumin ([facility]), glimepiride        (AMARYL) 1 MG tablet, OFFICE/OUTPT         VISIT,EST,LEVL III            (K22.2) Stricture and stenosis of esophagus  Comment: per GI  Plan: OFFICE/OUTPT VISIT,EST,LEVL III            (I50.9) Congestive heart failure, unspecified HF chronicity, unspecified heart failure type (H)  Comment: stable   Plan: OFFICE/OUTPT VISIT,EST,LEVL III        Per cardiology    (C61) Prostate cancer (H)  Comment: recheck lab today  Continue cares OFFICE/OUTPT         VISIT,EST,LEVL III        Per oncology/urology    (Z79.01) Long term current use of anticoagulant therapy  Comment:   Plan: Prothrombin - INR ([facility]), OFFICE/OUTPT         VISIT,EST,LEVL III            (R53.81) Physical deconditioning  Comment:   Plan: OFFICE/OUTPT VISIT,EST,LEVL III            (I25.2) History of MI (myocardial infarction)  Comment: no CP today  Plan: OFFICE/OUTPT VISIT,EST,LEVL III        Per cardiology    (Z86.73) History of TIA (transient ischemic attack) and stroke  Comment: stable  Plan: OFFICE/OUTPT VISIT,EST,LEVL III        Per neurology    (F84.0) Autism disorder  Comment: stable  Plan: OFFICE/OUTPT VISIT,EST,LEVL III        Continue care    (E11.65) Type 2 diabetes mellitus with hyperglycemia, without long-term current use of insulin (H)  Comment:   Lab Results   Component Value Date    A1C 9.9 02/08/2019    A1C 12.6 11/26/2018    A1C 10.3 02/23/2018    A1C 8.2 07/25/2017    A1C 7.6 01/25/2017        Plan: OFFICE/OUTPT VISIT,EST,LEVL III        Continue cares. Recheck lab     (I48.2) Chronic atrial fibrillation (H)  Comment: stable  Plan: warfarin (COUMADIN) 2 MG tablet, OFFICE/OUTPT         VISIT,EST,LEVL III, CANCELED: TSH ([facility]),         CANCELED: Thyroxine (T4) Free  Direct  S         ([facility])        Per cardiology    (Z76.0) Encounter for medication refill  Comment:   Plan: metFORMIN (GLUCOPHAGE) 500 MG tablet,         OFFICE/OUTPT VISIT,EST,LEVL III            (E78.5) Hyperlipidemia LDL goal <70  Comment:   LDL Cholesterol Calculated   Date Value Ref Range Status   02/08/2019 99 0 - 99 mg/dL Final       Plan: Lipid Panel ([facility]), OFFICE/OUTPT         VISIT,EST,LEVL III            (Z51.81) Encounter for therapeutic drug monitoring  Comment:   Plan: Hemoglobin A1C ([facility]), Lipid Panel         ([facility]), Vitamin D  25-Hydroxy ([facility]), PSA        Serum ([facility]), CBC with Diff/Plt ([facility]), Comp.        Metabolic Panel (14) ([facility]), OFFICE/OUTPT         VISIT,EST,LEVL III, CANCELED: TSH ([facility]),         CANCELED: Thyroxine (T4) Free  Direct  S         ([facility])            (E55.9) Vitamin D deficiency  Comment:   Plan: Vitamin D  25-Hydroxy ([facility]), OFFICE/OUTPT         VISIT,EST,LEVL III                Counseling Resources:  ATP IV Guidelines  Pooled Cohorts Equation Calculator  Breast Cancer Risk Calculator  FRAX Risk Assessment  ICSI Preventive Guidelines  Dietary Guidelines for Americans, 2010  USDA's MyPlate  ASA Prophylaxis  Lung CA Screening    Mariela Stauffer MD  [facility]

## 2019-05-20 ENCOUNTER — OFFICE VISIT (OUTPATIENT)
Dept: FAMILY MEDICINE | Facility: CLINIC | Age: 79
End: 2019-05-20

## 2019-05-20 VITALS
HEART RATE: 59 BPM | SYSTOLIC BLOOD PRESSURE: 108 MMHG | DIASTOLIC BLOOD PRESSURE: 64 MMHG | BODY MASS INDEX: 25.43 KG/M2 | WEIGHT: 176 LBS | OXYGEN SATURATION: 97 % | RESPIRATION RATE: 16 BRPM

## 2019-05-20 DIAGNOSIS — C61 PROSTATE CANCER (H): ICD-10-CM

## 2019-05-20 DIAGNOSIS — I50.9 CONGESTIVE HEART FAILURE, UNSPECIFIED HF CHRONICITY, UNSPECIFIED HEART FAILURE TYPE (H): ICD-10-CM

## 2019-05-20 DIAGNOSIS — Z51.81 ENCOUNTER FOR THERAPEUTIC DRUG MONITORING: ICD-10-CM

## 2019-05-20 DIAGNOSIS — I25.2 HISTORY OF MI (MYOCARDIAL INFARCTION): ICD-10-CM

## 2019-05-20 DIAGNOSIS — I48.20 CHRONIC ATRIAL FIBRILLATION (H): ICD-10-CM

## 2019-05-20 DIAGNOSIS — Z76.0 ENCOUNTER FOR MEDICATION REFILL: ICD-10-CM

## 2019-05-20 DIAGNOSIS — Z79.01 LONG TERM CURRENT USE OF ANTICOAGULANT THERAPY: ICD-10-CM

## 2019-05-20 DIAGNOSIS — F84.0 AUTISM DISORDER: ICD-10-CM

## 2019-05-20 DIAGNOSIS — E55.9 VITAMIN D DEFICIENCY: ICD-10-CM

## 2019-05-20 DIAGNOSIS — E11.65 TYPE 2 DIABETES MELLITUS WITH HYPERGLYCEMIA, WITHOUT LONG-TERM CURRENT USE OF INSULIN (H): ICD-10-CM

## 2019-05-20 DIAGNOSIS — Z00.01 ENCOUNTER FOR GENERAL ADULT MEDICAL EXAMINATION WITH ABNORMAL FINDINGS: Primary | ICD-10-CM

## 2019-05-20 DIAGNOSIS — Z86.73 HISTORY OF TIA (TRANSIENT ISCHEMIC ATTACK) AND STROKE: ICD-10-CM

## 2019-05-20 DIAGNOSIS — E11.9 TYPE 2 DIABETES MELLITUS WITHOUT COMPLICATION, WITHOUT LONG-TERM CURRENT USE OF INSULIN (H): ICD-10-CM

## 2019-05-20 DIAGNOSIS — R53.81 PHYSICAL DECONDITIONING: ICD-10-CM

## 2019-05-20 DIAGNOSIS — E78.5 HYPERLIPIDEMIA LDL GOAL <70: ICD-10-CM

## 2019-05-20 DIAGNOSIS — K22.2 STRICTURE AND STENOSIS OF ESOPHAGUS: ICD-10-CM

## 2019-05-20 LAB
% GRANULOCYTES: 72.7 % (ref 42.2–75.2)
HCT VFR BLD AUTO: 43.8 % (ref 39–51)
HEMOGLOBIN: 14.6 G/DL (ref 13.4–17.5)
INR PPP: 3.6 (ref 2–3)
LYMPHOCYTES NFR BLD AUTO: 21.5 % (ref 20.5–51.1)
MCH RBC QN AUTO: 29.4 PG (ref 27–31)
MCHC RBC AUTO-ENTMCNC: 33.4 G/DL (ref 33–37)
MCV RBC AUTO: 87.8 FL (ref 80–100)
MONOCYTES NFR BLD AUTO: 5.8 % (ref 1.7–9.3)
PLATELET # BLD AUTO: 170 K/UL (ref 140–450)
RBC # BLD AUTO: 4.98 X10/CMM (ref 4.2–5.9)
WBC # BLD AUTO: 6.7 X10/CMM (ref 3.8–11)

## 2019-05-20 PROCEDURE — 85025 COMPLETE CBC W/AUTO DIFF WBC: CPT | Performed by: FAMILY MEDICINE

## 2019-05-20 PROCEDURE — 85610 PROTHROMBIN TIME: CPT | Performed by: FAMILY MEDICINE

## 2019-05-20 PROCEDURE — 82044 UR ALBUMIN SEMIQUANTITATIVE: CPT | Performed by: FAMILY MEDICINE

## 2019-05-20 PROCEDURE — 99213 OFFICE O/P EST LOW 20 MIN: CPT | Mod: 25 | Performed by: FAMILY MEDICINE

## 2019-05-20 PROCEDURE — 36415 COLL VENOUS BLD VENIPUNCTURE: CPT | Performed by: FAMILY MEDICINE

## 2019-05-20 PROCEDURE — 82570 ASSAY OF URINE CREATININE: CPT | Performed by: FAMILY MEDICINE

## 2019-05-20 PROCEDURE — G0439 PPPS, SUBSEQ VISIT: HCPCS | Performed by: FAMILY MEDICINE

## 2019-05-20 RX ORDER — WARFARIN SODIUM 2 MG/1
2 TABLET ORAL DAILY
Qty: 180 TABLET | Refills: 1 | Status: SHIPPED | OUTPATIENT
Start: 2019-05-20 | End: 2020-01-20

## 2019-05-20 RX ORDER — GLIMEPIRIDE 1 MG/1
TABLET ORAL
Qty: 180 TABLET | Refills: 3 | Status: ON HOLD | OUTPATIENT
Start: 2019-05-20 | End: 2021-01-29

## 2019-05-20 ASSESSMENT — PATIENT HEALTH QUESTIONNAIRE - PHQ9
5. POOR APPETITE OR OVEREATING: NOT AT ALL
SUM OF ALL RESPONSES TO PHQ QUESTIONS 1-9: 0

## 2019-05-20 ASSESSMENT — ANXIETY QUESTIONNAIRES
6. BECOMING EASILY ANNOYED OR IRRITABLE: NOT AT ALL
IF YOU CHECKED OFF ANY PROBLEMS ON THIS QUESTIONNAIRE, HOW DIFFICULT HAVE THESE PROBLEMS MADE IT FOR YOU TO DO YOUR WORK, TAKE CARE OF THINGS AT HOME, OR GET ALONG WITH OTHER PEOPLE: NOT DIFFICULT AT ALL
5. BEING SO RESTLESS THAT IT IS HARD TO SIT STILL: NOT AT ALL
3. WORRYING TOO MUCH ABOUT DIFFERENT THINGS: NOT AT ALL
7. FEELING AFRAID AS IF SOMETHING AWFUL MIGHT HAPPEN: NOT AT ALL
2. NOT BEING ABLE TO STOP OR CONTROL WORRYING: NOT AT ALL
GAD7 TOTAL SCORE: 0
1. FEELING NERVOUS, ANXIOUS, OR ON EDGE: NOT AT ALL

## 2019-05-20 NOTE — PATIENT INSTRUCTIONS
Shingrix vaccine. Check with insurance for coverage, then if desired get on the list at the pharmacy.    Labs today    Refills today    INR Today's  INR  3.6  He took 2 mg today Monday  Plan Skip Tuesday Wednesday, then Thur-Sun 2 mg (10 mg/week)  F/u one week next Monday  Patient Education   Personalized Prevention Plan  You are due for the preventive services outlined below.  Your care team is available to assist you in scheduling these services.  If you have already completed any of these items, please share that information with your care team to update in your medical record.  Health Maintenance Due   Topic Date Due     A1C Lab  1940     Liver Monitoring Lab  1940     Basic Metabolic Panel  1940     Heart Failure Action Plan  1940     Thyroid Function Lab  1940     Complete Blood Count  1940     Diabetic Foot Exam  1940     Eye Exam  1940     Annual Wellness Visit  09/16/2012     Zoster (Shingles) Vaccine (2 of 3) 04/28/2015     PHQ-2  01/01/2019     FALL RISK ASSESSMENT  05/03/2019

## 2019-05-21 LAB
ALBUMIN SERPL-MCNC: 4 G/DL (ref 3.5–4.8)
ALBUMIN/GLOB SERPL: 1.5 {RATIO} (ref 1.2–2.2)
ALP SERPL-CCNC: 69 IU/L (ref 39–117)
ALT SERPL-CCNC: 21 IU/L (ref 0–44)
AST SERPL-CCNC: 18 IU/L (ref 0–40)
BILIRUB SERPL-MCNC: 0.7 MG/DL (ref 0–1.2)
BUN SERPL-MCNC: 10 MG/DL (ref 8–27)
BUN/CREATININE RATIO: 11 (ref 10–24)
CALCIUM SERPL-MCNC: 9.2 MG/DL (ref 8.6–10.2)
CHLORIDE SERPLBLD-SCNC: 100 MMOL/L (ref 96–106)
CHOLEST SERPL-MCNC: 234 MG/DL (ref 100–199)
CREAT SERPL-MCNC: 0.89 MG/DL (ref 0.76–1.27)
EGFR IF AFRICN AM: 95 ML/MIN/1.73
EGFR IF NONAFRICN AM: 82 ML/MIN/1.73
GLOBULIN, TOTAL: 2.7 G/DL (ref 1.5–4.5)
GLUCOSE SERPL-MCNC: 306 MG/DL (ref 65–99)
HBA1C MFR BLD: 11.6 % (ref 4.8–5.6)
HDLC SERPL-MCNC: 46 MG/DL
LDL/HDL RATIO: 3.1 RATIO (ref 0–3.6)
LDLC SERPL CALC-MCNC: 143 MG/DL (ref 0–99)
POTASSIUM SERPL-SCNC: 3.9 MMOL/L (ref 3.5–5.2)
PROT SERPL-MCNC: 6.7 G/DL (ref 6–8.5)
PSA NG/ML: 23.4 NG/ML (ref 0–4)
SODIUM SERPL-SCNC: 137 MMOL/L (ref 134–144)
TOTAL CO2: 21 MMOL/L (ref 20–29)
TRIGL SERPL-MCNC: 227 MG/DL (ref 0–149)
VITAMIN D, 25-HYDROXY: 18.1 NG/ML (ref 30–100)
VLDLC SERPL CALC-MCNC: 45 MG/DL (ref 5–40)

## 2019-05-21 ASSESSMENT — ANXIETY QUESTIONNAIRES: GAD7 TOTAL SCORE: 0

## 2019-05-22 LAB
A/C RATIO MG/G: ABNORMAL MG/G
ALBUMIN (URINE) MG/L: 80 MG/L
INTERPRETATION: ABNORMAL
URINE CREATININE MG/DL - QUEST: 300 MG/DL

## 2019-05-22 NOTE — RESULT ENCOUNTER NOTE
INR was elevated  CBC was normal  Lipids were high and need work  Vitamin D was low. Take an over the counter supplement 3000 international unit(s) daily  PSA VERY ELEVATED CONSIDER SEEING UROLOGY  HIGH SUGAR, otherwise CMP was normal  Lab Results       Component                Value               Date                       A1C                      11.6                05/20/2019         VERY VERY HIGH DIABETES NEEDS WORK        A1C                      9.9                 02/08/2019                 A1C                      12.6                11/26/2018                 A1C                      10.3                02/23/2018                 A1C                      8.2                 07/25/2017       Microalbumin is abnormal

## 2019-05-24 ENCOUNTER — TELEPHONE (OUTPATIENT)
Dept: FAMILY MEDICINE | Facility: CLINIC | Age: 79
End: 2019-05-24

## 2019-05-24 DIAGNOSIS — E78.5 HYPERLIPIDEMIA LDL GOAL <70: Primary | ICD-10-CM

## 2019-05-24 DIAGNOSIS — Z76.0 ENCOUNTER FOR MEDICATION REFILL: ICD-10-CM

## 2019-05-24 RX ORDER — ATORVASTATIN CALCIUM 40 MG/1
TABLET, FILM COATED ORAL
Qty: 90 TABLET | Refills: 3 | Status: SHIPPED | OUTPATIENT
Start: 2019-05-24 | End: 2021-02-19

## 2019-05-24 NOTE — TELEPHONE ENCOUNTER
Spoke with patient sister Mary and discussed results per Dr Stauffer. Patient will increase atorvastatin to 40mg-prescription for this dose sent to mail order pharmacy. Future lab orders entered for recheck lipids. Patient will f/u with urology in regard to elevated PSA. Advised to increase Vitamin D by 3000IU daily. Patient and family will discuss endo referral vs MTM at [facility] and will call with decision. Tish Horn

## 2019-05-24 NOTE — TELEPHONE ENCOUNTER
----- Message from Jennifer Gonzalez RPH sent at 5/22/2019  8:35 PM CDT -----  This patient is private pay to see me now and is not likely going to be able to make that work, so we will need to try to see if we can refer him to endo or another option to help him get his sugars under better control.     Can you help with this? Thank you!      ----- Message -----  From: Mariela Stauffer MD  Sent: 5/22/2019   4:02 PM  To: Jennifer Gonzalez RPH, [facility] Triage    INR was elevated  CBC was normal  Lipids were high and need work  Vitamin D was low. Take an over the counter supplement 3000 international unit(s) daily  PSA VERY ELEVATED CONSIDER SEEING UROLOGY  HIGH SUGAR, otherwise CMP was normal  Lab Results       Component                Value               Date                       A1C                      11.6                05/20/2019         VERY VERY HIGH DIABETES NEEDS WORK        A1C                      9.9                 02/08/2019                 A1C                      12.6                11/26/2018                 A1C                      10.3                02/23/2018                 A1C                      8.2                 07/25/2017       Microalbumin is abnormal

## 2019-05-28 VITALS — DIASTOLIC BLOOD PRESSURE: 62 MMHG | WEIGHT: 175.2 LBS | BODY MASS INDEX: 25.32 KG/M2 | SYSTOLIC BLOOD PRESSURE: 122 MMHG

## 2019-05-28 DIAGNOSIS — Z86.73 HISTORY OF TIA (TRANSIENT ISCHEMIC ATTACK) AND STROKE: Primary | ICD-10-CM

## 2019-05-28 PROBLEM — J10.1 INFLUENZA A: Status: RESOLVED | Noted: 2017-02-08 | Resolved: 2019-05-28

## 2019-05-28 PROBLEM — I48.20 CHRONIC ATRIAL FIBRILLATION (H): Status: RESOLVED | Noted: 2019-05-20 | Resolved: 2019-05-28

## 2019-05-28 LAB — INR PPP: 2.8 (ref 2–3)

## 2019-05-28 PROCEDURE — 99213 OFFICE O/P EST LOW 20 MIN: CPT | Performed by: FAMILY MEDICINE

## 2019-05-28 PROCEDURE — 85610 PROTHROMBIN TIME: CPT | Performed by: FAMILY MEDICINE

## 2019-05-28 PROCEDURE — 36415 COLL VENOUS BLD VENIPUNCTURE: CPT | Performed by: FAMILY MEDICINE

## 2019-05-29 NOTE — PROGRESS NOTES
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   No particular complaints today    ROS otherwise negative including sleep, neuro, CV, skin or GI    GENERAL: healthy, alert and no distress  EYES: Eyes grossly normal to inspection, PERRL and conjunctivae and sclerae normal  RESP: lungs clear to auscultation - no rales, rhonchi or wheezes  CV: regular rate and rhythm, normal S1 S2,    ASSESSMENT:  1. History of TIA (transient ischemic attack) and stroke  See flowsheet  - Prothrombin - INR ([facility])    Greater than 15 minutes spent with patient and family discussing risks and benefits and management with possible outcomes regarding this issue with greater than 50% in counseling for medical decision making and coordination of care.

## 2019-06-13 VITALS
RESPIRATION RATE: 16 BRPM | BODY MASS INDEX: 25.9 KG/M2 | DIASTOLIC BLOOD PRESSURE: 64 MMHG | SYSTOLIC BLOOD PRESSURE: 112 MMHG | WEIGHT: 179.25 LBS

## 2019-06-13 DIAGNOSIS — B35.1 ONYCHOMYCOSIS: Primary | ICD-10-CM

## 2019-06-13 DIAGNOSIS — Z86.73 HISTORY OF TIA (TRANSIENT ISCHEMIC ATTACK) AND STROKE: ICD-10-CM

## 2019-06-13 DIAGNOSIS — Z79.01 LONG TERM CURRENT USE OF ANTICOAGULANT THERAPY: ICD-10-CM

## 2019-06-13 LAB — INR PPP: 2.2 (ref 2–3)

## 2019-06-13 PROCEDURE — 36415 COLL VENOUS BLD VENIPUNCTURE: CPT | Performed by: FAMILY MEDICINE

## 2019-06-13 PROCEDURE — 85610 PROTHROMBIN TIME: CPT | Performed by: FAMILY MEDICINE

## 2019-06-13 PROCEDURE — 99213 OFFICE O/P EST LOW 20 MIN: CPT | Performed by: FAMILY MEDICINE

## 2019-06-13 NOTE — PROGRESS NOTES
Deshawn is here today in follow pu for his anticoagulation.  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 Long term current use of anticoagulant therapy  cw current dose  See back in two weeks  - Prothrombin - INR ([facility])    2. History of TIA (transient ischemic attack) and stroke  As above  - Prothrombin - INR ([facility])     3. Onychomycosis  Reassure   Local cares discussed   See podiatry if becomes painful    4. Dm2  Reviewed most recent a1c    Will have pharmacy see and work with management

## 2019-07-02 VITALS
WEIGHT: 179.2 LBS | DIASTOLIC BLOOD PRESSURE: 72 MMHG | SYSTOLIC BLOOD PRESSURE: 118 MMHG | BODY MASS INDEX: 25.9 KG/M2 | RESPIRATION RATE: 16 BRPM

## 2019-07-02 DIAGNOSIS — Z86.73 HISTORY OF TIA (TRANSIENT ISCHEMIC ATTACK) AND STROKE: ICD-10-CM

## 2019-07-02 DIAGNOSIS — Z79.01 LONG TERM CURRENT USE OF ANTICOAGULANT THERAPY: ICD-10-CM

## 2019-07-02 LAB — INR PPP: 1.5 (ref 2–3)

## 2019-07-02 PROCEDURE — 85610 PROTHROMBIN TIME: CPT | Performed by: FAMILY MEDICINE

## 2019-07-02 PROCEDURE — 36415 COLL VENOUS BLD VENIPUNCTURE: CPT | Performed by: FAMILY MEDICINE

## 2019-07-11 VITALS — BODY MASS INDEX: 25.43 KG/M2 | RESPIRATION RATE: 16 BRPM | WEIGHT: 176 LBS

## 2019-07-11 DIAGNOSIS — Z79.01 LONG TERM CURRENT USE OF ANTICOAGULANT THERAPY: ICD-10-CM

## 2019-07-11 DIAGNOSIS — Z86.73 HISTORY OF TIA (TRANSIENT ISCHEMIC ATTACK) AND STROKE: ICD-10-CM

## 2019-07-11 DIAGNOSIS — E11.9 TYPE 2 DIABETES MELLITUS WITHOUT COMPLICATION, WITHOUT LONG-TERM CURRENT USE OF INSULIN (H): Primary | ICD-10-CM

## 2019-07-11 LAB — INR PPP: 1.8 (ref 2–3)

## 2019-07-11 PROCEDURE — 85610 PROTHROMBIN TIME: CPT | Performed by: FAMILY MEDICINE

## 2019-07-11 PROCEDURE — 36415 COLL VENOUS BLD VENIPUNCTURE: CPT | Performed by: FAMILY MEDICINE

## 2019-07-25 VITALS
RESPIRATION RATE: 15 BRPM | BODY MASS INDEX: 25.72 KG/M2 | WEIGHT: 178 LBS | DIASTOLIC BLOOD PRESSURE: 74 MMHG | SYSTOLIC BLOOD PRESSURE: 106 MMHG

## 2019-07-25 DIAGNOSIS — Z86.73 HISTORY OF TIA (TRANSIENT ISCHEMIC ATTACK) AND STROKE: ICD-10-CM

## 2019-07-25 DIAGNOSIS — Z79.01 LONG TERM CURRENT USE OF ANTICOAGULANT THERAPY: ICD-10-CM

## 2019-07-25 LAB — INR PPP: 2.8 (ref 2–3)

## 2019-07-25 PROCEDURE — 36415 COLL VENOUS BLD VENIPUNCTURE: CPT | Performed by: FAMILY MEDICINE

## 2019-07-25 PROCEDURE — 99212 OFFICE O/P EST SF 10 MIN: CPT | Performed by: FAMILY MEDICINE

## 2019-07-25 PROCEDURE — 85610 PROTHROMBIN TIME: CPT | Performed by: FAMILY MEDICINE

## 2019-07-25 NOTE — PROGRESS NOTES
Problem(s) Oriented visit        SUBJECTIVE:                                                    Jose Lees is a 78 year old male who presents to clinic today for recheck of INR. He does not take coumadin on Thursdays and takes 2 mg all other days. No bruising or bleeding.       Problem list, Medication list, Allergies, and Medical/Social/Surgical histories reviewed in [facility] and updated as appropriate.   Additional history: as documented    ROS:  5 point ROS completed and negative except noted above, including Gen, CV, Resp, GI, MS      Histories:   Patient Active Problem List   Diagnosis     Prostate cancer (H)     Autism disorder     ACP (advance care planning)     Health Care Home     History of MI (myocardial infarction)     History of stroke     CHF (congestive heart failure) (H)     Long term current use of anticoagulant therapy     Stricture and stenosis of esophagus     History of TIA (transient ischemic attack) and stroke     Physical deconditioning     Type 2 diabetes mellitus without complication, without long-term current use of insulin (H)     Past Surgical History:   Procedure Laterality Date     CORONARY ANGIOGRAPHY ADULT ORDER       ESOPHAGOSCOPY, GASTROSCOPY, DUODENOSCOPY (EGD), COMBINED N/A 8/6/2015    Procedure: COMBINED ESOPHAGOSCOPY, GASTROSCOPY, DUODENOSCOPY (EGD), REMOVE FOREIGN BODY;  Surgeon: Yu Tapia MD;  Location:  GI     HEART CATH, ANGIOPLASTY  4/14    BMS to LAD     PHACOEMULSIFICATION CLEAR CORNEA WITH STANDARD INTRAOCULAR LENS IMPLANT Right 12/4/2018    Procedure: COMPLEX RIGHT EYE PHACOEMULSIFICATION CLEAR CORNEA WITH STANDARD INTRAOCULAR LENS IMPLANT;  Surgeon: Kamar Kyle MD;  Location: [facility]     PHACOEMULSIFICATION CLEAR CORNEA WITH STANDARD INTRAOCULAR LENS IMPLANT Left 12/19/2018    Procedure: COMPLEX LEFT EYE PHACOEMULSIFICATION CLEAR CORNEA WITH STANDARD INTRAOCULAR LENS IMPLANT;  Surgeon: Kamar Kyle MD;  Location: [facility]        Social History     Tobacco Use     Smoking status: Never Smoker     Smokeless tobacco: Never Used   Substance Use Topics     Alcohol use: No     Comment: never     Family History   Problem Relation Age of Onset     Cerebrovascular Disease Mother 92     C.A.D. Father 48           OBJECTIVE:                                                    /74   Resp 15   Wt 80.7 kg (178 lb)   BMI 25.72 kg/m    Body mass index is 25.72 kg/m .   GENERAL APPEARANCE: Alert, no acute distress  NEURO: Alert, oriented, speech and mentation normal  PSYCH: mentation appears normal, affect and mood normal   Labs Resulted Today:   Results for orders placed or performed in visit on 07/25/19   Prothrombin - INR ([facility])   Result Value Ref Range    INR 2.8 2.0 - 3.0     ASSESSMENT/PLAN:                                                        Jose was seen today for inr followup.    Diagnoses and all orders for this visit:    Long term current use of anticoagulant therapy  -     Prothrombin - INR ([facility])    History of TIA (transient ischemic attack) and stroke  -     Prothrombin - INR ([facility])        Patient Instructions   Please continue Coumadin 2 mg all days except Thursday. Recheck in 4 weeks.      The following health maintenance items are reviewed in Epic and correct as of today:  Health Maintenance   Topic Date Due     EYE EXAM  04/01/2013     ZOSTER IMMUNIZATION (2 of 3) 04/28/2015     DIABETIC FOOT EXAM  02/18/2017     HF ACTION PLAN  04/06/2019     FALL RISK ASSESSMENT  05/03/2019     INFLUENZA VACCINE (1) 09/01/2019     A1C  11/20/2019     BMP  11/20/2019     MEDICARE ANNUAL WELLNESS VISIT  05/20/2020     ALT  05/20/2020     LIPID  05/20/2020     MICROALBUMIN  05/20/2020     CBC  05/20/2020     TSH W/FREE T4 REFLEX  11/26/2020     DTAP/TDAP/TD IMMUNIZATION (2 - Td) 09/16/2021     ADVANCE CARE PLANNING  03/09/2023     PHQ-2  Completed     IPV IMMUNIZATION  Aged Out     MENINGITIS IMMUNIZATION  Aged Out       Arlene Mcdermott,  MD  [facility]  [phone]    For any issues my office # is [phone]

## 2019-08-06 ENCOUNTER — TRANSFERRED RECORDS (OUTPATIENT)
Dept: FAMILY MEDICINE | Facility: CLINIC | Age: 79
End: 2019-08-06

## 2019-08-22 ENCOUNTER — MEDICAL CORRESPONDENCE (OUTPATIENT)
Dept: HEALTH INFORMATION MANAGEMENT | Facility: CLINIC | Age: 79
End: 2019-08-22

## 2019-08-24 DIAGNOSIS — C61 PROSTATE CANCER (H): ICD-10-CM

## 2019-08-24 LAB
CREAT SERPL-MCNC: 0.93 MG/DL (ref 0.66–1.25)
GFR SERPL CREATININE-BSD FRML MDRD: 78 ML/MIN/{1.73_M2}

## 2019-08-24 PROCEDURE — 82565 ASSAY OF CREATININE: CPT | Performed by: INTERNAL MEDICINE

## 2019-08-24 PROCEDURE — 36415 COLL VENOUS BLD VENIPUNCTURE: CPT | Performed by: INTERNAL MEDICINE

## 2019-08-27 VITALS — WEIGHT: 175.2 LBS | DIASTOLIC BLOOD PRESSURE: 60 MMHG | SYSTOLIC BLOOD PRESSURE: 102 MMHG | BODY MASS INDEX: 25.32 KG/M2

## 2019-08-27 DIAGNOSIS — Z86.73 HISTORY OF TIA (TRANSIENT ISCHEMIC ATTACK) AND STROKE: ICD-10-CM

## 2019-08-27 DIAGNOSIS — Z79.01 LONG TERM CURRENT USE OF ANTICOAGULANT THERAPY: Primary | ICD-10-CM

## 2019-08-27 LAB — INR PPP: 1.8 (ref 2–3)

## 2019-08-27 PROCEDURE — 36415 COLL VENOUS BLD VENIPUNCTURE: CPT | Performed by: FAMILY MEDICINE

## 2019-08-27 PROCEDURE — 85610 PROTHROMBIN TIME: CPT | Performed by: FAMILY MEDICINE

## 2019-09-05 VITALS — BODY MASS INDEX: 25.23 KG/M2 | DIASTOLIC BLOOD PRESSURE: 68 MMHG | SYSTOLIC BLOOD PRESSURE: 114 MMHG | WEIGHT: 174.6 LBS

## 2019-09-05 DIAGNOSIS — Z86.73 HISTORY OF TIA (TRANSIENT ISCHEMIC ATTACK) AND STROKE: ICD-10-CM

## 2019-09-05 DIAGNOSIS — Z79.01 LONG TERM CURRENT USE OF ANTICOAGULANT THERAPY: Primary | ICD-10-CM

## 2019-09-05 LAB — INR PPP: 1.6 (ref 2–3)

## 2019-09-05 PROCEDURE — 85610 PROTHROMBIN TIME: CPT | Performed by: FAMILY MEDICINE

## 2019-09-05 PROCEDURE — 36415 COLL VENOUS BLD VENIPUNCTURE: CPT | Performed by: FAMILY MEDICINE

## 2019-09-11 ENCOUNTER — TRANSFERRED RECORDS (OUTPATIENT)
Dept: FAMILY MEDICINE | Facility: CLINIC | Age: 79
End: 2019-09-11

## 2019-09-13 VITALS — DIASTOLIC BLOOD PRESSURE: 62 MMHG | SYSTOLIC BLOOD PRESSURE: 108 MMHG | WEIGHT: 175.4 LBS | BODY MASS INDEX: 25.35 KG/M2

## 2019-09-13 DIAGNOSIS — Z86.73 HISTORY OF TIA (TRANSIENT ISCHEMIC ATTACK) AND STROKE: ICD-10-CM

## 2019-09-13 DIAGNOSIS — Z79.01 LONG TERM CURRENT USE OF ANTICOAGULANT THERAPY: ICD-10-CM

## 2019-09-13 LAB — INR PPP: 1.8 (ref 2–3)

## 2019-09-13 PROCEDURE — 36415 COLL VENOUS BLD VENIPUNCTURE: CPT | Performed by: FAMILY MEDICINE

## 2019-09-13 PROCEDURE — 85610 PROTHROMBIN TIME: CPT | Performed by: FAMILY MEDICINE

## 2019-10-01 VITALS — DIASTOLIC BLOOD PRESSURE: 62 MMHG | WEIGHT: 173 LBS | BODY MASS INDEX: 25 KG/M2 | SYSTOLIC BLOOD PRESSURE: 114 MMHG

## 2019-10-01 DIAGNOSIS — I63.40 CEREBROVASCULAR ACCIDENT (CVA) DUE TO EMBOLISM OF CEREBRAL ARTERY (H): Primary | ICD-10-CM

## 2019-10-01 DIAGNOSIS — Z23 NEEDS FLU SHOT: ICD-10-CM

## 2019-10-01 DIAGNOSIS — Z79.01 LONG TERM CURRENT USE OF ANTICOAGULANT THERAPY: ICD-10-CM

## 2019-10-01 LAB — INR PPP: 1.9 (ref 2–3)

## 2019-10-01 PROCEDURE — 85610 PROTHROMBIN TIME: CPT | Performed by: FAMILY MEDICINE

## 2019-10-01 PROCEDURE — 36415 COLL VENOUS BLD VENIPUNCTURE: CPT | Performed by: FAMILY MEDICINE

## 2019-10-01 PROCEDURE — G0008 ADMIN INFLUENZA VIRUS VAC: HCPCS | Performed by: FAMILY MEDICINE

## 2019-10-01 PROCEDURE — 90662 IIV NO PRSV INCREASED AG IM: CPT | Performed by: FAMILY MEDICINE

## 2019-10-01 PROCEDURE — 99212 OFFICE O/P EST SF 10 MIN: CPT | Mod: 25 | Performed by: FAMILY MEDICINE

## 2019-10-02 NOTE — PROGRESS NOTES
Deshawn is here today for INR check and for flu shot    He is doing well   Hx of CVA and TIA and on long term coumadin for prevention    ROS otherwise negative including sleep, neuro, CV, skin or GI     /62   Wt 78.5 kg (173 lb)   BMI 25.00 kg/m      GENERAL: healthy, alert and no distress  EYES: Eyes grossly normal to inspection, PERRL and conjunctivae and sclerae normal  HENT: ear canals and TM's normal, nose and mouth without ulcers or lesions  NECK: no adenopathy, no asymmetry, masses, or scars and thyroid normal to palpation  RESP: lungs clear to auscultation - no rales, rhonchi or wheezes  CV: regular rate and rhythm, normal S1 S2    ASSESSMENT:  1. Long term current use of anticoagulant therapy  See flowsheet  - Prothrombin - INR ([facility])    2. CVA, embolic, cerebral    - Prothrombin - INR ([facility])     3. flu shot

## 2019-10-17 ENCOUNTER — TRANSFERRED RECORDS (OUTPATIENT)
Dept: FAMILY MEDICINE | Facility: CLINIC | Age: 79
End: 2019-10-17

## 2019-10-22 VITALS — WEIGHT: 174.2 LBS | DIASTOLIC BLOOD PRESSURE: 76 MMHG | SYSTOLIC BLOOD PRESSURE: 102 MMHG | BODY MASS INDEX: 25.17 KG/M2

## 2019-10-22 DIAGNOSIS — I63.40 CEREBROVASCULAR ACCIDENT (CVA) DUE TO EMBOLISM OF CEREBRAL ARTERY (H): ICD-10-CM

## 2019-10-22 DIAGNOSIS — Z79.01 LONG TERM CURRENT USE OF ANTICOAGULANT THERAPY: ICD-10-CM

## 2019-10-22 LAB — INR PPP: 1.7 (ref 2–3)

## 2019-10-22 PROCEDURE — 85610 PROTHROMBIN TIME: CPT | Performed by: FAMILY MEDICINE

## 2019-10-22 PROCEDURE — 36415 COLL VENOUS BLD VENIPUNCTURE: CPT | Performed by: FAMILY MEDICINE

## 2019-11-08 VITALS — DIASTOLIC BLOOD PRESSURE: 68 MMHG | SYSTOLIC BLOOD PRESSURE: 114 MMHG | BODY MASS INDEX: 24.97 KG/M2 | WEIGHT: 172.8 LBS

## 2019-11-08 DIAGNOSIS — I63.40 CEREBROVASCULAR ACCIDENT (CVA) DUE TO EMBOLISM OF CEREBRAL ARTERY (H): ICD-10-CM

## 2019-11-08 DIAGNOSIS — Z79.01 LONG TERM CURRENT USE OF ANTICOAGULANT THERAPY: ICD-10-CM

## 2019-11-08 DIAGNOSIS — E11.9 TYPE 2 DIABETES MELLITUS WITHOUT COMPLICATION, WITHOUT LONG-TERM CURRENT USE OF INSULIN (H): Primary | ICD-10-CM

## 2019-11-08 LAB — INR PPP: 2.5 (ref 2–3)

## 2019-11-08 PROCEDURE — 85610 PROTHROMBIN TIME: CPT | Performed by: FAMILY MEDICINE

## 2019-11-08 PROCEDURE — 36415 COLL VENOUS BLD VENIPUNCTURE: CPT | Performed by: FAMILY MEDICINE

## 2019-11-09 LAB
BUN SERPL-MCNC: 20 MG/DL (ref 8–27)
BUN/CREATININE RATIO: 16 (ref 10–24)
CALCIUM SERPL-MCNC: 9.2 MG/DL (ref 8.6–10.2)
CHLORIDE SERPLBLD-SCNC: 99 MMOL/L (ref 96–106)
CREAT SERPL-MCNC: 1.27 MG/DL (ref 0.76–1.27)
EGFR IF AFRICN AM: 63 ML/MIN/1.73
EGFR IF NONAFRICN AM: 54 ML/MIN/1.73
GLUCOSE SERPL-MCNC: 440 MG/DL (ref 65–99)
HBA1C MFR BLD: 10.2 % (ref 4.8–5.6)
POTASSIUM SERPL-SCNC: 5 MMOL/L (ref 3.5–5.2)
SODIUM SERPL-SCNC: 136 MMOL/L (ref 134–144)
TOTAL CO2: 23 MMOL/L (ref 20–29)

## 2019-11-25 ENCOUNTER — OFFICE VISIT (OUTPATIENT)
Dept: FAMILY MEDICINE | Facility: CLINIC | Age: 79
End: 2019-11-25

## 2019-11-25 VITALS
WEIGHT: 172.5 LBS | SYSTOLIC BLOOD PRESSURE: 98 MMHG | HEART RATE: 96 BPM | BODY MASS INDEX: 24.93 KG/M2 | DIASTOLIC BLOOD PRESSURE: 60 MMHG | RESPIRATION RATE: 16 BRPM | TEMPERATURE: 97.8 F | OXYGEN SATURATION: 97 %

## 2019-11-25 DIAGNOSIS — R05.9 COUGH: Primary | ICD-10-CM

## 2019-11-25 DIAGNOSIS — Z79.01 LONG TERM CURRENT USE OF ANTICOAGULANT THERAPY: ICD-10-CM

## 2019-11-25 DIAGNOSIS — I63.40 CEREBROVASCULAR ACCIDENT (CVA) DUE TO EMBOLISM OF CEREBRAL ARTERY (H): ICD-10-CM

## 2019-11-25 PROBLEM — E78.5 HYPERLIPIDAEMIA LDL GOAL < 100: Status: RESOLVED | Noted: 2019-11-25 | Resolved: 2019-11-25

## 2019-11-25 LAB — INR PPP: 2.1 (ref 2–3)

## 2019-11-25 PROCEDURE — 99213 OFFICE O/P EST LOW 20 MIN: CPT | Performed by: NURSE PRACTITIONER

## 2019-11-25 PROCEDURE — 85610 PROTHROMBIN TIME: CPT | Performed by: NURSE PRACTITIONER

## 2019-11-25 PROCEDURE — 36415 COLL VENOUS BLD VENIPUNCTURE: CPT | Performed by: NURSE PRACTITIONER

## 2019-11-25 NOTE — PATIENT INSTRUCTIONS
Take 4 mg of warfarin Wednesday and 2 mg all other days. Return in two weeks for monitoring.     Increase your daily fluid to 2 liters per day. Use honey to sooth your throat. Use salt water gargles to help with the throat pain. Use a one ingredient cough syrup if you would like relief from coughing.       Patient Education     Adult Self-Care for Colds  Colds are caused by viruses. They can't be cured with antibiotics. However, you can ease symptoms and support your body's efforts to heal itself. No matter which symptoms you have, be sure to:    Drink plenty of fluids (water or clear soup)    Stop smoking and drinking alcohol    Get plenty of rest  Understand a fever    Take your temperature several times a day. If your fever is 100.4 F (38.0 C) for more than a day, call your healthcare provider.    Relax, lie down. Go to bed if you want. Just get off your feet and rest. Also, drink plenty of fluids to avoid dehydration.    Take acetaminophen or a nonsteroidal anti-inflammatory agent (NSAID), such as ibuprofen.  Treat a troubled nose kindly    Breathe steam or heated humidified air to open blocked nasal passages.  a hot shower or use a vaporizer. Be careful not to get burned by the steam.    Saline nasal sprays and decongestant tablets help open a stuffy nose. Antihistamines can also help, but they can cause side effects such as drowsiness and drying of the eyes, nose, and mouth.  Soothe a sore throat and cough    Gargle every 2 hours with 1/4 teaspoon of salt dissolved in 1/2 cup of warm water. Suck on throat lozenges and cough drops to moisten your throat.    Cough medicines are available but it is unclear how well they actually work.    Take acetaminophen or an NSAID, such as ibuprofen, to ease throat pain  Ease digestive problems    Put fluids back into your body. Take frequent sips of clear liquids such as water or broth. Avoid drinks that have a lot of sugar in them, such as juices and sodas. These  can make diarrhea worse. Older children and adults can drink sports drinks.    As your appetite returns, you can resume your normal diet. Ask your healthcare provider if there are any foods you should avoid.  When to seek medical care  When you first notice symptoms, ask your healthcare provider if antiviral medicines are appropriate. Antibiotics should not be taken for colds or flu. Also, call your healthcare provider if you have any of the following symptoms or if you aren't feeling better after 7 days:    Shortness of breath    Pain or pressure in the chest or belly (abdomen)    Worsening symptoms, especially after a period of improvement    Fever of 100.4 F  (38.0 C) or higher, or fever that doesn't go down with medicine    Sudden dizziness or confusion    Severe or continued vomiting    Signs of dehydration, including extreme thirst, dark urine, infrequent urination, dry mouth    Spotted, red, or very sore throat   Date Last Reviewed: 12/1/2016 2000-2018 [employer]. [address] [place] [address]. All rights reserved. This information is not intended as a substitute for professional medical care. Always follow your healthcare professional's instructions.

## 2019-11-25 NOTE — PROGRESS NOTES
Problem(s) Oriented visit        SUBJECTIVE:                                                    Jose Lees is a 79 year old male who presents to clinic today for the following health issues :  Concern - Sore throat  Onset: 2 days    Description:   Sore throat with cough    Intensity: moderate    Progression of Symptoms:  worsening    Accompanying Signs & Symptoms:  Weakness    Previous history of similar problem:   no    Precipitating factors:   Worsened by: none    Alleviating factors:  Improved by: none    Therapies Tried and outcome: lemon and honey      Problem list, Medication list, Allergies, and Medical/Social/Surgical histories reviewed in [facility] and updated as appropriate.   Additional history: as documented    ROS:  General:  NEGATIVE for fever, chills, change in weight HEENT:  Negative for frequent or significant headaches and Nose Positive for congestion, nasal discharge, sore throat CV:  NEGATIVE for chest pain, palpitations or peripheral edema Resp:  Non-productive cough.    Histories:   Patient Active Problem List   Diagnosis     Prostate cancer (H)     Autism disorder     ACP (advance care planning)     Health Care Home     History of MI (myocardial infarction)     History of stroke     CHF (congestive heart failure) (H)     Long term current use of anticoagulant therapy     Stricture and stenosis of esophagus     History of TIA (transient ischemic attack) and stroke     Physical deconditioning     Type 2 diabetes mellitus without complication, without long-term current use of insulin (H)     Past Surgical History:   Procedure Laterality Date     CORONARY ANGIOGRAPHY ADULT ORDER       ESOPHAGOSCOPY, GASTROSCOPY, DUODENOSCOPY (EGD), COMBINED N/A 8/6/2015    Procedure: COMBINED ESOPHAGOSCOPY, GASTROSCOPY, DUODENOSCOPY (EGD), REMOVE FOREIGN BODY;  Surgeon: Yu Tapia MD;  Location:  GI     HEART CATH, ANGIOPLASTY  4/14    BMS to LAD     PHACOEMULSIFICATION CLEAR CORNEA WITH STANDARD  INTRAOCULAR LENS IMPLANT Right 12/4/2018    Procedure: COMPLEX RIGHT EYE PHACOEMULSIFICATION CLEAR CORNEA WITH STANDARD INTRAOCULAR LENS IMPLANT;  Surgeon: Kamar Kyle MD;  Location: [facility]     PHACOEMULSIFICATION CLEAR CORNEA WITH STANDARD INTRAOCULAR LENS IMPLANT Left 12/19/2018    Procedure: COMPLEX LEFT EYE PHACOEMULSIFICATION CLEAR CORNEA WITH STANDARD INTRAOCULAR LENS IMPLANT;  Surgeon: Kamar Kyle MD;  Location: [facility]       Social History     Tobacco Use     Smoking status: Never Smoker     Smokeless tobacco: Never Used   Substance Use Topics     Alcohol use: No     Comment: never     Family History   Problem Relation Age of Onset     Cerebrovascular Disease Mother 92     C.A.D. Father 48           OBJECTIVE:                                                    BP 98/60   Pulse 96   Temp 97.8  F (36.6  C)   Resp 16   Wt 78.2 kg (172 lb 8 oz)   SpO2 97%   BMI 24.93 kg/m    Body mass index is 24.93 kg/m .   GENERAL: healthy, alert and no distress  EYES: Eyes grossly normal to inspection, PERRL and conjunctivae and sclerae normal  HENT: ear canals and TM's normal, nose and mouth without ulcers or lesions  RESP: lungs clear to auscultation - no rales, rhonchi or wheezes and no tactile fremitus   CV: regular rate and rhythm, normal S1 S2, no S3 or S4, no murmur, click or rub, no peripheral edema and peripheral pulses strong     ASSESSMENT/PLAN:                                                        Diagnoses and all orders for this visit:    Cough  -     Cancel: X-ray Chest 2 vws*    Long term current use of anticoagulant therapy  -     Prothrombin - INR ([facility])    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 You can also gargle with salt water to help relieve your symptoms.     ASSESSMENT/PLAN:       The following health  maintenance items are reviewed in Epic and correct as of today:  Health Maintenance   Topic Date Due     EYE EXAM  04/01/2013     ZOSTER IMMUNIZATION (2 of 3) 04/28/2015     DIABETIC FOOT EXAM  02/18/2017     HF ACTION PLAN  04/06/2019     A1C  05/08/2020     BMP  05/08/2020     MEDICARE ANNUAL WELLNESS VISIT  05/20/2020     ALT  05/20/2020     LIPID  05/20/2020     MICROALBUMIN  05/20/2020     CBC  05/20/2020     FALL RISK ASSESSMENT  11/08/2020     TSH W/FREE T4 REFLEX  11/26/2020     DTAP/TDAP/TD IMMUNIZATION (2 - Td) 09/16/2021     ADVANCE CARE PLANNING  03/09/2023     PHQ-2  Completed     INFLUENZA VACCINE  Completed     PNEUMOCOCCAL IMMUNIZATION 65+ LOW/MEDIUM RISK  Completed     IPV IMMUNIZATION  Aged Out     MENINGITIS IMMUNIZATION  Aged Out       Margie Faria NP  [facility]  Family Practice  [facility]  [phone]    For any issues my office # is [phone]

## 2019-12-10 VITALS — SYSTOLIC BLOOD PRESSURE: 108 MMHG | BODY MASS INDEX: 24.57 KG/M2 | DIASTOLIC BLOOD PRESSURE: 66 MMHG | WEIGHT: 170 LBS

## 2019-12-10 DIAGNOSIS — I63.40 CEREBROVASCULAR ACCIDENT (CVA) DUE TO EMBOLISM OF CEREBRAL ARTERY (H): Primary | ICD-10-CM

## 2019-12-10 DIAGNOSIS — Z79.01 LONG TERM CURRENT USE OF ANTICOAGULANT THERAPY: ICD-10-CM

## 2019-12-10 LAB — INR PPP: 2.5 (ref 2–3)

## 2019-12-10 PROCEDURE — 36415 COLL VENOUS BLD VENIPUNCTURE: CPT | Performed by: FAMILY MEDICINE

## 2019-12-10 PROCEDURE — 85610 PROTHROMBIN TIME: CPT | Performed by: FAMILY MEDICINE

## 2019-12-10 PROCEDURE — 99207 ZZC DROP WITH A PROCEDURE: CPT | Performed by: FAMILY MEDICINE

## 2020-01-20 ENCOUNTER — OFFICE VISIT (OUTPATIENT)
Dept: INTERNAL MEDICINE | Facility: CLINIC | Age: 80
End: 2020-01-20
Payer: COMMERCIAL

## 2020-01-20 VITALS
RESPIRATION RATE: 16 BRPM | BODY MASS INDEX: 24.2 KG/M2 | TEMPERATURE: 98.9 F | HEART RATE: 91 BPM | OXYGEN SATURATION: 99 % | WEIGHT: 169 LBS | SYSTOLIC BLOOD PRESSURE: 110 MMHG | HEIGHT: 70 IN | DIASTOLIC BLOOD PRESSURE: 68 MMHG

## 2020-01-20 DIAGNOSIS — I48.20 CHRONIC ATRIAL FIBRILLATION (H): ICD-10-CM

## 2020-01-20 DIAGNOSIS — E11.9 TYPE 2 DIABETES MELLITUS WITHOUT COMPLICATION, WITHOUT LONG-TERM CURRENT USE OF INSULIN (H): ICD-10-CM

## 2020-01-20 DIAGNOSIS — F84.0 AUTISM DISORDER: ICD-10-CM

## 2020-01-20 DIAGNOSIS — F32.0 MILD MAJOR DEPRESSION (H): ICD-10-CM

## 2020-01-20 DIAGNOSIS — E55.9 VITAMIN D DEFICIENCY: ICD-10-CM

## 2020-01-20 PROBLEM — I48.11 LONGSTANDING PERSISTENT ATRIAL FIBRILLATION (H): Status: ACTIVE | Noted: 2020-01-20

## 2020-01-20 PROBLEM — R53.81 PHYSICAL DECONDITIONING: Status: RESOLVED | Noted: 2017-02-14 | Resolved: 2020-01-20

## 2020-01-20 LAB
ALBUMIN SERPL-MCNC: 3.4 G/DL (ref 3.4–5)
ALP SERPL-CCNC: 65 U/L (ref 40–150)
ALT SERPL W P-5'-P-CCNC: 23 U/L (ref 0–70)
ANION GAP SERPL CALCULATED.3IONS-SCNC: 3 MMOL/L (ref 3–14)
AST SERPL W P-5'-P-CCNC: 17 U/L (ref 0–45)
BILIRUB SERPL-MCNC: 0.8 MG/DL (ref 0.2–1.3)
BUN SERPL-MCNC: 14 MG/DL (ref 7–30)
CALCIUM SERPL-MCNC: 9.2 MG/DL (ref 8.5–10.1)
CHLORIDE SERPL-SCNC: 101 MMOL/L (ref 94–109)
CHOLEST SERPL-MCNC: 242 MG/DL
CO2 SERPL-SCNC: 29 MMOL/L (ref 20–32)
CREAT SERPL-MCNC: 0.87 MG/DL (ref 0.66–1.25)
CREAT UR-MCNC: 218 MG/DL
DEPRECATED CALCIDIOL+CALCIFEROL SERPL-MC: 23 UG/L (ref 20–75)
GFR SERPL CREATININE-BSD FRML MDRD: 82 ML/MIN/{1.73_M2}
GLUCOSE SERPL-MCNC: 326 MG/DL (ref 70–99)
HBA1C MFR BLD: 11.8 % (ref 0–5.6)
HDLC SERPL-MCNC: 50 MG/DL
INR PPP: 1.53 (ref 0.86–1.14)
LDLC SERPL CALC-MCNC: 146 MG/DL
MICROALBUMIN UR-MCNC: 73 MG/L
MICROALBUMIN/CREAT UR: 33.3 MG/G CR (ref 0–17)
NONHDLC SERPL-MCNC: 192 MG/DL
POTASSIUM SERPL-SCNC: 4.4 MMOL/L (ref 3.4–5.3)
PROT SERPL-MCNC: 7.7 G/DL (ref 6.8–8.8)
SODIUM SERPL-SCNC: 133 MMOL/L (ref 133–144)
T4 FREE SERPL-MCNC: 0.97 NG/DL (ref 0.76–1.46)
TRIGL SERPL-MCNC: 232 MG/DL
TSH SERPL DL<=0.005 MIU/L-ACNC: 5.2 MU/L (ref 0.4–4)

## 2020-01-20 PROCEDURE — 80053 COMPREHEN METABOLIC PANEL: CPT | Performed by: INTERNAL MEDICINE

## 2020-01-20 PROCEDURE — 36415 COLL VENOUS BLD VENIPUNCTURE: CPT | Performed by: INTERNAL MEDICINE

## 2020-01-20 PROCEDURE — 84443 ASSAY THYROID STIM HORMONE: CPT | Performed by: INTERNAL MEDICINE

## 2020-01-20 PROCEDURE — 99207 C FOOT EXAM  NO CHARGE: CPT | Mod: 25 | Performed by: INTERNAL MEDICINE

## 2020-01-20 PROCEDURE — 83036 HEMOGLOBIN GLYCOSYLATED A1C: CPT | Performed by: INTERNAL MEDICINE

## 2020-01-20 PROCEDURE — 82306 VITAMIN D 25 HYDROXY: CPT | Performed by: INTERNAL MEDICINE

## 2020-01-20 PROCEDURE — 80061 LIPID PANEL: CPT | Performed by: INTERNAL MEDICINE

## 2020-01-20 PROCEDURE — 99214 OFFICE O/P EST MOD 30 MIN: CPT | Performed by: INTERNAL MEDICINE

## 2020-01-20 PROCEDURE — 82043 UR ALBUMIN QUANTITATIVE: CPT | Performed by: INTERNAL MEDICINE

## 2020-01-20 PROCEDURE — 84439 ASSAY OF FREE THYROXINE: CPT | Performed by: INTERNAL MEDICINE

## 2020-01-20 PROCEDURE — 85610 PROTHROMBIN TIME: CPT | Performed by: INTERNAL MEDICINE

## 2020-01-20 RX ORDER — ABIRATERONE 500 MG/1
1000 TABLET ORAL DAILY
Status: ON HOLD | COMMUNITY
Start: 2020-01-20 | End: 2021-01-29

## 2020-01-20 RX ORDER — WARFARIN SODIUM 2 MG/1
TABLET ORAL
Qty: 180 TABLET | Refills: 1 | Status: ON HOLD
Start: 2020-01-20 | End: 2021-01-29

## 2020-01-20 RX ORDER — VITAMIN B COMPLEX
TABLET ORAL
Status: ON HOLD | COMMUNITY
Start: 2020-01-20 | End: 2021-01-26

## 2020-01-20 ASSESSMENT — MIFFLIN-ST. JEOR: SCORE: 1488.86

## 2020-01-20 NOTE — PATIENT INSTRUCTIONS
Continue current meds for now as you are pending lab results  Labs today as ordered  Schedule an eye appointment  Future recommendations will be based on lab results  Will get established with anticoagulation clinic for future INR monitoring with Warfarin use  Moisturizer daily to dry skin of feet (Vanicream., Lubriderm, etc)  See me in a couple weeks for follow-up lab results, other medical issues  Have sister Judith assist with medication set up with pill boxes

## 2020-01-20 NOTE — PROGRESS NOTES
Subjective     Jose Lees is a 79 year old male who presents to clinic today for the following health issues:    HPI   Chief Complaint   Patient presents with     Establish Care     Under the care of Dr Cortes at [facility]      Pt's past medical history, family history, habits, medications and allergies were reviewed with the patient today.  See snap shot for  HCM status. Most recent lab results reviewed with pt. Problem list and histories reviewed & adjusted, as indicated.  Additional history as below:    Component      Latest Ref Rng & Units 5/20/2019 11/8/2019 11/25/2019 12/10/2019   WBC      3.8 - 11.0 x10/cmm 6.7      % Lymphocytes      20.5 - 51.1 % 21.5      % Monocytes      1.7 - 9.3 % 5.8      % Granulocytes      42.2 - 75.2 % 72.7      RBC x10/cmm      4.2 - 5.9 x10/cmm 4.98      Hemoglobin      13.4 - 17.5 g/dl 14.6      Hematocrit      39 - 51 % 43.8      MCV      80 - 100 fL 87.8      MCH      27.0 - 31.0 pg 29.4      MCHC      33.0 - 37.0 g/dL 33.4      Platelet Count      140 - 450 K/uL 170      Glucose      65 - 99 mg/dL  440 (H)     Urea Nitrogen      8 - 27 mg/dL  20     Creatinine      0.76 - 1.27 mg/dL  1.27     eGFR If NonAfricn Am      >59 mL/min/1.73  54 (L)     eGFR If Africn Am      >59 mL/min/1.73  63     BUN/Creatinine Ratio      10 - 24  16     Sodium      134 - 144 mmol/L  136     Potassium      3.5 - 5.2 mmol/L  5.0     Chloride      96 - 106 mmol/L  99     Total CO2      20 - 29 mmol/L  23     Calcium      8.6 - 10.2 mg/dL  9.2     Cholesterol      100 - 199 mg/dL 234 (H)      Triglycerides      0 - 149 mg/dL 227 (H)      HDL Cholesterol      >39 mg/dL 46      VLDL Cholesterol Killian      5 - 40 mg/dL 45 (H)      LDL Cholesterol Calculated      0 - 99 mg/dL 143 (H)      LDL/HDL Ratio      0.0 - 3.6 ratio 3.1      Hemoglobin A1C      4.8 - 5.6 % 11.6 (H) 10.2 (H)     PSA NG/ML      0.0 - 4.0 ng/mL 23.4 (H)      INR      2 - 3   2.1 2.5     Last note from [facility] group May 2019 reviewed with patient last underwent a physical.  Last visit with that group was November 2019 for upper respiratory symptoms.    Last saw oncology and October regarding castrate resistant prostate cancer diagnosed in 2011.  Patient has failed leuprolide and bicalutamide and other type of treatments being considered.  See oncology note for details.  PSA from that visit in October was elevated to 32.  Note states PET scan showed some evidence of local recurrence    Patient lives by himself in assisted living.  History of autism  Eating regular food without diabetic diet.  Not checking blood sugars at all.  Patient states the strips are too expensive  Patient denies chest pain, shortness of breath or abdominal pain.  Using a walker with ambulation.  On glimepiride and Metformin for diabetes.  Poor control as above.  Is supposed to be on atorvastatin for hyperlipidemia but lipids elevated.  Patient states he often does not take his medications.  On sertraline for depression.  Patient states mood is \"okay\"       Review of Systems:    CONSTITUTIONAL: NEGATIVE for fever, chills, change in weight  INTEGUMENTARY/SKIN: NEGATIVE for worrisome rashes, moles or lesions  EYES: NEGATIVE for vision changes or irritation  ENT/MOUTH: NEGATIVE for ear, mouth and throat problems  RESP: NEGATIVE for significant cough or SOB  BREAST: NEGATIVE for masses, tenderness or discharge  CV: NEGATIVE for chest pain, palpitations or peripheral edema. Hx A fib. On warfarin and taking that consistently. Last INR OK  GI: NEGATIVE for nausea, abdominal pain, heartburn, or change in bowel habits  : NEGATIVE for frequency, dysuria, or hematuria. Prostate CA  as abive  MUSCULOSKELETAL: NEGATIVE for significant arthralgias or myalgia  NEURO: NEGATIVE for weakness, dizziness or paresthesias  ENDOCRINE:   See above  HEME: NEGATIVE for bleeding problems  PSYCHIATRIC: NEGATIVE for changes in mood or affect with med use. Hx Autism. " "Sister Judith visits  about 4x/week       OBJECTIVE:  /68   Pulse 91   Temp 98.9  F (37.2  C) (Temporal)   Resp 16   Ht 1.772 m (5' 9.75\")   Wt 76.7 kg (169 lb)   SpO2 99%   BMI 24.42 kg/m     Estimated body mass index is 24.42 kg/m  as calculated from the following:    Height as of this encounter: 1.772 m (5' 9.75\").    Weight as of this encounter: 76.7 kg (169 lb).     HENT: ear canals and TM's normal and nose and mouth without ulcers or lesions   Neck: no adenopathy. Thyroid normal to palpation. No bruits  Pulm: Lungs clear to auscultation   CV: Irregular rates and rhythm  GI: Soft, nontender, Normal active bowel sounds, No hepatosplenomegaly or masses palpable  Ext: Peripheral pulses intact. No edema.  Neuro: Normal strength and tone, sensory exam grossly normal. Stable gait with walker. Slight higher steppage with right foot compared to left with gait  Skin: Foot skin dry bilaterally    Assessment/Plan: (See plan discussion below for further details)  1. Type 2 diabetes mellitus without complication, without long-term current use of insulin (H)  Not checking blood sugars at home.  Previous control poor with A1c 10.2.  Patient has some compliance issues with taking his medications, not following diabetic diet currently.  Labs as ordered.  Since patient sister is seeing him 4 times a week, will asked that she lay out a pillbox for patient to help monitor if patient has been taking his medications as put out in his pillbox. Due for eye exam  - FOOT EXAM  - Comprehensive metabolic panel  - Lipid panel reflex to direct LDL Fasting  - TSH with free T4 reflex  - T4 free  - Hemoglobin A1c  - Albumin Random Urine Quantitative with Creat Ratio    2. Mild major depression (H)  Patient states mood is \"pretty good\" continue sertraline     3. Vitamin D deficiency  Previous history deficiency per chart.  Needs lab follow-up  - Vitamin D Deficiency    4. Chronic atrial fibrillation  Rate controlled.  On warfarin.  " Due for INR recheck we will get others with [facility].   - INR    5.   Autism disorder   Meeting pt for first time today. Some limited intellectual ability. Will speak with sister Judith further to get more info and to be sure she is looking out well for pt and that he is taking meds, etc    Plan discussion:   Continue current meds for now as you are pending lab results  Labs today as ordered  Schedule an eye appointment  Future recommendations will be based on lab results  Will get established with anticoagulation clinic for future INR monitoring with Warfarin use  Moisturizer daily to dry skin of feet (Vanicream., Lubriderm, etc)  See me in a couple weeks for follow-up lab results, other medical issues  Have sister Judith assist with medication set up with pill boxes    Nolberto Ferrari MD  Internal Medicine Department  [facility]    (Chart documentation was completed, in part, with [employer] voice-recognition software. Even though reviewed, some grammatical, spelling, and word errors may remain.)

## 2020-02-03 ENCOUNTER — TELEPHONE (OUTPATIENT)
Dept: INTERNAL MEDICINE | Facility: CLINIC | Age: 80
End: 2020-02-03

## 2020-02-03 DIAGNOSIS — E11.9 TYPE 2 DIABETES MELLITUS WITHOUT COMPLICATION, WITHOUT LONG-TERM CURRENT USE OF INSULIN (H): Primary | ICD-10-CM

## 2020-02-03 RX ORDER — LANCETS
EACH MISCELLANEOUS
Qty: 100 EACH | Refills: 3 | Status: SHIPPED | OUTPATIENT
Start: 2020-02-03 | End: 2021-02-01

## 2020-02-03 NOTE — TELEPHONE ENCOUNTER
Reason for Call:  Medication or medication refill:    Do you use a [facility]?  Name of the pharmacy and phone number for the current request:  [facility] [phone]    Name of the medication requested: bd single use swab and accu-chek nahun solution    Other request: new rx    Can we leave a detailed message on this number?     Phone number patient can be reached at: Other phone number:  See above tele #    Best Time: soon    Call taken on 2/3/2020 at 9:52 AM by Quynh Rodarte

## 2020-02-03 NOTE — TELEPHONE ENCOUNTER
Katherine faxed in a request for Accu-Chek nahun plus meter, test strips, and lancets. I do not see any diabetic testing supplies on the med list.

## 2020-02-03 NOTE — TELEPHONE ENCOUNTER
PCP please advise of diabetic supplies needed.     Patient newly established to  [facility].      Pharmacy and med's Td'up.    Harini SNOWN, RN, PHN

## 2020-02-05 ENCOUNTER — MYC MEDICAL ADVICE (OUTPATIENT)
Dept: INTERNAL MEDICINE | Facility: CLINIC | Age: 80
End: 2020-02-05

## 2020-02-07 ENCOUNTER — TELEPHONE (OUTPATIENT)
Dept: INTERNAL MEDICINE | Facility: CLINIC | Age: 80
End: 2020-02-07

## 2020-02-07 ENCOUNTER — TELEPHONE (OUTPATIENT)
Dept: URGENT CARE | Facility: URGENT CARE | Age: 80
End: 2020-02-07

## 2020-02-07 DIAGNOSIS — E11.9 TYPE 2 DIABETES MELLITUS WITHOUT COMPLICATION, WITHOUT LONG-TERM CURRENT USE OF INSULIN (H): Primary | ICD-10-CM

## 2020-02-07 NOTE — TELEPHONE ENCOUNTER
Per pharmacy ACCU-CHEK COMPACT CARE KIT meter device kit has been discontinued.  Please replace with ACCU-CHEK ALEJANDRO PLUS. Updated strips/Lancets will be dispensed unless otherwise noted.

## 2020-02-10 ENCOUNTER — TELEPHONE (OUTPATIENT)
Dept: INTERNAL MEDICINE | Facility: CLINIC | Age: 80
End: 2020-02-10

## 2020-02-10 DIAGNOSIS — I63.9 CEREBROVASCULAR ACCIDENT (CVA), UNSPECIFIED MECHANISM (H): Primary | ICD-10-CM

## 2020-02-10 RX ORDER — LANCETS
EACH MISCELLANEOUS
Qty: 100 EACH | Refills: 3 | Status: SHIPPED | OUTPATIENT
Start: 2020-02-10 | End: 2021-02-01

## 2020-02-10 NOTE — TELEPHONE ENCOUNTER
Patients sister Judith is requesting an order be put in for the INR clinic so she can make an appt for patient.  Please give her a call at [phone] when order is put in.

## 2020-02-11 ENCOUNTER — TRANSFERRED RECORDS (OUTPATIENT)
Dept: HEALTH INFORMATION MANAGEMENT | Facility: CLINIC | Age: 80
End: 2020-02-11

## 2020-02-11 LAB — RETINOPATHY: NEGATIVE

## 2020-02-11 NOTE — TELEPHONE ENCOUNTER
[facility] mail order pharmacy was the pharmacy entered by staff for this encounter. NO other documentation noted so Rx sent to [facility] for new glucometer and supplies

## 2020-02-13 NOTE — TELEPHONE ENCOUNTER
PCP, Could you place a new [facility] referral for pt?  Pt established care with you on 1/20/20.  He has never been seen in [facility] yet, did make an appt in [facility] 2/14/20.

## 2020-02-14 ENCOUNTER — ANTICOAGULATION THERAPY VISIT (OUTPATIENT)
Dept: ANTICOAGULATION | Facility: CLINIC | Age: 80
End: 2020-02-14
Payer: COMMERCIAL

## 2020-02-14 DIAGNOSIS — I63.9 CEREBROVASCULAR ACCIDENT (CVA), UNSPECIFIED MECHANISM (H): ICD-10-CM

## 2020-02-14 LAB — INR POINT OF CARE: 1.6 (ref 0.86–1.14)

## 2020-02-14 PROCEDURE — 36416 COLLJ CAPILLARY BLOOD SPEC: CPT

## 2020-02-14 PROCEDURE — 85610 PROTHROMBIN TIME: CPT | Mod: QW

## 2020-02-14 NOTE — PROGRESS NOTES
ANTICOAGULATION FOLLOW-UP CLINIC VISIT    Patient Name:  Jose Lees  Date:  2020  Contact Type:  Face to Face    SUBJECTIVE:  Patient Findings     Comments:   The patient was assessed for diet, medication, and activity level changes, missed or extra doses, bruising or bleeding,. Increase lately in green intake.            Clinical Outcomes     Comments:   The patient was assessed for diet, medication, and activity level changes, missed or extra doses, bruising or bleeding,. Increase lately in green intake.               OBJECTIVE    INR Protime   Date Value Ref Range Status   2020 1.6 (A) 0.86 - 1.14 Final       ASSESSMENT / PLAN  INR assessment SUB    Recheck INR In: 1 WEEK    INR Location Clinic      Anticoagulation Summary  As of 2020    INR goal:   2.0-3.0   TTR:   --   INR used for dosin.6! (2020)   Warfarin maintenance plan:   4 mg (2 mg x 2) every Wed; 2 mg (2 mg x 1) all other days   Full warfarin instructions:   : 4 mg; Otherwise 4 mg every Wed; 2 mg all other days   Weekly warfarin total:   16 mg   Plan last modified:   Annabelle Link RN (2020)   Next INR check:   2020   Target end date:   Indefinite    Indications    Cerebrovascular accident (CVA)  unspecified mechanism (H) [I63.9]             Anticoagulation Episode Summary     INR check location:       Preferred lab:       Send INR reminders to:   [facility]    Comments:         Anticoagulation Care Providers     Provider Role Specialty Phone number    Nolberto Ferrari MD Referring Internal Medicine [phone]            See the Encounter Report to view Anticoagulation Flowsheet and Dosing Calendar (Go to Encounters tab in chart review, and find the Anticoagulation Therapy Visit)        Annabelle Link, RN

## 2020-02-21 ENCOUNTER — ANTICOAGULATION THERAPY VISIT (OUTPATIENT)
Dept: ANTICOAGULATION | Facility: CLINIC | Age: 80
End: 2020-02-21
Payer: COMMERCIAL

## 2020-02-21 DIAGNOSIS — Z76.0 ENCOUNTER FOR MEDICATION REFILL: ICD-10-CM

## 2020-02-21 DIAGNOSIS — I63.9 CEREBROVASCULAR ACCIDENT (CVA), UNSPECIFIED MECHANISM (H): ICD-10-CM

## 2020-02-21 LAB — INR POINT OF CARE: 2.1 (ref 0.86–1.14)

## 2020-02-21 PROCEDURE — 36416 COLLJ CAPILLARY BLOOD SPEC: CPT

## 2020-02-21 PROCEDURE — 85610 PROTHROMBIN TIME: CPT | Mod: QW

## 2020-02-21 RX ORDER — METOPROLOL SUCCINATE 25 MG/1
TABLET, EXTENDED RELEASE ORAL
Qty: 90 TABLET | Refills: 3 | Status: SHIPPED | OUTPATIENT
Start: 2020-02-21 | End: 2021-02-19

## 2020-02-21 NOTE — TELEPHONE ENCOUNTER
"Requested Prescriptions   Pending Prescriptions Disp Refills     metoprolol succinate ER (TOPROL-XL) 25 MG 24 hr tablet 90 tablet 3     Sig: TAKE 1 TABLET EVERY DAY       Beta-Blockers Protocol Passed - 2/21/2020  4:35 PM        Passed - Blood pressure under 140/90 in past 12 months     BP Readings from Last 3 Encounters:   01/20/20 110/68   12/10/19 108/66   11/25/19 98/60                 Passed - Patient is age 6 or older        Passed - Recent (12 mo) or future (30 days) visit within the authorizing provider's specialty     Patient has had an office visit with the authorizing provider or a provider within the authorizing providers department within the previous 12 mos or has a future within next 30 days. See \"Patient Info\" tab in inbasket, or \"Choose Columns\" in Meds & Orders section of the refill encounter.              Passed - Medication is active on med list          "

## 2020-02-21 NOTE — PROGRESS NOTES
ANTICOAGULATION FOLLOW-UP CLINIC VISIT    Patient Name:  Jose Lees  Date:  2020  Contact Type:  Face to Face    SUBJECTIVE:  Patient Findings     Comments:   Signs/symptoms of thrombosis - .nop  The patient was assessed for diet, medication, and activity level changes, missed or extra doses, bruising or bleeding, with no problem findings.          Clinical Outcomes     Comments:   The patient was assessed for diet, medication, and activity level changes, missed or extra doses, bruising or bleeding, with no problem findings.             OBJECTIVE    INR Protime   Date Value Ref Range Status   2020 2.1 (A) 0.86 - 1.14 Final       ASSESSMENT / PLAN  INR assessment THER    Recheck INR In: 2 WEEKS    INR Location Clinic      Anticoagulation Summary  As of 2020    INR goal:   2.0-3.0   TTR:   --   INR used for dosin.1 (2020)   Warfarin maintenance plan:   4 mg (2 mg x 2) every Wed, Sat; 2 mg (2 mg x 1) all other days   Full warfarin instructions:   4 mg every Wed, Sat; 2 mg all other days   Weekly warfarin total:   18 mg   Plan last modified:   Annabelle Link RN (2020)   Next INR check:   3/6/2020   Target end date:   Indefinite    Indications    Cerebrovascular accident (CVA)  unspecified mechanism (H) [I63.9]             Anticoagulation Episode Summary     INR check location:       Preferred lab:       Send INR reminders to:   [facility]    Comments:         Anticoagulation Care Providers     Provider Role Specialty Phone number    Nolberto Ferrari MD Referring Internal Medicine [phone]            See the Encounter Report to view Anticoagulation Flowsheet and Dosing Calendar (Go to Encounters tab in chart review, and find the Anticoagulation Therapy Visit)        Annabelle Link RN

## 2020-02-25 ENCOUNTER — TRANSFERRED RECORDS (OUTPATIENT)
Dept: FAMILY MEDICINE | Facility: CLINIC | Age: 80
End: 2020-02-25

## 2020-02-28 DIAGNOSIS — Z76.0 ENCOUNTER FOR MEDICATION REFILL: ICD-10-CM

## 2020-02-28 NOTE — TELEPHONE ENCOUNTER
Requested Prescriptions   Pending Prescriptions Disp Refills     sertraline (ZOLOFT) 50 MG tablet 90 tablet 3     Sig: TAKE 1 TABLET EVERY DAY       There is no refill protocol information for this order      Last Written Prescription Date:  02/08/19  Last Fill Quantity: 90,  # refills: 3   Last office visit: 1/20/2020 with prescribing provider:  01/20/20   Future Office Visit:  0  PHQ 5/20/2019   PHQ-9 Total Score 0   Q9: Thoughts of better off dead/self-harm past 2 weeks Not at all

## 2020-02-28 NOTE — TELEPHONE ENCOUNTER
"Requested Prescriptions   Pending Prescriptions Disp Refills     sertraline (ZOLOFT) 50 MG tablet 90 tablet 3     Sig: TAKE 1 TABLET EVERY DAY       SSRIs Protocol Passed - 2/28/2020 12:57 PM        Passed - Recent (12 mo) or future (30 days) visit within the authorizing provider's specialty     Patient has had an office visit with the authorizing provider or a provider within the authorizing providers department within the previous 12 mos or has a future within next 30 days. See \"Patient Info\" tab in inbasket, or \"Choose Columns\" in Meds & Orders section of the refill encounter.              Passed - Medication is active on med list        Passed - Patient is age 18 or older          Prescription approved per [facility].    Harini SNOWN, RN, PHN      "

## 2020-03-06 ENCOUNTER — TRANSFERRED RECORDS (OUTPATIENT)
Dept: HEALTH INFORMATION MANAGEMENT | Facility: CLINIC | Age: 80
End: 2020-03-06

## 2020-03-06 ENCOUNTER — ANTICOAGULATION THERAPY VISIT (OUTPATIENT)
Dept: ANTICOAGULATION | Facility: CLINIC | Age: 80
End: 2020-03-06
Payer: COMMERCIAL

## 2020-03-06 DIAGNOSIS — I63.9 CEREBROVASCULAR ACCIDENT (CVA), UNSPECIFIED MECHANISM (H): ICD-10-CM

## 2020-03-06 LAB — INR POINT OF CARE: 2.1 (ref 0.86–1.14)

## 2020-03-06 PROCEDURE — 85610 PROTHROMBIN TIME: CPT | Mod: QW

## 2020-03-06 PROCEDURE — 36416 COLLJ CAPILLARY BLOOD SPEC: CPT

## 2020-03-06 NOTE — PROGRESS NOTES
ANTICOAGULATION FOLLOW-UP CLINIC VISIT    Patient Name:  Jose Lees  Date:  3/6/2020  Contact Type:  Face to Face    SUBJECTIVE:         OBJECTIVE    INR Protime   Date Value Ref Range Status   2020 2.1 (A) 0.86 - 1.14 Final       ASSESSMENT / PLAN  INR assessment THER    Recheck INR In: 3 WEEKS    INR Location Clinic      Anticoagulation Summary  As of 3/6/2020    INR goal:   2.0-3.0   TTR:   100.0 % (1.6 wk)   INR used for dosin.1 (3/6/2020)   Warfarin maintenance plan:   4 mg (2 mg x 2) every Wed, Sat; 2 mg (2 mg x 1) all other days   Full warfarin instructions:   4 mg every Wed, Sat; 2 mg all other days   Weekly warfarin total:   18 mg   Plan last modified:   Annabelle Link RN (2020)   Next INR check:   3/27/2020   Target end date:   Indefinite    Indications    Cerebrovascular accident (CVA)  unspecified mechanism (H) [I63.9]             Anticoagulation Episode Summary     INR check location:       Preferred lab:       Send INR reminders to:   [facility]    Comments:         Anticoagulation Care Providers     Provider Role Specialty Phone number    Nolberto Ferrari MD Referring Internal Medicine [phone]            See the Encounter Report to view Anticoagulation Flowsheet and Dosing Calendar (Go to Encounters tab in chart review, and find the Anticoagulation Therapy Visit)        Annabelle Link RN

## 2020-03-12 ENCOUNTER — TELEPHONE (OUTPATIENT)
Dept: INTERNAL MEDICINE | Facility: CLINIC | Age: 80
End: 2020-03-12

## 2020-03-12 NOTE — TELEPHONE ENCOUNTER
Reason for Call:  Other call back    Detailed comments: Judith would like a call back from Dr. Ferrari regarding patients lab work from [facility].  She states they haven't received a call to discuss next steps. Please call back.    Phone Number Patient can be reached at: Home number on file [phone] (home)    Best Time: anytime    Can we leave a detailed message on this number? YES    Call taken on 3/12/2020 at 9:36 AM by Adina Rodriguez

## 2020-03-25 DIAGNOSIS — Z79.01 LONG TERM CURRENT USE OF ANTICOAGULANT THERAPY: ICD-10-CM

## 2020-03-25 DIAGNOSIS — I63.9 CEREBROVASCULAR ACCIDENT (CVA), UNSPECIFIED MECHANISM (H): ICD-10-CM

## 2020-03-25 DIAGNOSIS — I50.9 CONGESTIVE HEART FAILURE, UNSPECIFIED HF CHRONICITY, UNSPECIFIED HEART FAILURE TYPE (H): Primary | ICD-10-CM

## 2020-04-02 ENCOUNTER — TRANSFERRED RECORDS (OUTPATIENT)
Dept: HEALTH INFORMATION MANAGEMENT | Facility: CLINIC | Age: 80
End: 2020-04-02

## 2020-04-09 ENCOUNTER — TELEPHONE (OUTPATIENT)
Dept: INTERNAL MEDICINE | Facility: CLINIC | Age: 80
End: 2020-04-09

## 2020-04-09 NOTE — TELEPHONE ENCOUNTER
Spoke with patient sister Judith who manages patients medications and health issues d/t autistic.     Patient resides in [place] and she is currently not able to go into his residence.  He reports to Judith that he has stopped taking his medications, including his warfarin.  He tells her that the medications were causing him to be nauseated.  She states that he stopped the diabetic meds a few weeks ago and the warfarin a few days ago.    Patient does have an INR appointment for tomorrow and can discuss warfarin dosing at that time.    Judith states that she never received advisement on labs that were done in January. She sent a my chart message on 2/5 inquiring on results and per PCP said to see TE from 2/10 but the labs were not addressed in that encounter.  Can PCP please review and advise on medications.  Should patient make an appointment to be seen?  She is unsure what to do regarding medications due to not being able to get into patients apartment.  She states that she does still set up his warfarin, but has to leave it at the  and the patient would pick it up from there.    Please advise.    Sivan John RN  [facility]

## 2020-04-10 DIAGNOSIS — I63.9 CEREBROVASCULAR ACCIDENT (CVA), UNSPECIFIED MECHANISM (H): ICD-10-CM

## 2020-04-10 DIAGNOSIS — Z79.01 LONG TERM CURRENT USE OF ANTICOAGULANT THERAPY: ICD-10-CM

## 2020-04-10 LAB
CAPILLARY BLOOD COLLECTION: NORMAL
INR PPP: 1.7 (ref 0.86–1.14)

## 2020-04-10 PROCEDURE — 85610 PROTHROMBIN TIME: CPT | Performed by: INTERNAL MEDICINE

## 2020-04-10 PROCEDURE — 36416 COLLJ CAPILLARY BLOOD SPEC: CPT | Performed by: INTERNAL MEDICINE

## 2020-04-20 NOTE — TELEPHONE ENCOUNTER
Pt has autism. No phone number for pt. Only phone number is pt's sister. Tried that number and also her cell number listed in chart. NA with either. LM that I will be calling her back when back in clinic 4/27/20. Pt has longstanding nausea related to Prostate CA drugs per oncology note from March 2020. Has elevated lipids and A1C/blood sugars. With Covid restrictions, no way to see that pt is taking meds and with autism, pt not checking sugars at all and not clear how well eating so can't be starting insulin now without monitoring blood sugars and unclear if pt would be able to do either. Only met pt once before. Will discuss both pt capabilities and possible plans given limitations of Covid such as possibly pt having a nurse do meds for him or at least sister setting up  Some meds to see if sx of nausea better then. Will need to know if  Pt has also stopped prostate CA meds and if nausea better (might not be able to find out if sister is not able to talk with pt now with Covid issues). Will try to come up with plan once able to reach Judith to discuss further

## 2020-04-22 ENCOUNTER — TELEPHONE (OUTPATIENT)
Dept: INTERNAL MEDICINE | Facility: CLINIC | Age: 80
End: 2020-04-22

## 2020-04-22 NOTE — TELEPHONE ENCOUNTER
Reason for Call:  Other call back    Detailed comments: Pt's sister would like a call back to discuss pt's INR appt scheduled for 4/24/20.  Is the appt necessary?    Phone Number Patient can be reached at: Other phone number:  [phone]    Best Time: anytime    Can we leave a detailed message on this number? YES    Call taken on 4/22/2020 at 2:16 PM by JEREMI JEFFERS

## 2020-04-23 NOTE — TELEPHONE ENCOUNTER
Talked with Deshawn's sister Judith.  He is refusing to take his medications most days including his warfarin and diabetics medications.  Judith is going to talk with Dr Ferrari on 4/27 and then she is going to call me with the decision they make.  She wants the INR for 4/24 canceled until she has the discussion with the doctor

## 2020-05-06 ENCOUNTER — TRANSFERRED RECORDS (OUTPATIENT)
Dept: HEALTH INFORMATION MANAGEMENT | Facility: CLINIC | Age: 80
End: 2020-05-06

## 2020-05-22 ENCOUNTER — TELEPHONE (OUTPATIENT)
Dept: URGENT CARE | Facility: URGENT CARE | Age: 80
End: 2020-05-22

## 2020-05-22 NOTE — TELEPHONE ENCOUNTER
"Patient sister, Judith, called to report she has not heard from Dr. hardy and that patient is still not taking any warfarin or diabetic medication.  Sister was advised that Judith, ACC nurse, from [facility] is not in until next Wednesday.  Judith, sister, reprots \"that is fine, I want to talk with her and it can wait until she's back.\"    Writer sending to [facility] for Judith to address when she returns at patients caregiver's request.    Thanks,  Maddi Finney RN    "

## 2020-06-19 ENCOUNTER — TRANSFERRED RECORDS (OUTPATIENT)
Dept: HEALTH INFORMATION MANAGEMENT | Facility: CLINIC | Age: 80
End: 2020-06-19

## 2020-10-07 ENCOUNTER — DOCUMENTATION ONLY (OUTPATIENT)
Dept: INTERNAL MEDICINE | Facility: CLINIC | Age: 80
End: 2020-10-07

## 2020-10-07 DIAGNOSIS — I63.9 CEREBROVASCULAR ACCIDENT (CVA), UNSPECIFIED MECHANISM (H): ICD-10-CM

## 2020-10-07 NOTE — PROGRESS NOTES
ANTICOAGULATION  MANAGEMENT    Jose Lees is being discharged from the [facility] Anticoagulation Management Program (ACC).    Reason for discharge: non-compliance with Anticoagulation Management Program; care returned to primary care provider    Anticoagulation episode resolved, ACC referral closed and INR Standing order discontinued    Patient may be accepted back in ACC program after an office visit to discuss non-compliance and check INR. Patient must be agreeable to follow the monitoring recommendations of the program and will need a new referral to be re-enrolled.     Last notes indicated patient is not taking warfarin. He has not seen a [facility] provider since 1-.

## 2020-10-15 NOTE — PATIENT INSTRUCTIONS
Patient's daughter reported that the patient is taking bupropion every twelve hours and the order is stating once daily. Script corrected and sent to preferred pharmacy. While Celeste was on the phone she asked about patient's UA results and was informed of Neena Adams's order for Bactrim DS. The antibiotic was also sent to the patient's preferred pharmacy and Celeste reported she will  the medications in the morning.   See what the new medication would cost, if you start then don't take the coumadin any more.  The dose will change to 3 mg daily.    Recheck next INR in 1  week    Jermain Helm

## 2020-10-28 ENCOUNTER — TRANSFERRED RECORDS (OUTPATIENT)
Dept: HEALTH INFORMATION MANAGEMENT | Facility: CLINIC | Age: 80
End: 2020-10-28

## 2020-12-14 ENCOUNTER — HEALTH MAINTENANCE LETTER (OUTPATIENT)
Age: 80
End: 2020-12-14

## 2021-01-26 ENCOUNTER — APPOINTMENT (OUTPATIENT)
Dept: CT IMAGING | Facility: CLINIC | Age: 81
DRG: 392 | End: 2021-01-26
Attending: EMERGENCY MEDICINE
Payer: COMMERCIAL

## 2021-01-26 ENCOUNTER — HOSPITAL ENCOUNTER (INPATIENT)
Facility: CLINIC | Age: 81
LOS: 2 days | Discharge: HOME | DRG: 392 | End: 2021-01-29
Attending: EMERGENCY MEDICINE | Admitting: INTERNAL MEDICINE
Payer: COMMERCIAL

## 2021-01-26 DIAGNOSIS — B37.81 ESOPHAGEAL CANDIDIASIS (H): Primary | ICD-10-CM

## 2021-01-26 DIAGNOSIS — E11.9 TYPE 2 DIABETES MELLITUS WITHOUT COMPLICATION, WITHOUT LONG-TERM CURRENT USE OF INSULIN (H): ICD-10-CM

## 2021-01-26 DIAGNOSIS — R10.84 ABDOMINAL PAIN, GENERALIZED: ICD-10-CM

## 2021-01-26 DIAGNOSIS — I63.9 CEREBROVASCULAR ACCIDENT (CVA), UNSPECIFIED MECHANISM (H): ICD-10-CM

## 2021-01-26 DIAGNOSIS — E55.9 VITAMIN D DEFICIENCY: ICD-10-CM

## 2021-01-26 DIAGNOSIS — K56.2 VOLVULUS (H): ICD-10-CM

## 2021-01-26 LAB
ABO + RH BLD: NORMAL
ABO + RH BLD: NORMAL
ALBUMIN SERPL-MCNC: 3.4 G/DL (ref 3.4–5)
ALP SERPL-CCNC: 74 U/L (ref 40–150)
ALT SERPL W P-5'-P-CCNC: 14 U/L (ref 0–70)
ANION GAP SERPL CALCULATED.3IONS-SCNC: 12 MMOL/L (ref 3–14)
AST SERPL W P-5'-P-CCNC: 15 U/L (ref 0–45)
BASOPHILS # BLD AUTO: 0.1 10E9/L (ref 0–0.2)
BASOPHILS NFR BLD AUTO: 0.5 %
BILIRUB SERPL-MCNC: 0.7 MG/DL (ref 0.2–1.3)
BLD GP AB SCN SERPL QL: NORMAL
BLOOD BANK CMNT PATIENT-IMP: NORMAL
BUN SERPL-MCNC: 16 MG/DL (ref 7–30)
CALCIUM SERPL-MCNC: 9.4 MG/DL (ref 8.5–10.1)
CHLORIDE SERPL-SCNC: 102 MMOL/L (ref 94–109)
CO2 SERPL-SCNC: 24 MMOL/L (ref 20–32)
CREAT SERPL-MCNC: 0.87 MG/DL (ref 0.66–1.25)
DIFFERENTIAL METHOD BLD: NORMAL
EOSINOPHIL # BLD AUTO: 0.1 10E9/L (ref 0–0.7)
EOSINOPHIL NFR BLD AUTO: 0.7 %
ERYTHROCYTE [DISTWIDTH] IN BLOOD BY AUTOMATED COUNT: 13.3 % (ref 10–15)
GFR SERPL CREATININE-BSD FRML MDRD: 82 ML/MIN/{1.73_M2}
GLUCOSE BLDC GLUCOMTR-MCNC: 208 MG/DL (ref 70–99)
GLUCOSE BLDC GLUCOMTR-MCNC: 226 MG/DL (ref 70–99)
GLUCOSE SERPL-MCNC: 247 MG/DL (ref 70–99)
HBA1C MFR BLD: 11.4 % (ref 0–5.6)
HCT VFR BLD AUTO: 46.2 % (ref 40–53)
HGB BLD-MCNC: 15.1 G/DL (ref 13.3–17.7)
IMM GRANULOCYTES # BLD: 0 10E9/L (ref 0–0.4)
IMM GRANULOCYTES NFR BLD: 0.3 %
INR PPP: 0.99 (ref 0.86–1.14)
LABORATORY COMMENT REPORT: NORMAL
LACTATE BLD-SCNC: 1.6 MMOL/L (ref 0.7–2)
LYMPHOCYTES # BLD AUTO: 2.1 10E9/L (ref 0.8–5.3)
LYMPHOCYTES NFR BLD AUTO: 22.6 %
MCH RBC QN AUTO: 29.5 PG (ref 26.5–33)
MCHC RBC AUTO-ENTMCNC: 32.7 G/DL (ref 31.5–36.5)
MCV RBC AUTO: 90 FL (ref 78–100)
MONOCYTES # BLD AUTO: 0.6 10E9/L (ref 0–1.3)
MONOCYTES NFR BLD AUTO: 6.8 %
NEUTROPHILS # BLD AUTO: 6.5 10E9/L (ref 1.6–8.3)
NEUTROPHILS NFR BLD AUTO: 69.1 %
NRBC # BLD AUTO: 0 10*3/UL
NRBC BLD AUTO-RTO: 0 /100
PLATELET # BLD AUTO: 193 10E9/L (ref 150–450)
PLATELET # BLD EST: NORMAL 10*3/UL
POTASSIUM SERPL-SCNC: 3.5 MMOL/L (ref 3.4–5.3)
PROT SERPL-MCNC: 7.9 G/DL (ref 6.8–8.8)
RBC # BLD AUTO: 5.12 10E12/L (ref 4.4–5.9)
RBC MORPH BLD: NORMAL
SARS-COV-2 RNA RESP QL NAA+PROBE: NEGATIVE
SODIUM SERPL-SCNC: 138 MMOL/L (ref 133–144)
SPECIMEN EXP DATE BLD: NORMAL
SPECIMEN SOURCE: NORMAL
WBC # BLD AUTO: 9.5 10E9/L (ref 4–11)

## 2021-01-26 PROCEDURE — 86901 BLOOD TYPING SEROLOGIC RH(D): CPT | Performed by: EMERGENCY MEDICINE

## 2021-01-26 PROCEDURE — 250N000009 HC RX 250: Performed by: EMERGENCY MEDICINE

## 2021-01-26 PROCEDURE — 258N000003 HC RX IP 258 OP 636: Performed by: EMERGENCY MEDICINE

## 2021-01-26 PROCEDURE — 250N000013 HC RX MED GY IP 250 OP 250 PS 637: Performed by: PHYSICIAN ASSISTANT

## 2021-01-26 PROCEDURE — 999N001017 HC STATISTIC GLUCOSE BY METER IP

## 2021-01-26 PROCEDURE — 86850 RBC ANTIBODY SCREEN: CPT | Performed by: EMERGENCY MEDICINE

## 2021-01-26 PROCEDURE — 86900 BLOOD TYPING SEROLOGIC ABO: CPT | Performed by: EMERGENCY MEDICINE

## 2021-01-26 PROCEDURE — C9803 HOPD COVID-19 SPEC COLLECT: HCPCS

## 2021-01-26 PROCEDURE — 99220 PR INITIAL OBSERVATION CARE,LEVEL III: CPT | Performed by: INTERNAL MEDICINE

## 2021-01-26 PROCEDURE — 250N000011 HC RX IP 250 OP 636: Performed by: EMERGENCY MEDICINE

## 2021-01-26 PROCEDURE — 250N000012 HC RX MED GY IP 250 OP 636 PS 637: Performed by: INTERNAL MEDICINE

## 2021-01-26 PROCEDURE — G0378 HOSPITAL OBSERVATION PER HR: HCPCS

## 2021-01-26 PROCEDURE — 99285 EMERGENCY DEPT VISIT HI MDM: CPT | Mod: 25

## 2021-01-26 PROCEDURE — 71260 CT THORAX DX C+: CPT

## 2021-01-26 PROCEDURE — 85610 PROTHROMBIN TIME: CPT | Performed by: EMERGENCY MEDICINE

## 2021-01-26 PROCEDURE — 96360 HYDRATION IV INFUSION INIT: CPT

## 2021-01-26 PROCEDURE — 83605 ASSAY OF LACTIC ACID: CPT | Performed by: EMERGENCY MEDICINE

## 2021-01-26 PROCEDURE — 96372 THER/PROPH/DIAG INJ SC/IM: CPT | Performed by: INTERNAL MEDICINE

## 2021-01-26 PROCEDURE — 83036 HEMOGLOBIN GLYCOSYLATED A1C: CPT | Performed by: PHYSICIAN ASSISTANT

## 2021-01-26 PROCEDURE — 85025 COMPLETE CBC W/AUTO DIFF WBC: CPT | Performed by: EMERGENCY MEDICINE

## 2021-01-26 PROCEDURE — 87635 SARS-COV-2 COVID-19 AMP PRB: CPT | Performed by: EMERGENCY MEDICINE

## 2021-01-26 PROCEDURE — 80053 COMPREHEN METABOLIC PANEL: CPT | Performed by: EMERGENCY MEDICINE

## 2021-01-26 RX ORDER — ONDANSETRON 4 MG/1
4 TABLET, ORALLY DISINTEGRATING ORAL EVERY 6 HOURS PRN
Status: DISCONTINUED | OUTPATIENT
Start: 2021-01-26 | End: 2021-01-29 | Stop reason: HOSPADM

## 2021-01-26 RX ORDER — NICOTINE POLACRILEX 4 MG
15-30 LOZENGE BUCCAL
Status: DISCONTINUED | OUTPATIENT
Start: 2021-01-26 | End: 2021-01-29 | Stop reason: HOSPADM

## 2021-01-26 RX ORDER — IOPAMIDOL 755 MG/ML
81 INJECTION, SOLUTION INTRAVASCULAR ONCE
Status: COMPLETED | OUTPATIENT
Start: 2021-01-26 | End: 2021-01-26

## 2021-01-26 RX ORDER — SODIUM CHLORIDE 9 MG/ML
INJECTION, SOLUTION INTRAVENOUS CONTINUOUS
Status: DISCONTINUED | OUTPATIENT
Start: 2021-01-26 | End: 2021-01-26

## 2021-01-26 RX ORDER — DEXTROSE MONOHYDRATE 25 G/50ML
25-50 INJECTION, SOLUTION INTRAVENOUS
Status: DISCONTINUED | OUTPATIENT
Start: 2021-01-26 | End: 2021-01-29 | Stop reason: HOSPADM

## 2021-01-26 RX ORDER — PROCHLORPERAZINE MALEATE 5 MG
5 TABLET ORAL EVERY 6 HOURS PRN
Status: DISCONTINUED | OUTPATIENT
Start: 2021-01-26 | End: 2021-01-29 | Stop reason: HOSPADM

## 2021-01-26 RX ORDER — PROCHLORPERAZINE 25 MG
12.5 SUPPOSITORY, RECTAL RECTAL EVERY 12 HOURS PRN
Status: DISCONTINUED | OUTPATIENT
Start: 2021-01-26 | End: 2021-01-29 | Stop reason: HOSPADM

## 2021-01-26 RX ORDER — ONDANSETRON 2 MG/ML
4 INJECTION INTRAMUSCULAR; INTRAVENOUS EVERY 6 HOURS PRN
Status: DISCONTINUED | OUTPATIENT
Start: 2021-01-26 | End: 2021-01-29 | Stop reason: HOSPADM

## 2021-01-26 RX ORDER — ACETAMINOPHEN 325 MG/1
650 TABLET ORAL EVERY 4 HOURS PRN
Status: DISCONTINUED | OUTPATIENT
Start: 2021-01-26 | End: 2021-01-29 | Stop reason: HOSPADM

## 2021-01-26 RX ADMIN — SODIUM CHLORIDE 65 ML: 9 INJECTION, SOLUTION INTRAVENOUS at 17:39

## 2021-01-26 RX ADMIN — SODIUM CHLORIDE 1000 ML: 9 INJECTION, SOLUTION INTRAVENOUS at 17:08

## 2021-01-26 RX ADMIN — IOPAMIDOL 81 ML: 755 INJECTION, SOLUTION INTRAVENOUS at 17:39

## 2021-01-26 RX ADMIN — INSULIN ASPART 1 UNITS: 100 INJECTION, SOLUTION INTRAVENOUS; SUBCUTANEOUS at 22:50

## 2021-01-26 RX ADMIN — Medication 1 LOZENGE: at 23:49

## 2021-01-26 ASSESSMENT — ENCOUNTER SYMPTOMS
APPETITE CHANGE: 1
VOMITING: 0
NAUSEA: 1

## 2021-01-26 ASSESSMENT — MIFFLIN-ST. JEOR: SCORE: 1440.21

## 2021-01-26 NOTE — ED TRIAGE NOTES
"Patient reports being unable to eat for the last 3 days. He reports vomiting due to \"phlegm\".   "

## 2021-01-26 NOTE — ED PROVIDER NOTES
"  History     Chief Complaint:  Nausea, Vomiting, & Diarrhea       HPI  Jose Lees is a 80 year old male with a history of MI, type 2 diabetes, and stroke who presents for evaluation of nausea. The patient reports that for the last 4 days he has had a loss of appetite, nausea, and has been \"unable to keep food down\", but denies any vomiting. He states that mucous is coming up. He notes that he had his COVID-19 shot 2 weeks ago. Per the wife, she has concerns that he stopped taking his metformin and would like his legs examined.     Review of Systems   Constitutional: Positive for appetite change.   Gastrointestinal: Positive for nausea. Negative for vomiting.   All other systems reviewed and are negative.      Allergies:  No Known Allergies    Medications:   Zytiga  Aspirin 81  Lipitor  Vitamin D  Amaryl  Glucophage   Prilosec  Zoloft  Metoprolol   Coumadin     Medical History:   Prostate cancer   Autism disorder  Myocardial infarction  Stroke  Congestive heart failure  Long term current use of anticoagulant therapy  Stricture and stenosis of esophagus  Transient ischemic attack and stroke  Type 2 diabetes mellitus without complication, without long-term current use of insulin   Longstanding persistent atrial fibrillation   Mild major depression   Cerebrovascular accident    Past Surgical History:    Coronary angiography adult order  Heart cath, angioplasty   Phacoemulsification clear cornea with standard intraocular lens implant     Family History:    Mother: cerebrovascular disease  Father: CAD    Social History:  The patient was accompanied to the ED by his wife.  PCP: Kostas Cortes     Physical Exam     Patient Vitals for the past 24 hrs:   BP Temp Temp src Pulse Resp SpO2 Height Weight   01/26/21 1805 122/70 -- -- 94 -- 98 % -- --   01/26/21 1710 -- -- -- -- -- 100 % -- --   01/26/21 1607 109/60 98.1  F (36.7  C) Temporal 104 12 98 % 1.753 m (5' 9\") 73.5 kg (162 lb)        Physical Exam  Vitals: " reviewed by me  General: Pt seen on [facility], cooperative, and alert to conversation  Eyes: Tracking well, clear conjunctiva BL  ENT: MMM, midline trachea.   Lungs:   No tachypnea, no accessory muscle use. No respiratory distress.   CV: Rate as above, regular rhythm.    Abd: Soft, left upper and left lower quadrants with tenderness to palpation, no guarding, no rebound.  MSK: no peripheral edema or joint effusion.  No evidence of trauma  Skin: No rash, normal turgor and temperature  Neuro: Clear speech and no facial droop.  Psych: Not RIS, no e/o AH/VH        Emergency Department Course   Imaging:  CT Chest/Abdomen/Pelvis w Contrast  1. The sigmoid colon appears to be mildly distended and displaced into  the upper mid abdomen/left upper quadrant, however with no associated  mesenteric twisting or bowel obstruction. Finding is nonspecific, but  might predispose patient into sigmoid volvulus.  2. 6 mm fat attenuating lesion abutting the posterior aspect of the  esophagus, could represent small food residue versus small submucosal  lipoma. No other radiodense foreign body seen within the esophageal  lumen which appears mildly dilated/patulous.  3. Numerous bilateral pulmonary nodules, worrisome for metastatic  disease.  4. The prostate gland although not significantly enlarged, appears  diffusely enhancing, nonspecific, can be due to underlying prostatitis  or prostate malignancy, recommend correlation with serum  prostate-specific antigen.  5. Cholelithiasis without CT evidence of acute cholecystitis.  6. 1.3 cm partially calcified hypoattenuating focus in the left  hepatic lobe, of indeterminate etiology.    GREGORIO SHANNON MD    Reading per radiology     Laboratory:  CBC: WBC 9.5, HGB 15.1,   CMP: glucose 247 (H) o/w WNL (Creatinine 0.87)  ABO/Rh type and screen: A positive  INR: 0.99  Lactic Acid (Resulted: 1913): 1.6  Asymptomatic COVID-19 Virus (Coronavirus) by PCR Nasopharyngeal swab:  pending    Emergency Department Course:  Reviewed:  1653 I reviewed nursing notes, vitals, past medical history and care everywhere    Assessment  1901 Patient rechecked and updated.      Consults:   1831 I spoke with Dr. Hollins of the hospitalist service from [facility] regarding patient's presentation, findings, and plan of care.     Interventions:  1708 0.9% NS 1000 mL IV    Disposition:  The patient was admitted to the hospital under the care of Dr. Hollins.   Impression & Plan   Covid-19  Jose Lees was evaluated during a global COVID-19 pandemic, which necessitated consideration that the patient might be at risk for infection with the SARS-CoV-2 virus that causes COVID-19.   Applicable protocols for evaluation were followed during the patient's care.   COVID-19 was considered as part of the patient's evaluation. The plan for testing is:  a test was obtained during this visit.    Medical Decision Making:  Jose Lees is a very pleasant 80 year old male who presents to the emergency room with abdominal pain and a CT scan concerning for an early volvulus. He is tender over this area, but not peritonitic, no vomiting, and had negative labs which is of course reassuring. His vitals are also reassuring. I have an abundance of caution, I do think that he does need to be admitted, and at least sen by surgery tomorrow, and am fine with observation . He is not requiring much in the way of pain control, and he and his sister both feel comfortable being admitted. No antibiotics indicated at this point, no evidence of ischemic colitis or overwhelming bacterial infection. I do think that he stable for very careful monitoring and admission as above. I spoke with Dr. Hollins of the hospitalist team who very kindly agreed to accept care of the patient .       Diagnosis:     ICD-10-CM    1. Abdominal pain, generalized  R10.84 Asymptomatic SARS-CoV-2 COVID-19 Virus (Coronavirus) by PCR   2. 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     [facility]     Buck Larsen MD  01/26/21 0674

## 2021-01-27 ENCOUNTER — ANESTHESIA EVENT (OUTPATIENT)
Dept: GASTROENTEROLOGY | Facility: CLINIC | Age: 81
DRG: 392 | End: 2021-01-27
Payer: COMMERCIAL

## 2021-01-27 ENCOUNTER — ANESTHESIA (OUTPATIENT)
Dept: GASTROENTEROLOGY | Facility: CLINIC | Age: 81
DRG: 392 | End: 2021-01-27
Payer: COMMERCIAL

## 2021-01-27 ENCOUNTER — ANESTHESIA EVENT (OUTPATIENT)
Dept: SURGERY | Facility: CLINIC | Age: 81
DRG: 392 | End: 2021-01-27
Payer: COMMERCIAL

## 2021-01-27 ENCOUNTER — DOCUMENTATION ONLY (OUTPATIENT)
Dept: OTHER | Facility: CLINIC | Age: 81
End: 2021-01-27

## 2021-01-27 LAB
ANION GAP SERPL CALCULATED.3IONS-SCNC: 7 MMOL/L (ref 3–14)
BUN SERPL-MCNC: 12 MG/DL (ref 7–30)
CALCIUM SERPL-MCNC: 8.6 MG/DL (ref 8.5–10.1)
CHLORIDE SERPL-SCNC: 107 MMOL/L (ref 94–109)
CO2 SERPL-SCNC: 25 MMOL/L (ref 20–32)
CREAT SERPL-MCNC: 0.66 MG/DL (ref 0.66–1.25)
GFR SERPL CREATININE-BSD FRML MDRD: >90 ML/MIN/{1.73_M2}
GLUCOSE BLDC GLUCOMTR-MCNC: 148 MG/DL (ref 70–99)
GLUCOSE BLDC GLUCOMTR-MCNC: 192 MG/DL (ref 70–99)
GLUCOSE BLDC GLUCOMTR-MCNC: 215 MG/DL (ref 70–99)
GLUCOSE BLDC GLUCOMTR-MCNC: 302 MG/DL (ref 70–99)
GLUCOSE SERPL-MCNC: 195 MG/DL (ref 70–99)
POTASSIUM SERPL-SCNC: 3.9 MMOL/L (ref 3.4–5.3)
SODIUM SERPL-SCNC: 139 MMOL/L (ref 133–144)
UPPER GI ENDOSCOPY: NORMAL

## 2021-01-27 PROCEDURE — 258N000003 HC RX IP 258 OP 636: Performed by: NURSE ANESTHETIST, CERTIFIED REGISTERED

## 2021-01-27 PROCEDURE — 97602 WOUND(S) CARE NON-SELECTIVE: CPT

## 2021-01-27 PROCEDURE — G0463 HOSPITAL OUTPT CLINIC VISIT: HCPCS | Mod: 25

## 2021-01-27 PROCEDURE — 0D758ZZ DILATION OF ESOPHAGUS, VIA NATURAL OR ARTIFICIAL OPENING ENDOSCOPIC: ICD-10-PCS | Performed by: INTERNAL MEDICINE

## 2021-01-27 PROCEDURE — 80048 BASIC METABOLIC PNL TOTAL CA: CPT | Performed by: INTERNAL MEDICINE

## 2021-01-27 PROCEDURE — 250N000009 HC RX 250: Performed by: NURSE ANESTHETIST, CERTIFIED REGISTERED

## 2021-01-27 PROCEDURE — 99233 SBSQ HOSP IP/OBS HIGH 50: CPT | Performed by: INTERNAL MEDICINE

## 2021-01-27 PROCEDURE — 43249 ESOPH EGD DILATION <30 MM: CPT | Performed by: INTERNAL MEDICINE

## 2021-01-27 PROCEDURE — G0378 HOSPITAL OBSERVATION PER HR: HCPCS

## 2021-01-27 PROCEDURE — 120N000004 HC R&B MS OVERFLOW

## 2021-01-27 PROCEDURE — 999N001017 HC STATISTIC GLUCOSE BY METER IP

## 2021-01-27 PROCEDURE — 250N000011 HC RX IP 250 OP 636: Performed by: NURSE ANESTHETIST, CERTIFIED REGISTERED

## 2021-01-27 PROCEDURE — 370N000017 HC ANESTHESIA TECHNICAL FEE, PER MIN: Performed by: INTERNAL MEDICINE

## 2021-01-27 PROCEDURE — 999N000010 HC STATISTIC ANES STAT CODE-CRNA PER MINUTE: Performed by: INTERNAL MEDICINE

## 2021-01-27 PROCEDURE — 36415 COLL VENOUS BLD VENIPUNCTURE: CPT | Performed by: INTERNAL MEDICINE

## 2021-01-27 RX ORDER — NALOXONE HYDROCHLORIDE 0.4 MG/ML
0.4 INJECTION, SOLUTION INTRAMUSCULAR; INTRAVENOUS; SUBCUTANEOUS
Status: ACTIVE | OUTPATIENT
Start: 2021-01-27 | End: 2021-01-28

## 2021-01-27 RX ORDER — LIDOCAINE HYDROCHLORIDE 20 MG/ML
SOLUTION OROPHARYNGEAL PRN
Status: DISCONTINUED | OUTPATIENT
Start: 2021-01-27 | End: 2021-01-27

## 2021-01-27 RX ORDER — LIDOCAINE HYDROCHLORIDE 20 MG/ML
INJECTION, SOLUTION INFILTRATION; PERINEURAL PRN
Status: DISCONTINUED | OUTPATIENT
Start: 2021-01-27 | End: 2021-01-27

## 2021-01-27 RX ORDER — PROPOFOL 10 MG/ML
INJECTION, EMULSION INTRAVENOUS CONTINUOUS PRN
Status: DISCONTINUED | OUTPATIENT
Start: 2021-01-27 | End: 2021-01-27

## 2021-01-27 RX ORDER — SODIUM CHLORIDE, SODIUM LACTATE, POTASSIUM CHLORIDE, CALCIUM CHLORIDE 600; 310; 30; 20 MG/100ML; MG/100ML; MG/100ML; MG/100ML
INJECTION, SOLUTION INTRAVENOUS CONTINUOUS PRN
Status: DISCONTINUED | OUTPATIENT
Start: 2021-01-27 | End: 2021-01-27

## 2021-01-27 RX ORDER — LIDOCAINE 40 MG/G
CREAM TOPICAL
Status: DISCONTINUED | OUTPATIENT
Start: 2021-01-27 | End: 2021-01-27 | Stop reason: HOSPADM

## 2021-01-27 RX ORDER — PROPOFOL 10 MG/ML
INJECTION, EMULSION INTRAVENOUS PRN
Status: DISCONTINUED | OUTPATIENT
Start: 2021-01-27 | End: 2021-01-27

## 2021-01-27 RX ORDER — FLUMAZENIL 0.1 MG/ML
0.2 INJECTION, SOLUTION INTRAVENOUS
Status: ACTIVE | OUTPATIENT
Start: 2021-01-27 | End: 2021-01-27

## 2021-01-27 RX ORDER — NALOXONE HYDROCHLORIDE 0.4 MG/ML
0.2 INJECTION, SOLUTION INTRAMUSCULAR; INTRAVENOUS; SUBCUTANEOUS
Status: ACTIVE | OUTPATIENT
Start: 2021-01-27 | End: 2021-01-28

## 2021-01-27 RX ADMIN — LIDOCAINE HYDROCHLORIDE 5 ML: 20 SOLUTION ORAL; TOPICAL at 10:57

## 2021-01-27 RX ADMIN — PROPOFOL 30 MG: 10 INJECTION, EMULSION INTRAVENOUS at 11:09

## 2021-01-27 RX ADMIN — PROPOFOL 135 MCG/KG/MIN: 10 INJECTION, EMULSION INTRAVENOUS at 11:06

## 2021-01-27 RX ADMIN — PROPOFOL 30 MG: 10 INJECTION, EMULSION INTRAVENOUS at 11:15

## 2021-01-27 RX ADMIN — PROPOFOL 40 MG: 10 INJECTION, EMULSION INTRAVENOUS at 11:06

## 2021-01-27 RX ADMIN — SODIUM CHLORIDE, POTASSIUM CHLORIDE, SODIUM LACTATE AND CALCIUM CHLORIDE: 600; 310; 30; 20 INJECTION, SOLUTION INTRAVENOUS at 11:01

## 2021-01-27 RX ADMIN — LIDOCAINE HYDROCHLORIDE 40 MG: 20 INJECTION, SOLUTION INFILTRATION; PERINEURAL at 11:06

## 2021-01-27 RX ADMIN — INSULIN ASPART 3 UNITS: 100 INJECTION, SOLUTION INTRAVENOUS; SUBCUTANEOUS at 21:02

## 2021-01-27 ASSESSMENT — ACTIVITIES OF DAILY LIVING (ADL)
ADLS_ACUITY_SCORE: 18
ADLS_ACUITY_SCORE: 18

## 2021-01-27 ASSESSMENT — ENCOUNTER SYMPTOMS: DYSRHYTHMIAS: 1

## 2021-01-27 NOTE — PROGRESS NOTES
GI: EGD showed two severe benign appearing strictures. Proximal stenosis at 18 mm dilated to 12 mm. Could not easily pass balloon through distal stricture at 40 cm.    Plan  -Soft diet today  -Repeat EGD/dilation with fluoroscopy tomorrow    Audra Huang MD  [facility]  Phone [phone] until 5 pm  Office [phone] for after hours on call provider

## 2021-01-27 NOTE — PROGRESS NOTES
Pt is A&Ox4, VSS on RA, Denies pain, N/V/D,  Mech Soft diet, IV SL, Assist of 1 w/ GB and walker, Upper Endo done during shift, Fluoroscopy pending tomorrow, BLE wound, Wound nurse consulted, OT pending, n.p.o at midnight. Continue to monitor

## 2021-01-27 NOTE — PLAN OF CARE
Covid neg. A/Ox4, forgetful. VSS on RA. NPO since 0000. SBA w/ walker. Denies N/V/D or pain this shift. Has bilateral wounds on shins w/ large scabs. WOC to see pt today. Abdominal CT showed Volvulus, GI to consult along w/ OT today. Continue to monitor.       Observation Goals:       -tolerating oral intake to maintain hydration: Not met, NPO     Nurse to notify provider when observation goals have been met and patient is ready for discharge.

## 2021-01-27 NOTE — ANESTHESIA CARE TRANSFER NOTE
Patient: Jose Lees    Procedure(s):  ESOPHAGOGASTRODUODENOSCOPY, WITH BALLOON DILATION OF LESS THAN 30 MILLIMETERS    Diagnosis: Dysphagia [R13.10]  Diagnosis Additional Information: No value filed.    Anesthesia Type:   MAC     Note:    Oropharynx: oropharynx clear of all foreign objects  Level of Consciousness: awake  Oxygen Supplementation: nasal cannula  Level of Supplemental Oxygen: 2  Independent Airway: airway patency satisfactory and stable  Dentition: dentition unchanged  Vital Signs Stable: post-procedure vital signs reviewed and stable  Report to RN Given: handoff report given  Patient transferred to:  Recovery    Handoff Report: Identifed the Patient, Identified the Reponsible Provider, Reviewed the pertinent medical history, Discussed the surgical course, Reviewed Intra-OP anesthesia mangement and issues during anesthesia, Set expectations for post-procedure period and Allowed opportunity for questions and acknowledgement of understanding      Vitals: (Last set prior to Anesthesia Care Transfer)  CRNA VITALS  1/27/2021 1055 - 1/27/2021 1129      1/27/2021             SpO2:  100 %    Resp Rate (set):  10        Electronically Signed By: SARATH Montano CRNA  January 27, 2021  11:29 AM

## 2021-01-27 NOTE — CONSULTS
Care Management Initial Consult    General Information  Assessment completed with: Caregiver, Meseret, clinical care coordinator [phone] and sister Judith [phone]  Type of CM/SW Visit: Initial Assessment    Primary Care Provider verified and updated as needed: Yes(in 2020 was seeing Dr. Ferrari at [facility] [phone])   Readmission within the last 30 days:        Reason for Consult: discharge planning, medication concerns  Advance Care Planning:    has Health Care Directive dated 2/16/18 and POLST dated 2/14/17 scanned, will have [email or web address] review again to update chart   General Information Comments: it is noted pt has not been taking his medications for several months including diabetes meds and anticoagulation medications     Communication Assessment  Patient's communication style: spoken language (English or Bilingual)    Hearing Difficulty or Deaf: yes   Wear Glasses or Blind: no    Cognitive  Cognitive/Neuro/Behavioral: WDL                      Living Environment:   People in home: alone     Current living Arrangements: assisted living, apartment  Name of Facility: [facility]    Able to return to prior arrangements:         Family/Social Support:  Care provided by: self  Provides care for:    Marital Status: Single  Sibling(s)        Sister   Description of Support System: Supportive, Involved       Current Resources:   Skilled Home Care Services:    Community Resources: Housekeeping/Chore Agency(meals available at [facility] )  Equipment currently used at home: walker, rolling(wheeled walker with seat and brakes )  Supplies currently used at home:      Employment/Financial:  Employment Status: retired     Employment/ Comments: worked as a  and also in a variety of other positions in the [employer] industry  Financial Concerns: No concerns identified      Finance Comments: active BCBS/BCBS MEDICARE ADVANTAGE insurance     Lifestyle & Psychosocial Needs:  Socioeconomic  History     Marital status: Single     Spouse name: Not on file     Number of children: Not on file     Years of education: Not on file     Highest education level: Not on file     Tobacco Use     Smoking status: Never Smoker     Smokeless tobacco: Never Used   Substance and Sexual Activity     Alcohol use: No     Comment: never     Drug use: No     Sexual activity: Not Currently     Partners: Male       Functional Status:  Prior to admission patient needed assistance: independent but slow with mobility      Mental Health Status:  Mental Health Status: No Current Concerns       Chemical Dependency Status:  Chemical Dependency Status: No Current Concerns           Values/Beliefs:  Spiritual, Cultural Beliefs, Zoroastrian Practices, Values that affect care: no          Values/Beliefs Comment: Spiritism, non-Lutheran     Additional Information:  Contacted pt's place of residence for information: [facility] ([phone])  + [facility], clinical coordinator requesting baseline services etc.  + Callback received: pt is in an independent apartment, staff are not doing any services for him.     Per Chart notes,     The patient, per his sister's report, does have a good understanding of his underlying medical illnesses and his healthcare.  She does note she is his medical power of  and assists him with medical decision-making as needed.  However, she says he typically signs his own consents.  He lives independently in [facility] assisted living facility.     and  His sister does help with cares routinely but this is been limited in recent months due to the pandemic. Apparently Deshawn stopped taking all of his medications several months ago. He began having trouble swallowing for at least the last 4 to 5 days. He relates difficulties keeping food down but denies overt vomiting.    First contact is sister, who is also pt's Health Care Agent if pt found to not have medical  "decision-making capacity.  She has rubberit My Chart auth on file as pt's proxy.  Placed call, she and her  helped with baseline status.  Pt lives at [facility] in independent living.  Services are available \"like meals when he wants them--three meals per day that they can buy, they will deliver to the apartment if he wants.\"  He also has housekeeping services, \"His place is cleaned once per week.\"  At baseline mobility pt \"can get around but seems to be getting a little weaker--he has Muscular degeneration, walks really slow.\"  Sister notes pt has been \"Losing weight due to not taking diabetic medications\" and she is aware that he had an Endoscopy today, \"he had food stuck in the esophagus--they did a dilation (widening), which he has had that done twice before.\"  Regarding pt stopping his medications a few months ago, \"that's a problem, it's hard to know what to do; I order them and fill them up, but he just doesn't take them.  Even if I had a nurse bring them, he wouldn't take them.  It takes him like an hour to get them down if he does, and then he says he feels sick from them.\"  She was not aware of the sores on his legs, \"he normally wears pants.\"  If he was Rx'd edemawear, \"he would not be able to get on himself.\"      For \"aftercare,\" he would be compliant with appointments / followup with other doctors, she can attend with him.  She verifies Dr. Ferrari as PCP, I updated this in EPIC.  Ideally he would go to Transitional Care and \"our first choice would be [facility] since he lives there.\"  She thinks he would be compliant with Home Care services if they were recommended/ordered but she is anticipating TCU would be best before transition back home.  \"He needs be told to get up and do his exercises daily, he mostly lays on the couch watching TV.\"  She mentions, pt is \"due for second COVID vaccine soon, got the first last week.\"      CM-RN and CM-SW to follow for therapy discharge " recommendations and assist in discharge planning.   CM-RN did submit Clinic Care Coordinator order.     Isis Schofield RN, BSN, PHN  [facility]  Inpatient Care Management  Direct Mobile: [phone]

## 2021-01-27 NOTE — UTILIZATION REVIEW
[facility] Utilization Review  Admission Status; Secondary Review Determination     Admission Date: 1/26/2021  4:45 PM      Under the authority of the Utilization Management Committee, the utilization review process indicated a secondary review on the above patient.  The review outcome is based on review of the medical records, discussions with staff, and applying clinical experience noted on the date of the review.        (X)      Inpatient Status Appropriate - This patient's medical care is consistent with medical management for inpatient care and reasonable inpatient medical practice.          RATIONALE FOR DETERMINATION   80-year-old M with history of coronary artery disease, stenting, hyperlipidemia, embolic stroke, persistent atrial fibrillation, not on anticoagulation, adhesiolysis type II, esophageal stricture, autism, prostate cancer, depression admitted for decreased appetite, nausea and inability to keep food down.  Initial work-up unremarkable, EGD showed severe benign-appearing stricture, could not pass scope through distal stricture, plan for repeat EGD/dilatation with fluoroscopy tomorrow.  Complex patient with inability to keep food down, needs dilatation of stricture, failed dilatation today, needs ongoing interventions, start diet and need to monitor if patient will tolerate diet well, with ongoing cares, anticipate more than 2 midnight hospital stay, recommend change to inpatient status, communicated to Dr. Raymond      The severity of illness, intensity of service provided, expected LOS and risk for adverse outcome make the care complex, high risk and appropriate for hospital admission.The patient requires hospital based medical care which is anticipated to require a stay of 2 or more midnights; according to CMS guidelines the patient should be admitted as inpatient        The information on this document is developed by the utilization review team in order for the business office to  ensure compliance.  This only denotes the appropriateness of proper admission status and does not reflect the quality of care rendered.         The definitions of Inpatient Status and Observation Status used in making the determination above are those provided in the CMS Coverage Manual, Chapter 1 and Chapter 6, section 70.4.      Sincerely,       Tru Sampson MD  Physician Advisor  Utilization Review-[place]    Phone: [phone]

## 2021-01-27 NOTE — PHARMACY-ADMISSION MEDICATION HISTORY
Pharmacy Medication History  Admission medication history interview status for the 1/26/2021  admission is complete. See EPIC admission navigator for prior to admission medications     Location of Interview: Phone  Medication history sources: Patient's family/friend (sister), Judith.  Medication history source reliability:   Adherence assessment: Poor    Significant changes made to the medication list:  -Pt lives at [facility], independent living. Per pt's sister, pt has not his meds for over 3 months. He stopped taking them. Left them on PTA list for informational purpose (what he is supposed to take).     In the past week, patient estimated taking medication this percent of the time: less than 50% due to stopped taking them because they make him sick per sister.    Additional medication history information:       Medication reconciliation completed by provider prior to medication history? No    Time spent in this activity: 15 min      Prior to Admission medications    Medication Sig Last Dose Taking? Auth Provider   abiraterone (ZYTIGA) 500 MG TABS tablet Take 2 tablets (1,000 mg) by mouth daily   Nolberto Ferrari MD   ACE/ARB/ARNI NOT PRESCRIBED, INTENTIONAL, ACE & ARB not prescribed due to Symptomatic hypotension not due to excessive diuresis   Mariela Stauffer MD   aspirin 81 MG chewable tablet Take 1 tablet (81 mg) by mouth daily   Gely Urbina DO   atorvastatin (LIPITOR) 40 MG tablet Take 1 tablet by mouth daily   Mariela Stauffer MD   blood glucose (NO BRAND SPECIFIED) test strip Use to test blood sugar 1 time daily or as directed.   Nolberto Ferrari MD   blood glucose calibration (NO BRAND SPECIFIED) solution Use to calibrate blood glucose monitor as needed as directed.   Nolberto Ferrari MD   blood glucose monitoring (NO BRAND SPECIFIED) meter device kit Use to test blood sugar one time daily or as directed.   Nolberto Ferrari MD   blood glucose monitoring (SOFTCLIX) lancets Use to test  blood sugar one time daily.   Nolberto Ferrari MD   Cholecalciferol (VITAMIN D) 2000 units CAPS Take 3,000 Units by mouth daily Takes  3000 mg   Reported, Patient   glimepiride (AMARYL) 1 MG tablet Take 2 tabs by mouth every am.   Mariela Stauffer MD   Leuprolide Acetate, 6 Month, (LUPRON DEPOT, 6-MONTH, IM)    Reported, Patient   metFORMIN (GLUCOPHAGE) 500 MG tablet TAKE 2 TABLETS TWICE DAILY WITH MEALS   Mariela Stauffer MD   metoprolol succinate ER (TOPROL-XL) 25 MG 24 hr tablet TAKE 1 TABLET EVERY DAY   Nolberto Ferrari MD   omeprazole (PRILOSEC) 20 MG DR capsule TAKE 1 CAPSULE (20 MG) BY MOUTH DAILY   Jermain Helm MD   sertraline (ZOLOFT) 50 MG tablet TAKE 1 TABLET EVERY DAY   Nolberto Ferrari MD   thin (NO BRAND SPECIFIED) lancets Use to test blood sugar one time daily or as directed.   Nolberto Ferrari MD   warfarin ANTICOAGULANT (COUMADIN) 2 MG tablet 1 tab daily except take 2 tabs on Wednesdays   Nolberto Ferrari MD

## 2021-01-27 NOTE — ANESTHESIA PREPROCEDURE EVALUATION
Anesthesia Pre-Procedure Evaluation    Patient: Jose Lees   MRN: [id number] : 1940        Preoperative Diagnosis: Dysphagia [R13.10]   Procedure : Procedure(s):  ESOPHAGOGASTRODUODENOSCOPY (EGD)     Past Medical History:   Diagnosis Date     ACP (advance care planning)     Form given     Acute anterior wall MI (H) 2014     Autism disorder 2012     CHF (congestive heart failure) (H)     ECHO EF=25-30% on 14     Coronary artery disease 2014 HEART CATH - 70% mid LAD -- BMS placed, small 1st diagonal 80%, RCA 60-70% before crux, 70% mid PDA     Dementia (H)      Hyperlipidaemia      Longstanding persistent atrial fibrillation 2020     Mild major depression (H) 2020     Prostate cancer (H) 10/11    GLEASOR 8     Stricture and stenosis of esophagus 10/22/2015     Stroke, embolic (H) 2014    bilateral cerebral embolic CVAs     Type 2 diabetes mellitus without complication, without long-term current use of insulin (H) 2017      Past Surgical History:   Procedure Laterality Date     CORONARY ANGIOGRAPHY ADULT ORDER       ESOPHAGOSCOPY, GASTROSCOPY, DUODENOSCOPY (EGD), COMBINED N/A 2015    Procedure: COMBINED ESOPHAGOSCOPY, GASTROSCOPY, DUODENOSCOPY (EGD), REMOVE FOREIGN BODY;  Surgeon: Yu Tapia MD;  Location:  GI     HEART CATH, ANGIOPLASTY      BMS to LAD     PHACOEMULSIFICATION CLEAR CORNEA WITH STANDARD INTRAOCULAR LENS IMPLANT Right 2018    Procedure: COMPLEX RIGHT EYE PHACOEMULSIFICATION CLEAR CORNEA WITH STANDARD INTRAOCULAR LENS IMPLANT;  Surgeon: Kamar Kyle MD;  Location:  EC     PHACOEMULSIFICATION CLEAR CORNEA WITH STANDARD INTRAOCULAR LENS IMPLANT Left 2018    Procedure: COMPLEX LEFT EYE PHACOEMULSIFICATION CLEAR CORNEA WITH STANDARD INTRAOCULAR LENS IMPLANT;  Surgeon: Kamar Kyle MD;  Location: [facility]      No Known Allergies   Social History     Tobacco Use     Smoking status: Never Smoker      Smokeless tobacco: Never Used   Substance Use Topics     Alcohol use: No     Comment: never      Wt Readings from Last 1 Encounters:   01/26/21 73.5 kg (162 lb)        Anesthesia Evaluation            ROS/MED HX  ENT/Pulmonary:    (-) sleep apnea   Neurologic:     (+) CVA, date: 2014,     Cardiovascular:     (+) Dyslipidemia hypertension--CAD -past MI -stent-CHF dysrhythmias, a-fib,     METS/Exercise Tolerance:     Hematologic:       Musculoskeletal:       GI/Hepatic:     (+) esophageal disease,  (-) GERD   Renal/Genitourinary:       Endo:     (+) type II DM,     Psychiatric/Substance Use: Comment: Autism;    (+) psychiatric history depression and other (comment)     Infectious Disease:       Malignancy:   (+) Malignancy, History of Prostate.    Other:            Physical Exam    Airway        Mallampati: II   TM distance: > 3 FB   Neck ROM: full   Mouth opening: > 3 cm    Respiratory Devices and Support         Dental       (+) upper dentures and lower dentures      Cardiovascular   cardiovascular exam normal          Pulmonary   pulmonary exam normal                OUTSIDE LABS:  CBC:   Lab Results   Component Value Date    WBC 9.5 01/26/2021    WBC 6.7 05/20/2019    HGB 15.1 01/26/2021    HGB 14.6 05/20/2019    HCT 46.2 01/26/2021    HCT 43.8 05/20/2019     01/26/2021     05/20/2019     BMP:   Lab Results   Component Value Date     01/27/2021     01/26/2021    POTASSIUM 3.9 01/27/2021    POTASSIUM 3.5 01/26/2021    CHLORIDE 107 01/27/2021    CHLORIDE 102 01/26/2021    CO2 25 01/27/2021    CO2 24 01/26/2021    BUN 12 01/27/2021    BUN 16 01/26/2021    CR 0.66 01/27/2021    CR 0.87 01/26/2021     (H) 01/27/2021     (H) 01/26/2021     COAGS:   Lab Results   Component Value Date    INR 0.99 01/26/2021     POC:   Lab Results   Component Value Date     (H) 01/27/2021     HEPATIC:   Lab Results   Component Value Date    ALBUMIN 3.4 01/26/2021    PROTTOTAL 7.9 01/26/2021     ALT 14 01/26/2021    AST 15 01/26/2021    ALKPHOS 74 01/26/2021    BILITOTAL 0.7 01/26/2021    BILIDIRECT 0.13 11/26/2018     OTHER:   Lab Results   Component Value Date    LACT 1.6 01/26/2021    A1C 11.4 (H) 01/26/2021    TENZIN 8.6 01/27/2021    PHOS 2.6 08/06/2015    MAG 2.2 02/09/2017    LIPASE 52 06/01/2012    TSH 5.20 (H) 01/20/2020    T4 0.97 01/20/2020       Anesthesia Plan     History & Physical Review      ASA Status:  3. NPO Status:  NPO Appropriate. .  Plan for MAC         PONV prophylaxis:  Ondansetron (or other 5HT-3).       Consents  Anesthesia Plan(s) and associated risks, benefits, and realistic alternatives discussed.    Questions answered and patient/representative(s) expressed understanding.    Discussed with:  Patient.             Postoperative Care            MP CLARK MD

## 2021-01-27 NOTE — PROGRESS NOTES
Office note 10/28/20  Reason for Visit  Mr. Jose Carmichael was seen in the office today for follow-up visit regarding history of castrate resistant prostate cancer   Assessment  1.  Castrate resistant prostate cancer with treatment as outlined in his records. At present, he has no symptoms of metastatic disease. We reviewed his recent PSA levels which show some fluctuation. In addition, we reviewed the imaging studies.  Fortunately, there is no radiographic evidence of rapid disease progression.  At this point, he prefers to remain on leuprolide but avoid other systemic therapy.  I emphasized to him that it is highly likely he will eventually of symptomatic disease progression at which time restarting additional forms of systemic therapy will be appropriate.    2.  Coronary arteries, asymptomatic    3.  Prior cerebral infarction, asymptomatic at present.    4.  Diabetes mellitus. We again reviewed the importance of taking diabetic medication on a consistent basis in order to minimize risk of complications.  .  Plan  1. Continue to hold abiraterone acetate  2. Continue leuprolide.  3. Return in 3 months for reassessment.  Prior to visit, he will have repeat laboratory studies  Advanced Care Planning  Not addressed  Pain Scale on Today's Visit  0  Pain Plan on Today's Visit  No pain plan indicated for today's visit  Smoking Status  Never smoker  Depression Screening Tool Status  Was screened; Outcome positive: No; Screening Date: 10/28/2020; Screening Tool: PRIME MD-PHQ2  ______________________________________________________________________________________________    History of Present Illness  ONCOLOGY HISTORY:  - He has a history of prostate cancer diagnosed in 2011.  At that time, he had apparently been noted to have an elevated PSA of over 50.  He subsequently had a biopsy showing Javon grade 8 prostate cancer.   - He was not felt to be good candidate for surgery and was begun on androgen deprivation  therapy using leuprolide every 6 months.   - In addition, he was also started on bicalutamide after the initial start of hormone therapy did not lead to a drop in PSA.  He continued this treatment until he had further increase in his PSA.   - On this basis, it was elected to stop the bicalutamide for 1 month to see if there will be a withdrawal response, but his PSA continued to rise.    - 9/11/2019 medical oncology consultation.  PSA on that date was 25.1.  - PET prostate scan 10/1/2019 shows focal radiotracer uptake in the left side of the prostate gland without clear CT correlation.  Findings suspected to represent known prostate cancer.  Prominent radiotracer activity in the base of the urinary bladder more than expected from physiologic activity.  Focal radiotracer uptake in the subcentimeter right hilar lymph node was nonspecific.  -10/8/2019 PSA 32.2   - Abiraterone acetate and prednisone started with steady decline in PSA levels as outlined below  12/09/2019 PSA 45.4  01/07/2020 PSA 22.3  02/05/2020 PSA 21.5  03/06/2020 PSA 18.0  - he subsequently stopped this medication in approximately March 2020 due to side effects.  -He subsequently restarted the medication but again discontinue treatment after a nonspecified period of time.  Interval History  He was last seen for visit on 8/5/2020.  Since his last visit, he reports generally feeling well.  He relates no problems with urination or bowel movements. He reports no problems with dyspnea or coughing.  He relates no problems with back pain.  He had repeat imaging studies and is seen today to review the results.  Review of Systems  Remaining 14 point comprehensive review of systems within normal limits. NCCN Distress Thermometer and Problem List were collected and documented in the patient chart.  Remarkable symptoms and concerns were discussed with the patient.  Any additional follow-up is indicated in the plan.  Past Medical and Surgical History  PAST MEDICAL  HISTORY:  1.  Prostate cancer with treatment as summarized above.  2.  Type 2 diabetes mellitus.  3.  Coronary disease with prior myocardial infarction.  4.  Hyperlipidemia.  5.  Cerebral infarction.    PAST SURGICAL HISTORY:  1.  Previous upper GI endoscopy with dilation of esophageal stricture.  2.  Dental tooth implants.  3.  Coronary artery stent placement.  Current Medications  Medication List  Name Date  Glimepiride Oral 10/17/2019  Metformin Oral 10/17/2019  Omeprazole Oral Delayed Release Capsule 10/17/2019  Sertraline Oral 10/17/2019  Prednisone Oral 05/06/2020  Metoprolol Oral 24 hr Tab (Succinate) 10/17/2019  Cholecalciferol Oral 10/17/2019  Abiraterone Oral 10/24/2019  Ondansetron Oral Disintegrating Tablet 01/17/2020  Warfarin Oral 10/17/2019  Aspirin Oral 10/17/2019  Allergies  No known medication allergies  Family History  There is a history of lung cancer in his paternal grandfather an unspecified type of cancer in his maternal grandmother.  There is also history of breast and bladder cancer in his mother.  He has 1 sister who is his medical power of .  He has no brothers.  He has no children.  Social History  He is single.  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  He does not use tobacco products and does not use alcohol. He lives at [facility].Vital Signs  Blood pressure: 100/60, Pulse: 94, Temperature: 97.9 F, Respirations: 16, O2 sat: 98%, Pain Scale: 0, Height: 69.75 in, Weight: 162.2 lb, BSA: 1.9, BMI: 23.44 kg/m2  Flu vaccine - Adult (10/28/2020), Elsewhere; Flu vaccine - Adult (10/2019)  Performance Status ECOG or Karnofsky  ECOG: Not recorded  Karnofsky:  90% Able to carry on normal activity; minor signs or symptoms of disease. (Date: 10/28/2020)    Physical Exam  General exam shows skin warm and dry without rashes, petechia or ecchymosis. Scalp hair is normal.  HEENT examination shows lips and tongue normal.  Thyroid gland not palpably enlarged. Pupils are round and reactive to light and sclera nonicteric.  Chest examination shows clear breath sounds without rales or wheezes.  Cardiovascular exam shows regular heart rhythm without murmur or rub.  Abdomen soft and nontender. Spleen tip and liver edge not palpable. Bowel sounds are quiet.  Extremity exam shows no significant upper or lower extremity edema. Muscle groups appear symmetric.  Neurologic exam shows mood and affect as well as a judgment and insight are normal.  He requires considerable assistance in climbing on and off the exam table. He uses a wheeled walker for help with ambulation.  Genetics/Molecular/Biomarkers    Additional Labs, Imaging, and Other Studies  Bone scan on 10/19/2020 shows no evidence for osteoblastic bone metastases.  CT of the chest, abdomen and pelvis on 10/19/2020 shows numerous lung nodules measuring up to 1 cm.  The prostate was felt to be enlarged and to have an irregular appearance consistent with known malignancy.  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  There were no pathologically enlarged lymph nodes in the retroperitoneum or other areas.      PET scan performed on 10/16/2019 demonstrating focal radiotracer uptake in the left side of the prostate gland without clear CT correlate.  Prominent radiotracer activity was noted in the base of the urinary bladder.  In addition, focal radiotracer uptake within a subcentimeter right hilar lymph node was described.    Bone scan on 8/22/2019 shows no scintigraphic evidence of osseous metastatic disease.    CT of the abdomen pelvis on 8/22/2019 shows no convincing evidence for metastatic disease in the abdomen pelvis.  Prostate dimensions were smaller compared with scans from October 2011.  Incidental right renal cysts were noted.  Gallstones were also described.          Hemant Burgos MD    End of note

## 2021-01-27 NOTE — PLAN OF CARE
OT:Orders received and chart reviewed. Pt. presented to the emergency room  for evaluation of a constellation of gastrointestinal symptoms including decreased appetite, nausea and inability to keep food down.  Per chart, opt. Resides at [facility]. PMH:coronary artery disease with history of stenting, hyperlipidemia, history of embolic stroke, history of persistent atrial fibrillation previously on chronic anticoagulation with warfarin, type 2 diabetes, esophageal stricture, autism and prostate cancer and depression.    Attempted evaluation--pt. currently at procedure/EGD.

## 2021-01-27 NOTE — ANESTHESIA POSTPROCEDURE EVALUATION
Patient: Jose Lees    Procedure(s):  ESOPHAGOGASTRODUODENOSCOPY, WITH BALLOON DILATION OF LESS THAN 30 MILLIMETERS    Diagnosis:Dysphagia [R13.10]  Diagnosis Additional Information: No value filed.    Anesthesia Type:  MAC    Note:  Disposition: Outpatient   Postop Pain Control: Uneventful            Sign Out: Well controlled pain   PONV: No   Neuro/Psych: Uneventful            Sign Out: Acceptable/Baseline neuro status   Airway/Respiratory: Uneventful            Sign Out: Acceptable/Baseline resp. status   CV/Hemodynamics: Uneventful            Sign Out: Acceptable CV status   Other NRE: NONE   DID A NON-ROUTINE EVENT OCCUR? No         Last vitals:  Vitals:    01/27/21 1150 01/27/21 1157 01/27/21 1236   BP: 131/70  127/59   Pulse: 81  74   Resp: 17 17 18   Temp:   35.9  C (96.6  F)   SpO2: 96% 97% 97%       Electronically Signed By: MP CLARK MD  January 27, 2021  2:52 PM

## 2021-01-27 NOTE — CONSULTS
Consult Date:  01/27/2021      PRIMARY ONCOLOGIST:  Hemant Burgos MD      REQUESTING PHYSICIAN:  Baldemar Raymond MD.      REASON FOR CONSULTATION:  Metastatic prostate cancer, liver nodules and liver nodules and pulmonary nodules.      HISTORY:  Medical record reviewed, history obtained, patient examined.      Mr. 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  He was seen in our office in October and had increase in tumor marker, but clinically was stable and kept on single agent leuprolide.  He was previously on abiraterone and the medication was held due to rising tumor marker, which was 55 in August and 104 in October.  CT scan from [facility] in 10/19/2020 showed multiple lung nodules up to 1 cm and 1 indeterminate liver lesion.  The details of his cancer and treatment are detailed in the accompanying progress note today from office note of October.      He was admitted to observation care with several days of nausea and vomiting and inability to swallow normally.  He has some abdominal pain and a CT scan was done on admission, which showed innumerable lung lesions.  The sigmoid colon appears to have mildly distended and is placed into the upper mid abdomen, nonspecific but might predispose him to sigmoid volvulus, 6 mm fat attenuating lesion abutting the posterior aspect of the esophagus, could represent small food residue.  Cholelithiasis, 1.3 cm partially calcified hypoattenuating focus in the left lobe of the liver, indeterminate.  He underwent an upper endoscopy with esophageal dilatation.  He was found to have a stenosis and the plan is to have another dilatation tomorrow.      He was supposed to be in the office tomorrow for followup on his prostate cancer with   [facility]. PSA was ordered, but the appointment was canceled due to hospitalization.      PAST MEDICAL HISTORY:  Prostate cancer as detailed, coronary artery disease, hypertension, dyslipidemia, atrial fibrillation, history of embolic strokes in 2014, type 2 diabetes, depression, autism, esophageal stenosis requiring previous dilatation.  Home medications were reviewed, for the prostate cancer, he is on leuprolide every 6 months Urology and the next one is in March, according to him.      SOCIAL HISTORY:  He lives in [facility] assisted living.  He is nonsmoker, no alcohol.  Tested negative for COVID-19 upon admission.      FAMILY HISTORY:  Noncontributory.      REVIEW OF SYSTEMS:  Significant for some weight loss, decreased bowel movement, esophageal stricture related symptoms.  No bone pain.  He has a chronic skin lesions on his shins managed by Wound team.      PHYSICAL EXAMINATION:   GENERAL:  No acute distress.   VITAL SIGNS:  Stable as charted, afebrile.   HEENT:  Unremarkable.   NECK:  No lymphadenopathy, no JVD.   LUNGS:  Clear to auscultation.  No respiratory distress.   ABDOMEN:  Soft, nontender, no organomegaly.  Has mild upper abdominal tenderness.  Deep palpation avoided.   EXTREMITIES:  No edema.  He has ulcers on his shins wrapped.   LYMPHATIC:  No lymphadenopathy in cervical, supraclavicular, axillary, epitrochlear or inguinal areas.   NEUROLOGIC:  Gross motor examination is normal.      ANCILLARY DATA:  CBC and chemistry panel normal.  Glucose 148-226.  CT scan as detailed.  COVID-19 negative.  Upper endoscopy report reviewed showing benign stenosis of the esophagus, dilated.      IMPRESSION:   1.  Castration-resistant prostate cancer, currently on leuprolide every 6 months with evidence of increasing tumor marker, was clinically stable as of last visit to Dr. Burgos.   Numerous lung nodules concerning for metastatic disease, although he had dose October CT scan.   2.  Liver lesion,  indeterminate.   3.  Esophageal stenosis, status post dilatation.   4.  Wound lesions managed by Wound team.      DISCUSSION AND RECOMMENDATIONS:  I will add a PSA to the lab drawn in the hospital and I will request Radiology to perform comparative reports with a CT scan in the hospital, with a CT scan in October at [facility].  He had a bone scan in October, which was unremarkable.  Followup tomorrow was canceled in our office and we can arrange a visit in a week or two.  This could be done through telemedicine if possible.      I appreciate the opportunity to participate in the care of Hong Fajardo.  Our team will follow while hospitalized.         IDA QUIGLEY MD             D: 2021   T: 2021   MT:       Name:     HONG FAJARDO   MRN:      [id number]        Account:       [id number]   :      1940           Consult Date:  2021      Document: [id number]       cc: Baldemar Raymond MD

## 2021-01-27 NOTE — PROGRESS NOTES
RECEIVING UNIT ED HANDOFF REVIEW    ED Nurse Handoff Report was reviewed by: Heather Fischer RN on January 26, 2021 at 8:02 PM

## 2021-01-27 NOTE — PROGRESS NOTES
Observation Goals:      -tolerating oral intake to maintain hydration: Not met, NPO     Nurse to notify provider when observation goals have been met and patient is ready for discharge.

## 2021-01-27 NOTE — ED NOTES
"[facility]  ED Nurse Handoff Report    ED Chief complaint: Nausea, Vomiting, & Diarrhea      ED Diagnosis:   Final diagnoses:   Abdominal pain, generalized   Volvulus (H)       Code Status:  Admitting MD to assess.      Allergies: No Known Allergies    Patient Story: Patient reports being unable to eat for the last 3 days. He reports vomiting due to \"phlegm\".   Focused Assessment:  Patient is alert and oriented x 4, calm and cooperative. VS are stable, skin is warm and dry, respirations are even and non-labored on room air. Patient denied chest pain, shortness of breath, dizziness, endorses intermittent nausea without any vomiting.        Treatments and/or interventions provided:   Medications   0.9% sodium chloride BOLUS (0 mLs Intravenous Stopped 1/26/21 1814)     Followed by   sodium chloride 0.9% infusion (has no administration in time range)   iopamidol (ISOVUE-370) solution 81 mL (81 mLs Intravenous Given 1/26/21 1739)   Saline Flush (65 mLs Intravenous Given 1/26/21 1739)       Patient's response to treatments and/or interventions: Stable    To be done/followed up on inpatient unit:  Monitor    Does this patient have any cognitive concerns?: None    Activity level - Baseline/Home:  Independent  Activity Level - Current:   Independent    Patient's Preferred language: English   Needed?: No    Isolation: None  Infection: Not Applicable  Patient tested for COVID 19 prior to admission: YES  Bariatric?: No    Vital Signs:   Vitals:    01/26/21 1607 01/26/21 1710 01/26/21 1805   BP: 109/60  122/70   Pulse: 104  94   Resp: 12     Temp: 98.1  F (36.7  C)     TempSrc: Temporal     SpO2: 98% 100% 98%   Weight: 73.5 kg (162 lb)     Height: 1.753 m (5' 9\")         Cardiac Rhythm:     Was the PSS-3 completed:   Yes  What interventions are required if any?               Family Comments: No family present at this time.   OBS brochure/video discussed/provided to patient/family: Yes              " Name of person given brochure if not patient: NA              Relationship to patient: NA    For the majority of the shift this patient's behavior was Green.   Behavioral interventions performed were  information and reassurance provided.    ED NURSE PHONE NUMBER: [phone]

## 2021-01-27 NOTE — H&P
"Admitted:     01/26/2021      CHIEF COMPLAINT:  Decreased appetite, nausea and difficulty keeping food down.      HISTORY OF PRESENT ILLNESS:  Deshawn Lees is a very pleasant 80-year-old male with a past medical history significant for coronary artery disease, hyperlipidemia, history of embolic strokes in 2014, type 2 diabetes, persistent atrial fibrillation previously on chronic anticoagulation, autism, prostate cancer for which he follows with Urology in [facility], and depression, among other medical problems, who presents to the emergency room tonight for evaluation of a constellation of GI complaints.  History is obtained per discussions with the patient, review of his medical record and discussions with his sister, Judith.      The patient lives at [facility].  He lives alone and does most of his ADLs, including managing his own medications.  His sister does help with his cares routinely, but this has been limited in recent months due to COVID infection.  She helps to get Deshawn to and from appointments.  She notes that Deshawn stopped taking all of his medication several months ago (unclear why).  Tonight he presented to the emergency room for complaints of swallowing difficulties.  He has undergone esophageal dilations in the past.  Most recently, he underwent an endoscopy in 2018 per [facility] and was found to have a Schatzki ring and candidiasis esophagitis.  He relays that for about the past 4 days, if not longer, his appetite has been quite poor.  He has had some associated nausea.  He relays difficulties \"keeping food down,\" but does not specifically say he has been vomiting.  He endorses some whitish phlegm.  He has not had any specific abdominal pain, but notes things \"did not feel right.\"  No changes in his bowel or bladder habits.  In regards to his prostate cancer, he was last seen in [facility] in 10/2020, and his disease was deemed " stable on his current medication regimen.  Ultimately, his sister became concerned with his inability to take p.o. and had some concerns about him being off his medications for months and some skin changes on his legs, and he was ultimately brought to the emergency room for evaluation this evening.      In the emergency room, he was seen and evaluated by Dr. Larsen.  He was afebrile and hemodynamically stable.  Basic labs including a BMP, LFTs and lactate and a CBC were all within normal limits.  Glucose was elevated mildly at 240.  He has a history of use of warfarin for chronic anticoagulation for his atrial fibrillation and history of embolic stroke, so his INR tonight was within normal limits at 0.99, concurrent with the fact that he has been off his medications for several months now.  A CT of the chest, abdomen and pelvis with contrast was obtained.  There were no acute findings per se.  There was mention of a sigmoid colon appearing distended and displaced in the upper abdomen, possibly increasing his predisposition for developing a sigmoid volvulus, but there was no mesenteric twisting or bowel obstruction appreciated tonight.  Additionally, there was a 6 mm fat-attenuating lesion abutting the posterior aspect of the esophagus, thought to represent a small lipoma.  He had findings consistent with his known prostate cancer, cholelithiasis without cholecystitis, an indeterminate hepatic nodule, as well as numerous bilateral pulmonary nodules.  In the emergency room, he was given IV fluids.  Plan at this juncture is to admit under observation status today for ongoing evaluation and care.      When I saw the patient, he was able to relay some of his medical history.  His conversation is somewhat tangential.  He told me tonight that his sister, Judith, is his power of .  When I spoke with Judith on the phone this evening, she did confirm that she was Deshawn's medical power of .  When I asked who  makes Deshawn's medical decisions for him, she says that Deshawn, although he is autistic, is very understanding of his medical conditions and able to sign his own consents.  She does assist him as needed.  She is in agreement with the patient being hospitalized and undergoing further evaluation from a GI perspective.  She notes he is compliant with his followups with [facility] and his urologist, Dr. Best.  He does receive a Lupron injection every 6 months, but he has stopped taking his other medications as prescribed.        PAST MEDICAL HISTORY:   1.  Coronary artery disease with history of stent placement in 2014.   2.  Hypertension.   3.  Hyperlipidemia.   4.  Persistent atrial fibrillation.  Previously on chronic anticoagulation with warfarin.   5.  Bilateral embolic strokes in 2014.   6.  Type 2 diabetes.  Previously managed with oral medications only.   7.  Depression.   8.  Autism.   9.  Esophageal stricture and stenosis requiring previous dilations.   10.  Documented history of dementia, though his sister does not specifically relay underlying cognitive impairment, and she says he has a good understanding of his underlying medical conditions.  However, she is his medical power of .      PAST SURGICAL HISTORY:  Previous corneal procedures in 2018, previous angioplasty in 2014.      FAMILY HISTORY:  Mother had a history of cerebrovascular disease.  Father had a history of coronary artery disease.      SOCIAL HISTORY:  The patient is a lifelong nonsmoker, does not consume alcohol.  He lives at [facility].  He does most of his own cooking and manages his own medications; however, he notes he has been off his home medications for several months now.      HOME MEDICATIONS:    Stopped taking all meds 3 months ago but had previously been prescribed the following:  Prior to Admission medications    Medication Sig Last Dose Taking? Auth Provider   abiraterone (ZYTIGA) 500  MG TABS tablet Take 2 tablets (1,000 mg) by mouth daily     Nolberto Ferrari MD   ACE/ARB/ARNI NOT PRESCRIBED, INTENTIONAL, ACE & ARB not prescribed due to Symptomatic hypotension not due to excessive diuresis     Mariela Stauffer MD   aspirin 81 MG chewable tablet Take 1 tablet (81 mg) by mouth daily     Gely Urbian DO   atorvastatin (LIPITOR) 40 MG tablet Take 1 tablet by mouth daily     Mariela Stauffer MD   blood glucose (NO BRAND SPECIFIED) test strip Use to test blood sugar 1 time daily or as directed.     Nolberto Ferrari MD   blood glucose calibration (NO BRAND SPECIFIED) solution Use to calibrate blood glucose monitor as needed as directed.     Nolberto Ferrari MD   blood glucose monitoring (NO BRAND SPECIFIED) meter device kit Use to test blood sugar one time daily or as directed.     Nolberto Ferrari MD   blood glucose monitoring (SOFTCLIX) lancets Use to test blood sugar one time daily.     Nolberto Ferrari MD   Cholecalciferol (VITAMIN D) 2000 units CAPS Take 3,000 Units by mouth daily Takes  3000 mg     Reported, Patient   glimepiride (AMARYL) 1 MG tablet Take 2 tabs by mouth every am.     Mariela Stauffer MD   Leuprolide Acetate, 6 Month, (LUPRON DEPOT, 6-MONTH, IM)       Reported, Patient   metFORMIN (GLUCOPHAGE) 500 MG tablet TAKE 2 TABLETS TWICE DAILY WITH MEALS     Mariela Stauffer MD   metoprolol succinate ER (TOPROL-XL) 25 MG 24 hr tablet TAKE 1 TABLET EVERY DAY     Nolberto Ferrari MD   omeprazole (PRILOSEC) 20 MG DR capsule TAKE 1 CAPSULE (20 MG) BY MOUTH DAILY     Jermain Helm MD   sertraline (ZOLOFT) 50 MG tablet TAKE 1 TABLET EVERY DAY     Nolberto Ferrari MD   thin (NO BRAND SPECIFIED) lancets Use to test blood sugar one time daily or as directed.     Nolberto Ferrari MD   warfarin ANTICOAGULANT (COUMADIN) 2 MG tablet 1 tab daily except take 2 tabs on Wednesdays     Nolberto Ferrari MD        ALLERGIES:  NO KNOWN DRUG ALLERGIES.      REVIEW OF SYSTEMS:  A full 10-point review of  systems was obtained, negative otherwise stated per HPI.      PHYSICAL EXAMINATION:   VITAL SIGNS:  Temperature 97.7, pulse 88, respirations 14, blood pressure 99/54, O2 saturation 96% on room  air.   GENERAL:  The patient is an elderly appearing male lying quietly on the gurney.  He is alert and oriented x3, answering questions appropriately, in no acute distress, though with some situational confusion in regards to his overall medical history.   HEENT:  Pupils equal, round and reactive.  Extraocular movements are intact.  Mucous membranes are moist.   CARDIOVASCULAR:  Heart rhythm regular, no murmurs, gallops, rubs.  Pulses are +2 and symmetric in bilateral upper extremities.  There is trace bilateral lower extremity edema.   RESPIRATORY:  Lungs are clear to auscultation bilaterally, no increased work of breathing or accessory muscle use.   ABDOMEN:  Soft, nontender, nondistended.  Positive bowel sounds throughout.   INTEGUMENTARY:  He has noted wounds and excoriations with overlying scabs and plaques on his legs bilaterally, mostly isolated to his shins.  He has a small area of a superficial laceration and some mild erythema at the base of his right thumb.   NEUROLOGIC:  Cranial nerves II through XII grossly intact.  No focal motor or sensory deficits.  Gait was not assessed.      LABORATORY DATA AND IMAGING:  BMP shows sodium 138, potassium 3.5, creatinine of 0.87.  LFTs were all within normal limits.  Lactate was normal.  Glucose was 247.      CBC showed white count 9.5, hemoglobin 15.0, and a platelet count of 193,000.  INR 0.99.      A CT of the chest, abdomen and pelvis with contrast showed sigmoid colon appears mildly distended and displaced into the upper mid abdomen and left upper quadrant, though with no associated mesenteric twisting or bowel obstruction.  Finding could predispose the patient to have sigmoid volvulus, but again this was not noted on tonight's study.  A 6 mm fat-attenuating lesion  "abutting the posterior aspect of the esophagus could represent food residue versus small submucosal lipoma.  There were numerous bilateral pulmonary nodules, worrisome for metastatic disease.  His prostate gland was enlarged, diffusely enhancing, thought to represent underlying prostate malignancy.  There was cholelithiasis without evidence of acute cholecystitis and a 1.3 cm partially calcified hypoattenuating focus in the left hepatic lobe of indeterminate etiology.      ASSESSMENT AND PLAN:  Deshawn Lees is a very pleasant 80-year-old male with past medical history significant for coronary artery disease with history of stenting, hyperlipidemia, history of embolic stroke, history of persistent atrial fibrillation previously on chronic anticoagulation with warfarin, type 2 diabetes, esophageal stricture, autism and prostate cancer and depression, who presented to the emergency room this evening for evaluation of a constellation of gastrointestinal symptoms including decreased appetite, nausea and inability to keep food down.  A basic workup this evening is relatively unremarkable.  He will be admitted under observation status for ongoing evaluation and care.      1.  Dysphagia:  The patient endorses difficulties \"keeping food down\" and, in general, difficulties with swallowing.  He does have a history of esophageal stenosis and stricture requiring dilation in the past.  He can try clear liquids tonight.  Consult is placed to [facility], whom he has seen in the past.  Anticipate further evaluation with EGD.  Will keep him n.p.o. at midnight.  Although there was some mention of possible tenderness to palpation in the left quadrants on the EGD exam, I did not appreciate this on my exam tonight.  He did have some mildly abnormal anatomy that could predispose him to increased risk for developing a volvulus, but at this point, I do not see any concerns for that as being a culprit of his symptoms and presentation.  If he " were to develop acute abdominal discomfort, could consider a General Surgery consult, but I do not feel it is indicated at this time.   2.  Abnormal skin lesions on lower extremities:  Unclear when exactly they developed.  The patient seems unbothered by them.  Wound nurse has been consulted.  No indications suggestive of an acute infection at this time.  Could consider further evaluation per dermatology in the outpatient setting.  3.  Coronary artery disease with remote history of stent, history of persistent atrial fibrillation, previously on anticoagulation with warfarin, history of embolic cerebrovascular accidents in 2014:  He has now been off all medications for several months now, including his warfarin, metoprolol, aspirin, and statin.  These medicines will be held given his admission under observation status.  Would recommend resumption.  The patient likely needs more education in this regard medication compliance going forward as it is not completely clear why he stopped taking all of his medications in the first place.   4.  Type 2 diabetes:  Previously managed with glimepiride and metformin.  He has been off these medications for several months now.  Blood sugar was mildly elevated this evening.  Will manage with sliding scale insulin this hospitalization while needed, given his difficulties with taking p.o.   5.  Prostate cancer:  Follows with Dr. Burgos of [facility] and Dr. Best [facility].  He manages with Lupron injections every 6 months.  He was last seen in 10/2020 per [facility], and his disease was noted to be stable.  He had also been previously prescribed Zytiga, but again he stopped taking this several weeks ago.   5.  Depression:  Previously stable on Zoloft.  Stopped taking this medication as well.  Can resume as needed.   6.  Autism:  The patient, per his sister's report, does have a good understanding of his underlying medical illnesses and his healthcare.  She  does note she is his medical power of  and assists him with medical decision-making as needed.  However, she says he typically signs his own consents.  He lives independently in [facility] assisted living facility.     7.  Reported history of dementia:  I see this documented in the chart, but this was not specifically endorsed in my conversations with Judith this evening.  OT has been consulted for cognitive evaluation.  Again, it is unclear why he stopped taking his medications several months ago.      DEEP VENOUS THROMBOSIS PROPHYLAXIS:  PCDs.      CODE STATUS:  Full code, as confirmed per patient this evening.     As I anticipate he will be here for less than a 2-midnight stay, he has been admitted under observation status.         ENMA LEON DO             D: 2021   T: 2021   MT: ZEFERINO      Name:     HONG FAJARDO   MRN:      [id number]        Account:      [id number]   :      1940        Admitted:     2021                   Document: [id number]       cc: Kostas Cortes MD

## 2021-01-27 NOTE — CONSULTS
"[facility] Nurse Inpatient Assessment   Reason for consultation: Evaluate and treat bilateral LE    Assessment          Bilateral LE very unkempt with thick, thick crusting of old eschar along anterior-medial aspects of bilateral LE, these look like venous stasis wounds, no s/s of infection but need to work on ridding skin of these thick crusts of eschar.  Eschar lifting at margins, no drainage, no pain.  Plan to improve hygiene, provide wound and skin care.     Treatment Plan    Bilateral LE plan of care: effective now   Remove old Edema Wear and set aside, do not throw away  PERFORM \"GOOD FOOT CARE\" AT ALL TIMES:  1. CLEAN feet daily with a gentle soap and water, making sure to clean between the toes! Dry thoroughly.   2. Spray with Isabella from toes to knees, massage in, sit x 30 minutes and wipe clean (Isabella will \"condition\" ,loosening crusty debris, moisturizing &cleaning)  3. LOTION from toes to knees, not between toes (lotion moisturizes and too much moisture between toes may cause a fungal rash).    4.  Wounds care along inside of bilateral LE  - we gauze with Vashe Wound Cleanser and press to any wounds x 15 minutes, wipe clean  - to the really thick eschar add a thick glob of Triad Paste- heavily coat to create moist, autolytic debridement, cover with Mepilex Lite dressings  - to all other wounds apply Medilesaney (#[id number]), cover with Mepilex Lite Dressings  5. Pull clean pair of medium Edema Wear into place, from toes to knees, cuffing stockings below the knees.    EDEMA WEAR Stockings- apply fresh pair daily  DO NOT CUT OR TRIM STOCKINGS  Remove and set aside stockings, do not throw away  SKIN/WOUND CARE  1. Clean feet and legs with gentle wash  2. Complete any wound or skin treatments  o Spray Isabella Cleanse and Protect on intact skin, especially dry, scaly plaques, massage in, wipe or rinse  o Apply lotions  o Complete wound care  3. Apply Edema Wear from toes to knees with a " cuff at the top of the stockings  CARE OF EDEMAWEAR     DO NOT THROW AWAY THE OLD PAIR OF EDEMA WEAR, WASH IT.   o Wash stocking(s) with soap and HOT water. If really soiled use a surgical soap then regular soap and/or you may need to wash several times  o Hang dry       Edema Wear    size stripe color PS #   small navy [id number]   medium yellow [id number]     Please shower pt while in the hospital- expect the thick crusts of eschar on the legs to soften and fall away!      Recommended provider order: n/a   Orders Written  WO Nurse follow-up plan: weekly    Patient History    According to provider note(s):  80-year-old male with past medical history significant for coronary artery disease with history of stenting, hyperlipidemia, history of embolic stroke, history of persistent atrial fibrillation previously on chronic anticoagulation with warfarin, type 2 diabetes, esophageal stricture, autism and prostate cancer and depression, who presented to the emergency room this evening for evaluation of a constellation of gastrointestinal symptoms including decreased appetite, nausea and inability to keep food down    Objective Data    Containment of urine/stool: Brief  Active Diet Order:  Orders Placed This Encounter      NPO per Anesthesia Guidelines for Procedure/Surgery Except for: Meds    Labs:   Recent Labs   Lab 01/26/21  2255 01/26/21  1624   ALBUMIN  --  3.4   HGB  --  15.1   INR  --  0.99   WBC  --  9.5   A1C 11.4*  --      Output:   I/O last 3 completed shifts:  In: -   Out: 200 [Urine:200]  Risk Assessment:   Sensory Perception: 4-->no impairment  Moisture: 4-->rarely moist  Activity: 3-->walks occasionally  Mobility: 3-->slightly limited  Nutrition: 3-->adequate  Friction and Shear: 3-->no apparent problem  Al Score: 20    Physical Exam    Bilateral LE assessment   Wound / skin history: chronic wounds  Photo date: January 27, 2021          Right medial LE     Left medial Le        Bilateral LE +2 edema, overall  legs are very very dry, lightly peeling with cracking skin around feet and ankles, irritated looking tissue in base of cracks.  Bilateral anterior-medial legs with thick crusts of dark eschar, right worse than left. No drainage, no pain or irritation from these areas.    R LE- anterior line of eschar is about 24cm x 6cm and a second,  Parallel line about 30 cm x 4cm  L LE- medial line of eschar is thinner than the right side but again no fluctuance or drainage, line of about 30cm x 4-5cm      Interventions  Current support surface: Standard  Atmos Air mattress,   Current off-loading measures: Pillows,   Visual inspection of wound(s) completed  Wound Care: discussed with RN  Supplies: ordered: vashe, edemawear, medihoney and triad paste, reviewed, gathered and placed at the bedside  Education provided: Infection prevention  and Hygiene  Discussed plan of care with Patient and Nurse    Kasey Harris RN

## 2021-01-27 NOTE — PROGRESS NOTES
"[facility]    Hospitalist Progress Note    Assessment & Plan   Deshawn Lees is a very pleasant 80-year-old male with past medical history significant for coronary artery disease with history of stenting, hyperlipidemia, history of embolic stroke, history of persistent atrial fibrillation previously on chronic anticoagulation with warfarin, type 2 diabetes, esophageal stricture, autism and prostate cancer and depression, who presented to the emergency room this evening for evaluation of a constellation of gastrointestinal symptoms including decreased appetite, nausea and inability to keep food down.  A basic workup this evening is relatively unremarkable.  He will be admitted under observation status for ongoing evaluation and care.       Dysphagia:    -The patient endorses difficulties \"keeping food down\" and, in general, difficulties with swallowing.   - He does have a history of esophageal stenosis and stricture requiring dilation in the past.    -Although there was some mention of possible tenderness to palpation in the left quadrants on the EGD exam, admitting md did not appreciate this on admission exam.   - He did have some mildly abnormal anatomy that could predispose him to increased risk for developing a volvulus, but at this point, I do not see any concerns for that as being a culprit of his symptoms and presentation.  If he were to develop acute abdominal discomfort, could consider a General Surgery consult, but I do not feel it is indicated at this time.   - pt states he stopped taking his meds since difficult to swallow them.   -seems to have mild abd discomfort this am. Slightly distended abdomen. NT,   -nl bmp, LFTs, lactate and cbc with platelet count    Plan-  -follow for need of repeat abd imaging.   -  Consult is placed to [facility], whom he has seen in the past.  Anticipate further evaluation with EGD.    -npo currently for likely egd    Addendum; per GI: EGD showed two " Estefania Herrera was admitted to [id number] from [facility] via  by transporters. Reason for hospitalization is UTI and hematuria.   Upon arrival, patient is stable and accompanied by VPM. See Epic documentation for patient individualized nursing care plan.   severe benign appearing strictures. Proximal stenosis at 18 mm dilated to 12 mm. Could not easily pass balloon through distal stricture at 40 cm.  -Soft diet today  -Repeat EGD/dilation with fluoroscopy tomorrow  -npo midnight ordered  Admit to inpatient          Abnormal skin lesions on lower extremities:   - Unclear when exactly they developed.  The patient seems unbothered by them.   - Wound nurse has been consulted.  No indications suggestive of an acute infection at this time.  Could consider further evaluation per dermatology in the outpatient setting.  -bilateral so doubt malignancy. No clear purulence.   -states have been there for weeks to ? Months. Unclear hx  - outpatient derm consult.     Coronary artery disease with remote history of stent, history of persistent atrial fibrillation,  - previously on anticoagulation with warfarin, history of embolic cerebrovascular accidents in 2014:   - He has now been off all medications for several months now, including his warfarin, metoprolol, aspirin, and statin.   - These medicines will be held given his admission under observation status.  Would recommend resumption.   - The patient likely needs more education in this regard medication compliance going forward as it is not completely clear why he stopped taking all of his medications in the first place.   -pt stopped taking  meds given his dysphagia    Type 2 diabetes:   - Previously managed with glimepiride and metformin.  He has been off these medications for several months now.   - Blood sugar 100-vka360 range  -HbA1C 11%, suspect 2/2 to being off medications.   -manage with sliding scale insulin this hospitalization while needed, given his difficulties with taking p.o.   -follow for need of lantus while in hospital  -close pcp follow up. Home care needed     Prostate cancer:   - Follows with Dr. Burgos of [facility] and Dr. [facility].   - He manages with Lupron injections every 6  months.   - He was last seen in 10/2020 per [facility], and his disease was noted to be stable.   - He had also been previously prescribed Zytiga, but again he stopped taking this several weeks ago.   -[facility] consult given CT prostate and pulmonary nodule findings.       Depression:  Previously stable on Zoloft.  Stopped taking this medication as well.  Can resume as needed.   pcp follow up      Autism:   - The patient, per his sister's report, does have a good understanding of his underlying medical illnesses and his healthcare.  She does note she is his medical power of  and assists him with medical decision-making as needed.  However, she says he typically signs his own consents.  He lives independently in [facility] assisted living facility.        Reported history of dementia:    -see this documented in the chart, but this was not specifically endorsed in my conversations with Judith this evening.    -OT has been consulted for cognitive evaluation.   pt likely stopped taking meds given his new dysphagia     DEEP VENOUS THROMBOSIS PROPHYLAXIS:  PCDs.      CODE STATUS:  Full code, as confirmed per patient this evening.     Dispo:  anticipate he will be here for less than a 2-midnight stay, he has been admitted under observation status.     Baldemar Raymond MD  Text Page  (7am to 6pm)  Interval History   Tells me he stopped taking his meds given his new dysphagia  Feels slightly uncomfortable in abdomen. No cp or sob. No nausea or vomiting  No new issues   npo for likely egd by GI for dysphagia    -Data reviewed today: I reviewed all new labs and imaging results over the last 24 hours. I personally reviewed imaging and labs since admission    Physical Exam   Temp: 96.3  F (35.7  C) Temp src: Oral BP: 115/50 Pulse: 92   Resp: 16 SpO2: 98 % O2 Device: None (Room air)    Vitals:    01/26/21 1607   Weight: 73.5 kg (162 lb)     Vital Signs with Ranges  Temp:  [95.4  F (35.2  C)-98.1  F  (36.7  C)] 96.3  F (35.7  C)  Pulse:  [] 92  Resp:  [12-18] 16  BP: ()/(50-70) 115/50  SpO2:  [94 %-100 %] 98 %  I/O last 3 completed shifts:  In: -   Out: 200 [Urine:200]    Constitutional: Nad, in bed  Respiratory: CTAB  Cardiovascular: RRR no r/g/m  GI: soft, mild distension, nl BS, NT  Skin/Integumen: large dry raised lesions BLE, R>L. No redness, fluctuance. One is unroofed RLE  No cyanosis. Mild BLE edema  Neuro/psych: talkative, pleasant, appears at baseline cognition and affect, nl speech and mentation.      Medications       insulin aspart  1-7 Units Subcutaneous TID AC     insulin aspart  1-5 Units Subcutaneous At Bedtime       Data   Recent Labs   Lab 01/27/21  0636 01/26/21  1624   WBC  --  9.5   HGB  --  15.1   MCV  --  90   PLT  --  193   INR  --  0.99    138   POTASSIUM 3.9 3.5   CHLORIDE 107 102   CO2 25 24   BUN 12 16   CR 0.66 0.87   ANIONGAP 7 12   TENZIN 8.6 9.4   * 247*   ALBUMIN  --  3.4   PROTTOTAL  --  7.9   BILITOTAL  --  0.7   ALKPHOS  --  74   ALT  --  14   AST  --  15       Imaging:   Recent Results (from the past 24 hour(s))   CT Chest/Abdomen/Pelvis w Contrast    Narrative    CT CHEST, ABDOMEN, PELVIS WITH CONTRAST  1/26/2021 5:51 PM    HISTORY: Abdominal pain and possible FB in esophagus, he is not sure.  ?+ nausea and lower abdominal pain.    TECHNIQUE: CT scan obtained of the chest, abdomen, and pelvis with  oral and IV contrast. 81mL Isovue-370 IV injected. Radiation dose for  this scan was reduced using automated exposure control, adjustment of  the mA and/or kV according to patient size, or iterative  reconstruction technique.    COMPARISON:  Chest x-ray on 2/7/2017.    FINDINGS:  Chest/mediastinum: No cardiomegaly or significant pericardial  effusion. Moderate atherosclerotic vascular calcification of the  abdominal aorta and iliac vessels. Extensive bilateral gynecomastia.  Thyroid gland is unremarkable. No significant mediastinal, hilar or  axillary  lymphadenopathy. 6 mm fat attenuating focus abutting the  posterior aspect of the distal esophagus, could represent small food  residue versus small submucosal lipoma. No evidence of other  radiodense foreign bodies. Esophagus is patulous.    Lung/pleura: No pleural effusion or pneumothorax. Numerous bilateral  pulmonary nodules, worrisome for metastatic disease.    Abdomen/pelvis: 1.3 cm partially calcified hypoattenuating focus in  the left hepatic lobe (series 3 image 114) otherwise no suspicious  focal hepatic lesion. Cholelithiasis without CT evidence of acute  cholecystitis. No main pancreatic ductal dilatation or definite solid  pancreatic mass. No splenomegaly. No adrenal nodules. No radiodense  kidney stones or hydronephrosis.    Sigmoid colon appears to be mildly distended and displaced into the  upper mid abdomen/left upper quadrant, no associated mesenteric  twisting or bowel obstruction noted. No significant free fluid in the  abdomen and pelvis. No free peritoneal or portal venous gas. Moderate  atherosclerotic vascular calcification of the abdominal aorta and  iliac vessels. Although the prostate gland is not significantly  enlarged it appears diffusely enhancing, nonspecific, can be due to  underlying diffuse prostatitis versus prostatic malignancy.    Bones and soft tissue: Multilevel degenerative changes of the spine.      Impression    IMPRESSION:   1. The sigmoid colon appears to be mildly distended and displaced into  the upper mid abdomen/left upper quadrant, however with no associated  mesenteric twisting or bowel obstruction. Finding is nonspecific, but  might predispose patient into sigmoid volvulus.  2. 6 mm fat attenuating lesion abutting the posterior aspect of the  esophagus, could represent small food residue versus small submucosal  lipoma. No other radiodense foreign body seen within the esophageal  lumen which appears mildly dilated/patulous.  3. Numerous bilateral pulmonary nodules,  worrisome for metastatic  disease.  4. The prostate gland although not significantly enlarged, appears  diffusely enhancing, nonspecific, can be due to underlying prostatitis  or prostate malignancy, recommend correlation with serum  prostate-specific antigen.  5. Cholelithiasis without CT evidence of acute cholecystitis.  6. 1.3 cm partially calcified hypoattenuating focus in the left  hepatic lobe, of indeterminate etiology.    GREGORIO SHANNON MD

## 2021-01-27 NOTE — CONSULTS
GASTROENTEROLOGY CONSULTATION     Jose Lees   [address] [facility] [id number]   80 year old male   Admission Date/Time: 1/26/2021   Encounter Date: 1/27/2021  Primary Care Provider: Kostas Cortes     Referring / Attending Physician: Susanne Hollins   We were asked to see the patient in consultation by Dr. Hollins for evaluation of dysphagia and volvulus.     HPI: Jose Lees is a 80 year old male with a past medical history significant for coronary artery disease, hyperlipidemia and a history of embolic strokes in 2014, type 2 diabetes, persistent atrial fibrillation previously on chronic anticoagulation, autism, prostate cancer and depression who presented to the emergency department last evening for evaluation of decreased appetite, dysphagia and nausea.  The patient lives in an assisted living facility.  His sister does help with cares routinely but this is been limited in recent months due to the pandemic.  Apparently Deshawn stopped taking all of his medications several months ago.  He began having trouble swallowing for at least the last 4 to 5 days.  He relates difficulties keeping food down but denies overt vomiting.  In the emergency department he was found to be afebrile and hemodynamically stable.  Laboratory evaluation was overall unremarkable except for his glucose being elevated at 240.  He has been on warfarin in the past however his INR in the ED was 0.99 consistent with him stating he has been off medications for several months.  A CT of the chest, abdomen and pelvis was done which did mention a sigmoid colon appearing distended and displaced in the upper abdomen increasing his predisposition for a sigmoid volvulus but there is no mesenteric twisting or bowel obstruction.  Additionally there was a 6 mm fat attenuating lesion abutting the posterior aspect of the esophagus thought to be a small lipoma.  He had findings consistent with his known prostate cancer as well as  cholelithiasis without cholecystitis.  He was admitted for observation.  The patient has had multiple endoscopies with us in the past.  His most recent being in 2018 where he was found to have a Schatzki's ring as well as esophageal candidiasis.    Past Medical History  Past Medical History:   Diagnosis Date     ACP (advance care planning)     Form given     Acute anterior wall MI (H) 4/7/2014     Autism disorder 6/29/2012     CHF (congestive heart failure) (H)     ECHO EF=25-30% on 4/7/14     Coronary artery disease 4/2014 4/2014 HEART CATH - 70% mid LAD -- BMS placed, small 1st diagonal 80%, RCA 60-70% before crux, 70% mid PDA     Dementia (H)      Hyperlipidaemia      Longstanding persistent atrial fibrillation 1/20/2020     Mild major depression (H) 1/20/2020     Prostate cancer (H) 10/11    GLEASOR 8     Stricture and stenosis of esophagus 10/22/2015     Stroke, 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     Type 2 diabetes mellitus without complication, without long-term current use of insulin (H) 7/25/2017       Past Surgical History  Past Surgical History:   Procedure Laterality Date     CORONARY ANGIOGRAPHY ADULT ORDER       ESOPHAGOSCOPY, GASTROSCOPY, DUODENOSCOPY (EGD), COMBINED N/A 8/6/2015    Procedure: COMBINED ESOPHAGOSCOPY, GASTROSCOPY, DUODENOSCOPY (EGD), REMOVE FOREIGN BODY;  Surgeon: Yu Tapia MD;  Location:  GI     HEART CATH, ANGIOPLASTY  4/14    BMS to LAD     PHACOEMULSIFICATION CLEAR CORNEA WITH STANDARD INTRAOCULAR LENS IMPLANT Right 12/4/2018    Procedure: COMPLEX RIGHT EYE PHACOEMULSIFICATION CLEAR CORNEA WITH STANDARD INTRAOCULAR LENS IMPLANT;  Surgeon: Kamar Kyle MD;  Location: [facility]     PHACOEMULSIFICATION CLEAR CORNEA WITH STANDARD INTRAOCULAR LENS IMPLANT Left 12/19/2018    Procedure: COMPLEX LEFT EYE PHACOEMULSIFICATION CLEAR CORNEA WITH STANDARD INTRAOCULAR LENS IMPLANT;  Surgeon: Kamar Kyle MD;  Location: [facility]       Family  History  Family History   Problem Relation Age of Onset     Cerebrovascular Disease Mother 92     C.A.D. Father 48       Social History  Social History     Socioeconomic History     Marital status: Single     Spouse name: Not on file     Number of children: Not on file     Years of education: Not on file     Highest education level: Not on file   Occupational History     Not on file   Social Needs     Financial resource strain: Not on file     Food insecurity     Worry: Not on file     Inability: Not on file     Transportation needs     Medical: Not on file     Non-medical: Not on file   Tobacco Use     Smoking status: Never Smoker     Smokeless tobacco: Never Used   Substance and Sexual Activity     Alcohol use: No     Comment: never     Drug use: No     Sexual activity: Not Currently     Partners: Male   Lifestyle     Physical activity     Days per week: Not on file     Minutes per session: Not on file     Stress: Not on file   Relationships     Social connections     Talks on phone: Not on file     Gets together: Not on file     Attends Oriental orthodox service: Not on file     Active member of club or organization: Not on file     Attends meetings of clubs or organizations: Not on file     Relationship status: Not on file     Intimate partner violence     Fear of current or ex partner: Not on file     Emotionally abused: Not on file     Physically abused: Not on file     Forced sexual activity: Not on file   Other Topics Concern     Parent/sibling w/ CABG, MI or angioplasty before 65F 55M? Not Asked      Service Not Asked     Blood Transfusions Not Asked     Caffeine Concern No     Occupational Exposure Not Asked     Hobby Hazards Not Asked     Sleep Concern No     Stress Concern Not Asked     Weight Concern Yes     Comment: weight loss 22# since 4/2016     Special Diet Not Asked     Back Care Not Asked     Exercise No     Bike Helmet Not Asked     Seat Belt Not Asked     Self-Exams Not Asked   Social History  Narrative     Not on file       Medications  Prior to Admission medications    Medication Sig Start Date End Date Taking? Authorizing Provider   abiraterone (ZYTIGA) 500 MG TABS tablet Take 2 tablets (1,000 mg) by mouth daily 1/20/20   Nolberto Ferrari MD   ACE/ARB/ARNI NOT PRESCRIBED, INTENTIONAL, ACE & ARB not prescribed due to Symptomatic hypotension not due to excessive diuresis 11/26/18   Mariela Stauffer MD   aspirin 81 MG chewable tablet Take 1 tablet (81 mg) by mouth daily 7/30/14   Gely Urbina DO   atorvastatin (LIPITOR) 40 MG tablet Take 1 tablet by mouth daily 5/24/19   Mariela Stauffer MD   blood glucose (NO BRAND SPECIFIED) test strip Use to test blood sugar 1 time daily or as directed. 2/10/20   Nolberto Ferrari MD   blood glucose calibration (NO BRAND SPECIFIED) solution Use to calibrate blood glucose monitor as needed as directed. 2/10/20   Nolberto Ferrari MD   blood glucose monitoring (NO BRAND SPECIFIED) meter device kit Use to test blood sugar one time daily or as directed. 2/10/20   Nolberto Ferrari MD   blood glucose monitoring (SOFTCLIX) lancets Use to test blood sugar one time daily. 2/3/20   Nolberto Ferrari MD   Cholecalciferol (VITAMIN D) 2000 units CAPS Take 3,000 Units by mouth daily Takes  3000 mg    Reported, Patient   glimepiride (AMARYL) 1 MG tablet Take 2 tabs by mouth every am. 5/20/19   Mariela Stauffer MD   Leuprolide Acetate, 6 Month, (LUPRON DEPOT, 6-MONTH, IM)     Reported, Patient   metFORMIN (GLUCOPHAGE) 500 MG tablet TAKE 2 TABLETS TWICE DAILY WITH MEALS 5/20/19   Mariela Stauffer MD   metoprolol succinate ER (TOPROL-XL) 25 MG 24 hr tablet TAKE 1 TABLET EVERY DAY 2/21/20   Nolberto Ferrari MD   omeprazole (PRILOSEC) 20 MG DR capsule TAKE 1 CAPSULE (20 MG) BY MOUTH DAILY 4/23/19   Jermain Helm MD   sertraline (ZOLOFT) 50 MG tablet TAKE 1 TABLET EVERY DAY 2/28/20   Nolberto Ferrari MD   thin (NO BRAND SPECIFIED) lancets Use to test blood sugar one time daily or  "as directed. 2/10/20   Nolberto Ferrari MD   warfarin ANTICOAGULANT (COUMADIN) 2 MG tablet 1 tab daily except take 2 tabs on Wednesdays 1/20/20   Nolberto Ferrari MD       Allergies:  Patient has no known allergies.    ROS: A ten point review of systems was obtained and negative other than the symptoms noted above in the HPI.     Physical Exam:   /50 (BP Location: Right arm)   Pulse 92   Temp 96.3  F (35.7  C) (Oral)   Resp 16   Ht 1.753 m (5' 9\")   Wt 73.5 kg (162 lb)   SpO2 98%   BMI 23.92 kg/m     Constitutional: age appropriate, alert, no acute distress  Cardiovascular: regular rate and rhythm, no murmurs,rubs or gallops  Respiratory: clear to auscultation bilaterally  Psychiatric: normal pleasant affect  Head: atraumatic, normocephalic  Neck: supple, no thyromegaly  ENT: mucous membranes are moist, no oral lesions are noted  Abdomen: soft, non-tender, non-distended, normally active bowel sound. No masses or hepatosplenomegaly is appreciated. No rebound tenderness or guarding  Neuro: Neurologically intact grossly  Skin: warm, dry, no rashes are noted    ADDITIONAL COMMENTS:   I reviewed the patient's new clinical lab test results.   Recent Labs   Lab Test 01/26/21  1624 04/10/20  1045 03/06/20 05/20/19  1205 05/20/19  1205 11/26/18  1116 11/26/18  1116   WBC 9.5  --   --   --  6.7  --  6.4   HGB 15.1  --   --   --  14.6  --  14.1   MCV 90  --   --   --  87.8  --  88.5     --   --   --  170  --  186   INR 0.99 1.70* 2.1*   < >  --    < >  --     < > = values in this interval not displayed.      Recent Labs   Lab Test 01/27/21  0636 01/26/21  1624 01/20/20  0927    138 133   POTASSIUM 3.9 3.5 4.4   CHLORIDE 107 102 101   CO2 25 24 29   BUN 12 16 14   CR 0.66 0.87 0.87   ANIONGAP 7 12 3   TENZIN 8.6 9.4 9.2      Recent Labs   Lab Test 01/26/21  1624 01/20/20  0927 05/20/19  1145 11/26/18  1625 02/08/17  0420 08/06/15  2300 08/06/15  2300   ALBUMIN 3.4 3.4 4.0 4.0  --    < >  --    BILITOTAL 0.7 0.8 " 0.7 0.5  --    < >  --    BILIDIRECT  --   --   --  0.13  --   --   --    ALT 14 23 21 18  --    < >  --    AST 15 17 18 19  --    < >  --    ALKPHOS 74 65 69 64  --    < >  --    PROTEIN  --   --   --   --  Negative  --  10*    < > = values in this interval not displayed.      1/26/21 CT chest abdomen pelvis  IMPRESSION:   1. The sigmoid colon appears to be mildly distended and displaced into  the upper mid abdomen/left upper quadrant, however with no associated  mesenteric twisting or bowel obstruction. Finding is nonspecific, but  might predispose patient into sigmoid volvulus.  2. 6 mm fat attenuating lesion abutting the posterior aspect of the  esophagus, could represent small food residue versus small submucosal  lipoma. No other radiodense foreign body seen within the esophageal  lumen which appears mildly dilated/patulous.  3. Numerous bilateral pulmonary nodules, worrisome for metastatic  disease.  4. The prostate gland although not significantly enlarged, appears  diffusely enhancing, nonspecific, can be due to underlying prostatitis  or prostate malignancy, recommend correlation with serum  prostate-specific antigen.  5. Cholelithiasis without CT evidence of acute cholecystitis.  6. 1.3 cm partially calcified hypoattenuating focus in the left  hepatic lobe, of indeterminate etiology.    Assessment: 80-year-old male presenting with dysphagia and nausea.  Patient has a history of esophageal stricture as well as esophageal candidiasis.  He apparently has not been taking his medication for the last couple of months potentially related to dysphagia.  Would suggest repeat EGD today for further evaluation as well as potential dilation if needed.  In regards to the mildly distended sigmoid colon on CT his exam today is reassuring.  Once his EGD is completed we can advance his diet and monitor for symptoms however hopefully surgery can be avoided at this point.    Plan:   -NPO  -EGD today with   "Sal  -Further recommendations to follow EGD    I discussed the patient's findings and plan with Dr. Audra Huang who will also independently see and examine the patient.     Moncho Deleon PA-C  [facility]  Cell:  [phone] Monday through Friday 8014-1382  Office: [phone]    Staff addendum: 80 yom admitted with dysphagia x 4 weeks. Hx of peptic stricture and Candida esophagitis. Having some mild abd pain. CT showed dilated sigmoid without volvulus.    /67   Pulse 71   Temp 96.3  F (35.7  C) (Oral)   Resp 16   Ht 1.753 m (5' 9\")   Wt 73.5 kg (162 lb)   SpO2 99%   BMI 23.92 kg/m    Gen: awake alert NAD  Cor: RRR  Abd: S mildly diffusely tender ND +bs    A/P: 80 yom with esophageal dysphagia. DDx: peptic stricture, Candidiasis, neoplasm, EoE  -EGD today    Audra Huang MD  [facility]  Phone [phone] until 5 pm  Office [phone] for after hours on call provider      "

## 2021-01-28 ENCOUNTER — ANESTHESIA (OUTPATIENT)
Dept: SURGERY | Facility: CLINIC | Age: 81
DRG: 392 | End: 2021-01-28
Payer: COMMERCIAL

## 2021-01-28 ENCOUNTER — APPOINTMENT (OUTPATIENT)
Dept: GENERAL RADIOLOGY | Facility: CLINIC | Age: 81
DRG: 392 | End: 2021-01-28
Attending: INTERNAL MEDICINE
Payer: COMMERCIAL

## 2021-01-28 ENCOUNTER — APPOINTMENT (OUTPATIENT)
Dept: OCCUPATIONAL THERAPY | Facility: CLINIC | Age: 81
DRG: 392 | End: 2021-01-28
Attending: INTERNAL MEDICINE
Payer: COMMERCIAL

## 2021-01-28 LAB
GLUCOSE BLDC GLUCOMTR-MCNC: 180 MG/DL (ref 70–99)
GLUCOSE BLDC GLUCOMTR-MCNC: 194 MG/DL (ref 70–99)
GLUCOSE BLDC GLUCOMTR-MCNC: 200 MG/DL (ref 70–99)
GLUCOSE BLDC GLUCOMTR-MCNC: 266 MG/DL (ref 70–99)
GLUCOSE BLDC GLUCOMTR-MCNC: 301 MG/DL (ref 70–99)
PSA SERPL-MCNC: 121 UG/L (ref 0–4)
UPPER GI ENDOSCOPY: NORMAL

## 2021-01-28 PROCEDURE — 250N000013 HC RX MED GY IP 250 OP 250 PS 637: Performed by: INTERNAL MEDICINE

## 2021-01-28 PROCEDURE — 97166 OT EVAL MOD COMPLEX 45 MIN: CPT | Mod: GO

## 2021-01-28 PROCEDURE — 97535 SELF CARE MNGMENT TRAINING: CPT | Mod: GO

## 2021-01-28 PROCEDURE — 250N000011 HC RX IP 250 OP 636

## 2021-01-28 PROCEDURE — 36415 COLL VENOUS BLD VENIPUNCTURE: CPT | Performed by: INTERNAL MEDICINE

## 2021-01-28 PROCEDURE — 999N001017 HC STATISTIC GLUCOSE BY METER IP

## 2021-01-28 PROCEDURE — 74330 X-RAY BILE/PANC ENDOSCOPY: CPT

## 2021-01-28 PROCEDURE — 370N000017 HC ANESTHESIA TECHNICAL FEE, PER MIN: Performed by: INTERNAL MEDICINE

## 2021-01-28 PROCEDURE — 0D758ZZ DILATION OF ESOPHAGUS, VIA NATURAL OR ARTIFICIAL OPENING ENDOSCOPIC: ICD-10-PCS | Performed by: INTERNAL MEDICINE

## 2021-01-28 PROCEDURE — 360N000082 HC SURGERY LEVEL 2 W/ FLUORO, PER MIN: Performed by: INTERNAL MEDICINE

## 2021-01-28 PROCEDURE — 999N000141 HC STATISTIC PRE-PROCEDURE NURSING ASSESSMENT: Performed by: INTERNAL MEDICINE

## 2021-01-28 PROCEDURE — 710N000009 HC RECOVERY PHASE 1, LEVEL 1, PER MIN: Performed by: INTERNAL MEDICINE

## 2021-01-28 PROCEDURE — 120N000004 HC R&B MS OVERFLOW

## 2021-01-28 PROCEDURE — 97530 THERAPEUTIC ACTIVITIES: CPT | Mod: GO

## 2021-01-28 PROCEDURE — 88305 TISSUE EXAM BY PATHOLOGIST: CPT | Mod: TC | Performed by: INTERNAL MEDICINE

## 2021-01-28 PROCEDURE — 88305 TISSUE EXAM BY PATHOLOGIST: CPT | Mod: 26 | Performed by: PATHOLOGY

## 2021-01-28 PROCEDURE — 84153 ASSAY OF PSA TOTAL: CPT | Performed by: INTERNAL MEDICINE

## 2021-01-28 PROCEDURE — 0DB98ZX EXCISION OF DUODENUM, VIA NATURAL OR ARTIFICIAL OPENING ENDOSCOPIC, DIAGNOSTIC: ICD-10-PCS | Performed by: INTERNAL MEDICINE

## 2021-01-28 PROCEDURE — 99233 SBSQ HOSP IP/OBS HIGH 50: CPT | Performed by: INTERNAL MEDICINE

## 2021-01-28 PROCEDURE — C1769 GUIDE WIRE: HCPCS | Performed by: INTERNAL MEDICINE

## 2021-01-28 PROCEDURE — 0DB38ZX EXCISION OF LOWER ESOPHAGUS, VIA NATURAL OR ARTIFICIAL OPENING ENDOSCOPIC, DIAGNOSTIC: ICD-10-PCS | Performed by: INTERNAL MEDICINE

## 2021-01-28 PROCEDURE — 272N000048 HC BALLOON ADDITIONAL: Performed by: INTERNAL MEDICINE

## 2021-01-28 PROCEDURE — 258N000003 HC RX IP 258 OP 636: Performed by: ANESTHESIOLOGY

## 2021-01-28 RX ORDER — FLUMAZENIL 0.1 MG/ML
0.2 INJECTION, SOLUTION INTRAVENOUS
Status: ACTIVE | OUTPATIENT
Start: 2021-01-28 | End: 2021-01-29

## 2021-01-28 RX ORDER — NALOXONE HYDROCHLORIDE 0.4 MG/ML
0.4 INJECTION, SOLUTION INTRAMUSCULAR; INTRAVENOUS; SUBCUTANEOUS
Status: ACTIVE | OUTPATIENT
Start: 2021-01-28 | End: 2021-01-29

## 2021-01-28 RX ORDER — HYDROMORPHONE HYDROCHLORIDE 1 MG/ML
.3-.5 INJECTION, SOLUTION INTRAMUSCULAR; INTRAVENOUS; SUBCUTANEOUS EVERY 5 MIN PRN
Status: DISCONTINUED | OUTPATIENT
Start: 2021-01-28 | End: 2021-01-28 | Stop reason: HOSPADM

## 2021-01-28 RX ORDER — FENTANYL CITRATE 50 UG/ML
INJECTION, SOLUTION INTRAMUSCULAR; INTRAVENOUS PRN
Status: DISCONTINUED | OUTPATIENT
Start: 2021-01-28 | End: 2021-01-28

## 2021-01-28 RX ORDER — FENTANYL CITRATE 50 UG/ML
25-50 INJECTION, SOLUTION INTRAMUSCULAR; INTRAVENOUS
Status: DISCONTINUED | OUTPATIENT
Start: 2021-01-28 | End: 2021-01-28 | Stop reason: HOSPADM

## 2021-01-28 RX ORDER — SODIUM CHLORIDE, SODIUM LACTATE, POTASSIUM CHLORIDE, CALCIUM CHLORIDE 600; 310; 30; 20 MG/100ML; MG/100ML; MG/100ML; MG/100ML
INJECTION, SOLUTION INTRAVENOUS CONTINUOUS
Status: DISCONTINUED | OUTPATIENT
Start: 2021-01-28 | End: 2021-01-28 | Stop reason: HOSPADM

## 2021-01-28 RX ORDER — ONDANSETRON 2 MG/ML
INJECTION INTRAMUSCULAR; INTRAVENOUS PRN
Status: DISCONTINUED | OUTPATIENT
Start: 2021-01-28 | End: 2021-01-28

## 2021-01-28 RX ORDER — ATORVASTATIN CALCIUM 40 MG/1
40 TABLET, FILM COATED ORAL DAILY
Status: DISCONTINUED | OUTPATIENT
Start: 2021-01-28 | End: 2021-01-29 | Stop reason: HOSPADM

## 2021-01-28 RX ORDER — NALOXONE HYDROCHLORIDE 0.4 MG/ML
0.2 INJECTION, SOLUTION INTRAMUSCULAR; INTRAVENOUS; SUBCUTANEOUS
Status: ACTIVE | OUTPATIENT
Start: 2021-01-28 | End: 2021-01-29

## 2021-01-28 RX ORDER — ONDANSETRON 2 MG/ML
4 INJECTION INTRAMUSCULAR; INTRAVENOUS EVERY 30 MIN PRN
Status: DISCONTINUED | OUTPATIENT
Start: 2021-01-28 | End: 2021-01-28 | Stop reason: HOSPADM

## 2021-01-28 RX ORDER — FLUCONAZOLE 200 MG/1
200 TABLET ORAL DAILY
Qty: 14 TABLET | Refills: 0 | Status: SHIPPED | OUTPATIENT
Start: 2021-01-28 | End: 2021-02-11

## 2021-01-28 RX ORDER — PANTOPRAZOLE SODIUM 20 MG/1
40 TABLET, DELAYED RELEASE ORAL 2 TIMES DAILY
Status: DISCONTINUED | OUTPATIENT
Start: 2021-01-28 | End: 2021-01-29

## 2021-01-28 RX ORDER — FLUCONAZOLE 200 MG/1
200 TABLET ORAL DAILY
Status: DISCONTINUED | OUTPATIENT
Start: 2021-01-28 | End: 2021-01-29 | Stop reason: HOSPADM

## 2021-01-28 RX ORDER — PANTOPRAZOLE SODIUM 20 MG/1
40 TABLET, DELAYED RELEASE ORAL DAILY
Status: DISCONTINUED | OUTPATIENT
Start: 2021-01-28 | End: 2021-01-29 | Stop reason: HOSPADM

## 2021-01-28 RX ORDER — ONDANSETRON 4 MG/1
4 TABLET, ORALLY DISINTEGRATING ORAL EVERY 30 MIN PRN
Status: DISCONTINUED | OUTPATIENT
Start: 2021-01-28 | End: 2021-01-28 | Stop reason: HOSPADM

## 2021-01-28 RX ORDER — METOPROLOL SUCCINATE 25 MG/1
25 TABLET, EXTENDED RELEASE ORAL DAILY
Status: DISCONTINUED | OUTPATIENT
Start: 2021-01-28 | End: 2021-01-29 | Stop reason: HOSPADM

## 2021-01-28 RX ORDER — PROPOFOL 10 MG/ML
INJECTION, EMULSION INTRAVENOUS CONTINUOUS PRN
Status: DISCONTINUED | OUTPATIENT
Start: 2021-01-28 | End: 2021-01-28

## 2021-01-28 RX ADMIN — PANTOPRAZOLE SODIUM 40 MG: 20 TABLET, DELAYED RELEASE ORAL at 20:23

## 2021-01-28 RX ADMIN — FLUCONAZOLE 200 MG: 200 TABLET ORAL at 13:45

## 2021-01-28 RX ADMIN — SODIUM CHLORIDE, POTASSIUM CHLORIDE, SODIUM LACTATE AND CALCIUM CHLORIDE: 600; 310; 30; 20 INJECTION, SOLUTION INTRAVENOUS at 10:04

## 2021-01-28 RX ADMIN — PROPOFOL 150 MCG/KG/MIN: 10 INJECTION, EMULSION INTRAVENOUS at 10:38

## 2021-01-28 RX ADMIN — FENTANYL CITRATE 25 MCG: 50 INJECTION, SOLUTION INTRAMUSCULAR; INTRAVENOUS at 10:42

## 2021-01-28 RX ADMIN — INSULIN ASPART 3 UNITS: 100 INJECTION, SOLUTION INTRAVENOUS; SUBCUTANEOUS at 21:27

## 2021-01-28 RX ADMIN — FENTANYL CITRATE 25 MCG: 50 INJECTION, SOLUTION INTRAMUSCULAR; INTRAVENOUS at 10:55

## 2021-01-28 RX ADMIN — ONDANSETRON 4 MG: 2 INJECTION INTRAMUSCULAR; INTRAVENOUS at 10:42

## 2021-01-28 RX ADMIN — PANTOPRAZOLE SODIUM 40 MG: 20 TABLET, DELAYED RELEASE ORAL at 16:13

## 2021-01-28 RX ADMIN — METOPROLOL SUCCINATE 25 MG: 25 TABLET, EXTENDED RELEASE ORAL at 16:14

## 2021-01-28 RX ADMIN — ATORVASTATIN CALCIUM 40 MG: 40 TABLET, FILM COATED ORAL at 16:14

## 2021-01-28 ASSESSMENT — ACTIVITIES OF DAILY LIVING (ADL)
ADLS_ACUITY_SCORE: 18
ADLS_ACUITY_SCORE: 18
PREVIOUS_RESPONSIBILITIES: MEAL PREP;MEDICATION MANAGEMENT
ADLS_ACUITY_SCORE: 18
ADLS_ACUITY_SCORE: 18
ADLS_ACUITY_SCORE: 19

## 2021-01-28 ASSESSMENT — ENCOUNTER SYMPTOMS
DYSRHYTHMIAS: 1
ORTHOPNEA: 0

## 2021-01-28 NOTE — CONSULTS
CLINICAL NUTRITION SERVICES  -  ASSESSMENT NOTE      Recommendations Ordered by Registered Dietitian (RD):   Change diet to Moderate Consistent CHO (0085-7869 kcals per day), Mech Soft  Strawberry Boost Glucose Control BID btw meals (10 am and 2 pm)   Malnutrition:   % Weight Loss:  > 2% in 1 week (severe malnutrition)  % Intake:  </= 50% for >/= 5 days (severe malnutrition)  Subcutaneous Fat Loss:  Orbital region mild depletion and Upper arm region moderate depletion  Muscle Loss:  Temporal region moderate depletion, Clavicle bone region moderate depletion, Acromion bone region moderate   depletion and Dorsal hand region mild depletion  Fluid Retention:  None noted    Malnutrition Diagnosis: Severe malnutrition  In Context of:  Acute illness or injury        REASON FOR ASSESSMENT  Jose Lees is a 80 year old male seen by Registered Dietitian for RN Consult - Pt/Family Request:  Sister notes weight loss, pt having difficulty swallowing      NUTRITION HISTORY  - Information obtained from patient and Epic records.  - Patient is on a Soft diet at home.  He has h/o esophageal stricture and stenosis requiring previous dilations.   - Typical food/fluid intake is fair but very poor lately.  Over the last 4 days PTA, he had nausea, poor appetite, and inability to swallow food.  He also had some abdominal pain.  - Diet history:  Pt states he used to have his meals in the dining room, but then they started bringing meals to the rooms.  Eventually he requested to no longer have meals brought to his room.  He tells his sister Judith what he needs (almond milk, cranberry juice, etc) and she delivers it to the assisted living facility where he resides.   - Supplements:  Generic brand of Ensure   - No food allergies/intolerances noted.  Note pt stopped taking his medications due to difficulty swallowing them.  Pt has become weak and deconditioned.      CURRENT NUTRITION ORDERS  Diet Order:     Mechanical/Dental Soft  "    Current Intake/Tolerance:  Pt was working on his lunch today, which consisted mostly of FL.  He is receptive to having a strawberry flavored oral liquid nutritional supplements.    1/27:  Abd CT   - Sigmoid colon mildly distended & displaced into upper mid abdomen/left upper quadrant, however no assoc mesenteric twisting or bowel obstruction. Finding nonspecific, but might predispose pt into sigmoid volvulus.   - 6 mm fat attenuating lesion abutting posterior aspect of esophagus, could represent small food residue versus small submucosal lipoma.   - Numerous bilateral pulmonary nodules, worrisome for metastatic disease.   - Cholelithiasis without CT evidence of acute cholecystitis.   - 1.3 cm partially calcified hypoattenuating focus in left hepatic lobe   1/27:  EGD = 2 severe benign appearing strictures. Proximal stenosis at 18 mm dilated to 12 mm.   1/28:  Repeat EGD/dilation with fluoroscopy     - Benign-appearing esophageal stenosis. Dilated.     - Benign-appearing esophageal stenosis. Dilated.   - Gastritis.   - Congested duodenal mucosa. Biopsied.   - Normal second portion of the duodenum.   - White nummular lesions in esophageal mucosa. Biopsied. Likely Candida esophagitis     NUTRITION FOCUSED PHYSICAL ASSESSMENT FOR DIAGNOSING MALNUTRITION)  Yes    Observed:    Muscle wasting (refer to documentation in Malnutrition section)   Subcutaneous fat loss (refer to documentation in Malnutrition section)    Obtained from Chart/Interdisciplinary Team:  Non-healing wound - 1/27:  WOCN = Bilateral LE very unkempt with thick, thick crusting of old eschar along anterior-medial aspects of bilateral LE, these look like venous stasis wounds, no s/s of infection but need to work on ridding skin of these thick crusts of eschar    ANTHROPOMETRICS  Height: 5' 9\"  Weight:  73.5 kg (162 lbs 0 oz)  Body mass index is 23.92 kg/m .  Weight Status:  Normal BMI  IBW:  72.7 kg  % IBW:  100%  Weight History:  Recent 3.2 kg (4%) " weight loss.    Wt Readings from Last 10 Encounters:   01/26/21 73.5 kg (162 lb)   01/20/20 76.7 kg (169 lb)   12/10/19 77.1 kg (170 lb)   11/25/19 78.2 kg (172 lb 8 oz)   11/08/19 78.4 kg (172 lb 12.8 oz)   10/22/19 79 kg (174 lb 3.2 oz)   10/01/19 78.5 kg (173 lb)   09/13/19 79.6 kg (175 lb 6.4 oz)   09/05/19 79.2 kg (174 lb 9.6 oz)   08/27/19 79.5 kg (175 lb 3.2 oz)       LABS  Labs reviewed  Hgb A1C 11.4 (H)  BGM:  215, 302, 194, 200 - Pt with h/o DM-2    MEDICATIONS  Medications reviewed  Med Res sliding scale insulin - for glycemic control    ASSESSED NUTRITION NEEDS PER APPROVED PRACTICE GUIDELINES:    Dosing Weight:  73.5 kg  Estimated Energy Needs:  0093-6363 kcals (25-30 Kcal/Kg)  Justification: maintenance  Estimated Protein Needs:   grams protein (1.2-1.5 g pro/Kg)  Justification: Repletion and hypercatabolism with acute illness  Estimated Fluid Needs:  4267-6182 mL (1 mL/Kcal)  Justification: maintenance    MALNUTRITION:  % Weight Loss:  > 2% in 1 week (severe malnutrition)  % Intake:  </= 50% for >/= 5 days (severe malnutrition)  Subcutaneous Fat Loss:  Orbital region mild depletion and Upper arm region moderate depletion  Muscle Loss:  Temporal region moderate depletion, Clavicle bone region moderate depletion, Acromion bone region moderate   depletion and Dorsal hand region mild depletion  Fluid Retention:  None noted    Malnutrition Diagnosis: Severe malnutrition  In Context of:  Acute illness or injury    NUTRITION DIAGNOSIS:  Inadequate oral intake related to altered GI function (dysphagia, nausea, and decreased appetite) as evidenced by limited oral intake over the past 5 days with resultant 4% weight loss and loss of subcutaneous fat and muscle mass.    NUTRITION INTERVENTIONS  Recommendations / Nutrition Prescription  Change diet to Moderate Consistent CHO (5609-5545 kcals per day), Mech Soft  Strawberry Boost Glucose Control BID btw meals (10 am and 2 pm)    Implementation  Nutrition  education: Provided education on the use of supplements to optimize intake.  Reviewed small, frequent feedings.  Diet Order - Modified in Epic as above  Medical Food Supplement - Ordered in Epic as above    Nutrition Goals  Pt will consume > 75% meals and > 50% supplements in 3-5 days    MONITORING AND EVALUATION:  Progress towards goals will be monitored and evaluated per protocol and Practice Guidelines    Rebeca Newton, RIC, LD, CNSC

## 2021-01-28 NOTE — PROGRESS NOTES
EGD: Upper esophagus stricture dilated to 15 mm. Lower esophageal stricture dilated to 12 mm. Probable Candida esophagitis bx'ed. Mild antral gastritis. Edema of duodenal bulb bx'ed    Recs  -Soft diet   -PPI indefinitely  -Await path  -Fluconazole x 2 weeks  -Repeat EGD for further dilation in 2 weeks    Will sign off. Pt ok for discharge when felt to be ready per hospitalist service.    Audra Huang MD  [facility]  Phone [phone] until 5 pm  Office [phone] for after hours on call provider.

## 2021-01-28 NOTE — PROGRESS NOTES
"[facility]    Hospitalist Progress Note    Assessment & Plan   Deshawn Lees is a very pleasant 80-year-old male with past medical history significant for coronary artery disease with history of stenting, hyperlipidemia, history of embolic stroke, history of persistent atrial fibrillation previously on chronic anticoagulation with warfarin, type 2 diabetes, esophageal stricture, autism and prostate cancer and depression, who presented to the emergency room this evening for evaluation of a constellation of gastrointestinal symptoms including decreased appetite, nausea and inability to keep food down.  A basic workup this evening is relatively unremarkable.  He will be admitted under observation status for ongoing evaluation and care.       Dysphagia:    Candididal esophagitis  Esophageal strictures -s/p EGD with dilation  -The patient endorses difficulties \"keeping food down\" and, in general, difficulties with swallowing.   - He does have a history of esophageal stenosis and stricture requiring dilation in the past.    -Although there was some mention of possible tenderness to palpation in the left quadrants on the EGD exam, admitting md did not appreciate this on admission exam.   - He did have some mildly abnormal anatomy that could predispose him to increased risk for developing a volvulus, but at this point, I do not see any concerns for that as being a culprit of his symptoms and presentation.  If he were to develop acute abdominal discomfort, could consider a General Surgery consult, but I do not feel it is indicated at this time.   - pt states he stopped taking his meds since difficult to swallow them.   -seems to have mild abd discomfort this am. Slightly distended abdomen. NT,   -nl bmp, LFTs, lactate and cbc with platelet count  - - 1/27, EGD showed two severe benign appearing strictures. Proximal stenosis at 18 mm dilated to 12 mm. Could not easily pass balloon through distal " stricture at 40 cm.  -1/28 EGD Benign-appearing esophageal stenosis. Dilated.                             - Benign-appearing esophageal stenosis. Dilated.                             - Gastritis.                             - Congested duodenal mucosa. Biopsied.                             - Normal second portion of the duodenum.                             - White nummular lesions in esophageal mucosa.                             Biopsied. Likely Candida esophagitis     -pt tolerating and taking mechanical soft diet. Feeling fine  He states that he stopped taking meds 2/2 to the dysphagia he was experiencing.     Plan-  -initiated on fluconazole  200mg every day for 14 days  - follow up [facility] for repeat EGD 2 weeks- will need to be off DOAC for 3 days prior to procedure or have nl INR (if on coumadin) prior to his repeat EGD per GI.   - mechanical soft diet today.        Abnormal skin lesions on lower extremities:   - Unclear when exactly they developed.  The patient seems unbothered by them.   - Wound nurse has been consulted.  No indications suggestive of an acute infection at this time.  Could consider further evaluation per dermatology in the outpatient setting.  -bilateral so doubt malignancy. No clear purulence.   -states have been there for weeks to ? Months. Unclear hx  - outpatient derm consult.   -wound care orders in place    Coronary artery disease with remote history of stent, history of persistent atrial fibrillation,  - previously on anticoagulation with warfarin, history of embolic cerebrovascular accidents in 2014:   -cardiology note 7/30/14 notes low EF, MRI brain showing infarcts c/w embolism but no hx of documented Afib  -see discharge summary and mri brain 4/2014 for CVA admission and starting coumadin.   - He has now been off all medications for several months now, including his warfarin, metoprolol, aspirin, and statin.   - These medicines will be held given his admission under observation  status.  Would recommend resumption.   - The patient likely needs more education in this regard medication compliance going forward as it is not completely clear why he stopped taking all of his medications in the first place.   -pt stopped taking  meds given his dysphagia  -will restart metoprolol and statin  -will review his prior anticoagulation. Given need for upcoming procedures and being in supervised setting/tcu that prudent to resume anticoagulation with DOAC such as Eliquis rather than coumadin that would need to be reversed.   If/when he returns to assisted living will need and assisted living RN to administer his meds to ensure compliance going forward  Cardiology follow up to be arranged.   Will consult neurology to determine if anticoagulation indicated. See discussion above, findings from 2014.   -recheck echo    Type 2 diabetes:   - Previously managed with glimepiride and metformin.  He has been off these medications for several months now.   - Blood sugar 100-gmn653 range  -HbA1C 11%, suspect 2/2 to being off medications.   -manage with sliding scale insulin this hospitalization while needed, given his difficulties with taking p.o.   -follow for need of lantus while in hospital  -close pcp follow up. Home care needed  -now that he is eating will see how his blood sugars are to assess needs.      Prostate cancer:   - Follows with Dr. Burgos of [facility] and Dr. [facility].   - He manages with Lupron injections every 6 months.   - He was last seen in 10/2020 per [facility], and his disease was noted to be stable.   - He had also been previously prescribed Zytiga, but again he stopped taking this several weeks ago.   -[facility] consult given CT prostate and pulmonary nodule findings.   -Await formal comparison of CT scans.  Anticipate outpatient follow up with Dr. Burgos.        Depression:  Previously stable on Zoloft.  Stopped taking this medication as well.  Can  resume as needed.   pcp follow up      Autism:   - The patient, per his sister's report, does have a good understanding of his underlying medical illnesses and his healthcare.  She does note she is his medical power of  and assists him with medical decision-making as needed.  However, she says he typically signs his own consents.  He lives independently in [facility] assisted living facility.     -will discuss increase in services to ensure taking medications  -OT joce tcu  - discussed care plan with pt's sister and encouraged her to have assisted living increase his services so that an RN is administering meds to patient on daily basis as pt not reliably take medications     Reported history of dementia:    -see this documented in the chart, but this was not specifically endorsed in my conversations with Judith this evening.    -OT has been consulted for cognitive evaluation.   pt likely stopped taking meds given his new dysphagia  -will need close Pcp follow up     DEEP VENOUS THROMBOSIS PROPHYLAXIS:  PCDs.      CODE STATUS:  Full code, as confirmed per patient this evening.     Dispo:  ultmately discharge home     Baldemar Raymond MD  Text Page  (7am to 6pm)  Interval History   Repeat egd today. Started fluconazole for canddal esophagitis  Dilated strictures.   Swallowing food fine   no cp or sob.   Feels fine  -Data reviewed today: I reviewed all new labs and imaging results over the last 24 hours. I personally reviewed imaging and labs since admission    Physical Exam   Temp: 97.2  F (36.2  C) Temp src: Oral BP: 129/62 Pulse: 66   Resp: 16 SpO2: 96 % O2 Device: None (Room air) Oxygen Delivery: 2 LPM  Vitals:    01/26/21 1607   Weight: 73.5 kg (162 lb)     Vital Signs with Ranges  Temp:  [95.6  F (35.3  C)-98.5  F (36.9  C)] 96.5  F (35.8  C)  Pulse:  [64-96] 96  Resp:  [14-20] 16  BP: (105-130)/(43-70) 126/67  SpO2:  [94 %-99 %] 96 %  I/O last 3 completed shifts:  In: 240 [P.O.:240]  Out: -      Constitutional: Nad, in bed eating  Respiratory: CTAB  Cardiovascular: RRR no r/g/m  GI: soft, mild distension, nl BS, NT  Skin/Integumen: large dry raised lesions BLE, R>L. No redness, fluctuance. One is unroofed RLE---> bandaged.   No cyanosis. Mild BLE edema  Neuro/psych: talkative, pleasant, appears at baseline cognition and affect, nl speech and mentation.      Medications     - MEDICATION INSTRUCTIONS -         fluconazole  200 mg Oral Daily     insulin aspart  1-7 Units Subcutaneous TID AC     insulin aspart  1-5 Units Subcutaneous At Bedtime     pantoprazole  40 mg Oral BID       Data   Recent Labs   Lab 01/27/21  0636 01/26/21  1624   WBC  --  9.5   HGB  --  15.1   MCV  --  90   PLT  --  193   INR  --  0.99    138   POTASSIUM 3.9 3.5   CHLORIDE 107 102   CO2 25 24   BUN 12 16   CR 0.66 0.87   ANIONGAP 7 12   TENZIN 8.6 9.4   * 247*   ALBUMIN  --  3.4   PROTTOTAL  --  7.9   BILITOTAL  --  0.7   ALKPHOS  --  74   ALT  --  14   AST  --  15       Imaging:   Recent Results (from the past 24 hour(s))   XR ERCP    Narrative    ENDOSCOPIC RETROGRADE CHOLANGIOPANCREATOGRAPHY  1/28/2021 11:15 AM     HISTORY: ERCP [id number]    COMPARISON: None.      Impression    IMPRESSION: Single spot fluoroscopic image during endoscopy  demonstrating endoscope near the GE junction. Total fluoroscopic time  is 0.1 minute.    TESSA FERRARO MD

## 2021-01-28 NOTE — ANESTHESIA CARE TRANSFER NOTE
Patient: Jose Lees    Procedure(s):  ESOPHAGOGASTRODUODENOSCOPY (EGD) WITH FLUORO,Biopsy, and Dilation    Diagnosis: Dysphagia, unspecified type [R13.10]  Diagnosis Additional Information: No value filed.    Anesthesia Type:   MAC     Note:    Oropharynx: oropharynx clear of all foreign objects  Level of Consciousness: awake  Oxygen Supplementation: nasal cannula  Level of Supplemental Oxygen: 4  Independent Airway: airway patency satisfactory and stable  Dentition: dentition unchanged  Vital Signs Stable: post-procedure vital signs reviewed and stable  Report to RN Given: handoff report given  Patient transferred to: PACU  Comments: At end of procedure, spontaneous respirations, patient alert to voice, able to follow commands. Oxygen via nasal cannula at 4 liters per minute to PACU. Oxygen tubing connected to wall O2 in PACU, SpO2, NiBP, and EKG monitors and alarms on and functioning, report on patient's clinical status given to PACU RN, RN questions answered.  Handoff Report: Identifed the Patient, Identified the Reponsible Provider, Reviewed the pertinent medical history, Discussed the surgical course, Reviewed Intra-OP anesthesia mangement and issues during anesthesia, Set expectations for post-procedure period and Allowed opportunity for questions and acknowledgement of understanding      Vitals: (Last set prior to Anesthesia Care Transfer)  CRNA VITALS  1/28/2021 1040 - 1/28/2021 1120      1/28/2021             Resp Rate (observed):  16        Electronically Signed By: SARATH Garrett CRNA  January 28, 2021  11:20 AM

## 2021-01-28 NOTE — PROGRESS NOTES
[facility] Hematology Progress Note     Primary Oncologist/Hematologist:  Dr. Burgos          Assessment and Plan:   1.  Castration-resistant prostate cancer, currently on leuprolide every 6 months with evidence of increasing tumor marker, was clinically stable as of last visit to Dr. Burgos in October.   -Numerous lung nodules concerning for metastatic disease.  Await formal comparison to October 2020 CT scan from [facility] (requested again on 1/28/21)    - Bone scan in October 2020 which did not demonstrate metastatic disease.   -  on 1/28/21 compared to 104 on 10/28/21.   - If prostate cancer is progressing, could be considered for resumption of abiraterone and prednisone.     2.   Liver lesion, indeterminate.     3.  Esophageal stenosis, status post dilatation.   - EGD from 1/28/21 shows benign appearing stenosis which was dilated.  Also noted was gastritis and likely candida esophagitis.   Now on fluconazole x 14 days.     4.  Wound lesions managed by Wound team.     FULL CODE  DVT prophylaxis: PCDs     Plan:   Await formal comparison of CT scans.  Anticipate outpatient follow up with Dr. Burgos.           Interval History:   No new complaints.               Review of Systems:   The 5 point Review of Systems is negative other than noted in the HPI             Medications:   Scheduled Medications    fluconazole  50 mg Oral Daily     [Auto Hold] insulin aspart  1-7 Units Subcutaneous TID AC     [Auto Hold] insulin aspart  1-5 Units Subcutaneous At Bedtime     [START ON 1/29/2021] omeprazole  40 mg Oral QAM AC     PRN Medications  [Auto Hold] acetaminophen, [Auto Hold] sore throat lozenge, [Auto Hold] glucose **OR** [Auto Hold] dextrose **OR** [Auto Hold] glucagon, fentaNYL, HYDROmorphone, [Auto Hold] melatonin, ondansetron **OR** ondansetron, [Auto Hold] ondansetron **OR** [Auto Hold] ondansetron, [Auto Hold] prochlorperazine **OR** [Auto Hold] prochlorperazine **OR** [Auto Hold] prochlorperazine,  "[Auto Hold] sodium chloride (PF)               Physical Exam:   Vitals were reviewed  Blood pressure 119/60, pulse 73, temperature 97.2  F (36.2  C), resp. rate 17, height 1.753 m (5' 9\"), weight 73.5 kg (162 lb), SpO2 98 %.  Wt Readings from Last 4 Encounters:   01/26/21 73.5 kg (162 lb)   01/20/20 76.7 kg (169 lb)   12/10/19 77.1 kg (170 lb)   11/25/19 78.2 kg (172 lb 8 oz)       I/O last 3 completed shifts:  In: 240 [P.O.:240]  Out: -       Constitutional: Awake, alert, cooperative, no apparent distress   Lungs: Breathing comfortably.   Cardiovascular: Regular rate   Abdomen: non-tender   Skin: Dry skin lesions involving legs.    Other:               Data:   All laboratory data and imaging studies reviewed.    CMP  Recent Labs   Lab 01/27/21  0636 01/26/21  1624    138   POTASSIUM 3.9 3.5   CHLORIDE 107 102   CO2 25 24   ANIONGAP 7 12   * 247*   BUN 12 16   CR 0.66 0.87   GFRESTIMATED >90 82   GFRESTBLACK >90 >90   TENZIN 8.6 9.4   PROTTOTAL  --  7.9   ALBUMIN  --  3.4   BILITOTAL  --  0.7   ALKPHOS  --  74   AST  --  15   ALT  --  14     CBC  Recent Labs   Lab 01/26/21  1624   WBC 9.5   RBC 5.12   HGB 15.1   HCT 46.2   MCV 90   MCH 29.5   MCHC 32.7   RDW 13.3        INR  Recent Labs   Lab 01/26/21  1624   INR 0.99           Clarissa Fam CNP  Nurse Practitioner  [facility]  [phone]      "

## 2021-01-28 NOTE — ANESTHESIA POSTPROCEDURE EVALUATION
Patient: Jose Lees    Procedure(s):  ESOPHAGOGASTRODUODENOSCOPY (EGD) WITH FLUORO,Biopsy, and Dilation    Diagnosis:Dysphagia, unspecified type [R13.10]  Diagnosis Additional Information: No value filed.    Anesthesia Type:  MAC    Note:     Postop Pain Control: Uneventful            Sign Out: Well controlled pain   PONV: No   Neuro/Psych: Uneventful            Sign Out: Acceptable/Baseline neuro status   Airway/Respiratory: Uneventful            Sign Out: Acceptable/Baseline resp. status   CV/Hemodynamics: Uneventful            Sign Out: Acceptable CV status   Other NRE: NONE   DID A NON-ROUTINE EVENT OCCUR? No         Last vitals:  Vitals:    01/28/21 1220 01/28/21 1234 01/28/21 1517   BP:  129/62 126/67   Pulse: 74 66 96   Resp: 20 16 16   Temp:   35.8  C (96.5  F)   SpO2: 97% 96% 96%       Electronically Signed By: Jannet Kimball MD  January 28, 2021  5:44 PM

## 2021-01-28 NOTE — PLAN OF CARE
A/O x 4, pleasant. Mechanical soft Mod Carb diet, NPO at midnight. Adequate appetite. Denies nausea or vomiting. SBA 2/walker. VSS on room air. , 302. Insulin given. IV SL. BLE wound, wound care completed this shift. WOC following. Upper endoscopy completed today, Fluoroscopy scheduled for tomorrow. Hematology/Oncology, OT consulted. Inpatient status.

## 2021-01-28 NOTE — PLAN OF CARE
A&Ox4. VSS on RA. Tele ST. EGD with dilatation and biopsies of esophagus and duodenum done today. Mech/soft diet ordered; pt currently only wanting to order from liquids. Incontinent of urine at times, otherwise up with A1, walker, and gait belt to BR. BLE dressings CDI. Denies pain. Discharge TBD.

## 2021-01-28 NOTE — PLAN OF CARE
Covid negative.  AVSS. RA.  A&O.  Forgetful at times.  Denies pain.   No c/o nausea.  Saline locked.  NPO at [facility].  Plan for EGD at 1030 this am.  at 0200. Voiding.  SBA/ gait belt/walker.  BLE with edema wear stocking, CDI. Hem/Onc/Nutrition/OT/WOC consult.

## 2021-01-28 NOTE — ANESTHESIA PREPROCEDURE EVALUATION
Anesthesia Pre-Procedure Evaluation    Patient: Jose Lees   MRN: [id number] : 1940        Preoperative Diagnosis: Dysphagia, unspecified type [R13.10]   Procedure : Procedure(s):  ESOPHAGOGASTRODUODENOSCOPY (EGD) WITH FLUORO     Past Medical History:   Diagnosis Date     ACP (advance care planning)     Form given     Acute anterior wall MI (H) 2014     Autism disorder 2012     CHF (congestive heart failure) (H)     ECHO EF=25-30% on 14     Coronary artery disease 2014 HEART CATH - 70% mid LAD -- BMS placed, small 1st diagonal 80%, RCA 60-70% before crux, 70% mid PDA     Dementia (H)      Hyperlipidaemia      Longstanding persistent atrial fibrillation (H) 2020     Mild major depression (H) 2020     Prostate cancer (H) 10/11    GLEASOR 8     Stricture and stenosis of esophagus 10/22/2015     Stroke, embolic (H) 2014    bilateral cerebral embolic CVAs     Type 2 diabetes mellitus without complication, without long-term current use of insulin (H) 2017      Past Surgical History:   Procedure Laterality Date     CORONARY ANGIOGRAPHY ADULT ORDER       ESOPHAGOSCOPY, GASTROSCOPY, DUODENOSCOPY (EGD), COMBINED N/A 2015    Procedure: COMBINED ESOPHAGOSCOPY, GASTROSCOPY, DUODENOSCOPY (EGD), REMOVE FOREIGN BODY;  Surgeon: Yu Tapia MD;  Location:  GI     HEART CATH, ANGIOPLASTY      BMS to LAD     PHACOEMULSIFICATION CLEAR CORNEA WITH STANDARD INTRAOCULAR LENS IMPLANT Right 2018    Procedure: COMPLEX RIGHT EYE PHACOEMULSIFICATION CLEAR CORNEA WITH STANDARD INTRAOCULAR LENS IMPLANT;  Surgeon: Kamar Kyle MD;  Location:  EC     PHACOEMULSIFICATION CLEAR CORNEA WITH STANDARD INTRAOCULAR LENS IMPLANT Left 2018    Procedure: COMPLEX LEFT EYE PHACOEMULSIFICATION CLEAR CORNEA WITH STANDARD INTRAOCULAR LENS IMPLANT;  Surgeon: Kamar Kyle MD;  Location: [facility]      No Known Allergies   Social History     Tobacco Use      Smoking status: Never Smoker     Smokeless tobacco: Never Used   Substance Use Topics     Alcohol use: No     Comment: never      Wt Readings from Last 1 Encounters:   01/26/21 73.5 kg (162 lb)        Anesthesia Evaluation   Pt has had prior anesthetic.         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    Other:            Physical Exam    Airway        Mallampati: II   TM distance: > 3 FB   Neck ROM: full   Mouth opening: > 3 cm    Respiratory Devices and Support         Dental       (+) upper dentures and lower dentures      Cardiovascular          Rhythm and rate: regular     Pulmonary           breath sounds clear to auscultation           OUTSIDE LABS:  CBC:   Lab Results   Component Value Date    WBC 9.5 01/26/2021    WBC 6.7 05/20/2019    HGB 15.1 01/26/2021    HGB 14.6 05/20/2019    HCT 46.2 01/26/2021    HCT 43.8 05/20/2019     01/26/2021     05/20/2019     BMP:   Lab Results   Component Value Date     01/27/2021     01/26/2021    POTASSIUM 3.9 01/27/2021    POTASSIUM 3.5 01/26/2021    CHLORIDE 107 01/27/2021    CHLORIDE 102 01/26/2021    CO2 25 01/27/2021    CO2 24 01/26/2021    BUN 12 01/27/2021    BUN 16 01/26/2021    CR 0.66 01/27/2021    CR 0.87 01/26/2021     (H) 01/27/2021     (H) 01/26/2021     COAGS:   Lab Results   Component Value Date    INR 0.99 01/26/2021     POC:   Lab Results    Component Value Date     (H) 01/28/2021     HEPATIC:   Lab Results   Component Value Date    ALBUMIN 3.4 01/26/2021    PROTTOTAL 7.9 01/26/2021    ALT 14 01/26/2021    AST 15 01/26/2021    ALKPHOS 74 01/26/2021    BILITOTAL 0.7 01/26/2021    BILIDIRECT 0.13 11/26/2018     OTHER:   Lab Results   Component Value Date    LACT 1.6 01/26/2021    A1C 11.4 (H) 01/26/2021    TENZIN 8.6 01/27/2021    PHOS 2.6 08/06/2015    MAG 2.2 02/09/2017    LIPASE 52 06/01/2012    TSH 5.20 (H) 01/20/2020    T4 0.97 01/20/2020     No Known Allergies  Social History     Tobacco Use     Smoking status: Never Smoker     Smokeless tobacco: Never Used   Substance Use Topics     Alcohol use: No     Comment: never     Wt Readings from Last 1 Encounters:   01/26/21 73.5 kg (162 lb)      Prior to Admission medications    Medication Sig Start Date End Date Taking? Authorizing Provider   abiraterone (ZYTIGA) 500 MG TABS tablet Take 2 tablets (1,000 mg) by mouth daily 1/20/20   Nolberto Ferrari MD   ACE/ARB/ARNI NOT PRESCRIBED, INTENTIONAL, ACE & ARB not prescribed due to Symptomatic hypotension not due to excessive diuresis 11/26/18   Mariela Stauffer MD   aspirin 81 MG chewable tablet Take 1 tablet (81 mg) by mouth daily 7/30/14   Gely Urbina DO   atorvastatin (LIPITOR) 40 MG tablet Take 1 tablet by mouth daily 5/24/19   Mariela Stauffer MD   blood glucose (NO BRAND SPECIFIED) test strip Use to test blood sugar 1 time daily or as directed. 2/10/20   Nolberto Ferrari MD   blood glucose calibration (NO BRAND SPECIFIED) solution Use to calibrate blood glucose monitor as needed as directed. 2/10/20   Nolberto Ferrari MD   blood glucose monitoring (NO BRAND SPECIFIED) meter device kit Use to test blood sugar one time daily or as directed. 2/10/20   Nolberto Ferrari MD   blood glucose monitoring (SOFTCLIX) lancets Use to test blood sugar one time daily. 2/3/20   Veum, Nolberto TREVIZO MD   Cholecalciferol (VITAMIN D) 2000 units CAPS Take 3,000  Units by mouth daily Takes  3000 mg    Reported, Patient   glimepiride (AMARYL) 1 MG tablet Take 2 tabs by mouth every am. 5/20/19   Mariela Stauffer MD   Leuprolide Acetate, 6 Month, (LUPRON DEPOT, 6-MONTH, IM)     Reported, Patient   metFORMIN (GLUCOPHAGE) 500 MG tablet TAKE 2 TABLETS TWICE DAILY WITH MEALS 5/20/19   Mariela Stauffer MD   metoprolol succinate ER (TOPROL-XL) 25 MG 24 hr tablet TAKE 1 TABLET EVERY DAY 2/21/20   Nolberto Ferrari MD   omeprazole (PRILOSEC) 20 MG DR capsule TAKE 1 CAPSULE (20 MG) BY MOUTH DAILY 4/23/19   Jermain Helm MD   sertraline (ZOLOFT) 50 MG tablet TAKE 1 TABLET EVERY DAY 2/28/20   Nolberto Ferrari MD   thin (NO BRAND SPECIFIED) lancets Use to test blood sugar one time daily or as directed. 2/10/20   Nolberto Ferrari MD   warfarin ANTICOAGULANT (COUMADIN) 2 MG tablet 1 tab daily except take 2 tabs on Wednesdays 1/20/20   Nolberto Ferrari MD     Current Facility-Administered Medications Ordered in Epic   Medication Dose Route Frequency Last Rate Last Admin     [Auto Hold] acetaminophen (TYLENOL) tablet 650 mg  650 mg Oral Q4H PRN         [Auto Hold] benzocaine-menthol (CHLORASEPTIC) 6-10 MG lozenge 1 lozenge  1 lozenge Buccal Q1H PRN   1 lozenge at 01/26/21 2349     [Auto Hold] glucose gel 15-30 g  15-30 g Oral Q15 Min PRN        Or     [Auto Hold] dextrose 50 % injection 25-50 mL  25-50 mL Intravenous Q15 Min PRN        Or     [Auto Hold] glucagon injection 1 mg  1 mg Subcutaneous Q15 Min PRN         [Auto Hold] insulin aspart (NovoLOG) injection (RAPID ACTING)  1-7 Units Subcutaneous TID AC   2 Units at 01/27/21 1703     [Auto Hold] insulin aspart (NovoLOG) injection (RAPID ACTING)  1-5 Units Subcutaneous At Bedtime   3 Units at 01/27/21 2102     lactated ringers infusion   Intravenous Continuous 25 mL/hr at 01/28/21 1004 New Bag at 01/28/21 1004     lidocaine 1 % 0.1-1 mL  0.1-1 mL Other Q1H PRN         May continue current IV fluids if patient has IV fluids infusing.   Does  not apply Continuous PRN         [Auto Hold] melatonin tablet 1 mg  1 mg Oral At Bedtime PRN         [Auto Hold] naloxone (NARCAN) injection 0.2 mg  0.2 mg Intravenous Q2 Min PRN         [Auto Hold] naloxone (NARCAN) injection 0.2 mg  0.2 mg Intramuscular Q2 Min PRN         [Auto Hold] naloxone (NARCAN) injection 0.4 mg  0.4 mg Intravenous Q2 Min PRN         [Auto Hold] naloxone (NARCAN) injection 0.4 mg  0.4 mg Intramuscular Q2 Min PRN         [Auto Hold] ondansetron (ZOFRAN-ODT) ODT tab 4 mg  4 mg Oral Q6H PRN        Or     [Auto Hold] ondansetron (ZOFRAN) injection 4 mg  4 mg Intravenous Q6H PRN         [Auto Hold] prochlorperazine (COMPAZINE) injection 5 mg  5 mg Intravenous Q6H PRN        Or     [Auto Hold] prochlorperazine (COMPAZINE) tablet 5 mg  5 mg Oral Q6H PRN        Or     [Auto Hold] prochlorperazine (COMPAZINE) suppository 12.5 mg  12.5 mg Rectal Q12H PRN         [Auto Hold] sodium chloride (PF) 0.9% PF flush 3 mL  3 mL Intravenous q1 min prn         No current [facility]-ordered outpatient medications on file.       lactated ringers 25 mL/hr at 01/28/21 1004     - MEDICATION INSTRUCTIONS -       Recent Labs   Lab Test 01/27/21  0636 01/26/21  1624    138   POTASSIUM 3.9 3.5   CHLORIDE 107 102   CO2 25 24   ANIONGAP 7 12   * 247*   BUN 12 16   CR 0.66 0.87   TENZIN 8.6 9.4     Recent Labs   Lab Test 01/26/21  1624 05/20/19  1205   WBC 9.5 6.7   HGB 15.1 14.6    170     Recent Labs   Lab Test 01/26/21  1624   ABO A   RH Pos     Recent Labs   Lab Test 08/06/15  0905 04/09/14  0600 04/09/14  0240   TROPI <0.015  The 99th percentile for upper reference range is 0.045 ug/L.  Troponin values in   the range of 0.045 - 0.120 ug/L may be associated with risks of adverse   clinical events.   0.626* 0.752*     No results for input(s): PH, PCO2, PO2, HCO3 in the last 31199 hours.  No results for input(s): HCG in the last 14377 hours.  Recent Results (from the past 744 hour(s))   CT Chest/Abdomen/Pelvis  w Contrast    Narrative    CT CHEST, ABDOMEN, PELVIS WITH CONTRAST  1/26/2021 5:51 PM    HISTORY: Abdominal pain and possible FB in esophagus, he is not sure.  ?+ nausea and lower abdominal pain.    TECHNIQUE: CT scan obtained of the chest, abdomen, and pelvis with  oral and IV contrast. 81mL Isovue-370 IV injected. Radiation dose for  this scan was reduced using automated exposure control, adjustment of  the mA and/or kV according to patient size, or iterative  reconstruction technique.    COMPARISON:  Chest x-ray on 2/7/2017.    FINDINGS:  Chest/mediastinum: No cardiomegaly or significant pericardial  effusion. Moderate atherosclerotic vascular calcification of the  abdominal aorta and iliac vessels. Extensive bilateral gynecomastia.  Thyroid gland is unremarkable. No significant mediastinal, hilar or  axillary lymphadenopathy. 6 mm fat attenuating focus abutting the  posterior aspect of the distal esophagus, could represent small food  residue versus small submucosal lipoma. No evidence of other  radiodense foreign bodies. Esophagus is patulous.    Lung/pleura: No pleural effusion or pneumothorax. Numerous bilateral  pulmonary nodules, worrisome for metastatic disease.    Abdomen/pelvis: 1.3 cm partially calcified hypoattenuating focus in  the left hepatic lobe (series 3 image 114) otherwise no suspicious  focal hepatic lesion. Cholelithiasis without CT evidence of acute  cholecystitis. No main pancreatic ductal dilatation or definite solid  pancreatic mass. No splenomegaly. No adrenal nodules. No radiodense  kidney stones or hydronephrosis.    Sigmoid colon appears to be mildly distended and displaced into the  upper mid abdomen/left upper quadrant, no associated mesenteric  twisting or bowel obstruction noted. No significant free fluid in the  abdomen and pelvis. No free peritoneal or portal venous gas. Moderate  atherosclerotic vascular calcification of the abdominal aorta and  iliac vessels. Although the  prostate gland is not significantly  enlarged it appears diffusely enhancing, nonspecific, can be due to  underlying diffuse prostatitis versus prostatic malignancy.    Bones and soft tissue: Multilevel degenerative changes of the spine.      Impression    IMPRESSION:   1. The sigmoid colon appears to be mildly distended and displaced into  the upper mid abdomen/left upper quadrant, however with no associated  mesenteric twisting or bowel obstruction. Finding is nonspecific, but  might predispose patient into sigmoid volvulus.  2. 6 mm fat attenuating lesion abutting the posterior aspect of the  esophagus, could represent small food residue versus small submucosal  lipoma. No other radiodense foreign body seen within the esophageal  lumen which appears mildly dilated/patulous.  3. Numerous bilateral pulmonary nodules, worrisome for metastatic  disease.  4. The prostate gland although not significantly enlarged, appears  diffusely enhancing, nonspecific, can be due to underlying prostatitis  or prostate malignancy, recommend correlation with serum  prostate-specific antigen.  5. Cholelithiasis without CT evidence of acute cholecystitis.  6. 1.3 cm partially calcified hypoattenuating focus in the left  hepatic lobe, of indeterminate etiology.    GREGORIO SHANNON MD     No results found for this or any previous visit (from the past 4320 hour(s)).      RECENT LABS:       Anesthesia Plan     History & Physical Review      ASA Status:  4.   Plan for MAC            Consents  Anesthesia Plan(s) and associated risks, benefits, and realistic alternatives discussed.    Questions answered and patient/representative(s) expressed understanding.    Discussed with:  Patient.             Postoperative Care            Jannet Kimball MD

## 2021-01-28 NOTE — OR NURSING
EGD note:  Procedure in OR 35.  EGD with tts dilitation (10/11/12 & 12/13.5/15) and biopsies of esophagus and duodenum.  Specimens left with circulator for processing.  See MD report for details.

## 2021-01-28 NOTE — PROGRESS NOTES
01/28/21 0930   Quick Adds   Type of Visit Initial Occupational Therapy Evaluation   Living Environment   People in home alone   Current Living Arrangements assisted living;apartment   Transportation Anticipated family or friend will provide   Living Environment Comments Patient lives in independent senior living apartment.    Self-Care   Usual Activity Tolerance good   Current Activity Tolerance fair   Equipment Currently Used at Home walker, rolling   Activity/Exercise/Self-Care Comment Patient reports independence in ADls- dressing, bathing, toileting. Modified independence with ambulation- 4WW. Patient independent with medication management. Patient has assistance with home management, cleaning and laundry.    General Information   Onset of Illness/Injury or Date of Surgery 01/26/21   Referring Physician Susanne Hollins,    Patient/Family Therapy Goal Statement (OT) get stronger    Additional Occupational Profile Info/Pertinent History of Current Problem 80-year-old male with a past medical history significant for coronary artery disease, hyperlipidemia, history of embolic strokes in 2014, type 2 diabetes, persistent atrial fibrillation previously on chronic anticoagulation, autism, prostate cancer for which he follows with Urology in [facility], and depression, among other medical problems, who presents to the emergency room tonight for evaluation of a constellation of GI complaints.   Existing Precautions/Restrictions fall   General Observations and Info Activity: Ambulate with Assist    Cognitive Status Examination   Orientation Status orientation to person, place and time   Visual Perception   Visual Impairment/Limitations WNL   Sensory   Sensory Quick Adds No deficits were identified   Pain Assessment   Patient Currently in Pain No   Integumentary/Edema   Integumentary/Edema no deficits were identifed   Posture   Posture not impaired   Range of Motion Comprehensive   General Range of  Motion no range of motion deficits identified   Strength Comprehensive (MMT)   Comment, General Manual Muscle Testing (MMT) Assessment generalized weakness/deconditioning    Muscle Tone Assessment   Muscle Tone Quick Adds No deficits were identified   Coordination   Upper Extremity Coordination No deficits were identified   Bed Mobility   Bed Mobility supine-sit   Supine-Sit [place] (Bed Mobility) set up;supervision;minimum assist (75% patient effort)   Assistive Device (Bed Mobility) bed rails   Transfers   Transfers sit-stand transfer;bed-chair transfer   Transfer Skill: Bed to Chair/Chair to Bed   Bed-Chair [place] (Transfers) set up;supervision;minimum assist (75% patient effort)   Assistive Device (Bed-Chair Transfers) standard walker   Sit-Stand Transfer   Sit-Stand [place] (Transfers) supervision;set up;verbal cues;minimum assist (75% patient effort)   Assistive Device (Sit-Stand Transfers) walker, front-wheeled   Instrumental Activities of Daily Living (IADL)   Previous Responsibilities meal prep;medication management   Clinical Impression   Criteria for Skilled Therapeutic Interventions Met (OT) yes;meets criteria;skilled treatment is necessary   OT Diagnosis decreased independence in ADLs/IADLs    OT Problem List-Impairments impacting ADL problems related to;activity tolerance impaired;balance;cognition;mobility;strength   Assessment of Occupational Performance 3-5 Performance Deficits   Identified Performance Deficits dressing, bathing, toileting, home management    Planned Therapy Interventions (OT) ADL retraining;IADL retraining;balance training;cognition;strengthening;transfer training;home program guidelines   Clinical Decision Making Complexity (OT) moderate complexity   Therapy Frequency (OT) Daily   Risk & Benefits of therapy have been explained care plan/treatment goals reviewed;evaluation/treatment results reviewed;current/potential barriers reviewed;risks/benefits  reviewed;participants voiced agreement with care plan;participants included;patient   OT Discharge Planning    OT Discharge Recommendation (DC Rec) Transitional Care Facility   OT Rationale for DC Rec At this time, recommend patient to discharge to TCU- patient presenting with deconditioning, decreased balance, cognitive deficits and requires increased assistance for ADLS/IADLs.    OT Brief overview of current status  Patient resides in [facility]- independent living- patient may progress to discharge home with home OT/PT, daily check ins and assistance with medication management and meals if this is able to be provided.    Total Evaluation Time (Minutes)   Total Evaluation Time (Minutes) 10

## 2021-01-29 ENCOUNTER — APPOINTMENT (OUTPATIENT)
Dept: CARDIOLOGY | Facility: CLINIC | Age: 81
DRG: 392 | End: 2021-01-29
Attending: INTERNAL MEDICINE
Payer: COMMERCIAL

## 2021-01-29 ENCOUNTER — TELEPHONE (OUTPATIENT)
Dept: INTERNAL MEDICINE | Facility: CLINIC | Age: 81
End: 2021-01-29

## 2021-01-29 ENCOUNTER — RECORDS - HEALTHEAST (OUTPATIENT)
Dept: LAB | Facility: CLINIC | Age: 81
End: 2021-01-29

## 2021-01-29 VITALS
HEART RATE: 73 BPM | OXYGEN SATURATION: 94 % | TEMPERATURE: 97.2 F | WEIGHT: 162 LBS | BODY MASS INDEX: 23.99 KG/M2 | DIASTOLIC BLOOD PRESSURE: 61 MMHG | RESPIRATION RATE: 20 BRPM | HEIGHT: 69 IN | SYSTOLIC BLOOD PRESSURE: 127 MMHG

## 2021-01-29 PROBLEM — I48.11 LONGSTANDING PERSISTENT ATRIAL FIBRILLATION (H): Status: RESOLVED | Noted: 2020-01-20 | Resolved: 2021-01-29

## 2021-01-29 LAB
CHOLEST SERPL-MCNC: 146 MG/DL
COPATH REPORT: NORMAL
GLUCOSE BLDC GLUCOMTR-MCNC: 208 MG/DL (ref 70–99)
GLUCOSE BLDC GLUCOMTR-MCNC: 208 MG/DL (ref 70–99)
GLUCOSE BLDC GLUCOMTR-MCNC: 222 MG/DL (ref 70–99)
GLUCOSE BLDC GLUCOMTR-MCNC: 240 MG/DL (ref 70–99)
HDLC SERPL-MCNC: 47 MG/DL
LDLC SERPL CALC-MCNC: 75 MG/DL
NONHDLC SERPL-MCNC: 99 MG/DL
TRIGL SERPL-MCNC: 120 MG/DL

## 2021-01-29 PROCEDURE — 36415 COLL VENOUS BLD VENIPUNCTURE: CPT | Performed by: INTERNAL MEDICINE

## 2021-01-29 PROCEDURE — 99239 HOSP IP/OBS DSCHRG MGMT >30: CPT | Performed by: INTERNAL MEDICINE

## 2021-01-29 PROCEDURE — 250N000013 HC RX MED GY IP 250 OP 250 PS 637: Performed by: INTERNAL MEDICINE

## 2021-01-29 PROCEDURE — 80061 LIPID PANEL: CPT | Performed by: INTERNAL MEDICINE

## 2021-01-29 PROCEDURE — 999N001017 HC STATISTIC GLUCOSE BY METER IP

## 2021-01-29 PROCEDURE — 999N000208 ECHOCARDIOGRAM COMPLETE

## 2021-01-29 PROCEDURE — 255N000002 HC RX 255 OP 636: Performed by: INTERNAL MEDICINE

## 2021-01-29 PROCEDURE — 99222 1ST HOSP IP/OBS MODERATE 55: CPT | Mod: GC | Performed by: PSYCHIATRY & NEUROLOGY

## 2021-01-29 PROCEDURE — 93272 ECG/REVIEW INTERPRET ONLY: CPT | Performed by: INTERNAL MEDICINE

## 2021-01-29 PROCEDURE — 99207 PR CDG-CODE CATEGORY CHANGED: CPT | Performed by: INTERNAL MEDICINE

## 2021-01-29 PROCEDURE — 93306 TTE W/DOPPLER COMPLETE: CPT | Mod: 26 | Performed by: INTERNAL MEDICINE

## 2021-01-29 PROCEDURE — G0463 HOSPITAL OUTPT CLINIC VISIT: HCPCS | Mod: 25

## 2021-01-29 PROCEDURE — 97602 WOUND(S) CARE NON-SELECTIVE: CPT

## 2021-01-29 PROCEDURE — 250N000012 HC RX MED GY IP 250 OP 636 PS 637: Performed by: INTERNAL MEDICINE

## 2021-01-29 PROCEDURE — 93270 REMOTE 30 DAY ECG REV/REPORT: CPT

## 2021-01-29 RX ORDER — MULTIVIT-MIN/IRON/FOLIC ACID/K 18-600-40
2000 CAPSULE ORAL DAILY
DISCHARGE
Start: 2021-01-29 | End: 2021-05-23

## 2021-01-29 RX ORDER — ASPIRIN 325 MG
325 TABLET ORAL DAILY
DISCHARGE
Start: 2021-01-29 | End: 2021-02-26

## 2021-01-29 RX ADMIN — ATORVASTATIN CALCIUM 40 MG: 40 TABLET, FILM COATED ORAL at 07:54

## 2021-01-29 RX ADMIN — METOPROLOL SUCCINATE 25 MG: 25 TABLET, EXTENDED RELEASE ORAL at 07:52

## 2021-01-29 RX ADMIN — INSULIN GLARGINE 10 UNITS: 100 INJECTION, SOLUTION SUBCUTANEOUS at 09:01

## 2021-01-29 RX ADMIN — HUMAN ALBUMIN MICROSPHERES AND PERFLUTREN 9 ML: 10; .22 INJECTION, SOLUTION INTRAVENOUS at 10:12

## 2021-01-29 RX ADMIN — PANTOPRAZOLE SODIUM 40 MG: 20 TABLET, DELAYED RELEASE ORAL at 07:54

## 2021-01-29 RX ADMIN — FLUCONAZOLE 200 MG: 200 TABLET ORAL at 07:52

## 2021-01-29 ASSESSMENT — ACTIVITIES OF DAILY LIVING (ADL)
ADLS_ACUITY_SCORE: 22
ADLS_ACUITY_SCORE: 22
ADLS_ACUITY_SCORE: 18
ADLS_ACUITY_SCORE: 22
ADLS_ACUITY_SCORE: 22

## 2021-01-29 NOTE — PROGRESS NOTES
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 Many are now epidermis and resurfacing. Medihoney chosen to add antimicrobial therapy and autolytic debridement.   Treatment Plan  BLE wounds: Every other day    Cleanse with VASHE by letting Vashe moistened gauze sit on the wounds x 5 minutes. Don't rinse off  Apply Medihoney to Mepilex lite and apply to wound beds  Apply sween cream 24 to intact skin  Apply edemawear  Change every other day    Edemawear   EDEMAWEAR stockings- apply fresh pair with dressing changes  ? Carefully remove old pair of EdemaWear- lifting over any wounds as you remove  Do not throw away- use baby shampoo or laundry soap  ? Clean feet and legs with gentle wash  ? Apply Edemawear from toes to knees with a cuff just below the knee-do not cut it  ? Not disposable - wash the old pair and let them hang dry.   o Wash stocking(s) in really hot water with laundry soap or baby shampoo, may need to do this several times  o Rinse well   o Hang dry       EdemaWear    size Stripe color   small navy   medium yellow   large red   X Large aqua         Orders Updated  Recommended provider order: None, at this time  [facility] Nurse follow-up plan:weekly  Nursing to notify the Provider(s) and re-consult the [facility] Nurse if wound(s) deteriorates or new skin concern.    Patient History  According to provider note(s):  Deshawn Lees is a very pleasant 80-year-old male with a past medical history significant for coronary artery disease, hyperlipidemia, history of embolic strokes in 2014, type 2 diabetes, persistent atrial fibrillation previously on chronic anticoagulation, autism, prostate cancer for which he follows with Urology in [place] [facility], and  depression, among other medical problems, who presents to the emergency room tonight for evaluation of a constellation of GI complaints.  The patient is a lifelong nonsmoker, does not consume alcohol.  He lives at [facility] living [facility].  He does most of his own cooking and manages his own medications; however, he notes he has been off his home medications for several months now.    Objective Data  Containment of urine/stool: Incontinence Protocol    Active Diet Order  Orders Placed This Encounter      Combination Diet 6483-9970 Calories: Moderate Consistent CHO (4-6 CHO units/meal); Mechanical/Dental Soft Diet      Output:   I/O last 3 completed shifts:  In: 640 [P.O.:240; I.V.:400]  Out: -     Risk Assessment:   Sensory Perception: 4-->no impairment  Moisture: 3-->occasionally moist  Activity: 3-->walks occasionally  Mobility: 3-->slightly limited  Nutrition: 3-->adequate  Friction and Shear: 2-->potential problem  Al Score: 18                          Labs:   Recent Labs   Lab 01/26/21  2255 01/26/21  1624   ALBUMIN  --  3.4   HGB  --  15.1   INR  --  0.99   WBC  --  9.5   A1C 11.4*  --        Physical Exam  Areas of skin assessed: focused BLE     Wound Location:  LLE  Date of last photo 1/27/2021 none taken today   Wound History: chronic scattered blisters with thin yellow dry flaps covering the wounds mostly located on medial lower leg from medial knee to medial ankle area    Wound Base: 100 % epidermis     Palpation of the wound bed: normal      Drainage: none     Description of drainage: none      Periwound skin: edematous      Color: pale      Temperature: normal   Odor: none  Pain: denies , none         Wound Location:  RLE  Date of last photo1/27/2021  none today  Wound History: chronic extensive thick scattered areas of eschar on Right shin and medial lower leg. Now eschar softer and thinner was able to remove the nonviable tissue with gentle cleansing. Superficial epithelial and  dermal tissue revealed.  Largest two wounds will be described here   Wound Base: 90, 10 % dermis and defined dark black triangular area localized at the distal portion of the wound even with the rest of the wound bed  The most distal shin wound malinda red dermis with dark black area at the center feels the same as the rest of the wound indicating that he had two areas of trauma on hi right shin.      Palpation of the wound bed: normal      Drainage: none    Periwound skin: ecchymosis and superficial erosion      Color: pale      Temperature: normal   Odor: none  Pain: denies , none    Interventions  Visual inspection and assessment completed on admission  Wound Care Rationale Promote moist wound healing without tissue dehydration , Provide selective debridement (autolysis) of nonviable tissue  and Decrease bacterial load  Wound Care: done per plan of care  Supplies: gathered and placed at the bedside  Current off-loading measures: Pillows under calves  Current support surface: Standard  Atmos Air mattress  Education provided to: plan of care, wound progress and Infection prevention   Discussed plan of care with Patient and Nurse    Clementina Whaley RN BS CWON

## 2021-01-29 NOTE — PROGRESS NOTES
Care Management Discharge Note    Discharge Date: 01/29/21  Expected Time of Departure: 6pm via family    Discharge Disposition: Transitional Care, Skilled Nursing Facilty    Discharge Services: None    Discharge DME: None    Discharge Transportation: family or friend will provide    Private pay costs discussed: Not applicable    PAS Confirmation Code: [id number]  Patient/family educated on Medicare website which has current facility and service quality ratings: no    Education Provided on the Discharge Plan:    Persons Notified of Discharge Plans: pts sister  Patient/Family in Agreement with the Plan: yes    Handoff Referral Completed: Yes    Additional Information:  Pt to discharge to [facility] at 6pm via family transport. Orders faxed.     PAS-RR    D: Per DHS regulation, SW completed and submitted PAS-RR to [place] [employer] Direct Connect via the Senior [employer] Line.  PAS-RR confirmation # is : [id number]    I: SW spoke with pt and they are aware a PAS-RR has been submitted.  SW reviewed with pt that they may be contacted for a follow up appointment within 10 days of hospital discharge if their SNF stay is < 30 days.  Contact information for Senior [employer] Line was also provided.    A: pt verbalized understanding.    P: Further questions may be directed to [facility] LinkAge Line at #[phone], option #4 for PAS-RR staff.    SHU Kumar

## 2021-01-29 NOTE — CONSULTS
"  [facility]    Stroke Consult Note    Reason for Consult:  \"reevaluate need for anticoagulation\"    Chief Complaint: Nausea, Vomiting, & Diarrhea       HPI  Jose Lees is a 80 year old male with past medical history significant for prior stroke (2014), CAD s/p stenting, DM2, prostate cancer, esophagitis with prior esophageal dilations, and depression who presented to [facility] 1/26 for evaluation of nausea and vomiting. He reports that several months ago, he began to have difficulty swallowing, and for this reason, stopped taking his home medications. We are consulted for recommendations on long term anticoagulation given his history of stroke.     Per chart review, he presented 4/7/2014 with dysarthria and dizziness and MRI brain was obtained which showed: recent scattered tiny infarcts in the left cerebral hemisphere and a questionable small recent infarct in the right cerebral hemisphere. During this admission, he also developed EKG changes and was taken for PCI with stent placement. Due to concern for mural thrombus in the setting of cardiac hypokinesis, he was started on anticoagulation during this admission.    Impression  History of embolic appearing bilateral hemisphere infarcts (2014)    Recommendations  - Chronic atrial fibrillation is documented in his history, but multiple notes mention that this has never been captured. I discussed with Dr. Raymond who also does not find evidence of documented atrial fibrillation. Given no evidence of current cardiac thrombus, adequate ejection fraction, and no documented atrial fibrillation, there is no strong evidence from a stroke standpoint for him to continue on anticoagulation  -  mg daily given chronic infarcts  - 30 day cardiac event monitor to evaluate for occult atrial fibrillation  - No further stroke evaluation indicated at this time     Patient Follow-up    - in the next 1-2 week(s) with PCP  - in 6 weeks with local " "neurologist    Thank you for this consult. No further stroke evaluation is recommended, so we will sign off. Please contact us with any additional questions.    SARATH Jaramillo, CNP  Neurology  To page me or covering stroke neurology team member, click here: [facility]   Choose \"On Call\" tab at top, then search dropdown box for \"Neurology Adult\", select location, press Enter, then look for stroke/neuro ICU/telestroke.    _____________________________________________________    Past Medical History   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/2020     Prostate cancer (H) 10/11    GLEASOR 8     Stricture and stenosis of esophagus 10/22/2015     Stroke, 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     Type 2 diabetes mellitus without complication, without long-term current use of insulin (H) 7/25/2017     Past Surgical History   Past Surgical History:   Procedure Laterality Date     CORONARY ANGIOGRAPHY ADULT ORDER       ESOPHAGOSCOPY, GASTROSCOPY, DUODENOSCOPY (EGD), COMBINED N/A 8/6/2015    Procedure: COMBINED ESOPHAGOSCOPY, GASTROSCOPY, DUODENOSCOPY (EGD), REMOVE FOREIGN BODY;  Surgeon: Yu Tapia MD;  Location:  GI     ESOPHAGOSCOPY, GASTROSCOPY, DUODENOSCOPY (EGD), COMBINED N/A 1/28/2021    Procedure: ESOPHAGOGASTRODUODENOSCOPY (EGD) WITH FLUORO,Biopsy, and Dilation;  Surgeon: Audra Huang MD;  Location:  OR     HEART CATH, ANGIOPLASTY  4/14    BMS to LAD     PHACOEMULSIFICATION CLEAR CORNEA WITH STANDARD INTRAOCULAR LENS " IMPLANT Right 12/4/2018    Procedure: COMPLEX RIGHT EYE PHACOEMULSIFICATION CLEAR CORNEA WITH STANDARD INTRAOCULAR LENS IMPLANT;  Surgeon: Kamar Kyle MD;  Location: [facility]     PHACOEMULSIFICATION CLEAR CORNEA WITH STANDARD INTRAOCULAR LENS IMPLANT Left 12/19/2018    Procedure: COMPLEX LEFT EYE PHACOEMULSIFICATION CLEAR CORNEA WITH STANDARD INTRAOCULAR LENS IMPLANT;  Surgeon: Kamar Kyle MD;  Location: [facility]     Medications   Home Meds  Prior to Admission medications    Medication Sig Start Date End Date Taking? Authorizing Provider   fluconazole (DIFLUCAN) 200 MG tablet Take 1 tablet (200 mg) by mouth daily for 14 days 1/28/21 2/11/21 Yes Baldemar Raymond MD   abiraterone (ZYTIGA) 500 MG TABS tablet Take 2 tablets (1,000 mg) by mouth daily 1/20/20   Nolberto Ferrari MD   ACE/ARB/ARNI NOT PRESCRIBED, INTENTIONAL, ACE & ARB not prescribed due to Symptomatic hypotension not due to excessive diuresis 11/26/18   Mariela Stauffer MD   aspirin 81 MG chewable tablet Take 1 tablet (81 mg) by mouth daily 7/30/14   Gely Urbina,    atorvastatin (LIPITOR) 40 MG tablet Take 1 tablet by mouth daily 5/24/19   Mariela Stauffer MD   blood glucose (NO BRAND SPECIFIED) test strip Use to test blood sugar 1 time daily or as directed. 2/10/20   Nolberto Ferrari MD   blood glucose calibration (NO BRAND SPECIFIED) solution Use to calibrate blood glucose monitor as needed as directed. 2/10/20   Nolberto Ferrari MD   blood glucose monitoring (NO BRAND SPECIFIED) meter device kit Use to test blood sugar one time daily or as directed. 2/10/20   Nolberto Ferrari MD   blood glucose monitoring (SOFTCLIX) lancets Use to test blood sugar one time daily. 2/3/20   Nolberto Ferrari MD   Cholecalciferol (VITAMIN D) 2000 units CAPS Take 3,000 Units by mouth daily Takes  3000 mg    Reported, Patient   glimepiride (AMARYL) 1 MG tablet Take 2 tabs by mouth every am. 5/20/19   Mariela Stauffer MD   Leuprolide Acetate, 6  Month, (LUPRON DEPOT, 6-MONTH, IM)     Reported, Patient   metFORMIN (GLUCOPHAGE) 500 MG tablet TAKE 2 TABLETS TWICE DAILY WITH MEALS 5/20/19   Mariela Stauffer MD   metoprolol succinate ER (TOPROL-XL) 25 MG 24 hr tablet TAKE 1 TABLET EVERY DAY 2/21/20   Nolberto Ferrari MD   omeprazole (PRILOSEC) 20 MG DR capsule TAKE 1 CAPSULE (20 MG) BY MOUTH DAILY 4/23/19   Jermain Helm MD   sertraline (ZOLOFT) 50 MG tablet TAKE 1 TABLET EVERY DAY 2/28/20   Nolberto Ferrari MD   thin (NO BRAND SPECIFIED) lancets Use to test blood sugar one time daily or as directed. 2/10/20   Nolberto Ferrari MD   warfarin ANTICOAGULANT (COUMADIN) 2 MG tablet 1 tab daily except take 2 tabs on Wednesdays 1/20/20   Nolberto Ferrari MD       Scheduled Meds    atorvastatin  40 mg Oral Daily     fluconazole  200 mg Oral Daily     insulin aspart  3 Units Subcutaneous TID w/meals     insulin aspart  1-7 Units Subcutaneous TID AC     insulin aspart  1-5 Units Subcutaneous At Bedtime     insulin glargine  10 Units Subcutaneous QAM AC     metoprolol succinate ER  25 mg Oral Daily     pantoprazole  40 mg Oral Daily       Infusion Meds    - MEDICATION INSTRUCTIONS -         PRN Meds  acetaminophen, sore throat lozenge, glucose **OR** dextrose **OR** glucagon, - MEDICATION INSTRUCTIONS -, melatonin, naloxone, naloxone, naloxone, naloxone, naloxone, naloxone, naloxone, naloxone, ondansetron **OR** ondansetron, prochlorperazine **OR** prochlorperazine **OR** prochlorperazine, sodium chloride (PF), sodium chloride (PF)    Allergies   No Known Allergies  Family History   Family History   Problem Relation Age of Onset     Cerebrovascular Disease Mother 92     C.A.D. Father 48     Social History   Social History     Tobacco Use     Smoking status: Never Smoker     Smokeless tobacco: Never Used   Substance Use Topics     Alcohol use: No     Comment: never     Drug use: No       Review of Systems   The 10 point Review of Systems is negative other than noted in the  HPI or here.        PHYSICAL EXAMINATION   Temp:  [96.5  F (35.8  C)-98.7  F (37.1  C)] (P) 97.1  F (36.2  C)  Pulse:  [66-96] 80  Resp:  [14-20] (P) 20  BP: ()/(52-69) 106/55  SpO2:  [96 %-99 %] (P) 96 %    Neurologic  Mental Status:  alert, oriented x 3, follows commands, speech clear and fluent  Cranial Nerves:  PERRL, EOMI with normal smooth pursuit, facial movements symmetric, hearing not formally tested but intact to conversation, no dysarthria, tongue protrusion midline  Motor:  normal muscle tone and bulk, no abnormal movements, able to move all limbs spontaneously  Reflexes:  deferred  Sensory:  sensation grossly intact  Coordination:  no obvious ataxia  Station/Gait:  deferred    Dysphagia Screen  Per Nursing    Imaging  I personally reviewed all imaging; relevant findings per HPI.    Labs Data   CBC  Recent Labs   Lab 01/26/21  1624   WBC 9.5   RBC 5.12   HGB 15.1   HCT 46.2        Basic Metabolic Panel   Recent Labs   Lab 01/27/21  0636 01/26/21  1624    138   POTASSIUM 3.9 3.5   CHLORIDE 107 102   CO2 25 24   BUN 12 16   CR 0.66 0.87   * 247*   TENZIN 8.6 9.4     Liver Panel  Recent Labs   Lab 01/26/21  1624   PROTTOTAL 7.9   ALBUMIN 3.4   BILITOTAL 0.7   ALKPHOS 74   AST 15   ALT 14     INR    Recent Labs   Lab Test 01/26/21  1624 04/10/20  1045 03/06/20   INR 0.99 1.70* 2.1*      Lipid Profile    Recent Labs   Lab Test 01/29/21  0701 01/20/20  0927 05/20/19  1145 04/10/14  0730 04/10/14  0730 04/08/14  0529   CHOL 146 242* 234*   < > 130 136   HDL 47 50 46   < > 43 37*   LDL 75 146* 143*   < > 65 81   TRIG 120 232* 227*   < > 113 89   CHOLHDLRATIO  --   --   --   --  3.1 3.7    < > = values in this interval not displayed.     A1C    Recent Labs   Lab Test 01/26/21  2255 01/20/20  0927 11/08/19  1036   A1C 11.4* 11.8* 10.2*     Troponin I  No results for input(s): TROPI in the last 168 hours.       Stroke Code / Stroke Consult Data Data This was a non-emergent, non-tele stroke  consult.

## 2021-01-29 NOTE — DISCHARGE SUMMARY
[facility]    Discharge Summary  Hospitalist    Date of Admission:  1/26/2021  Date of Discharge:  1/29/2021  Discharging Provider: Baldemar Raymond MD    Discharge Diagnoses   -Dysphagia:    -Candididal esophagitis  -Esophageal strictures -s/p EGD with dilation   -Abnormal skin lesions on lower extremities: unclear etiology, dermatology referral. No signs infection  -DM-2 poorly controlled, initiated on insulin  -Coronary artery disease with remote history of stent  -hx of embolic CVA 2014. No documented hx of afib, warfarin stopped per neurology,   -Metastatic prostate Cancer- guarded prognosis, [facility] pt  -Autism  - medication noncomplaince- 2/2 behavior and dysphagia  -cognitive impairment    History of Present Illness   Deshawn Lees is a very pleasant 80-year-old male with a past medical history significant for coronary artery disease, hyperlipidemia, history of embolic strokes in 2014, type 2 diabetes, persistent atrial fibrillation previously on chronic anticoagulation, autism, prostate cancer for which he follows with Urology in [facility], and depression, among other medical problems, who presents to the emergency room tonight for evaluation of a constellation of GI complaints.  History is obtained per discussions with the patient, review of his medical record and discussions with his sister, Judith.      The patient lives at [facility] assisted living [facility].  He lives alone and does most of his ADLs, including managing his own medications.  His sister does help with his cares routinely, but this has been limited in recent months due to COVID infection.  She helps to get Deshawn to and from appointments.  She notes that Deshawn stopped taking all of his medication several months ago (unclear why).  Tonight he presented to the emergency room for complaints of swallowing difficulties.  He has undergone esophageal dilations in the past.  Most recently, he underwent  "an endoscopy in 2018 per [facility] and was found to have a Schatzki ring and candidiasis esophagitis.  He relays that for about the past 4 days, if not longer, his appetite has been quite poor.  He has had some associated nausea.  He relays difficulties \"keeping food down,\" but does not specifically say he has been vomiting.  He endorses some whitish phlegm.  He has not had any specific abdominal pain, but notes things \"did not feel right.\"  No changes in his bowel or bladder habits.  In regards to his prostate cancer, he was last seen in [facility] in 10/2020, and his disease was deemed stable on his current medication regimen.  Ultimately, his sister became concerned with his inability to take p.o. and had some concerns about him being off his medications for months and some skin changes on his legs, and he was ultimately brought to the emergency room for evaluation this evening.      In the emergency room, he was seen and evaluated by Dr. Larsen.  He was afebrile and hemodynamically stable.  Basic labs including a BMP, LFTs and lactate and a CBC were all within normal limits.  Glucose was elevated mildly at 240.  He has a history of use of warfarin for chronic anticoagulation for his atrial fibrillation and history of embolic stroke, so his INR tonight was within normal limits at 0.99, concurrent with the fact that he has been off his medications for several months now.  A CT of the chest, abdomen and pelvis with contrast was obtained.  There were no acute findings per se.  There was mention of a sigmoid colon appearing distended and displaced in the upper abdomen, possibly increasing his predisposition for developing a sigmoid volvulus, but there was no mesenteric twisting or bowel obstruction appreciated tonight.  Additionally, there was a 6 mm fat-attenuating lesion abutting the posterior aspect of the esophagus, thought to represent a small lipoma.  He had findings " consistent with his known prostate cancer, cholelithiasis without cholecystitis, an indeterminate hepatic nodule, as well as numerous bilateral pulmonary nodules.  In the emergency room, he was given IV fluids.  Plan at this juncture is to admit under observation status today for ongoing evaluation and care.      When I saw the patient, he was able to relay some of his medical history.  His conversation is somewhat tangential.  He told me tonight that his sister, Judith, is his power of .  When I spoke with Judith on the phone this evening, she did confirm that she was Deshawn's medical power of .  When I asked who makes Deshawn's medical decisions for him, she says that Deshawn, although he is autistic, is very understanding of his medical conditions and able to sign his own consents.  She does assist him as needed.  She is in agreement with the patient being hospitalized and undergoing further evaluation from a GI perspective.  She notes he is compliant with his followups with [facility] and his urologist, Dr. Best.  He does receive a Lupron injection every 6 months, but he has stopped taking his other medications as prescribed.       Hospital Course   Jose Lees was admitted on 1/26/2021.  The following problems were addressed during his hospitalization:    Active Problems:    Volvulus (H)    Abdominal pain, generalized  Deshawn Lees is a very pleasant 80-year-old male with past medical history significant for coronary artery disease with history of stenting, hyperlipidemia, history of embolic stroke, history of persistent atrial fibrillation previously on chronic anticoagulation with warfarin, type 2 diabetes, esophageal stricture, autism and prostate cancer and depression, who presented to the emergency room this evening for evaluation of a constellation of gastrointestinal symptoms including decreased appetite, nausea and inability to keep food down.  A basic workup this evening is  "relatively unremarkable.  He will be admitted under observation status for ongoing evaluation and care.       Dysphagia:    Candididal esophagitis  Esophageal strictures -s/p EGD with dilation  -The patient endorses difficulties \"keeping food down\" and, in general, difficulties with swallowing.   - He does have a history of esophageal stenosis and stricture requiring dilation in the past.    -Although there was some mention of possible tenderness to palpation in the left quadrants on the EGD exam, admitting md did not appreciate this on admission exam.   - He did have some mildly abnormal anatomy that could predispose him to increased risk for developing a volvulus, but at this point, I do not see any concerns for that as being a culprit of his symptoms and presentation.  If he were to develop acute abdominal discomfort, could consider a General Surgery consult, but I do not feel it is indicated at this time.   - pt states he stopped taking his meds since difficult to swallow them.   -seems to have mild abd discomfort this am. Slightly distended abdomen. NT,   -nl bmp, LFTs, lactate and cbc with platelet count  - - 1/27, EGD showed two severe benign appearing strictures. Proximal stenosis at 18 mm dilated to 12 mm. Could not easily pass balloon through distal stricture at 40 cm.  -1/28 EGD Benign-appearing esophageal stenosis. Dilated.                             - Benign-appearing esophageal stenosis. Dilated.                             - Gastritis.                             - Congested duodenal mucosa. Biopsied.                             - Normal second portion of the duodenum.                             - White nummular lesions in esophageal mucosa.                             Biopsied. Likely Candida esophagitis      -pt tolerating and taking mechanical soft diet. Feeling fine  He states that he stopped taking meds 2/2 to the dysphagia he was experiencing.      Plan at discharge. -  -initiated on " fluconazole  200mg every day for 14 days  - follow up [facility] for repeat EGD 2 weeks  - mechanical soft diet/Dm diet for now until follow up EGD for further esophageal dilation         Abnormal skin lesions on lower extremities:   - Unclear when exactly they developed.  The patient seems unbothered by them.   - Wound nurse has been consulted.  No indications suggestive of an acute infection at this time.  Could consider further evaluation per dermatology in the outpatient setting.  -bilateral so doubt malignancy. No clear purulence.   -states have been there for weeks to ? Months. Unclear hx  -no signs infection.   - outpatient derm consult- TCU to arrange  -wound care orders in place-see discharge orders     Coronary artery disease with remote history of stent,   Cardiomyopathy  - previously on anticoagulation with warfarin, history of embolic cerebrovascular accidents in 2014:   -cardiology note 7/30/14 notes low EF, MRI brain showing infarcts c/w embolism but no hx of documented Afib, but pt had low EF and concern that pt could have had mural thrombus could have been source embolism. No documented mural thrombus  -see discharge summary and mri brain 4/2014 for CVA admission and starting coumadin.   -review of prior ekg and epic notes by hospitalist and neurology service this stay did not find documentation of atrial fibrillation  - He has now been off all medications for several months now, including his warfarin, metoprolol, aspirin, and statin. Pt self stop 2/2 to behaviour and dysphagia.   --pt not able to self administer his meds and will need RN assistance should he discharge back to his assisted living.   -restarted metoprolol and statin  -Echo:   The visual ejection fraction is estimated at 35-40%.  Left ventricular systolic function is mild to moderately reduced.  There are regional wall motion abnormalities as specified.  The regional wall motion abnormalities are similar to previous and are  consistent  with LAD territory ischemia and / or infarction.  The ascending aorta is Mildly dilated.  The study was technically difficult.  -consulted neurology as to whether pt still had indication for anticoagulation since prior CVA 2014 and no documented afib. hospitalist and neurology reviewed chart and no clear documented atrial fibrillation. Started on anticoagulation post CVA in 2014 given bilateral acute infarcts in setting of low EF but no documented afib. Started on anticoagulation at that time emperically. If mural thrombus was etiology of embolic cva at that time pt has completed duration of recommended anticoagulation per neurology. Neurology recommends stopping anticoagulation and check 30 day event monitor. For now have patient on ASA 325mg every day. EF >30% on echo this stay therefore low concern at this time for mural thrombus per neurology.     -removed atrial fibrillation and anticoagulation from pt's problem list  - attempted to reach pt's pcp to discuss  -has low EF. No chf. euvolemic    Plan at time of discharge:   -30 day event monitor to eval for afib- if atrial fibrillation then will likely need anticoagulation- consider DOAC instead of coumadin  - follow up [facility] in 30 days to follow up cardiomyopathy and 30 day event monitor  -cont metoprolol  -low sodium diet, daily wt, call MD if wt increase by 5 pounds in 5-7 days.   -cont statin Rx  -repeat LDL 6 weeks  -ASA 325mg every day   -prn follow up with neurology  -low Na diet.   -follow for edema and need for lasix       Type 2 diabetes:   - Previously managed with glimepiride and metformin.  He has been off these medications for several months now.   - Blood sugar 200 range once taking po.   -HbA1C 11%, suspect 2/2 to being off medications but may need insulin at this stay.   -IsS initially during stay then lantus and prandial aspart started  Plan at discharge:   - resume metformin  - for now stop stop Amaryl   -ISS with meals  - aspart 5  "units with each meals  -Lantus 15 units every day  -adjust insulin as needed  - possible he can transition to oral agents at TCU. Assess insulin needs now that he is eating regularly following esophageal stricture dilation       Prostate cancer:   - Follows with Dr. Burgos of [facility] and Dr. Best of [facility].   - He manages with Lupron injections every 6 months. Now not taking  - He was last seen in 10/2020 per [facility], and his disease was noted to be stable.   - He had also been previously prescribed Zytiga, but again he stopped taking this several weeks ago. Not resume at discharge per oncolgoy  -[facility] consulted given CT prostate and pulmonary nodule findings.   - per oncology:   - Clinically stable, but PSA rising  - Numerous lung nodules concerning for metastatic disease.  Await formal comparison to October 2020 CT scan from [facility]. radiology at [facility] to read comparison  - Bone scan in October 2020 which did not demonstrate metastatic disease.   -  on 1/28/21 compared to 104 on 10/28/21   -Await formal comparison of CT scans. outpatient follow up with Dr. Burgos.    -pt no longer taking Zytiga  -CT scan on admission:  Numerous bilateral pulmonary nodules, worrisome for metastatic  disease.  - per oncology team: \" If prostate cancer is progressing, could be considered for resumption of abiraterone and prednisone but this was poorly tolerated previously. He is not likely a good candidate for chemotherapy. Over he appears relatively stable from his malignancy, but without treatment there will be ongoing progression and at some point he will have symptoms (respiratory symptoms or pain)\"  -- follow up with dr. Burgos of [facility] in ~1-2 weeks to review current state of his prostate cancer. They will contact TCU to arrange         Depression:  Previously stable on Zoloft.  Stopped taking this medication as well.  Can resume as needed.   pcp follow up  Follow mood " off zoloft for now       Autism:   - The patient, per his sister's report, does have a good understanding of his underlying medical illnesses and his healthcare.  She does note she is his medical power of  and assists him with medical decision-making as needed.  However, she says he typically signs his own consents.  He lives independently in [facility] assisted living facility.     -will discuss increase in services to ensure taking medications  -OT joce tcu  - discussed care plan with pt's sister and encouraged her to have assisted living increase his services so that an RN is administering meds to patient on daily basis as pt not reliably take medications  -pt unlikely to live independently going forward, if discharge home to assisted living he would need an RN administering his meds and insulin (if still on insulin)      Reported history of dementia:    -see this documented in the chart, but this was not specifically endorsed in my conversations with Judith this evening.    -OT has been consulted for cognitive evaluation.   pt likely stopped taking meds given his new dysphagia and hehavior  -tCU transfer, KATERYNA hobson. Assess ability to live indepedently maulik if cancer progressing     DEEP VENOUS THROMBOSIS PROPHYLAXIS:  PCDs.      CODE STATUS:  Full code, as confirmed per patient this evening.     [facility]    Reviewed care plan with neurology, oncology, pt and pt's sister day of disckeelyrge.     Baldemar Raymond MD, MD    Significant Results and Procedures   See hospital course    Pending Results   none  Unresulted Labs Ordered in the Past 30 Days of this Admission     No orders found from 12/27/2020 to 1/27/2021.          Code Status   Full Code       Primary Care Physician   Nolberto Ferrari    Physical Exam   Temp: (P) 97.1  F (36.2  C) Temp src: (P) Oral BP: 106/55 Pulse: 80   Resp: (P) 20 SpO2: (P) 96 % O2 Device: (P) None (Room air)    Vitals:    01/26/21 1607    Weight: 73.5 kg (162 lb)     Vital Signs with Ranges  Temp:  [96.5  F (35.8  C)-98.7  F (37.1  C)] (P) 97.1  F (36.2  C)  Pulse:  [71-96] 80  Resp:  [16-20] (P) 20  BP: ()/(52-67) 106/55  SpO2:  [96 %-98 %] (P) 96 %  I/O last 3 completed shifts:  In: 640 [P.O.:240; I.V.:400]  Out: -     Constitutional: Nad, in bed  Respiratory:     CTAB  Cardiovascular: RRR no r/g/m  GI: soft, mild distension, nl BS, NT  Skin/Integumen: large dry raised lesions BLE, R>L. No redness, fluctuance. One is unroofed RLE---> bandaged.   No cyanosis. Mild BLE edema  Neuro/psych: talkative, pleasant, appears at baseline cognition and affect, nl speech and mentation.      Discharge Disposition   Discharged to short-term care facility  Condition at discharge: Good    Consultations This Hospital Stay   WOUND OSTOMY CONTINENCE NURSE  IP CONSULT  OCCUPATIONAL THERAPY ADULT IP CONSULT  GASTROENTEROLOGY IP CONSULT  OCCUPATIONAL THERAPY ADULT IP CONSULT  HEMATOLOGY & ONCOLOGY IP CONSULT  CARE MANAGEMENT / SOCIAL WORK IP CONSULT  HEMATOLOGY & ONCOLOGY IP CONSULT  NUTRITION SERVICES ADULT IP CONSULT  NEUROLOGY IP CONSULT  PHYSICAL THERAPY ADULT IP CONSULT  OCCUPATIONAL THERAPY ADULT IP CONSULT  PHARMACY LIAISON FOR MEDICATION COVERAGE CONSULT    Time Spent on this Encounter   I, Baldemar Raymond MD, personally saw the patient today and spent greater than 30 minutes discharging this patient.    Discharge Orders      Care Coordination Referral      General info for SNF    Length of Stay Estimate: Short Term Care: Estimated # of Days <30  Condition at Discharge: Improving  Level of care:skilled   Rehabilitation Potential: Excellent  Admission H&P remains valid and up-to-date: Yes  Recent Chemotherapy: N/A  Use Nursing Home Standing Orders: Yes     Mantoux instructions    Give two-step Mantoux (PPD) Per Facility Policy Yes     Reason for your hospital stay    Dysphagia:    Candididal esophagitis  Esophageal strictures -s/p EGD with dilation      Glucose monitor nursing POCT    Before meals and at bedtime     Intake and output    Every shift     Daily weights    Call Provider for weight gain of more than 2 pounds per day or 5 pounds per week.     Additional Discharge Instructions    1. BMP, CBC with platelet count in 3 days     Activity - Up with nursing assistance     Patient care order    Insulin sliding scale for meal times:  MEDIUM INSULIN RESISTANCE DOSING    Do Not give Bedtime Correction Insulin if BG less than  200.   For  - 249 give 1 units.   For  - 299 give 2 units.   For  - 349 give 3 units.   For  -399 give 4 units.   For BG greater than or equal to 400 give 5 units.  Notify provider if glucose greater than or equal to 350 mg/dL after administration of correction dose.     Follow Up and recommended labs and tests    1. Needs [facility] D[facility]atology referral for lower extremity lesions scheduled next available opening after leaving [facility]  2. Needs [facility] cardiology follow up of cardiomyopathy and anticoagulation scheduled in 4 weeks- when he has completed his 30 day cardiac event monitor  3. Schedule follow up with PCP 1 week after leaving u  4. Follow up with [facility] 2 week for repeat EGD for esophageal stricture follow up  5. 30 day event monitor starting 1/29/21. Follow up with [facility] [place] office at 4weeks- please schedule  6. Follow up with Dr. Burgos of [facility] in 2 weeks regarding metastatic prostate cancer. [facility] will call TCU to arrange.     Wound care    Site:  BLE wounds: Every other day    Cleanse with VASHE by letting Vashe moistened gauze sit on the wounds x 5 minutes. Don't rinse off  Apply Medihoney to Mepilex lite and apply to wound beds  Apply sween cream 24 to intact skin  Apply edemawear  Change every other day     Edemawear   EDEMAWEAR stockings- apply fresh pair with dressing changes    Carefully remove old pair of EdemaWear- lifting over any wounds as you  remove  Do not throw away- use baby shampoo or laundry soap    Clean feet and legs with gentle wash    Apply Edemawear from toes to knees with a cuff just below the knee-do not cut it    Not disposable - wash the old pair and let them hang dry.   ? Wash stocking(s) in really hot water with laundry soap or baby shampoo, may need to do this several times  ? Rinse well   ? Hang dry     Full Code     Physical Therapy Adult Consult    Evaluate and treat as clinically indicated.    Reason:  Deconditioned     Occupational Therapy Adult Consult    Evaluate and treat as clinically indicated.    Reason:  Cognitive and safety eval, eval ability to self direct/administer own medications. autism     Cardiac Event Monitor Adult Pediatric     Fall precautions     Advance Diet as Tolerated    Follow this diet upon discharge: Orders Placed This Encounter      Snacks/Supplements Adult: Boost Glucose Control; Between Meals      Combination Diet 2854-6966 Calories: Moderate Consistent CHO (4-6 CHO units/meal); Mechanical/Dental Soft Diet  2000mg low sodium diet     Discharge Medications   Current Discharge Medication List      START taking these medications    Details   aspirin (ASA) 325 MG tablet Take 1 tablet (325 mg) by mouth daily  Qty:      Associated Diagnoses: Cerebrovascular accident (CVA), unspecified mechanism (H)      fluconazole (DIFLUCAN) 200 MG tablet Take 1 tablet (200 mg) by mouth daily for 14 days  Qty: 14 tablet, Refills: 0    Comments: Future refills by PCP Dr. Nolberto Ferrari with phone number [phone].  Associated Diagnoses: Esophageal candidiasis (H)      insulin aspart (NOVOLOG PEN) 100 UNIT/ML pen Inject 5 Units Subcutaneous 3 times daily (with meals)  Qty:      Associated Diagnoses: Type 2 diabetes mellitus without complication, without long-term current use of insulin (H)      insulin glargine (LANTUS PEN) 100 UNIT/ML pen Inject 15 Units Subcutaneous every morning (before breakfast)  Qty:      Comments: If  Lantus is not covered by insurance, may substitute Basaglar at same dose and frequency.    Associated Diagnoses: Type 2 diabetes mellitus without complication, without long-term current use of insulin (H)         CONTINUE these medications which have CHANGED    Details   Cholecalciferol (VITAMIN D) 50 MCG (2000 UT) CAPS Take 2,000 Units by mouth daily    Associated Diagnoses: Vitamin D deficiency         CONTINUE these medications which have NOT CHANGED    Details   ACE/ARB/ARNI NOT PRESCRIBED, INTENTIONAL, ACE & ARB not prescribed due to Symptomatic hypotension not due to excessive diuresis    Associated Diagnoses: Chronic systolic congestive heart failure (H)      atorvastatin (LIPITOR) 40 MG tablet Take 1 tablet by mouth daily  Qty: 90 tablet, Refills: 3    Associated Diagnoses: Encounter for medication refill; Hyperlipidemia LDL goal <70      blood glucose (NO BRAND SPECIFIED) test strip Use to test blood sugar 1 time daily or as directed.  Qty: 100 strip, Refills: 3    Comments: Per pharmacy, requesting ACCU-CHEK ALEJANDRO PLUS as formulary covered brand  Associated Diagnoses: Type 2 diabetes mellitus without complication, without long-term current use of insulin (H)      blood glucose calibration (NO BRAND SPECIFIED) solution Use to calibrate blood glucose monitor as needed as directed.  Qty: 1 Bottle, Refills: 11    Comments: Per pharmacy, requesting ACCU-CHEK ALEJANDRO PLUS as formulary covered brand  Associated Diagnoses: Type 2 diabetes mellitus without complication, without long-term current use of insulin (H)      blood glucose monitoring (NO BRAND SPECIFIED) meter device kit Use to test blood sugar one time daily or as directed.  Qty: 1 kit, Refills: 0    Comments: Per pharmacy, requesting ACCU-CHEK ALEJANDRO PLUS as formulary covered brand  Associated Diagnoses: Type 2 diabetes mellitus without complication, without long-term current use of insulin (H)      !! blood glucose monitoring (SOFTCLIX) lancets Use to  test blood sugar one time daily.  Qty: 100 each, Refills: 3    Associated Diagnoses: Type 2 diabetes mellitus without complication, without long-term current use of insulin (H)      Leuprolide Acetate, 6 Month, (LUPRON DEPOT, 6-MONTH, IM)       metFORMIN (GLUCOPHAGE) 500 MG tablet TAKE 2 TABLETS TWICE DAILY WITH MEALS  Qty: 360 tablet, Refills: 3    Associated Diagnoses: Encounter for medication refill; Type 2 diabetes mellitus without complication, without long-term current use of insulin (H)      metoprolol succinate ER (TOPROL-XL) 25 MG 24 hr tablet TAKE 1 TABLET EVERY DAY  Qty: 90 tablet, Refills: 3    Associated Diagnoses: Encounter for medication refill      omeprazole (PRILOSEC) 20 MG DR capsule TAKE 1 CAPSULE (20 MG) BY MOUTH DAILY  Qty: 90 capsule, Refills: 3    Associated Diagnoses: Gastroesophageal reflux disease, esophagitis presence not specified      !! thin (NO BRAND SPECIFIED) lancets Use to test blood sugar one time daily or as directed.  Qty: 100 each, Refills: 3    Comments: Per pharmacy, requesting ACCU-CHEK ALEJANDRO PLUS as formulary covered brand  Associated Diagnoses: Type 2 diabetes mellitus without complication, without long-term current use of insulin (H)       !! - Potential duplicate medications found. Please discuss with provider.      STOP taking these medications       abiraterone (ZYTIGA) 500 MG TABS tablet Comments:   Reason for Stopping:         aspirin 81 MG chewable tablet Comments:   Reason for Stopping:         glimepiride (AMARYL) 1 MG tablet Comments:   Reason for Stopping:         sertraline (ZOLOFT) 50 MG tablet Comments:   Reason for Stopping:         warfarin ANTICOAGULANT (COUMADIN) 2 MG tablet Comments:   Reason for Stopping:             Allergies   No Known Allergies  Data   Most Recent 3 CBC's:  Recent Labs   Lab Test 01/26/21  1624 05/20/19  1205 11/26/18  1116   WBC 9.5 6.7 6.4   HGB 15.1 14.6 14.1   MCV 90 87.8 88.5    170 186      Most Recent 3 BMP's:  Recent  Labs   Lab Test 01/27/21  0636 01/26/21  1624 01/20/20  0927    138 133   POTASSIUM 3.9 3.5 4.4   CHLORIDE 107 102 101   CO2 25 24 29   BUN 12 16 14   CR 0.66 0.87 0.87   ANIONGAP 7 12 3   TENZIN 8.6 9.4 9.2   * 247* 326*     Most Recent 2 LFT's:  Recent Labs   Lab Test 01/26/21  1624 01/20/20  0927   AST 15 17   ALT 14 23   ALKPHOS 74 65   BILITOTAL 0.7 0.8     Most Recent INR's and Anticoagulation Dosing History:  Anticoagulation Dose History     Recent Dosing and Labs Latest Ref Rng & Units 12/10/2019 1/20/2020 2/14/2020 2/21/2020 3/6/2020 4/10/2020 1/26/2021    INR 0.86 - 1.14 2.5 1.53(H) - - - 1.70(H) 0.99    INR 0.86 - 1.14 - - 1.6(A) 2.1(A) 2.1(A) - -        Most Recent 3 Troponin's:  Recent Labs   Lab Test 08/06/15  0905 04/09/14  0600 04/09/14  0240   TROPI <0.015  The 99th percentile for upper reference range is 0.045 ug/L.  Troponin values in   the range of 0.045 - 0.120 ug/L may be associated with risks of adverse   clinical events.   0.626* 0.752*     Most Recent Cholesterol Panel:  Recent Labs   Lab Test 01/29/21  0701   CHOL 146   LDL 75   HDL 47   TRIG 120     Most Recent 6 Bacteria Isolates From Any Culture (See EPIC Reports for Culture Details):No lab results found.  Most Recent TSH, T4 and A1c Labs:  Recent Labs   Lab Test 01/26/21  2255 01/20/20  0927   TSH  --  5.20*   T4  --  0.97   A1C 11.4* 11.8*     Results for orders placed or performed during the hospital encounter of 01/26/21   CT Chest/Abdomen/Pelvis w Contrast    Narrative    CT CHEST, ABDOMEN, PELVIS WITH CONTRAST  1/26/2021 5:51 PM    HISTORY: Abdominal pain and possible FB in esophagus, he is not sure.  ?+ nausea and lower abdominal pain.    TECHNIQUE: CT scan obtained of the chest, abdomen, and pelvis with  oral and IV contrast. 81mL Isovue-370 IV injected. Radiation dose for  this scan was reduced using automated exposure control, adjustment of  the mA and/or kV according to patient size, or iterative  reconstruction  technique.    COMPARISON:  Chest x-ray on 2/7/2017.    FINDINGS:  Chest/mediastinum: No cardiomegaly or significant pericardial  effusion. Moderate atherosclerotic vascular calcification of the  abdominal aorta and iliac vessels. Extensive bilateral gynecomastia.  Thyroid gland is unremarkable. No significant mediastinal, hilar or  axillary lymphadenopathy. 6 mm fat attenuating focus abutting the  posterior aspect of the distal esophagus, could represent small food  residue versus small submucosal lipoma. No evidence of other  radiodense foreign bodies. Esophagus is patulous.    Lung/pleura: No pleural effusion or pneumothorax. Numerous bilateral  pulmonary nodules, worrisome for metastatic disease.    Abdomen/pelvis: 1.3 cm partially calcified hypoattenuating focus in  the left hepatic lobe (series 3 image 114) otherwise no suspicious  focal hepatic lesion. Cholelithiasis without CT evidence of acute  cholecystitis. No main pancreatic ductal dilatation or definite solid  pancreatic mass. No splenomegaly. No adrenal nodules. No radiodense  kidney stones or hydronephrosis.    Sigmoid colon appears to be mildly distended and displaced into the  upper mid abdomen/left upper quadrant, no associated mesenteric  twisting or bowel obstruction noted. No significant free fluid in the  abdomen and pelvis. No free peritoneal or portal venous gas. Moderate  atherosclerotic vascular calcification of the abdominal aorta and  iliac vessels. Although the prostate gland is not significantly  enlarged it appears diffusely enhancing, nonspecific, can be due to  underlying diffuse prostatitis versus prostatic malignancy.    Bones and soft tissue: Multilevel degenerative changes of the spine.      Impression    IMPRESSION:   1. The sigmoid colon appears to be mildly distended and displaced into  the upper mid abdomen/left upper quadrant, however with no associated  mesenteric twisting or bowel obstruction. Finding is nonspecific,  but  might predispose patient into sigmoid volvulus.  2. 6 mm fat attenuating lesion abutting the posterior aspect of the  esophagus, could represent small food residue versus small submucosal  lipoma. No other radiodense foreign body seen within the esophageal  lumen which appears mildly dilated/patulous.  3. Numerous bilateral pulmonary nodules, worrisome for metastatic  disease.  4. The prostate gland although not significantly enlarged, appears  diffusely enhancing, nonspecific, can be due to underlying prostatitis  or prostate malignancy, recommend correlation with serum  prostate-specific antigen.  5. Cholelithiasis without CT evidence of acute cholecystitis.  6. 1.3 cm partially calcified hypoattenuating focus in the left  hepatic lobe, of indeterminate etiology.    GREGORIO SHANNON MD   XR ERCP    Narrative    ENDOSCOPIC RETROGRADE CHOLANGIOPANCREATOGRAPHY  2021 11:15 AM     HISTORY: ERCP [id number]    COMPARISON: None.      Impression    IMPRESSION: Single spot fluoroscopic image during endoscopy  demonstrating endoscope near the GE junction. Total fluoroscopic time  is 0.1 minute.    TESSA FERRARO MD   Echocardiogram Complete    Narrative    [id number]  [id number]  [id number]  [id number]^E           [facility]  Echocardiography Laboratory  [address] [place] [address]        Name: HONG FAJARDO  MRN: [id number]  : 1940  Study Date: 2021 09:43 AM  Age: 80 yrs  Gender: Male  Patient Location: [facility]  Reason For Study: CHF  Ordering Physician: DEISY FLOWER  Referring Physician: STUART KHAN  Performed By: Dyllan Schulz RDCS     BSA: 1.9 m2  Height: 69 in  Weight: 162 lb  HR: 71  BP: 126/67 mmHg  _____________________________________________________________________________  __        Procedure  Complete Portable Echo Adult. Optison (NDC #[id number]) given  intravenously.  _____________________________________________________________________________  __        Interpretation Summary     The visual ejection fraction is estimated at 35-40%.  Left ventricular systolic function is mild to moderately reduced.  There are regional wall motion abnormalities as specified.  The regional wall motion abnormalities are similar to previous and are  consistent with LAD territory ischemia and / or infarction.  The ascending aorta is Mildly dilated.  The study was technically difficult.  _____________________________________________________________________________  __        Left Ventricle  The left ventricle is normal in size. There is normal left ventricular wall  thickness. Diastolic Doppler findings (E/E' ratio and/or other parameters)  suggest left ventricular filling pressures are normal. Grade I or early  diastolic dysfunction. The visual ejection fraction is estimated at 35-40%.  Left ventricular systolic function is mild to moderately reduced. There is  apical akinesis. Distal inferoseptal akinesis. Mid to distal anteroseptal  akinesis. Distal anterior akinesis. There are regional wall motion  abnormalities as specified.     Right Ventricle  The right ventricle is normal size. Right ventricular function cannot be  assessed due to poor image quality.     Atria  Normal left atrial size. Right atrial size is normal. There is no color  Doppler evidence of an atrial shunt.     Mitral Valve  Thickened mitral valve anterior leaflet. There is mild mitral annular  calcification. There is trace mitral regurgitation.        Tricuspid Valve  There is trace tricuspid regurgitation. Right ventricular systolic pressure  could not be approximated due to inadequate tricuspid regurgitation.     Aortic Valve  The aortic valve is not well visualized. There is trace aortic regurgitation.  No hemodynamically significant valvular aortic stenosis.     Pulmonic Valve  There is no pulmonic valvular  regurgitation.     Vessels  Borderline aortic root dilatation. The ascending aorta is Mildly dilated. (3.9  cm). Inferior vena cava not well visualized for estimation of right atrial  pressure.     Pericardium  There is no pericardial effusion.        Rhythm  Sinus rhythm was noted.  _____________________________________________________________________________  __  MMode/2D Measurements & Calculations  IVSd: 0.95 cm     LVIDd: 3.6 cm  LVIDs: 3.2 cm  LVPWd: 0.83 cm  FS: 10.9 %  LV mass(C)d: 90.3 grams  LV mass(C)dI: 47.8 grams/m2  Ao root diam: 3.7 cm  LA dimension: 3.7 cm  LA/Ao: 0.99  LA Volume (BP): 60.0 ml  LA Volume Index (BP): 31.7 ml/m2  RWT: 0.47           Doppler Measurements & Calculations  MV E max ochoa: 52.3 cm/sec  MV A max ochoa: 89.8 cm/sec  MV E/A: 0.58  MV dec slope: 157.7 cm/sec2  PA acc time: 0.10 sec  E/E' av.3  Lateral E/e': 10.6  Medial E/e': 8.1           _____________________________________________________________________________  __           Report approved by: Surekha Castillo 2021 12:54 PM

## 2021-01-29 NOTE — PROGRESS NOTES
Progress Note     Primary Oncologist/Hematologist:  Dr. Burgos          Assessment and Plan:New actions/orders today (01/29/2021) are underlined.     Castration-resistant prostate cancer  - Currently on leuprolide every 6 months  - Clinically stable, but PSA rising  - Numerous lung nodules concerning for metastatic disease.  Await formal comparison to October 2020 CT scan from [facility]. I spoke with staff in Radiology today about getting this done.  - Bone scan in October 2020 which did not demonstrate metastatic disease.   -  on 1/28/21 compared to 104 on 10/28/21   - If prostate cancer is progressing, could be considered for resumption of abiraterone and prednisone but this was poorly tolerated previously. He is not likely a good candidate for chemotherapy. Over he appears relatively stable from his malignancy, but without treatment there will be ongoing progression and at some point he will have symptoms (respiratory symptoms or pain)  - Okay to discharge from Oncology standpoint  - Plan follow up with Dr. Burgos as previously scheduled     Liver lesion  - Indeterminate    CAD   Paroxysmal atrial fibrillation  - On beta blockers and statin  - Echocardiogram done this morning  - Neurology consulted regarding anticoagulation. If recommended, there is not a contraindication from an Oncology standpoint.    Diabetes  - Previously on oral agents only  - Glucose improved with use of insulin, but he will need more support to use this long term    Esophageal stricture  Dysphagia  Esophageal candidiasis  - History of dilatation in the past.   - EGD from 1/28/21 shows benign appearing stenosis which was dilated.  Also noted was gastritis and likely candida esophagitis.     - Now on fluconazole x 14 days.      Lower extremity lesions  - Managed by Wound Care     FULL CODE  DVT prophylaxis: PCDs    Pollo Baldwin APRN, CNP  [facility]  [phone] (office), [phone] (cell)        Interval History:  "    Clive is feeling great today. Eating is improved. Wound care is going well.               Review of Systems:     The 5 point Review of Systems is negative other than noted in the HPI             Medications:   Scheduled Medications    atorvastatin  40 mg Oral Daily     fluconazole  200 mg Oral Daily     insulin aspart  3 Units Subcutaneous TID w/meals     insulin aspart  1-7 Units Subcutaneous TID AC     insulin aspart  1-5 Units Subcutaneous At Bedtime     insulin glargine  10 Units Subcutaneous QAM AC     metoprolol succinate ER  25 mg Oral Daily     pantoprazole  40 mg Oral Daily     PRN Medications  acetaminophen, sore throat lozenge, glucose **OR** dextrose **OR** glucagon, - MEDICATION INSTRUCTIONS -, melatonin, naloxone, naloxone, naloxone, naloxone, naloxone, naloxone, naloxone, naloxone, ondansetron **OR** ondansetron, prochlorperazine **OR** prochlorperazine **OR** prochlorperazine, sodium chloride (PF), sodium chloride (PF)               Physical Exam:   Vitals were reviewed  Blood pressure 106/55, pulse 80, temperature (P) 97.1  F (36.2  C), temperature source (P) Oral, resp. rate (P) 20, height 1.753 m (5' 9\"), weight 73.5 kg (162 lb), SpO2 (P) 96 %.  Wt Readings from Last 4 Encounters:   01/26/21 73.5 kg (162 lb)   01/20/20 76.7 kg (169 lb)   12/10/19 77.1 kg (170 lb)   11/25/19 78.2 kg (172 lb 8 oz)       I/O last 3 completed shifts:  In: 640 [P.O.:240; I.V.:400]  Out: -       Constitutional: Awake, alert, cooperative, no apparent distress           Data:   All laboratory data and imaging studies reviewed.    CMP  Recent Labs   Lab 01/27/21  0636 01/26/21  1624    138   POTASSIUM 3.9 3.5   CHLORIDE 107 102   CO2 25 24   ANIONGAP 7 12   * 247*   BUN 12 16   CR 0.66 0.87   GFRESTIMATED >90 82   GFRESTBLACK >90 >90   TENZIN 8.6 9.4   PROTTOTAL  --  7.9   ALBUMIN  --  3.4   BILITOTAL  --  0.7   ALKPHOS  --  74   AST  --  15   ALT  --  14     CBC  Recent Labs   Lab 01/26/21  1624   WBC 9.5 "   RBC 5.12   HGB 15.1   HCT 46.2   MCV 90   MCH 29.5   MCHC 32.7   RDW 13.3        INR  Recent Labs   Lab 01/26/21  1624   INR 0.99

## 2021-01-29 NOTE — PLAN OF CARE
A/O x 4. Mechanical soft Mod carb diet, , 301. 3 units of insulin given at bedtime. VSS on room air. Assist of 1 with GB and walker. Denies pain, or nausea. Incontinent of urine at times. EGD with fluoroscopy completed today, upper and lower esophogeal stricture dilated. Able to swallow without problem. BLE wound care completed and dressings changed. Neurology consulted. Hematology/Oncology following. Discharge plan pending.

## 2021-01-29 NOTE — DISCHARGE INSTRUCTIONS
BLE wounds: Every other day    Cleanse with VASHE by letting Vashe moistened gauze sit on the wounds x 5 minutes. Don't rinse off  Apply Medihoney to Mepilex lite and apply to wound beds  Apply sween cream 24 to intact skin  Apply edemawear  Change every other day    Edemawear Call Jenny juli Red [phone] to purchase, this will be mailed to your home.  EDEMAWEAR stockings- apply fresh pair with dressing changes  ? Carefully remove old pair of EdemaWear- lifting over any wounds as you remove  DO NOT THROW AWAY- WASH SOILED EDEMAWEAR- use baby shampoo or laundry soap  ? Clean feet and legs with gentle wash  ? Apply Edemawear from toes to knees with a cuff just below the knee-do not cut it  ? DO NOT THROW AWAY THE OLD PAIR OF EDEMWEAR, WASH IT.   o Wash stocking(s) in really hot water with laundry soap or baby shampoo, may need to do this several times  o Rinse well   o Hang dry       EdemaWear    size Stripe color   small navy   medium yellow   large red   X Large aqua

## 2021-01-29 NOTE — TELEPHONE ENCOUNTER
Dr. Raymond from [facility] wanted to leave a message to have Dr. Ferrari call back on Dr. Raymond's personal cell phone regarding pt being in the hospital.    Ph: [phone]    Cici Bermeo Patient Representative

## 2021-01-29 NOTE — PHARMACY-RX INSURANCE COVERAGE
Anticoagulation coverage check.  Patient has Medicare D through [facility] with $300 (of $300) unmet deductible.    Xarelto/Eliquis   January: Upon receipt of RX, Discharge Pharmacy can dispense 1 month free.   February: $336 (fulfills $300 deductible)   Mar-Oct: $37/mo   Nov-Dec: $117/mo (coverage gap)     Jantoven (warfarin): $0/mo.      -CHERISE Johnson, Pharmacy Technician/Liaison, Discharge Pharmacy [phone]

## 2021-01-29 NOTE — PROGRESS NOTES
Care Management Follow Up    Length of Stay (days): 2    Expected Discharge Date: 01/29/21     Concerns to be Addressed:       Patient plan of care discussed at interdisciplinary rounds: Yes    Anticipated Discharge Disposition:  TBD     Anticipated Discharge Services: TBD   Anticipated Discharge DME:  TBD    Patient/family educated on Medicare website which has current facility and service quality ratings:    Education Provided on the Discharge Plan:    Patient/Family in Agreement with the Plan:      Referrals Placed by CM/SW:    Private pay costs discussed: Not applicable    Additional Information:  Writer sent referral to [facility], for them to review pt for potential admit. Writer called Admissions today and notified them that OT is recommending TCU. Admissions stated pt needs a qualifying diagnosis. Admissions stated they will review pt again today and will call back.     9:33p Addendum: [facility] stated they can accept pt. They wanted to kow if the family is okay with the $36/day fee after 14 days. Writer spoke with family, who stated that fee is okay. They would like to transport pt at 3:30p on 1/30/2021. Provider okay with pt discharging 1/30 in afternoon.     10:44a Addendum: Writer spoke with [facility] who stated they will likely not have anyone in admissions this weekend. Writer notified provider who stated he will speak with oncology and will let writer know.     2:29p Addendum: Writer spoke with provider who stated that pt is able to discharge later this evening. Writer spoke with family, who are planning to transport @ 6pm. Facility notified.    SHU Kumar

## 2021-01-29 NOTE — PLAN OF CARE
COVID negative. Very pleasant. A&Ox4. VSS on RA. EGD with dilatation and biopsies of esophagus and duodenum yesterday. Denies pain, SOB, nausea. [place] Mech/soft diet, fair appetite. Incontinent of urine at times, otherwise up with A1, walker, and gait belt to BR. BLE dressings CDI. AM lipid panel pending. Hem/Onc/Nutrition/WOC following. OT consult complete. Neurology to determine if anticoagulation indicated. SW/CC following for discharge needs. Discharge TBD.

## 2021-01-30 NOTE — PLAN OF CARE
A&O. VSS on RA. Ambulate to restroom assist of 1 with gait belt and walker. BLE wounds woc consult complete. Blanchable redness on buttocks. Barrier cream applied. Incontinent of bladder. Denies pain. Tolerated mech soft mod carb diet. BG coverage. Pt discharge to TCU. Sister providing transport. Received medications. [facility] staff transported pt out of facility using wc.

## 2021-01-30 NOTE — PLAN OF CARE
Occupational Therapy Discharge Summary    Reason for therapy discharge:    Discharged to transitional care facility.    Progress towards therapy goal(s). See goals on Care Plan in [facility] electronic health record for goal details.  Goals not met.  Barriers to achieving goals:   discharge from facility.    Therapy recommendation(s):    Continued therapy is recommended.  Rationale/Recommendations:  Pt deconditioned with impaired cognition and need for increased assist with ADLs. Ongoing therapy in TCU setting recommended prior to returning to [facility]. .

## 2021-01-31 ASSESSMENT — MIFFLIN-ST. JEOR: SCORE: 1393.94

## 2021-02-01 ENCOUNTER — PATIENT OUTREACH (OUTPATIENT)
Dept: CARE COORDINATION | Facility: CLINIC | Age: 81
End: 2021-02-01

## 2021-02-01 ENCOUNTER — NURSING HOME VISIT (OUTPATIENT)
Dept: GERIATRICS | Facility: CLINIC | Age: 81
End: 2021-02-01
Payer: COMMERCIAL

## 2021-02-01 ENCOUNTER — TRANSFERRED RECORDS (OUTPATIENT)
Dept: HEALTH INFORMATION MANAGEMENT | Facility: CLINIC | Age: 81
End: 2021-02-01

## 2021-02-01 VITALS
BODY MASS INDEX: 22.48 KG/M2 | HEIGHT: 69 IN | SYSTOLIC BLOOD PRESSURE: 116 MMHG | DIASTOLIC BLOOD PRESSURE: 65 MMHG | WEIGHT: 151.8 LBS | HEART RATE: 82 BPM | RESPIRATION RATE: 18 BRPM | TEMPERATURE: 97.9 F | OXYGEN SATURATION: 99 %

## 2021-02-01 DIAGNOSIS — E11.9 TYPE 2 DIABETES MELLITUS WITHOUT COMPLICATION, WITHOUT LONG-TERM CURRENT USE OF INSULIN (H): ICD-10-CM

## 2021-02-01 DIAGNOSIS — Z98.890 HISTORY OF ESOPHAGOGASTRODUODENOSCOPY: ICD-10-CM

## 2021-02-01 DIAGNOSIS — I25.10 PRESENCE OF STENT IN CORONARY ARTERY IN PATIENT WITH CORONARY ARTERY DISEASE: ICD-10-CM

## 2021-02-01 DIAGNOSIS — C79.51 PROSTATE CANCER METASTATIC TO BONE (H): ICD-10-CM

## 2021-02-01 DIAGNOSIS — C61 PROSTATE CANCER METASTATIC TO BONE (H): ICD-10-CM

## 2021-02-01 DIAGNOSIS — F84.0 AUTISM: ICD-10-CM

## 2021-02-01 DIAGNOSIS — R53.81 PHYSICAL DECONDITIONING: ICD-10-CM

## 2021-02-01 DIAGNOSIS — R13.19 ESOPHAGEAL DYSPHAGIA: Primary | ICD-10-CM

## 2021-02-01 DIAGNOSIS — R41.89 COGNITIVE IMPAIRMENT: ICD-10-CM

## 2021-02-01 DIAGNOSIS — I50.22 CHRONIC SYSTOLIC CONGESTIVE HEART FAILURE (H): ICD-10-CM

## 2021-02-01 DIAGNOSIS — Z91.148 NONCOMPLIANCE WITH MEDICATION REGIMEN: ICD-10-CM

## 2021-02-01 DIAGNOSIS — Z86.73 HISTORY OF CEREBROVASCULAR ACCIDENT (CVA) DUE TO EMBOLISM: ICD-10-CM

## 2021-02-01 DIAGNOSIS — Z71.89 COUNSELING REGARDING ADVANCED DIRECTIVES: ICD-10-CM

## 2021-02-01 DIAGNOSIS — B37.81 CANDIDAL ESOPHAGITIS (H): ICD-10-CM

## 2021-02-01 DIAGNOSIS — L98.9 SKIN LESION: ICD-10-CM

## 2021-02-01 DIAGNOSIS — K22.2 ESOPHAGEAL STRICTURE: ICD-10-CM

## 2021-02-01 DIAGNOSIS — Z95.5 PRESENCE OF STENT IN CORONARY ARTERY IN PATIENT WITH CORONARY ARTERY DISEASE: ICD-10-CM

## 2021-02-01 PROBLEM — R13.10 DYSPHAGIA: Status: ACTIVE | Noted: 2021-02-01

## 2021-02-01 LAB
ANION GAP SERPL CALCULATED.3IONS-SCNC: 8 MMOL/L (ref 5–18)
ANION GAP SERPL CALCULATED.3IONS-SCNC: 8 MMOL/L (ref 5–18)
BASOPHILS # BLD AUTO: 0.1 THOU/UL (ref 0–0.2)
BASOPHILS NFR BLD AUTO: 1 % (ref 0–2)
BUN SERPL-MCNC: 8 MG/DL (ref 8–28)
BUN SERPL-MCNC: 8 MG/DL (ref 8–28)
CALCIUM SERPL-MCNC: 9.1 MG/DL (ref 8.5–10.5)
CALCIUM SERPL-MCNC: 9.1 MG/DL (ref 8.5–10.5)
CHLORIDE BLD-SCNC: 100 MMOL/L (ref 98–107)
CHLORIDE SERPLBLD-SCNC: 100 MMOL/L (ref 98–107)
CO2 SERPL-SCNC: 29 MMOL/L (ref 22–31)
CO2 SERPL-SCNC: 29 MMOL/L (ref 22–31)
CREAT SERPL-MCNC: 0.87 MG/DL (ref 0.7–1.3)
CREAT SERPL-MCNC: 0.87 MG/DL (ref 0.7–1.3)
DIFFERENTIAL: ABNORMAL
EOSINOPHIL # BLD AUTO: 0.3 THOU/UL (ref 0–0.4)
EOSINOPHIL NFR BLD AUTO: 5 % (ref 0–6)
ERYTHROCYTE [DISTWIDTH] IN BLOOD BY AUTOMATED COUNT: 13.2 % (ref 11–14.5)
ERYTHROCYTE [DISTWIDTH] IN BLOOD BY AUTOMATED COUNT: 13.2 % (ref 11–14.5)
GFR SERPL CREATININE-BSD FRML MDRD: >60 ML/MIN/1.73M2
GFR SERPL CREATININE-BSD FRML MDRD: >60 ML/MIN/1.73M2
GLUCOSE BLD-MCNC: 121 MG/DL (ref 70–125)
GLUCOSE SERPL-MCNC: 121 MG/DL (ref 70–125)
HCT VFR BLD AUTO: 39.5 % (ref 40–54)
HCT VFR BLD AUTO: 39.5 % (ref 40–54)
HEMOGLOBIN: 12.8 G/DL (ref 14–18)
HGB BLD-MCNC: 12.8 G/DL (ref 14–18)
IMM GRANULOCYTES # BLD: 0 THOU/UL
IMM GRANULOCYTES NFR BLD: 1 %
LYMPHOCYTES # BLD AUTO: 1.8 THOU/UL (ref 0.8–4.4)
LYMPHOCYTES NFR BLD AUTO: 26 % (ref 20–40)
MCH RBC QN AUTO: 29.8 PG (ref 27–34)
MCH RBC QN AUTO: 29.8 PG (ref 27–34)
MCHC RBC AUTO-ENTMCNC: 32.4 G/DL (ref 32–35)
MCHC RBC AUTO-ENTMCNC: 32.4 G/DL (ref 32–36)
MCV RBC AUTO: 92 FL (ref 80–100)
MCV RBC AUTO: 92 FL (ref 80–100)
MONOCYTES # BLD AUTO: 0.6 THOU/UL (ref 0–0.9)
MONOCYTES NFR BLD AUTO: 9 % (ref 2–10)
NEUTROPHILS # BLD AUTO: 4.3 THOU/UL (ref 2–7.7)
NEUTROPHILS NFR BLD AUTO: 60 % (ref 50–70)
PLATELET # BLD AUTO: 249 THOU/UL (ref 140–440)
PLATELET # BLD AUTO: 249 THOU/UL (ref 140–440)
PMV BLD AUTO: 9.8 FL (ref 8.5–12.5)
POTASSIUM BLD-SCNC: 4.4 MMOL/L (ref 3.5–5)
POTASSIUM SERPL-SCNC: 4.4 MMOL/L (ref 3.5–5)
RBC # BLD AUTO: 4.29 MILL/UL (ref 4.4–6.2)
RBC # BLD AUTO: 4.29 MILL/UL (ref 4.4–6.2)
SODIUM SERPL-SCNC: 137 MMOL/L (ref 136–145)
SODIUM SERPL-SCNC: 137 MMOL/L (ref 136–145)
WBC # BLD AUTO: 7.2 THOU/UL (ref 4–11)
WBC: 7.2 THOU/UL (ref 4–11)

## 2021-02-01 PROCEDURE — 99310 SBSQ NF CARE HIGH MDM 45: CPT | Performed by: NURSE PRACTITIONER

## 2021-02-01 RX ORDER — ABIRATERONE 500 MG/1
1000 TABLET ORAL EVERY MORNING
COMMUNITY
End: 2021-02-25

## 2021-02-01 RX ORDER — NYSTATIN 100000/ML
500000 SUSPENSION, ORAL (FINAL DOSE FORM) ORAL 4 TIMES DAILY
COMMUNITY
Start: 2021-02-01 | End: 2021-02-07

## 2021-02-01 NOTE — PROGRESS NOTES
Clinic Care Coordination Contact  Care Coordination Transition Communication    Referral Source: IP Handoff    Clinical Data: Patient was hospitalized at [facility] from 1/26/21 to 1/29/21 with diagnosis of Dysphagia, Candididal esophagitis, Esophageal strictures -s/p EGD with dilation.     Last PCP OV 1/20/20. Pt lives at [facility] independent senior living. Sister helps pt at times. Pt has housekeeping services 1x/week. Can order meals. Reported pt stopped taking medications a few months ago.     Transition to Facility:              Facility Name: [facility]              Contact name and phone number/fax: phone [phone], fax [phone]    CHARLIE CC sent fax to facility for discharge planning.     Plan:  Care Coordinator will await notification from facility staff informing  Care Coordinator of patient's discharge plans/needs.  Care Coordinator will review chart and outreach to facility staff every 3 weeks and as needed.     SHU Mariano   Social Work Clinic Care Coordinator   [facility]  PH: [phone]  [email or web address]

## 2021-02-01 NOTE — LETTER
[facility]   To:   [facility]          Please give to facility    From:   SHU Mariano Care Coordinator [facility]     Patient Name:  Jose Lees YOB: 1940   Admit date: 1/29/21      *Information Needed:  Please contact me when the patient will discharge (or if they will move to long term care)- include the discharge date, disposition, and main diagnosis   - If the patient is discharged with home care services, please provide the name of the agency    Reports indicate this pt would benefit from clinic care coordination after discharge.   Phone, Fax or Email with information       Thank You,   SHU Mariano   Social Work Clinic Care Coordinator   [facility]  PH: [phone]  [email or web address]

## 2021-02-01 NOTE — PROGRESS NOTES
[facility]  PRIMARY CARE PROVIDER AND CLINIC:  Nolberto Ferrari MD, [address] [place] [address] / [facility] [id number]  Chief Complaint   Patient presents with     [facility] Care     [place] Medical Record Number:  [id number]  Place of Service where encounter took place:  [facility] [place] ([facility]) [[id number]]    Jose Lees  is a 80 year old  (1940), admitted to the above facility from  [facility]. Hospital stay 1/26 through 1/29/21..  Admitted to this facility for  rehab, medical management, nursing care and hospice.     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    Brief Summary of Hospital Course:  Gentleman with complex medical history  brought to hospital after not being able to eat much for 4 days--hard to swallow.  He had hx of Schatzki ring and esophageal dilatations in the past.    He had stopped taking his meds a few months ago per his sister, and it was unclear why.  He had been on coumadin for a few years since a stroke--as AF was thought to have bene the cause. Doing his hospital stay he was found to have severe yeast esophagitis and had two EGD's with two dilatations on 1/27 and 1/28.  His swallowing improved. Also he was not restarted on Coumadin as neurology and cards evals did not show AF--they placed him on a 30 day event monitor and back  on his BB and baby asa. Echo showed 35-40% EF. He also had some leg lesions which had been there a few weeks to months per pt, which don't bother him and are unclear what they are.. he was sent for rehab    TODAY DURING EXAM HPI and ROS:  No CP, SOB, Cough, dizziness, fevers, chills, HA, N/V, dysuria or Bowel Abnormalities. Appetite is down, but feels can swallow good now.  No pain.      CODE STATUS/ADVANCE DIRECTIVES DISCUSSION:   Advance Directives: YES:   DNI/DNR--d/w pt today  Patient's living condition: lives alone at [facility] but sister assists with groceries, etc    ALLERGIES: Patient has no known allergies.  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  PAST SURGICAL HISTORY:   has a past surgical history that includes Coronary Angiography Adult Order; Heart Cath, Angioplasty (4/14); Esophagoscopy, gastroscopy, duodenoscopy (EGD), combined (N/A, 8/6/2015); Phacoemulsification clear cornea with standard intraocular lens implant (Right, 12/4/2018); Phacoemulsification clear cornea with standard intraocular lens implant (Left, 12/19/2018); and Esophagoscopy, gastroscopy, duodenoscopy (EGD), combined (N/A, 1/28/2021).  FAMILY HISTORY: family history includes C.A.D. (age of onset: 48) in his father; Cerebrovascular Disease (age of onset: 92) in his mother.  SOCIAL HISTORY:   reports that he has never smoked. He has never used smokeless tobacco. He reports that he does not drink alcohol or use  drugs.    Post Discharge Medication Reconciliation Status: discharge medications reconciled and changed, per note/orders     Current Outpatient Medications   Medication Sig Dispense Refill     abiraterone (ZYTIGA) 500 MG TABS tablet Take 1,000 mg by mouth every morning       ACE/ARB/ARNI NOT PRESCRIBED, INTENTIONAL, ACE & ARB not prescribed due to Symptomatic hypotension not due to excessive diuresis       aspirin (ASA) 325 MG tablet Take 1 tablet (325 mg) by mouth daily       atorvastatin (LIPITOR) 40 MG tablet Take 1 tablet by mouth daily 90 tablet 3     Cholecalciferol (VITAMIN D) 50 MCG (2000 UT) CAPS Take 2,000 Units by mouth daily       Continuous Glucose Monitor Sup MISC 4 times daily (before meals and nightly) Call NP IF SBP <110 OR HR <60 and call NP.       fluconazole (DIFLUCAN) 200 MG tablet Take 1 tablet (200 mg) by mouth daily for 14 days 14 tablet 0     insulin aspart (NOVOLOG PEN) 100 UNIT/ML pen Inject 5 Units Subcutaneous 3 times daily (with meals)       insulin aspart (NOVOLOG VIAL) 100 UNITS/ML vial Inject Subcutaneous 3 times daily (with meals) Inject as per sliding scale: if 200 - 249 = 1 units; 250 - 299 = 2 units; 300 349 = 3 units; 350 - 399 = 4 units; 400 - 999 = 5 units Notify provider if glucose greater than or equal to 350 mg/dl after administration of dose, subcutaneously before meals for diabete       insulin glargine (LANTUS PEN) 100 UNIT/ML pen Inject 15 Units Subcutaneous every morning (before breakfast)       Leuprolide Acetate, 6 Month, (LUPRON DEPOT, 6-MONTH, IM)        metFORMIN (GLUCOPHAGE) 500 MG tablet TAKE 2 TABLETS TWICE DAILY WITH MEALS 360 tablet 3     metoprolol succinate ER (TOPROL-XL) 25 MG 24 hr tablet TAKE 1 TABLET EVERY DAY (Patient taking differently: TAKE 1 TABLET EVERY DAY  Hold if sbp < 110 or HR < 60 and notify NP) 90 tablet 3     Nutritional Supplements (NUTRITIONAL SUPPLEMENT PO) Start med pass per floor routine.       nystatin (MYCOSTATIN) 291578 UNIT/ML  "suspension Take 500,000 Units by mouth 4 times daily SWISH AND SWALLOW give 4xy=281,000 units QID X 7 DAYS. In addition ot the fluconazole       omeprazole (PRILOSEC) 20 MG DR capsule TAKE 1 CAPSULE (20 MG) BY MOUTH DAILY 90 capsule 3     sertraline (ZOLOFT) 50 MG tablet Take 50 mg by mouth every morning             ROS:  See above under HPI    Vitals:  /65   Pulse 82   Temp 97.9  F (36.6  C)   Resp 18   Ht 1.753 m (5' 9\")   Wt 68.9 kg (151 lb 12.8 oz)   SpO2 99%   BMI 22.42 kg/m    Exam:  GENERAL APPEARANCE:  Alert, in no distress, oriented, cooperative  ENT:  Mouth and posterior oropharynx normal except thrush on tongue, some creamy phlegm seen at back of throat,  normal hearing acuity  EYES:  EOM, conjunctivae, lids, pupils and irises normal  NECK:  No adenopathy,masses or thyromegaly  RESP:  respiratory effort and palpation of chest normal, lungs clear to auscultation , no respiratory distress  CV:  Palpation and auscultation of heart done , regular rate and rhythm, no murmur, rub, or gallop, no edema, +2 pedal pulses  ABDOMEN:  normal bowel sounds, soft, nontender, no hepatosplenomegaly or other masses  M/S:   Gait and station normal with PT--ROSS equally  SKIN:  Inspection of skin and subcutaneous tissue baseline except LEs have wraps in place--not removed  NEURO:   Cranial nerves 2-12 are normal tested and grossly at patient's baseline  PSYCH:  oriented X 3, affect and mood normal    Lab/Diagnostic data:  Recent Labs   Lab Test 02/01/21 01/27/21  0636    139   POTASSIUM 4.4 3.9   CHLORIDE 100 107   CO2 29 25   ANIONGAP 8 7    195*   BUN 8 12   CR 0.87 0.66   TENZIN 9.1 8.6     CBC RESULTS:   Recent Labs   Lab Test 02/01/21   WBC 7.2   RBC 4.29*   HGB 12.8*   HCT 39.5*   MCV 92   MCH 29.8   MCHC 32.4   RDW 13.2        ASSESSMENT / PLAN:  (R13.10) Esophageal dysphagia  (primary encounter diagnosis)  (K22.2) Esophageal stricture  (Z98.890) History of esophagogastroduodenoscopy: " "Dilatation on 1/27 & 1/28/21  Comment/Plan:  Doing well no need for SLP eval, f/u GI in 2 weeks, monitor.    (B37.81) Candidal esophagitis (H)  Comment/Plan: cont diflucan, will also add 7 days of oral nystatin swish and swallow--good oral care, monitor.    (R53.81) Physical deconditioning  Comment/Plan: PT OT--may need more than [place]/[facility] [place]--or help at home maulik with meds.     (E11.9) Type 2 diabetes mellitus without complication, without long-term current use of insulin (H)  Comment/Plan: off Amaryl, conts on Metformin.   Will try to taper off insulins and back to orals but may not be able to do so--at least try to simplify.  A1C was poor. Monitor.    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 Has no pains    (F84.0) Autism  (R41.89) Cognitive impairment  (Z91.14) Noncompliance with medication regimen  Comment/Plan: will need  OT eval, ?help at home    (L98.9) Skin lesion  Comment/Plan: wound care per orders from WOCN-- cont EDEMA wear socks,   F/U Derm at [facility] after TCU stay       (Z71.89) Counseling regarding advanced directives  Comment/Plan: No CPR- Do NOT Intubate.. new POLST signed.              Total time spent with patient visit at the skilled nursing facility was 55 including patient visit, review of past records, phone call to patient contact, sister and msg left on her personal cell.  Also d/w pt and explained what POLST was and options.  He had been FULL CODE and said he does not know what that. If he loses consciousness, want \"no CPR, just let me go--no tube\".. Greater than 50% of total time spent with " counseling and coordinating care due to complexities of care as noted above..     Electronically signed by:  SARATH Yi CNP

## 2021-02-03 NOTE — PROGRESS NOTES
[facility]  INITIAL VISIT NOTE  February 4, 2021    PRIMARY CARE PROVIDER AND CLINIC:  Nolberto Ferrari [address] [place] [address] / [facility] [id number]    CHIEF COMPLAINT:  Hospital follow-up/Initial visit    HPI:    Jose Lees is a 80 year old  (1940) male who [facility] [place] on February 4, 2021 for an initial visit.     Medical history is notable for coronary artery disease, ischemic cardiomyopathy, hypertension, dyslipidemia, embolic stroke, DM type II, metastatic prostate cancer, depression, autistic disorder, and cognitive impairment.    Summary of hospital course:  Patient was hospitalized at [facility] from January 26 through January 29, 2021 after presenting with nausea, vomiting, and diarrhea and noted to have candidal esophagitis as well as esophageal stricture.  His diabetes was poorly controlled and hemoglobin A1c was 11.4%.  CMP and CBC were unremarkable.  Test for COVID-19 was negative.  CT chest/abdomen/pelvis revealed mildly distended sigmoid colon, 6 mm fat-containing lesion involving the posterior aspect of the esophagus, numerous bilateral pulmonary nodules, worrisome for metastatic disease, diffuse enhancing prostate gland, cholelithiasis, and 1.3 cm partially calcified hypoattenuating focus in the left hepatic lobe, of indeterminate etiology.  EGD was performed on January 27 and 28, 2021 which showed benign-appearing esophageal stenosis which was dilated.  There was also white nummular lesions in the esophageal mucosa suggestive of Candida esophagitis (confirmed on biopsy) as well as gastritis.  Patient was treated with nystatin and fluconazole.  SLP evaluation was done and he was started on dysphagia diet level 2 with thin liquid consistency.  Dietary supplement was recommended for severe malnutrition.  Neurology was consulted for history of embolic stroke as he was previously on warfarin.  They recommended 30-day cardiac event  monitor to rule out PAF.  Notably, echocardiogram on January 29, showed LVEF of 35-40%, grade 1 or early diastolic dysfunction, and mildly dilated ascending aorta.    Patient is admitted to this facility for medical management, nursing care, and rehab.     Of note, history obtained from patient, facility RN, and extensive review of the chart necessitated by complex hospitalization.    Today's visit:  Patient was seen in his room, while sitting in a chair.  He appears comfortable.  He reports that he has no trouble swallowing.  He denies fever, chills, chest pain, palpitation, dyspnea, nausea, vomiting, or abdominal pain.  He states that he had a bowel movement yesterday.      CODE STATUS:   DNR / DNI    PAST MEDICAL HISTORY:   Cognitive impairment  Coronary artery disease, status post BMS to LAD in April 2014  Ischemic cardiomyopathy and grade 1 LV diastolic dysfunction (last LVEF 35-40% on January 29, 2021 by echo)  Hypertension  Dyslipidemia  Bilateral embolic strokes in 2014  DM type II  Metastatic prostate cancer (castrate resistant, initially diagnosed in 2011, currently on leuprolide injection every 6 months)  Esophageal stricture/stenosis, status post dilation  Candidal esophagitis  Mildly dilated ascending aorta  Autistic disorder  Depression  Severe malnutrition    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/2020     Prostate cancer (H) 10/11    GLEASOR 8     Stricture and stenosis of esophagus 10/22/2015     Stroke, embolic (H) 4/7/2014    bilateral  cerebral embolic CVAs     Type 2 diabetes mellitus without complication, without long-term current use of insulin (H) 7/25/2017       PAST SURGICAL HISTORY:   Past Surgical History:   Procedure Laterality Date     CORONARY ANGIOGRAPHY ADULT ORDER       ESOPHAGOSCOPY, GASTROSCOPY, DUODENOSCOPY (EGD), COMBINED N/A 8/6/2015    Procedure: COMBINED ESOPHAGOSCOPY, GASTROSCOPY, DUODENOSCOPY (EGD), REMOVE FOREIGN BODY;  Surgeon: Yu Tapia MD;  Location:  GI     ESOPHAGOSCOPY, GASTROSCOPY, DUODENOSCOPY (EGD), COMBINED N/A 1/28/2021    Procedure: ESOPHAGOGASTRODUODENOSCOPY (EGD) WITH FLUORO,Biopsy, and Dilation;  Surgeon: Audra Huang MD;  Location:  OR     HEART CATH, ANGIOPLASTY  4/14    BMS to LAD     PHACOEMULSIFICATION CLEAR CORNEA WITH STANDARD INTRAOCULAR LENS IMPLANT Right 12/4/2018    Procedure: COMPLEX RIGHT EYE PHACOEMULSIFICATION CLEAR CORNEA WITH STANDARD INTRAOCULAR LENS IMPLANT;  Surgeon: Kamar Kyle MD;  Location:  EC     PHACOEMULSIFICATION CLEAR CORNEA WITH STANDARD INTRAOCULAR LENS IMPLANT Left 12/19/2018    Procedure: COMPLEX LEFT EYE PHACOEMULSIFICATION CLEAR CORNEA WITH STANDARD INTRAOCULAR LENS IMPLANT;  Surgeon: Kamar Kyle MD;  Location:  EC       FAMILY HISTORY:   Family History   Problem Relation Age of Onset     Cerebrovascular Disease Mother 92     C.A.D. Father 48       SOCIAL HISTORY:  Patient is single and lives at [facility] assisted living facility.  He uses a walker for ambulation. He is a lifelong nonsmoker and does not consume alcohol.      Social History     Tobacco Use     Smoking status: Never Smoker     Smokeless tobacco: Never Used   Substance Use Topics     Alcohol use: No     Comment: never       MEDICATIONS:  Current Outpatient Medications   Medication Sig Dispense Refill     abiraterone (ZYTIGA) 500 MG TABS tablet Take 1,000 mg by mouth every morning       ACE/ARB/ARNI NOT PRESCRIBED, INTENTIONAL, ACE & ARB not  prescribed due to Symptomatic hypotension not due to excessive diuresis       aspirin (ASA) 325 MG tablet Take 1 tablet (325 mg) by mouth daily       atorvastatin (LIPITOR) 40 MG tablet Take 1 tablet by mouth daily 90 tablet 3     Cholecalciferol (VITAMIN D) 50 MCG (2000 UT) CAPS Take 2,000 Units by mouth daily       Continuous Glucose Monitor Sup MISC 4 times daily (before meals and nightly) Call NP IF SBP <110 OR HR <60 and call NP.       fluconazole (DIFLUCAN) 200 MG tablet Take 1 tablet (200 mg) by mouth daily for 14 days 14 tablet 0     insulin aspart (NOVOLOG PEN) 100 UNIT/ML pen Inject 5 Units Subcutaneous 3 times daily (with meals)       insulin aspart (NOVOLOG VIAL) 100 UNITS/ML vial Inject Subcutaneous 3 times daily (with meals) Inject as per sliding scale: if 200 - 249 = 1 units; 250 - 299 = 2 units; 300 349 = 3 units; 350 - 399 = 4 units; 400 - 999 = 5 units Notify provider if glucose greater than or equal to 350 mg/dl after administration of dose, subcutaneously before meals for diabete       insulin glargine (LANTUS PEN) 100 UNIT/ML pen Inject 15 Units Subcutaneous every morning (before breakfast)       Leuprolide Acetate, 6 Month, (LUPRON DEPOT, 6-MONTH, IM)        metFORMIN (GLUCOPHAGE) 500 MG tablet TAKE 2 TABLETS TWICE DAILY WITH MEALS 360 tablet 3     metoprolol succinate ER (TOPROL-XL) 25 MG 24 hr tablet TAKE 1 TABLET EVERY DAY (Patient taking differently: TAKE 1 TABLET EVERY DAY  Hold if sbp < 110 or HR < 60 and notify NP) 90 tablet 3     Nutritional Supplements (NUTRITIONAL SUPPLEMENT PO) Start med pass per floor routine.       nystatin (MYCOSTATIN) 834696 UNIT/ML suspension Take 500,000 Units by mouth 4 times daily SWISH AND SWALLOW give 5tt=121,000 units QID X 7 DAYS. In addition ot the fluconazole       omeprazole (PRILOSEC) 20 MG DR capsule TAKE 1 CAPSULE (20 MG) BY MOUTH DAILY 90 capsule 3     sertraline (ZOLOFT) 50 MG tablet Take 50 mg by mouth every morning         ALLERGIES:  No Known  Allergies    ROS:  10 point ROS were negative other than the symptoms noted above in the HPI.    PHYSICAL EXAM:  Vital signs were reviewed in the chart.  Vital Signs: Blood pressure 117/65, heart rate 71, respiratory rate 18, temperature 97.8  F, oxygen saturation 98%, weight 153.6 LBS  General: Comfortable and in no acute distress  HEENT: Normocephalic; oropharynx clear  Cardiovascular: Normal S1, S2, RRR  Respiratory: Lungs clear to auscultation bilaterally  GI: Abdomen soft, non-tender, non-distended, +BS  Extremities: 1+ bilateral LE edema; compression stockings are on  Neuro: CX II-XII grossly intact; ROM in all four extremities grossly intact  Psych: Alert and oriented x3; normal affect  Skin: No acute rash    LABORATORY/IMAGING DATA:    Most Recent 3 CBC's:  Recent Labs   Lab Test 02/01/21 01/26/21  1624 05/20/19  1205   WBC 7.2 9.5 6.7   HGB 12.8* 15.1 14.6   MCV 92 90 87.8    193 170     Most Recent 3 BMP's:  Recent Labs   Lab Test 02/01/21 01/27/21  0636 01/26/21  1624    139 138   POTASSIUM 4.4 3.9 3.5   CHLORIDE 100 107 102   CO2 29 25 24   BUN 8 12 16   CR 0.87 0.66 0.87   ANIONGAP 8 7 12   TENZIN 9.1 8.6 9.4    195* 247*     Most Recent 2 LFT's:  Recent Labs   Lab Test 01/26/21  1624 01/20/20  0927   AST 15 17   ALT 14 23   ALKPHOS 74 65   BILITOTAL 0.7 0.8     Most Recent Cholesterol Panel:  Recent Labs   Lab Test 01/29/21  0701   CHOL 146   LDL 75   HDL 47   TRIG 120     Most Recent TSH and T4:  Recent Labs   Lab Test 01/20/20  0927   TSH 5.20*   T4 0.97     Most Recent Hemoglobin A1c:  Recent Labs   Lab Test 01/26/21  2255   A1C 11.4*       ASSESSMENT/PLAN:  Esophageal stenosis, benign-appearing, status post dilation on January 27 and January 28, 2021,  Candidal esophagitis,  Gastritis,  Dysphagia.  Dysphagia has improved.  Plan:  Continue on dysphagia diet level 2 with thin liquid consistency  Continue SLP evaluation and therapy  Continue nystatin, 5 mL p.o. 4 times a day through  February 9 and fluconazole 200 mg p.o. daily through February 13, 2021  Continue omeprazole 20 mg p.o. daily    History of embolic stroke in April 2014.  No evidence for atrial fibrillation on previous EKGs.    Plan:  Neurology recommended cardiac event monitor for 30 days (it is to be arranged)  Continue aspirin 325 mg p.o. daily and atorvastatin 40 mg p.o. at bedtime    Coronary artery disease, status post BMS to LAD in April 2014,  Ischemic cardiomyopathy and grade 1 LV diastolic dysfunction (last LVEF 35-40% on January 29, 2021 by echo),  Hypertension,  Dyslipidemia.  Stable.  Plan:  Continue aspirin 325 mg p.o. daily, atorvastatin 40 mg p.o. daily, and metoprolol ER 25 mg p.o. daily  Monitor blood pressure, weight, and volume status    DM type II, poorly controlled.  Last hemoglobin A1c was 11.4% on January 26, 2021.  Blood glucose is currently in the range of .  Plan:  Continue diabetic diet  Continue Lantus 15 units subcu every morning  Continue NovoLog 5 units subcu 3 times daily  Continue Metformin 1000 mg p.o. twice daily  Continue to monitor blood glucose    Metastatic prostate cancer.  Castrate resistant, initially diagnosed in 2001, currently on leuprolide injection every 6 months.   Last PSA was 121 on January 2021 compared to 104 on October 28, 2020.  Plan:  Continue Zytiga 1000 mg p.o. daily  Follow-up with Dr. Burgos as previously scheduled    Depression.  Stable  Plan:  Continue sertraline 50 mg p.o. in the morning    Severe malnutrition.   In the setting of acute illness.  Plan:  Continue dietary supplement  Facility dietitian to evaluate the patient    Cognitive impairment,  Autistic disorder.  Plan:  Staff to assist with daily care and mobility  Cognitive evaluation per therapies    Skin lesions on lower extremities.  Plan:  Follow-up with dermatology at [facility] after TCU stay    Physical deconditioning.  Plan:  Continue PT/OT evaluation and therapy    Family update:  Spoke to sister at  extent,  today, February 4, and discussed the plan for rehab and follow-ups      Orders written by provider at facility:  None.      Total unit/floor time of 55 minutes consisted of the following: examination of patient, reviewing the record including pertinent labs and imaging. More than 50% of this time was spent in coordination of care with the patient, sister, nursing staff, and other healthcare providers. This time was spent on discussing the care plan including goal of care, wound cares, discharge/safe placement, and specialty follow up.        Electronically signed by:  Jean-Paul Doe MD

## 2021-02-04 ENCOUNTER — NURSING HOME VISIT (OUTPATIENT)
Dept: GERIATRICS | Facility: CLINIC | Age: 81
End: 2021-02-04
Payer: COMMERCIAL

## 2021-02-04 VITALS
HEIGHT: 69 IN | HEART RATE: 77 BPM | SYSTOLIC BLOOD PRESSURE: 110 MMHG | RESPIRATION RATE: 18 BRPM | OXYGEN SATURATION: 97 % | DIASTOLIC BLOOD PRESSURE: 64 MMHG | TEMPERATURE: 98 F | BODY MASS INDEX: 22.75 KG/M2 | WEIGHT: 153.6 LBS

## 2021-02-04 DIAGNOSIS — E43 SEVERE PROTEIN-CALORIE MALNUTRITION (H): ICD-10-CM

## 2021-02-04 DIAGNOSIS — E11.65 POORLY CONTROLLED DIABETES MELLITUS (H): ICD-10-CM

## 2021-02-04 DIAGNOSIS — L98.9 SKIN LESIONS: ICD-10-CM

## 2021-02-04 DIAGNOSIS — Z86.73 HISTORY OF EMBOLIC STROKE: ICD-10-CM

## 2021-02-04 DIAGNOSIS — B37.81 CANDIDAL ESOPHAGITIS (H): ICD-10-CM

## 2021-02-04 DIAGNOSIS — R41.89 COGNITIVE IMPAIRMENT: ICD-10-CM

## 2021-02-04 DIAGNOSIS — I25.5 ISCHEMIC CARDIOMYOPATHY: ICD-10-CM

## 2021-02-04 DIAGNOSIS — K29.60 OTHER GASTRITIS WITHOUT HEMORRHAGE, UNSPECIFIED CHRONICITY: ICD-10-CM

## 2021-02-04 DIAGNOSIS — F32.A DEPRESSION, UNSPECIFIED DEPRESSION TYPE: ICD-10-CM

## 2021-02-04 DIAGNOSIS — R53.81 PHYSICAL DECONDITIONING: ICD-10-CM

## 2021-02-04 DIAGNOSIS — E78.5 DYSLIPIDEMIA: ICD-10-CM

## 2021-02-04 DIAGNOSIS — C61 METASTATIC CASTRATION-RESISTANT ADENOCARCINOMA OF PROSTATE (H): ICD-10-CM

## 2021-02-04 DIAGNOSIS — F84.0 AUTISM: ICD-10-CM

## 2021-02-04 DIAGNOSIS — I10 ESSENTIAL HYPERTENSION: ICD-10-CM

## 2021-02-04 DIAGNOSIS — Z19.2 METASTATIC CASTRATION-RESISTANT ADENOCARCINOMA OF PROSTATE (H): ICD-10-CM

## 2021-02-04 DIAGNOSIS — K22.2 ESOPHAGEAL STENOSIS: Primary | ICD-10-CM

## 2021-02-04 DIAGNOSIS — I25.10 CORONARY ARTERY DISEASE INVOLVING NATIVE CORONARY ARTERY OF NATIVE HEART WITHOUT ANGINA PECTORIS: ICD-10-CM

## 2021-02-04 PROCEDURE — 99306 1ST NF CARE HIGH MDM 50: CPT | Mod: AI | Performed by: INTERNAL MEDICINE

## 2021-02-04 ASSESSMENT — MIFFLIN-ST. JEOR: SCORE: 1402.11

## 2021-02-04 NOTE — LETTER
2/4/2021        RE: Jose Lees  [id number] [facility] [id number]        [facility]  INITIAL VISIT NOTE  February 4, 2021    PRIMARY CARE PROVIDER AND CLINIC:  Nolberto Ferrari [address] [place] [address] / [facility] [id number]    CHIEF COMPLAINT:  Hospital follow-up/Initial visit    HPI:    Jose Lees is a 80 year old  (1940) male who was seen at [facility] in [place] on February 4, 2021 for an initial visit.     Medical history is notable for coronary artery disease, ischemic cardiomyopathy, hypertension, dyslipidemia, embolic stroke, DM type II, metastatic prostate cancer, depression, autistic disorder, and cognitive impairment.    Summary of hospital course:  Patient was hospitalized at [facility] from January 26 through January 29, 2021 after presenting with nausea, vomiting, and diarrhea and noted to have candidal esophagitis as well as esophageal stricture.  His diabetes was poorly controlled and hemoglobin A1c was 11.4%.  CMP and CBC were unremarkable.  Test for COVID-19 was negative.  CT chest/abdomen/pelvis revealed mildly distended sigmoid colon, 6 mm fat-containing lesion involving the posterior aspect of the esophagus, numerous bilateral pulmonary nodules, worrisome for metastatic disease, diffuse enhancing prostate gland, cholelithiasis, and 1.3 cm partially calcified hypoattenuating focus in the left hepatic lobe, of indeterminate etiology.  EGD was performed on January 27 and 28, 2021 which showed benign-appearing esophageal stenosis which was dilated.  There was also white nummular lesions in the esophageal mucosa suggestive of Candida esophagitis (confirmed on biopsy) as well as gastritis.  Patient was treated with nystatin and fluconazole.  SLP evaluation was done and he was started on dysphagia diet level 2 with thin liquid consistency.  Dietary supplement was recommended for severe malnutrition.  Neurology was consulted for  history of embolic stroke as he was previously on warfarin.  They recommended 30-day cardiac event monitor to rule out PAF.  Notably, echocardiogram on January 29, showed LVEF of 35-40%, grade 1 or early diastolic dysfunction, and mildly dilated ascending aorta.    Patient is admitted to this facility for medical management, nursing care, and rehab.     Of note, history obtained from patient, facility RN, and extensive review of the chart necessitated by complex hospitalization.    Today's visit:  Patient was seen in his room, while sitting in a chair.  He appears comfortable.  He reports that he has no trouble swallowing.  He denies fever, chills, chest pain, palpitation, dyspnea, nausea, vomiting, or abdominal pain.  He states that he had a bowel movement yesterday.      CODE STATUS:   DNR / DNI    PAST MEDICAL HISTORY:   Cognitive impairment  Coronary artery disease, status post BMS to LAD in April 2014  Ischemic cardiomyopathy and grade 1 LV diastolic dysfunction (last LVEF 35-40% on January 29, 2021 by echo)  Hypertension  Dyslipidemia  Bilateral embolic strokes in 2014  DM type II  Metastatic prostate cancer (castrate resistant, initially diagnosed in 2011, currently on leuprolide injection every 6 months)  Esophageal stricture/stenosis, status post dilation  Candidal esophagitis  Mildly dilated ascending aorta  Autistic disorder  Depression  Severe malnutrition    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/2020     Prostate cancer (H) 10/11    MARILIN Jiménez      Stricture and stenosis of esophagus 10/22/2015     Stroke, 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     Type 2 diabetes mellitus without complication, without long-term current use of insulin (H) 7/25/2017       PAST SURGICAL HISTORY:   Past Surgical History:   Procedure Laterality Date     CORONARY ANGIOGRAPHY ADULT ORDER       ESOPHAGOSCOPY, GASTROSCOPY, DUODENOSCOPY (EGD), COMBINED N/A 8/6/2015    Procedure: COMBINED ESOPHAGOSCOPY, GASTROSCOPY, DUODENOSCOPY (EGD), REMOVE FOREIGN BODY;  Surgeon: Yu Tapia MD;  Location:  GI     ESOPHAGOSCOPY, GASTROSCOPY, DUODENOSCOPY (EGD), COMBINED N/A 1/28/2021    Procedure: ESOPHAGOGASTRODUODENOSCOPY (EGD) WITH FLUORO,Biopsy, and Dilation;  Surgeon: Audra Huang MD;  Location:  OR     HEART CATH, ANGIOPLASTY  4/14    BMS to LAD     PHACOEMULSIFICATION CLEAR CORNEA WITH STANDARD INTRAOCULAR LENS IMPLANT Right 12/4/2018    Procedure: COMPLEX RIGHT EYE PHACOEMULSIFICATION CLEAR CORNEA WITH STANDARD INTRAOCULAR LENS IMPLANT;  Surgeon: Kamar Kyle MD;  Location:  EC     PHACOEMULSIFICATION CLEAR CORNEA WITH STANDARD INTRAOCULAR LENS IMPLANT Left 12/19/2018    Procedure: COMPLEX LEFT EYE PHACOEMULSIFICATION CLEAR CORNEA WITH STANDARD INTRAOCULAR LENS IMPLANT;  Surgeon: Kamar Kyle MD;  Location:  EC       FAMILY HISTORY:   Family History   Problem Relation Age of Onset     Cerebrovascular Disease Mother 92     C.A.D. Father 48       SOCIAL HISTORY:  Patient is single [facility]  He uses a walker for ambulation. He is a lifelong nonsmoker and does not consume alcohol.      Social History     Tobacco Use     Smoking status: Never Smoker     Smokeless tobacco: Never Used   Substance Use Topics     Alcohol use: No     Comment: never       MEDICATIONS:  Current Outpatient Medications   Medication Sig Dispense Refill     abiraterone (ZYTIGA) 500 MG TABS tablet Take  1,000 mg by mouth every morning       ACE/ARB/ARNI NOT PRESCRIBED, INTENTIONAL, ACE & ARB not prescribed due to Symptomatic hypotension not due to excessive diuresis       aspirin (ASA) 325 MG tablet Take 1 tablet (325 mg) by mouth daily       atorvastatin (LIPITOR) 40 MG tablet Take 1 tablet by mouth daily 90 tablet 3     Cholecalciferol (VITAMIN D) 50 MCG (2000 UT) CAPS Take 2,000 Units by mouth daily       Continuous Glucose Monitor Sup MISC 4 times daily (before meals and nightly) Call NP IF SBP <110 OR HR <60 and call NP.       fluconazole (DIFLUCAN) 200 MG tablet Take 1 tablet (200 mg) by mouth daily for 14 days 14 tablet 0     insulin aspart (NOVOLOG PEN) 100 UNIT/ML pen Inject 5 Units Subcutaneous 3 times daily (with meals)       insulin aspart (NOVOLOG VIAL) 100 UNITS/ML vial Inject Subcutaneous 3 times daily (with meals) Inject as per sliding scale: if 200 - 249 = 1 units; 250 - 299 = 2 units; 300 349 = 3 units; 350 - 399 = 4 units; 400 - 999 = 5 units Notify provider if glucose greater than or equal to 350 mg/dl after administration of dose, subcutaneously before meals for diabete       insulin glargine (LANTUS PEN) 100 UNIT/ML pen Inject 15 Units Subcutaneous every morning (before breakfast)       Leuprolide Acetate, 6 Month, (LUPRON DEPOT, 6-MONTH, IM)        metFORMIN (GLUCOPHAGE) 500 MG tablet TAKE 2 TABLETS TWICE DAILY WITH MEALS 360 tablet 3     metoprolol succinate ER (TOPROL-XL) 25 MG 24 hr tablet TAKE 1 TABLET EVERY DAY (Patient taking differently: TAKE 1 TABLET EVERY DAY  Hold if sbp < 110 or HR < 60 and notify NP) 90 tablet 3     Nutritional Supplements (NUTRITIONAL SUPPLEMENT PO) Start med pass per floor routine.       nystatin (MYCOSTATIN) 804899 UNIT/ML suspension Take 500,000 Units by mouth 4 times daily SWISH AND SWALLOW give 2ki=962,000 units QID X 7 DAYS. In addition ot the fluconazole       omeprazole (PRILOSEC) 20 MG DR capsule TAKE 1 CAPSULE (20 MG) BY MOUTH DAILY 90 capsule 3      sertraline (ZOLOFT) 50 MG tablet Take 50 mg by mouth every morning         ALLERGIES:  No Known Allergies    ROS:  10 point ROS were negative other than the symptoms noted above in the HPI.    PHYSICAL EXAM:  Vital signs were reviewed in the chart.  Vital Signs: Blood pressure 117/65, heart rate 71, respiratory rate 18, temperature 97.8  F, oxygen saturation 98%, weight 153.6 LBS  General: Comfortable and in no acute distress  HEENT: Normocephalic; oropharynx clear  Cardiovascular: Normal S1, S2, RRR  Respiratory: Lungs clear to auscultation bilaterally  GI: Abdomen soft, non-tender, non-distended, +BS  Extremities: 1+ bilateral LE edema; compression stockings are on  Neuro: CX II-XII grossly intact; ROM in all four extremities grossly intact  Psych: Alert and oriented x3; normal affect  Skin: No acute rash    LABORATORY/IMAGING DATA:    Most Recent 3 CBC's:  Recent Labs   Lab Test 02/01/21 01/26/21  1624 05/20/19  1205   WBC 7.2 9.5 6.7   HGB 12.8* 15.1 14.6   MCV 92 90 87.8    193 170     Most Recent 3 BMP's:  Recent Labs   Lab Test 02/01/21 01/27/21  0636 01/26/21  1624    139 138   POTASSIUM 4.4 3.9 3.5   CHLORIDE 100 107 102   CO2 29 25 24   BUN 8 12 16   CR 0.87 0.66 0.87   ANIONGAP 8 7 12   TENZIN 9.1 8.6 9.4    195* 247*     Most Recent 2 LFT's:  Recent Labs   Lab Test 01/26/21  1624 01/20/20  0927   AST 15 17   ALT 14 23   ALKPHOS 74 65   BILITOTAL 0.7 0.8     Most Recent Cholesterol Panel:  Recent Labs   Lab Test 01/29/21  0701   CHOL 146   LDL 75   HDL 47   TRIG 120     Most Recent TSH and T4:  Recent Labs   Lab Test 01/20/20  0927   TSH 5.20*   T4 0.97     Most Recent Hemoglobin A1c:  Recent Labs   Lab Test 01/26/21  2255   A1C 11.4*       ASSESSMENT/PLAN:  Esophageal stenosis, benign-appearing, status post dilation on January 27 and January 28, 2021,  Candidal esophagitis,  Gastritis,  Dysphagia.  Dysphagia has improved.  Plan:  Continue on dysphagia diet level 2 with thin liquid  consistency  Continue SLP evaluation and therapy  Continue nystatin, 5 mL p.o. 4 times a day through February 9 and fluconazole 200 mg p.o. daily through February 13, 2021  Continue omeprazole 20 mg p.o. daily    History of embolic stroke in April 2014.  No evidence for atrial fibrillation on previous EKGs.    Plan:  Neurology recommended cardiac event monitor for 30 days (it is to be arranged)  Continue aspirin 325 mg p.o. daily and atorvastatin 40 mg p.o. at bedtime    Coronary artery disease, status post BMS to LAD in April 2014,  Ischemic cardiomyopathy and grade 1 LV diastolic dysfunction (last LVEF 35-40% on January 29, 2021 by echo),  Hypertension,  Dyslipidemia.  Stable.  Plan:  Continue aspirin 325 mg p.o. daily, atorvastatin 40 mg p.o. daily, and metoprolol ER 25 mg p.o. daily  Monitor blood pressure, weight, and volume status    DM type II, poorly controlled.  Last hemoglobin A1c was 11.4% on January 26, 2021.  Blood glucose is currently in the range of .  Plan:  Continue diabetic diet  Continue Lantus 15 units subcu every morning  Continue NovoLog 5 units subcu 3 times daily  Continue Metformin 1000 mg p.o. twice daily  Continue to monitor blood glucose    Metastatic prostate cancer.  Castrate resistant, initially diagnosed in 2001, currently on leuprolide injection every 6 months.   Last PSA was 121 on January 2021 compared to 104 on October 28, 2020.  Plan:  Continue Zytiga 1000 mg p.o. daily  Follow-up with Dr. Burgos as previously scheduled    Depression.  Stable  Plan:  Continue sertraline 50 mg p.o. in the morning    Severe malnutrition.   In the setting of acute illness.  Plan:  Continue dietary supplement  Facility dietitian to evaluate the patient    Cognitive impairment,  Autistic disorder.  Plan:  Staff to assist with daily care and mobility  Cognitive evaluation per therapies    Skin lesions on lower extremities.  Plan:  Follow-up with dermatology at [facility] after TCU stay    Physical  deconditioning.  Plan:  Continue PT/OT evaluation and therapy    Family update:  Spoke to sister at [facility],  today, February 4, and discussed the plan for rehab and follow-ups      Orders written by provider at facility:  None.      Total unit/floor time of 55 minutes consisted of the following: examination of patient, reviewing the record including pertinent labs and imaging. More than 50% of this time was spent in coordination of care with the patient, sister, nursing staff, and other healthcare providers. This time was spent on discussing the care plan including goal of care, wound cares, discharge/safe placement, and specialty follow up.        Electronically signed by:  Jean-Paul Doe MD                          Sincerely,        Jean-Paul Doe MD

## 2021-02-07 ASSESSMENT — MIFFLIN-ST. JEOR: SCORE: 1403.92

## 2021-02-08 ENCOUNTER — NURSING HOME VISIT (OUTPATIENT)
Dept: GERIATRICS | Facility: CLINIC | Age: 81
End: 2021-02-08
Payer: COMMERCIAL

## 2021-02-08 VITALS
RESPIRATION RATE: 17 BRPM | OXYGEN SATURATION: 98 % | DIASTOLIC BLOOD PRESSURE: 62 MMHG | SYSTOLIC BLOOD PRESSURE: 103 MMHG | BODY MASS INDEX: 22.81 KG/M2 | HEIGHT: 69 IN | HEART RATE: 80 BPM | WEIGHT: 154 LBS | TEMPERATURE: 98 F

## 2021-02-08 DIAGNOSIS — I25.10 PRESENCE OF STENT IN CORONARY ARTERY IN PATIENT WITH CORONARY ARTERY DISEASE: ICD-10-CM

## 2021-02-08 DIAGNOSIS — E11.9 TYPE 2 DIABETES MELLITUS WITHOUT COMPLICATION, WITHOUT LONG-TERM CURRENT USE OF INSULIN (H): ICD-10-CM

## 2021-02-08 DIAGNOSIS — Z98.890 HISTORY OF ESOPHAGOGASTRODUODENOSCOPY: ICD-10-CM

## 2021-02-08 DIAGNOSIS — B37.81 CANDIDAL ESOPHAGITIS (H): ICD-10-CM

## 2021-02-08 DIAGNOSIS — K22.2 ESOPHAGEAL STRICTURE: ICD-10-CM

## 2021-02-08 DIAGNOSIS — R53.81 PHYSICAL DECONDITIONING: ICD-10-CM

## 2021-02-08 DIAGNOSIS — Z95.5 PRESENCE OF STENT IN CORONARY ARTERY IN PATIENT WITH CORONARY ARTERY DISEASE: ICD-10-CM

## 2021-02-08 DIAGNOSIS — C79.51 PROSTATE CANCER METASTATIC TO BONE (H): ICD-10-CM

## 2021-02-08 DIAGNOSIS — R13.19 ESOPHAGEAL DYSPHAGIA: Primary | ICD-10-CM

## 2021-02-08 DIAGNOSIS — C61 PROSTATE CANCER METASTATIC TO BONE (H): ICD-10-CM

## 2021-02-08 DIAGNOSIS — Z86.73 HISTORY OF CEREBROVASCULAR ACCIDENT (CVA) DUE TO EMBOLISM: ICD-10-CM

## 2021-02-08 DIAGNOSIS — I50.22 CHRONIC SYSTOLIC CONGESTIVE HEART FAILURE (H): ICD-10-CM

## 2021-02-08 PROCEDURE — 99309 SBSQ NF CARE MODERATE MDM 30: CPT | Performed by: NURSE PRACTITIONER

## 2021-02-08 RX ORDER — INSULIN GLARGINE 100 [IU]/ML
14 INJECTION, SOLUTION SUBCUTANEOUS DAILY
COMMUNITY
End: 2021-02-25

## 2021-02-08 NOTE — PROGRESS NOTES
[facility]  [facility] Record Number:  [id number]  Place of Service where encounter took place:  [facility] ([facility] [[id number]]  Chief Complaint   Patient presents with     Nursing Home Acute       HPI:    Jose Lees  is a 80 year old (1940), who is being seen today for an episodic care visit.  HPI information obtained from: facility chart records, facility staff, patient report and [facility] chart review. Today's concern is:     Esophageal dysphagia  Esophageal stricture  History of esophagogastroduodenoscopy  Candidal esophagitis (H)  Physical deconditioning  Type 2 diabetes mellitus without complication, without long-term current use of insulin (H)  Presence of stent in coronary artery in patient with coronary artery disease  Chronic systolic congestive heart failure (H)  History of cerebrovascular accident (CVA) due to embolism  Prostate cancer metastatic to bone (H)      Past Medical and Surgical History reviewed in Epic today.    MEDICATIONS:     Current Outpatient Medications   Medication Sig Dispense Refill     insulin aspart (NOVOLOG VIAL) 100 UNITS/ML vial Inject 4 Units Subcutaneous 3 times daily (with meals) Same hold parameters, If accu-checks >90, call NP right away to discuss dose Call NP if held       insulin glargine (LANTUS VIAL) 100 UNIT/ML vial Inject 12 Units Subcutaneous every morning If accu-checks >90, call NP right away to discuss dose.       abiraterone (ZYTIGA) 500 MG TABS tablet Take 1,000 mg by mouth every morning       ACE/ARB/ARNI NOT PRESCRIBED, INTENTIONAL, ACE & ARB not prescribed due to Symptomatic hypotension not due to excessive diuresis       aspirin (ASA) 325 MG tablet Take 1 tablet (325 mg) by mouth daily       atorvastatin (LIPITOR) 40 MG tablet Take 1 tablet by mouth daily 90 tablet 3     Cholecalciferol (VITAMIN D) 50 MCG (2000 UT) CAPS Take 2,000 Units by mouth daily       Continuous Glucose Monitor  "Sup MISC 4 times daily (before meals and nightly) Call NP IF SBP <110 OR HR <60 and call NP.       fluconazole (DIFLUCAN) 200 MG tablet Take 1 tablet (200 mg) by mouth daily for 14 days 14 tablet 0     insulin aspart (NOVOLOG VIAL) 100 UNITS/ML vial Inject Subcutaneous 3 times daily (with meals) Inject as per sliding scale: if 200 - 249 = 1 units; 250 - 299 = 2 units; 300 349 = 3 units; 350 - 399 = 4 units; 400 - 999 = 5 units Notify provider if glucose greater than or equal to 350 mg/dl after administration of dose, subcutaneously before meals for diabete       Leuprolide Acetate, 6 Month, (LUPRON DEPOT, 6-MONTH, IM)        metFORMIN (GLUCOPHAGE) 500 MG tablet TAKE 2 TABLETS TWICE DAILY WITH MEALS 360 tablet 3     metoprolol succinate ER (TOPROL-XL) 25 MG 24 hr tablet TAKE 1 TABLET EVERY DAY (Patient taking differently: TAKE 1 TABLET EVERY DAY  Hold if sbp < 110 or HR < 60 and notify NP) 90 tablet 3     Nutritional Supplements (NUTRITIONAL SUPPLEMENT PO) Start med pass per floor routine.       omeprazole (PRILOSEC) 20 MG DR capsule TAKE 1 CAPSULE (20 MG) BY MOUTH DAILY 90 capsule 3     sertraline (ZOLOFT) 50 MG tablet Take 50 mg by mouth every morning        TODAY DURING EXAM/ROS:  No CP, SOB, Cough, dizziness, fevers, chills, HA, N/V, dysuria or Bowel Abnormalities. Appetite is better.  No pains    Objective:  /62   Pulse 80   Temp 98  F (36.7  C)   Resp 17   Ht 1.753 m (5' 9\")   Wt 69.9 kg (154 lb)   SpO2 98%   BMI 22.74 kg/m    Exam:  GENERAL APPEARANCE:  Alert, in no distress, oriented, cooperative  ENT:  Mouth  Is normal--no thrush on tongue or in throat, normal hearing acuity  EYES:  Conjunctivae, lids, pupils and irises normal  RESP:  respiratory effort   of chest normal, lungs clear to auscultation , no respiratory distress  CV:  Auscultation of heart done , RRR, no murmur, rub, or gallop, no edema, +2 pedal pulses  ABDOMEN:  normal bowel sounds, soft, nontender  M/S:   Gait and station normal " with PT--ROSS equally  SKIN:  Inspection of skin at baseline except LE's have wraps in place--not removed--see PCC pics of wound  NEURO:   Cranial nerves 2-12 are normal tested and grossly at patient's baseline  PSYCH:  oriented X 3, affect and mood normal      Labs:   Recent Labs   Lab Test 02/01/21 01/27/21  0636    139   POTASSIUM 4.4 3.9   CHLORIDE 100 107   CO2 29 25   ANIONGAP 8 7    195*   BUN 8 12   CR 0.87 0.66   TENZIN 9.1 8.6     CBC RESULTS:   Recent Labs   Lab Test 02/01/21   WBC 7.2   RBC 4.29*   HGB 12.8*   HCT 39.5*   MCV 92   MCH 29.8   MCHC 32.4   RDW 13.2              ASSESSMENT / PLAN:  (R13.10) Esophageal dysphagia  (primary encounter diagnosis)  (K22.2) Esophageal stricture  (Z98.890) History of esophagogastroduodenoscopy: Dilatation on 1/27 & 1/28/21  Comment/Plan:  Doing well swallowing fine, will find out when f/U with GI--soon, monitor.    (B37.81) Candidal esophagitis (H)  Comment/Plan: improving greatly, cont the course of  Diflucan and nystatin swish and swallow--good oral care, monitor.    (R53.81) Physical deconditioning  Comment/Plan: PT OT--may need more than [place]--or help at home maulik with meds.   NO LCD yet    (E11.9) Type 2 diabetes mellitus without complication, without long-term current use of insulin (H)  Comment/Plan: some low sugars mostly in the late afternoon or evening.   Will decreased Lantus and the scheduled Novolog, keep on the Metformin,.  Has been off Amaryl since hospital, try to simplify regimen,  ? Restart Amaryl,  Monitor.    CV ISSUES:  (I25.10,  Z95.5) Presence of stent in coronary artery in patient with coronary artery disease  (I50.22) Chronic systolic congestive heart failure (H)  (Z86.73) History of cerebrovascular accident (CVA) due to embolism  Comment/Plan: cont low dose asa, BB,  30 day event monitor(started 1/29/21) for arrhythmia check.  F/U 3/5 with cards.    (C61,  C79.51) Prostate cancer metastatic to bone  (H)  Comment/Plan: cont meds/ Lupron, etc and f/u oncology. Has no pain         Electronically signed by:  SARATH Yi CNP

## 2021-02-10 NOTE — PROGRESS NOTES
[facility]  February 101 2021      CHIEF COMPLAINT:  Episodic visit    HPI:    Jose Lees is a 80 year old  (1940), who is being seen today for an episodic care visit at [facility].     Medical history is notable for coronary artery disease, ischemic cardiomyopathy, hypertension, dyslipidemia, embolic stroke, DM type II, metastatic prostate cancer, depression, autistic disorder, and cognitive impairment.     Summary of hospital course:  Patient was hospitalized at [facility] from January 26 through January 29, 2021 after presenting with nausea, vomiting, and diarrhea and noted to have candidal esophagitis as well as esophageal stricture.  His diabetes was poorly controlled and hemoglobin A1c was 11.4%.  CMP and CBC were unremarkable.  Test for COVID-19 was negative.  CT chest/abdomen/pelvis revealed mildly distended sigmoid colon, 6 mm fat-containing lesion involving the posterior aspect of the esophagus, numerous bilateral pulmonary nodules, worrisome for metastatic disease, diffuse enhancing prostate gland, cholelithiasis, and 1.3 cm partially calcified hypoattenuating focus in the left hepatic lobe, of indeterminate etiology.  EGD was performed on January 27 and 28, 2021 which showed benign-appearing esophageal stenosis which was dilated.  There was also white nummular lesions in the esophageal mucosa suggestive of Candida esophagitis (confirmed on biopsy) as well as gastritis.  Patient was treated with nystatin and fluconazole.  SLP evaluation was done and he was started on dysphagia diet level 2 with thin liquid consistency.  Dietary supplement was recommended for severe malnutrition.  Neurology was consulted for history of embolic stroke as he was previously on warfarin.  They recommended 30-day cardiac event monitor to rule out PAF. Notably echo on Jan 29 showed LVEF of 35-40%, grade 1 or early diastolic dysfunction, and mildly  dilated ascending aorta.       Patient was admitted to this facility for medical management, nursing care, and rehab.    Today's concern is:  I have been asked by NP to see the patient for management of diabetes.  Her blood glucose has been fluctuating in the range of  in TCU.  This am  blood glucose is 182.  I spoke to the patient regarding my plan to simplify his insulin regimen and he was in agreement.  He is overall doing well with rehab.      CODE STATUS:   DNR / DNI    ALLERGIES: Patient has no known allergies.     PAST MEDICAL HISTORY:   Cognitive impairment (SLUMS 23/30 in Jan 2021)  Coronary artery disease, status post BMS to LAD in April 2014  Ischemic cardiomyopathy and grade 1 LV diastolic dysfunction (last LVEF 35-40% on January 29, 2021 by echo)  Hypertension  Dyslipidemia  Bilateral embolic strokes in 2014  DM type II  Metastatic prostate cancer (castrate resistant, initially diagnosed in 2011, currently on leuprolide injection every 6 months)  Esophageal stricture/stenosis, status post dilation  Candidal esophagitis  Mildly dilated ascending aorta  Autistic disorder  Depression  Severe malnutrition      Past Surgical, Family, and Social History were reviewed and updated in [facility].    Current Outpatient Medications   Medication Sig Dispense Refill     abiraterone (ZYTIGA) 500 MG TABS tablet Take 1,000 mg by mouth every morning       ACE/ARB/ARNI NOT PRESCRIBED, INTENTIONAL, ACE & ARB not prescribed due to Symptomatic hypotension not due to excessive diuresis       aspirin (ASA) 325 MG tablet Take 1 tablet (325 mg) by mouth daily       atorvastatin (LIPITOR) 40 MG tablet Take 1 tablet by mouth daily 90 tablet 3     Cholecalciferol (VITAMIN D) 50 MCG (2000 UT) CAPS Take 2,000 Units by mouth daily       Continuous Glucose Monitor Sup MISC 4 times daily (before meals and nightly) Call NP IF SBP <110 OR HR <60 and call NP.       fluconazole (DIFLUCAN) 200 MG tablet Take 1 tablet (200 mg) by mouth daily  for 14 days 14 tablet 0     insulin aspart (NOVOLOG VIAL) 100 UNITS/ML vial Inject 4 Units Subcutaneous 3 times daily (with meals) Same hold parameters, If accu-checks >90, call NP right away to discuss dose Call NP if held       insulin aspart (NOVOLOG VIAL) 100 UNITS/ML vial Inject Subcutaneous 3 times daily (with meals) Inject as per sliding scale: if 200 - 249 = 1 units; 250 - 299 = 2 units; 300 349 = 3 units; 350 - 399 = 4 units; 400 - 999 = 5 units Notify provider if glucose greater than or equal to 350 mg/dl after administration of dose, subcutaneously before meals for diabete       insulin glargine (LANTUS VIAL) 100 UNIT/ML vial Inject 12 Units Subcutaneous every morning If accu-checks >90, call NP right away to discuss dose.       Leuprolide Acetate, 6 Month, (LUPRON DEPOT, 6-MONTH, IM)        metFORMIN (GLUCOPHAGE) 500 MG tablet TAKE 2 TABLETS TWICE DAILY WITH MEALS 360 tablet 3     metoprolol succinate ER (TOPROL-XL) 25 MG 24 hr tablet TAKE 1 TABLET EVERY DAY (Patient taking differently: TAKE 1 TABLET EVERY DAY  Hold if sbp < 110 or HR < 60 and notify NP) 90 tablet 3     Nutritional Supplements (NUTRITIONAL SUPPLEMENT PO) Start med pass per floor routine.       omeprazole (PRILOSEC) 20 MG DR capsule TAKE 1 CAPSULE (20 MG) BY MOUTH DAILY 90 capsule 3     sertraline (ZOLOFT) 50 MG tablet Take 50 mg by mouth every morning       Medications reviewed:  Medications reconciled to facility chart and changes were made to reflect current medications as identified as above med list. Below are the changes that were made:   Medications stopped since last EPIC medication reconciliation:   There are no discontinued medications.  Medications started since last [facility] medication reconciliation:  No orders of the defined types were placed in this encounter.      REVIEW OF SYSTEMS:  10 point ROS were negative other than the symptoms noted above in the HPI.    PHYSICAL EXAM:  Vital signs were reviewed in the chart.  Vital  Signs: Blood pressure 99/67, heart rate 82, respiratory rate 18, temperature 98.2  F, O2 saturation 97%, weight 155.6 LBS  General: Comfortable and in no acute distress  HEENT: Normocephalic; oropharynx clear  Cardiovascular: Normal S1, S2, RRR  Respiratory: Lungs clear to auscultation bilaterally  GI: Abdomen soft, non-tender, non-distended, +BS  Extremities: Trace bilateral LE edema  Neuro: CX II-XII grossly intact; ROM in all four extremities grossly intact  Psych: Alert and oriented; normal affect  Skin: No acute rash    LABORATORY/IMAGING DATA:    Most Recent 3 CBC's:  Recent Labs   Lab Test 02/01/21 01/26/21  1624 05/20/19  1205   WBC 7.2 9.5 6.7   HGB 12.8* 15.1 14.6   MCV 92 90 87.8    193 170     Most Recent 3 BMP's:  Recent Labs   Lab Test 02/01/21 01/27/21  0636 01/26/21  1624    139 138   POTASSIUM 4.4 3.9 3.5   CHLORIDE 100 107 102   CO2 29 25 24   BUN 8 12 16   CR 0.87 0.66 0.87   ANIONGAP 8 7 12   TENZIN 9.1 8.6 9.4    195* 247*       ASSESSMENT/PLAN:  DM type II, poorly controlled.  Last hemoglobin A1c was 11.4% on January 26, 2021.  Prior to admission to the hospital, her diabetes was being managed with glimepiride 2 mg p.o. daily and Metformin 1000 g p.o. twice daily.   Due to poorly controlled diabetes, he was started on insulin in the hospital.  Episodes of hypoglycemia noted in TCU; both Lantus and NovoLog were reduced on February 8.  Patient is approaching discharge and NP has asked me to simplify her diabetic regimen.  Plan:  Continue diabetic diet  Discontinue NovoLog with meals  Change Lantus to 7 units subcu twice daily  Continue Metformin 1000 mg p.o. twice daily  Continue to monitor blood glucose  Further adjust insulin regimen as indicated    Esophageal stenosis, benign-appearing, status post dilation on January 27 and January 28, 2021,  Candidal esophagitis,  Gastritis,  Dysphagia.  Dysphagia has improved.  Plan:  Continue dysphagia diet level 2 with thin liquid  consistency  Continue SLP evaluation and therapy  Continue fluconazole 200 mg p.o. daily through February 13, 2021 as per orders  Continue omeprazole 20 mg p.o. daily     History of embolic stroke in April 2014.  No evidence for atrial fibrillation on previous EKGs.    Plan:  Neurology recommended cardiac event monitor for 30 days (it is to be arranged)  Continue aspirin 325 mg p.o. daily and atorvastatin 40 mg p.o. at bedtime     Coronary artery disease, status post BMS to LAD in April 2014,  Ischemic cardiomyopathy and grade 1 LV diastolic dysfunction (last LVEF 35-40% on January 29, 2021 by echo),  Hypertension,  Dyslipidemia.  Stable.  Plan:  Continue aspirin 325 mg p.o. daily, atorvastatin 40 mg p.o. daily, and metoprolol ER 25 mg p.o. daily  Monitor blood pressure, weight, and volume status     Metastatic prostate cancer.  Castrate resistant, initially diagnosed in 2001, currently on leuprolide injection every 6 months.   Last PSA was 121 on January 2021 compared to 104 on October 28, 2020.  Plan:  Continue Zytiga 1000 mg p.o. daily  Follow-up with Dr. Burgos as previously scheduled     Depression.  Stable  Plan:  Continue sertraline 50 mg p.o. in the morning     Severe protein-calorie malnutrition.   In the setting of acute illness.  Plan:  Continue dietary supplement     Cognitive impairment,  Autistic disorder.  SLUMS 23/30.  Plan:  Staff to assist with daily care and mobility  Cognitive evaluation per therapies     Skin lesions on lower extremities.  Chronic.  Plan:  Follow-up with dermatology at [facility] after TCU stay     Physical deconditioning.  Plan:  Continue PT/OT evaluation and therapy       Orders written by provider at facility:  Discontinue NovoLog 4 units subcu 3 times daily with meals  Increase Lantus to 7 subcu twice daily, hold for blood glucose less than 120    Addendum:  Received a call from RN in the afternoon that the patient has developed stage II pressure injury of his buttock. According to  RN there is no evidence of infection. Order placed for Tegaderm.    Electronically signed by  Jean-Paul Doe MD

## 2021-02-11 ENCOUNTER — NURSING HOME VISIT (OUTPATIENT)
Dept: GERIATRICS | Facility: CLINIC | Age: 81
End: 2021-02-11
Payer: COMMERCIAL

## 2021-02-11 VITALS
TEMPERATURE: 97.8 F | BODY MASS INDEX: 23.05 KG/M2 | HEIGHT: 69 IN | SYSTOLIC BLOOD PRESSURE: 99 MMHG | DIASTOLIC BLOOD PRESSURE: 67 MMHG | WEIGHT: 155.6 LBS | HEART RATE: 82 BPM | OXYGEN SATURATION: 100 % | RESPIRATION RATE: 18 BRPM

## 2021-02-11 DIAGNOSIS — I25.10 CORONARY ARTERY DISEASE INVOLVING NATIVE CORONARY ARTERY OF NATIVE HEART WITHOUT ANGINA PECTORIS: ICD-10-CM

## 2021-02-11 DIAGNOSIS — C61 METASTATIC CASTRATION-RESISTANT ADENOCARCINOMA OF PROSTATE (H): ICD-10-CM

## 2021-02-11 DIAGNOSIS — Z86.73 HISTORY OF CEREBROVASCULAR ACCIDENT (CVA) DUE TO EMBOLISM: ICD-10-CM

## 2021-02-11 DIAGNOSIS — R53.81 PHYSICAL DECONDITIONING: ICD-10-CM

## 2021-02-11 DIAGNOSIS — K29.60 OTHER GASTRITIS WITHOUT HEMORRHAGE, UNSPECIFIED CHRONICITY: ICD-10-CM

## 2021-02-11 DIAGNOSIS — F84.0 AUTISM: ICD-10-CM

## 2021-02-11 DIAGNOSIS — E43 SEVERE PROTEIN-CALORIE MALNUTRITION (H): ICD-10-CM

## 2021-02-11 DIAGNOSIS — E78.5 DYSLIPIDEMIA: ICD-10-CM

## 2021-02-11 DIAGNOSIS — E11.65 POORLY CONTROLLED DIABETES MELLITUS (H): Primary | ICD-10-CM

## 2021-02-11 DIAGNOSIS — K22.2 ESOPHAGEAL STENOSIS: ICD-10-CM

## 2021-02-11 DIAGNOSIS — B37.81 CANDIDAL ESOPHAGITIS (H): ICD-10-CM

## 2021-02-11 DIAGNOSIS — R13.19 ESOPHAGEAL DYSPHAGIA: ICD-10-CM

## 2021-02-11 DIAGNOSIS — R41.89 COGNITIVE IMPAIRMENT: ICD-10-CM

## 2021-02-11 DIAGNOSIS — Z19.2 METASTATIC CASTRATION-RESISTANT ADENOCARCINOMA OF PROSTATE (H): ICD-10-CM

## 2021-02-11 PROCEDURE — 99309 SBSQ NF CARE MODERATE MDM 30: CPT | Performed by: INTERNAL MEDICINE

## 2021-02-11 ASSESSMENT — MIFFLIN-ST. JEOR: SCORE: 1411.18

## 2021-02-11 NOTE — LETTER
2/11/2021        RE: Jose Lees  [id number] [address] [facility] [address]        [facility]  February 101 2021      CHIEF COMPLAINT:  Episodic visit    HPI:    Jose Lees is a 80 year old  (1940), who is being seen today for an episodic care visit at [facility].     Medical history is notable for coronary artery disease, ischemic cardiomyopathy, hypertension, dyslipidemia, embolic stroke, DM type II, metastatic prostate cancer, depression, autistic disorder, and cognitive impairment.     Summary of hospital course:  Patient was hospitalized at [facility] from January 26 through January 29, 2021 after presenting with nausea, vomiting, and diarrhea and noted to have candidal esophagitis as well as esophageal stricture.  His diabetes was poorly controlled and hemoglobin A1c was 11.4%.  CMP and CBC were unremarkable.  Test for COVID-19 was negative.  CT chest/abdomen/pelvis revealed mildly distended sigmoid colon, 6 mm fat-containing lesion involving the posterior aspect of the esophagus, numerous bilateral pulmonary nodules, worrisome for metastatic disease, diffuse enhancing prostate gland, cholelithiasis, and 1.3 cm partially calcified hypoattenuating focus in the left hepatic lobe, of indeterminate etiology.  EGD was performed on January 27 and 28, 2021 which showed benign-appearing esophageal stenosis which was dilated.  There was also white nummular lesions in the esophageal mucosa suggestive of Candida esophagitis (confirmed on biopsy) as well as gastritis.  Patient was treated with nystatin and fluconazole.  SLP evaluation was done and he was started on dysphagia diet level 2 with thin liquid consistency.  Dietary supplement was recommended for severe malnutrition.  Neurology was consulted for history of embolic stroke as he was previously on warfarin.  They recommended 30-day cardiac event monitor to rule out PAF. Notably  echo on Jan 29 showed LVEF of 35-40%, grade 1 or early diastolic dysfunction, and mildly dilated ascending aorta.       Patient was admitted to this facility for medical management, nursing care, and rehab.    Today's concern is:  I have been asked by NP to see the patient for management of diabetes.  Her blood glucose has been fluctuating in the range of  in TCU.  This am  blood glucose is 182.  I spoke to the patient regarding my plan to simplify his insulin regimen and he was in agreement.  He is overall doing well with rehab.      CODE STATUS:   DNR / DNI    ALLERGIES: Patient has no known allergies.     PAST MEDICAL HISTORY:   Cognitive impairment (SLUMS 23/30 in Jan 2021)  Coronary artery disease, status post BMS to LAD in April 2014  Ischemic cardiomyopathy and grade 1 LV diastolic dysfunction (last LVEF 35-40% on January 29, 2021 by echo)  Hypertension  Dyslipidemia  Bilateral embolic strokes in 2014  DM type II  Metastatic prostate cancer (castrate resistant, initially diagnosed in 2011, currently on leuprolide injection every 6 months)  Esophageal stricture/stenosis, status post dilation  Candidal esophagitis  Mildly dilated ascending aorta  Autistic disorder  Depression  Severe malnutrition      Past Surgical, Family, and Social History were reviewed and updated in [facility].    Current Outpatient Medications   Medication Sig Dispense Refill     abiraterone (ZYTIGA) 500 MG TABS tablet Take 1,000 mg by mouth every morning       ACE/ARB/ARNI NOT PRESCRIBED, INTENTIONAL, ACE & ARB not prescribed due to Symptomatic hypotension not due to excessive diuresis       aspirin (ASA) 325 MG tablet Take 1 tablet (325 mg) by mouth daily       atorvastatin (LIPITOR) 40 MG tablet Take 1 tablet by mouth daily 90 tablet 3     Cholecalciferol (VITAMIN D) 50 MCG (2000 UT) CAPS Take 2,000 Units by mouth daily       Continuous Glucose Monitor Sup MISC 4 times daily (before meals and nightly) Call NP IF SBP <110 OR HR <60 and  call NP.       fluconazole (DIFLUCAN) 200 MG tablet Take 1 tablet (200 mg) by mouth daily for 14 days 14 tablet 0     insulin aspart (NOVOLOG VIAL) 100 UNITS/ML vial Inject 4 Units Subcutaneous 3 times daily (with meals) Same hold parameters, If accu-checks >90, call NP right away to discuss dose Call NP if held       insulin aspart (NOVOLOG VIAL) 100 UNITS/ML vial Inject Subcutaneous 3 times daily (with meals) Inject as per sliding scale: if 200 - 249 = 1 units; 250 - 299 = 2 units; 300 349 = 3 units; 350 - 399 = 4 units; 400 - 999 = 5 units Notify provider if glucose greater than or equal to 350 mg/dl after administration of dose, subcutaneously before meals for diabete       insulin glargine (LANTUS VIAL) 100 UNIT/ML vial Inject 12 Units Subcutaneous every morning If accu-checks >90, call NP right away to discuss dose.       Leuprolide Acetate, 6 Month, (LUPRON DEPOT, 6-MONTH, IM)        metFORMIN (GLUCOPHAGE) 500 MG tablet TAKE 2 TABLETS TWICE DAILY WITH MEALS 360 tablet 3     metoprolol succinate ER (TOPROL-XL) 25 MG 24 hr tablet TAKE 1 TABLET EVERY DAY (Patient taking differently: TAKE 1 TABLET EVERY DAY  Hold if sbp < 110 or HR < 60 and notify NP) 90 tablet 3     Nutritional Supplements (NUTRITIONAL SUPPLEMENT PO) Start med pass per floor routine.       omeprazole (PRILOSEC) 20 MG DR capsule TAKE 1 CAPSULE (20 MG) BY MOUTH DAILY 90 capsule 3     sertraline (ZOLOFT) 50 MG tablet Take 50 mg by mouth every morning       Medications reviewed:  Medications reconciled to facility chart and changes were made to reflect current medications as identified as above med list. Below are the changes that were made:   Medications stopped since last EPIC medication reconciliation:   There are no discontinued medications.  Medications started since last [facility] medication reconciliation:  No orders of the defined types were placed in this encounter.      REVIEW OF SYSTEMS:  10 point ROS were negative other than the symptoms  noted above in the HPI.    PHYSICAL EXAM:  Vital signs were reviewed in the chart.  Vital Signs: Blood pressure 99/67, heart rate 82, respiratory rate 18, temperature 98.2  F, O2 saturation 97%, weight 155.6 LBS  General: Comfortable and in no acute distress  HEENT: Normocephalic; oropharynx clear  Cardiovascular: Normal S1, S2, RRR  Respiratory: Lungs clear to auscultation bilaterally  GI: Abdomen soft, non-tender, non-distended, +BS  Extremities: Trace bilateral LE edema  Neuro: CX II-XII grossly intact; ROM in all four extremities grossly intact  Psych: Alert and oriented; normal affect  Skin: No acute rash    LABORATORY/IMAGING DATA:    Most Recent 3 CBC's:  Recent Labs   Lab Test 02/01/21 01/26/21  1624 05/20/19  1205   WBC 7.2 9.5 6.7   HGB 12.8* 15.1 14.6   MCV 92 90 87.8    193 170     Most Recent 3 BMP's:  Recent Labs   Lab Test 02/01/21 01/27/21  0636 01/26/21  1624    139 138   POTASSIUM 4.4 3.9 3.5   CHLORIDE 100 107 102   CO2 29 25 24   BUN 8 12 16   CR 0.87 0.66 0.87   ANIONGAP 8 7 12   TENZIN 9.1 8.6 9.4    195* 247*       ASSESSMENT/PLAN:  DM type II, poorly controlled.  Last hemoglobin A1c was 11.4% on January 26, 2021.  Prior to admission to the hospital, her diabetes was being managed with glimepiride 2 mg p.o. daily and Metformin 1000 g p.o. twice daily.   Due to poorly controlled diabetes, he was started on insulin in the hospital.  Episodes of hypoglycemia noted in TCU; both Lantus and NovoLog were reduced on February 8.  Patient is approaching discharge and NP has asked me to simplify her diabetic regimen.  Plan:  Continue diabetic diet  Discontinue NovoLog with meals  Change Lantus to 7 units subcu twice daily  Continue Metformin 1000 mg p.o. twice daily  Continue to monitor blood glucose  Further adjust insulin regimen as indicated    Esophageal stenosis, benign-appearing, status post dilation on January 27 and January 28, 2021,  Candidal  esophagitis,  Gastritis,  Dysphagia.  Dysphagia has improved.  Plan:  Continue dysphagia diet level 2 with thin liquid consistency  Continue SLP evaluation and therapy  Continue fluconazole 200 mg p.o. daily through February 13, 2021 as per orders  Continue omeprazole 20 mg p.o. daily     History of embolic stroke in April 2014.  No evidence for atrial fibrillation on previous EKGs.    Plan:  Neurology recommended cardiac event monitor for 30 days (it is to be arranged)  Continue aspirin 325 mg p.o. daily and atorvastatin 40 mg p.o. at bedtime     Coronary artery disease, status post BMS to LAD in April 2014,  Ischemic cardiomyopathy and grade 1 LV diastolic dysfunction (last LVEF 35-40% on January 29, 2021 by echo),  Hypertension,  Dyslipidemia.  Stable.  Plan:  Continue aspirin 325 mg p.o. daily, atorvastatin 40 mg p.o. daily, and metoprolol ER 25 mg p.o. daily  Monitor blood pressure, weight, and volume status     Metastatic prostate cancer.  Castrate resistant, initially diagnosed in 2001, currently on leuprolide injection every 6 months.   Last PSA was 121 on January 2021 compared to 104 on October 28, 2020.  Plan:  Continue Zytiga 1000 mg p.o. daily  Follow-up with Dr. Burgos as previously scheduled     Depression.  Stable  Plan:  Continue sertraline 50 mg p.o. in the morning     Severe protein-calorie malnutrition.   In the setting of acute illness.  Plan:  Continue dietary supplement     Cognitive impairment,  Autistic disorder.  SLUMS 23/30.  Plan:  Staff to assist with daily care and mobility  Cognitive evaluation per therapies     Skin lesions on lower extremities.  Chronic.  Plan:  Follow-up with dermatology at [facility] after TCU stay     Physical deconditioning.  Plan:  Continue PT/OT evaluation and therapy       Orders written by provider at facility:  Discontinue NovoLog 4 units subcu 3 times daily with meals  Increase Lantus to 7 subcu twice daily, hold for blood glucose less than  120        Electronically signed by  Jean-Paul Doe MD                          Sincerely,        Jean-Paul Doe MD

## 2021-02-14 ASSESSMENT — MIFFLIN-ST. JEOR: SCORE: 1388.04

## 2021-02-15 ENCOUNTER — NURSING HOME VISIT (OUTPATIENT)
Dept: GERIATRICS | Facility: CLINIC | Age: 81
End: 2021-02-15
Payer: COMMERCIAL

## 2021-02-15 ENCOUNTER — TELEPHONE (OUTPATIENT)
Dept: GERIATRICS | Facility: CLINIC | Age: 81
End: 2021-02-15

## 2021-02-15 VITALS
RESPIRATION RATE: 18 BRPM | BODY MASS INDEX: 22.29 KG/M2 | HEART RATE: 71 BPM | DIASTOLIC BLOOD PRESSURE: 67 MMHG | SYSTOLIC BLOOD PRESSURE: 120 MMHG | TEMPERATURE: 98.6 F | HEIGHT: 69 IN | WEIGHT: 150.5 LBS | OXYGEN SATURATION: 98 %

## 2021-02-15 DIAGNOSIS — F32.0 MILD MAJOR DEPRESSION (H): ICD-10-CM

## 2021-02-15 DIAGNOSIS — Z95.5 PRESENCE OF STENT IN CORONARY ARTERY IN PATIENT WITH CORONARY ARTERY DISEASE: ICD-10-CM

## 2021-02-15 DIAGNOSIS — C79.51 PROSTATE CANCER METASTATIC TO BONE (H): ICD-10-CM

## 2021-02-15 DIAGNOSIS — R13.19 ESOPHAGEAL DYSPHAGIA: Primary | ICD-10-CM

## 2021-02-15 DIAGNOSIS — K22.2 ESOPHAGEAL STRICTURE: ICD-10-CM

## 2021-02-15 DIAGNOSIS — Z98.890 HISTORY OF ESOPHAGOGASTRODUODENOSCOPY: ICD-10-CM

## 2021-02-15 DIAGNOSIS — B37.81 CANDIDAL ESOPHAGITIS (H): ICD-10-CM

## 2021-02-15 DIAGNOSIS — R53.81 PHYSICAL DECONDITIONING: ICD-10-CM

## 2021-02-15 DIAGNOSIS — I25.10 PRESENCE OF STENT IN CORONARY ARTERY IN PATIENT WITH CORONARY ARTERY DISEASE: ICD-10-CM

## 2021-02-15 DIAGNOSIS — Z86.73 HISTORY OF CEREBROVASCULAR ACCIDENT (CVA) DUE TO EMBOLISM: ICD-10-CM

## 2021-02-15 DIAGNOSIS — E11.9 TYPE 2 DIABETES MELLITUS WITHOUT COMPLICATION, WITHOUT LONG-TERM CURRENT USE OF INSULIN (H): ICD-10-CM

## 2021-02-15 DIAGNOSIS — C61 PROSTATE CANCER METASTATIC TO BONE (H): ICD-10-CM

## 2021-02-15 DIAGNOSIS — I50.22 CHRONIC SYSTOLIC CONGESTIVE HEART FAILURE (H): ICD-10-CM

## 2021-02-15 PROCEDURE — 99309 SBSQ NF CARE MODERATE MDM 30: CPT | Performed by: NURSE PRACTITIONER

## 2021-02-15 ASSESSMENT — MIFFLIN-ST. JEOR: SCORE: 1401.2

## 2021-02-15 NOTE — PROGRESS NOTES
[facility]  [facility] Record Number:  [id number]  Place of Service where encounter took place:  [facility] ([facility]) [[id number]]  Chief Complaint   Patient presents with     Nursing Home Acute       HPI:    Jose Lees  is a 80 year old (1940), who is being seen today for an episodic care visit.  HPI information obtained from: {[facility] HPI:886710}. Today's concern is:  {[facility] DX:436221}    Past Medical and Surgical History reviewed in Epic today.    MEDICATIONS:  {Providers Please double check the med list (in the plan section >> meds & orders tab) and Discontinue any of the meds flagged by the TC to be discontinued}  Current Outpatient Medications   Medication Sig Dispense Refill     abiraterone (ZYTIGA) 500 MG TABS tablet Take 1,000 mg by mouth every morning       ACE/ARB/ARNI NOT PRESCRIBED, INTENTIONAL, ACE & ARB not prescribed due to Symptomatic hypotension not due to excessive diuresis       aspirin (ASA) 325 MG tablet Take 1 tablet (325 mg) by mouth daily       atorvastatin (LIPITOR) 40 MG tablet Take 1 tablet by mouth daily 90 tablet 3     Cholecalciferol (VITAMIN D) 50 MCG (2000 UT) CAPS Take 2,000 Units by mouth daily       Continuous Glucose Monitor Sup MISC 4 times daily (before meals and nightly) Call NP IF SBP <110 OR HR <60 and call NP.       insulin aspart (NOVOLOG VIAL) 100 UNITS/ML vial Inject Subcutaneous 3 times daily (with meals) Inject as per sliding scale: if 200 - 249 = 1 units; 250 - 299 = 2 units; 300 349 = 3 units; 350 - 399 = 4 units; 400 - 999 = 5 units Notify provider if glucose greater than or equal to 350 mg/dl after administration of dose, subcutaneously before meals for diabete       insulin glargine (LANTUS VIAL) 100 UNIT/ML vial Inject 7 Units Subcutaneous 2 times daily Hold for BG<120       Leuprolide Acetate, 6 Month, (LUPRON DEPOT, 6-MONTH, IM)        metFORMIN (GLUCOPHAGE) 500 MG tablet TAKE 2 TABLETS TWICE DAILY WITH  "MEALS 360 tablet 3     metoprolol succinate ER (TOPROL-XL) 25 MG 24 hr tablet TAKE 1 TABLET EVERY DAY (Patient taking differently: TAKE 1 TABLET EVERY DAY  Hold if sbp < 110 or HR < 60 and notify NP) 90 tablet 3     Nutritional Supplements (NUTRITIONAL SUPPLEMENT PO) Start med pass per floor routine.       omeprazole (PRILOSEC) 20 MG DR capsule TAKE 1 CAPSULE (20 MG) BY MOUTH DAILY 90 capsule 3     sertraline (ZOLOFT) 50 MG tablet Take 50 mg by mouth every morning       ***    REVIEW OF SYSTEMS:  {ROS [facility]:389775}    Objective:  /67   Pulse 71   Temp 98.6  F (37  C)   Resp 18   Ht 1.753 m (5' 9\")   Wt 68.3 kg (150 lb 8 oz)   SpO2 98%   BMI 22.22 kg/m    Exam:  {Nursing home physical exam :876487}    Labs:   {fgslab:595134}    ASSESSMENT/PLAN:  {[facility] DX:764420}    {fgsorders:158798}  ***    {fgstime1:607476}  Electronically signed by:  Melina Anderson MA ***  {Providers Please double check the med list (in the plan section >> meds & orders tab) and Discontinue any of the meds flagged by the TC to be discontinued}      "

## 2021-02-15 NOTE — PROGRESS NOTES
"[place] [facility]  [place] Medical Record Number:  [id number]  Place of Service where encounter took place:  [facility] ([facility]) [[id number]]  Chief Complaint   Patient presents with     Nursing Home Acute     HPI:    Jose Lees  is a 80 year old (1940), who is being seen today for an episodic care visit.  HPI information obtained from: facility chart records, facility staff, patient report and [facility] chart review.     This is a 80-year-old male, with a past medical history significant for coronary artery disease, ischemic cardiomyopathy, hypertension, hyperlipidemia, embolic stroke, type 2 diabetes mellitus, metastatic prostate cancer, depression, autistic disorder and cognitive impairment, who was admitted to [facility] 1/26/21 through 1/29/21 with nausea, vomiting and diarrhea. Noted to have candidal esophagitis as well as esophageal stricture. A1C 11.4 on 1/26/21. An abdominal, chest and pelvic CT revealed \" 1. Sigmoid colon appears to be mildly distended and displaced into the upper mid abdomen/left upper quadrant, however with no associated mesenteric twisting or bowel obstruction. Finding is nonspecific, but might predispose patient into sigmoid volvulus. 2. 6 mm fat attenuating lesion abutting the posterior aspect of the esophagus could represent small food residue versus small submucosal lipoma.. Numerous bilateral pulmonary nodules.. The prostate gland.. appears diffusely enhancing, nonspecific. Recommend correlation with serum prostate-specific antigen.. Cholelithiasis.. 1.3 partially calcified hypoattenuating focus in the left hepatic lobe, of indeterminate etiology\". An EGD on 1/27/21 revealed a benign-appearing esophageal stenosis. Dilated. On 1/28/21 revealed gastritis, congested duodenal mucosa and white nummular lesions in the esophageal mucosa. Biopsied and found to be Candida esophagitis. Nystatin and Fluconazole were " initiated. Speech Therapy was consulted and recommended dysphagia diet level 2 with thin liquid consistency. Neurology was consulted due to history of embolic stroke and recommended 30-day cardiac event monitor to rule out PAF as patient is no longer on Warfarin. An echocardiogram on 1/29/21 revealed an EF 35-40% with grade I or early diastolic dysfunction and mildly dilated ascending aorta. A TCU stay was recommended for ongoing physical rehabilitation.     Today's concern is:    Esophageal Stenosis S/P Dilation on 1/27/21 and 1/28/21, Gastritis, Candidal Esophagitis, and Dysphagia.  No issues noted during today's visit. Following today's visit, staff report white patches remain in patient's mouth.     Type 2 Diabetes Mellitus. Per staff report, patient is tentatively planning to discharge back to his AL later this week. Staff at facility are unable to administer insulin. Questioning if regimen can be simplified. Upon review of blood sugars over the past 5 days, range is as follows:    Breakfast: 103-211, most < 200  Lunch:   Dinner: , most < 200  Bedtime: 107-218, most < 200    Coronary Artery Disease S/P BMS to LAD in April 2014, Ischemic Cardiomyopathy, Grade I LV Diastolic Dysfunction with EF 35-40% on 1/29/21, Hypertension and Hyperlipidemia. Upon review of blood pressures over the past 5 days, systolic range from . Diastolic range from 59-72. Weight 156 lbs on 1/29/21 -> 153.4 lbs on 2/15/21.     Right Gluteus Wound. Following today's visit, staff is requesting daily dressing change be changed to every 3 days due to anticipated discharge.    Past Medical and Surgical History reviewed in Epic today.    MEDICATIONS:  Current Outpatient Medications   Medication Sig Dispense Refill     abiraterone (ZYTIGA) 500 MG TABS tablet Take 1,000 mg by mouth every morning       ACE/ARB/ARNI NOT PRESCRIBED, INTENTIONAL, ACE & ARB not prescribed due to Symptomatic hypotension not due to excessive diuresis    "    aspirin (ASA) 325 MG tablet Take 1 tablet (325 mg) by mouth daily       atorvastatin (LIPITOR) 40 MG tablet Take 1 tablet by mouth daily 90 tablet 3     Cholecalciferol (VITAMIN D) 50 MCG (2000 UT) CAPS Take 2,000 Units by mouth daily       Continuous Glucose Monitor Sup MISC 4 times daily (before meals and nightly) Call NP IF SBP <110 OR HR <60 and call NP.       insulin aspart (NOVOLOG VIAL) 100 UNITS/ML vial Inject Subcutaneous 3 times daily (with meals) Inject as per sliding scale: if 200 - 249 = 1 units; 250 - 299 = 2 units; 300 349 = 3 units; 350 - 399 = 4 units; 400 - 999 = 5 units Notify provider if glucose greater than or equal to 350 mg/dl after administration of dose, subcutaneously before meals for diabete       insulin glargine (LANTUS VIAL) 100 UNIT/ML vial Inject 7 Units Subcutaneous 2 times daily Hold for BG<120       Leuprolide Acetate, 6 Month, (LUPRON DEPOT, 6-MONTH, IM)        metFORMIN (GLUCOPHAGE) 500 MG tablet TAKE 2 TABLETS TWICE DAILY WITH MEALS 360 tablet 3     metoprolol succinate ER (TOPROL-XL) 25 MG 24 hr tablet TAKE 1 TABLET EVERY DAY (Patient taking differently: TAKE 1 TABLET EVERY DAY  Hold if sbp < 110 or HR < 60 and notify NP) 90 tablet 3     Nutritional Supplements (NUTRITIONAL SUPPLEMENT PO) Start med pass per floor routine.       omeprazole (PRILOSEC) 20 MG DR capsule TAKE 1 CAPSULE (20 MG) BY MOUTH DAILY 90 capsule 3     sertraline (ZOLOFT) 50 MG tablet Take 50 mg by mouth every morning       REVIEW OF SYSTEMS:  4 point ROS including Respiratory, CV, GI and , other than that noted in the HPI,  is negative    Objective:  /67   Pulse 71   Temp 98.6  F (37  C)   Resp 18   Ht 1.753 m (5' 9\")   Wt 68.3 kg (150 lb 8 oz)   SpO2 98%   BMI 22.22 kg/m    Exam:  GENERAL APPEARANCE:  Alert, in no distress  ENT:  Mouth and posterior oropharynx normal, moist mucous membranes  EYES:  EOM, conjunctivae, lids, pupils and irises normal  RESP:  respiratory effort and palpation " of chest normal, lungs clear to auscultation , no respiratory distress  CV:  Palpation and auscultation of heart done , regular rate and rhythm, no murmur, rub, or gallop  ABDOMEN:  normal bowel sounds, soft, nontender, no hepatosplenomegaly or other masses  M/S:   Active movement of bilateral upper and lower extremities.  SKIN:  Inspection of skin and subcutaneous tissue baseline, Palpation of skin and subcutaneous tissue baseline  NEURO:   Cranial nerves 2-12 are normal tested and grossly at patient's baseline  PSYCH:  oriented to person    Labs:   Labs done in SNF are in [facility]. Please refer to them using [employer] Everywhere.    ASSESSMENT/PLAN:  Esophageal Stenosis S/P Dilation on 1/27/21 and 1/28/21, Gastritis, Candidal Esophagitis, and Dysphagia. Given staff reports white patches are present in mouth, will extend Nystatin and Fluconazole an additional 7 days. Continue Omeprazole as ordered. On a Consistent Carbohydrate diet, NDD2- Mechanically Altered texture, Thin Liquids consistency     Type 2 Diabetes Mellitus. Last A1C 11.4 on 1/26/21. Will change Lantus from 7 U BID to 14 U subcutaneous every day as patient is returning to AL and they can't do injections. Will continue sliding scale insulin for now until patient discharges from TCU. Staff to educate patient on insulin injections. If unable to complete, will need to find assistance for injections upon TCU discharge as diabetes is uncontrolled and injections are necessary.    Coronary Artery Disease S/P BMS to LAD in April 2014, Ischemic Cardiomyopathy, Grade I LV Diastolic Dysfunction with EF 35-40% on 1/29/21, Hypertension and Hyperlipidemia. Follow-up with Cardiology on 3/5/21 regarding cardiac monitor noted below. Blood pressures < 140/90. Weights stable. Monitor blood pressure and weights daily. Taking Aspirin, Atorvastatin and Metoprolol.    History of Embolic Stroke in April 2014. No evidence for atrial fibrillation on previous EKGs.  Neurology recommended cardiac event monitor for 30 days following hospital discharge which started 1/29/21. Continue Aspirin and Atorvastatin as ordered.    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 Followed by Dr. Burgos. Receives Leuprolide injection every 6 months. Continue Zytiga as ordered.    Mild Major Depression. Continue Sertraline as ordered.    Severe Malnutrition. In the setting of acute illness. Dietary following in TCU. Taking supplement.    Cognitive Impairment and Autistic Disorder. SLUMS Score 23/30. Patient completed the LACLS with a score of 3.8/5.8. Score indicates patient requires 24 hour supervision for safety in the environment due to decreased problem solving skills and deficits in safety awareness. Score may be impacted by patient's diagnosis of Autism. Tentative plan is for patient to discharge back to AL later this week.    Skin Lesions on Lower Extremities with Right Gluteus Wound. Follow-up with Dermatology following TCU stay. Ok to change dressing from daily to every 3 days per staff request - cleanse with wound cleanser. Change foam dressing every 3 days. BLE wounds: change Q3D Cleanse with VASHE by letting Vashe moistened gauze sit on the wounds x 5 minutes. Don't rinse off Apply Medihoney to Mepilex lite and apply to wound beds. Apply sween cream 24 to intact skin. Apply Dermawear-DO NOT throw away-wash soiled edemawear. Wash in really hot water with laundry soap or baby shampoo, may need to do several times. Rinse well Hang dry     Physical Deconditioning. Secondary to recent hospitalization. Physical and Occupational Therapy ordered. Tentative discharge scheduled for later this week.     Orders written by provider at facility  Extend Fluconazole 200 mg PO every day x 7 days  Extend Nystatin Swish and Swallow QID x 7 days  Change Glargine from 7 U BID to 14 U every day  Ok for right gluteal wound dressing to be changed every 3 days    Electronically signed  by:  SARATH Barrera CNP

## 2021-02-15 NOTE — LETTER
"    2/15/2021        RE: Jose Lees  [id number] [address]        [place] [facility]  [place] Medical Record Number:  [id number]  Place of Service where encounter took place:  [facility] ([facility]) [[id number]]  Chief Complaint   Patient presents with     Nursing Home Acute     HPI:    Jose Lees  is a 80 year old (1940), who is being seen today for an episodic care visit.  HPI information obtained from: facility chart records, facility staff, patient report and [facility] chart review.     This is a 80-year-old male, with a past medical history significant for coronary artery disease, ischemic cardiomyopathy, hypertension, hyperlipidemia, embolic stroke, type 2 diabetes mellitus, metastatic prostate cancer, depression, autistic disorder and cognitive impairment, who was admitted to [facility] 1/26/21 through 1/29/21 with nausea, vomiting and diarrhea. Noted to have candidal esophagitis as well as esophageal stricture. A1C 11.4 on 1/26/21. An abdominal, chest and pelvic CT revealed \" 1. Sigmoid colon appears to be mildly distended and displaced into the upper mid abdomen/left upper quadrant, however with no associated mesenteric twisting or bowel obstruction. Finding is nonspecific, but might predispose patient into sigmoid volvulus. 2. 6 mm fat attenuating lesion abutting the posterior aspect of the esophagus could represent small food residue versus small submucosal lipoma.. Numerous bilateral pulmonary nodules.. The prostate gland.. appears diffusely enhancing, nonspecific. Recommend correlation with serum prostate-specific antigen.. Cholelithiasis.. 1.3 partially calcified hypoattenuating focus in the left hepatic lobe, of indeterminate etiology\". An EGD on 1/27/21 revealed a benign-appearing esophageal stenosis. Dilated. On 1/28/21 revealed gastritis, congested duodenal mucosa and white nummular lesions in the " esophageal mucosa. Biopsied and found to be Candida esophagitis. Nystatin and Fluconazole were initiated. Speech Therapy was consulted and recommended dysphagia diet level 2 with thin liquid consistency. Neurology was consulted due to history of embolic stroke and recommended 30-day cardiac event monitor to rule out PAF as patient is no longer on Warfarin. An echocardiogram on 1/29/21 revealed an EF 35-40% with grade I or early diastolic dysfunction and mildly dilated ascending aorta. A TCU stay was recommended for ongoing physical rehabilitation.     Today's concern is:    Esophageal Stenosis S/P Dilation on 1/27/21 and 1/28/21, Gastritis, Candidal Esophagitis, and Dysphagia.  No issues noted during today's visit. Following today's visit, staff report white patches remain in patient's mouth.     Type 2 Diabetes Mellitus. Per staff report, patient is tentatively planning to discharge back to his AL later this week. Staff at facility are unable to administer insulin. Questioning if regimen can be simplified. Upon review of blood sugars over the past 5 days, range is as follows:    Breakfast: 103-211, most < 200  Lunch:   Dinner: , most < 200  Bedtime: 107-218, most < 200    Coronary Artery Disease S/P BMS to LAD in April 2014, Ischemic Cardiomyopathy, Grade I LV Diastolic Dysfunction with EF 35-40% on 1/29/21, Hypertension and Hyperlipidemia. Upon review of blood pressures over the past 5 days, systolic range from . Diastolic range from 59-72. Weight 156 lbs on 1/29/21 -> 153.4 lbs on 2/15/21.     Right Gluteus Wound. Following today's visit, staff is requesting daily dressing change be changed to every 3 days due to anticipated discharge.    Past Medical and Surgical History reviewed in Epic today.    MEDICATIONS:  Current Outpatient Medications   Medication Sig Dispense Refill     abiraterone (ZYTIGA) 500 MG TABS tablet Take 1,000 mg by mouth every morning       ACE/ARB/ARNI NOT PRESCRIBED,  "INTENTIONAL, ACE & ARB not prescribed due to Symptomatic hypotension not due to excessive diuresis       aspirin (ASA) 325 MG tablet Take 1 tablet (325 mg) by mouth daily       atorvastatin (LIPITOR) 40 MG tablet Take 1 tablet by mouth daily 90 tablet 3     Cholecalciferol (VITAMIN D) 50 MCG (2000 UT) CAPS Take 2,000 Units by mouth daily       Continuous Glucose Monitor Sup MISC 4 times daily (before meals and nightly) Call NP IF SBP <110 OR HR <60 and call NP.       insulin aspart (NOVOLOG VIAL) 100 UNITS/ML vial Inject Subcutaneous 3 times daily (with meals) Inject as per sliding scale: if 200 - 249 = 1 units; 250 - 299 = 2 units; 300 349 = 3 units; 350 - 399 = 4 units; 400 - 999 = 5 units Notify provider if glucose greater than or equal to 350 mg/dl after administration of dose, subcutaneously before meals for diabete       insulin glargine (LANTUS VIAL) 100 UNIT/ML vial Inject 7 Units Subcutaneous 2 times daily Hold for BG<120       Leuprolide Acetate, 6 Month, (LUPRON DEPOT, 6-MONTH, IM)        metFORMIN (GLUCOPHAGE) 500 MG tablet TAKE 2 TABLETS TWICE DAILY WITH MEALS 360 tablet 3     metoprolol succinate ER (TOPROL-XL) 25 MG 24 hr tablet TAKE 1 TABLET EVERY DAY (Patient taking differently: TAKE 1 TABLET EVERY DAY  Hold if sbp < 110 or HR < 60 and notify NP) 90 tablet 3     Nutritional Supplements (NUTRITIONAL SUPPLEMENT PO) Start med pass per floor routine.       omeprazole (PRILOSEC) 20 MG DR capsule TAKE 1 CAPSULE (20 MG) BY MOUTH DAILY 90 capsule 3     sertraline (ZOLOFT) 50 MG tablet Take 50 mg by mouth every morning       REVIEW OF SYSTEMS:  4 point ROS including Respiratory, CV, GI and , other than that noted in the HPI,  is negative    Objective:  /67   Pulse 71   Temp 98.6  F (37  C)   Resp 18   Ht 1.753 m (5' 9\")   Wt 68.3 kg (150 lb 8 oz)   SpO2 98%   BMI 22.22 kg/m    Exam:  GENERAL APPEARANCE:  Alert, in no distress  ENT:  Mouth and posterior oropharynx normal, moist mucous " membranes  EYES:  EOM, conjunctivae, lids, pupils and irises normal  RESP:  respiratory effort and palpation of chest normal, lungs clear to auscultation , no respiratory distress  CV:  Palpation and auscultation of heart done , regular rate and rhythm, no murmur, rub, or gallop  ABDOMEN:  normal bowel sounds, soft, nontender, no hepatosplenomegaly or other masses  M/S:   Active movement of bilateral upper and lower extremities.  SKIN:  Inspection of skin and subcutaneous tissue baseline, Palpation of skin and subcutaneous tissue baseline  NEURO:   Cranial nerves 2-12 are normal tested and grossly at patient's baseline  PSYCH:  oriented to person    Labs:   Labs done in SNF are in [facility]. Please refer to them using [employer] Everywhere.    ASSESSMENT/PLAN:  Esophageal Stenosis S/P Dilation on 1/27/21 and 1/28/21, Gastritis, Candidal Esophagitis, and Dysphagia. Given staff reports white patches are present in mouth, will extend Nystatin and Fluconazole an additional 7 days. Continue Omeprazole as ordered. On a Consistent Carbohydrate diet, NDD2- Mechanically Altered texture, Thin Liquids consistency     Type 2 Diabetes Mellitus. Last A1C 11.4 on 1/26/21. Will change Lantus from 7 U BID to 14 U subcutaneous every day as patient is returning to AL and they can't do injections. Will continue sliding scale insulin for now until patient discharges from TCU. Staff to educate patient on insulin injections. If unable to complete, will need to find assistance for injections upon TCU discharge as diabetes is uncontrolled and injections are necessary.    Coronary Artery Disease S/P BMS to LAD in April 2014, Ischemic Cardiomyopathy, Grade I LV Diastolic Dysfunction with EF 35-40% on 1/29/21, Hypertension and Hyperlipidemia. Follow-up with Cardiology on 3/5/21 regarding cardiac monitor noted below. Blood pressures < 140/90. Weights stable. Monitor blood pressure and weights daily. Taking Aspirin, Atorvastatin and  Metoprolol.    History of Embolic Stroke in April 2014. No evidence for atrial fibrillation on previous EKGs. Neurology recommended cardiac event monitor for 30 days following hospital discharge which started 1/29/21. Continue Aspirin and Atorvastatin as ordered.    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 Followed by Dr. Burgos. Receives Leuprolide injection every 6 months. Continue Zytiga as ordered.    Mild Major Depression. Continue Sertraline as ordered.    Severe Malnutrition. In the setting of acute illness. Dietary following in TCU. Taking supplement.    Cognitive Impairment and Autistic Disorder. SLUMS Score 23/30. Patient completed the LACLS with a score of 3.8/5.8. Score indicates patient requires 24 hour supervision for safety in the environment due to decreased problem solving skills and deficits in safety awareness. Score may be impacted by patient's diagnosis of Autism. Tentative plan is for patient to discharge back to AL later this week.    Skin Lesions on Lower Extremities with Right Gluteus Wound. Follow-up with Dermatology following TCU stay. Ok to change dressing from daily to every 3 days per staff request - cleanse with wound cleanser. Change foam dressing every 3 days. BLE wounds: change Q3D Cleanse with VASHE by letting Vashe moistened gauze sit on the wounds x 5 minutes. Don't rinse off Apply Medihoney to Mepilex lite and apply to wound beds. Apply sween cream 24 to intact skin. Apply Dermawear-DO NOT throw away-wash soiled edemawear. Wash in really hot water with laundry soap or baby shampoo, may need to do several times. Rinse well Hang dry     Physical Deconditioning. Secondary to recent hospitalization. Physical and Occupational Therapy ordered. Tentative discharge scheduled for later this week.     Orders written by provider at facility  Extend Fluconazole 200 mg PO every day x 7 days  Extend Nystatin Swish and Swallow QID x 7 days  Change Glargine from 7 U  BID to 14 U every day  Ok for right gluteal wound dressing to be changed every 3 days    Electronically signed by:  SARATH Barrera CNP             Sincerely,        SARATH Barrera CNP

## 2021-02-16 ENCOUNTER — TELEPHONE (OUTPATIENT)
Dept: INTERNAL MEDICINE | Facility: CLINIC | Age: 81
End: 2021-02-16

## 2021-02-16 RX ORDER — NYSTATIN 100000/ML
500000 SUSPENSION, ORAL (FINAL DOSE FORM) ORAL 4 TIMES DAILY
COMMUNITY
Start: 2021-02-15 | End: 2021-02-22

## 2021-02-16 RX ORDER — FLUCONAZOLE 200 MG/1
200 TABLET ORAL DAILY
COMMUNITY
Start: 2021-02-15 | End: 2021-02-22

## 2021-02-16 NOTE — TELEPHONE ENCOUNTER
[facility] Nurse Triage Telephone Note    Provider:  Laury Helm NP    Facility: [facility] ([facility]) [[id number]]    Caller: Ro  Call Back Number: [phone]    Allergies:  No Known Allergies     Reason for call: Request to change wound cares from every day to every 3 days as is discharging soon.    Verbal Order/Direction given by Provider: Ok change q 3rd day.    Provider giving Order:  Laury Helm NP      Verbal Order given to: Ro Barrios RN

## 2021-02-16 NOTE — TELEPHONE ENCOUNTER
[facility] calling sent paperwork for physician orders for assisted living med management toileting, dressing since when coming back from TCU needs more than independent living . Orders need to be received prior to assisted living helping pt . Currently family is helping.Erika Bell RN

## 2021-02-17 ENCOUNTER — DISCHARGE SUMMARY NURSING HOME (OUTPATIENT)
Dept: GERIATRICS | Facility: CLINIC | Age: 81
End: 2021-02-17
Payer: COMMERCIAL

## 2021-02-17 VITALS
SYSTOLIC BLOOD PRESSURE: 112 MMHG | HEART RATE: 80 BPM | DIASTOLIC BLOOD PRESSURE: 65 MMHG | OXYGEN SATURATION: 98 % | HEIGHT: 69 IN | TEMPERATURE: 97.9 F | BODY MASS INDEX: 22.72 KG/M2 | RESPIRATION RATE: 18 BRPM | WEIGHT: 153.4 LBS

## 2021-02-17 DIAGNOSIS — I25.10 PRESENCE OF STENT IN CORONARY ARTERY IN PATIENT WITH CORONARY ARTERY DISEASE: ICD-10-CM

## 2021-02-17 DIAGNOSIS — E11.9 TYPE 2 DIABETES MELLITUS WITHOUT COMPLICATION, WITHOUT LONG-TERM CURRENT USE OF INSULIN (H): ICD-10-CM

## 2021-02-17 DIAGNOSIS — B37.81 CANDIDAL ESOPHAGITIS (H): ICD-10-CM

## 2021-02-17 DIAGNOSIS — R53.81 PHYSICAL DECONDITIONING: ICD-10-CM

## 2021-02-17 DIAGNOSIS — L98.9 SKIN LESION: ICD-10-CM

## 2021-02-17 DIAGNOSIS — K22.2 ESOPHAGEAL STRICTURE: ICD-10-CM

## 2021-02-17 DIAGNOSIS — Z86.73 HISTORY OF CEREBROVASCULAR ACCIDENT (CVA) DUE TO EMBOLISM: ICD-10-CM

## 2021-02-17 DIAGNOSIS — R13.19 ESOPHAGEAL DYSPHAGIA: Primary | ICD-10-CM

## 2021-02-17 DIAGNOSIS — I50.22 CHRONIC SYSTOLIC CONGESTIVE HEART FAILURE (H): ICD-10-CM

## 2021-02-17 DIAGNOSIS — C61 PROSTATE CANCER METASTATIC TO BONE (H): ICD-10-CM

## 2021-02-17 DIAGNOSIS — F84.0 AUTISM: ICD-10-CM

## 2021-02-17 DIAGNOSIS — Z98.890 HISTORY OF ESOPHAGOGASTRODUODENOSCOPY: ICD-10-CM

## 2021-02-17 DIAGNOSIS — R41.89 COGNITIVE IMPAIRMENT: ICD-10-CM

## 2021-02-17 DIAGNOSIS — Z95.5 PRESENCE OF STENT IN CORONARY ARTERY IN PATIENT WITH CORONARY ARTERY DISEASE: ICD-10-CM

## 2021-02-17 DIAGNOSIS — C79.51 PROSTATE CANCER METASTATIC TO BONE (H): ICD-10-CM

## 2021-02-17 PROCEDURE — 99316 NF DSCHRG MGMT 30 MIN+: CPT | Performed by: NURSE PRACTITIONER

## 2021-02-17 NOTE — LETTER
"    2/17/2021        RE: Jose Lees  [address] [facility] [address]        [facility] DISCHARGE SUMMARY  PATIENT'S NAME: Jose Lees  YOB: 1940  MEDICAL RECORD NUMBER:  [id number]  Place of Service where encounter took place:  [facility] ([facility]) [[id number]]    PRIMARY CARE PROVIDER AND CLINIC RESPONSIBLE AFTER TRANSFER:   Nolberto Ferrari MD, [address] [facility] [address]    [facility] Provider     Transferring providers: SARATH Barrera CNP, Jean-Paul Doe MD  Recent Hospitalization/ED:  Hospital  [facility] stay 1/26/21 to 1/29/21.  Date of SNF Admission: January/29/2021  Date of SNF (anticipated) Discharge: February/18/2021  Discharged to: previous independent home initially going to his sister's house for the weekend and will return [facility] on Sunday or Monday  Cognitive Scores: SLUMS: 24/30, Safety Questionnaire: 26/27/20, ACL: 3.8/5.8 and Meds 2./7  Physical Function: Supervision-to-modified independence with toilet hygiene. Set-up for lower and upper body dressing. Minimal assistance-to-modified independence with bed mobility. Modified independence with transfers. Ambulating 65 feet with 4WW and [id number]    CODE STATUS/ADVANCE DIRECTIVES DISCUSSION:  Full Code   ALLERGIES: Patient has no known allergies.    DISCHARGE DIAGNOSIS/NURSING FACILITY COURSE:   This is a 80-year-old male, with a past medical history significant for coronary artery disease, ischemic cardiomyopathy, hypertension, hyperlipidemia, embolic stroke, type 2 diabetes mellitus, metastatic prostate cancer, depression, autistic disorder and cognitive impairment, who was admitted to [facility] 1/26/21 through 1/29/21 with nausea, vomiting and diarrhea. Noted to have candidal esophagitis as well as esophageal stricture. A1C 11.4 on 1/26/21. An abdominal, chest and pelvic CT revealed \" 1. Sigmoid " "colon appears to be mildly distended and displaced into the upper mid abdomen/left upper quadrant, however with no associated mesenteric twisting or bowel obstruction. Finding is nonspecific, but might predispose patient into sigmoid volvulus. 2. 6 mm fat attenuating lesion abutting the posterior aspect of the esophagus could represent small food residue versus small submucosal lipoma.. Numerous bilateral pulmonary nodules.. The prostate gland.. appears diffusely enhancing, nonspecific. Recommend correlation with serum prostate-specific antigen.. Cholelithiasis.. 1.3 partially calcified hypoattenuating focus in the left hepatic lobe, of indeterminate etiology\". An EGD on 1/27/21 revealed a benign-appearing esophageal stenosis. Dilated. On 1/28/21 revealed gastritis, congested duodenal mucosa and white nummular lesions in the esophageal mucosa. Biopsied and found to be Candida esophagitis. Nystatin and Fluconazole were initiated. Speech Therapy was consulted and recommended dysphagia diet level 2 with thin liquid consistency. Neurology was consulted due to history of embolic stroke and recommended 30-day cardiac event monitor to rule out PAF as patient is no longer on Warfarin. An echocardiogram on 1/29/21 revealed an EF 35-40% with grade I or early diastolic dysfunction and mildly dilated ascending aorta. A TCU stay was recommended for ongoing physical rehabilitation.     Esophageal Stenosis S/P Dilation on 1/27/21 and 1/28/21, Gastritis, Candidal Esophagitis, and Dysphagia. Nystatin and Fluconazole extended for an additional 7 days until 2/23/21. Continue Omeprazole as ordered. On a Consistent Carbohydrate diet, NDD2- Mechanically Altered texture, Thin Liquids consistency      Type 2 Diabetes Mellitus. Last A1C 11.4 on 1/26/21. Spoke to sister, Judith. Reports patient has a glucometer at home with strips. Staff at [facility] will be checking blood sugars and administering insulin. Continue Glargine as ordered. Blood sugars " over the past 5 days have been as follows:    Breakfast: 103-230, most < 200  Lunch:   Dinner: , most < 200  Bedtime: 143-230, most < 200     Coronary Artery Disease S/P BMS to LAD in April 2014, Ischemic Cardiomyopathy, Grade I LV Diastolic Dysfunction with EF 35-40% on 1/29/21, Hypertension and Hyperlipidemia. Follow-up with Cardiology on 3/5/21 regarding cardiac monitor noted below. Upon review of blood pressures over the past 5 days, systolic range from 104-120. Diastolic range from 58-72.  Weights 156 on 1/29/21 -> 150.8 lbs on 2/17/21. Taking Aspirin, Atorvastatin and Metoprolol.     History of Embolic Stroke in April 2014. No evidence for atrial fibrillation on previous EKGs. Neurology recommended cardiac event monitor for 30 days following hospital discharge which started 1/29/21. Sister instructed on when to discontinue event monitor and return envelope for Cardiology to review. Continue Aspirin and Atorvastatin as ordered.     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 Followed by Dr. Burgos. Receives Leuprolide injection every 6 months. Continue Zytiga as ordered.     Mild Major Depression. Continue Sertraline as ordered.     Cognitive Impairment and Autistic Disorder. SLUMS Score 23/30. Patient completed the LACLS with a score of 3.8/5.8. Score indicates patient requires 24 hour supervision for safety in the environment due to decreased problem solving skills and deficits in safety awareness. Score may be impacted by patient's diagnosis of Autism. Will discharge to [facility] with increased services and home health care.     Skin Lesions on Lower Extremities with Right Gluteus Wound. Follow-up with Dermatology following TCU stay. Ok to change dressing from daily to every 3 days per staff request - cleanse with wound cleanser. Change foam dressing every 3 days. BLE wounds: change Q3D Cleanse with VASHE by letting Vashe moistened gauze sit on the wounds x 5 minutes. Don't rinse  off Apply Medihoney to Mepilex lite and apply to wound beds. Apply sween cream 24 to intact skin. Apply Dermawear-DO NOT throw away-wash soiled edemawear. Wash in really hot water with laundry soap or baby shampoo, may need to do several times. Rinse well Hang dry      Physical Deconditioning. Secondary to recent hospitalization. Home Physical and Occupational Therapy ordered.     Past Medical History:  has a past medical history of ACP (advance care planning), Acute anterior wall MI (H) (4/7/2014), Autism disorder (6/29/2012), CHF (congestive heart failure) (H), Coronary artery disease (4/2014), Dementia (H), Hyperlipidaemia, Longstanding persistent atrial fibrillation (H) (1/20/2020), Mild major depression (H) (1/20/2020), Prostate cancer (H) (10/11), Stricture and stenosis of esophagus (10/22/2015), Stroke, embolic (H) (4/7/2014), and Type 2 diabetes mellitus without complication, without long-term current use of insulin (H) (7/25/2017).    Discharge Medications:  Current Outpatient Medications   Medication Sig Dispense Refill     abiraterone (ZYTIGA) 500 MG TABS tablet Take 1,000 mg by mouth every morning       aspirin (ASA) 325 MG tablet Take 1 tablet (325 mg) by mouth daily       atorvastatin (LIPITOR) 40 MG tablet Take 1 tablet by mouth daily 90 tablet 3     Cholecalciferol (VITAMIN D) 50 MCG (2000 UT) CAPS Take 2,000 Units by mouth daily       fluconazole (DIFLUCAN) 200 MG tablet Take 200 mg by mouth daily       insulin glargine (LANTUS VIAL) 100 UNIT/ML vial Inject 14 Units Subcutaneous daily Hold for BG<120       metFORMIN (GLUCOPHAGE) 500 MG tablet TAKE 2 TABLETS TWICE DAILY WITH MEALS 360 tablet 3     metoprolol succinate ER (TOPROL-XL) 25 MG 24 hr tablet TAKE 1 TABLET EVERY DAY (Patient taking differently: TAKE 1 TABLET EVERY DAY  Hold if sbp < 110 or HR < 60 and notify NP) 90 tablet 3     nystatin (MYCOSTATIN) 815578 UNIT/ML suspension Take 500,000 Units by mouth 4 times daily       omeprazole  "(PRILOSEC) 20 MG DR capsule TAKE 1 CAPSULE (20 MG) BY MOUTH DAILY 90 capsule 3     sertraline (ZOLOFT) 50 MG tablet Take 50 mg by mouth every morning       ACE/ARB/ARNI NOT PRESCRIBED, INTENTIONAL, ACE & ARB not prescribed due to Symptomatic hypotension not due to excessive diuresis (Patient not taking: Reported on 2/17/2021)       Continuous Glucose Monitor Sup MISC 4 times daily (before meals and nightly)        Leuprolide Acetate, 6 Month, (LUPRON DEPOT, 6-MONTH, IM)        Medication Changes/Rationale:     See above    Controlled medications sent with patient:   not applicable/none     ROS:   10 point ROS of systems including Constitutional, Eyes, Respiratory, Cardiovascular, Gastroenterology, Genitourinary, Integumentary, Musculoskeletal, Psychiatric were all negative except for pertinent positives noted in my HPI.    Physical Exam:   Vitals: /65   Pulse 80   Temp 97.9  F (36.6  C)   Resp 18   Ht 1.753 m (5' 9\")   Wt 69.6 kg (153 lb 6.4 oz)   SpO2 98%   BMI 22.65 kg/m    BMI= Body mass index is 22.65 kg/m .  GENERAL APPEARANCE:  Alert, in no distress  ENT:  Mouth and posterior oropharynx normal, moist mucous membranes  EYES:  EOM, conjunctivae, lids, pupils and irises normal  RESP:  respiratory effort and palpation of chest normal, lungs clear to auscultation , no respiratory distress  CV:  Palpation and auscultation of heart done , regular rate and rhythm, no murmur, rub, or gallop  ABDOMEN:  normal bowel sounds, soft, nontender, no hepatosplenomegaly or other masses  M/S:   Active movement of bilateral upper and lower extremities.  SKIN:  Inspection of skin and subcutaneous tissue baseline, Palpation of skin and subcutaneous tissue baseline  NEURO:   Cranial nerves 2-12 are normal tested and grossly at patient's baseline  PSYCH:  oriented to person    SNF labs: Labs done in SNF are in [facility]. Please refer to them using [facility] Everywhere.    DISCHARGE PLAN:    Follow up labs: No labs " orders/due    Medical Follow Up:      Follow up with primary care provider in 1 weeks    MT referral needed and placed by this provider: No    Current [facility] scheduled appointments:  Next 5 appointments (look out 90 days)    Feb 25, 2021  3:00 PM  Office Visit with Nolberto Ferrari MD  [facility] ([facility] ) [address] [id number] [phone]   Mar 05, 2021  1:15 PM  Return Visit with Jonny Dominguez MD  [facility] ([facility] ) [address] [facility] [address]  [facility] [address] [phone]         Future Appointments   Date Time Provider Department Center   2/25/2021  3:00 PM Nolberto Ferrari MD [facility]   3/5/2021  1:15 PM Jonny Dominguez MD [facility]   3/30/2021 11:30 AM Ty Be MD [facility]         Discharge Services: Home Care:  Occupational Therapy, Physical Therapy, Registered Nurse and From:  [facility]    Discharge Instructions to Patient at Discharge:     BLE wounds: change Q3D Cleanse with VASHE by letting Vashe moistened gauze sit on the wounds x 5 minutes. Don't rinse off Apply Medihoney to Mepilex lite and apply to wound beds. Apply sween cream 24 to intact skin. Apply Dermawear-DO NOT throw away-wash soiled edemawear. Wash in really hot water with laundry soap or baby shampoo, may need to do several times. Rinse well Hang dry     Dressing Change Location: R Gluteus Frequency: Q3D Cleanse: Wound Cleanser Protect: Foam Dressing     Consistent Carbohydrate diet, NDD2- Mechanically Altered texture, Thin Liquids consistency     TOTAL DISCHARGE TIME:   Greater than 30 minutes  Electronically signed by:  SARATH Barrera CNP         Documentation of Face-to-Face and Certification for Home Health Services     Patient: Jose Lees   YOB: 1940  MR Number: [id number]  Today's Date:  2/18/2021    I certify that patient: Jose Lees is under my care and that I, or a nurse practitioner or physician's assistant working with me, had a face-to-face encounter that meets the physician face-to-face encounter requirements with this patient on: 2/17/21.    This encounter with the patient was in whole, or in part, for the following medical condition, which is the primary reason for home health care: Esophageal Stenosis S/P Dilation on 1/27/21 and 1/28/21, Gastritis, Candidal Esophagitis, and Dysphagia.    I certify that, based on my findings, the following services are medically necessary home health services: Nursing, Occupational Therapy and Physical Therapy.    My clinical findings support the need for the above services because: Nurse is needed: To assess blood sugars after changes in medications or other medical regimen.., Occupational Therapy Services are needed to assess and treat cognitive ability and address ADL safety due to impairment in cognitive impairment. and Physical Therapy Services are needed to assess and treat the following functional impairments: Esophageal Stenosis S/P Dilation on 1/27/21 and 1/28/21, Gastritis, Candidal Esophagitis, and Dysphagia..    Further, I certify that my clinical findings support that this patient is homebound (i.e. absences from home require considerable and taxing effort and are for medical reasons or [employer] services or infrequently or of short duration when for other reasons) because: Requires assistance of another person or specialized equipment to access medical services because patient: Is unable to walk greater than 100 feet without rest...    Based on the above findings. I certify that this patient is confined to the home and needs intermittent skilled nursing care, physical therapy and/or speech therapy.  The patient is under my care, and I have initiated the establishment of the plan of care.  This patient will be followed by a physician  who will periodically review the plan of care.  Physician/Provider to provide follow up care: Nolberto Ferrari    Responsible Medicare certified PECOS Physician: Dr. Jean-Paul Doe  Physician Signature: See electronic signature associated with these discharge orders.  Date: 2/18/2021          Sincerely,        SARATH Barrera CNP

## 2021-02-17 NOTE — PROGRESS NOTES
"[place] [facility] DISCHARGE SUMMARY  PATIENT'S NAME: Jose Lees  YOB: 1940  MEDICAL RECORD NUMBER:  [id number]  Place of Service where encounter took place:  [facility] ([facility]) [[id number]]    PRIMARY CARE PROVIDER AND CLINIC RESPONSIBLE AFTER TRANSFER:   Nolberto Ferrari MD, [address]  / [facility] [address]    [facility] [place]     Transferring providers: SARATH Barrera CNP, Jean-Paul Doe MD  Recent Hospitalization/ED:  [facility] stay 1/26/21 to 1/29/21.  Date of SNF Admission: January/29/2021  Date of SNF (anticipated) Discharge: February/18/2021  Discharged to: previous independent home initially going to his sister's house for the weekend and will return to [facility] on Sunday or Monday  Cognitive Scores: SLUMS: 24/30, Safety Questionnaire: 26/27/20, ACL: 3.8/5.8 and Meds 2./7  Physical Function: Supervision-to-modified independence with toilet hygiene. Set-up for lower and upper body dressing. Minimal assistance-to-modified independence with bed mobility. Modified independence with transfers. Ambulating 65 feet with 4WW and [id number]    CODE STATUS/ADVANCE DIRECTIVES DISCUSSION:  Full Code   ALLERGIES: Patient has no known allergies.    DISCHARGE DIAGNOSIS/NURSING FACILITY COURSE:   This is a 80-year-old male, with a past medical history significant for coronary artery disease, ischemic cardiomyopathy, hypertension, hyperlipidemia, embolic stroke, type 2 diabetes mellitus, metastatic prostate cancer, depression, autistic disorder and cognitive impairment, who was admitted to [facility] 1/26/21 through 1/29/21 with nausea, vomiting and diarrhea. Noted to have candidal esophagitis as well as esophageal stricture. A1C 11.4 on 1/26/21. An abdominal, chest and pelvic CT revealed \" 1. Sigmoid colon appears to be mildly distended and displaced into the upper mid abdomen/left upper " "quadrant, however with no associated mesenteric twisting or bowel obstruction. Finding is nonspecific, but might predispose patient into sigmoid volvulus. 2. 6 mm fat attenuating lesion abutting the posterior aspect of the esophagus could represent small food residue versus small submucosal lipoma.. Numerous bilateral pulmonary nodules.. The prostate gland.. appears diffusely enhancing, nonspecific. Recommend correlation with serum prostate-specific antigen.. Cholelithiasis.. 1.3 partially calcified hypoattenuating focus in the left hepatic lobe, of indeterminate etiology\". An EGD on 1/27/21 revealed a benign-appearing esophageal stenosis. Dilated. On 1/28/21 revealed gastritis, congested duodenal mucosa and white nummular lesions in the esophageal mucosa. Biopsied and found to be Candida esophagitis. Nystatin and Fluconazole were initiated. Speech Therapy was consulted and recommended dysphagia diet level 2 with thin liquid consistency. Neurology was consulted due to history of embolic stroke and recommended 30-day cardiac event monitor to rule out PAF as patient is no longer on Warfarin. An echocardiogram on 1/29/21 revealed an EF 35-40% with grade I or early diastolic dysfunction and mildly dilated ascending aorta. A TCU stay was recommended for ongoing physical rehabilitation.     Esophageal Stenosis S/P Dilation on 1/27/21 and 1/28/21, Gastritis, Candidal Esophagitis, and Dysphagia. Nystatin and Fluconazole extended for an additional 7 days until 2/23/21. Continue Omeprazole as ordered. On a Consistent Carbohydrate diet, NDD2- Mechanically Altered texture, Thin Liquids consistency      Type 2 Diabetes Mellitus. Last A1C 11.4 on 1/26/21. Spoke to sister, Judith. Reports patient has a glucometer at home with strips. Staff at [facility] will be checking blood sugars and administering insulin. Continue Glargine as ordered. Blood sugars over the past 5 days have been as follows:    Breakfast: 103-230, most < 200  Lunch: "   Dinner: , most < 200  Bedtime: 143-230, most < 200     Coronary Artery Disease S/P BMS to LAD in April 2014, Ischemic Cardiomyopathy, Grade I LV Diastolic Dysfunction with EF 35-40% on 1/29/21, Hypertension and Hyperlipidemia. Follow-up with Cardiology on 3/5/21 regarding cardiac monitor noted below. Upon review of blood pressures over the past 5 days, systolic range from 104-120. Diastolic range from 58-72.  Weights 156 on 1/29/21 -> 150.8 lbs on 2/17/21. Taking Aspirin, Atorvastatin and Metoprolol.     History of Embolic Stroke in April 2014. No evidence for atrial fibrillation on previous EKGs. Neurology recommended cardiac event monitor for 30 days following hospital discharge which started 1/29/21. Sister instructed on when to discontinue event monitor and return envelope for Cardiology to review. Continue Aspirin and Atorvastatin as ordered.     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 Followed by Dr. Burgos. Receives Leuprolide injection every 6 months. Continue Zytiga as ordered.     Mild Major Depression. Continue Sertraline as ordered.     Cognitive Impairment and Autistic Disorder. SLUMS Score 23/30. Patient completed the LACLS with a score of 3.8/5.8. Score indicates patient requires 24 hour supervision for safety in the environment due to decreased problem solving skills and deficits in safety awareness. Score may be impacted by patient's diagnosis of Autism. Will discharge to [facility] with increased services and home health care.     Skin Lesions on Lower Extremities with Right Gluteus Wound. Follow-up with Dermatology following TCU stay. Ok to change dressing from daily to every 3 days per staff request - cleanse with wound cleanser. Change foam dressing every 3 days. BLE wounds: change Q3D Cleanse with VASHE by letting Vashe moistened gauze sit on the wounds x 5 minutes. Don't rinse off Apply Medihoney to Mepilex lite and apply to wound beds. Apply sween cream 24 to  intact skin. Apply Dermawear-DO NOT throw away-wash soiled edemawear. Wash in really hot water with laundry soap or baby shampoo, may need to do several times. Rinse well Hang dry      Physical Deconditioning. Secondary to recent hospitalization. Home Physical and Occupational Therapy ordered.     Past Medical History:  has a past medical history of ACP (advance care planning), Acute anterior wall MI (H) (4/7/2014), Autism disorder (6/29/2012), CHF (congestive heart failure) (H), Coronary artery disease (4/2014), Dementia (H), Hyperlipidaemia, Longstanding persistent atrial fibrillation (H) (1/20/2020), Mild major depression (H) (1/20/2020), Prostate cancer (H) (10/11), Stricture and stenosis of esophagus (10/22/2015), Stroke, embolic (H) (4/7/2014), and Type 2 diabetes mellitus without complication, without long-term current use of insulin (H) (7/25/2017).    Discharge Medications:  Current Outpatient Medications   Medication Sig Dispense Refill     abiraterone (ZYTIGA) 500 MG TABS tablet Take 1,000 mg by mouth every morning       aspirin (ASA) 325 MG tablet Take 1 tablet (325 mg) by mouth daily       atorvastatin (LIPITOR) 40 MG tablet Take 1 tablet by mouth daily 90 tablet 3     Cholecalciferol (VITAMIN D) 50 MCG (2000 UT) CAPS Take 2,000 Units by mouth daily       fluconazole (DIFLUCAN) 200 MG tablet Take 200 mg by mouth daily       insulin glargine (LANTUS VIAL) 100 UNIT/ML vial Inject 14 Units Subcutaneous daily Hold for BG<120       metFORMIN (GLUCOPHAGE) 500 MG tablet TAKE 2 TABLETS TWICE DAILY WITH MEALS 360 tablet 3     metoprolol succinate ER (TOPROL-XL) 25 MG 24 hr tablet TAKE 1 TABLET EVERY DAY (Patient taking differently: TAKE 1 TABLET EVERY DAY  Hold if sbp < 110 or HR < 60 and notify NP) 90 tablet 3     nystatin (MYCOSTATIN) 868423 UNIT/ML suspension Take 500,000 Units by mouth 4 times daily       omeprazole (PRILOSEC) 20 MG DR capsule TAKE 1 CAPSULE (20 MG) BY MOUTH DAILY 90 capsule 3     sertraline  "(ZOLOFT) 50 MG tablet Take 50 mg by mouth every morning       ACE/ARB/ARNI NOT PRESCRIBED, INTENTIONAL, ACE & ARB not prescribed due to Symptomatic hypotension not due to excessive diuresis (Patient not taking: Reported on 2/17/2021)       Continuous Glucose Monitor Sup MISC 4 times daily (before meals and nightly)        Leuprolide Acetate, 6 Month, (LUPRON DEPOT, 6-MONTH, IM)        Medication Changes/Rationale:     See above    Controlled medications sent with patient:   not applicable/none     ROS:   10 point ROS of systems including Constitutional, Eyes, Respiratory, Cardiovascular, Gastroenterology, Genitourinary, Integumentary, Musculoskeletal, Psychiatric were all negative except for pertinent positives noted in my HPI.    Physical Exam:   Vitals: /65   Pulse 80   Temp 97.9  F (36.6  C)   Resp 18   Ht 1.753 m (5' 9\")   Wt 69.6 kg (153 lb 6.4 oz)   SpO2 98%   BMI 22.65 kg/m    BMI= Body mass index is 22.65 kg/m .  GENERAL APPEARANCE:  Alert, in no distress  ENT:  Mouth and posterior oropharynx normal, moist mucous membranes  EYES:  EOM, conjunctivae, lids, pupils and irises normal  RESP:  respiratory effort and palpation of chest normal, lungs clear to auscultation , no respiratory distress  CV:  Palpation and auscultation of heart done , regular rate and rhythm, no murmur, rub, or gallop  ABDOMEN:  normal bowel sounds, soft, nontender, no hepatosplenomegaly or other masses  M/S:   Active movement of bilateral upper and lower extremities.  SKIN:  Inspection of skin and subcutaneous tissue baseline, Palpation of skin and subcutaneous tissue baseline  NEURO:   Cranial nerves 2-12 are normal tested and grossly at patient's baseline  PSYCH:  oriented to person    SNF labs: Labs done in SNF are in [place] EPIC. Please refer to them using EPIC/Care Everywhere.    DISCHARGE PLAN:    Follow up labs: No labs orders/due    Medical Follow Up:      Follow up with primary care provider in 1 weeks    MTM " referral needed and placed by this provider: No    Current [place] scheduled appointments:  Next 5 appointments (look out 90 days)    Feb 25, 2021  3:00 PM  Office Visit with Nolberto Ferrari MD  [facility] ([facility] ) [address] [address]  [phone]   Mar 05, 2021  1:15 PM  Return Visit with Jonny Dominguez MD  [facility] ([facility] ) [address]  [facility] [id number] [phone]         Future Appointments   Date Time Provider Department Center   2/25/2021  3:00 PM Nolberto Ferrari MD [facility]   3/5/2021  1:15 PM Jonny Dominguez MD [facility]   3/30/2021 11:30 AM Ty Be MD [facility]         Discharge Services: Home Care:  Occupational Therapy, Physical Therapy, Registered Nurse and From:  [facility]     Discharge Instructions to Patient at Discharge:     BLE wounds: change Q3D Cleanse with VASHE by letting Vashe moistened gauze sit on the wounds x 5 minutes. Don't rinse off Apply Medihoney to Mepilex lite and apply to wound beds. Apply sween cream 24 to intact skin. Apply Dermawear-DO NOT throw away-wash soiled edemawear. Wash in really hot water with laundry soap or baby shampoo, may need to do several times. Rinse well Hang dry     Dressing Change Location: R Gluteus Frequency: Q3D Cleanse: Wound Cleanser Protect: Foam Dressing     Consistent Carbohydrate diet, NDD2- Mechanically Altered texture, Thin Liquids consistency     TOTAL DISCHARGE TIME:   Greater than 30 minutes  Electronically signed by:  SARATH Barrera CNP         Documentation of Face-to-Face and Certification for Home Health Services     Patient: Jose Lees   YOB: 1940  MR Number: [id number]  Today's Date: 2/18/2021    I certify that patient: Jose Lees is under my care and that I, or a  nurse practitioner or physician's assistant working with me, had a face-to-face encounter that meets the physician face-to-face encounter requirements with this patient on: 2/17/21.    This encounter with the patient was in whole, or in part, for the following medical condition, which is the primary reason for home health care: Esophageal Stenosis S/P Dilation on 1/27/21 and 1/28/21, Gastritis, Candidal Esophagitis, and Dysphagia.    I certify that, based on my findings, the following services are medically necessary home health services: Nursing, Occupational Therapy and Physical Therapy.    My clinical findings support the need for the above services because: Nurse is needed: To assess blood sugars after changes in medications or other medical regimen.., Occupational Therapy Services are needed to assess and treat cognitive ability and address ADL safety due to impairment in cognitive impairment. and Physical Therapy Services are needed to assess and treat the following functional impairments: Esophageal Stenosis S/P Dilation on 1/27/21 and 1/28/21, Gastritis, Candidal Esophagitis, and Dysphagia..    Further, I certify that my clinical findings support that this patient is homebound (i.e. absences from home require considerable and taxing effort and are for medical reasons or Jainism services or infrequently or of short duration when for other reasons) because: Requires assistance of another person or specialized equipment to access medical services because patient: Is unable to walk greater than 100 feet without rest...    Based on the above findings. I certify that this patient is confined to the home and needs intermittent skilled nursing care, physical therapy and/or speech therapy.  The patient is under my care, and I have initiated the establishment of the plan of care.  This patient will be followed by a physician who will periodically review the plan of care.  Physician/Provider to provide follow up  care: Nolberto Ferrari    Responsible Medicare certified PECOS Physician: Dr. Jean-Paul Doe  Physician Signature: See electronic signature associated with these discharge orders.  Date: 2/18/2021

## 2021-02-19 DIAGNOSIS — Z76.0 ENCOUNTER FOR MEDICATION REFILL: ICD-10-CM

## 2021-02-19 DIAGNOSIS — E78.5 HYPERLIPIDEMIA LDL GOAL <70: ICD-10-CM

## 2021-02-19 DIAGNOSIS — E11.9 TYPE 2 DIABETES MELLITUS WITHOUT COMPLICATION, WITHOUT LONG-TERM CURRENT USE OF INSULIN (H): ICD-10-CM

## 2021-02-19 RX ORDER — METOPROLOL SUCCINATE 25 MG/1
TABLET, EXTENDED RELEASE ORAL
Qty: 90 TABLET | Refills: 0 | Status: SHIPPED | OUTPATIENT
Start: 2021-02-19 | End: 2021-02-24

## 2021-02-19 RX ORDER — ATORVASTATIN CALCIUM 40 MG/1
TABLET, FILM COATED ORAL
Qty: 90 TABLET | Refills: 0 | Status: SHIPPED | OUTPATIENT
Start: 2021-02-19 | End: 2021-02-24

## 2021-02-19 NOTE — TELEPHONE ENCOUNTER
Metformin  Routing refill request to provider for review/approval because:  Patient does NOT have a diagnosis of CHF.  Patient needs to be seen because it has been more than 1 year since last office visit.      Metoprolol succinate ER   Routing refill request to provider for review/approval because:  Patient needs to be seen because it has been more than 1 year since last office visit.      Atorvastatin   Routing refill request to provider for review/approval because:  Patient needs to be seen because it has been more than 1 year since last office visit.

## 2021-02-20 ENCOUNTER — TELEPHONE (OUTPATIENT)
Dept: NURSING | Facility: CLINIC | Age: 81
End: 2021-02-20

## 2021-02-20 NOTE — TELEPHONE ENCOUNTER
Lipids and kidney function at goal.  A1c elevated but noncompliance with medication prior to recent hospitalization show elevated A1c expected.  Medications refilled for 90 days.  Patient will have follow-up appointment with me 2/25/2021 as scheduled and will assess blood sugar status at that time.  Was recently discharged from TCU back to home

## 2021-02-20 NOTE — TELEPHONE ENCOUNTER
[facility], needs orders to begin home care.  Dr OSORIO Ferrari  Is primary. Wants verbal order to begin home care. Verbal order to initiate home care, and send orders to Dr Ferrari for signature  Provided per [facility] standing orders.

## 2021-02-22 ENCOUNTER — TRANSFERRED RECORDS (OUTPATIENT)
Dept: HEALTH INFORMATION MANAGEMENT | Facility: CLINIC | Age: 81
End: 2021-02-22

## 2021-02-22 ENCOUNTER — PATIENT OUTREACH (OUTPATIENT)
Dept: CARE COORDINATION | Facility: CLINIC | Age: 81
End: 2021-02-22

## 2021-02-22 DIAGNOSIS — Z76.0 ENCOUNTER FOR MEDICATION REFILL: ICD-10-CM

## 2021-02-22 DIAGNOSIS — E11.9 TYPE 2 DIABETES MELLITUS WITHOUT COMPLICATION, WITHOUT LONG-TERM CURRENT USE OF INSULIN (H): ICD-10-CM

## 2021-02-22 DIAGNOSIS — E78.5 HYPERLIPIDEMIA LDL GOAL <70: ICD-10-CM

## 2021-02-22 NOTE — LETTER
[facility] CARE COORDINATION  [facility]  [address] [address]  Tel. [phone]  Fax [phone]    February 23, 2021    Jose Lees  [address] [address] [address]      Dear Jose,    I am a clinic care coordinator who works with Nolberto Ferrari MD at [facility]. I wanted to thank you for spending the time to talk with me.  Below is a description of clinic care coordination and how I can further assist you.      The clinic care coordination team is made up of a registered nurse,  and community health worker who understand the health care system. The goal of clinic care coordination is to help you manage your health and improve access to the health care system in the most efficient manner. The team can assist you in meeting your health care goals by providing education, coordinating services, strengthening the communication among your providers and supporting you with any resource needs.    Please feel free to contact the Community Health Worker at [phone] with any questions or concerns. We are focused on providing you with the highest-quality healthcare experience possible and that all starts with you.     Sincerely,     SHU Mariano   Social Work Clinic Care Coordinator   [facility]  PH: [phone]  [email or web address]

## 2021-02-22 NOTE — PROGRESS NOTES
Clinic Care Coordination Contact  [facility]/Voicemail     Clinical Data: Care Coordinator Outreach  TCU discharge follow up     SW CC received voicemail from ROMA Belle reporting pt discharging to sister's home 2/18/21 with [facility] (PT/OT/HHA/RN). Plan to stay with sister over weekend and return to [facility] Monday or so. They plan to increase [facility] services.     Outreach attempted x 1.  Left message on patient's sister/POA voicemail with call back information and requested return call. (main # on file)    Plan: Care Coordinator will try to reach patient again in 3-5 business days.    SHU Mariano   Social Work Clinic Care Coordinator   [facility]  PH: [phone]  [email or web address]

## 2021-02-23 DIAGNOSIS — Z53.9 DIAGNOSIS NOT YET DEFINED: Primary | ICD-10-CM

## 2021-02-23 PROCEDURE — G0180 MD CERTIFICATION HHA PATIENT: HCPCS | Performed by: INTERNAL MEDICINE

## 2021-02-23 NOTE — PROGRESS NOTES
Clinic Care Coordination Contact  Care Team Conversations    SW CC outreach to pt's sister, Judith. Spoke to her  briefly as Judith was on phone in background. Reported home care started, RN was there today. CC confirmed PCP appt 2/25/21. No other questions or concerns today. Advised to call back if things change or other questions come up.     SHU Mariano   Social Work Clinic Care Coordinator   [facility]  PH: [phone]  [email or web address]

## 2021-02-24 RX ORDER — METOPROLOL SUCCINATE 25 MG/1
TABLET, EXTENDED RELEASE ORAL
Qty: 90 TABLET | Refills: 3 | Status: ON HOLD | OUTPATIENT
Start: 2021-02-24 | End: 2021-02-27

## 2021-02-24 RX ORDER — ATORVASTATIN CALCIUM 40 MG/1
TABLET, FILM COATED ORAL
Qty: 90 TABLET | Refills: 3 | Status: SHIPPED | OUTPATIENT
Start: 2021-02-24 | End: 2022-03-02

## 2021-02-24 NOTE — TELEPHONE ENCOUNTER
Metoprolol Succinate  Routing refill request to provider for review/approval because:  Patient needs to be seen because it has been more than 1 year since last office visit.      Metformin   Routing refill request to provider for review/approval because:  Patient needs to be seen because it has been more than 1 year since last office visit.  Patient does NOT have a diagnosis of CHF.      Atorvastatin   Routing refill request to provider for review/approval because:  Patient needs to be seen because it has been more than 1 year since last office visit.

## 2021-02-25 ENCOUNTER — OFFICE VISIT (OUTPATIENT)
Dept: INTERNAL MEDICINE | Facility: CLINIC | Age: 81
End: 2021-02-25
Payer: COMMERCIAL

## 2021-02-25 VITALS
HEIGHT: 69 IN | DIASTOLIC BLOOD PRESSURE: 64 MMHG | BODY MASS INDEX: 22.65 KG/M2 | OXYGEN SATURATION: 99 % | SYSTOLIC BLOOD PRESSURE: 102 MMHG | TEMPERATURE: 97.9 F | HEART RATE: 101 BPM

## 2021-02-25 DIAGNOSIS — R62.7 FAILURE TO THRIVE IN ADULT: ICD-10-CM

## 2021-02-25 DIAGNOSIS — R53.1 WEAKNESS: ICD-10-CM

## 2021-02-25 DIAGNOSIS — E11.9 TYPE 2 DIABETES MELLITUS WITHOUT COMPLICATION, WITHOUT LONG-TERM CURRENT USE OF INSULIN (H): ICD-10-CM

## 2021-02-25 LAB
ERYTHROCYTE [DISTWIDTH] IN BLOOD BY AUTOMATED COUNT: 13.5 % (ref 10–15)
HCT VFR BLD AUTO: 40.8 % (ref 40–53)
HGB BLD-MCNC: 13.8 G/DL (ref 13.3–17.7)
MCH RBC QN AUTO: 30.3 PG (ref 26.5–33)
MCHC RBC AUTO-ENTMCNC: 33.8 G/DL (ref 31.5–36.5)
MCV RBC AUTO: 90 FL (ref 78–100)
PLATELET # BLD AUTO: 271 10E9/L (ref 150–450)
RBC # BLD AUTO: 4.56 10E12/L (ref 4.4–5.9)
WBC # BLD AUTO: 7.2 10E9/L (ref 4–11)

## 2021-02-25 PROCEDURE — 84439 ASSAY OF FREE THYROXINE: CPT | Performed by: INTERNAL MEDICINE

## 2021-02-25 PROCEDURE — 84443 ASSAY THYROID STIM HORMONE: CPT | Performed by: INTERNAL MEDICINE

## 2021-02-25 PROCEDURE — 85027 COMPLETE CBC AUTOMATED: CPT | Performed by: INTERNAL MEDICINE

## 2021-02-25 PROCEDURE — 80053 COMPREHEN METABOLIC PANEL: CPT | Performed by: INTERNAL MEDICINE

## 2021-02-25 PROCEDURE — 36415 COLL VENOUS BLD VENIPUNCTURE: CPT | Performed by: INTERNAL MEDICINE

## 2021-02-25 PROCEDURE — 99214 OFFICE O/P EST MOD 30 MIN: CPT | Performed by: INTERNAL MEDICINE

## 2021-02-25 NOTE — PROGRESS NOTES
Assessment & Plan     1. Weakness  Patient appears to be failing outpatient therapy after recent discharge from TCU.  Patient sister is not able to care for him by herself and patient certainly does not appear safe to be at home by himself.  Patient denies dysuria, fevers, chills to suggest acute process.  Labs as ordered.  Will talk with care coordination to see about possible placement back at rehab if bed available versus having to return to the hospital  - Comprehensive metabolic panel  - CBC with platelets  - TSH with free T4 reflex  - T4 free    2. Failure to thrive in adult  See #1 above.  Will talk with care coordination to see about other arrangements. Sister and her  not able to take care of pt and pt certainly is not able to do so on his own. Sister and  willing to have pt back home tonight but will need alternative arrangements by tomorrow. Will speak with CC today    3. Type 2 diabetes mellitus without complication, without long-term current use of insulin (H)  Blood sugars elevated beyond goal but because of difficulty doing what patient will be eating with poor intake recently, will leave insulin orders for now and adjust once patient is more stable environment regarding oral intake         Zechariah Hess is a 80 year old who presents for the following health issues  accompanied by his sister:    \A Chronology of [place] Hospitals\""         Hospital Follow-up Visit:    Hospital/Nursing Home/IP Rehab Facility: [facility]  Date of Admission: 1/29/21  Date of Discharge: 2/18/21  Reason(s) for Admission:  diagnosis of Dysphagia, Candididal esophagitis, Esophageal strictures -s/p EGD with dilation.          Was your hospitalization related to COVID-19? No   Problems taking medications regularly:  Yes difficulty swallowing-must be ground up  Medication changes since discharge: stopped Zytiga  Problems adhering to non-medication therapy:  Yes    Summary of hospitalization:  [facility] discharge  summary reviewed  Diagnostic Tests/Treatments reviewed.  Follow up needed: labs  Other Healthcare Providers Involved in Patient s Care:         Homecare, PT/OT  Update since discharge: worsened.       Post Discharge Medication Reconciliation: discharge medications reconciled, continue medications without change.  Plan of care communicated with patient and sister              Discharge summary reviewed. Part of the summary as below:    [facility] DISCHARGE SUMMARY  PATIENT'S NAME: Jose Lees  YOB: 1940  MEDICAL RECORD NUMBER:  [id number]  Place of Service where encounter took place:  [facility]) [[id number]]     PRIMARY CARE PROVIDER AND CLINIC RESPONSIBLE AFTER TRANSFER:   Nolberto Ferrari MD, [address]  / [facility] [id number]    [facility] Provider      Transferring providers: SARATH Barrera CNP, Jean-Paul Doe MD  Recent Hospital[facility]/ED[facility] to 1/29/21.  Date of SNF Admission: January/29/2021  Date of SNF (anticipated) Discharge: February/18/2021  Discharged to: previous independent home initially going to his sister's house for the weekend and will return to [facility] on Sunday or Monday  Cognitive Scores: SLUMS: 24/30, Safety Questionnaire: 26/27/20, ACL: 3.8/5.8 and Meds 2./7  Physical Function: Supervision-to-modified independence with toilet hygiene. Set-up for lower and upper body dressing. Minimal assistance-to-modified independence with bed mobility. Modified independence with transfers. Ambulating 65 feet with 4WW and [id number]     CODE STATUS/ADVANCE DIRECTIVES DISCUSSION:  Full Code   ALLERGIES: Patient has no known allergies.     DISCHARGE DIAGNOSIS/NURSING FACILITY COURSE:   This is a 80-year-old male, with a past medical history significant for coronary artery disease, ischemic cardiomyopathy, hypertension, hyperlipidemia, embolic stroke, type 2  "diabetes mellitus, metastatic prostate cancer, depression, autistic disorder and cognitive impairment, who was admitted to [facility] 1/26/21 through 1/29/21 with nausea, vomiting and diarrhea. Noted to have candidal esophagitis as well as esophageal stricture. A1C 11.4 on 1/26/21. An abdominal, chest and pelvic CT revealed \" 1. Sigmoid colon appears to be mildly distended and displaced into the upper mid abdomen/left upper quadrant, however with no associated mesenteric twisting or bowel obstruction. Finding is nonspecific, but might predispose patient into sigmoid volvulus. 2. 6 mm fat attenuating lesion abutting the posterior aspect of the esophagus could represent small food residue versus small submucosal lipoma.. Numerous bilateral pulmonary nodules.. The prostate gland.. appears diffusely enhancing, nonspecific. Recommend correlation with serum prostate-specific antigen.. Cholelithiasis.. 1.3 partially calcified hypoattenuating focus in the left hepatic lobe, of indeterminate etiology\". An EGD on 1/27/21 revealed a benign-appearing esophageal stenosis. Dilated. On 1/28/21 revealed gastritis, congested duodenal mucosa and white nummular lesions in the esophageal mucosa. Biopsied and found to be Candida esophagitis. Nystatin and Fluconazole were initiated. Speech Therapy was consulted and recommended dysphagia diet level 2 with thin liquid consistency. Neurology was consulted due to history of embolic stroke and recommended 30-day cardiac event monitor to rule out PAF as patient is no longer on Warfarin. An echocardiogram on 1/29/21 revealed an EF 35-40% with grade I or early diastolic dysfunction and mildly dilated ascending aorta. A TCU stay was recommended for ongoing physical rehabilitation.      Esophageal Stenosis S/P Dilation on 1/27/21 and 1/28/21, Gastritis, Candidal Esophagitis, and Dysphagia. Nystatin and Fluconazole extended for an additional 7 days until 2/23/21. Continue " Omeprazole as ordered. On a Consistent Carbohydrate diet, NDD2- Mechanically Altered texture, Thin Liquids consistency      Type 2 Diabetes Mellitus. Last A1C 11.4 on 1/26/21. Spoke to sister, Judith. Reports patient has a glucometer at home with strips. Staff at AL will be checking blood sugars and administering insulin. Continue Glargine as ordered. Blood sugars over the past 5 days have been as follows:     Breakfast: 103-230, most < 200  Lunch:   Dinner: , most < 200  Bedtime: 143-230, most < 200     Coronary Artery Disease S/P BMS to LAD in April 2014, Ischemic Cardiomyopathy, Grade I LV Diastolic Dysfunction with EF 35-40% on 1/29/21, Hypertension and Hyperlipidemia. Follow-up with Cardiology on 3/5/21 regarding cardiac monitor noted below. Upon review of blood pressures over the past 5 days, systolic range from 104-120. Diastolic range from 58-72.  Weights 156 on 1/29/21 -> 150.8 lbs on 2/17/21. Taking Aspirin, Atorvastatin and Metoprolol.     History of Embolic Stroke in April 2014. No evidence for atrial fibrillation on previous EKGs. Neurology recommended cardiac event monitor for 30 days following hospital discharge which started 1/29/21. Sister instructed on when to discontinue event monitor and return envelope for Cardiology to review. Continue Aspirin and Atorvastatin as ordered.     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 Followed by Dr. Burgos. Receives Leuprolide injection every 6 months. Continue Zytiga as ordered.     Mild Major Depression. Continue Sertraline as ordered.     Cognitive Impairment and Autistic Disorder. SLUMS Score 23/30. Patient completed the LACLS with a score of 3.8/5.8. Score indicates patient requires 24 hour supervision for safety in the environment due to decreased problem solving skills and deficits in safety awareness. Score may be impacted by patient's diagnosis of Autism. Will discharge to AL with increased services and home health  care.     Skin Lesions on Lower Extremities with Right Gluteus Wound. Follow-up with Dermatology following TCU stay. Ok to change dressing from daily to every 3 days per staff request - cleanse with wound cleanser. Change foam dressing every 3 days. BLE wounds: change Q3D Cleanse with VASHE by letting Vashe moistened gauze sit on the wounds x 5 minutes. Don't rinse off Apply Medihoney to Mepilex lite and apply to wound beds. Apply sween cream 24 to intact skin. Apply Dermawear-DO NOT throw away-wash soiled edemawear. Wash in really hot water with laundry soap or baby shampoo, may need to do several times. Rinse well Hang dry      Physical Deconditioning. Secondary to recent hospitalization. Home Physical and Occupational Therapy ordered     Patient seen today with his Sister Judith.  Patient is a poor historian due to cognitive impairment.    Blood sugars:   208,264,270  226,x, 243  257, 170,140  119,247    Using Lantus 14 units daily.  Sister managing patient's meds as he is not able to do them himself.  Currently has home care RN and physical therapy.  Occupational Therapy ordered but they have not seen him yet.  Patient is able to walk 20-30  feet before having to stop because of increasing fatigue and fear of falls.  Needs assistance with ambulation.  Took 2 people to help get the patient into a car today.  Having difficulty getting to the bathroom at home because of weakness.  His not been eating very much.  Had some oatmeal to eat last night for supper.  Had a protein drink only this morning.  Overall feeling weaker.  Poor balance.  Has not fallen since getting home however.  Ambulating with a walker.  Next line denies chest pain or abdominal pain now.  Denies dysphagia symptoms currently after recent esophageal treatment  Lower extremities have leg wraps for edema.  Also has a coccyx wound.  Patient states that is getting better      Additional ROS:   Constitutional, HEENT, Cardiovascular, Pulmonary, GI and ,  "Neuro, MSK and Psych review of systems/symptoms are otherwise negative or unchanged from previous, except as noted above.      OBJECTIVE:  /64   Pulse 101   Temp 97.9  F (36.6  C) (Tympanic)   Ht 1.753 m (5' 9\")   SpO2 99%   BMI 22.65 kg/m     Estimated body mass index is 22.65 kg/m  as calculated from the following:    Height as of this encounter: 1.753 m (5' 9\").    Weight as of 2/15/21: 69.6 kg (153 lb 6.4 oz).   Eye: PERRL, EOMI  HENT: ear canals and TM's normal and nose and mouth without ulcers or lesions   Neck: no adenopathy. Thyroid normal to palpation. No bruits  Pulm: Lungs clear to auscultation   CV: Regular rates and rhythm  GI: Soft, nontender, Normal active bowel sounds, No hepatosplenomegaly or masses palpable  Ext: Peripheral pulses intact. N1 plus BLE edema. BLE leg wraps  Neuro: Normal  tone, sensory exam grossly normal. Strength 4-/5 in BUE and BLE extremities  Skin: Wound dressing over coccyx area.  Per patient's sister, was just changed today by home care nurse.  Further examination deferred              "

## 2021-02-25 NOTE — TELEPHONE ENCOUNTER
Pt recently was restarted on meds.  Had not been taking for quite awhile prior. Recent lipid and BMP with meds reviewed. OK for RF. Will be ordering future A1C at upcoming appt with me

## 2021-02-26 ENCOUNTER — HOSPITAL ENCOUNTER (OUTPATIENT)
Facility: CLINIC | Age: 81
Setting detail: OBSERVATION
Discharge: NURSING, REHAB OR LONG-TERM CARE | End: 2021-02-27
Attending: EMERGENCY MEDICINE | Admitting: EMERGENCY MEDICINE
Payer: COMMERCIAL

## 2021-02-26 ENCOUNTER — PATIENT OUTREACH (OUTPATIENT)
Dept: NURSING | Facility: CLINIC | Age: 81
End: 2021-02-26
Payer: COMMERCIAL

## 2021-02-26 ENCOUNTER — TELEPHONE (OUTPATIENT)
Dept: CARE COORDINATION | Facility: CLINIC | Age: 81
End: 2021-02-26

## 2021-02-26 DIAGNOSIS — Z76.0 ENCOUNTER FOR MEDICATION REFILL: ICD-10-CM

## 2021-02-26 DIAGNOSIS — R62.7 FAILURE TO THRIVE IN ADULT: ICD-10-CM

## 2021-02-26 DIAGNOSIS — Z71.89 COUNSELING AND COORDINATION OF CARE: Primary | ICD-10-CM

## 2021-02-26 DIAGNOSIS — M62.81 GENERALIZED MUSCLE WEAKNESS: ICD-10-CM

## 2021-02-26 DIAGNOSIS — R79.89 ELEVATED LACTIC ACID LEVEL: ICD-10-CM

## 2021-02-26 DIAGNOSIS — E11.9 TYPE 2 DIABETES MELLITUS WITHOUT COMPLICATION, WITHOUT LONG-TERM CURRENT USE OF INSULIN (H): ICD-10-CM

## 2021-02-26 DIAGNOSIS — K59.00 CONSTIPATION, UNSPECIFIED CONSTIPATION TYPE: Primary | ICD-10-CM

## 2021-02-26 LAB
ALBUMIN SERPL-MCNC: 2.8 G/DL (ref 3.4–5)
ALP SERPL-CCNC: 76 U/L (ref 40–150)
ALT SERPL W P-5'-P-CCNC: 17 U/L (ref 0–70)
ANION GAP SERPL CALCULATED.3IONS-SCNC: 9 MMOL/L (ref 3–14)
ANION GAP SERPL CALCULATED.3IONS-SCNC: 9 MMOL/L (ref 3–14)
AST SERPL W P-5'-P-CCNC: 31 U/L (ref 0–45)
BASOPHILS # BLD AUTO: 0.1 10E9/L (ref 0–0.2)
BASOPHILS NFR BLD AUTO: 1.1 %
BILIRUB SERPL-MCNC: 0.4 MG/DL (ref 0.2–1.3)
BUN SERPL-MCNC: 15 MG/DL (ref 7–30)
BUN SERPL-MCNC: 17 MG/DL (ref 7–30)
CALCIUM SERPL-MCNC: 9.2 MG/DL (ref 8.5–10.1)
CALCIUM SERPL-MCNC: 9.7 MG/DL (ref 8.5–10.1)
CHLORIDE SERPL-SCNC: 101 MMOL/L (ref 94–109)
CHLORIDE SERPL-SCNC: 99 MMOL/L (ref 94–109)
CO2 SERPL-SCNC: 22 MMOL/L (ref 20–32)
CO2 SERPL-SCNC: 25 MMOL/L (ref 20–32)
CREAT SERPL-MCNC: 0.94 MG/DL (ref 0.66–1.25)
CREAT SERPL-MCNC: 0.97 MG/DL (ref 0.66–1.25)
DIFFERENTIAL METHOD BLD: ABNORMAL
EOSINOPHIL # BLD AUTO: 0.3 10E9/L (ref 0–0.7)
EOSINOPHIL NFR BLD AUTO: 4.3 %
ERYTHROCYTE [DISTWIDTH] IN BLOOD BY AUTOMATED COUNT: 13.2 % (ref 10–15)
GFR SERPL CREATININE-BSD FRML MDRD: 74 ML/MIN/{1.73_M2}
GFR SERPL CREATININE-BSD FRML MDRD: 77 ML/MIN/{1.73_M2}
GLUCOSE BLDC GLUCOMTR-MCNC: 161 MG/DL (ref 70–99)
GLUCOSE SERPL-MCNC: 256 MG/DL (ref 70–99)
GLUCOSE SERPL-MCNC: 264 MG/DL (ref 70–99)
HCT VFR BLD AUTO: 39.9 % (ref 40–53)
HGB BLD-MCNC: 13.3 G/DL (ref 13.3–17.7)
IMM GRANULOCYTES # BLD: 0 10E9/L (ref 0–0.4)
IMM GRANULOCYTES NFR BLD: 0.4 %
INTERPRETATION ECG - MUSE: NORMAL
LABORATORY COMMENT REPORT: NORMAL
LACTATE BLD-SCNC: 2.5 MMOL/L (ref 0.7–2)
LACTATE BLD-SCNC: 2.9 MMOL/L (ref 0.7–2)
LYMPHOCYTES # BLD AUTO: 2.3 10E9/L (ref 0.8–5.3)
LYMPHOCYTES NFR BLD AUTO: 31.2 %
MCH RBC QN AUTO: 29.4 PG (ref 26.5–33)
MCHC RBC AUTO-ENTMCNC: 33.3 G/DL (ref 31.5–36.5)
MCV RBC AUTO: 88 FL (ref 78–100)
MONOCYTES # BLD AUTO: 0.9 10E9/L (ref 0–1.3)
MONOCYTES NFR BLD AUTO: 11.6 %
NEUTROPHILS # BLD AUTO: 3.8 10E9/L (ref 1.6–8.3)
NEUTROPHILS NFR BLD AUTO: 51.4 %
NRBC # BLD AUTO: 0 10*3/UL
NRBC BLD AUTO-RTO: 0 /100
PLATELET # BLD AUTO: 295 10E9/L (ref 150–450)
POTASSIUM SERPL-SCNC: 3.3 MMOL/L (ref 3.4–5.3)
POTASSIUM SERPL-SCNC: 3.8 MMOL/L (ref 3.4–5.3)
POTASSIUM SERPL-SCNC: 4.1 MMOL/L (ref 3.4–5.3)
PROT SERPL-MCNC: 7.2 G/DL (ref 6.8–8.8)
RBC # BLD AUTO: 4.52 10E12/L (ref 4.4–5.9)
SARS-COV-2 RNA RESP QL NAA+PROBE: NEGATIVE
SODIUM SERPL-SCNC: 132 MMOL/L (ref 133–144)
SODIUM SERPL-SCNC: 133 MMOL/L (ref 133–144)
SPECIMEN SOURCE: NORMAL
T4 FREE SERPL-MCNC: 1.19 NG/DL (ref 0.76–1.46)
TSH SERPL DL<=0.005 MIU/L-ACNC: 8.65 MU/L (ref 0.4–4)
WBC # BLD AUTO: 7.4 10E9/L (ref 4–11)

## 2021-02-26 PROCEDURE — 83605 ASSAY OF LACTIC ACID: CPT | Performed by: EMERGENCY MEDICINE

## 2021-02-26 PROCEDURE — 258N000003 HC RX IP 258 OP 636: Performed by: EMERGENCY MEDICINE

## 2021-02-26 PROCEDURE — 93005 ELECTROCARDIOGRAM TRACING: CPT

## 2021-02-26 PROCEDURE — 96360 HYDRATION IV INFUSION INIT: CPT

## 2021-02-26 PROCEDURE — 87635 SARS-COV-2 COVID-19 AMP PRB: CPT | Performed by: EMERGENCY MEDICINE

## 2021-02-26 PROCEDURE — C9803 HOPD COVID-19 SPEC COLLECT: HCPCS

## 2021-02-26 PROCEDURE — G0378 HOSPITAL OBSERVATION PER HR: HCPCS

## 2021-02-26 PROCEDURE — 84132 ASSAY OF SERUM POTASSIUM: CPT | Mod: 91 | Performed by: HOSPITALIST

## 2021-02-26 PROCEDURE — 99220 PR INITIAL OBSERVATION CARE,LEVEL III: CPT | Performed by: HOSPITALIST

## 2021-02-26 PROCEDURE — 87040 BLOOD CULTURE FOR BACTERIA: CPT | Performed by: EMERGENCY MEDICINE

## 2021-02-26 PROCEDURE — 80048 BASIC METABOLIC PNL TOTAL CA: CPT | Performed by: EMERGENCY MEDICINE

## 2021-02-26 PROCEDURE — 51798 US URINE CAPACITY MEASURE: CPT

## 2021-02-26 PROCEDURE — 36415 COLL VENOUS BLD VENIPUNCTURE: CPT | Performed by: HOSPITALIST

## 2021-02-26 PROCEDURE — 999N001017 HC STATISTIC GLUCOSE BY METER IP

## 2021-02-26 PROCEDURE — 99285 EMERGENCY DEPT VISIT HI MDM: CPT | Mod: 25

## 2021-02-26 PROCEDURE — 85025 COMPLETE CBC W/AUTO DIFF WBC: CPT | Performed by: EMERGENCY MEDICINE

## 2021-02-26 PROCEDURE — 250N000013 HC RX MED GY IP 250 OP 250 PS 637: Mod: GY | Performed by: EMERGENCY MEDICINE

## 2021-02-26 RX ORDER — ASPIRIN 81 MG/1
81 TABLET, CHEWABLE ORAL DAILY
COMMUNITY
End: 2021-04-27

## 2021-02-26 RX ORDER — HYDRALAZINE HYDROCHLORIDE 20 MG/ML
10 INJECTION INTRAMUSCULAR; INTRAVENOUS EVERY 4 HOURS PRN
Status: DISCONTINUED | OUTPATIENT
Start: 2021-02-26 | End: 2021-02-27 | Stop reason: HOSPADM

## 2021-02-26 RX ORDER — INSULIN GLARGINE 100 [IU]/ML
14 INJECTION, SOLUTION SUBCUTANEOUS AT BEDTIME
Status: ON HOLD | COMMUNITY
End: 2021-02-27

## 2021-02-26 RX ORDER — DEXTROSE MONOHYDRATE 25 G/50ML
25-50 INJECTION, SOLUTION INTRAVENOUS
Status: DISCONTINUED | OUTPATIENT
Start: 2021-02-26 | End: 2021-02-27 | Stop reason: HOSPADM

## 2021-02-26 RX ORDER — ASPIRIN 81 MG/1
81 TABLET, CHEWABLE ORAL DAILY
Status: DISCONTINUED | OUTPATIENT
Start: 2021-02-27 | End: 2021-02-27 | Stop reason: HOSPADM

## 2021-02-26 RX ORDER — POTASSIUM CHLORIDE 1500 MG/1
40 TABLET, EXTENDED RELEASE ORAL ONCE
Status: COMPLETED | OUTPATIENT
Start: 2021-02-26 | End: 2021-02-26

## 2021-02-26 RX ORDER — NICOTINE POLACRILEX 4 MG
15-30 LOZENGE BUCCAL
Status: DISCONTINUED | OUTPATIENT
Start: 2021-02-26 | End: 2021-02-27 | Stop reason: HOSPADM

## 2021-02-26 RX ADMIN — SODIUM CHLORIDE 1000 ML: 9 INJECTION, SOLUTION INTRAVENOUS at 18:45

## 2021-02-26 RX ADMIN — SODIUM CHLORIDE 1000 ML: 9 INJECTION, SOLUTION INTRAVENOUS at 18:20

## 2021-02-26 RX ADMIN — POTASSIUM CHLORIDE 40 MEQ: 1500 TABLET, EXTENDED RELEASE ORAL at 19:46

## 2021-02-26 ASSESSMENT — ENCOUNTER SYMPTOMS
CHILLS: 0
WEAKNESS: 1
COUGH: 0
ABDOMINAL PAIN: 0
WOUND: 1
VOMITING: 0
NAUSEA: 1
TROUBLE SWALLOWING: 0
FATIGUE: 1
SHORTNESS OF BREATH: 0
FEVER: 0
APPETITE CHANGE: 1

## 2021-02-26 ASSESSMENT — ACTIVITIES OF DAILY LIVING (ADL)
DEPENDENT_IADLS:: CLEANING;COOKING;LAUNDRY;SHOPPING;MEAL PREPARATION;MEDICATION MANAGEMENT;MONEY MANAGEMENT;TRANSPORTATION

## 2021-02-26 NOTE — PROGRESS NOTES
Clinic Care Coordination Contact  Care Team Conversations    CHARLIE RAMSAY received message from PCP 2/25/21 after OV that pt needs more support at home and possible readmission to TCU. Pt cannot do ADLs by himself, therefore did not return to [facility] yet. Pt not eating much.     CHARLIE RAMSAY received voicemail from Judith, pt's sister. Reported she and her  are taking care of pt at this time, having difficulty lifting pt due to their age, pt wants to go back, they are tired, pt is not eating. issues with balance, cannot walk. She is wondering if pt can return to previous TCU, [facility]. Wants to make sure pt can go somewhere today before the weekend. Lives in [place] on [address] ([phone], alt [phone])    CHARLIE RAMSAY made call to Barbra TCU SW at [facility] and [facility] to see about readmission to facility. CHARLIE RAMSAY previously received message from her regarding pt's discharge.     CHARLIE RAMSAY made call back to pt's sister, Judith. Left message. Alt phone # went straight to voicemail as well. Noted CHARLIE RAMSAY will begin calling places for TCU admission but also discussed reality of a same day TCU admission from community. May need to present to ED over weekend if needed.     CHARLIE RAMSAY made call to [facility], left message for TCU admissions.     CHARLIE RAMSAY received call back from [facility] admissions, Luciana. Advised to take to ED. Will need a full workup to understand what is going on and to see if there is anything new going on. Easiest way for him to admit as quickly as possible to their facility. They have beds available today, but they are tight so would want them to go as soon as possible to ensure placement. Have SW reach out right when they get there.     CHARLIE RAMSAY made call to Judith, pt's sister and left message.     Judith returned call. Reported pt was weak when he left TCU and hasn't been eating. Unsure why would need to go to ED, nothing has changed.  noted pt has RN visit  today. Would like CC to call other places, inquired about [facility].     CHARLIE CC made call to [facility], admissions Isis. No beds until later next week at least.     CHARLIE CC made call to [facility], admissions Ayesha. Left message. Returned call. No beds all weekend.     CHARLIE CC made call to [facility] TCU, admissions Stephanie. Left message. Received message back that they have one pending discharge this afternoon that would be their only bed, they are looking at other referrals. Fax# [phone].    CHARLIE CC sent Skype message to [facility]. They have female openings only.     CHARLIE CC returned call to sister Wong. Gave update above. Asked about [facility] in [place] - [employer] says permanently closed.  Pt has RN visit at 11:30 am today. Asked CC to keep trying TCUs. CC asked them to have RN call me after assessment today - [facility]. Noted it is really hard to get him out of house by themselves, 2 steps from house to garage ([facility]). Reiterated messaging to present to ED if needed and if this process is taking too much time, advised to possibly ask RN help get pt into car. Agreeable to plan.     CHARLIE CC made call to [facility], admissions. Left message.     CHARLIE CC made call to [facility], admissions. They have one bed open. Send information over. (fax #[phone])    CHARLIE CC made call to Judith with update, gave CC [facility].     CHARLIE RAMSAY sent telephone encounter to MA at clinic to fax information.     Received call back from sister Wong. Elena, PT there. PT spoke to [facility] TCU admissions again and they said clinic can send orders/information to them via fax to review for admission. (fax #[phone])    MA faxed information to both [facility] TCU and [facility] for review for admission.     2 PM Update:  CHARLIE RAMSAY received voicemail from [facility]  admissions. She spoke with her supervisor as well. Not enough new information to go off of since TCU discharge in paperwork that was sent over. Call back to discuss further.     CHARLIE RAMSAY sent message to MA and PCP urging PCP to finish OV note from yesterday to send to TCU.    CHARLIE RAMSAY made call to [facility] to see if they can send notes to TCU. Pt's RN CM is Laura, [phone].    CHARLIE RAMSAY made call to AMBROCIO Sanchez CM and left message [facility], gave fax #.     CHARLIE RAMSAY made call to [facility], admissions. Left message on cell. Received call back. Because he was just discharged, manager said she would want to get auth from [facility] first before admission due to [facility] Medicare Advantage plan. Would have to be next week and could not do today/over the weekend.    CHARLIE RAMSAY made call back to [facility] to see if they can fax home care notes to TCU admissions.  can do this ask, transferred to that line, spoke to Lovely. Asked them to fax notes/assessment to [facility] admissions, she will do so.     [facility] Gave update that home care will send notes. Noted that they need actual dx, medication orders, further consult, further work up etc. They would likely send pt to hospital anyway after arrival. They are not sure who PT staff spoke to but they are again saying pt should go to ED for more workup before admission.     CHARLIE RAMSAY made call back to Judith, pt's sister. Gave update above. Advised to take pt to ED if needed for more workup, placement, and if she cannot care for him. Thanked CHARLIE RAMSAY and hung up.     Updated separate encounter.     Karyna Topete, [facility]   Social Work Clinic Care Coordinator   [facility]  PH: [phone]  [email or web address]

## 2021-02-26 NOTE — ED TRIAGE NOTES
Pt was discharged from a TCU a week ago and has been staying with his sister. She states she is unable to are for him.Wants him to go back a TCU.

## 2021-02-26 NOTE — TELEPHONE ENCOUNTER
"[facility] TCU - send information for possible TCU admission. Please fax these documents to fax #[phone]. Please include my contact information on the fax cover sheet as contact regarding admission.     - Recent H&P/OV notes   print these items from under \"misc reports\" tabs, description:   - Financial summary/report  - Meds patient copy  - Demographics  - Problem list    SHU Mariano   Social Work Clinic Care Coordinator   [facility]  PH: [phone]  [email or web address]     "
Can you also fax this information to [facility] at fax #[phone]?     SHU Mariano   Social Work Clinic Care Coordinator   [facility]  PH: [phone]  [email or web address]   
Faxed office notes from yesterday as well as the below documents.     Karyna Goldstein MA    
TCU admission will not be happening today from the community.     Advised pt's sister to bring pt to ED if she needs to this weekend.      -[facility] (reviewed his information, recommended he go to hospital for more workup)   -[facility] (no beds)   -[facility] (no beds)  -[facility] (no beds)  -[facility] (no beds)  -[facility] (no answer)  -[facility] (reviewed his information, wanted to contact insurance due to recent TCU discharge, cannot do until next week)     SHU Mariano   [facility]  PH: [phone]  [email or web address]   
Home

## 2021-02-26 NOTE — ED NOTES
I was informed of this patient who is in Triage and needs orders to go to [facility] TCU.  This patient was there and went home with his sister and now she can't care for him any more.  [facility] has a bed and can take him they just need the orders.  I tried calling the TCU admission line and left a VM--no call back.  I was able to get a hold of Sun at [facility] and she helped me by getting in touch of the TCU upper mgment.   TCU can not take this patient today, they say there has been change in his condition and he needs a full work up in the ER. They can take him tomorrow if he is medically ready for discharge. I spoke with this patient andn his sister out in triage explaining that I tried to get him into TCU today but  won't accept him.  I explained that he will need to wait in the ER so he can get a work up done and if he needs admission then he will be admitted. I explained that it could be that he is admitted to a different hospital.  This patient and his sister taked about leaving as this patient is very tired.  I explained I tried hard to make this happen for them today but I can't.  They were very understanding and have decided that they will wait to be seen and go from there.   can likely take this patient in the am when they have admission staff available.  I updated the triage RN who asked for my assistance.

## 2021-02-27 ENCOUNTER — APPOINTMENT (OUTPATIENT)
Dept: PHYSICAL THERAPY | Facility: CLINIC | Age: 81
End: 2021-02-27
Payer: COMMERCIAL

## 2021-02-27 VITALS
SYSTOLIC BLOOD PRESSURE: 110 MMHG | TEMPERATURE: 98 F | RESPIRATION RATE: 16 BRPM | HEART RATE: 79 BPM | OXYGEN SATURATION: 97 % | DIASTOLIC BLOOD PRESSURE: 57 MMHG

## 2021-02-27 LAB
ALBUMIN UR-MCNC: NEGATIVE MG/DL
ANION GAP SERPL CALCULATED.3IONS-SCNC: 4 MMOL/L (ref 3–14)
APPEARANCE UR: CLEAR
BILIRUB UR QL STRIP: NEGATIVE
BUN SERPL-MCNC: 13 MG/DL (ref 7–30)
CALCIUM SERPL-MCNC: 8.3 MG/DL (ref 8.5–10.1)
CHLORIDE SERPL-SCNC: 108 MMOL/L (ref 94–109)
CO2 SERPL-SCNC: 25 MMOL/L (ref 20–32)
COLOR UR AUTO: YELLOW
CREAT SERPL-MCNC: 0.86 MG/DL (ref 0.66–1.25)
GFR SERPL CREATININE-BSD FRML MDRD: 82 ML/MIN/{1.73_M2}
GLUCOSE BLDC GLUCOMTR-MCNC: 148 MG/DL (ref 70–99)
GLUCOSE BLDC GLUCOMTR-MCNC: 158 MG/DL (ref 70–99)
GLUCOSE BLDC GLUCOMTR-MCNC: 164 MG/DL (ref 70–99)
GLUCOSE BLDC GLUCOMTR-MCNC: 168 MG/DL (ref 70–99)
GLUCOSE SERPL-MCNC: 164 MG/DL (ref 70–99)
GLUCOSE UR STRIP-MCNC: 300 MG/DL
HGB UR QL STRIP: NEGATIVE
HYALINE CASTS #/AREA URNS LPF: 1 /LPF (ref 0–2)
KETONES UR STRIP-MCNC: NEGATIVE MG/DL
LACTATE BLD-SCNC: 1 MMOL/L (ref 0.7–2)
LEUKOCYTE ESTERASE UR QL STRIP: NEGATIVE
NITRATE UR QL: NEGATIVE
PH UR STRIP: 5 PH (ref 5–7)
POTASSIUM SERPL-SCNC: 3.6 MMOL/L (ref 3.4–5.3)
RBC #/AREA URNS AUTO: <1 /HPF (ref 0–2)
SODIUM SERPL-SCNC: 137 MMOL/L (ref 133–144)
SOURCE: ABNORMAL
SP GR UR STRIP: 1.01 (ref 1–1.03)
SQUAMOUS #/AREA URNS AUTO: <1 /HPF (ref 0–1)
UROBILINOGEN UR STRIP-MCNC: 0 MG/DL (ref 0–2)
WBC #/AREA URNS AUTO: <1 /HPF (ref 0–5)

## 2021-02-27 PROCEDURE — 96372 THER/PROPH/DIAG INJ SC/IM: CPT | Performed by: HOSPITALIST

## 2021-02-27 PROCEDURE — 36415 COLL VENOUS BLD VENIPUNCTURE: CPT | Performed by: HOSPITALIST

## 2021-02-27 PROCEDURE — 83605 ASSAY OF LACTIC ACID: CPT | Performed by: HOSPITALIST

## 2021-02-27 PROCEDURE — 250N000013 HC RX MED GY IP 250 OP 250 PS 637: Mod: GY | Performed by: PHYSICIAN ASSISTANT

## 2021-02-27 PROCEDURE — 80048 BASIC METABOLIC PNL TOTAL CA: CPT | Performed by: HOSPITALIST

## 2021-02-27 PROCEDURE — 81001 URINALYSIS AUTO W/SCOPE: CPT | Performed by: EMERGENCY MEDICINE

## 2021-02-27 PROCEDURE — 99207 PR APP CREDIT; MD BILLING SHARED VISIT: CPT | Performed by: PHYSICIAN ASSISTANT

## 2021-02-27 PROCEDURE — 999N001017 HC STATISTIC GLUCOSE BY METER IP

## 2021-02-27 PROCEDURE — 97530 THERAPEUTIC ACTIVITIES: CPT | Mod: GP

## 2021-02-27 PROCEDURE — G0378 HOSPITAL OBSERVATION PER HR: HCPCS

## 2021-02-27 PROCEDURE — 99217 PR OBSERVATION CARE DISCHARGE: CPT | Performed by: INTERNAL MEDICINE

## 2021-02-27 PROCEDURE — 250N000012 HC RX MED GY IP 250 OP 636 PS 637: Mod: GY | Performed by: HOSPITALIST

## 2021-02-27 PROCEDURE — 250N000013 HC RX MED GY IP 250 OP 250 PS 637: Performed by: HOSPITALIST

## 2021-02-27 PROCEDURE — 97162 PT EVAL MOD COMPLEX 30 MIN: CPT | Mod: GP

## 2021-02-27 RX ORDER — METOPROLOL SUCCINATE 25 MG/1
12.5 TABLET, EXTENDED RELEASE ORAL DAILY
Qty: 90 TABLET | Refills: 0 | DISCHARGE
Start: 2021-02-27 | End: 2021-05-19

## 2021-02-27 RX ORDER — AMOXICILLIN 250 MG
1 CAPSULE ORAL DAILY PRN
Status: DISCONTINUED | OUTPATIENT
Start: 2021-02-27 | End: 2021-02-27 | Stop reason: HOSPADM

## 2021-02-27 RX ORDER — AMOXICILLIN 250 MG
1 CAPSULE ORAL DAILY PRN
DISCHARGE
Start: 2021-02-27 | End: 2021-10-06

## 2021-02-27 RX ADMIN — SERTRALINE HYDROCHLORIDE 50 MG: 50 TABLET ORAL at 08:17

## 2021-02-27 RX ADMIN — METOPROLOL SUCCINATE 12.5 MG: 25 TABLET, EXTENDED RELEASE ORAL at 08:17

## 2021-02-27 RX ADMIN — INSULIN GLARGINE 7 UNITS: 100 INJECTION, SOLUTION SUBCUTANEOUS at 00:20

## 2021-02-27 RX ADMIN — METFORMIN HYDROCHLORIDE 1000 MG: 500 TABLET, FILM COATED ORAL at 09:13

## 2021-02-27 RX ADMIN — ASPIRIN 81 MG CHEWABLE TABLET 81 MG: 81 TABLET CHEWABLE at 08:17

## 2021-02-27 RX ADMIN — OMEPRAZOLE 20 MG: 20 CAPSULE, DELAYED RELEASE ORAL at 08:17

## 2021-02-27 NOTE — PROGRESS NOTES
02/27/21 1302   Quick Adds   Type of Visit Initial PT Evaluation   Living Environment   People in home alone   Current Living Arrangements assisted living   Transportation Anticipated family or friend will provide   Living Environment Comments Pt lives in IND living however has been staying with family. Pt has to negotiate stairs at family [place]   Self-Care   Usual Activity Tolerance good   Current Activity Tolerance poor   Equipment Currently Used at Home shower chair;walker, standard   Activity/Exercise/Self-Care Comment Per chart review: Patient reports independence in ADls- dressing, bathing, toileting. Modified independence with ambulation- 4WW. Patient independent with medication management. Patient has assistance with home management, cleaning and laundry.    Disability/Function   Fall history within last six months no   Change in Functional Status Since Onset of Current Illness/Injury yes   General Information   Onset of Illness/Injury or Date of Surgery 02/26/21   Referring Physician Patient reports independence in ADls- dressing, bathing, toileting. Modified independence with ambulation- 4WW. Patient independent with medication management. Patient has assistance with home management, cleaning and laundry.    Pertinent History of Current Problem (include personal factors and/or comorbidities that impact the POC) Pt admitted with weakness and failure to thrive see chart for further details   Existing Precautions/Restrictions fall   Cognition   Orientation Status (Cognition) person;place   Cognitive Status Comments Pt forgetful    Pain Assessment   Patient Currently in Pain No   Posture    Posture Forward head position   Range of Motion (ROM)   ROM Comment BLE WFL   Strength   Strength Comments Gross functional weakness, min A to stand from EOB very effortful   Bed Mobility   Comment (Bed Mobility) Supine > sit with min A and use of bedrail    Transfers   Transfer Safety Comments STS with min A and use of  FWW    Gait/Stairs (Locomotion)   Distance in Feet (Required for LE Total Joints) 5' for evaluation,    Comment (Gait/Stairs) Pt ambulates with FWW, min A, unsteady, unsafe proximity to walker, unable to obtain full upright posture   Balance   Balance Comments Fair-poor dynamic balance with use of FWW    Clinical Impression   Criteria for Skilled Therapeutic Intervention yes, treatment indicated   PT Diagnosis (PT) impaired IND with functional mobility from baseline   Influenced by the following impairments weakness, balance, activity tolerance   Functional limitations due to impairments see above   Clinical Presentation Evolving/Changing   Clinical Presentation Rationale co morbidities, clinical judgement   Clinical Decision Making (Complexity) moderate complexity   Therapy Frequency (PT) 3x/week   Predicted Duration of Therapy Intervention (days/wks) 1 week   Planned Therapy Interventions (PT) balance training;bed mobility training;gait training;strengthening;transfer training   Risk & Benefits of therapy have been explained evaluation/treatment results reviewed;risks/benefits reviewed;care plan/treatment goals reviewed;patient   PT Discharge Planning    PT Discharge Recommendation (DC Rec) Transitional Care Facility   PT Rationale for DC Rec Pt will require TCU stay to improve functional strength, balance, activity tolerance as pt below baseline at this time. Currently pt CGA-min A with bed mobility, transfers, gait. Pt demonstrates unsteady gait and unsafe proximity to walker placing him at incresaed risk for falls    Total Evaluation Time   Total Evaluation Time (Minutes) 12

## 2021-02-27 NOTE — PHARMACY-ADMISSION MEDICATION HISTORY
Pharmacy Medication History  Admission medication history interview status for the 2/26/2021  admission is complete. See EPIC admission navigator for prior to admission medications     Location of Interview: Phone  Medication history sources: Patient's family/friend (SisterJudith), Surescripts and Care Everywhere    Significant changes made to the medication list:  Changed: insulin decreased from 16 units to 14 units    In the past week, patient estimated taking medication this percent of the time: greater than 90%    Additional medication history information:   Sister reports patient struggles to swallow pills, has been crushing and giving with pudding.     Lupron injection is due mid March, exact date unknown.    Medication reconciliation completed by provider prior to medication history? No    Time spent in this activity: 20 minutes    Prior to Admission medications    Medication Sig Last Dose Taking? Auth Provider   aspirin (ASA) 81 MG chewable tablet Take 81 mg by mouth daily 2/26/2021 at AM Yes Unknown, Entered By History   atorvastatin (LIPITOR) 40 MG tablet TAKE ONE TABLET BY MOUTH EVERY DAY 2/26/2021 at AM Yes Nolberto Ferrari MD   Cholecalciferol (VITAMIN D) 50 MCG (2000 UT) CAPS Take 2,000 Units by mouth daily 2/26/2021 at AM Yes Baldemar Raymond MD   insulin glargine (LANTUS VIAL) 100 UNIT/ML vial Inject 14 Units Subcutaneous At Bedtime 2/25/2021 at Unknown time Yes Unknown, Entered By History   Leuprolide Acetate, 6 Month, (LUPRON DEPOT, 6-MONTH, IM)   at Unknown, ~5 months ago Yes Reported, Patient   metFORMIN (GLUCOPHAGE) 500 MG tablet TAKE 2 TABLETS BY MOUTH TWICE A DAY WITH MEALS 2/26/2021 at AM Yes Nolberto Ferrari MD   metoprolol succinate ER (TOPROL-XL) 25 MG 24 hr tablet TAKE ONE TABLET BY MOUTH EVERY DAY 2/26/2021 at AM Yes Nolberto Ferrari MD   omeprazole (PRILOSEC) 20 MG DR capsule TAKE 1 CAPSULE (20 MG) BY MOUTH DAILY 2/26/2021 at AM Yes Jermain Helm MD   sertraline (ZOLOFT) 50 MG tablet  Take 50 mg by mouth every morning 2/26/2021 at AM Yes Reported, Patient   ACE/ARB/ARNI NOT PRESCRIBED, INTENTIONAL, ACE & ARB not prescribed due to Symptomatic hypotension not due to excessive diuresis  Patient not taking: Reported on 2/17/2021   Mariela Stauffer MD   Continuous Glucose Monitor Sup MISC 4 times daily (before meals and nightly)    Reported, Patient       The information provided in this note is only as accurate as the sources available at the time of update(s)

## 2021-02-27 NOTE — PROGRESS NOTES
Care Management Discharge Note    Discharge Date:         Discharge Disposition:      Discharge Services:      Discharge DME:      Discharge Transportation: family or friend will provide    Private pay costs discussed: Not applicable    PAS Confirmation Code:  [id number]  Patient/family educated on Medicare website which has current facility and service quality ratings:      Education Provided on the Discharge Plan:    Persons Notified of Discharge Plans: patient and his sister  Patient/Family in Agreement with the Plan:      Handoff Referral Completed: Yes    Additional Information  See previous note for details.  Patient discharging today to [facility] at 1700      VENECIA Berry    Addendum added on 2/28  [facility] TCU admissions believed the hospital needed to submit SNF request to [facility].  This was completed by paper referral and faxed to [facility] on 2/28. Requested [facility] follow up with [facility].

## 2021-02-27 NOTE — PROGRESS NOTES
Pt admitted for increased weakness 1 weak after discharge from TCU. Sister has been caring for pt but now feels pt needs more care and another admission to TCU. HX: DMII.  O2=RA. Diet=mech soft/dental soft. Decreased appetite. .  Wounds on BLE covered and healing. IV=SL in a forearm. Lacticacid=2.5. K+ corrected in ED next  Lab pending. Blood CX pending. PT/OT eval and SW for discharge planning.

## 2021-02-27 NOTE — PROGRESS NOTES
RECEIVING UNIT ED HANDOFF REVIEW    ED Nurse Handoff Report was reviewed by: Odalys Lawson RN on February 26, 2021 at 9:06 PM

## 2021-02-27 NOTE — PLAN OF CARE
PRIMARY DIAGNOSIS: GENERALIZED WEAKNESS    OUTPATIENT/OBSERVATION GOALS TO BE MET BEFORE DISCHARGE  1. Orthostatic performed: NA    2. Tolerating PO medications: Yes    3. Return to near baseline physical activity: No    4. Cleared for discharge by consultants (if involved): No    Discharge Planner Nurse   Safe discharge environment identified: No  Barriers to discharge: Yes       Entered by: Shelly Bell 02/27/2021 5:32 AM     Please review provider order for any additional goals.   Nurse to notify provider when observation goals have been met and patient is ready for discharge.

## 2021-02-27 NOTE — PROGRESS NOTES
[unfilled]                                                                              [facility]      OUTPATIENT PHYSICAL THERAPY EVALUATION  PLAN OF TREATMENT FOR OUTPATIENT REHABILITATION  (COMPLETE FOR INITIAL CLAIMS ONLY)  Patient's Last Name, First Name, M.I.  YOB: 1940  Jose Lees                        Provider's Name  [facility] Medical Record No.  [id number]                               Onset Date:  02/26/21   Start of Care Date:  02/26/21      Type:     _X_PT   ___OT   ___SLP Medical Diagnosis:  Weakness, failure to thrive                        PT Diagnosis:  impaired IND with functional mobility from baseline   Visits from SOC:  1   _________________________________________________________________________________  Plan of Treatment/Functional Goals    Planned Interventions: balance training, bed mobility training, gait training, strengthening, transfer training     Goals: See Physical Therapy Goals on Care Plan in [facility] electronic health record.    Therapy Frequency: 3x/week  Predicted Duration of Therapy Intervention: 1 week  _________________________________________________________________________________    I CERTIFY THE NEED FOR THESE SERVICES FURNISHED UNDER        THIS PLAN OF TREATMENT AND WHILE UNDER MY CARE     (Physician co-signature of this document indicates review and certification of the therapy plan).                Certification date from: 02/27/21, Certification date to: 03/03/21    Referring Physician: Michelle Sanchez PA-C            Initial Assessment        See Physical Therapy evaluation dated 02/26/21 in Epic electronic health record.

## 2021-02-27 NOTE — CONSULTS
Care Management Initial Consult    General Information  Assessment completed with: Care Team Member, SUSY-chart review,    Type of CM/SW Visit: Initial Assessment    Primary Care Provider verified and updated as needed:     Readmission within the last 30 days:           Advance Care Planning:            Communication Assessment  Patient's communication style: spoken language (English or Bilingual)    Hearing Difficulty or Deaf: no   Wear Glasses or Blind: no    Cognitive  Cognitive/Neuro/Behavioral: WDL                      Living Environment:   People in home:       Current living Arrangements: assisted living      Able to return to prior arrangements: no       Family/Social Support:  Care provided by:    Provides care for:       Sibling(s)          Description of Support System: Supportive, Involved         Current Resources:   Patient receiving home care services: Yes     Community Resources:    Equipment currently used at home: shower chair, walker, standard  Supplies currently used at home:      Employment/Financial:  Employment Status:          Financial Concerns:             Lifestyle & Psychosocial Needs:        Socioeconomic History     Marital status: Single     Spouse name: Not on file     Number of children: Not on file     Years of education: Not on file     Highest education level: Not on file     Tobacco Use     Smoking status: Never Smoker     Smokeless tobacco: Never Used   Substance and Sexual Activity     Alcohol use: No     Comment: never     Drug use: No     Sexual activity: Not Currently     Partners: Male       Functional Status:  Prior to admission patient needed assistance:              Mental Health Status:          Chemical Dependency Status:                Values/Beliefs:  Spiritual, Cultural Beliefs, Adventist Practices, Values that affect care:                 Additional Information:  Patient lives at [facility].  Was recently at [facility]. Upon discharge went to stay  with sister and her spouse.  Was receiving [facility] for skilled services.  Per report, sister was unable to care for patient as he was eating poorly and became weak.   She brought him into our ED as [facility] wanted a medical workup before accepting patient due to the change in his condition.  Writer along with care team provided assessment information to [facility]. Information included malnutrition due to dysphagia and subsequent weakness. PT assessed patient as being below baseline and appropriate for daily therapy.  [facility] in admissions did accept patient back into their TCU.  The rehab team at the TCU will be able to determine if patient will need a long term plan for a higher level of care then returning to his apartment at [facility].  Orders have been faxed and sister and he spouse will transport patient at 1700 to [facility].    JUAN BerrySW

## 2021-02-27 NOTE — ED NOTES
Pt resting on stretcher, pt is alert and oriented x4, speaking full clear sentences, respirations non-labored, skin warm dry and intact, strong pulses noted throughout.  Pt denies any pain or needs at this time, rn will continue to monitor.

## 2021-02-27 NOTE — PLAN OF CARE
Physical Therapy Discharge Summary    Reason for therapy discharge:    Discharged to transitional care facility.    Progress towards therapy goal(s). See goals on Care Plan in [facility] electronic health record for goal details.  Goals partially met.  Barriers to achieving goals:   discharge from facility.    Therapy recommendation(s):    Continued therapy is recommended.  Rationale/Recommendations:  To improve IND and safety with functional mobility as pt below baseline at this time .

## 2021-02-27 NOTE — PROGRESS NOTES
[facility]    Hospitalist Progress Note      Assessment & Plan   Jose Lees is a 80 year old male  with a past medical history significant for coronary artery disease, hyperlipidemia, history of embolic strokes in 2014, type 2 diabetes, autism, prostate cancer for which he follows with Urology in [facility], systolic CHF, depression, and recent admission for dysphagia with esophageal stricture and candidiasis who was admitted on 2/26/2021 for assistance with placement due to FTT.     Generalized weakness, failure to thrive:   Malnutrition     Before last hospitalization in late January, he was at independent living, and after discharge from hospital was sent to TCU where he stayed approximately 3 weeks. Discharged home with family care but requires more assistance. No evidence of acute illness/infection. Suspect poor nutritional status in the setting of esophageal stricture has been contributing to worsening weakness as well.    --PT consult  --SW  --nutrition consulted, appreciate assistance    History of esophageal stricture and Candida esophagitis:  He recently received dilatations on 01/27/21.  EGD noted likely candidal esophagitis.  He has completed a 2-week course of fluconazole.  He is supposed to follow-up with [facility] for followup EGD.  Currently he denies any difficulty swallowing but continues to have poor po intake per family. We are working on getting his dentures from home.   --mechanical dental soft diet.    -- Nutrition consult      Diabetes mellitus, uncontrolled:  His last HbA1C from 01/26 was 11.4, was started on insulin during last hospitalization.  PTA dose of Lantus 14 units. -- Given his poor p.o. intake, we will start Lantus at 7 units and can up-titrate as needed.   --continue metformin  -sliding scale as needed    History of coronary artery disease with stenting in 2014.  Chronic HFrEF, EF 35-40%  HTN  HLD  Blood pressure was soft on  admission, 88/51.  He did receive 2 liters of IV fluids with improvement to low 100s.  Likely, soft blood pressure secondary to poor p.o. intake.  His last echocardiogram from was noted with EF 35-40%, grade 1 early diastolic dysfunction.  He is not on diuretics PTA.  -- We will hold off on further IV hydration at this time and monitor clinically.     -- PTA Toprol-XL decreased to 12.5 mg daily with hold parameters on admit  -- Will hold off on PTA Lipitor while under observation status.   -- We will continue with aspirin.     Gastroesophageal reflux disease:  Continue Prilosec.     Autism/cognitive impairment:  He is alert, awake, oriented x 3.  Per notes reviewed during recent admission patient is own decision maker. Sister, July is medical POA. Recent SLUMS 24/30 per chart review.     History of prostate cancer with bony metastasis:  He follows up with Dr. Burgos from [facility] and Dr. Best from Urology.  Continue Lupron as an outpatient.  Follow up with Oncology as outpatient.      Elevated lactate, resolved:   Lactic acid was initially elevated at 2.9, trended down to 2.5>1.0 with IV hydration.   No clear concern for infection at this time.     Hypokalemia. Replace per protocol      Chronic LE wounds. Followed during recent TCU stay with plans for outpatient dermatology follow up.   Wound care per recent TCU regimen:  Change dressings every 3 days. cleanse with wound cleanser. Change foam dressing every 3 days. BLE wounds: change Q3D Cleanse with VASHE by letting Vashe moistened gauze sit on the wounds x 5 minutes. Don't rinse off Apply Medihoney to Mepilex lite and apply to wound beds. Apply sween cream 24 to intact skin. Apply Dermawear-DO NOT throw away-wash soiled edemawear. Wash in really hot water with laundry soap or baby shampoo, may need to do several times. Rinse well Hang dry     DVT Prophylaxis: Pneumatic Compression Devices  Code Status: No CPR- Do NOT Intubate    Disposition: Expected  discharge, likely to TCU, once PT assessment complete and safe dispo available    Patient was discussed with Dr. Chávez who agrees with the above plan     Michelle Sanchez PA-C    Interval History   Doing well toady. Reports he is not having difficulty swallowing but does not have his dentures this morning. He denies any pain, dyspnea, nausea, dizziness.     -Data reviewed today: I reviewed all new labs and imaging results over the last 24 hours. I personally reviewed no images or EKG's today.    Physical Exam   Temp: 98.7  F (37.1  C) Temp src: Axillary BP: 123/57 Pulse: 79   Resp: 16 SpO2: 97 % O2 Device: None (Room air)    There were no vitals filed for this visit.  Vital Signs with Ranges  Temp:  [95.6  F (35.3  C)-98.7  F (37.1  C)] 98.7  F (37.1  C)  Pulse:  [] 79  Resp:  [16-18] 16  BP: ()/(48-95) 123/57  SpO2:  [96 %-100 %] 97 %  I/O last 3 completed shifts:  In: 360 [P.O.:360]  Out: 450 [Urine:450]    Constitutional: Awake and alert. Sitting up in bed. Appears comfortable and is pleasantly conversant   ENT:  moist mucous membranes  Eyes:  Sclera anicteric, EOMI  Respiratory: Lungs clear to auscultation bilaterally, no increased work of breathing  Cardiovascular: Regular rate and rhythm  GI: active bowel sounds, abdomen soft, non-tender  MSK:  Moves all four extremities.   Neuro:  Speech is clear. Face symmetric. Follows commands.       Medications       aspirin  81 mg Oral Daily     insulin aspart  1-7 Units Subcutaneous TID AC     insulin aspart  1-5 Units Subcutaneous At Bedtime     insulin glargine  7 Units Subcutaneous At Bedtime     metoprolol succinate ER  12.5 mg Oral Daily     omeprazole  20 mg Oral Daily     sertraline  50 mg Oral QAM       Data   Recent Labs   Lab 02/27/21  0634 02/26/21  2223 02/26/21  1737 02/25/21  1612   WBC  --   --  7.4 7.2   HGB  --   --  13.3 13.8   MCV  --   --  88 90   PLT  --   --  295 271     --  133 132*   POTASSIUM 3.6 4.1 3.3* 3.8   CHLORIDE  108  --  99 101   CO2 25  --  25 22   BUN 13  --  15 17   CR 0.86  --  0.97 0.94   ANIONGAP 4  --  9 9   TENZIN 8.3*  --  9.2 9.7   *  --  264* 256*   ALBUMIN  --   --   --  2.8*   PROTTOTAL  --   --   --  7.2   BILITOTAL  --   --   --  0.4   ALKPHOS  --   --   --  76   ALT  --   --   --  17   AST  --   --   --  31       No results found for this or any previous visit (from the past 24 hour(s)).

## 2021-02-27 NOTE — ED PROVIDER NOTES
History   Chief Complaint:  Generalized Weakness       HPI   Jose Lees is a 80 year old male with complex medical history significant for CAD, embolic stroke, atrial fibrillation, DM, autism, and metastatic prostate cancer who presents with generalized weakness.  The patient was admitted 1/26 - 1/29/21 with nausea, vomiting, and diarrhea.  He was found to have esophageal candidiasis and esophageal stricture.  He was discharged to [facility] for rehab and discharged to his sister's house 8 days ago.  His sister was surprised at how weak he was compared to when he was admitted to the hospital.  Prior to admission, he was able to ambulate with his walker and now cannot really ambulate without assistance.  He feels generally tired and weak.  He has not been eating much due to nausea and this also makes it difficult for him to swallow his pills.  He denies any pain with swallowing.  He has chronic leg wounds that are being treated and his sister states these are improving nicely.  No new wounds, spreading redness, fever, or chills.  He saw his PCP yesterday who thought he may need to go back to the TCU and apparently this was being arranged.  However, due to change in his condition the TCU requested he be fully evaluated before he can be admitted there and they are also not able to take him this evening.    Review of Systems   Constitutional: Positive for appetite change and fatigue. Negative for chills and fever.   HENT: Negative for trouble swallowing.    Respiratory: Negative for cough and shortness of breath.    Gastrointestinal: Positive for nausea. Negative for abdominal pain and vomiting.   Skin: Positive for wound.   Neurological: Positive for weakness.   ROS limited by cognitive impairment.    Allergies:  No known drug allergies.     Medications:  Aspirin  Atorvastatin  Metoprolol   Lantus  Metformin  Lupron  Omeprazole   Sertraline     Past Medical History:    Congestive heart  failure  Coronary artery disease   Hyperlipidemia   Atrial fibrillation  Prostate cancer   Embolic stroke  Diabetes mellitus   Esophageal stricture  Dementia   Autism disorder   Major depressive disorder       Past Surgical History:    Esophagogastroduodenoscopy with dilation 1/28/21  Coronary angioplasty  Phacoemulsification clear cornea with intraocular lens implantation      Family History:    Stroke - mother  Coronary artery disease - father     Social History:  Presents to the ED with his sister, Judith.  PCP: Nolberto Ferrari     Physical Exam     Patient Vitals for the past 24 hrs:   BP Temp Temp src Pulse Resp SpO2   02/26/21 2000 (!) 108/95 -- -- 88 18 97 %   02/26/21 1845 111/48 -- -- 82 -- 100 %   02/26/21 1830 110/61 -- -- 85 -- --   02/26/21 1815 107/55 -- -- 88 -- --   02/26/21 1800 95/48 -- -- 87 -- --   02/26/21 1745 94/53 -- -- 71 -- 96 %   02/26/21 1730 (!) 88/51 -- -- -- -- 97 %   02/26/21 1500 96/52 98  F (36.7  C) Temporal 103 16 --       Physical Exam  General: Sitting up in bed  Eyes:  The pupils are equal and round    Conjunctivae and sclerae are normal  ENT:    Wearing a mask  Neck:  Normal range of motion  CV:  Regular rate, regular rhythm     Skin warm and well perfused   Resp:  Non labored breathing on room air    No tachypnea    No cough heard  GI:  Abdomen is soft, there is no rigidity    No distension    No rebound tenderness     No abdominal tenderness  MS:  Normal muscular tone  Skin:  Bilateral LE wrapped but wounds on right lower extremity healing well. No erythema or drainage  Neuro:   Awake, alert.      Speech is normal and fluent.    Face is symmetric.     Moves all extremities equally  Psych: Normal affect.  Appropriate interactions.    Emergency Department Course   ECG  ECG taken at 1822, ECG read at 1824  Normal sinus rhythm. Left axis deviation. Septal infarct, age undetermined. Prolonged QT. Abnormal ECG.   No significant change as compared to prior, dated 1/26/18.  Rate 87 bpm.  WV interval 134 ms. QRS duration 108 ms. QT/QTc 4450/541 ms. P-R-T axes 55 -58 53.     Laboratory:  CBC: HCT 39.9 (L), otherwise WNL (WBC 7.4, HGB 13.3, )    BMP: K 3.3 (L), Glucose 264 (H), otherwise WNL (Creatinine 0.97)   (1834) Lactic Acid: 2.9 (H)    (1940) Lactic Acid: 2.5 (H)    Blood Culture: pending x 2     UA: pending collection    COVID19 Virus PCR, nasopharyngeal: negative     Emergency Department Course:  Reviewed:  I reviewed nursing notes, vitals and past medical history    Assessments:  (1808) I obtained history and examined the patient as noted above.     Consults:  (2040) I discussed the patient with Dr. Bhatt of the hospitalist service, who will place the patient in observation for further monitoring, evaluation, and treatment.       Interventions:  (1820) Normal Saline, 1 liter, IV bolus   (1845) Normal Saline, 1 liter, IV bolus   (1946) Potassium chloride, 40 mEq, PO    Disposition:  The patient was admitted to the hospital under the care of Dr. Bhatt.     Impression & Plan     Medical Decision Making:  Jose Lees is a 80 year old female who presented to the ED with generalized weakness. Patient looks well. Has no specific complaints. No source of infection found on history/exam. No respiratory concerns, hypoxia or shortness of breath to suggest pneumonia. UA pending. No abdominal tenderness. Non focal exam and doubt CVA. Sister unable to care for patient given the generalized weakness. Clarissa care coordinator attempted to get him placed but unable to take him today. Lactic acid mildly elevated, likely from dehydration, poor oral intake. Given well exam, no fever, will hold off on antibiotics. Will hospitalize until placement can be determined.    Covid-19  Jose Lees was evaluated during a global COVID-19 pandemic, which necessitated consideration that the patient might be at risk for infection with the SARS-CoV-2 virus that causes COVID-19.   Applicable protocols  for evaluation were followed during the patient's care.   COVID-19 was considered as part of the patient's evaluation. The plan for testing is:  a test was obtained during this visit.    Diagnosis:    ICD-10-CM    1. Generalized muscle weakness  M62.81    2. Elevated lactic acid level  R79.89        Scribe Disclosure:  I, Rebeca Camarena, am serving as a scribe at 6:07 PM on 2/26/2021 to document services personally performed by Laney Perez MD based on my observations and the provider's statements to me.         Laney Perez MD  02/27/21 0101

## 2021-02-27 NOTE — CONSULTS
"CLINICAL NUTRITION SERVICES  -  ASSESSMENT NOTE      Recommendations Ordered by Registered Dietitian (RD):   - Please obtain updated weight as able (?bedscale)  - Boost Glucose Control (Strawberry) BID   Malnutrition:   % Weight Loss:  Up to 5% in 1 month (non-severe malnutrition) (at least)  % Intake:  </= 50% for >/= 5 days (severe malnutrition)  Subcutaneous Fat Loss:  Moderate-severe total body losses  Muscle Loss:  Moderate-severe total body losses   Fluid Retention:  None noted    Malnutrition Diagnosis: Severe malnutrition  In Context of:  Acute illness or injury        REASON FOR ASSESSMENT  Jose Lees is a 80 year old male seen by Registered Dietitian for Patient/Family Request      NUTRITION HISTORY  - Information obtained from chart review ~  - PMH includes DM2, HTN, CAD w/ stents, prostate cancer, CHF, GERD, autism/cognitive impairment, CVA, esophageal stricture and Candida esophagitis with a recent dilatation  - Recently at [facility] transitional care unit for 3 weeks and discharged 1 week ago to stay with family   - Previous hospital admission this January 2021 and was seen by the RD on 1/28/21. He met criteria for malnutrition and yes receptive to nutrition supplements     - Visited with patient at bedside this morning ~  - Pt reports that he has been eating less for \"quite a few weeks\" given poor appetite and swallowing issues  - Was on a soft food diet at TCU and recalls receiving meals TID and a protein supplement BID although do not suspect patient was consistently eating more than half of these meals  - When pt was at his sisters this past week, pt states that he was eating even less d/t a poor appetite and inconsistent denture availability. During this past week, pt was consuming mostly ice cream, pudding, protein drinks and gingerale   - He last recalls weighing ~150 lbs, which shocked him     CURRENT NUTRITION ORDERS  Diet Order:     Mechanical/Dental Soft     Current " "Intake/Tolerance:  Pt has had 1 meal so far. He does not have his dentures and is eager for a family member to drop them off       NUTRITION FOCUSED PHYSICAL ASSESSMENT FOR DIAGNOSING MALNUTRITION)  Completed          Observed:    Muscle wasting (refer to documentation in Malnutrition section) and Subcutaneous fat loss (refer to documentation in Malnutrition section)    Obtained from Chart/Interdisciplinary Team:  None noted    ANTHROPOMETRICS  Height: 5' 9\"  Weight: 153 lbs (69.6 kg) from 2/15 -- no admit weight obtained   BMI: 22.64 kg/m^2  Weight Status:  Normal BMI  IBW: 72.7 kg  % IBW: 96%  Weight History: Suspect patient has lost weight over the past 2 weeks --> will need an updated weight. Pt appears down 9 lbs (5%) in the past month at least   Wt Readings from Last 10 Encounters:   02/15/21 69.6 kg (153 lb 6.4 oz)   02/14/21 68.3 kg (150 lb 8 oz)   02/11/21 70.6 kg (155 lb 9.6 oz)   02/07/21 69.9 kg (154 lb)   02/04/21 69.7 kg (153 lb 9.6 oz)   01/31/21 68.9 kg (151 lb 12.8 oz)   01/26/21 73.5 kg (162 lb)   01/20/20 76.7 kg (169 lb)   12/10/19 77.1 kg (170 lb)   11/25/19 78.2 kg (172 lb 8 oz)       LABS  Labs reviewed  Recent Labs   Lab 02/27/21  0800 02/27/21  0634 02/27/21  0436 02/27/21  0017 02/26/21  2246 02/26/21  1737 02/25/21  1612   GLC  --  164*  --   --   --  264* 256*   *  --  168* 148* 161*  --   --      Lab Results   Component Value Date    URINEKETONE Negative 02/27/2021     Lab Results   Component Value Date    A1C 11.4 01/26/2021    A1C 11.8 01/20/2020    A1C 10.2 11/08/2019    A1C 11.6 05/20/2019    A1C 9.9 02/08/2019       MEDICATIONS  Medications reviewed  Lantus     ASSESSED NUTRITION NEEDS PER APPROVED PRACTICE GUIDELINES:    Dosing Weight 69.6 kg - most recent   Estimated Energy Needs: 3276-7881+ kcals (25-30+ Kcal/Kg)  Justification: maintenance and WCB  Estimated Protein Needs:  grams protein (1.2-1.5 g pro/Kg)  Justification: Repletion and preservation of lean body mass "   Estimated Fluid Needs: 1 mL/kcal or per provider       MALNUTRITION:  % Weight Loss:  Up to 5% in 1 month (non-severe malnutrition) (at least)  % Intake:  </= 50% for >/= 5 days (severe malnutrition)  Subcutaneous Fat Loss:  Moderate-severe total body losses  Muscle Loss:  Moderate-severe total body losses   Fluid Retention:  None noted    Malnutrition Diagnosis: Severe malnutrition  In Context of:  Acute illness or injury      NUTRITION DIAGNOSIS:  Inadequate oral intake related to decreased appetite and swallowing difficulty as evidenced by intakes </=50% for >/=1 week, wt loss up to 5% in 1 month and e/o total body fat and muscle depletion       NUTRITION INTERVENTIONS  Recommendations / Nutrition Prescription  Mech/dental soft diet   Boost GC BID      Implementation  Nutrition education: Per Provider order if indicated   Medical Food Supplement: as above     Nutrition Goals  Pt will consume >/=50% meals TID and supplements BID       MONITORING AND EVALUATION:  Progress towards goals will be monitored and evaluated per protocol and Practice Guidelines        Lilli Gonzalez RD, LD  Clinical Dietitian

## 2021-02-27 NOTE — PLAN OF CARE
AVSS; pt denied pain; purewick in place with adequate urine output; pt turned and repositioned; BLE wounds with dressings and netting in place; UA sent; holter monitor from prior hospitalization in place; pt drinking fluids without difficulty.    Call for follow up with Dr. Joseph within the next 24-48 hours.    Chest Pain    Chest pain can be caused by many different conditions which may or may not be dangerous. Causes include heartburn, lung infections, heart attack, blood clot in lungs, skin infections, strain or damage to muscle, cartilage, or bones, etc. In addition to a history and physical examination, an electrocardiogram (ECG) or other lab tests may have been performed to determine the cause of your chest pain. Follow up with your primary care provider or with a cardiologist as instructed.     SEEK IMMEDIATE MEDICAL CARE IF YOU HAVE ANY OF THE FOLLOWING SYMPTOMS: worsening chest pain, coughing up blood, unexplained back/neck/jaw pain, severe abdominal pain, dizziness or lightheadedness, fainting, shortness of breath, sweaty or clammy skin, vomiting, or racing heart beat. These symptoms may represent a serious problem that is an emergency. Do not wait to see if the symptoms will go away. Get medical help right away. Call 911 and do not drive yourself to the hospital.

## 2021-02-27 NOTE — PLAN OF CARE
A&Ox4. VSS on RA. A1 GB&W. Shifting weight while in bed. Some chay redness and scab to buttocks. BLE wounds CDI. Mechanical soft diet, poor appetite. Incontinent at times, external catheter in place. Denies pain. Plan pending placement at TCU.     PRIMARY DIAGNOSIS: GENERALIZED WEAKNESS     OUTPATIENT/OBSERVATION GOALS TO BE MET BEFORE DISCHARGE  1. Orthostatic performed: NA     2. Tolerating PO medications: Yes     3. Return to near baseline physical activity: No     4. Cleared for discharge by consultants (if involved): No     Discharge Planner Nurse   Safe discharge environment identified: No  Barriers to discharge: Yes       Entered by: Shelly Bell 02/27/2021 5:32 AM  Please review provider order for any additional goals.   Nurse to notify provider when observation goals have been met and patient is ready for discharge.

## 2021-02-27 NOTE — H&P
H & P dictated # [id number]    -Observation admission for generalized weakness, failure to thrive  -recently admitted 1/26 to 1/29 for dysphagia, esophageal stricture requiring esophageal dilatation, was sent to [facility] TCU from where he got discharged one week ago to his sister's house and family now unable to care for the patient due to generalized weakness  -soft BP and lactate improving; no clear concern for infection  -PT/OT evaluation and  for disposition planning

## 2021-02-27 NOTE — PROGRESS NOTES
02/27/21 1302   Quick Adds   Type of Visit Initial PT Evaluation   Living Environment   People in home alone   Current Living Arrangements assisted living   Transportation Anticipated family or friend will provide   Living Environment Comments Pt lives in IND living however has been staying with family. Pt has to negotiate stairs at family [place]   Self-Care   Usual Activity Tolerance good   Current Activity Tolerance poor   Equipment Currently Used at Home shower chair;walker, standard   Activity/Exercise/Self-Care Comment Per chart review: Patient reports independence in ADls- dressing, bathing, toileting. Modified independence with ambulation- 4WW. Patient independent with medication management. Patient has assistance with home management, cleaning and laundry.    Disability/Function   Fall history within last six months no   Change in Functional Status Since Onset of Current Illness/Injury yes   General Information   Onset of Illness/Injury or Date of Surgery 02/26/21   Referring Physician Michelle Sanchez PA-C   Pertinent History of Current Problem (include personal factors and/or comorbidities that impact the POC) Pt admitted with weakness and failure to thrive see chart for further details   Existing Precautions/Restrictions fall   Cognition   Orientation Status (Cognition) person;place   Cognitive Status Comments Pt forgetful    Pain Assessment   Patient Currently in Pain No   Posture    Posture Forward head position   Range of Motion (ROM)   ROM Comment BLE WFL   Strength   Strength Comments Gross functional weakness, min A to stand from EOB very effortful   Bed Mobility   Comment (Bed Mobility) Supine > sit with min A and use of bedrail    Transfers   Transfer Safety Comments STS with min A and use of FWW    Gait/Stairs (Locomotion)   Distance in Feet (Required for LE Total Joints) 5' for evaluation,    Comment (Gait/Stairs) Pt ambulates with FWW, min A, unsteady, unsafe proximity to walker, unable  to obtain full upright posture   Balance   Balance Comments Fair-poor dynamic balance with use of FWW    Clinical Impression   Criteria for Skilled Therapeutic Intervention yes, treatment indicated   PT Diagnosis (PT) impaired IND with functional mobility from baseline   Influenced by the following impairments weakness, balance, activity tolerance   Functional limitations due to impairments see above   Clinical Presentation Evolving/Changing   Clinical Presentation Rationale co morbidities, clinical judgement   Clinical Decision Making (Complexity) moderate complexity   Therapy Frequency (PT) 3x/week   Predicted Duration of Therapy Intervention (days/wks) 1 week   Planned Therapy Interventions (PT) balance training;bed mobility training;gait training;strengthening;transfer training   Risk & Benefits of therapy have been explained evaluation/treatment results reviewed;risks/benefits reviewed;care plan/treatment goals reviewed;patient   PT Discharge Planning    PT Discharge Recommendation (DC Rec) Transitional Care Facility   PT Rationale for DC Rec Pt will require TCU stay to improve functional strength, balance, activity tolerance as pt below baseline at this time. Currently pt CGA-min A with bed mobility, transfers, gait. Pt demonstrates unsteady gait and unsafe proximity to walker placing him at incresaed risk for falls    Therapy Certification   Start of care date 02/26/21   Certification date from 02/27/21   Certification date to 03/03/21   Medical Diagnosis Weakness, failure to thrive   Total Evaluation Time   Total Evaluation Time (Minutes) 12

## 2021-02-27 NOTE — H&P
Admitted:     02/26/2021      PRIMARY CARE PHYSICIAN:  Nolberto Ferrari MD      CHIEF COMPLAINT:  Generalized weakness, failure to thrive.      HISTORY OF PRESENT ILLNESS:  Reviewed from patient, chart review from recent hospitalization and also from his sister, Judith, over the phone.  Mr. Jose Lees is a very pleasant 80-year-old male with PMH as listed below, who was brought to ED for failure to thrive.  He was just in hospital from 01/26 to 01/29 for swallowing difficulty.  He has a history of esophageal strictures and requiring dilatation in the past.  He underwent a GI evaluation with EGD and had dilations done on 01/27, was noted with a benign-appearing stricture.  He was also started on a 2-week course of fluconazole for Candida esophagitis.  He was started on a mechanical dental soft diet and was sent to [facility] TCU where he stayed for about 3 weeks.  He was just discharged a week ago to his sister's house before getting back to the independent living; however, sister states that he continues to feel weak, and sister is unable to care for him at home.  He has had very poor oral intake and needs assist with transfers, so he was brought to ED for further evaluation.  The patient himself denies any active complaints, denies any fever, chills or rigors.  No chest pain or shortness of breath.  Denies pain in abdomen.  Reports a normal bowel and bladder habit.  He denies any swallowing difficulty at this time.      REVIEW OF SYSTEMS:  A 10-point review of system was done and was negative apart from those mentioned in the history of present illness.      PAST MEDICAL HISTORY:   1.  Diabetes mellitus.   2.  Hypertension.   3.  Dyslipidemia.   4.  History of CAD with stents.   5.  Prostate cancer with bony metastasis.   6.  Systolic CHF, not on diuretics.   7.  Gastroesophageal reflux disease.   8.  Depression.   9.  Autism/cognitive impairment.   10.  History of cerebrovascular accident.   11.  History of  esophageal stricture and Candida esophagitis with a recent dilatation.     PTA medications  Prior to Admission medications    Medication Sig Last Dose Taking? Auth Provider   aspirin (ASA) 81 MG chewable tablet Take 81 mg by mouth daily 2/26/2021 at AM Yes Unknown, Entered By History   atorvastatin (LIPITOR) 40 MG tablet TAKE ONE TABLET BY MOUTH EVERY DAY 2/26/2021 at AM Yes Nolberto Ferrari MD   Cholecalciferol (VITAMIN D) 50 MCG (2000 UT) CAPS Take 2,000 Units by mouth daily 2/26/2021 at AM Yes Baldemar Raymond MD   insulin glargine (LANTUS PEN) 100 UNIT/ML pen Inject 7 Units Subcutaneous At Bedtime  Yes Michelle Sanchez PA-C   Leuprolide Acetate, 6 Month, (LUPRON DEPOT, 6-MONTH, IM)   at Unknown, ~5 months ago Yes Reported, Patient   metFORMIN (GLUCOPHAGE) 500 MG tablet TAKE 2 TABLETS BY MOUTH TWICE A DAY WITH MEALS 2/26/2021 at AM Yes Nolberto Ferrari MD   metoprolol succinate ER (TOPROL-XL) 25 MG 24 hr tablet Take 0.5 tablets (12.5 mg) by mouth daily TAKE ONE TABLET BY MOUTH EVERY DAY  Patient taking differently: Take 12.5 mg by mouth daily TAKE ONE TABLET BY MOUTH EVERY DAY. HOLD if SBP <110. Call NP if held  Yes Michelle Sanchez PA-C   omeprazole (PRILOSEC) 20 MG DR capsule TAKE 1 CAPSULE (20 MG) BY MOUTH DAILY 2/26/2021 at AM Yes Jermain Helm MD   senna-docusate (SENOKOT-S/PERICOLACE) 8.6-50 MG tablet Take 1 tablet by mouth daily as needed for constipation  Yes Michelle Sanchez PA-C   sertraline (ZOLOFT) 50 MG tablet Take 50 mg by mouth every morning 2/26/2021 at AM Yes Reported, Patient   ACE/ARB/ARNI NOT PRESCRIBED, INTENTIONAL, ACE & ARB not prescribed due to Symptomatic hypotension not due to excessive diuresis  Patient not taking: Reported on 2/17/2021   Mariela Stauffer MD   Continuous Glucose Monitor Sup MISC 4 times daily (before meals and nightly)    Reported, Patient          ALLERGIES:  NO KNOWN DRUG ALLERGIES.      SOCIAL HISTORY:  He denies smoking, alcohol or illicit drug  use.      FAMILY HISTORY:  Reviewed and not pertinent to current presentation.      PHYSICAL EXAMINATION:   GENERAL:  The patient is conscious, alert, awake, oriented x 3, lying comfortably in bed in no apparent distress.   VITAL SIGNS:  Temperature 98.4, heart rate of 85.  Blood pressure on presentation was 88/51, improved to 109/76, saturation 97% on room air.   HEENT:  Pupils are equal and reactive to light and accommodation.  Extraocular movements are intact.  Oral mucosa is moist.   NECK:  Supple, no raised JVD.   RESPIRATORY:  Lung sounds bilaterally clear to auscultation, no wheezes or crepitation.   CARDIOVASCULAR:  Normal S1, S2, regular rate and rhythm.  No murmur.   ABDOMEN:  Soft, nontender, nondistended, no guarding, rigidity or rebound tenderness.   EXTREMITIES:  Lower extremity with some macular erythematous rashes, has compression stockings in place.   NEUROLOGIC:  No focal neurological deficits noted.  Cranial nerves II-XII grossly intact.   PSYCHIATRIC:  Normal mood and affect.      LABORATORY DATA:  Reviewed in Epic.  BMP notable for potassium 3.3.  Lactic acid was elevated at 2.9, trending down to 2.5 . Normal creatinine at 0.97.  CBC with WBC 7.4, hemoglobin 13.3, platelet 295,000.  Blood cultures pending.  COVID was negative.  EKG reviewed by me shows normal sinus rhythm with prolonged QTc.      ASSESSMENT AND PLAN:  Mr. Jose Lees is a 80-year-old male with a past medical history significant for diabetes, hypertension, dyslipidemia, coronary artery disease with stents, history of prostate cancer with bony metastasis, gastroesophageal reflux disesae, depression, autism and cognitive impairment, history of cerebrovascular accident, history of esophageal strictures requiring recent dilatation, who was brought to Emergency Department by his sister for generalized weakness.  As noted in History of Present Illness, he was admitted from 01/26 to 01/29 for dysphagia, requiring esophageal  dilatation, and was sent to [facility] transitional care unit, from where he was discharged a week ago, and now family is unable to care for the patient at home.      1.  Generalized weakness, failure to thrive:    - We will admit for observation.  Before last hospitalization in late January, he was at independent living, and after discharge from hospital was sent to TCU.  The patient states he continues to feel weak, and family is unable to care for him at home.  We will get PT/OT evaluation and will have  for disposition planning.  He will probably need a long-term nursing home placement.  No concern for active infection at this time.  He has no focal neurological deficits.     2.  History of esophageal stricture and Candida esophagitis:    - He recently received dilatations on 01/27.  EGD on 01/28 was noted with likely candidal esophagitis.  He has completed a 2-week course of fluconazole.  He is supposed to follow-up with [facility] for followup EGD.  Currently he denies any difficulty swallowing.  We will have him on mechanical dental soft diet.  Family also notes poor p.o. intake.  We will have Nutrition consult to help with nutrition to help with nutrition.     3.  Diabetes mellitus, uncontrolled:   - His last HbA1C from 01/26 was 11.4, was started on insulin during last hospitalization.  PTA dose of Lantus 14 units.  Given his poor p.o. intake, we will start Lantus at 7 units and can up-titrate as needed.  We will hold off on metformin.  We will start sliding scale insulin.     4.  Hypertension.   5.  Dyslipidemia.   6.  History of systolic congestive heart failure, not on diuretics.   7.  History of coronary artery disease and stents:   -  Blood pressure was soft on admission, 88/51.  He did receive 2 liters of IV fluids.  Blood pressure improved to low 100s.  Likely, soft blood pressure secondary to poor p.o. intake.  His last echocardiogram from was noted with EF 35-40%, grade 1  early diastolic dysfunction.  Clinically, he does not appear fluid-overloaded; however, already received 2 liters IV fluid in the ED.     -- We will hold off on further IV hydration at this time and monitor clinically.     -- We will decrease his Toprol-XL to 12.5 mg daily with hold parameters.   -- Will hold off on PTA Lipitor while under observation status.   -- We will continue with aspirin.     10.  Gastroesophageal reflux disease:  Continue Prilosec.     11.  Depression.   12.  Autism/cognitive impairment:    - He is alert, awake, oriented x 3.  We will continue with PTA Zoloft.   for disposition planning.     13.  History of prostate cancer with bony metastasis:    - He follows up with Dr. Burgos from [facility] and Dr. Best from Urology.  Continue Lupron as an outpatient.  Follow up with Oncology as outpatient.     14.  COVID status:  Low risk, asymptomatic, and COVID from this admission  negative.   15.  Deep venous thrombosis prophylaxis:  Mechanical with PCD boots.   16.  Code status:  He has a POLST, and also discussed with the patient and family.  He will be a DNR/DNI.       17.  Hypokalemia.   18.  Elevated lactate:    - Potassium noted at 3.3.  Lactic acid was elevated at 2.9, trended down to 2.5 with IV hydration.  We will have him on potassium replacement protocol.  We will recheck a lactate in a.m.  No clear concern for infection at this time.  We will monitor clinically.         SRINATH GIBBS MD             D: 2021   T: 2021   MT: ZEFERINO      Name:     HONG FAJARDO   MRN:      [id number]        Account:      [id number]   :      1940        Admitted:     2021                   Document: [id number]       cc: Nolberto Ferrari MD

## 2021-02-27 NOTE — PLAN OF CARE
PRIMARY DIAGNOSIS: GENERALIZED WEAKNESS    OUTPATIENT/OBSERVATION GOALS TO BE MET BEFORE DISCHARGE  1. Orthostatic performed: NA    2. Tolerating PO medications: Yes    3. Return to near baseline physical activity: No    4. Cleared for discharge by consultants (if involved): No    Discharge Planner Nurse   Safe discharge environment identified: No  Barriers to discharge: Yes       Entered by: Shelly Bell 02/27/2021 5:30 AM     Please review provider order for any additional goals.   Nurse to notify provider when observation goals have been met and patient is ready for discharge.

## 2021-02-27 NOTE — DISCHARGE SUMMARY
[facility]    Discharge Summary  Hospitalist    Date of Admission:  2/26/2021  Date of Discharge:  2/27/2021  Discharging Provider: Michelle Sanchez PA-C    Discharge Diagnoses   Generalized weakness  Failure to thrive  Malnutrition  Elevated lactic acid, resolved  Esophageal stricture s/p dilatation  Candida esophagitis  T2DM  CAD  HFpEF  Chronic LE wounds   HTN  HLD  Cognitive impairment  GERD    History of Present Illness   Jose Lees is an 80 year old male   with a past medical history significant for coronary artery disease, hyperlipidemia, history of embolic strokes in 2014, type 2 diabetes, autism, prostate cancer for which he follows with Urology in [facility], systolic CHF, depression, and recent admission for dysphagia with esophageal stricture and candidiasis who was admitted on 2/26/2021 for assistance with placement due to FTT. Patient was recently admitted 1/26-1/29 for dyphagia at which time he had EGD with dilation and was started on treatment for candida. He discharged to TCU where he resided for approximately 3 weeks before discharging to his sisters home under her care. Family was unable to meet his needs thus he was brought back to the ED for assistance with placement. Please see admission H&P for additional information.     Hospital Course   Jose Lees was admitted on 2/26/2021.  The following problems were addressed during his hospitalization:    Generalized weakness, failure to thrive:   Malnutrition     Before last hospitalization in late January, he was at independent living, and after discharge from hospital was sent to TCU where he stayed approximately 3 weeks. Discharged home with family care but requires more assistance. No evidence of acute illness/infection. Suspect poor nutritional status in the setting of esophageal stricture has been contributing to worsening weakness as well.    --PT consult  --SW  --nutrition consulted,  appreciate assistance     History of esophageal stricture and Candida esophagitis:  He recently received dilatations on 01/27/21.  EGD noted likely candidal esophagitis.  He has completed a 2-week course of fluconazole.  He is supposed to follow-up with [facility] for followup EGD.  Currently he denies any difficulty swallowing but continues to have poor po intake per family. We are working on getting his dentures from home.   --mechanical dental soft diet.    -- Nutrition consult      Diabetes mellitus, uncontrolled:  His last HbA1C from 01/26 was 11.4, was started on insulin during last hospitalization.  PTA dose of Lantus 14 units. -- Given his poor p.o. intake, we will start Lantus at 7 units and can up-titrate as needed.   --continue metformin  -sliding scale as needed     History of coronary artery disease with stenting in 2014.  Chronic HFrEF, EF 35-40%  HTN  HLD  Blood pressure was soft on admission, 88/51.  He did receive 2 liters of IV fluids with improvement to low 100s.  Likely, soft blood pressure secondary to poor p.o. intake.  His last echocardiogram from was noted with EF 35-40%, grade 1 early diastolic dysfunction.  He is not on diuretics PTA.  -- We will hold off on further IV hydration at this time and monitor clinically.     -- PTA Toprol-XL decreased to 12.5 mg daily with hold parameters on admit, continue lower dose at TCU. Can up titrate as needed   -- resume Lipitor at discharge   -- continue aspirin.      Gastroesophageal reflux disease:  Continue Prilosec.      Autism/cognitive impairment:  He is alert, awake, oriented x 3.  Per notes reviewed during recent admission patient is own decision maker. Sister, July is medical POA. Recent SLUMS 24/30 per chart review.      History of prostate cancer with bony metastasis:  He follows up with Dr. Burgos from [facility] and Dr. Best from Urology.  Continue Lupron as an outpatient.  Follow up with Oncology as outpatient.       Elevated  lactate, resolved:   Lactic acid was initially elevated at 2.9, trended down to 2.5>1.0 with IV hydration.   No clear concern for infection at this time.      Hypokalemia. Replace per protocol       Chronic LE wounds. Followed during recent TCU stay with plans for outpatient dermatology follow up.   Wound care per recent TCU regimen:  Change dressings every 3 days. cleanse with wound cleanser. Change foam dressing every 3 days. BLE wounds: change Q3D Cleanse with VASHE by letting Vashe moistened gauze sit on the wounds x 5 minutes. Don't rinse off Apply Medihoney to Mepilex lite and apply to wound beds. Apply sween cream 24 to intact skin. Apply Dermawear-DO NOT throw away-wash soiled edemawear. Wash in really hot water with laundry soap or baby shampoo, may need to do several times. Rinse well Hang dry     Patient was discussed with Dr. Chávez who agrees with the above plan.     Michelle Sanchez PA-C, SAGAR    Significant Results and Procedures   See below     Code Status   DNR / DNI       Primary Care Physician   Nolberto Ferrari    Physical Exam   Temp: 96.2  F (35.7  C) Temp src: Axillary BP: 114/53 Pulse: 86   Resp: 16 SpO2: 95 % O2 Device: None (Room air)    There were no vitals filed for this visit.  Vital Signs with Ranges  Temp:  [95.6  F (35.3  C)-98.7  F (37.1  C)] 96.2  F (35.7  C)  Pulse:  [71-89] 86  Resp:  [16-18] 16  BP: ()/(48-95) 114/53  SpO2:  [95 %-100 %] 95 %  I/O last 3 completed shifts:  In: 360 [P.O.:360]  Out: 950 [Urine:950]    Constitutional: Awake and alert. Sitting up in bed. Appears comfortable and is pleasantly conversant   ENT:  moist mucous membranes  Eyes:  Sclera anicteric, EOMI  Respiratory: Lungs clear to auscultation bilaterally, no increased work of breathing  Cardiovascular: Regular rate and rhythm  GI: active bowel sounds, abdomen soft, non-tender  MSK:  Moves all four extremities.   Neuro:  Speech is clear. Face symmetric. Follows commands.                Discharge  Disposition   Discharged to rehabilitation facility  Condition at discharge: Stable    Consultations This Hospital Stay   NUTRITION SERVICES ADULT IP CONSULT  PHYSICAL THERAPY ADULT IP CONSULT  NUTRITION SERVICES ADULT IP CONSULT  PHYSICAL THERAPY ADULT IP CONSULT  OCCUPATIONAL THERAPY ADULT IP CONSULT  CARE MANAGEMENT / SOCIAL WORK IP CONSULT    Time Spent on this Encounter   Michelle KNIGHT PA-C, personally saw the patient today and spent greater than 30 minutes discharging this patient.    Discharge Orders      CARE COORDINATION REFERRAL      General info for SNF    Length of Stay Estimate: Short Term Care: Estimated # of Days <30  Condition at Discharge: Stable  Level of care:skilled   Rehabilitation Potential: Fair  Admission H&P remains valid and up-to-date: Yes  Recent Chemotherapy: N/A  Use Nursing Home Standing Orders: N/A     Mantoux instructions    Give two-step Mantoux (PPD) Per Facility Policy Yes     Reason for your hospital stay    You were here for evaluation of weakness.     Glucose monitor nursing POCT    Before meals and at bedtime     Intake and output    Every shift     Wound care (specify)    Gluteal wounds: Change dressings every 3 days. cleanse with wound cleanser. Change foam dressing every 3 days. BLE wounds: change Q3D Cleanse with VASHE by letting Vashe moistened gauze sit on the wounds x 5 minutes. Don't rinse off Apply Medihoney to Mepilex lite and apply to wound beds. Apply sween cream 24 to intact skin. Apply Dermawear-DO NOT throw away-wash soiled edemawear. Wash in really hot water with laundry soap or baby shampoo, may need to do several times. Rinse well Hang dry     Follow Up and recommended labs and tests    Follow up with nursing home provider within 3 days for hospital follow up. Up titrate insulin and metoprolol as needed.     Follow up with Oncology routinely     Activity - Up with nursing assistance     No CPR- Do NOT Intubate     Nutrition Services Adult IP Consult     Reason:  Malnutrition     Physical Therapy Adult Consult    Evaluate and treat as clinically indicated.    Reason:  Generalized weakness, deconditioning     Occupational Therapy Adult Consult    Evaluate and treat as clinically indicated.    Reason:  Generalized weakness, deconditioning     Fall precautions     Advance Diet as Tolerated    Follow this diet upon discharge: Orders Placed This Encounter      Snacks/Supplements Adult: Boost Glucose Control; Between Meals      Mechanical/Dental Soft Diet     Discharge Medications   Current Discharge Medication List      START taking these medications    Details   senna-docusate (SENOKOT-S/PERICOLACE) 8.6-50 MG tablet Take 1 tablet by mouth daily as needed for constipation  Qty:      Associated Diagnoses: Constipation, unspecified constipation type         CONTINUE these medications which have CHANGED    Details   insulin glargine (LANTUS PEN) 100 UNIT/ML pen Inject 7 Units Subcutaneous At Bedtime  Refills: 0    Comments: If Lantus is not covered by insurance, may substitute Basaglar at same dose and frequency.    Associated Diagnoses: Type 2 diabetes mellitus without complication, without long-term current use of insulin (H)      metoprolol succinate ER (TOPROL-XL) 25 MG 24 hr tablet Take 0.5 tablets (12.5 mg) by mouth daily TAKE ONE TABLET BY MOUTH EVERY DAY  Qty: 90 tablet, Refills: 0    Associated Diagnoses: Encounter for medication refill         CONTINUE these medications which have NOT CHANGED    Details   aspirin (ASA) 81 MG chewable tablet Take 81 mg by mouth daily      atorvastatin (LIPITOR) 40 MG tablet TAKE ONE TABLET BY MOUTH EVERY DAY  Qty: 90 tablet, Refills: 3    Associated Diagnoses: Encounter for medication refill; Hyperlipidemia LDL goal <70      Cholecalciferol (VITAMIN D) 50 MCG (2000 UT) CAPS Take 2,000 Units by mouth daily    Associated Diagnoses: Vitamin D deficiency      Leuprolide Acetate, 6 Month, (LUPRON DEPOT, 6-MONTH, IM)       metFORMIN  (GLUCOPHAGE) 500 MG tablet TAKE 2 TABLETS BY MOUTH TWICE A DAY WITH MEALS  Qty: 360 tablet, Refills: 3    Associated Diagnoses: Encounter for medication refill; Type 2 diabetes mellitus without complication, without long-term current use of insulin (H)      omeprazole (PRILOSEC) 20 MG DR capsule TAKE 1 CAPSULE (20 MG) BY MOUTH DAILY  Qty: 90 capsule, Refills: 3    Associated Diagnoses: Gastroesophageal reflux disease, esophagitis presence not specified      sertraline (ZOLOFT) 50 MG tablet Take 50 mg by mouth every morning      ACE/ARB/ARNI NOT PRESCRIBED, INTENTIONAL, ACE & ARB not prescribed due to Symptomatic hypotension not due to excessive diuresis    Associated Diagnoses: Chronic systolic congestive heart failure (H)      Continuous Glucose Monitor Sup MISC 4 times daily (before meals and nightly)            Allergies   No Known Allergies  Data   Most Recent 3 CBC's:  Recent Labs   Lab Test 02/26/21  1737 02/25/21  1612 02/01/21   WBC 7.4 7.2 7.2   HGB 13.3 13.8 12.8*   MCV 88 90 92    271 249      Most Recent 3 BMP's:  Recent Labs   Lab Test 02/27/21  0634 02/26/21  2223 02/26/21  1737 02/25/21  1612     --  133 132*   POTASSIUM 3.6 4.1 3.3* 3.8   CHLORIDE 108  --  99 101   CO2 25  --  25 22   BUN 13  --  15 17   CR 0.86  --  0.97 0.94   ANIONGAP 4  --  9 9   TENZIN 8.3*  --  9.2 9.7   *  --  264* 256*     Most Recent 2 LFT's:  Recent Labs   Lab Test 02/25/21  1612 01/26/21  1624   AST 31 15   ALT 17 14   ALKPHOS 76 74   BILITOTAL 0.4 0.7     Most Recent INR's and Anticoagulation Dosing History:  Anticoagulation Dose History     Recent Dosing and Labs Latest Ref Rng & Units 12/10/2019 1/20/2020 2/14/2020 2/21/2020 3/6/2020 4/10/2020 1/26/2021    INR 0.86 - 1.14 2.5 1.53(H) - - - 1.70(H) 0.99    INR 0.86 - 1.14 - - 1.6(A) 2.1(A) 2.1(A) - -        Most Recent 3 Troponin's:  Recent Labs   Lab Test 08/06/15  0905 04/09/14  0600 04/09/14  0240   TROPI <0.015  The 99th percentile for upper  reference range is 0.045 ug/L.  Troponin values in   the range of 0.045 - 0.120 ug/L may be associated with risks of adverse   clinical events.   0.626* 0.752*     Most Recent Cholesterol Panel:  Recent Labs   Lab Test 01/29/21  0701   CHOL 146   LDL 75   HDL 47   TRIG 120     Most Recent 6 Bacteria Isolates From Any Culture (See EPIC Reports for Culture Details):  Recent Labs   Lab Test 02/26/21  1940 02/26/21  1935   CULT No growth after 16 hours No growth after 16 hours     Most Recent TSH, T4 and A1c Labs:  Recent Labs   Lab Test 02/25/21  1612 01/26/21  2255   TSH 8.65*  --    T4 1.19  --    A1C  --  11.4*     Results for orders placed or performed during the hospital encounter of 01/26/21   CT Chest/Abdomen/Pelvis w Contrast    Addendum: 1/29/2021    ADDENDUM:  Comparison studies from 10/19/2020 and 10/16/2019 are now available  for comparison.    The area of the low-attenuation focus within the mid to lower  esophagus on this study was not adequately assessed on the comparison  study due to differences in distention of the esophagus. Multiple  pulmonary nodules have slightly increased in size. The sigmoid colon  has not changed in position. The partially calcified lateral left  hepatic lobe lesion has not significantly changed.    END OF ADDENDUM    LESTER J FAHRNER, MD      Narrative    CT CHEST, ABDOMEN, PELVIS WITH CONTRAST  1/26/2021 5:51 PM    HISTORY: Abdominal pain and possible FB in esophagus, he is not sure.  ?+ nausea and lower abdominal pain.    TECHNIQUE: CT scan obtained of the chest, abdomen, and pelvis with  oral and IV contrast. 81mL Isovue-370 IV injected. Radiation dose for  this scan was reduced using automated exposure control, adjustment of  the mA and/or kV according to patient size, or iterative  reconstruction technique.    COMPARISON:  Chest x-ray on 2/7/2017.    FINDINGS:  Chest/mediastinum: No cardiomegaly or significant pericardial  effusion. Moderate atherosclerotic vascular  calcification of the  abdominal aorta and iliac vessels. Extensive bilateral gynecomastia.  Thyroid gland is unremarkable. No significant mediastinal, hilar or  axillary lymphadenopathy. 6 mm fat attenuating focus abutting the  posterior aspect of the distal esophagus, could represent small food  residue versus small submucosal lipoma. No evidence of other  radiodense foreign bodies. Esophagus is patulous.    Lung/pleura: No pleural effusion or pneumothorax. Numerous bilateral  pulmonary nodules, worrisome for metastatic disease.    Abdomen/pelvis: 1.3 cm partially calcified hypoattenuating focus in  the left hepatic lobe (series 3 image 114) otherwise no suspicious  focal hepatic lesion. Cholelithiasis without CT evidence of acute  cholecystitis. No main pancreatic ductal dilatation or definite solid  pancreatic mass. No splenomegaly. No adrenal nodules. No radiodense  kidney stones or hydronephrosis.    Sigmoid colon appears to be mildly distended and displaced into the  upper mid abdomen/left upper quadrant, no associated mesenteric  twisting or bowel obstruction noted. No significant free fluid in the  abdomen and pelvis. No free peritoneal or portal venous gas. Moderate  atherosclerotic vascular calcification of the abdominal aorta and  iliac vessels. Although the prostate gland is not significantly  enlarged it appears diffusely enhancing, nonspecific, can be due to  underlying diffuse prostatitis versus prostatic malignancy.    Bones and soft tissue: Multilevel degenerative changes of the spine.      Impression    IMPRESSION:   1. The sigmoid colon appears to be mildly distended and displaced into  the upper mid abdomen/left upper quadrant, however with no associated  mesenteric twisting or bowel obstruction. Finding is nonspecific, but  might predispose patient into sigmoid volvulus.  2. 6 mm fat attenuating lesion abutting the posterior aspect of the  esophagus, could represent small food residue versus  small submucosal  lipoma. No other radiodense foreign body seen within the esophageal  lumen which appears mildly dilated/patulous.  3. Numerous bilateral pulmonary nodules, worrisome for metastatic  disease.  4. The prostate gland although not significantly enlarged, appears  diffusely enhancing, nonspecific, can be due to underlying prostatitis  or prostate malignancy, recommend correlation with serum  prostate-specific antigen.  5. Cholelithiasis without CT evidence of acute cholecystitis.  6. 1.3 cm partially calcified hypoattenuating focus in the left  hepatic lobe, of indeterminate etiology.    GREGORIO SHANNON MD   XR ERCP    Narrative    ENDOSCOPIC RETROGRADE CHOLANGIOPANCREATOGRAPHY  2021 11:15 AM     HISTORY: ERCP [id number]    COMPARISON: None.      Impression    IMPRESSION: Single spot fluoroscopic image during endoscopy  demonstrating endoscope near the GE junction. Total fluoroscopic time  is 0.1 minute.    TESSA FERRARO MD   Echocardiogram Complete    Narrative    [id number]  [id number]  [id number]  [id number]^MUNDO^PETER^E           [facility]  Echocardiography Laboratory  [address] [place] [address]        Name: HONG FAJARDO  MRN: [id number]  : 1940  Study Date: 2021 09:43 AM  Age: 80 yrs  Gender: Male  Patient Location: [facility]  Reason For Study: CHF  Ordering Physician: DEISY FLOWER  Referring Physician: STUART KHAN  Performed By: Dyllan Schulz RDCS     BSA: 1.9 m2  Height: 69 in  Weight: 162 lb  HR: 71  BP: 126/67 mmHg  _____________________________________________________________________________  __        Procedure  Complete Portable Echo Adult. Optison (NDC #[id number]) given intravenously.  _____________________________________________________________________________  __        Interpretation Summary     The visual ejection fraction is estimated at 35-40%.  Left ventricular systolic function is mild to moderately reduced.  There are regional  wall motion abnormalities as specified.  The regional wall motion abnormalities are similar to previous and are  consistent with LAD territory ischemia and / or infarction.  The ascending aorta is Mildly dilated.  The study was technically difficult.  _____________________________________________________________________________  __        Left Ventricle  The left ventricle is normal in size. There is normal left ventricular wall  thickness. Diastolic Doppler findings (E/E' ratio and/or other parameters)  suggest left ventricular filling pressures are normal. Grade I or early  diastolic dysfunction. The visual ejection fraction is estimated at 35-40%.  Left ventricular systolic function is mild to moderately reduced. There is  apical akinesis. Distal inferoseptal akinesis. Mid to distal anteroseptal  akinesis. Distal anterior akinesis. There are regional wall motion  abnormalities as specified.     Right Ventricle  The right ventricle is normal size. Right ventricular function cannot be  assessed due to poor image quality.     Atria  Normal left atrial size. Right atrial size is normal. There is no color  Doppler evidence of an atrial shunt.     Mitral Valve  Thickened mitral valve anterior leaflet. There is mild mitral annular  calcification. There is trace mitral regurgitation.        Tricuspid Valve  There is trace tricuspid regurgitation. Right ventricular systolic pressure  could not be approximated due to inadequate tricuspid regurgitation.     Aortic Valve  The aortic valve is not well visualized. There is trace aortic regurgitation.  No hemodynamically significant valvular aortic stenosis.     Pulmonic Valve  There is no pulmonic valvular regurgitation.     Vessels  Borderline aortic root dilatation. The ascending aorta is Mildly dilated. (3.9  cm). Inferior vena cava not well visualized for estimation of right atrial  pressure.     Pericardium  There is no pericardial effusion.        Rhythm  Sinus rhythm  was noted.  _____________________________________________________________________________  __  MMode/2D Measurements & Calculations  IVSd: 0.95 cm     LVIDd: 3.6 cm  LVIDs: 3.2 cm  LVPWd: 0.83 cm  FS: 10.9 %  LV mass(C)d: 90.3 grams  LV mass(C)dI: 47.8 grams/m2  Ao root diam: 3.7 cm  LA dimension: 3.7 cm  LA/Ao: 0.99  LA Volume (BP): 60.0 ml  LA Volume Index (BP): 31.7 ml/m2  RWT: 0.47           Doppler Measurements & Calculations  MV E max ochoa: 52.3 cm/sec  MV A max ochoa: 89.8 cm/sec  MV E/A: 0.58  MV dec slope: 157.7 cm/sec2  PA acc time: 0.10 sec  E/E' av.3  Lateral E/e': 10.6  Medial E/e': 8.1           _____________________________________________________________________________  __           Report approved by: Surekha Castillo 2021 12:54 PM

## 2021-02-27 NOTE — ED NOTES
[facility]  ED Nurse Handoff Report    ED Chief complaint: Generalized Weakness      ED Diagnosis: generalized weakness   Final diagnoses:   Generalized muscle weakness   Elevated lactic acid level       Code Status: orders to come from inpt team    Allergies: No Known Allergies    Patient Story: pt was admitted to hospital for nausea and vomiting at the ed of January. During admission pt was found to have esophogeal candidiasis.  From hospital pt was transferred to tcu where he was for several weeks.  Pt was dcd from tcu to his sisters home, pt was at his sisters home for approx 1 week but over that week his weakness and increased and he has had poor po intake.  pts pcp wanted placement back in tcu but tcu said pt needed a full medical work up before being accepted, pt now looking for tcu placement.  Focused Assessment:  Pt is alert and oriented x4, speaking full clear sentences, respirations non-labored, skin warm dry and intact, strong pulses throughout, gait not observed in ed.    Treatments and/or interventions provided: labs drawn, looked ok besides potassium and lactic, k was 3.3 which po replacement was given and lactic of 2.9 after 2 liters it was 2.5.  Patient's response to treatments and/or interventions: pt tolerated all procedures well    To be done/followed up on inpatient unit:  unknown    Does this patient have any cognitive concerns?: autism     Activity level - Baseline/Home:  Unknown  Activity Level - Current:   Unknown    Patient's Preferred language: English   Needed?: No    Isolation: None  Infection: Not Applicable  Patient tested for COVID 19 prior to admission: YES  Bariatric?: No    Vital Signs:   Vitals:    02/26/21 1815 02/26/21 1830 02/26/21 1845 02/26/21 2000   BP: 107/55 110/61 111/48 (!) 108/95   Pulse: 88 85 82 88   Resp:    18   Temp:       TempSrc:       SpO2:   100% 97%       Cardiac Rhythm:     Was the PSS-3 completed:   Yes  What interventions are  required if any?               Family Comments:   OBS brochure/video discussed/provided to patient/family: Yes              Name of person given brochure if not patient:               Relationship to patient:     For the majority of the shift this patient's behavior was Green.   Behavioral interventions performed were .    ED NURSE PHONE NUMBER: *[phone]

## 2021-02-28 VITALS
HEIGHT: 69 IN | RESPIRATION RATE: 18 BRPM | OXYGEN SATURATION: 99 % | BODY MASS INDEX: 22.36 KG/M2 | TEMPERATURE: 97.9 F | HEART RATE: 77 BPM | SYSTOLIC BLOOD PRESSURE: 112 MMHG | WEIGHT: 151 LBS | DIASTOLIC BLOOD PRESSURE: 67 MMHG

## 2021-02-28 ASSESSMENT — MIFFLIN-ST. JEOR: SCORE: 1390.31

## 2021-02-28 NOTE — PROGRESS NOTES
PRIMARY DIAGNOSIS: GENERALIZED WEAKNESS     OUTPATIENT/OBSERVATION GOALS TO BE MET BEFORE DISCHARGE  1. Orthostatic performed: NA     2. Tolerating PO medications: Met     3. Return to near baseline physical activity: Not met     4. Cleared for discharge by consultants (if involved): Met     Discharge Planner Nurse   Safe discharge environment identified: Met  Barriers to discharge: No       Entered by: Pau Agee RN 02/27/2021 4:00 PM  Please review provider order for any additional goals.   Nurse to notify provider when observation goals have been met and patient is ready for discharge.

## 2021-02-28 NOTE — PROGRESS NOTES
[place] [facility]  PRIMARY CARE PROVIDER AND CLINIC:  Nolberto Ferrari MD, [address] [place] [address] / [facility] [address]  Chief Complaint   Patient presents with     intermediate Acute     [place] Medical Record Number:  [id number]  Place of Service where encounter took place:  [facility] (TC) [[id number]]    Jose Lees  is a 80 year old  (1940), admitted to the above facility from  [facility]. Hospital stay 2/26/21 through 2/27/21...  Admitted to this facility for  rehab, medical management and nursing care.    HPI:    HPI information obtained from: facility chart records, facility staff, patient report and [facility] chart review.   Brief Summary of Hospital Course: pt was discharged from here a couple weeks ago and went to his sisters to get stronger then he was to go back to [facility], his apt.  He cont'd to be weak, did not eat well and was too much care for them to care for him. No acute issues in the hospital except some soft BP's so metoprolol decreased. Also Lantus decreased a touch also and sugars noted to be mid to high 100's, Labs fine. Sent for more rehab.    TODAY DURING EXAM HPI and ROS:  No CP, SOB, Cough, dizziness, fevers, chills, HA, N/V, dysuria or Bowel Abnormalities. Appetite is down.  No pain except  Legs sometimes but not today.  Says needs to get stronger and hope to go back to his apt.      CODE STATUS/ADVANCE DIRECTIVES DISCUSSION:   No CPR- Do NOT Intubate  Patient's living condition: lives alone  ALLERGIES: Patient has no known allergies.  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  PAST SURGICAL HISTORY:   has a past surgical history that includes Coronary Angiography Adult Order; Heart Cath, Angioplasty (4/14); Esophagoscopy, gastroscopy, duodenoscopy (EGD), combined (N/A, 8/6/2015); Phacoemulsification clear cornea with standard intraocular lens implant (Right, 12/4/2018); Phacoemulsification clear cornea with standard intraocular lens implant (Left, 12/19/2018); and Esophagoscopy, gastroscopy, duodenoscopy (EGD), combined (N/A, 1/28/2021).  FAMILY HISTORY: family history includes C.A.D. (age of onset: 48) in his father; Cerebrovascular Disease (age of onset: 92) in his mother.  SOCIAL HISTORY:   reports that he has never smoked. He has never used smokeless tobacco. He reports that he does not drink alcohol or use drugs.    Post Discharge Medication Reconciliation Status: discharge medications reconciled and changed, per note/orders     Current Outpatient Medications   Medication Sig Dispense Refill     aspirin (ASA) 81 MG chewable tablet Take 81 mg by mouth daily       atorvastatin (LIPITOR) 40 MG tablet TAKE ONE TABLET BY MOUTH EVERY DAY 90 tablet 3     Cholecalciferol (VITAMIN D) 50 MCG (2000 UT) CAPS Take 2,000 Units by mouth daily       Continuous Glucose Monitor Sup MISC 4 times daily (before meals and nightly)        insulin glargine (LANTUS PEN) 100 UNIT/ML pen Inject 7 Units Subcutaneous At Bedtime  0     metFORMIN (GLUCOPHAGE) 500 MG tablet TAKE 2 TABLETS BY MOUTH TWICE A DAY WITH MEALS 360 tablet 3     metoprolol succinate ER (TOPROL-XL) 25 MG 24 hr tablet Take 0.5 tablets (12.5 mg) by mouth daily TAKE ONE TABLET BY MOUTH EVERY DAY (Patient taking differently: Take 12.5 mg by mouth daily TAKE ONE TABLET BY MOUTH EVERY DAY. HOLD if SBP <110. Call NP if held) 90 tablet 0     omeprazole  "(PRILOSEC) 20 MG DR capsule TAKE 1 CAPSULE (20 MG) BY MOUTH DAILY 90 capsule 3     senna-docusate (SENOKOT-S/PERICOLACE) 8.6-50 MG tablet Take 1 tablet by mouth daily as needed for constipation       sertraline (ZOLOFT) 50 MG tablet Take 50 mg by mouth every morning       ACE/ARB/ARNI NOT PRESCRIBED, INTENTIONAL, ACE & ARB not prescribed due to Symptomatic hypotension not due to excessive diuresis (Patient not taking: Reported on 2/17/2021)       Leuprolide Acetate, 6 Month, (LUPRON DEPOT, 6-MONTH, IM)              ROS:see above under HPI    Vitals:  /67   Pulse 77   Temp 97.9  F (36.6  C)   Resp 18   Ht 1.753 m (5' 9\")   Wt 68.5 kg (151 lb)   SpO2 99%   BMI 22.30 kg/m    Exam:  GENERAL: Siting in chair in room, in NAD,  ENT:  Mouth and posterior oropharynx normal --no thrush,  normal hearing acuity  EYES:  EOM, conjunctivae, lids, pupils and irises normal  NECK:  No adenopathy,masses or thyromegaly  RESP:  respiratory effort and palpation of chest normal, lungs clear to auscultation , no respiratory distress  CV:  Palpation and auscultation of heart done , regular rate and rhythm with occas irreg beat, no murmur, rub, or gallop,trace pedal bilat edema, +2 pedal pulses  ABDOMEN:  normal bowel sounds, soft, nontender, no hepatosplenomegaly or other masses  M/S:    ROSS equally   Seen in chair  SKIN:  Inspection of skin and subcutaneous tissue baseline except LE's have wraps in place--not removed--see hospital notes.  Pt said \"they are better and not sore\"  NEURO:   Cranial nerves 2-12 are normal tested and grossly at patient's baseline  PSYCH:  oriented X 3, affect and mood normal      Lab/Diagnostic data:  Recent Labs   Lab Test 02/27/21  0634 02/26/21  2223 02/26/21  1737     --  133   POTASSIUM 3.6 4.1 3.3*   CHLORIDE 108  --  99   CO2 25  --  25   ANIONGAP 4  --  9   *  --  264*   BUN 13  --  15   CR 0.86  --  0.97   TENZIN 8.3*  --  9.2     CBC RESULTS:   Recent Labs   Lab Test " 02/26/21  1737   WBC 7.4   RBC 4.52   HGB 13.3   HCT 39.9*   MCV 88   MCH 29.4   MCHC 33.3   RDW 13.2        ASSESSMENT / PLAN:  (M62.81) Generalized muscle weakness  (primary encounter diagnosis)   (R53.81) Physical deconditioning  Comment/Plan: PT OT, considering moving into [place] rather than [place] apt    (R62.7) Failure to thrive in adult   (E46) Malnutrition, unspecified type (H)  Comment/Plan: encourage pt, see below--has no thrush and swallowing deemed fine in hospital    (Z98.890,  Z87.19) S/P dilatation of esophageal stricture: 1/27/2021  Comment/Plan: dilated in Jan--f/u GI per their recs--pt did not f/u after last discharge and was to set up appt.    (E11.9) Type 2 diabetes mellitus without complication, without long-term current use of insulin (H)  Comment/Plan: running mid to high 100's occas >200 accuchecks, cont same meds, monitor.    CV ISSUES:  (I25.119) Coronary artery disease involving native coronary artery of native heart with angina pectoris (H)   (I10) Hypertension, unspecified type   (I50.30) Heart failure with preserved ejection fraction, NYHA class I (H)  Comment/Plan: cont current meds, no acute issues,   SBP had been soft in hospital so metoprolol decreased.    (S81.801S,  S81.802S) Open wound of both lower extremities with complication, sequela  Comment/Plan: cont same orders, wound care.    (R41.89) Cognitive impairment   (F84.0) Autism disorder  Comment/Plan: OT eval for further cog--may need further help at home.  Last admission: Cognitive Scores: SLUMS: 24/30, Safety Questionnaire: 26/27/20, ACL: 3.8/5.8 and Meds 2./7    I called and d/w family Judith, sister an update, the POC and care coordination.  This included discussion of pertinent diagnostic results,  risk and benefits of current and new treatments . All questions answered and there is agreement   on the Care Plan. Talked for >10 mins she had questions re discharge and home yohan care--referred her to CHARLIE in  am        Electronically signed by:  SARATH Yi CNP

## 2021-02-28 NOTE — PLAN OF CARE
Covid negative. A&Ox4. VSS on RA. SBA with gait belt and walker. Sal redness and scab to buttocks. BLE wounds, dressing clean dry and intact. Mechanical soft diet, poor appetite. Incontinent at times. Denies pain. Plan to discharge to TCU @ 5:00pm. Discharge instructions sent with patient, discharged off the unit at 5:00pm. PIV out.

## 2021-03-01 ENCOUNTER — NURSING HOME VISIT (OUTPATIENT)
Dept: GERIATRICS | Facility: CLINIC | Age: 81
End: 2021-03-01
Payer: COMMERCIAL

## 2021-03-01 ENCOUNTER — PATIENT OUTREACH (OUTPATIENT)
Dept: CARE COORDINATION | Facility: CLINIC | Age: 81
End: 2021-03-01

## 2021-03-01 DIAGNOSIS — R62.7 FAILURE TO THRIVE IN ADULT: ICD-10-CM

## 2021-03-01 DIAGNOSIS — S81.801S OPEN WOUND OF BOTH LOWER EXTREMITIES WITH COMPLICATION, SEQUELA: ICD-10-CM

## 2021-03-01 DIAGNOSIS — E11.9 TYPE 2 DIABETES MELLITUS WITHOUT COMPLICATION, WITHOUT LONG-TERM CURRENT USE OF INSULIN (H): ICD-10-CM

## 2021-03-01 DIAGNOSIS — I50.30 HEART FAILURE WITH PRESERVED EJECTION FRACTION, NYHA CLASS I (H): ICD-10-CM

## 2021-03-01 DIAGNOSIS — Z98.890 S/P DILATATION OF ESOPHAGEAL STRICTURE: ICD-10-CM

## 2021-03-01 DIAGNOSIS — F84.0 AUTISM DISORDER: ICD-10-CM

## 2021-03-01 DIAGNOSIS — I10 HYPERTENSION, UNSPECIFIED TYPE: ICD-10-CM

## 2021-03-01 DIAGNOSIS — E46 MALNUTRITION, UNSPECIFIED TYPE (H): ICD-10-CM

## 2021-03-01 DIAGNOSIS — I25.119 CORONARY ARTERY DISEASE INVOLVING NATIVE CORONARY ARTERY OF NATIVE HEART WITH ANGINA PECTORIS (H): ICD-10-CM

## 2021-03-01 DIAGNOSIS — R53.81 PHYSICAL DECONDITIONING: ICD-10-CM

## 2021-03-01 DIAGNOSIS — M62.81 GENERALIZED MUSCLE WEAKNESS: Primary | ICD-10-CM

## 2021-03-01 DIAGNOSIS — Z87.19 S/P DILATATION OF ESOPHAGEAL STRICTURE: ICD-10-CM

## 2021-03-01 DIAGNOSIS — R41.89 COGNITIVE IMPAIRMENT: ICD-10-CM

## 2021-03-01 DIAGNOSIS — S81.802S OPEN WOUND OF BOTH LOWER EXTREMITIES WITH COMPLICATION, SEQUELA: ICD-10-CM

## 2021-03-01 PROBLEM — E78.5 HLD (HYPERLIPIDEMIA): Status: ACTIVE | Noted: 2021-03-01

## 2021-03-01 PROBLEM — K21.00 GASTROESOPHAGEAL REFLUX DISEASE WITH ESOPHAGITIS WITHOUT HEMORRHAGE: Status: ACTIVE | Noted: 2021-03-01

## 2021-03-01 PROCEDURE — 99309 SBSQ NF CARE MODERATE MDM 30: CPT | Performed by: NURSE PRACTITIONER

## 2021-03-01 PROCEDURE — 99207 PR CDG-MDM COMPONENT: MEETS MODERATE - DOWN CODED: CPT | Performed by: NURSE PRACTITIONER

## 2021-03-01 NOTE — PROGRESS NOTES
Clinic Care Coordination Contact  Care Coordination Transition Communication    Referral Source: IP Handoff    Clinical Data: Patient was hospitalized at [facility] from 2/26/21 to 2/27/21 with diagnosis of malnutrition due to dysphagia, subsequent weakness. Pt discharged to TCU for further rebab, management, and LTC planning.     CHARLIE note: Patient lives at [facility]. Was recently at [facility]. Upon discharge went to stay with sister and her spouse. Was receiving [facility] for skilled services. Per report, sister was unable to care for patient as he was eating poorly and became weak. She brought him into [facility] as [facility] before [facility] patient due to the change in his condition. Writer along with care team [facility] Information included malnutrition due to dysphagia and subsequent weakness. PT assessed patient as being below baseline and appropriate for daily therapy. Azeb in admissions did accept patient back into their TCU. The rehab team at the TCU will be able to determine if patient will need a long term plan for a higher level of care then returning to his apartment at [facility].     Transition to Facility:              Facility Name: [facility]              Contact name and phone number/fax: phone [phone], fax [phone]    CHARLIE RAMSAY sent fax to facility for discharge planning.     Plan:  Care Coordinator will await notification from facility staff informing  Care Coordinator of patient's discharge plans/needs.  Care Coordinator will review chart and outreach to facility staff every 3 weeks and as needed.     SHU Mariano   Social Work Clinic Care Coordinator   [facility]  PH: [phone]  [email or web address]

## 2021-03-01 NOTE — LETTER
[facility]   To:   [facility]          Please give to facility    From:   SHU Mariano Care Coordinator [facility]     Patient Name:  Jose Lees YOB: 1940   Admit date: 2/27/21      *Information Needed:  Please contact me when the patient will discharge (or if they will move to long term care)- include the discharge date, disposition, and main diagnosis   - If the patient is discharged with home care services, please provide the name of the agency    Phone, Fax or Email with information       Thank You,   SHU Mariano   Social Work Clinic Care Coordinator   [facility]  PH: [phone]  [email or web address]

## 2021-03-02 ENCOUNTER — RECORDS - HEALTHEAST (OUTPATIENT)
Dept: LAB | Facility: CLINIC | Age: 81
End: 2021-03-02

## 2021-03-02 ENCOUNTER — DOCUMENTATION ONLY (OUTPATIENT)
Dept: OTHER | Facility: CLINIC | Age: 81
End: 2021-03-02

## 2021-03-03 NOTE — PROGRESS NOTES
[place] [facility]  INITIAL VISIT NOTE  March 4, 2021    PRIMARY CARE PROVIDER AND CLINIC:  Nolberto Ferrari [address] [place] [address] / [facility] [id number]    CHIEF COMPLAINT:  Hospital follow-up/Initial visit    HPI:    Jose Lees is a 80 year old  (1940) male who [facility] [place] [facility] March 4, 2021 for an initial visit.     Medical history is notable for CAD, ischemic cardiomyopathy, hypertension, dyslipidemia, embolic stroke, DM type II, metastatic prostate cancer, depression, autistic disorder, cognitive impairment, and recent hospitalization from January 26 through January 29, 2021 for esophageal stricture and candidal esophagitis, then discharged to [facility] TCU where he stayed for 3 weeks before discharging to his sister's home.    Summary of hospital course:  Patient was rehospitalized at [facility] from February 26 through February 27, 2021 for assistance with placement due to failure to thrive, as family was unable to meet his needs.  Lactic acid was elevated at 2.9 and he was resuscitated with IV fluids.  EKG showed normal sinus rhythm and left axis deviation.  Test for COVID-19 was negative.  Blood cultures yielded no growth.  TCU was recommended by therapies.    Patient is admitted to this facility for medical management, nursing care, and rehab.     Of note, history obtained from patient, facility RN, and extensive review of the chart necessitated by complex hospitalization.    Today's visit:  Patient was seen in his room, while sitting in a chair.  He appears comfortable.  Since the last time, his SLUMS score has declined from 24/30 to 14/30.  Patient reports no trouble swallowing.  He denies fever, chills, chest pain, palpitation, dyspnea, nausea, vomiting, abdominal pain, or urinary symptoms.  He had had constipation for several days but eventually had a bowel movement last night. His morning blood glucose level is 173.      CODE  STATUS:   DNR / DNI    PAST MEDICAL HISTORY:   Autistic disorder  Cognitive impairment, SLUMS 14/30  CAD, status post BMS to LAD in April 2014  Ischemic cardiomyopathy and grade 1 LV diastolic dysfunction (last LVEF 35-40% on January 29, 2021 by echo)  Hypertension  Dyslipidemia  Bilateral embolic strokes in 2014  DM type II  Metastatic prostate cancer (castrate resistant, initially diagnosed in 2011, currently on leuprolide injection every 6 months)  Esophageal stricture/stenosis, status post dilation  Candidal esophagitis  Mildly dilated ascending aorta  Depression  Severe malnutrition    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/2020     Prostate cancer (H) 10/11    GLEASOR 8     Stricture and stenosis of esophagus 10/22/2015     Stroke, 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     Type 2 diabetes mellitus without complication, without long-term current use of insulin (H) 7/25/2017       PAST SURGICAL HISTORY:   Past Surgical History:   Procedure Laterality Date     CORONARY ANGIOGRAPHY ADULT ORDER       ESOPHAGOSCOPY, GASTROSCOPY, DUODENOSCOPY (EGD), COMBINED N/A 8/6/2015    Procedure: COMBINED ESOPHAGOSCOPY, GASTROSCOPY, DUODENOSCOPY (EGD), REMOVE FOREIGN BODY;  Surgeon: Yu Tapia MD;  Location:  GI     ESOPHAGOSCOPY, GASTROSCOPY, DUODENOSCOPY (EGD), COMBINED N/A 1/28/2021    Procedure: ESOPHAGOGASTRODUODENOSCOPY (EGD) WITH FLUORO,Biopsy, and Dilation;  Surgeon: Audra Huang MD;  Location:  OR     HEART CATH,  ANGIOPLASTY  4/14    BMS to LAD     PHACOEMULSIFICATION CLEAR CORNEA WITH STANDARD INTRAOCULAR LENS IMPLANT Right 12/4/2018    Procedure: COMPLEX RIGHT EYE PHACOEMULSIFICATION CLEAR CORNEA WITH STANDARD INTRAOCULAR LENS IMPLANT;  Surgeon: Kamar Kyle MD;  Location: [facility]     PHACOEMULSIFICATION CLEAR CORNEA WITH STANDARD INTRAOCULAR LENS IMPLANT Left 12/19/2018    Procedure: COMPLEX LEFT EYE PHACOEMULSIFICATION CLEAR CORNEA WITH STANDARD INTRAOCULAR LENS IMPLANT;  Surgeon: Kamar Kyle MD;  Location: [facility]       FAMILY HISTORY:   Family History   Problem Relation Age of Onset     Cerebrovascular Disease Mother 92     C.A.D. Father 48       SOCIAL HISTORY:  Patient is single and lives at [facility] living [facility].  He uses a walker for ambulation. He is a lifelong nonsmoker and does not consume alcohol.       Social History     Tobacco Use     Smoking status: Never Smoker     Smokeless tobacco: Never Used   Substance Use Topics     Alcohol use: No     Comment: never       MEDICATIONS:  Current Outpatient Medications   Medication Sig Dispense Refill     ACE/ARB/ARNI NOT PRESCRIBED, INTENTIONAL, ACE & ARB not prescribed due to Symptomatic hypotension not due to excessive diuresis (Patient not taking: Reported on 2/17/2021)       aspirin (ASA) 81 MG chewable tablet Take 81 mg by mouth daily       atorvastatin (LIPITOR) 40 MG tablet TAKE ONE TABLET BY MOUTH EVERY DAY 90 tablet 3     Cholecalciferol (VITAMIN D) 50 MCG (2000 UT) CAPS Take 2,000 Units by mouth daily       Continuous Glucose Monitor Sup MISC 4 times daily (before meals and nightly)        insulin glargine (LANTUS PEN) 100 UNIT/ML pen Inject 7 Units Subcutaneous At Bedtime  0     Leuprolide Acetate, 6 Month, (LUPRON DEPOT, 6-MONTH, IM)        metFORMIN (GLUCOPHAGE) 500 MG tablet TAKE 2 TABLETS BY MOUTH TWICE A DAY WITH MEALS 360 tablet 3     metoprolol succinate ER (TOPROL-XL) 25 MG 24 hr tablet Take 0.5 tablets (12.5  mg) by mouth daily TAKE ONE TABLET BY MOUTH EVERY DAY (Patient taking differently: Take 12.5 mg by mouth daily TAKE ONE TABLET BY MOUTH EVERY DAY. HOLD if SBP <110. Call NP if held) 90 tablet 0     omeprazole (PRILOSEC) 20 MG DR capsule TAKE 1 CAPSULE (20 MG) BY MOUTH DAILY 90 capsule 3     senna-docusate (SENOKOT-S/PERICOLACE) 8.6-50 MG tablet Take 1 tablet by mouth daily as needed for constipation       sertraline (ZOLOFT) 50 MG tablet Take 50 mg by mouth every morning         ALLERGIES:  No Known Allergies    ROS:  10 point ROS were negative other than the symptoms noted above in the HPI.    PHYSICAL EXAM:  Vital signs were reviewed in the chart.  Vital Signs: Blood pressure 123/73, heart rate 81, respirate 18, temperature 98  F, oxygen saturation 97%, weight 184.4 LBS  General: Weak appearing but comfortable and in no acute distress  HEENT: Normocephalic; oropharynx clear  Cardiovascular: Normal S1, S2, RRR  Respiratory: Lungs clear to auscultation bilaterally  GI: Abdomen soft, non-tender, non-distended, +BS  Extremities: 1+ bilateral LE edema  Neuro: CX II-XII grossly intact; ROM in all four extremities grossly intact  Psych: Alert and oriented x2-3; normal affect  Skin: No acute rash    LABORATORY/IMAGING DATA:    Most Recent 3 CBC's:  Recent Labs   Lab Test 02/26/21 1737 02/25/21 1612 02/01/21   WBC 7.4 7.2 7.2   HGB 13.3 13.8 12.8*   MCV 88 90 92    271 249     Most Recent 3 BMP's:  Recent Labs   Lab Test 02/27/21  0634 02/26/21  2223 02/26/21  1737 02/25/21  1612     --  133 132*   POTASSIUM 3.6 4.1 3.3* 3.8   CHLORIDE 108  --  99 101   CO2 25  --  25 22   BUN 13  --  15 17   CR 0.86  --  0.97 0.94   ANIONGAP 4  --  9 9   TENZIN 8.3*  --  9.2 9.7   *  --  264* 256*     Most Recent 2 LFT's:  Recent Labs   Lab Test 02/25/21  1612 01/26/21  1624   AST 31 15   ALT 17 14   ALKPHOS 76 74   BILITOTAL 0.4 0.7     Most Recent 3 Troponin's:  Recent Labs   Lab Test 08/06/15  0905 04/09/14  0600  04/09/14  0240   TROPI <0.015  The 99th percentile for upper reference range is 0.045 ug/L.  Troponin values in   the range of 0.045 - 0.120 ug/L may be associated with risks of adverse   clinical events.   0.626* 0.752*     Most Recent Cholesterol Panel:  Recent Labs   Lab Test 01/29/21  0701   CHOL 146   LDL 75   HDL 47   TRIG 120     Most Recent 6 Bacteria Isolates From Any Culture (See EPIC Reports for Culture Details):  Recent Labs   Lab Test 02/26/21  1940 02/26/21  1935   CULT No growth after 5 days No growth after 5 days     Most Recent TSH and T4:  Recent Labs   Lab Test 02/25/21  1612   TSH 8.65*   T4 1.19     Most Recent Hemoglobin A1c:  Recent Labs   Lab Test 01/26/21  2255   A1C 11.4*       ASSESSMENT/PLAN:  Failure to thrive,  Severe malnutrition,  Generalized muscle weakness,  Physical deconditioning.  This is slowly improving.  Plan:  Considering moving to [place]  Continue PT/OT evaluation and therapy  Continue dietary supplement twice a day between meals    DM type II.  Last hemoglobin A1c was 11.4% on January 26, 2021.  Due to poor oral intake, Lantus dose was reduced in hospital from 14 units subcu to 7 units subcu at bedtime.  Blood glucose levels are currently in the range of 119-298.  This morning blood glucose level was 173.  Plan:  Continue diabetic diet  Continue Lantus 7 units subcu at bedtime  Continue Metformin 1000 mg p.o. twice daily  Continue to monitor blood glucose  Adjust insulin regimen as indicated     Esophageal stenosis, benign-appearing, status post dilation on January 27 and January 28, 2021,  Candidal esophagitis,  Gastritis,  Esophageal dysphagia.  Patient has completed the course of fluconazole.  Dysphagia has improved.  Plan:  Continue dysphagia diet level 2 with thin liquid consistency  Continue omeprazole 20 mg p.o. daily  Continue SLP evaluation and therapy    History of embolic stroke in April 2014.  No evidence for atrial fibrillation on previous EKGs and recent 30-day  cardiac event monitor in Jan-Feb 2021.  Plan:  Continue aspirin 81 mg p.o. daily and atorvastatin 40 mg p.o. at bedtime     CAD, status post BMS to LAD in April 2014,  Ischemic cardiomyopathy and grade 1 LV diastolic dysfunction (last LVEF 35-40% on January 29, 2021 by echo),  Hypertension,  Dyslipidemia.  Stable.  Metoprolol dose was reduced in the hospital.  Plan:  Continue aspirin 81 mg p.o. daily, atorvastatin 40 mg p.o. daily, and metoprolol ER 12.5 mg p.o. daily  Monitor blood pressure, weight, and volume status     Metastatic prostate cancer.  Castrate resistant, initially diagnosed in 2001, currently on leuprolide injection every 6 months.   Last PSA was 121 on January 2021 compared to 104 on October 28, 2020.  Zytiga was discontinued recently out of concern for causing dizziness and weakness.  Plan:  Follow-up with Dr. Burgos as previously scheduled     Depression.  Stable  Plan:  Continue sertraline 50 mg p.o. in the morning     Cognitive impairment,  Autistic disorder.  SLUMS 14/30.  Plan:  Staff to assist with daily care and mobility  Cognitive evaluation per therapies     Skin lesions on lower extremities.  Chronic.  Plan:  Follow-up with dermatology at [facility] as outpatient      Orders written by provider at facility:  None.        Electronically signed by:  Jean-Paul Doe MD

## 2021-03-04 ENCOUNTER — NURSING HOME VISIT (OUTPATIENT)
Dept: GERIATRICS | Facility: CLINIC | Age: 81
End: 2021-03-04
Payer: COMMERCIAL

## 2021-03-04 ENCOUNTER — TRANSFERRED RECORDS (OUTPATIENT)
Dept: HEALTH INFORMATION MANAGEMENT | Facility: CLINIC | Age: 81
End: 2021-03-04

## 2021-03-04 VITALS
OXYGEN SATURATION: 98 % | SYSTOLIC BLOOD PRESSURE: 103 MMHG | HEIGHT: 69 IN | TEMPERATURE: 98 F | RESPIRATION RATE: 18 BRPM | DIASTOLIC BLOOD PRESSURE: 63 MMHG | HEART RATE: 90 BPM | BODY MASS INDEX: 22.22 KG/M2 | WEIGHT: 150 LBS

## 2021-03-04 DIAGNOSIS — K22.2 ESOPHAGEAL STRICTURE: ICD-10-CM

## 2021-03-04 DIAGNOSIS — R62.7 FAILURE TO THRIVE IN ADULT: Primary | ICD-10-CM

## 2021-03-04 DIAGNOSIS — I10 ESSENTIAL HYPERTENSION: ICD-10-CM

## 2021-03-04 DIAGNOSIS — Z86.73 HISTORY OF STROKE: ICD-10-CM

## 2021-03-04 DIAGNOSIS — R13.19 ESOPHAGEAL DYSPHAGIA: ICD-10-CM

## 2021-03-04 DIAGNOSIS — F84.0 AUTISM DISORDER: ICD-10-CM

## 2021-03-04 DIAGNOSIS — C61 METASTASIS FROM HORMONE-REFRACTORY PROSTATE CANCER (H): ICD-10-CM

## 2021-03-04 DIAGNOSIS — I25.5 ISCHEMIC CARDIOMYOPATHY: ICD-10-CM

## 2021-03-04 DIAGNOSIS — R41.89 COGNITIVE IMPAIRMENT: ICD-10-CM

## 2021-03-04 DIAGNOSIS — Z79.4 TYPE 2 DIABETES MELLITUS WITH OTHER CIRCULATORY COMPLICATION, WITH LONG-TERM CURRENT USE OF INSULIN (H): ICD-10-CM

## 2021-03-04 DIAGNOSIS — L98.9 SKIN LESION: ICD-10-CM

## 2021-03-04 DIAGNOSIS — R53.81 PHYSICAL DECONDITIONING: ICD-10-CM

## 2021-03-04 DIAGNOSIS — I25.10 CORONARY ARTERY DISEASE INVOLVING NATIVE CORONARY ARTERY OF NATIVE HEART WITHOUT ANGINA PECTORIS: ICD-10-CM

## 2021-03-04 DIAGNOSIS — E11.59 TYPE 2 DIABETES MELLITUS WITH OTHER CIRCULATORY COMPLICATION, WITH LONG-TERM CURRENT USE OF INSULIN (H): ICD-10-CM

## 2021-03-04 DIAGNOSIS — E78.5 DYSLIPIDEMIA: ICD-10-CM

## 2021-03-04 DIAGNOSIS — C79.9 METASTASIS FROM HORMONE-REFRACTORY PROSTATE CANCER (H): ICD-10-CM

## 2021-03-04 DIAGNOSIS — E43 SEVERE MALNUTRITION (H): ICD-10-CM

## 2021-03-04 LAB
ANION GAP SERPL CALCULATED.3IONS-SCNC: 9 MMOL/L (ref 5–18)
BACTERIA SPEC CULT: NO GROWTH
BACTERIA SPEC CULT: NO GROWTH
BUN SERPL-MCNC: 9 MG/DL (ref 8–28)
CALCIUM SERPL-MCNC: 8.9 MG/DL (ref 8.5–10.5)
CHLORIDE BLD-SCNC: 102 MMOL/L (ref 98–107)
CHLORIDE SERPLBLD-SCNC: 102 MMOL/L (ref 98–107)
CHLORIDE SERPLBLD-SCNC: 102 MMOL/L (ref 98–107)
CO2 SERPL-SCNC: 26 MMOL/L (ref 22–31)
CREAT SERPL-MCNC: 0.76 MG/DL (ref 0.7–1.3)
GFR SERPL CREATININE-BSD FRML MDRD: >60 ML/MIN/1.73M2
GLUCOSE BLD-MCNC: 183 MG/DL (ref 70–125)
GLUCOSE SERPL-MCNC: 183 MG/DL (ref 70–125)
GLUCOSE SERPL-MCNC: 183 MG/DL (ref 70–125)
POTASSIUM BLD-SCNC: 3.8 MMOL/L (ref 3.5–5)
POTASSIUM SERPL-SCNC: 3.8 MMOL/L (ref 3.5–5)
POTASSIUM SERPL-SCNC: 3.8 MMOL/L (ref 3.5–5)
SODIUM SERPL-SCNC: 137 MMOL/L (ref 136–145)
SPECIMEN SOURCE: NORMAL
SPECIMEN SOURCE: NORMAL

## 2021-03-04 PROCEDURE — 99305 1ST NF CARE MODERATE MDM 35: CPT | Mod: AI | Performed by: INTERNAL MEDICINE

## 2021-03-04 ASSESSMENT — MIFFLIN-ST. JEOR: SCORE: 1385.78

## 2021-03-04 NOTE — LETTER
3/4/2021        RE: Jose Lees  [address] [facility] [address]        [facility]  INITIAL VISIT NOTE  March 4, 2021    PRIMARY CARE PROVIDER AND CLINIC:  Nolberto Ferrari [address] [address]    CHIEF COMPLAINT:  Hospital follow-up/Initial visit    HPI:    Jose Lees is a 80 year old  (1940) male who was seen at [facility]CU in [place] on March 4, 2021 for an initial visit.     Medical history is notable for CAD, ischemic cardiomyopathy, hypertension, dyslipidemia, embolic stroke, DM type II, metastatic prostate cancer, depression, autistic disorder, cognitive impairment, and recent hospitalization from January 26 through January 29, 2021 for esophageal stricture and candidal esophagitis, then discharged to [facility] TCU where he stayed for 3 weeks before discharging to his sister's home.    Summary of hospital course:  Patient was rehospitalized at [facility] from February 26 through February 27, 2021 for assistance with placement due to failure to thrive, as family was unable to meet his needs.  Lactic acid was elevated at 2.9 and he was resuscitated with IV fluids.  EKG showed normal sinus rhythm and left axis deviation.  Test for COVID-19 was negative.  Blood cultures yielded no growth.  TCU was recommended by therapies.    Patient is admitted to this facility for medical management, nursing care, and rehab.     Of note, history obtained from patient, facility RN, and extensive review of the chart necessitated by complex hospitalization.    Today's visit:  Patient was seen in his room, while sitting in a chair.  He appears comfortable.  Since the last time, his SLUMS score has declined from 24/30 to 15/30.  Patient reports no trouble swallowing.  He denies fever, chills, chest pain, palpitation, dyspnea, nausea, vomiting, abdominal pain, or urinary symptoms.  He had had constipation for several days but  eventually had a bowel movement last night. His morning blood glucose level is 173.      CODE STATUS:   DNR / DNI    PAST MEDICAL HISTORY:   Autistic disorder  Cognitive impairment, SLUMS 15/30  CAD, status post BMS to LAD in April 2014  Ischemic cardiomyopathy and grade 1 LV diastolic dysfunction (last LVEF 35-40% on January 29, 2021 by echo)  Hypertension  Dyslipidemia  Bilateral embolic strokes in 2014  DM type II  Metastatic prostate cancer (castrate resistant, initially diagnosed in 2011, currently on leuprolide injection every 6 months)  Esophageal stricture/stenosis, status post dilation  Candidal esophagitis  Mildly dilated ascending aorta  Depression  Severe malnutrition    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/2020     Prostate cancer (H) 10/11    GLEASOR 8     Stricture and stenosis of esophagus 10/22/2015     Stroke, 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     Type 2 diabetes mellitus without complication, without long-term current use of insulin (H) 7/25/2017       PAST SURGICAL HISTORY:   Past Surgical History:   Procedure Laterality Date     CORONARY ANGIOGRAPHY ADULT ORDER       ESOPHAGOSCOPY, GASTROSCOPY, DUODENOSCOPY (EGD), COMBINED N/A 8/6/2015    Procedure: COMBINED ESOPHAGOSCOPY, GASTROSCOPY, DUODENOSCOPY (EGD), REMOVE FOREIGN BODY;  Surgeon: Yu Tapia MD;  Location: [facility]     ESOPHAGOSCOPY, GASTROSCOPY, DUODENOSCOPY (EGD), COMBINED N/A 1/28/2021    Procedure: ESOPHAGOGASTRODUODENOSCOPY (EGD) WITH FLUORO,Biopsy,  and Dilation;  Surgeon: Audra Huang MD;  Location:  OR     HEART CATH, ANGIOPLASTY  4/14    BMS to LAD     PHACOEMULSIFICATION CLEAR CORNEA WITH STANDARD INTRAOCULAR LENS IMPLANT Right 12/4/2018    Procedure: COMPLEX RIGHT EYE PHACOEMULSIFICATION CLEAR CORNEA WITH STANDARD INTRAOCULAR LENS IMPLANT;  Surgeon: Kamar Kyle MD;  Location:  [facility]     PHACOEMULSIFICATION CLEAR CORNEA WITH STANDARD INTRAOCULAR LENS IMPLANT Left 12/19/2018    Procedure: COMPLEX LEFT EYE PHACOEMULSIFICATION CLEAR CORNEA WITH STANDARD INTRAOCULAR LENS IMPLANT;  Surgeon: Kamar Kyle MD;  Location:  [facility]       FAMILY HISTORY:   Family History   Problem Relation Age of Onset     Cerebrovascular Disease Mother 92     C.A.D. Father 48       SOCIAL HISTORY:  Patient is single and lives at [facility] assisted living facility.  He uses a walker for ambulation. He is a lifelong nonsmoker and does not consume alcohol.       Social History     Tobacco Use     Smoking status: Never Smoker     Smokeless tobacco: Never Used   Substance Use Topics     Alcohol use: No     Comment: never       MEDICATIONS:  Current Outpatient Medications   Medication Sig Dispense Refill     ACE/ARB/ARNI NOT PRESCRIBED, INTENTIONAL, ACE & ARB not prescribed due to Symptomatic hypotension not due to excessive diuresis (Patient not taking: Reported on 2/17/2021)       aspirin (ASA) 81 MG chewable tablet Take 81 mg by mouth daily       atorvastatin (LIPITOR) 40 MG tablet TAKE ONE TABLET BY MOUTH EVERY DAY 90 tablet 3     Cholecalciferol (VITAMIN D) 50 MCG (2000 UT) CAPS Take 2,000 Units by mouth daily       Continuous Glucose Monitor Sup MISC 4 times daily (before meals and nightly)        insulin glargine (LANTUS PEN) 100 UNIT/ML pen Inject 7 Units Subcutaneous At Bedtime  0     Leuprolide Acetate, 6 Month, (LUPRON DEPOT, 6-MONTH, IM)        metFORMIN (GLUCOPHAGE) 500 MG tablet TAKE 2 TABLETS BY MOUTH TWICE A DAY WITH MEALS 360  tablet 3     metoprolol succinate ER (TOPROL-XL) 25 MG 24 hr tablet Take 0.5 tablets (12.5 mg) by mouth daily TAKE ONE TABLET BY MOUTH EVERY DAY (Patient taking differently: Take 12.5 mg by mouth daily TAKE ONE TABLET BY MOUTH EVERY DAY. HOLD if SBP <110. Call NP if held) 90 tablet 0     omeprazole (PRILOSEC) 20 MG DR capsule TAKE 1 CAPSULE (20 MG) BY MOUTH DAILY 90 capsule 3     senna-docusate (SENOKOT-S/PERICOLACE) 8.6-50 MG tablet Take 1 tablet by mouth daily as needed for constipation       sertraline (ZOLOFT) 50 MG tablet Take 50 mg by mouth every morning         ALLERGIES:  No Known Allergies    ROS:  10 point ROS were negative other than the symptoms noted above in the HPI.    PHYSICAL EXAM:  Vital signs were reviewed in the chart.  Vital Signs: Blood pressure 123/73, heart rate 81, respirate 18, temperature 98  F, oxygen saturation 97%, weight 184.4 LBS  General: Weak appearing but comfortable and in no acute distress  HEENT: Normocephalic; oropharynx clear  Cardiovascular: Normal S1, S2, RRR  Respiratory: Lungs clear to auscultation bilaterally  GI: Abdomen soft, non-tender, non-distended, +BS  Extremities: 1+ bilateral LE edema  Neuro: CX II-XII grossly intact; ROM in all four extremities grossly intact  Psych: Alert and oriented x2-3; normal affect  Skin: No acute rash    LABORATORY/IMAGING DATA:    Most Recent 3 CBC's:  Recent Labs   Lab Test 02/26/21  1737 02/25/21  1612 02/01/21   WBC 7.4 7.2 7.2   HGB 13.3 13.8 12.8*   MCV 88 90 92    271 249     Most Recent 3 BMP's:  Recent Labs   Lab Test 02/27/21  0634 02/26/21  2223 02/26/21  1737 02/25/21  1612     --  133 132*   POTASSIUM 3.6 4.1 3.3* 3.8   CHLORIDE 108  --  99 101   CO2 25  --  25 22   BUN 13  --  15 17   CR 0.86  --  0.97 0.94   ANIONGAP 4  --  9 9   TENZIN 8.3*  --  9.2 9.7   *  --  264* 256*     Most Recent 2 LFT's:  Recent Labs   Lab Test 02/25/21  1612 01/26/21  1624   AST 31 15   ALT 17 14   ALKPHOS 76 74   BILITOTAL 0.4  0.7     Most Recent 3 Troponin's:  Recent Labs   Lab Test 08/06/15  0905 04/09/14  0600 04/09/14  0240   TROPI <0.015  The 99th percentile for upper reference range is 0.045 ug/L.  Troponin values in   the range of 0.045 - 0.120 ug/L may be associated with risks of adverse   clinical events.   0.626* 0.752*     Most Recent Cholesterol Panel:  Recent Labs   Lab Test 01/29/21  0701   CHOL 146   LDL 75   HDL 47   TRIG 120     Most Recent 6 Bacteria Isolates From Any Culture (See EPIC Reports for Culture Details):  Recent Labs   Lab Test 02/26/21  1940 02/26/21  1935   CULT No growth after 5 days No growth after 5 days     Most Recent TSH and T4:  Recent Labs   Lab Test 02/25/21  1612   TSH 8.65*   T4 1.19     Most Recent Hemoglobin A1c:  Recent Labs   Lab Test 01/26/21  2255   A1C 11.4*       ASSESSMENT/PLAN:  Failure to thrive,  Severe malnutrition,  Generalized muscle weakness,  Physical deconditioning.  This is slowly improving.  Plan:  Considering moving to AL  Continue PT/OT evaluation and therapy  Continue dietary supplement twice a day between meals    DM type II.  Last hemoglobin A1c was 11.4% on January 26, 2021.  Due to poor oral intake, Lantus dose was reduced in hospital from 14 units subcu to 7 units subcu at bedtime.  Blood glucose levels are currently in the range of 119-298.  This morning blood glucose level was 173.  Plan:  Continue diabetic diet  Continue Lantus 7 units subcu at bedtime  Continue Metformin 1000 mg p.o. twice daily  Continue to monitor blood glucose  Adjust insulin regimen as indicated     Esophageal stenosis, benign-appearing, status post dilation on January 27 and January 28, 2021,  Candidal esophagitis,  Gastritis,  Esophageal dysphagia.  Patient has completed the course of fluconazole.  Dysphagia has improved.  Plan:  Continue dysphagia diet level 2 with thin liquid consistency  Continue omeprazole 20 mg p.o. daily  Continue SLP evaluation and therapy    History of embolic stroke  in April 2014.  No evidence for atrial fibrillation on previous EKGs.    Plan:  Continue aspirin 81 mg p.o. daily and atorvastatin 40 mg p.o. at bedtime     CAD, status post BMS to LAD in April 2014,  Ischemic cardiomyopathy and grade 1 LV diastolic dysfunction (last LVEF 35-40% on January 29, 2021 by echo),  Hypertension,  Dyslipidemia.  Stable.  Metoprolol dose was reduced in the hospital.  Plan:  Continue aspirin 81 mg p.o. daily, atorvastatin 40 mg p.o. daily, and metoprolol ER 12.5 mg p.o. daily  Monitor blood pressure, weight, and volume status     Metastatic prostate cancer.  Castrate resistant, initially diagnosed in 2001, currently on leuprolide injection every 6 months.   Last PSA was 121 on January 2021 compared to 104 on October 28, 2020.  Zytiga was discontinued recently out of concern for causing dizziness and weakness.  Plan:  Follow-up with Dr. Burgos as previously scheduled     Depression.  Stable  Plan:  Continue sertraline 50 mg p.o. in the morning     Cognitive impairment,  Autistic disorder.  SLUMS 15/30.  Plan:  Staff to assist with daily care and mobility  Cognitive evaluation per therapies     Skin lesions on lower extremities.  Chronic.  Plan:  Follow-up with dermatology at [facility] as outpatient      Orders written by provider at facility:  None.        Electronically signed by:  Jean-Paul Doe MD                          Sincerely,        Jean-Paul Doe MD

## 2021-03-05 ASSESSMENT — MIFFLIN-ST. JEOR: SCORE: 1244.71

## 2021-03-08 ENCOUNTER — DISCHARGE SUMMARY NURSING HOME (OUTPATIENT)
Dept: GERIATRICS | Facility: CLINIC | Age: 81
End: 2021-03-08
Payer: COMMERCIAL

## 2021-03-08 VITALS
HEART RATE: 85 BPM | OXYGEN SATURATION: 97 % | RESPIRATION RATE: 17 BRPM | DIASTOLIC BLOOD PRESSURE: 55 MMHG | TEMPERATURE: 98 F | BODY MASS INDEX: 29.53 KG/M2 | WEIGHT: 150.4 LBS | SYSTOLIC BLOOD PRESSURE: 101 MMHG | HEIGHT: 60 IN

## 2021-03-08 DIAGNOSIS — I50.30 HEART FAILURE WITH PRESERVED EJECTION FRACTION, NYHA CLASS I (H): ICD-10-CM

## 2021-03-08 DIAGNOSIS — S81.801S OPEN WOUND OF BOTH LOWER EXTREMITIES WITH COMPLICATION, SEQUELA: ICD-10-CM

## 2021-03-08 DIAGNOSIS — E11.59 TYPE 2 DIABETES MELLITUS WITH OTHER CIRCULATORY COMPLICATION, WITH LONG-TERM CURRENT USE OF INSULIN (H): ICD-10-CM

## 2021-03-08 DIAGNOSIS — I25.119 CORONARY ARTERY DISEASE INVOLVING NATIVE CORONARY ARTERY OF NATIVE HEART WITH ANGINA PECTORIS (H): ICD-10-CM

## 2021-03-08 DIAGNOSIS — Z79.4 TYPE 2 DIABETES MELLITUS WITH OTHER CIRCULATORY COMPLICATION, WITH LONG-TERM CURRENT USE OF INSULIN (H): ICD-10-CM

## 2021-03-08 DIAGNOSIS — C61 PROSTATE CANCER (H): ICD-10-CM

## 2021-03-08 DIAGNOSIS — F84.0 AUTISM DISORDER: ICD-10-CM

## 2021-03-08 DIAGNOSIS — Z87.19 S/P DILATATION OF ESOPHAGEAL STRICTURE: ICD-10-CM

## 2021-03-08 DIAGNOSIS — Z98.890 S/P DILATATION OF ESOPHAGEAL STRICTURE: ICD-10-CM

## 2021-03-08 DIAGNOSIS — M62.81 GENERALIZED MUSCLE WEAKNESS: Primary | ICD-10-CM

## 2021-03-08 DIAGNOSIS — I10 HYPERTENSION, UNSPECIFIED TYPE: ICD-10-CM

## 2021-03-08 DIAGNOSIS — R62.7 FAILURE TO THRIVE IN ADULT: ICD-10-CM

## 2021-03-08 DIAGNOSIS — Z91.148 NONCOMPLIANCE WITH MEDICATION REGIMEN: ICD-10-CM

## 2021-03-08 DIAGNOSIS — R53.81 PHYSICAL DECONDITIONING: ICD-10-CM

## 2021-03-08 DIAGNOSIS — F32.0 MILD MAJOR DEPRESSION (H): ICD-10-CM

## 2021-03-08 DIAGNOSIS — R41.89 COGNITIVE IMPAIRMENT: ICD-10-CM

## 2021-03-08 DIAGNOSIS — S81.802S OPEN WOUND OF BOTH LOWER EXTREMITIES WITH COMPLICATION, SEQUELA: ICD-10-CM

## 2021-03-08 PROBLEM — C79.51 PROSTATE CANCER METASTATIC TO BONE (H): Status: RESOLVED | Noted: 2021-02-01 | Resolved: 2021-03-08

## 2021-03-08 PROCEDURE — 99316 NF DSCHRG MGMT 30 MIN+: CPT | Performed by: NURSE PRACTITIONER

## 2021-03-08 NOTE — PROGRESS NOTES
[facility] DISCHARGE SUMMARY    PATIENT'S NAME: Jose Lees  YOB: 1940    PRIMARY CARE PROVIDER AND CLINIC RESPONSIBLE AFTER TRANSFER: silvia cavazos RN, CNP and Dr jack Krause MD     CODE STATUS: No CPR- Do NOT Intubate    TRANSFERRING PROVIDERS: SARATH Yi CNP, Dr. Jean-Paul Doe MD      DATE OF SNF ADMISSION:      DATE OF SNF DISCHARGE (including anticipating DC):     DISCHARGE DISPOSITION: [facility] Provider    Nursing Facility: [facility] stay 21 to 21.     Condition on Discharge:  Improving.    Function:  Ambulatin ft w/ fww,  Transfers: sba/cga  Cognitive Scores: SLUMS  and safety: .    Physical Function: TBA  Equipment: walker  DME: Walker    DISCHARGE DIAGNOSIS:      Generalized muscle weakness  Physical deconditioning  Failure to thrive in adult  S/P dilatation of esophageal stricture  Type 2 diabetes mellitus with other circulatory complication, with long-term current use of insulin (H)  Coronary artery disease involving native coronary artery of native heart with angina pectoris (H)  Hypertension, unspecified type  Heart failure with preserved ejection fraction, NYHA class I (H)  Open wound of both lower extremities with complication, sequela  Cognitive impairment  Mild major depression (H)  Autism disorder  Prostate cancer (H)  Noncompliance with medication regimen        HOSPITAL COURSE:     pt was discharged from here a couple weeks ago and went to his sisters to get stronger then he was to go back to [facility] [place], his apt.  He cont'd to be weak, did not eat well and was too much care for them to care for him. No acute issues in the hospital except some soft BP's so metoprolol decreased. Also Lantus decreased a touch also and sugars noted to be mid to high 100's, Labs fine. Sent for more rehab.      TCU/SNF COURSE: has  progressed but it is deemed he is not able to go live alone again.  previous time, he grant to his sisters to get stronger and was to go to his independent place but got weaker.  He agrees he needs more monitoring and help. He will go to [facility] when sent out on 3/11/21. His sugars have been running mostly mid to high  100's--occas >200, he has wounds right LE but no sign infections.       PAST MEDICAL HISTORY:  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/2020     Prostate cancer (H) 10/11    GLEASOR 8     Stricture and stenosis of esophagus 10/22/2015     Stroke, 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     Type 2 diabetes mellitus without complication, without long-term current use of insulin (H) 7/25/2017       DISCHARGE MEDICATIONS:  Current Outpatient Medications   Medication Sig Dispense Refill     aspirin (ASA) 81 MG chewable tablet Take 81 mg by mouth daily       atorvastatin (LIPITOR) 40 MG tablet TAKE ONE TABLET BY MOUTH EVERY DAY 90 tablet 3     Cholecalciferol (VITAMIN D) 50 MCG (2000 UT) CAPS Take 2,000 Units by mouth daily       Continuous Glucose Monitor Sup MISC 4 times daily (before meals and nightly)       insulin glargine (LANTUS PEN) 100 UNIT/ML pen Inject 7 Units Subcutaneous At Bedtime  0     metFORMIN (GLUCOPHAGE) 500 MG tablet TAKE 2 TABLETS BY MOUTH TWICE A DAY WITH MEALS 360 tablet 3     metoprolol succinate ER (TOPROL-XL) 25 MG 24 hr tablet Take 0.5 tablets (12.5 mg) by mouth daily TAKE ONE TABLET BY MOUTH EVERY DAY  (Patient taking differently: Take 12.5 mg by mouth daily TAKE ONE TABLET BY MOUTH EVERY DAY. HOLD if SBP <110. Call NP if held) 90 tablet 0     omeprazole (PRILOSEC) 20 MG DR capsule TAKE 1 CAPSULE (20 MG) BY MOUTH DAILY 90 capsule 3     senna-docusate (SENOKOT-S/PERICOLACE) 8.6-50 MG tablet Take 1 tablet by mouth daily as needed for constipation       sertraline (ZOLOFT) 50 MG tablet Take 50 mg by mouth every morning       ACE/ARB/ARNI NOT PRESCRIBED, INTENTIONAL, ACE & ARB not prescribed due to Symptomatic hypotension not due to excessive diuresis (Patient not taking: Reported on 2/17/2021)       Leuprolide Acetate, 6 Month, (LUPRON DEPOT, 6-MONTH, IM)          MEDICATION CHANGES/RATIONALE:    none  /55   Pulse 85   Temp 98  F (36.7  C)   Resp 17   Ht 1.524 m (5')   Wt 68.2 kg (150 lb 6.4 oz)   SpO2 97%   BMI 29.37 kg/m      TODAY DURING EXAM/ROS:  No CP, SOB, Cough, dizziness, fevers, chills, HA, N/V, dysuria or Bowel Abnormalities. Appetite is good.  No pain      PHYSICAL EXAM Today:  A & O x 3, NAD. Lungs CTA, non labored. RRR, S1/S2 w/o murmur,gallop or rub.   No edema.  Abdomen soft, nontender, +BT'S. No focal neurological deficits. ORSS and up with walker.   Wounds right LE with drsgs intact--no sign drainage or bleeding..       SNF LABS  Recent Labs   Lab Test 03/04/21 02/27/21  0634     137 137   POTASSIUM 3.8  3.8 3.6   CHLORIDE 102  102 108   CO2 26  26 25   ANIONGAP 9  9 4   *  183* 164*   BUN 9  9 13   CR 0.76  0.76 0.86   TENZIN 8.9  8.9 8.3*     CBC RESULTS:   Recent Labs   Lab Test 02/26/21  1737   WBC 7.4   RBC 4.52   HGB 13.3   HCT 39.9*   MCV 88   MCH 29.4   MCHC 33.3   RDW 13.2                DISCHARGE PLAN:  Occupational Therapy, Physical Therapy, Speech Therapy , Registered Nurse, Home Health Aide and From:  [facility] Follow-up with PCP in 7 days: 7 days.   Current [place] or other scheduled appointments:  Future Appointments   Date Time Provider  Department Center   4/5/2021  8:45 AM Jonny Dominguez MD [facility]         MTM referral needed and placed by this provider: none    Pending labs: none     Discharge Treatments:see discharge orders       TOTAL DISCHARGE TIME:   Greater than 30 minutes    SARATH Yi [facility]              Documentation of Face-to-Face and Certification for Home Health Services     Patient: Jose Lees   YOB: 1940  MR Number: [id number]  Today's Date: 3/8/2021    I certify that patient: Jose Lees is under my care and that I, or a nurse practitioner or physician's assistant working with me, had a face-to-face encounter that meets the physician face-to-face encounter requirements with this patient on: 3/8/2021.    This encounter with the patient was in whole, or in part, for the following medical condition, which is the primary reason for home health care:      Generalized muscle weakness  Physical deconditioning  Failure to thrive in adult  S/P dilatation of esophageal stricture  Type 2 diabetes mellitus with other circulatory complication, with long-term current use of insulin (H)  Coronary artery disease involving native coronary artery of native heart with angina pectoris (H)  Hypertension, unspecified type  Heart failure with preserved ejection fraction, NYHA class I (H)  Open wound of both lower extremities with complication, sequela  Cognitive impairment  Mild major depression (H)  Autism disorder  Prostate cancer (H)  Noncompliance with medication regimen  .    I certify that, based on my findings, the following services are medically necessary home health services: Occupational Therapy, Physical Therapy, Speech Language Therapy and HHA.    My clinical findings support the need for the above services because: Occupational Therapy Services are needed to assess and treat cognitive ability and address ADL safety due to impairment in new  environment--safety eval,. and Physical Therapy Services are needed to assess and treat the following functional impairments: deconditioning and weakness.    Further, I certify that my clinical findings support that this patient is homebound (i.e. absences from home require considerable and taxing effort and are for medical reasons or Buddhism services or infrequently or of short duration when for other reasons) because: Requires assistance of another person or specialized equipment to access medical services because patient: Is unable to walk greater than  64 feet without rest...    Based on the above findings. I certify that this patient is confined to the home and needs intermittent skilled nursing care, physical therapy and/or speech therapy.  The patient is under my care, and I have initiated the establishment of the plan of care.  This patient will be followed by a physician who will periodically review the plan of care.  Physician/Provider to provide follow up care: Silvia Saucedo RN, MIKE    Responsible Medicare certified PECOS Physician: silvia saucedo RN, CNP  Physician Signature: See electronic signature associated with these discharge orders.  Date: 3/8/2021

## 2021-03-08 NOTE — PATIENT INSTRUCTIONS
[facility] Discharge Orders    Name: Jose Lees  : 1940  Planned Discharge Date: 3/11/2021  Discharged to: new assisted living for patient Dean Davis at [facility]    MEDICAL FOLLOW UP  Follow up with PCP in 1-2 weeks --  Follow up with [facility]--Dr Kamar Be MD--first available, Cards Dr Dominguez      FUTURE LABS: No labs orders/due    ORDER CHANGES:  none    DISCHARGE MEDICATIONS:  The patient s pharmacy is authorized to dispense a 30-day supply of medications. Refill requests should be directed to the primary provider, Kailey Saucedo   No narcotics are prescribed at time of discharge.   Current Outpatient Medications   Medication Sig Dispense Refill     aspirin (ASA) 81 MG chewable tablet Take 81 mg by mouth daily       atorvastatin (LIPITOR) 40 MG tablet TAKE ONE TABLET BY MOUTH EVERY DAY 90 tablet 3     Cholecalciferol (VITAMIN D) 50 MCG (2000 UT) CAPS Take 2,000 Units by mouth daily       Continuous Glucose Monitor Sup MISC 4 times daily (before meals and nightly)       insulin glargine (LANTUS PEN) 100 UNIT/ML pen Inject 7 Units Subcutaneous At Bedtime  0     metFORMIN (GLUCOPHAGE) 500 MG tablet TAKE 2 TABLETS BY MOUTH TWICE A DAY WITH MEALS 360 tablet 3     metoprolol succinate ER (TOPROL-XL) 25 MG 24 hr tablet Take 0.5 tablets (12.5 mg) by mouth daily TAKE ONE TABLET BY MOUTH EVERY DAY (Patient taking differently: Take 12.5 mg by mouth daily TAKE ONE TABLET BY MOUTH EVERY DAY. HOLD if SBP <110. Call NP if held) 90 tablet 0     omeprazole (PRILOSEC) 20 MG DR capsule TAKE 1 CAPSULE (20 MG) BY MOUTH DAILY 90 capsule 3     senna-docusate (SENOKOT-S/PERICOLACE) 8.6-50 MG tablet Take 1 tablet by mouth daily as needed for constipation       sertraline (ZOLOFT) 50 MG tablet Take 50 mg by mouth every morning       ACE/ARB/ARNI NOT PRESCRIBED, INTENTIONAL, ACE & ARB not prescribed due to Symptomatic hypotension not due to excessive diuresis (Patient not  taking: Reported on 2/17/2021)       Leuprolide Acetate, 6 Month, (LUPRON DEPOT, 6-MONTH, IM)          SERVICES:  Home Care:  Occupational Therapy, Physical Therapy, Speech Therapy , Home Health Aide and From:  [facility]    ADDITIONAL INSTRUCTIONS:  Wound care per current orders in [facility]--right leg currently with open wounds..     SARATH Yi CNP  This document was electronically signed on March 8, 2021

## 2021-03-08 NOTE — Clinical Note
Alon Marcus Dr is going to AL here at [facility].  Family  asked to have an in house team so I and Dr Krause said we would follow him here. He goes on Thursday to his new apt.  THanks Kailey

## 2021-03-09 ENCOUNTER — TELEPHONE (OUTPATIENT)
Dept: DERMATOLOGY | Facility: CLINIC | Age: 81
End: 2021-03-09

## 2021-03-09 NOTE — TELEPHONE ENCOUNTER
Called and spoke to Judith, patients POA.    Scheduled next available appointment w/ Dr. Camejo- lesions on lower extremity.    Judith voiced understanding.    Rena RN-BSN-PHN  [facility]  [phone]

## 2021-03-09 NOTE — TELEPHONE ENCOUNTER
TCU called clinic to schedule a dermatology appointment for patient.    TCU requested I call patients sister to schedule (POA).    AMBROCIO Chase-BSN-N  [facility]  [phone]

## 2021-03-12 ENCOUNTER — PATIENT OUTREACH (OUTPATIENT)
Dept: CARE COORDINATION | Facility: CLINIC | Age: 81
End: 2021-03-12

## 2021-03-12 NOTE — PROGRESS NOTES
Clinic Care Coordination Contact  [facility]/Voicemail    Clinical Data: Care Coordinator Outreach  TCU discharge follow up     Outreach attempted x 1.  Left message on patient's sister/POA's voicemail with call back information and requested return call.    Plan: Care Coordinator will try to reach patient again in 1-2 business days.    SHU Mariano   Social Work Clinic Care Coordinator   [facility]  [place], [place]  PH: [phone]  [email or web address]

## 2021-03-12 NOTE — PROGRESS NOTES
Clinic Care Coordination Contact  Care Team Conversations    SW CC received voicemail back from pt's sister/DASHAWN Wong. Reported after discussion and thought, they have decided to move pt's primary care to [facility] at [facility].     Routing to previous PCP as FYI. [facility] already updated chart accordingly.     SHU Mariano   Social Work Clinic Care Coordinator   [facility]  [facility] [place], [place], and [facility]  PH: [phone]  [email or web address]

## 2021-03-15 PROBLEM — R10.84 ABDOMINAL PAIN, GENERALIZED: Status: RESOLVED | Noted: 2021-01-26 | Resolved: 2021-03-15

## 2021-03-15 PROBLEM — K22.2 ESOPHAGEAL STRICTURE: Status: RESOLVED | Noted: 2021-02-01 | Resolved: 2021-03-15

## 2021-03-18 NOTE — PROGRESS NOTES
[place] [facility]  PRIMARY CARE PROVIDER AND CLINIC:  Kailey Saucedo, SARATH CNP, [address] / [place] [address]  Chief Complaint   Patient presents with     Establish Care     [place] Medical Record Number:  [id number]  Place of Service where encounter took place:  [facility] ([facility]) [[id number]]    Jose Lees  is a 80 year old  (1940), admitted to the above facility from  [facility]. Hospital stay 2/26/21 through 2/27/21. TCU stay 2/27-3/11/21  Admitted to this facility for  rehab, medical management and nursing care.     Generalized muscle weakness  Physical deconditioning  S/P dilatation of esophageal stricture  Type 2 diabetes mellitus with other circulatory complication, with long-term current use of insulin (H)  Coronary artery disease involving native coronary artery of native heart with angina pectoris (H)  Hypertension, unspecified type  Heart failure with preserved ejection fraction, NYHA class I (H)  Cognitive impairment  Autism disorder  Mild major depression (H)  Noncompliance with medication regimen  Open wound of both lower extremities with complication, sequela  Prostate cancer (H)  Routine general medical examination at a health care facility    HPI:    HPI information obtained from: facility chart records, facility staff, patient report and [facility] chart review.     Brief Summary of Hospital Course:please see EPIC for further data.  Pt was here at [facility] in the TCU in February of this year after he was at [facility] 1/26-29/21 after not being able swallow well or eat much for 4 days. He had not been taking his meds.  His sister brought him in and he was found to have severe yeast esophagitis and had two EGD's with two dilatations on 1/27 and 1/28.  His swallowing improved. He was not restarted on Coumadin as neurology and cards evals did not show AF--they placed him on a 30 day event monitor and back on his BB  and baby asa though.. Echo showed 35-40% EF. He also had some leg lesions which had been there a few weeks to months per pt, and it was unclear what they were.  He was readmitted to the hospital a few day after discharge from TCU(*went home [to his sisters on 2/18/2021*) as he had gone to his sisters place and got weaker. The plan was for him to stay there until strong enough to go back to his apt in [facility]. While in he hospital , his SBP was a bit soft so Metoprolol as decreased. Lantus was also decreased as sugars noted to be in the mid to high 100's. Sent for rehab again.. then discharged to  [facility] with increased services on 3/11/2021.     TODAY DURING EXAM HPI and ROS:  No CP, SOB, Cough, dizziness, fevers, chills, HA, N/V, dysuria or Bowel Abnormalities. Appetite is better per pt.  No pain.   said his  Dermatology appt was cancelled by family as they had just gotten their COVID vaccines so were unable to take him.    CODE STATUS/ADVANCE DIRECTIVES DISCUSSION:   No CPR- Do NOT Intubate  Patient's living condition: lives alone  ALLERGIES: Patient has no known allergies.  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  PAST SURGICAL HISTORY:   has a past surgical history that includes Coronary Angiography Adult Order; Heart Cath, Angioplasty (4/14); Esophagoscopy, gastroscopy, duodenoscopy (EGD), combined (N/A, 8/6/2015); Phacoemulsification clear cornea with standard intraocular lens  implant (Right, 12/4/2018); Phacoemulsification clear cornea with standard intraocular lens implant (Left, 12/19/2018); and Esophagoscopy, gastroscopy, duodenoscopy (EGD), combined (N/A, 1/28/2021).  FAMILY HISTORY: family history includes C.A.D. (age of onset: 48) in his father; Cerebrovascular Disease (age of onset: 92) in his mother.  SOCIAL HISTORY:   reports that he has never smoked. He has never used smokeless tobacco. He reports that he does not drink alcohol or use drugs.    Post Discharge Medication Reconciliation Status: discharge medications reconciled and changed, per note/orders     Current Outpatient Medications   Medication Sig Dispense Refill     aspirin (ASA) 81 MG chewable tablet Take 81 mg by mouth daily       atorvastatin (LIPITOR) 40 MG tablet TAKE ONE TABLET BY MOUTH EVERY DAY 90 tablet 3     Cholecalciferol (VITAMIN D) 50 MCG (2000 UT) CAPS Take 2,000 Units by mouth daily       Continuous Glucose Monitor Sup MISC 4 times daily (before meals and nightly)       insulin glargine (LANTUS PEN) 100 UNIT/ML pen Inject 7 Units Subcutaneous At Bedtime (Patient not taking: Reported on 3/16/2021)  0     insulin glargine (LANTUS VIAL) 100 UNIT/ML vial Inject 7 Units Subcutaneous At Bedtime       Leuprolide Acetate, 6 Month, (LUPRON DEPOT, 6-MONTH, IM)        metFORMIN (GLUCOPHAGE) 500 MG tablet TAKE 2 TABLETS BY MOUTH TWICE A DAY WITH MEALS 360 tablet 3     metoprolol succinate ER (TOPROL-XL) 25 MG 24 hr tablet Take 0.5 tablets (12.5 mg) by mouth daily TAKE ONE TABLET BY MOUTH EVERY DAY (Patient taking differently: Take 12.5 mg by mouth daily TAKE ONE TABLET BY MOUTH EVERY DAY. HOLD if SBP <110. Call NP if held) 90 tablet 0     omeprazole (PRILOSEC) 20 MG DR capsule TAKE 1 CAPSULE (20 MG) BY MOUTH DAILY 90 capsule 3     senna-docusate (SENOKOT-S/PERICOLACE) 8.6-50 MG tablet Take 1 tablet by mouth daily as needed for constipation       sertraline (ZOLOFT) 50 MG tablet Take 50 mg by mouth every morning              ROS:see above under HPI    Vitals:  /52   Pulse 82   Temp 97.7  F (36.5  C)   Resp 16   SpO2 96%   Exam:  GENERAL: Siting in chair in room, in NAD,  ENT:  Mouth and posterior oropharynx normal, normal hearing acuity  EYES:  Conjunctivae, lids, pupils and irises normal  NECK:  No adenopathy,masses or thyromegaly  RESP:  respiratory effort and palpation of chest normal, lungs CTA, no respiratory distress  CV:  Auscultation of heart done , regular rate and rhythm,  no murmur, rub, or gallop,trace pedal bilat edema, +2 pedal pulses  ABDOMEN:  normal bowel sounds, soft, nontender   M/S:    ROSS equally, seen in chair  SKIN:  Inspection of skin at baseline except LE's have wraps in place--not removed--open sores, not painful per pt  NEURO:   Cranial nerves 2-12 are normal tested and grossly at patient's baseline  PSYCH:  oriented X 3, affect and mood normal      Lab/Diagnostic data:  Recent Labs   Lab Test 03/04/21 02/27/21  0634     137 137   POTASSIUM 3.8  3.8 3.6   CHLORIDE 102  102 108   CO2 26  26 25   ANIONGAP 9  9 4   *  183* 164*   BUN 9  9 13   CR 0.76  0.76 0.86   TENZIN 8.9  8.9 8.3*       ASSESSMENT / PLAN:  (M62.81) Generalized muscle weakness  (primary encounter diagnosis)   (R53.81) Physical deconditioning  Comment/Plan: has PT OT in apt, monitor.    (Z98.890,  Z87.19) S/P dilatation of esophageal stricture: 1/27/2021  Comment/Plan: f/u GI per their recs, no upcoming appt. Has been on dysphagia diet--will f/u.    (E11.59,  Z79.4) Type 2 diabetes mellitus with other circulatory complication, with long-term current use of insulin (H)  Comment/Plan: metformin, insulin, sugars in the mid to high 100's. Monitor.      CV ISSUES:  (I25.119) Coronary artery disease involving native coronary artery of native heart with angina pectoris (H)   (I10) Hypertension, unspecified type   (I50.30) Heart failure with preserved ejection fraction, NYHA class I (H)  Comment/Plan: Compensated,  SBP reasonable, cont monitor. Asa Lipitor metoprolol.    (R41.89) Cognitive impairment   (F84.0) Autism disorder   (F32.0) Mild major depression (H)   (Z91.14) Noncompliance with medication regimen  Comment/Plan: SLUMS 14/30, safety eval 26/27--not able to manage own meds. Monitor.    (S81.801S,  S81.802S) Open wound of both lower extremities with complication, sequela  Comment/Plan: cont wound care with assistance of [employer] RN.  Needs new appt with Dr Camejo(*pt thinks it is in April*)    (C61) Prostate cancer (H)  Comment/Plan: Lupron, f/u oncology her their recs.    (Z00.00) Routine general medical examination at a health care facility       Total time spent with patient visit at the skilled nursing facility was 55 including patient visit, review of past records and d/w staff. Greater than 50% of total time spent with counseling and coordinating care due to complexities of care as noted above..     Electronically signed by:  SARATH Yi CNP

## 2021-03-19 ENCOUNTER — ASSISTED LIVING VISIT (OUTPATIENT)
Dept: GERIATRICS | Facility: CLINIC | Age: 81
End: 2021-03-19
Payer: COMMERCIAL

## 2021-03-19 DIAGNOSIS — R41.89 COGNITIVE IMPAIRMENT: ICD-10-CM

## 2021-03-19 DIAGNOSIS — S81.802S OPEN WOUND OF BOTH LOWER EXTREMITIES WITH COMPLICATION, SEQUELA: ICD-10-CM

## 2021-03-19 DIAGNOSIS — R53.81 PHYSICAL DECONDITIONING: ICD-10-CM

## 2021-03-19 DIAGNOSIS — I25.119 CORONARY ARTERY DISEASE INVOLVING NATIVE CORONARY ARTERY OF NATIVE HEART WITH ANGINA PECTORIS (H): ICD-10-CM

## 2021-03-19 DIAGNOSIS — Z79.4 TYPE 2 DIABETES MELLITUS WITH OTHER CIRCULATORY COMPLICATION, WITH LONG-TERM CURRENT USE OF INSULIN (H): ICD-10-CM

## 2021-03-19 DIAGNOSIS — Z00.00 ROUTINE GENERAL MEDICAL EXAMINATION AT A HEALTH CARE FACILITY: ICD-10-CM

## 2021-03-19 DIAGNOSIS — F32.0 MILD MAJOR DEPRESSION (H): ICD-10-CM

## 2021-03-19 DIAGNOSIS — Z91.148 NONCOMPLIANCE WITH MEDICATION REGIMEN: ICD-10-CM

## 2021-03-19 DIAGNOSIS — E11.59 TYPE 2 DIABETES MELLITUS WITH OTHER CIRCULATORY COMPLICATION, WITH LONG-TERM CURRENT USE OF INSULIN (H): ICD-10-CM

## 2021-03-19 DIAGNOSIS — M62.81 GENERALIZED MUSCLE WEAKNESS: Primary | ICD-10-CM

## 2021-03-19 DIAGNOSIS — I10 HYPERTENSION, UNSPECIFIED TYPE: ICD-10-CM

## 2021-03-19 DIAGNOSIS — S81.801S OPEN WOUND OF BOTH LOWER EXTREMITIES WITH COMPLICATION, SEQUELA: ICD-10-CM

## 2021-03-19 DIAGNOSIS — I50.30 HEART FAILURE WITH PRESERVED EJECTION FRACTION, NYHA CLASS I (H): ICD-10-CM

## 2021-03-19 DIAGNOSIS — C61 PROSTATE CANCER (H): ICD-10-CM

## 2021-03-19 DIAGNOSIS — F84.0 AUTISM DISORDER: ICD-10-CM

## 2021-03-19 DIAGNOSIS — Z98.890 S/P DILATATION OF ESOPHAGEAL STRICTURE: ICD-10-CM

## 2021-03-19 DIAGNOSIS — Z87.19 S/P DILATATION OF ESOPHAGEAL STRICTURE: ICD-10-CM

## 2021-03-19 PROCEDURE — 99207 PR CDG-MDM COMPONENT: MEETS MODERATE - DOWN CODED: CPT | Performed by: NURSE PRACTITIONER

## 2021-03-19 NOTE — LETTER
3/19/2021        RE: Jose Hollins  [address] [facility] [place] [address]        [place] [facility]  PRIMARY CARE PROVIDER AND CLINIC:  SARATH Yi CNP, [address] [facility] [address] / [place] [address]  Chief Complaint   Patient presents with     Establish Care     [place] Medical Record Number:  [id number]  Place of Service where encounter took place:  [facility] [place] ([facility]) [[id number]]    Jose Lees  is a 80 year old  (1940), admitted to the above facility from  [facility]. Hospital stay 2/26/21 through 2/27/21. TCU stay 2/27-3/11/21  Admitted to this facility for  rehab, medical management and nursing care.     Generalized muscle weakness  Physical deconditioning  S/P dilatation of esophageal stricture  Type 2 diabetes mellitus with other circulatory complication, with long-term current use of insulin (H)  Coronary artery disease involving native coronary artery of native heart with angina pectoris (H)  Hypertension, unspecified type  Heart failure with preserved ejection fraction, NYHA class I (H)  Cognitive impairment  Autism disorder  Mild major depression (H)  Noncompliance with medication regimen  Open wound of both lower extremities with complication, sequela  Prostate cancer (H)  Routine general medical examination at a health care facility    HPI:    HPI information obtained from: facility chart records, facility staff, patient report and [facility] chart review.     Brief Summary of Hospital Course:please see EPIC for further data.  Pt was here at [facility] in the TCU in February of this year after he was at [facility] 1/26-29/21 after not being able swallow well or eat much for 4 days. He had not been taking his meds.  His sister brought him in and he was found to have severe yeast esophagitis and had two EGD's with two dilatations on 1/27 and 1/28.  His swallowing improved. He was not restarted on  Coumadin as neurology and cards evals did not show AF--they placed him on a 30 day event monitor and back on his BB and baby asa though.. Echo showed 35-40% EF. He also had some leg lesions which had been there a few weeks to months per pt, and it was unclear what they were.  He was readmitted to the hospital a few day after discharge from TCU(*went home [to his sisters on 2/18/2021*) as he had gone to his sisters place and got weaker. The plan was for him to stay there until strong enough to go back to his apt in [facility]. While in he hospital , his SBP was a bit soft so Metoprolol as decreased. Lantus was also decreased as sugars noted to be in the mid to high 100's. Sent for rehab again.. then discharged to  [facility] with increased services on 3/11/2021.     TODAY DURING EXAM HPI and ROS:  No CP, SOB, Cough, dizziness, fevers, chills, HA, N/V, dysuria or Bowel Abnormalities. Appetite is better per pt.  No pain.   said his  Dermatology appt was cancelled by family as they had just gotten their COVID vaccines so were unable to take him.    CODE STATUS/ADVANCE DIRECTIVES DISCUSSION:   No CPR- Do NOT Intubate  Patient's living condition: lives alone  ALLERGIES: Patient has no known allergies.  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  PAST SURGICAL HISTORY:   has a past surgical history that includes Coronary Angiography Adult Order; Heart Cath, Angioplasty (4/14); Esophagoscopy,  gastroscopy, duodenoscopy (EGD), combined (N/A, 8/6/2015); Phacoemulsification clear cornea with standard intraocular lens implant (Right, 12/4/2018); Phacoemulsification clear cornea with standard intraocular lens implant (Left, 12/19/2018); and Esophagoscopy, gastroscopy, duodenoscopy (EGD), combined (N/A, 1/28/2021).  FAMILY HISTORY: family history includes C.A.D. (age of onset: 48) in his father; Cerebrovascular Disease (age of onset: 92) in his mother.  SOCIAL HISTORY:   reports that he has never smoked. He has never used smokeless tobacco. He reports that he does not drink alcohol or use drugs.    Post Discharge Medication Reconciliation Status: discharge medications reconciled and changed, per note/orders     Current Outpatient Medications   Medication Sig Dispense Refill     aspirin (ASA) 81 MG chewable tablet Take 81 mg by mouth daily       atorvastatin (LIPITOR) 40 MG tablet TAKE ONE TABLET BY MOUTH EVERY DAY 90 tablet 3     Cholecalciferol (VITAMIN D) 50 MCG (2000 UT) CAPS Take 2,000 Units by mouth daily       Continuous Glucose Monitor Sup MISC 4 times daily (before meals and nightly)       insulin glargine (LANTUS PEN) 100 UNIT/ML pen Inject 7 Units Subcutaneous At Bedtime (Patient not taking: Reported on 3/16/2021)  0     insulin glargine (LANTUS VIAL) 100 UNIT/ML vial Inject 7 Units Subcutaneous At Bedtime       Leuprolide Acetate, 6 Month, (LUPRON DEPOT, 6-MONTH, IM)        metFORMIN (GLUCOPHAGE) 500 MG tablet TAKE 2 TABLETS BY MOUTH TWICE A DAY WITH MEALS 360 tablet 3     metoprolol succinate ER (TOPROL-XL) 25 MG 24 hr tablet Take 0.5 tablets (12.5 mg) by mouth daily TAKE ONE TABLET BY MOUTH EVERY DAY (Patient taking differently: Take 12.5 mg by mouth daily TAKE ONE TABLET BY MOUTH EVERY DAY. HOLD if SBP <110. Call NP if held) 90 tablet 0     omeprazole (PRILOSEC) 20 MG DR capsule TAKE 1 CAPSULE (20 MG) BY MOUTH DAILY 90 capsule 3     senna-docusate (SENOKOT-S/PERICOLACE) 8.6-50 MG tablet Take 1  tablet by mouth daily as needed for constipation       sertraline (ZOLOFT) 50 MG tablet Take 50 mg by mouth every morning             ROS:see above under HPI    Vitals:  /52   Pulse 82   Temp 97.7  F (36.5  C)   Resp 16   SpO2 96%   Exam:  GENERAL: Siting in chair in room, in NAD,  ENT:  Mouth and posterior oropharynx normal, normal hearing acuity  EYES:  Conjunctivae, lids, pupils and irises normal  NECK:  No adenopathy,masses or thyromegaly  RESP:  respiratory effort and palpation of chest normal, lungs CTA, no respiratory distress  CV:  Auscultation of heart done , regular rate and rhythm,  no murmur, rub, or gallop,trace pedal bilat edema, +2 pedal pulses  ABDOMEN:  normal bowel sounds, soft, nontender   M/S:    ROSS equally, seen in chair  SKIN:  Inspection of skin at baseline except LE's have wraps in place--not removed--open sores, not painful per pt  NEURO:   Cranial nerves 2-12 are normal tested and grossly at patient's baseline  PSYCH:  oriented X 3, affect and mood normal      Lab/Diagnostic data:  Recent Labs   Lab Test 03/04/21 02/27/21  0634     137 137   POTASSIUM 3.8  3.8 3.6   CHLORIDE 102  102 108   CO2 26  26 25   ANIONGAP 9  9 4   *  183* 164*   BUN 9  9 13   CR 0.76  0.76 0.86   TENZIN 8.9  8.9 8.3*       ASSESSMENT / PLAN:  (M62.81) Generalized muscle weakness  (primary encounter diagnosis)   (R53.81) Physical deconditioning  Comment/Plan: has PT OT in apt, monitor.    (Z98.890,  Z87.19) S/P dilatation of esophageal stricture: 1/27/2021  Comment/Plan: f/u GI per their recs, no upcoming appt. Has been on dysphagia diet--will f/u.    (E11.59,  Z79.4) Type 2 diabetes mellitus with other circulatory complication, with long-term current use of insulin (H)  Comment/Plan: metformin, insulin, sugars in the mid to high 100's. Monitor.      CV ISSUES:  (I25.119) Coronary artery disease involving native coronary artery of native heart with angina pectoris (H)   (I10)  Hypertension, unspecified type   (I50.30) Heart failure with preserved ejection fraction, NYHA class I (H)  Comment/Plan: Compensated, SBP reasonable, cont monitor. Asa Lipitor metoprolol.    (R41.89) Cognitive impairment   (F84.0) Autism disorder   (F32.0) Mild major depression (H)   (Z91.14) Noncompliance with medication regimen  Comment/Plan: SLUMS 14/30, safety eval 26/27--not able to manage own meds. Monitor.    (S81.801S,  S81.802S) Open wound of both lower extremities with complication, sequela  Comment/Plan: cont wound care with assistance of [employer] RN.  Needs new appt with Dr Camejo(*pt thinks it is in April*)    (C61) Prostate cancer (H)  Comment/Plan: Lupron, f/u oncology her their recs.    (Z00.00) Routine general medical examination at a health care facility       Total time spent with patient visit at the skilled nursing facility was 55 including patient visit, review of past records and d/w staff. Greater than 50% of total time spent with counseling and coordinating care due to complexities of care as noted above..     Electronically signed by:  SARATH Yi CNP                           Sincerely,        SARATH Yi CNP

## 2021-03-20 VITALS
HEART RATE: 82 BPM | RESPIRATION RATE: 16 BRPM | TEMPERATURE: 97.7 F | SYSTOLIC BLOOD PRESSURE: 113 MMHG | DIASTOLIC BLOOD PRESSURE: 52 MMHG | OXYGEN SATURATION: 96 %

## 2021-03-20 PROBLEM — Z79.4 TYPE 2 DIABETES MELLITUS WITH OTHER CIRCULATORY COMPLICATION, WITH LONG-TERM CURRENT USE OF INSULIN (H): Status: ACTIVE | Noted: 2021-03-20

## 2021-03-20 PROBLEM — Z00.00 ROUTINE GENERAL MEDICAL EXAMINATION AT A HEALTH CARE FACILITY: Status: ACTIVE | Noted: 2021-03-20

## 2021-03-20 PROBLEM — E11.59 TYPE 2 DIABETES MELLITUS WITH OTHER CIRCULATORY COMPLICATION, WITH LONG-TERM CURRENT USE OF INSULIN (H): Status: ACTIVE | Noted: 2021-03-20

## 2021-04-05 ENCOUNTER — VIRTUAL VISIT (OUTPATIENT)
Dept: CARDIOLOGY | Facility: CLINIC | Age: 81
End: 2021-04-05
Payer: COMMERCIAL

## 2021-04-05 DIAGNOSIS — I25.10 PRESENCE OF STENT IN CORONARY ARTERY IN PATIENT WITH CORONARY ARTERY DISEASE: Primary | ICD-10-CM

## 2021-04-05 DIAGNOSIS — Z95.5 PRESENCE OF STENT IN CORONARY ARTERY IN PATIENT WITH CORONARY ARTERY DISEASE: Primary | ICD-10-CM

## 2021-04-05 DIAGNOSIS — I25.5 ISCHEMIC CARDIOMYOPATHY: ICD-10-CM

## 2021-04-05 PROCEDURE — 99213 OFFICE O/P EST LOW 20 MIN: CPT | Mod: 95 | Performed by: INTERNAL MEDICINE

## 2021-04-05 NOTE — PROGRESS NOTES
Deshawn is a 80 year old who is being evaluated via a billable video visit.      How would you like to obtain your AVS? Mail a copy  If the video visit is dropped, the invitation should be resent by: Text to cell phone: [phone]  Will anyone else be joining your video visit? No       Review Of Systems  Skin: negative  Eyes: negative  Ears/Nose/Throat: negative  Respiratory: No shortness of breath, dyspnea on exertion, cough, or hemoptysis  Cardiovascular: negative  Gastrointestinal: negative  Genitourinary: negative  Musculoskeletal: negative  Neurologic: numbness or tingling of feet  Psychiatric: negative  Hematologic/Lymphatic/Immunologic: negative  Endocrine: diabetes    Patient reported vitals:  BP:   Heart rate:  Weight: 150lb    Glenis Coffman [facility]      Video Start Time: 8:51 AM  Video-Visit Details    Type of service:  Video Visit    Video End Time:8:57 AM    Originating Location (pt. Location): Home    Distant Location (provider location):  [facility]     Platform used for Video Visit: [facility]    Cardiology Progress Note:    ROS, PMH, PSurgHx, FamHx, SocHx, medication list reviewed in EMR.    Video Exam:  General: No apparent distress. Appears stated age.  Eyes: Normal sclera  Neck: No JVD  Psych: Affect normal, no pressured speech or flights of fancy.  Respiratory: Unlabored, symmetric. Normal rate. No stridor or audible wheezing.  Skin: No facial lesions or bruises.  Musculoskeletal: Symmetric range of movement and coordination bilateral upper extremities, torso and neck.  Abdomen: No guarding  Neurologic: Grossly nonfocal.    The rest of a comprehensive physical examination is deferred due to PHE (public health emergency) video visit restrictions.          Interval History:     The patient is a very pleasant 80 year old whom I have seen previously for well compensated ischemic cardiomyopathy.  He was recently hospitalized with esophageal stricture and he had  dilatation.  He has been able to eat pretty well since that time.  He denies any dyspnea on exertion or exertional chest discomfort.  He has some musculoskeletal deconditioning but overall, he feels that he is doing pretty well without any concerns about his limitations.                      Assessment and Plan:         1. Ischemic cardiomyopathy, continues to be well compensated.    Reviewed echo and monitor from January 2021.  Echo is stable and monitor shows PVCs but no ventricular tachycardia or atrial fibrillation.    Continue current medications.    Routine follow-up in 2 years      20 minutes spent in review of medical record, tests, discussion with patient and coordination of care as well as visit documentation.    This note was transcribed using electronic voice recognition software and there may be typographical errors present.          Jonny Dominguez MD

## 2021-04-05 NOTE — LETTER
4/5/2021    Kailey Saucedo, APRN CNP  [address]  [facility] [address]    RE: Jose Lees       Dear Colleague,    I had the pleasure of seeing Jose Lees in the [facility].    Deshawn is a 80 year old who is being evaluated via a billable video visit.      How would you like to obtain your AVS? Mail a copy  If the video visit is dropped, the invitation should be resent by: Text to cell phone: [phone]  Will anyone else be joining your video visit? No       Review Of Systems  Skin: negative  Eyes: negative  Ears/Nose/Throat: negative  Respiratory: No shortness of breath, dyspnea on exertion, cough, or hemoptysis  Cardiovascular: negative  Gastrointestinal: negative  Genitourinary: negative  Musculoskeletal: negative  Neurologic: numbness or tingling of feet  Psychiatric: negative  Hematologic/Lymphatic/Immunologic: negative  Endocrine: diabetes    Patient reported vitals:  BP:   Heart rate:  Weight: 150lb    [facility]      Video Start Time: 8:51 AM  Video-Visit Details    Type of service:  Video Visit    Video End Time:8:57 AM    Originating Location (pt. Location): Home    Distant Location (provider location):  [facility]     Platform used for Video Visit: [employer]    Cardiology Progress Note:    ROS, PMH, PSurgHx, FamHx, SocHx, medication list reviewed in EMR.    Video Exam:  General: No apparent distress. Appears stated age.  Eyes: Normal sclera  Neck: No JVD  Psych: Affect normal, no pressured speech or flights of fancy.  Respiratory: Unlabored, symmetric. Normal rate. No stridor or audible wheezing.  Skin: No facial lesions or bruises.  Musculoskeletal: Symmetric range of movement and coordination bilateral upper extremities, torso and neck.  Abdomen: No guarding  Neurologic: Grossly nonfocal.    The rest of a comprehensive physical examination is deferred due to [facility] (public health  emergency) video visit restrictions.          Interval History:     The patient is a very pleasant 80 year old whom I have seen previously for well compensated ischemic cardiomyopathy.  He was recently hospitalized with esophageal stricture and he had dilatation.  He has been able to eat pretty well since that time.  He denies any dyspnea on exertion or exertional chest discomfort.  He has some musculoskeletal deconditioning but overall, he feels that he is doing pretty well without any concerns about his limitations.                      Assessment and Plan:         1. Ischemic cardiomyopathy, continues to be well compensated.    Reviewed echo and monitor from January 2021.  Echo is stable and monitor shows PVCs but no ventricular tachycardia or atrial fibrillation.    Continue current medications.    Routine follow-up in 2 years      20 minutes spent in review of medical record, tests, discussion with patient and coordination of care as well as visit documentation.    This note was transcribed using electronic voice recognition software and there may be typographical errors present.          Jonny Dominguez MD    cc:   No referring provider defined for this encounter.

## 2021-04-06 DIAGNOSIS — F32.0 MILD MAJOR DEPRESSION (H): ICD-10-CM

## 2021-04-08 ENCOUNTER — ASSISTED LIVING VISIT (OUTPATIENT)
Dept: GERIATRICS | Facility: CLINIC | Age: 81
End: 2021-04-08
Payer: COMMERCIAL

## 2021-04-08 VITALS
SYSTOLIC BLOOD PRESSURE: 113 MMHG | RESPIRATION RATE: 16 BRPM | DIASTOLIC BLOOD PRESSURE: 52 MMHG | HEART RATE: 82 BPM | OXYGEN SATURATION: 96 % | TEMPERATURE: 97.7 F

## 2021-04-08 DIAGNOSIS — E46 MALNUTRITION, UNSPECIFIED TYPE (H): ICD-10-CM

## 2021-04-08 DIAGNOSIS — Z79.4 TYPE 2 DIABETES MELLITUS WITH OTHER CIRCULATORY COMPLICATION, WITH LONG-TERM CURRENT USE OF INSULIN (H): Primary | ICD-10-CM

## 2021-04-08 DIAGNOSIS — K22.2 STRICTURE AND STENOSIS OF ESOPHAGUS: ICD-10-CM

## 2021-04-08 DIAGNOSIS — K21.00 GASTROESOPHAGEAL REFLUX DISEASE WITH ESOPHAGITIS WITHOUT HEMORRHAGE: ICD-10-CM

## 2021-04-08 DIAGNOSIS — F84.0 AUTISM DISORDER: ICD-10-CM

## 2021-04-08 DIAGNOSIS — I25.5 ISCHEMIC CARDIOMYOPATHY: ICD-10-CM

## 2021-04-08 DIAGNOSIS — I25.10 CORONARY ARTERY DISEASE INVOLVING NATIVE CORONARY ARTERY OF NATIVE HEART WITHOUT ANGINA PECTORIS: ICD-10-CM

## 2021-04-08 DIAGNOSIS — R41.89 COGNITIVE IMPAIRMENT: ICD-10-CM

## 2021-04-08 DIAGNOSIS — K56.2 VOLVULUS (H): ICD-10-CM

## 2021-04-08 DIAGNOSIS — Z91.148 NONCOMPLIANCE WITH MEDICATION REGIMEN: ICD-10-CM

## 2021-04-08 DIAGNOSIS — E11.59 TYPE 2 DIABETES MELLITUS WITH OTHER CIRCULATORY COMPLICATION, WITH LONG-TERM CURRENT USE OF INSULIN (H): Primary | ICD-10-CM

## 2021-04-08 DIAGNOSIS — C61 PROSTATE CANCER (H): ICD-10-CM

## 2021-04-08 NOTE — LETTER
"    4/8/2021        RE: Jose Hollins  [address] [address] [id number]        Jose Lees is a 80 year old male seen April 8, 2021 at [facility] where he has resided for several years, recently turned over to [facility] and seen for initial visit.     Pt is seen in his room up to chair.  He is pleasant and welcoming, states \"I'm pretty good Cecilia.\"  He denies any pain, dyspnea or other symptoms, feels he has done well since return to his [place] apartment   He is swallowing without difficulty.    By chart review, pt had a [facility] hospitalization in January 2021 for dysphagia, had stopped taking all of his medications.   He was found to have severe candidal esophagitis and esophageal strictures, underwent dilatation on 2 occasions.   He was started on insulin for poorly controlled DM2  He went to TCU before discharge to his sister's home 2/18, then rehospitalized 2/26 for weakness and failure to thrive.  Treated with IVF.   His Lantus and metoprolol doses were decreased.    He again went to TCU, then back to [facility]    Pt has prostate cancer diagnosed in 2011   No metastatic disease on imaging.   He was treated with leuprolide and bicalutamide without improvement in PSA.   At some point pt stopped his medications.   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 Most recent .           Past Medical History:   Diagnosis Date     ACP (advance care planning)     Form given     Acute anterior wall MI (H) 4/7/2014     Autism disorder 6/29/2012     CHF (congestive heart failure) (H)     ECHO EF=25-30% on 4/7/14     Coronary artery disease 4/2014 4/2014 HEART CATH - 70% mid LAD -- BMS placed, small 1st diagonal 80%, RCA 60-70% before crux, 70% mid PDA     Dementia (H)      Hyperlipidaemia      Longstanding persistent atrial fibrillation (H) 1/20/2020     Mild major " depression (H) 1/20/2020     Prostate cancer (H) 10/11    GLEASOR 8     Stricture and stenosis of esophagus 10/22/2015     Stroke, 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     Type 2 diabetes mellitus without complication, without long-term current use of insulin (H) 7/25/2017       Past Surgical History:   Procedure Laterality Date     CORONARY ANGIOGRAPHY ADULT ORDER       ESOPHAGOSCOPY, GASTROSCOPY, DUODENOSCOPY (EGD), COMBINED N/A 8/6/2015    Procedure: COMBINED ESOPHAGOSCOPY, GASTROSCOPY, DUODENOSCOPY (EGD), REMOVE FOREIGN BODY;  Surgeon: Yu Tapia MD;  Location:  GI     ESOPHAGOSCOPY, GASTROSCOPY, DUODENOSCOPY (EGD), COMBINED N/A 1/28/2021    Procedure: ESOPHAGOGASTRODUODENOSCOPY (EGD) WITH FLUORO,Biopsy, and Dilation;  Surgeon: Audra Huang MD;  Location:  OR     HEART CATH, ANGIOPLASTY  4/14    BMS to LAD     PHACOEMULSIFICATION CLEAR CORNEA WITH STANDARD INTRAOCULAR LENS IMPLANT Right 12/4/2018    Procedure: COMPLEX RIGHT EYE PHACOEMULSIFICATION CLEAR CORNEA WITH STANDARD INTRAOCULAR LENS IMPLANT;  Surgeon: Kamar Kyle MD;  Location:  EC     PHACOEMULSIFICATION CLEAR CORNEA WITH STANDARD INTRAOCULAR LENS IMPLANT Left 12/19/2018    Procedure: COMPLEX LEFT EYE PHACOEMULSIFICATION CLEAR CORNEA WITH STANDARD INTRAOCULAR LENS IMPLANT;  Surgeon: Kamar Kyle MD;  Location: [facility]     SH:  Lives alone, AL apartment.  Previously lived at [facility].   His sister Judith is POA   Non smoker     ROS: SLUMS 14/30   ACL 3.8     Ambulatory with 4WW  Wt Readings from Last 5 Encounters:   03/05/21 68.2 kg (150 lb 6.4 oz)   03/04/21 68 kg (150 lb)   02/28/21 68.5 kg (151 lb)   02/15/21 69.6 kg (153 lb 6.4 oz)   02/14/21 68.3 kg (150 lb 8 oz)      EXAM:  NAD  /52   Pulse 82   Temp 97.7  F (36.5  C)   Resp 16   SpO2 96%    Neck supple without adenopathy.    Lungs clear bilaterally with fair air movement  Heart RRR s1s2 at 80  Abd soft, NT, no  distention or guarding, +BS  Ext with 1+ edema, wearing compression stockings   Neuro: some limits to history, no focal findings  Psych: affect okay, smiling      3/4/2021:        Sodium 136 - 145 mmol/L 137     Potassium 3.5 - 5.0 mmol/L 3.8     Chloride 98 - 107 mmol/L 102     Carbon Dioxide 22 - 31 mmol/L 26     Anion Gap 5 - 18 mmol/L 9     Glucose 70 - 125 mg/dL 183Abnormal      Urea Nitrogen 8 - 28 mg/dL 9     Creatinine 0.70 - 1.30 mg/dL 0.76     Calcium 8.5 - 10.5 mg/dL 8.9     GFR Estimate >30 ml/min >60      Lab Results   Component Value Date    AST 31 02/25/2021      ALBUMIN 2.8 02/25/2021     Lab Results   Component Value Date    PROTTOTAL 7.2 02/25/2021      Lab Results   Component Value Date    ALKPHOS 76 02/25/2021     Lab Results   Component Value Date    CHOL 146 01/29/2021     Lab Results   Component Value Date    HDL 47 01/29/2021     Lab Results   Component Value Date    LDL 75 01/29/2021     Lab Results   Component Value Date    TRIG 120 01/29/2021     Lab Results   Component Value Date    WBC 7.4 02/26/2021      HGB 13.3 02/26/2021      MCV 88 02/26/2021       02/26/2021     TSH   Date Value Ref Range Status   02/25/2021 8.65 (H) 0.40 - 4.00 mU/L Final     ECHO 1/29/2021   Interpretation Summary  The visual ejection fraction is estimated at 35-40%.  Left ventricular systolic function is mild to moderately reduced.  There are regional wall motion abnormalities as specified.  The regional wall motion abnormalities are similar to previous and are consistent with LAD territory ischemia and / or infarction.  The ascending aorta is Mildly dilated.    CT CHEST, ABDOMEN, PELVIS WITH CONTRAST  1/26/2021 5:51 PM   HISTORY: Abdominal pain and possible FB in esophagus, he is not sure.  Chest/mediastinum: No cardiomegaly or significant pericardial  effusion. Moderate atherosclerotic vascular calcification of the  abdominal aorta and iliac vessels. Extensive bilateral gynecomastia.  Thyroid gland is  unremarkable. No significant mediastinal, hilar or  axillary lymphadenopathy. 6 mm fat attenuating focus abutting the  posterior aspect of the distal esophagus, could represent small food  residue versus small submucosal lipoma. No evidence of other radiodense foreign bodies. Esophagus is patulous.  Lung/pleura: No pleural effusion or pneumothorax. Numerous bilateral pulmonary nodules, worrisome for metastatic disease.  Abdomen/pelvis: 1.3 cm partially calcified hypoattenuating focus in  the left hepatic lobe (series 3 image 114) otherwise no suspicious  focal hepatic lesion. Cholelithiasis without CT evidence of acute cholecystitis. No main pancreatic ductal dilatation or definite solid  pancreatic mass. No splenomegaly. No adrenal nodules. N o radiodense kidney stones or hydronephrosis.  Sigmoid colon appears to be mildly distended and displaced into the upper mid abdomen/left upper quadrant, no associated mesenteric  twisting or bowel obstruction noted. No significant free fluid in the abdomen and pelvis. No free peritoneal or portal venous gas. Moderate  atherosclerotic vascular calcification of the abdominal aorta and iliac vessels. Although the prostate gland is not significantly  enlarged it appears diffusely enhancing, nonspecific, can be due to underlying diffuse prostatitis versus prostatic malignancy.  IMPRESSION:   1. The sigmoid colon appears to be mildly distended and displaced into  the upper mid abdomen/left upper quadrant, however with no associated  mesenteric twisting or bowel obstruction. Finding is nonspecific, but might predispose patient into sigmoid volvulus.  2. 6 mm fat attenuating lesion abutting the posterior aspect of the  esophagus, could represent small food residue versus small submucosal  lipoma. No other radiodense foreign body seen within the esophageal lumen which appears mildly dilated/patulous.  3. Numerous bilateral pulmonary nodules, worrisome for metastatic disease.  4. The  prostate gland although not significantly enlarged, appears  diffusely enhancing, nonspecific, can be due to underlying prostatitis  or prostate malignancy, recommend correlation with serum prostate-specific antigen.  5. Cholelithiasis without CT evidence of acute cholecystitis.  6. 1.3 cm partially calcified hypoattenuating focus in the left hepatic lobe, of indeterminate etiology.  ADDENDUM:  Comparison studies from 10/19/2020 and 10/16/2019 are now available  for comparison.   The area of the low-attenuation focus within the mid to lower esophagus on this study was not adequately assessed on the comparison study due to differences in distention of the esophagus. Multiple  pulmonary nodules have slightly increased in size. The sigmoid colon has not changed in position. The partially calcified lateral left hepatic lobe lesion has not significantly changed.         IMP/PLAN:   (E11.59,  Z79.4) Type 2 diabetes mellitus with other circulatory complication, with long-term current use of insulin (H)   Comment: Reviewed BGM log over past month:  Mostly 100s (111-246)     Lab Results   Component Value Date    A1C 11.4 01/26/2021    A1C 11.8 01/20/2020     Plan: on metformin 500 mg/day, Lantus 7 units/day    With normal renal function, would look to discontinuing insulin and increasing metformin dose.       (K22.2) Stricture and stenosis of esophagus  (K21.00) Gastroesophageal reflux disease with esophagitis without hemorrhage  Comment: s/p dilatation x2   Plan: omeprazole 20 mg/day    Follow up with GI as scheduled.       (I25.10) Coronary artery disease involving native coronary artery of native heart without angina pectoris  (I25.5) Ischemic cardiomyopathy  Comment: low EF, volume status stable     Plan: metoprolol 25 mg/day, daily ASA, atorvastatin 40 mg/day for secondary prevention     Follow weights and exam; recently seen by Cardiology and no changes made.         (C61) Prostate cancer (H)  Comment: high PSA, local  disease, castrate resistant     Plan: no current tx, follow up with Oncology       (R41.89) Cognitive impairment  (F84.0) Autism disorder  (Z91.14) Noncompliance with medication regimen  Comment: has stopped medications on occasion     Plan: AL support for med admin, meals, activity        Cecilia Krause MD         Sincerely,        Cecilia Krause MD

## 2021-04-12 ENCOUNTER — OFFICE VISIT (OUTPATIENT)
Dept: DERMATOLOGY | Facility: CLINIC | Age: 81
End: 2021-04-12
Payer: COMMERCIAL

## 2021-04-12 VITALS — HEART RATE: 75 BPM | SYSTOLIC BLOOD PRESSURE: 105 MMHG | DIASTOLIC BLOOD PRESSURE: 63 MMHG

## 2021-04-12 DIAGNOSIS — Z11.59 ENCOUNTER FOR SCREENING FOR OTHER VIRAL DISEASES: ICD-10-CM

## 2021-04-12 DIAGNOSIS — R21 RASH AND OTHER NONSPECIFIC SKIN ERUPTION: Primary | ICD-10-CM

## 2021-04-12 LAB
ERYTHROCYTE [DISTWIDTH] IN BLOOD BY AUTOMATED COUNT: 13.8 % (ref 10–15)
HCT VFR BLD AUTO: 35.8 % (ref 40–53)
HGB BLD-MCNC: 11.7 G/DL (ref 13.3–17.7)
MCH RBC QN AUTO: 29.9 PG (ref 26.5–33)
MCHC RBC AUTO-ENTMCNC: 32.7 G/DL (ref 31.5–36.5)
MCV RBC AUTO: 92 FL (ref 78–100)
PLATELET # BLD AUTO: 194 10E9/L (ref 150–450)
RBC # BLD AUTO: 3.91 10E12/L (ref 4.4–5.9)
WBC # BLD AUTO: 7.6 10E9/L (ref 4–11)

## 2021-04-12 PROCEDURE — 86704 HEP B CORE ANTIBODY TOTAL: CPT | Performed by: PHYSICIAN ASSISTANT

## 2021-04-12 PROCEDURE — 11105 PUNCH BX SKIN EA SEP/ADDL: CPT | Performed by: PHYSICIAN ASSISTANT

## 2021-04-12 PROCEDURE — 99203 OFFICE O/P NEW LOW 30 MIN: CPT | Mod: 25 | Performed by: PHYSICIAN ASSISTANT

## 2021-04-12 PROCEDURE — 86803 HEPATITIS C AB TEST: CPT | Performed by: PHYSICIAN ASSISTANT

## 2021-04-12 PROCEDURE — 88305 TISSUE EXAM BY PATHOLOGIST: CPT | Performed by: DERMATOLOGY

## 2021-04-12 PROCEDURE — 86160 COMPLEMENT ANTIGEN: CPT | Performed by: PHYSICIAN ASSISTANT

## 2021-04-12 PROCEDURE — 86235 NUCLEAR ANTIGEN ANTIBODY: CPT | Performed by: PHYSICIAN ASSISTANT

## 2021-04-12 PROCEDURE — 36415 COLL VENOUS BLD VENIPUNCTURE: CPT | Performed by: PHYSICIAN ASSISTANT

## 2021-04-12 PROCEDURE — 86255 FLUORESCENT ANTIBODY SCREEN: CPT | Performed by: PHYSICIAN ASSISTANT

## 2021-04-12 PROCEDURE — 11104 PUNCH BX SKIN SINGLE LESION: CPT | Performed by: PHYSICIAN ASSISTANT

## 2021-04-12 PROCEDURE — 88346 IMFLUOR 1ST 1ANTB STAIN PX: CPT | Performed by: DERMATOLOGY

## 2021-04-12 PROCEDURE — 88350 IMFLUOR EA ADDL 1ANTB STN PX: CPT | Performed by: DERMATOLOGY

## 2021-04-12 PROCEDURE — 86038 ANTINUCLEAR ANTIBODIES: CPT | Performed by: PHYSICIAN ASSISTANT

## 2021-04-12 PROCEDURE — 85027 COMPLETE CBC AUTOMATED: CPT | Performed by: PHYSICIAN ASSISTANT

## 2021-04-12 NOTE — PROGRESS NOTES
HPI:  Jose Lees is a 80 year old male patient here today for rash on legs .  Patient states this has been present for months?.  Patient reports the following symptoms: none .  Patient reports the following previous treatments: bandages.  Patient reports the following modifying factors: none.  Associated symptoms: none.  Patient has no other skin complaints today.  Remainder of the HPI, Meds, PMH, Allergies, FH, and SH was reviewed in chart.    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     Prostate cancer (H) 10/11    GLEASOR 8     Stricture and stenosis of esophagus 10/22/2015     Stroke, 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     Type 2 diabetes mellitus without complication, without long-term current use of insulin (H) 7/25/2017       Past Surgical History:   Procedure Laterality Date     CORONARY ANGIOGRAPHY ADULT ORDER       ESOPHAGOSCOPY, GASTROSCOPY, DUODENOSCOPY (EGD), COMBINED N/A 8/6/2015    Procedure: COMBINED ESOPHAGOSCOPY, GASTROSCOPY, DUODENOSCOPY (EGD), REMOVE FOREIGN BODY;  Surgeon: Yu Tapia MD;  Location:  GI     ESOPHAGOSCOPY, GASTROSCOPY, DUODENOSCOPY (EGD), COMBINED N/A 1/28/2021    Procedure: ESOPHAGOGASTRODUODENOSCOPY (EGD) WITH FLUORO,Biopsy, and Dilation;  Surgeon: Audra Huang MD;  Location:  OR     HEART CATH, ANGIOPLASTY  4/14    BMS to LAD     PHACOEMULSIFICATION CLEAR CORNEA WITH STANDARD INTRAOCULAR LENS IMPLANT  Right 12/4/2018    Procedure: COMPLEX RIGHT EYE PHACOEMULSIFICATION CLEAR CORNEA WITH STANDARD INTRAOCULAR LENS IMPLANT;  Surgeon: Kamar Kyle MD;  Location: [facility]     PHACOEMULSIFICATION CLEAR CORNEA WITH STANDARD INTRAOCULAR LENS IMPLANT Left 12/19/2018    Procedure: COMPLEX LEFT EYE PHACOEMULSIFICATION CLEAR CORNEA WITH STANDARD INTRAOCULAR LENS IMPLANT;  Surgeon: Kamar Kyle MD;  Location: [facility]        Family History   Problem Relation Age of Onset     Cerebrovascular Disease Mother 92     C.A.D. Father 48       Social History     Socioeconomic History     Marital status: Single     Spouse name: Not on file     Number of children: Not on file     Years of education: Not on file     Highest education level: Not on file   Occupational History     Not on file   Social Needs     Financial resource strain: Not on file     Food insecurity     Worry: Not on file     Inability: Not on file     Transportation needs     Medical: Not on file     Non-medical: Not on file   Tobacco Use     Smoking status: Never Smoker     Smokeless tobacco: Never Used   Substance and Sexual Activity     Alcohol use: No     Comment: never     Drug use: No     Sexual activity: Not Currently     Partners: Male   Lifestyle     Physical activity     Days per week: Not on file     Minutes per session: Not on file     Stress: Not on file   Relationships     Social connections     Talks on phone: Not on file     Gets together: Not on file     Attends Religion service: Not on file     Active member of club or organization: Not on file     Attends meetings of clubs or organizations: Not on file     Relationship status: Not on file     Intimate partner violence     Fear of current or ex partner: Not on file     Emotionally abused: Not on file     Physically abused: Not on file     Forced sexual activity: Not on file   Other Topics Concern     Parent/sibling w/ CABG, MI or angioplasty before 65F 55M? Not Asked       Service Not Asked     Blood Transfusions Not Asked     Caffeine Concern No     Occupational Exposure Not Asked     Hobby Hazards Not Asked     Sleep Concern No     Stress Concern Not Asked     Weight Concern Yes     Comment: weight loss 22# since 4/2016     Special Diet Not Asked     Back Care Not Asked     Exercise No     Bike Helmet Not Asked     Seat Belt Not Asked     Self-Exams Not Asked   Social History Narrative     Not on file       Outpatient Encounter Medications as of 4/12/2021   Medication Sig Dispense Refill     aspirin (ASA) 81 MG chewable tablet Take 81 mg by mouth daily       atorvastatin (LIPITOR) 40 MG tablet TAKE ONE TABLET BY MOUTH EVERY DAY 90 tablet 3     Cholecalciferol (VITAMIN D) 50 MCG (2000 UT) CAPS Take 2,000 Units by mouth daily       Continuous Glucose Monitor Sup MISC 4 times daily (before meals and nightly)       Contour Next EZ (CONTOUR NEXT EZ W/DEVICE KIT) w/Device KIT USE AS DIRECTED 1 kit 0     CONTOUR NEXT TEST test strip TEST TWICE A DAY 50 strip 4     insulin glargine (LANTUS PEN) 100 UNIT/ML pen Inject 7 Units Subcutaneous At Bedtime  0     Leuprolide Acetate, 6 Month, (LUPRON DEPOT, 6-MONTH, IM)        metFORMIN (GLUCOPHAGE) 500 MG tablet TAKE 2 TABLETS BY MOUTH TWICE A DAY WITH MEALS 360 tablet 3     metoprolol succinate ER (TOPROL-XL) 25 MG 24 hr tablet Take 0.5 tablets (12.5 mg) by mouth daily TAKE ONE TABLET BY MOUTH EVERY DAY (Patient taking differently: Take 12.5 mg by mouth daily TAKE ONE TABLET BY MOUTH EVERY DAY. HOLD if SBP <110. Call NP if held) 90 tablet 0     Microlet Lancets MISC USE TWICE DAILY 100 each 0     omeprazole (PRILOSEC) 20 MG DR capsule TAKE 1 CAPSULE BY MOUTH EVERY MORNING BEFORE A MEAL 90 capsule 3     senna-docusate (SENOKOT-S/PERICOLACE) 8.6-50 MG tablet Take 1 tablet by mouth daily as needed for constipation       sertraline (ZOLOFT) 50 MG tablet Take 1 tablet (50 mg) by mouth every morning 60 tablet 3     No facility-administered encounter  medications on file as of 4/12/2021.        Review Of Systems:  Skin: rash  Eyes: negative  Ears/Nose/Throat: negative  Respiratory: No shortness of breath, dyspnea on exertion, cough, or hemoptysis  Cardiovascular: negative  Gastrointestinal: negative  Genitourinary: negative  Musculoskeletal: negative  Neurologic: negative  Psychiatric: negative  Hematologic/Lymphatic/Immunologic: negative  Endocrine: negative      Objective:     /63   Pulse 75   Eyes: Conjunctivae/lids: Normal   ENT: Lips:  Normal  MSK: Normal  Cardiovascular: Peripheral edema none  Pulm: Breathing Normal  Neuro/Psych: Orientation: A/O x 3. Normal; Mood/Affect: Normal, NAD, WDWN  Pt accompanied by: sister  Following areas examined: face ( wearing mask), legs  Polanco skin type:i   Findings:  Purple/brown/red smooth and scaly patches on medial, lateral, and ventral legs    Assessment and Plan:     1)rash and other nonspecific skin eruption. Screen for viral disease  Rule out vasculitis  PUNCH BIOPSY for H&E and DIF:  After consent, anesthesia with LEC and prep, punch biopsy with a 4mm punch performed. Ethilon 4.0 green microfilament suture used for closure. No complications and routine wound care.  May grow back and will get a scar. Based on lesion type may need to completely remove lesion. Patient will be notified in 7-10 days of results. Wound care directions given. All questions were answered. Two interrupted sutures in each biopsy site on right medial leg.  Labs: ANCA, hepatitis b and c, kyle panel, manedep, C3, C4    Signs and Symptoms of non-melanoma skin cancer and ABCDEs of melanoma reviewed with patient. Patient encouraged to perform monthly self skin exams and educated on how to perform them. UV precautions reviewed with patient. Patient was asked about new or changing moles/lesions on body.   Wear a sunscreen with at least SPF 30 on your face, ears, neck and V of the chest daily. Wear sunscreen on other areas of the body if those  areas are exposed to the sun throughout the day. Sunscreens can contain physical and/or chemical blockers. Physical blockers are less likely to clog pores, these include zinc oxide and titanium dioxide. Reapply every two hour and after swimming. Sunscreen examples include Neutrogena, CeraVe, Blue Lizard, Elta MD and many others.    Proper skin care from [facility]:    -Eliminate harsh soaps as they strip the natural oils from the skin, often resulting in dry itchy skin ( i.e. Dial, Zest, [place] Spring)  -Use mild soaps such as Cetaphil or Dove Sensitive Skin in the shower. You do not need to use soap on arms, legs, and trunk every time you shower unless visibly soiled.   -Avoid hot or cold showers.  -After showering, lightly dry off and apply moisturizing within 2-3 minutes. This will help trap moisture in the skin.   -Aggressive use of a moisturizer at least 1-2 times a day to the entire body (including -Vanicream, Cetaphil, Aquaphor or Cerave) and moisturize hands after every washing.  -We recommend using moisturizers that come in a tub that needs to be scooped out, not a pump. This has more of an oil base. It will hold moisture in your skin much better than a water base moisturizer. The above recommended are non-pore clogging.         It was a pleasure speaking with Jose Lees today.       Follow up in based on biopsy results

## 2021-04-12 NOTE — PATIENT INSTRUCTIONS
Proper skin care from [facility]:    -Eliminate harsh soaps as they strip the natural oils from the skin, often resulting in dry itchy skin ( i.e. Dial, Zest, Caroline Spring)  -Use mild soaps such as Cetaphil or Dove Sensitive Skin in the shower. You do not need to use soap on arms, legs, and trunk every time you shower unless visibly soiled.   -Avoid hot or cold showers.  -After showering, lightly dry off and apply moisturizing within 2-3 minutes. This will help trap moisture in the skin.   -Aggressive use of a moisturizer at least 1-2 times a day to the entire body (including -Vanicream, Cetaphil, Aquaphor or Cerave) and moisturize hands after every washing.  -We recommend using moisturizers that come in a tub that needs to be scooped out, not a pump. This has more of an oil base. It will hold moisture in your skin much better than a water base moisturizer. The above recommended are non-pore clogging.      Wear a sunscreen with at least SPF 30 on your face, ears, neck and V of the chest daily. Wear sunscreen on other areas of the body if those areas are exposed to the sun throughout the day. Sunscreens can contain physical and/or chemical blockers. Physical blockers are less likely to clog pores, these include zinc oxide and titanium dioxide. Reapply every two hour and after swimming. Sunscreen examples include Neutrogena, CeraVe, Blue Lizard, Elta MD and many others.    UV radiation  UVA radiation remains constant throughout the day and throughout the year. It is a longer wavelength than UVB and therefore penetrates deeper into the skin leading to immediate and delayed tanning, photoaging, and skin cancer. 70-80% of UVA and UVB radiation occurs between the hours of 10am-2pm.  UVB radiation  UVB radiation causes the most harmful effects and is more significant during the summer months. However, snow and ice can reflect UVB radiation leading to skin damage during the winter months as well. UVB radiation is  responsible for tanning, burning, inflammation, delayed erythema (pinkness), pigmentation (brown spots), and skin cancer.     I recommend self monthly full body exams and yearly full body exams with a dermatology provider. If you develop a new or changing lesion please follow up for examination. Most skin cancers are pink and scaly or pink and pearly. However, we do see blue/brown/black skin cancers.  Consider the ABCDEs of melanoma when giving yourself your monthly full body exam ( don't forget the groin, buttocks, feet, toes, etc). A-asymmetry, B-borders, C-color, D-diameter, E-elevation or evolving. If you see any of these changes please follow up in clinic. If you cannot see your back I recommend purchasing a hand held mirror to use with a larger wall mirror.          Two punch biopsies today to rule out vasculitis  Labs: ANCA, hepatitis b and c, kyle panel, mandeep, C3, C4    Moisturize legs 1-2x a day.     Wound Care Instructions     FOR SUTURE CARE     [facility] [phone]  [facility] [phone]                 AFTER 24-48  HOURS YOU SHOULD REMOVE THE BANDAGE AND BEGIN DAILY DRESSING CHANGES AS FOLLOWS:     1) Remove Dressing.     2) Clean and dry the area with tap water using a Q-tip or sterile gauze pad.     3) Apply Vaseline, Aquaphor, Polysporin ointment or Bacitracin ointment over entire wound.  Do NOT use Neosporin ointment.     4) Cover the wound with a band-aid, or a sterile non-stick gauze pad and micropore paper tape      REPEAT THESE INSTRUCTIONS AT LEAST ONCE A DAY UNTIL THE WOUND HAS COMPLETELY HEALED.    RETURN TO CLINIC FOR A NURSE-ONLY SUTURE REMOVAL APPOINTMENT IN 10-14  DAYS. UNLESS YOUR PROVIDER SPECIFIES OTHERWISE.     It is an old wives tale that a wound heals better when it is exposed to air and allowed to dry out. The wound will heal faster with a better cosmetic result if it is kept moist with ointment and covered with a bandage.    **Do not let the wound dry  out.**      Supplies Needed:      *Cotton tipped applicators (Q-tips)    *Vaseline, Aquaphor, Polysporin Ointment or Bacitracin Ointment (NOT NEOSPORIN)    *Band-aids or non-stick gauze pads and micropore paper tape.          PATIENT INFORMATION:  During the healing process you will notice a number of changes. All wounds develop a small halo of redness surrounding the wound.  This means healing is occurring. Severe itching with extensive redness usually indicates sensitivity to the ointment or bandage tape used to dress the wound.  You should call our office if this develops.      Swelling  and/or discoloration around your surgical site is common, particularly when performed around the eye.    After the wound is healed you may discontinue dressing changes.     You may experience a sensation of tightness as your wound heals. This is normal and will gradually subside.    Your healed wound may be sensitive to temperature changes. This sensitivity improves with time, but if you re having a lot of discomfort, try to avoid temperature extremes.    Patients frequently experience itching after their wound appears to have healed because of the continue healing under the skin.  Plain Vaseline will help relieve the itching.      POSSIBLE COMPLICATIONS    BLEEDIN. Leave the bandage in place.  2. Use tightly rolled up gauze or a cloth to apply direct pressure over the bandage for 30  minutes.  3. Reapply pressure for an additional 30 minutes if necessary  4. Use additional gauze and tape to maintain pressure once the bleeding has stopped.

## 2021-04-12 NOTE — LETTER
4/12/2021         RE: Jose Hollins  [address] [facility] [id number]        Dear Colleague,    Thank you for referring your patient, Jose Lees, to the [facility]. Please see a copy of my visit note below.    HPI:  Jose Lees is a 80 year old male patient here today for rash on legs .  Patient states this has been present for months?.  Patient reports the following symptoms: none .  Patient reports the following previous treatments: bandages.  Patient reports the following modifying factors: none.  Associated symptoms: none.  Patient has no other skin complaints today.  Remainder of the HPI, Meds, PMH, Allergies, FH, and SH was reviewed in chart.    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     Prostate cancer (H) 10/11    GLEASOR 8     Stricture and stenosis of esophagus 10/22/2015     Stroke, 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     Type 2 diabetes mellitus without complication, without long-term current use of insulin (H) 7/25/2017       Past Surgical History:   Procedure Laterality Date     CORONARY ANGIOGRAPHY ADULT ORDER       ESOPHAGOSCOPY, GASTROSCOPY, DUODENOSCOPY (EGD), COMBINED N/A 8/6/2015    Procedure: COMBINED ESOPHAGOSCOPY, GASTROSCOPY, DUODENOSCOPY (EGD), REMOVE FOREIGN BODY;  Surgeon: Yu Tapia MD;  Location: [facility]     ESOPHAGOSCOPY, GASTROSCOPY, DUODENOSCOPY  (EGD), COMBINED N/A 1/28/2021    Procedure: ESOPHAGOGASTRODUODENOSCOPY (EGD) WITH FLUORO,Biopsy, and Dilation;  Surgeon: Audra Huang MD;  Location:  OR     HEART CATH, ANGIOPLASTY  4/14    BMS to LAD     PHACOEMULSIFICATION CLEAR CORNEA WITH STANDARD INTRAOCULAR LENS IMPLANT Right 12/4/2018    Procedure: COMPLEX RIGHT EYE PHACOEMULSIFICATION CLEAR CORNEA WITH STANDARD INTRAOCULAR LENS IMPLANT;  Surgeon: Kamar Kyle MD;  Location:  [facility]     PHACOEMULSIFICATION CLEAR CORNEA WITH STANDARD INTRAOCULAR LENS IMPLANT Left 12/19/2018    Procedure: COMPLEX LEFT EYE PHACOEMULSIFICATION CLEAR CORNEA WITH STANDARD INTRAOCULAR LENS IMPLANT;  Surgeon: Kamar Kyle MD;  Location: [facility]        Family History   Problem Relation Age of Onset     Cerebrovascular Disease Mother 92     C.A.D. Father 48       Social History     Socioeconomic History     Marital status: Single     Spouse name: Not on file     Number of children: Not on file     Years of education: Not on file     Highest education level: Not on file   Occupational History     Not on file   Social Needs     Financial resource strain: Not on file     Food insecurity     Worry: Not on file     Inability: Not on file     Transportation needs     Medical: Not on file     Non-medical: Not on file   Tobacco Use     Smoking status: Never Smoker     Smokeless tobacco: Never Used   Substance and Sexual Activity     Alcohol use: No     Comment: never     Drug use: No     Sexual activity: Not Currently     Partners: Male   Lifestyle     Physical activity     Days per week: Not on file     Minutes per session: Not on file     Stress: Not on file   Relationships     Social connections     Talks on phone: Not on file     Gets together: Not on file     Attends Scientologist service: Not on file     Active member of club or organization: Not on file     Attends meetings of clubs or organizations: Not on file     Relationship status: Not on file      Intimate partner violence     Fear of current or ex partner: Not on file     Emotionally abused: Not on file     Physically abused: Not on file     Forced sexual activity: Not on file   Other Topics Concern     Parent/sibling w/ CABG, MI or angioplasty before 65F 55M? Not Asked      Service Not Asked     Blood Transfusions Not Asked     Caffeine Concern No     Occupational Exposure Not Asked     Hobby Hazards Not Asked     Sleep Concern No     Stress Concern Not Asked     Weight Concern Yes     Comment: weight loss 22# since 4/2016     Special Diet Not Asked     Back Care Not Asked     Exercise No     Bike Helmet Not Asked     Seat Belt Not Asked     Self-Exams Not Asked   Social History Narrative     Not on file       Outpatient Encounter Medications as of 4/12/2021   Medication Sig Dispense Refill     aspirin (ASA) 81 MG chewable tablet Take 81 mg by mouth daily       atorvastatin (LIPITOR) 40 MG tablet TAKE ONE TABLET BY MOUTH EVERY DAY 90 tablet 3     Cholecalciferol (VITAMIN D) 50 MCG (2000 UT) CAPS Take 2,000 Units by mouth daily       Continuous Glucose Monitor Sup MISC 4 times daily (before meals and nightly)       Contour Next EZ (CONTOUR NEXT EZ W/DEVICE KIT) w/Device KIT USE AS DIRECTED 1 kit 0     CONTOUR NEXT TEST test strip TEST TWICE A DAY 50 strip 4     insulin glargine (LANTUS PEN) 100 UNIT/ML pen Inject 7 Units Subcutaneous At Bedtime  0     Leuprolide Acetate, 6 Month, (LUPRON DEPOT, 6-MONTH, IM)        metFORMIN (GLUCOPHAGE) 500 MG tablet TAKE 2 TABLETS BY MOUTH TWICE A DAY WITH MEALS 360 tablet 3     metoprolol succinate ER (TOPROL-XL) 25 MG 24 hr tablet Take 0.5 tablets (12.5 mg) by mouth daily TAKE ONE TABLET BY MOUTH EVERY DAY (Patient taking differently: Take 12.5 mg by mouth daily TAKE ONE TABLET BY MOUTH EVERY DAY. HOLD if SBP <110. Call NP if held) 90 tablet 0     Microlet Lancets MISC USE TWICE DAILY 100 each 0     omeprazole (PRILOSEC) 20 MG DR capsule TAKE 1 CAPSULE BY MOUTH  EVERY MORNING BEFORE A MEAL 90 capsule 3     senna-docusate (SENOKOT-S/PERICOLACE) 8.6-50 MG tablet Take 1 tablet by mouth daily as needed for constipation       sertraline (ZOLOFT) 50 MG tablet Take 1 tablet (50 mg) by mouth every morning 60 tablet 3     No facility-administered encounter medications on file as of 4/12/2021.        Review Of Systems:  Skin: rash  Eyes: negative  Ears/Nose/Throat: negative  Respiratory: No shortness of breath, dyspnea on exertion, cough, or hemoptysis  Cardiovascular: negative  Gastrointestinal: negative  Genitourinary: negative  Musculoskeletal: negative  Neurologic: negative  Psychiatric: negative  Hematologic/Lymphatic/Immunologic: negative  Endocrine: negative      Objective:     /63   Pulse 75   Eyes: Conjunctivae/lids: Normal   ENT: Lips:  Normal  MSK: Normal  Cardiovascular: Peripheral edema none  Pulm: Breathing Normal  Neuro/Psych: Orientation: A/O x 3. Normal; Mood/Affect: Normal, NAD, WDWN  Pt accompanied by: sister  Following areas examined: face ( wearing mask), legs  Polanco skin type:i   Findings:  Purple/brown/red smooth and scaly patches on medial, lateral, and ventral legs    Assessment and Plan:     1)rash and other nonspecific skin eruption  Rule out vasculitis  PUNCH BIOPSY for H&E and DIF:  After consent, anesthesia with [facility] and prep, punch biopsy with a 4mm punch performed. Ethilon 4.0 green microfilament suture used for closure. No complications and routine wound care.  May grow back and will get a scar. Based on lesion type may need to completely remove lesion. Patient will be notified in 7-10 days of results. Wound care directions given. All questions were answered. Two interrupted sutures in each biopsy site on right medial leg.  Labs: ANCA, hepatitis b and c, kyle panel, mandeep, C3, C4    Signs and Symptoms of non-melanoma skin cancer and ABCDEs of melanoma reviewed with patient. Patient encouraged to perform monthly self skin exams and educated on  how to perform them. UV precautions reviewed with patient. Patient was asked about new or changing moles/lesions on body.   Wear a sunscreen with at least SPF 30 on your face, ears, neck and V of the chest daily. Wear sunscreen on other areas of the body if those areas are exposed to the sun throughout the day. Sunscreens can contain physical and/or chemical blockers. Physical blockers are less likely to clog pores, these include zinc oxide and titanium dioxide. Reapply every two hour and after swimming. Sunscreen examples include Neutrogena, CeraVe, Blue Lizard, Elta MD and many others.    Proper skin care from [facility]:    -Eliminate harsh soaps as they strip the natural oils from the skin, often resulting in dry itchy skin ( i.e. Dial, Zest, [place] Spring)  -Use mild soaps such as Cetaphil or Dove Sensitive Skin in the shower. You do not need to use soap on arms, legs, and trunk every time you shower unless visibly soiled.   -Avoid hot or cold showers.  -After showering, lightly dry off and apply moisturizing within 2-3 minutes. This will help trap moisture in the skin.   -Aggressive use of a moisturizer at least 1-2 times a day to the entire body (including -Vanicream, Cetaphil, Aquaphor or Cerave) and moisturize hands after every washing.  -We recommend using moisturizers that come in a tub that needs to be scooped out, not a pump. This has more of an oil base. It will hold moisture in your skin much better than a water base moisturizer. The above recommended are non-pore clogging.         It was a pleasure speaking with Jose Lees today.       Follow up in based on biopsy results          Again, thank you for allowing me to participate in the care of your patient.        Sincerely,        Carmen Germain PA-C

## 2021-04-12 NOTE — PROGRESS NOTES
"Jose Lees is a 80 year old male seen April 8, 2021 at [facility] where he has resided for several years, recently turned over to [facility] and seen for initial visit.     Pt is seen in his room up to chair.  He is pleasant and welcoming, states \"I'm pretty good Cecilia.\"  He denies any pain, dyspnea or other symptoms, feels he has done well since return to his AL apartment   He is swallowing without difficulty.    By chart review, pt had a [facility] hospitalization in January 2021 for dysphagia, had stopped taking all of his medications.   He was found to have severe candidal esophagitis and esophageal strictures, underwent dilatation on 2 occasions.   He was started on insulin for poorly controlled DM2  He went to TCU before discharge to his sister's home 2/18, then rehospitalized 2/26 for weakness and failure to thrive.  Treated with IVF.   His Lantus and metoprolol doses were decreased.    He again went to TCU, then back to [facility]    Pt has prostate cancer diagnosed in 2011   No metastatic disease on imaging.   He was treated with leuprolide and bicalutamide without improvement in PSA.   At some point pt stopped his medications.   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 Most recent .           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/2020     Prostate cancer (H) 10/11    MARILIN Jiménez     Stricture and stenosis of esophagus " 10/22/2015     Stroke, 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     Type 2 diabetes mellitus without complication, without long-term current use of insulin (H) 7/25/2017       Past Surgical History:   Procedure Laterality Date     CORONARY ANGIOGRAPHY ADULT ORDER       ESOPHAGOSCOPY, GASTROSCOPY, DUODENOSCOPY (EGD), COMBINED N/A 8/6/2015    Procedure: COMBINED ESOPHAGOSCOPY, GASTROSCOPY, DUODENOSCOPY (EGD), REMOVE FOREIGN BODY;  Surgeon: Yu Tapia MD;  Location:  GI     ESOPHAGOSCOPY, GASTROSCOPY, DUODENOSCOPY (EGD), COMBINED N/A 1/28/2021    Procedure: ESOPHAGOGASTRODUODENOSCOPY (EGD) WITH FLUORO,Biopsy, and Dilation;  Surgeon: Audra Huang MD;  Location:  OR     HEART CATH, ANGIOPLASTY  4/14    BMS to LAD     PHACOEMULSIFICATION CLEAR CORNEA WITH STANDARD INTRAOCULAR LENS IMPLANT Right 12/4/2018    Procedure: COMPLEX RIGHT EYE PHACOEMULSIFICATION CLEAR CORNEA WITH STANDARD INTRAOCULAR LENS IMPLANT;  Surgeon: Kamar Kyle MD;  Location:  EC     PHACOEMULSIFICATION CLEAR CORNEA WITH STANDARD INTRAOCULAR LENS IMPLANT Left 12/19/2018    Procedure: COMPLEX LEFT EYE PHACOEMULSIFICATION CLEAR CORNEA WITH STANDARD INTRAOCULAR LENS IMPLANT;  Surgeon: Kamar Kyle MD;  Location: [facility]     SH:  Lives alone, AL apartment.  Previously lived at [facility].   His sister Judith is POA   Non smoker     ROS: SLUMS 14/30   ACL 3.8     Ambulatory with 4WW  Wt Readings from Last 5 Encounters:   03/05/21 68.2 kg (150 lb 6.4 oz)   03/04/21 68 kg (150 lb)   02/28/21 68.5 kg (151 lb)   02/15/21 69.6 kg (153 lb 6.4 oz)   02/14/21 68.3 kg (150 lb 8 oz)      EXAM:  NAD  /52   Pulse 82   Temp 97.7  F (36.5  C)   Resp 16   SpO2 96%    Neck supple without adenopathy.    Lungs clear bilaterally with fair air movement  Heart RRR s1s2 at 80  Abd soft, NT, no distention or guarding, +BS  Ext with 1+ edema, wearing compression stockings   Neuro: some limits to history, no  focal findings  Psych: affect pawel finney      3/4/2021:        Sodium 136 - 145 mmol/L 137     Potassium 3.5 - 5.0 mmol/L 3.8     Chloride 98 - 107 mmol/L 102     Carbon Dioxide 22 - 31 mmol/L 26     Anion Gap 5 - 18 mmol/L 9     Glucose 70 - 125 mg/dL 183Abnormal      Urea Nitrogen 8 - 28 mg/dL 9     Creatinine 0.70 - 1.30 mg/dL 0.76     Calcium 8.5 - 10.5 mg/dL 8.9     GFR Estimate >30 ml/min >60      Lab Results   Component Value Date    AST 31 02/25/2021      ALBUMIN 2.8 02/25/2021     Lab Results   Component Value Date    PROTTOTAL 7.2 02/25/2021      Lab Results   Component Value Date    ALKPHOS 76 02/25/2021     Lab Results   Component Value Date    CHOL 146 01/29/2021     Lab Results   Component Value Date    HDL 47 01/29/2021     Lab Results   Component Value Date    LDL 75 01/29/2021     Lab Results   Component Value Date    TRIG 120 01/29/2021     Lab Results   Component Value Date    WBC 7.4 02/26/2021      HGB 13.3 02/26/2021      MCV 88 02/26/2021       02/26/2021     TSH   Date Value Ref Range Status   02/25/2021 8.65 (H) 0.40 - 4.00 mU/L Final     ECHO 1/29/2021   Interpretation Summary  The visual ejection fraction is estimated at 35-40%.  Left ventricular systolic function is mild to moderately reduced.  There are regional wall motion abnormalities as specified.  The regional wall motion abnormalities are similar to previous and are consistent with LAD territory ischemia and / or infarction.  The ascending aorta is Mildly dilated.    CT CHEST, ABDOMEN, PELVIS WITH CONTRAST  1/26/2021 5:51 PM   HISTORY: Abdominal pain and possible FB in esophagus, he is not sure.  Chest/mediastinum: No cardiomegaly or significant pericardial  effusion. Moderate atherosclerotic vascular calcification of the  abdominal aorta and iliac vessels. Extensive bilateral gynecomastia.  Thyroid gland is unremarkable. No significant mediastinal, hilar or  axillary lymphadenopathy. 6 mm fat attenuating focus abutting  the  posterior aspect of the distal esophagus, could represent small food  residue versus small submucosal lipoma. No evidence of other radiodense foreign bodies. Esophagus is patulous.  Lung/pleura: No pleural effusion or pneumothorax. Numerous bilateral pulmonary nodules, worrisome for metastatic disease.  Abdomen/pelvis: 1.3 cm partially calcified hypoattenuating focus in  the left hepatic lobe (series 3 image 114) otherwise no suspicious  focal hepatic lesion. Cholelithiasis without CT evidence of acute cholecystitis. No main pancreatic ductal dilatation or definite solid  pancreatic mass. No splenomegaly. No adrenal nodules. N o radiodense kidney stones or hydronephrosis.  Sigmoid colon appears to be mildly distended and displaced into the upper mid abdomen/left upper quadrant, no associated mesenteric  twisting or bowel obstruction noted. No significant free fluid in the abdomen and pelvis. No free peritoneal or portal venous gas. Moderate  atherosclerotic vascular calcification of the abdominal aorta and iliac vessels. Although the prostate gland is not significantly  enlarged it appears diffusely enhancing, nonspecific, can be due to underlying diffuse prostatitis versus prostatic malignancy.  IMPRESSION:   1. The sigmoid colon appears to be mildly distended and displaced into  the upper mid abdomen/left upper quadrant, however with no associated  mesenteric twisting or bowel obstruction. Finding is nonspecific, but might predispose patient into sigmoid volvulus.  2. 6 mm fat attenuating lesion abutting the posterior aspect of the  esophagus, could represent small food residue versus small submucosal  lipoma. No other radiodense foreign body seen within the esophageal lumen which appears mildly dilated/patulous.  3. Numerous bilateral pulmonary nodules, worrisome for metastatic disease.  4. The prostate gland although not significantly enlarged, appears  diffusely enhancing, nonspecific, can be due to  underlying prostatitis  or prostate malignancy, recommend correlation with serum prostate-specific antigen.  5. Cholelithiasis without CT evidence of acute cholecystitis.  6. 1.3 cm partially calcified hypoattenuating focus in the left hepatic lobe, of indeterminate etiology.  ADDENDUM:  Comparison studies from 10/19/2020 and 10/16/2019 are now available  for comparison.   The area of the low-attenuation focus within the mid to lower esophagus on this study was not adequately assessed on the comparison study due to differences in distention of the esophagus. Multiple  pulmonary nodules have slightly increased in size. The sigmoid colon has not changed in position. The partially calcified lateral left hepatic lobe lesion has not significantly changed.         IMP/PLAN:   (E11.59,  Z79.4) Type 2 diabetes mellitus with other circulatory complication, with long-term current use of insulin (H)   Comment: Reviewed BGM log over past month:  Mostly 100s (111-246)     Lab Results   Component Value Date    A1C 11.4 01/26/2021    A1C 11.8 01/20/2020     Plan: on metformin 500 mg/day, Lantus 7 units/day    With normal renal function, would look to discontinuing insulin and increasing metformin dose.       (K22.2) Stricture and stenosis of esophagus  (K21.00) Gastroesophageal reflux disease with esophagitis without hemorrhage  Comment: s/p dilatation x2   Plan: omeprazole 20 mg/day    Follow up with GI as scheduled.       (I25.10) Coronary artery disease involving native coronary artery of native heart without angina pectoris  (I25.5) Ischemic cardiomyopathy  Comment: low EF, volume status stable     Plan: metoprolol 25 mg/day, daily ASA, atorvastatin 40 mg/day for secondary prevention     Follow weights and exam; recently seen by Cardiology and no changes made.         (C61) Prostate cancer (H)  Comment: high PSA, local disease, castrate resistant     Plan: no current tx, follow up with Oncology       (R41.89) Cognitive  impairment  (F84.0) Autism disorder  (Z91.14) Noncompliance with medication regimen  Comment: has stopped medications on occasion     Plan: AL support for med admin, meals, activity        Cecilia Krause MD

## 2021-04-13 LAB
ANA SER QL IF: NEGATIVE
ANCA AB PATTERN SER IF-IMP: NORMAL
C-ANCA TITR SER IF: NORMAL {TITER}
C3 SERPL-MCNC: 113 MG/DL (ref 81–157)
C4 SERPL-MCNC: 29 MG/DL (ref 13–39)
ENA RNP IGG SER IA-ACNC: <0.2 AI (ref 0–0.9)
ENA SM IGG SER-ACNC: <0.2 AI (ref 0–0.9)
ENA SS-A IGG SER IA-ACNC: <0.2 AI (ref 0–0.9)
ENA SS-B IGG SER IA-ACNC: <0.2 AI (ref 0–0.9)
HBV CORE AB SERPL QL IA: NONREACTIVE
HCV AB SERPL QL IA: NONREACTIVE

## 2021-04-19 ENCOUNTER — TELEPHONE (OUTPATIENT)
Dept: DERMATOLOGY | Facility: CLINIC | Age: 81
End: 2021-04-19

## 2021-04-19 DIAGNOSIS — L81.7 PIGMENTED PURPURIC DERMATOSIS: Primary | ICD-10-CM

## 2021-04-19 LAB — COPATH REPORT: NORMAL

## 2021-04-19 NOTE — TELEPHONE ENCOUNTER
Called and LM for patients sister Judith to call back (CTC on file).    AMBROCIO Chase-BSN-N  [facility]  [phone]

## 2021-04-19 NOTE — TELEPHONE ENCOUNTER
----- Message from Carmen Germain PA-C sent at 4/19/2021 12:39 PM CDT -----  Pt sent results of labs on mychart recently -did he get these?    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 Unless otherwise bothersome no treatment is needed. The dermatopathologist did note that you had some stasis dermatitis ( fluid build up in skin from incompetent veins causing inflammation of the skin).    Stasis derm: elevation of legs and compression stockings -does pt have stockings and/or is he elevating legs daily?    We can try a short course of topic steroid for the stasis derm and the pigmented purpuric dermatosis. There will most likely be improvement of the stasis derm but improvement of PPD varies with topical steroid.

## 2021-04-20 RX ORDER — TRIAMCINOLONE ACETONIDE 1 MG/G
OINTMENT TOPICAL 2 TIMES DAILY
Qty: 453.6 G | Refills: 0 | Status: CANCELLED | OUTPATIENT
Start: 2021-04-20

## 2021-04-20 NOTE — TELEPHONE ENCOUNTER
Patients sister Judith called back (CTC on file).    Judith read [employer] messages sent by provider, .    Judith requested RN call [facility] and speak w/ Iris (@[phone]) to discuss patients results and med directions/orders.    Judith would like provider to send topical steroid to [facility].    Judith voiced understanding.    AMBROCIO Chase-BSN-PHN  [facility]  [phone]

## 2021-04-21 RX ORDER — TRIAMCINOLONE ACETONIDE 5 MG/G
CREAM TOPICAL 2 TIMES DAILY
Qty: 60 G | Refills: 1 | Status: SHIPPED | OUTPATIENT
Start: 2021-04-21 | End: 2023-01-01

## 2021-04-21 NOTE — TELEPHONE ENCOUNTER
[facility]    Educated Meseret on patients biopsy results- inflamed blood vessels, (pigmented purpuric dermatosis).   -No tx needed, unless a/a is bothersome for patient    The dermatopathologist did note stasis dermatitis (fluid build up in skin from incompetent veins causing inflammation of the skin).  -Elevation of legs and compression stockings, Meseret stated orders for patient include daily elevation of legs and compression stocking    Informed Meseret provider sent ([facility]) a short course of a topical steroid for stasis derm and pigmented purpuric dermatosis.    Printed TAC order and mychart messages from provider to patient for patients NP to review- both items faxed by RN to: Attn [facility] [phone]    Meseret voiced understanding.    AMBROCIO Chase-BSN-N  [facility]  [phone]

## 2021-05-18 ENCOUNTER — ASSISTED LIVING VISIT (OUTPATIENT)
Dept: GERIATRICS | Facility: CLINIC | Age: 81
End: 2021-05-18
Payer: COMMERCIAL

## 2021-05-18 VITALS
OXYGEN SATURATION: 98 % | TEMPERATURE: 97.7 F | HEART RATE: 83 BPM | DIASTOLIC BLOOD PRESSURE: 64 MMHG | RESPIRATION RATE: 16 BRPM | SYSTOLIC BLOOD PRESSURE: 110 MMHG

## 2021-05-18 DIAGNOSIS — I10 ESSENTIAL HYPERTENSION: ICD-10-CM

## 2021-05-18 DIAGNOSIS — C61 PROSTATE CANCER (H): ICD-10-CM

## 2021-05-18 DIAGNOSIS — I95.9 HYPOTENSION, UNSPECIFIED HYPOTENSION TYPE: ICD-10-CM

## 2021-05-18 DIAGNOSIS — R41.89 COGNITIVE IMPAIRMENT: ICD-10-CM

## 2021-05-18 DIAGNOSIS — K21.00 GASTROESOPHAGEAL REFLUX DISEASE WITH ESOPHAGITIS WITHOUT HEMORRHAGE: ICD-10-CM

## 2021-05-18 DIAGNOSIS — L81.7 PIGMENTED PURPURIC DERMATOSIS: Primary | ICD-10-CM

## 2021-05-18 DIAGNOSIS — I25.10 CORONARY ARTERY DISEASE INVOLVING NATIVE CORONARY ARTERY OF NATIVE HEART WITHOUT ANGINA PECTORIS: ICD-10-CM

## 2021-05-18 DIAGNOSIS — E11.59 TYPE 2 DIABETES MELLITUS WITH OTHER CIRCULATORY COMPLICATION, WITH LONG-TERM CURRENT USE OF INSULIN (H): ICD-10-CM

## 2021-05-18 DIAGNOSIS — F84.0 AUTISM DISORDER: ICD-10-CM

## 2021-05-18 DIAGNOSIS — K22.2 STRICTURE AND STENOSIS OF ESOPHAGUS: ICD-10-CM

## 2021-05-18 DIAGNOSIS — I87.2 STASIS DERMATITIS OF BOTH LEGS: ICD-10-CM

## 2021-05-18 DIAGNOSIS — Z79.4 TYPE 2 DIABETES MELLITUS WITH OTHER CIRCULATORY COMPLICATION, WITH LONG-TERM CURRENT USE OF INSULIN (H): ICD-10-CM

## 2021-05-18 DIAGNOSIS — I25.5 ISCHEMIC CARDIOMYOPATHY: ICD-10-CM

## 2021-05-18 PROBLEM — R21 RASH AND OTHER NONSPECIFIC SKIN ERUPTION: Status: ACTIVE | Noted: 2021-05-18

## 2021-05-18 NOTE — LETTER
"    5/18/2021        RE: Jose Lees  [place]  [address] [address] [address]        [facility]  [place] Medical Record Number:  [id number]  Place of Service where encounter took place:  [facility] [place] ([facility]) [[id number]]  Chief Complaint   Patient presents with     Nursing Home Acute       HPI:    Jose Lees  is a 80 year old (1940), who is being seen today for an episodic care visit.  HPI information obtained from: facility chart records, facility staff and patient report. Today's concern is:per staff no issues.  Pt's door was locked and he could not hear me knock--TV on--so  opened it.  \"HI good to see you\", he said.     Pigmented purpuric dermatosis  Type 2 diabetes mellitus with other circulatory complication, with long-term current use of insulin (H)  Stricture and stenosis of esophagus  Gastroesophageal reflux disease with esophagitis without hemorrhage  Coronary artery disease involving native coronary artery of native heart without angina pectoris  Ischemic cardiomyopathy  Prostate cancer (H)  Cognitive impairment  Autism disorder      Past Medical and Surgical History reviewed in Epic today.    MEDICATIONS:     Current Outpatient Medications   Medication Sig Dispense Refill     ASPIRIN LOW DOSE 81 MG chewable tablet TAKE ONE TABLET BY MOUTH EVERY DAY 30 tablet 4     atorvastatin (LIPITOR) 40 MG tablet TAKE ONE TABLET BY MOUTH EVERY DAY 90 tablet 3     Cholecalciferol (VITAMIN D) 50 MCG (2000 UT) CAPS Take 2,000 Units by mouth daily       Continuous Glucose Monitor Sup MISC 4 times daily (before meals and nightly)       Contour Next EZ (CONTOUR NEXT EZ W/DEVICE KIT) w/Device KIT USE AS DIRECTED 1 kit 0     CONTOUR NEXT TEST test strip TEST TWICE A DAY 50 strip 4     insulin glargine (LANTUS PEN) 100 UNIT/ML pen Inject 7 Units Subcutaneous At Bedtime  0     Leuprolide Acetate, 6 Month, (LUPRON DEPOT, 6-MONTH, " "IM)        metFORMIN (GLUCOPHAGE) 500 MG tablet TAKE 2 TABLETS BY MOUTH TWICE A DAY WITH MEALS 360 tablet 3     metoprolol succinate ER (TOPROL-XL) 25 MG 24 hr tablet Take 0.5 tablets (12.5 mg) by mouth daily TAKE ONE TABLET BY MOUTH EVERY DAY (Patient taking differently: Take 12.5 mg by mouth daily TAKE ONE TABLET BY MOUTH EVERY DAY. HOLD if SBP <110. Call NP if held) 90 tablet 0     Microlet Lancets MISC USE TWICE DAILY 100 each 0     omeprazole (PRILOSEC) 20 MG DR capsule TAKE 1 CAPSULE BY MOUTH EVERY MORNING BEFORE A MEAL 90 capsule 3     senna-docusate (SENOKOT-S/PERICOLACE) 8.6-50 MG tablet Take 1 tablet by mouth daily as needed for constipation       sertraline (ZOLOFT) 50 MG tablet Take 1 tablet (50 mg) by mouth every morning 60 tablet 3     triamcinolone (ARISTOCORT HP) 0.5 % external cream Apply topically 2 times daily To aa on legs for 2 weeks. Tapering with improvement and restarting with flares. 60 g 1      TODAY DURING EXAM/ROS:  No CP, SOB, Cough, dizziness, fevers, chills, HA, N/V, dysuria or Bowel Abnormalities. Appetite is good \"I just ate a can of green beans\".  No pain    Objective:  /64   Pulse 83   Temp 97.7  F (36.5  C)   Resp 16   SpO2 98%   Exam:  GENERAL: Siting in chair in room, in NAD,  ENT:  Mouth   normal, normal hearing acuity  EYES:  Conjunctivae, lids, pupils and irises normal  NECK:  No adenopathy,masses or thyromegaly  RESP:  respiratory effort  of chest normal, lungs clear to ausculation, no respiratory distress  CV:  Auscultation of heart done , RRR  no murmur, rub, or gallop,trace pedal bilat edema, +2 pedal pulses  ABDOMEN:  normal bowel sounds, soft, nontender   M/S:    ROSS equally, seen in chair  SKIN:  Inspection of skin at baseline except LE's have wraps in place--no open sores, skin intact, discolored areas where old lesion/sores had been- not painful per pt  NEURO:   Cranial nerves 2-12 are  Grossly intact and at patient's baseline  PSYCH:  oriented X 3, affect " and mood normal    Labs:   Recent Labs   Lab Test 03/04/21 02/27/21  0634     137   POTASSIUM 3.8  3.6   CHLORIDE 102  108   CO2 26  25   ANIONGAP 9  4   *  164*   BUN 9  13   CR 0.76  0.86   TENZIN 8.9  8.3*     CBC RESULTS:   Recent Labs   Lab Test 04/12/21  1210   WBC 7.6   RBC 3.91*   HGB 11.7*   HCT 35.8*   MCV 92   MCH 29.9   MCHC 32.7   RDW 13.8        ASSESSMENT / PLAN:  (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 May try HC if stasis Derm flares but not always helpful with PDD. Monitor.    (E11.59,  Z79.4) Type 2 diabetes mellitus with other circulatory complication, with long-term current use of insulin (H)  Comment/Plan: cont same meds--Lantus, metformin, accuchecks have been mostly 120-180's occas in 200's and very rarely >300. Once noted to be <100, no changes today    (K22.2) Stricture and stenosis of esophagus   (K21.00) Gastroesophageal reflux disease with esophagitis without hemorrhage  Comment: no acute issues lately  Plan: f/u GI per their recs and prn if symptoms, cont PPI    CV ISSUES:  (I25.10) Coronary artery disease involving native coronary artery of native heart without angina pectoris   (I25.5) Ischemic cardiomyopathy  (I10) Essential hypertension  (I95.9) Hypotension, unspecified hypotension type   Comment/Plan: no acute issues, except SBP  Runs mostly 100-110 and occas 70-90's, will discontinue the BB but cont same meds--asa, statin , monitor.    (C61) Prostate cancer (H)  Comment/Plan: f/u with urology per their rec--Lupron also    (R41.89) Cognitive impairment  (F84.0) Autism disorder  Comment/Plan: settling in to new routine--cont same regimen of Zoloft, monitor.      Electronically signed by:  SARATH Yi CNP                 Sincerely,        SARATH Yi  CNP

## 2021-05-18 NOTE — PROGRESS NOTES
"[facility]  [place] Medical Record Number:  [id number]  Place of Service where encounter took place:  [facility] [place] ([facility]) [[id number]]  Chief Complaint   Patient presents with     Nursing Home Acute       HPI:    Jose Lees  is a 80 year old (1940), who is being seen today for an episodic care visit.  HPI information obtained from: facility chart records, facility staff and patient report. Today's concern is:per staff no issues.  Pt's door was locked and he could not hear me knock--TV on--so  opened it.  \"HI good to see you\", he said.     Pigmented purpuric dermatosis  Type 2 diabetes mellitus with other circulatory complication, with long-term current use of insulin (H)  Stricture and stenosis of esophagus  Gastroesophageal reflux disease with esophagitis without hemorrhage  Coronary artery disease involving native coronary artery of native heart without angina pectoris  Ischemic cardiomyopathy  Prostate cancer (H)  Cognitive impairment  Autism disorder      Past Medical and Surgical History reviewed in Epic today.    MEDICATIONS:     Current Outpatient Medications   Medication Sig Dispense Refill     ASPIRIN LOW DOSE 81 MG chewable tablet TAKE ONE TABLET BY MOUTH EVERY DAY 30 tablet 4     atorvastatin (LIPITOR) 40 MG tablet TAKE ONE TABLET BY MOUTH EVERY DAY 90 tablet 3     Cholecalciferol (VITAMIN D) 50 MCG (2000 UT) CAPS Take 2,000 Units by mouth daily       Continuous Glucose Monitor Sup MISC 4 times daily (before meals and nightly)       Contour Next EZ (CONTOUR NEXT EZ W/DEVICE KIT) w/Device KIT USE AS DIRECTED 1 kit 0     CONTOUR NEXT TEST test strip TEST TWICE A DAY 50 strip 4     insulin glargine (LANTUS PEN) 100 UNIT/ML pen Inject 7 Units Subcutaneous At Bedtime  0     Leuprolide Acetate, 6 Month, (LUPRON DEPOT, 6-MONTH, IM)        metFORMIN (GLUCOPHAGE) 500 MG tablet TAKE 2 TABLETS BY MOUTH TWICE A DAY WITH MEALS 360 tablet 3     " "metoprolol succinate ER (TOPROL-XL) 25 MG 24 hr tablet Take 0.5 tablets (12.5 mg) by mouth daily TAKE ONE TABLET BY MOUTH EVERY DAY (Patient taking differently: Take 12.5 mg by mouth daily TAKE ONE TABLET BY MOUTH EVERY DAY. HOLD if SBP <110. Call NP if held) 90 tablet 0     Microlet Lancets MISC USE TWICE DAILY 100 each 0     omeprazole (PRILOSEC) 20 MG DR capsule TAKE 1 CAPSULE BY MOUTH EVERY MORNING BEFORE A MEAL 90 capsule 3     senna-docusate (SENOKOT-S/PERICOLACE) 8.6-50 MG tablet Take 1 tablet by mouth daily as needed for constipation       sertraline (ZOLOFT) 50 MG tablet Take 1 tablet (50 mg) by mouth every morning 60 tablet 3     triamcinolone (ARISTOCORT HP) 0.5 % external cream Apply topically 2 times daily To aa on legs for 2 weeks. Tapering with improvement and restarting with flares. 60 g 1      TODAY DURING EXAM/ROS:  No CP, SOB, Cough, dizziness, fevers, chills, HA, N/V, dysuria or Bowel Abnormalities. Appetite is good \"I just ate a can of green beans\".  No pain    Objective:  /64   Pulse 83   Temp 97.7  F (36.5  C)   Resp 16   SpO2 98%   Exam:  GENERAL: Siting in chair in room, in NAD,  ENT:  Mouth   normal, normal hearing acuity  EYES:  Conjunctivae, lids, pupils and irises normal  NECK:  No adenopathy,masses or thyromegaly  RESP:  respiratory effort  of chest normal, lungs clear to ausculation, no respiratory distress  CV:  Auscultation of heart done , RRR  no murmur, rub, or gallop,trace pedal bilat edema, +2 pedal pulses  ABDOMEN:  normal bowel sounds, soft, nontender   M/S:    ROSS equally, seen in chair  SKIN:  Inspection of skin at baseline except LE's have wraps in place--no open sores, skin intact, discolored areas where old lesion/sores had been- not painful per pt  NEURO:   Cranial nerves 2-12 are  Grossly intact and at patient's baseline  PSYCH:  oriented X 3, affect and mood normal    Labs:   Recent Labs   Lab Test 03/04/21 02/27/21  0634     137   POTASSIUM 3.8  3.6 "   CHLORIDE 102  108   CO2 26  25   ANIONGAP 9  4   *  164*   BUN 9  13   CR 0.76  0.86   TENZIN 8.9  8.3*     CBC RESULTS:   Recent Labs   Lab Test 04/12/21  1210   WBC 7.6   RBC 3.91*   HGB 11.7*   HCT 35.8*   MCV 92   MCH 29.9   MCHC 32.7   RDW 13.8        ASSESSMENT / PLAN:  (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 May try HC if stasis Derm flares but not always helpful with PDD. Monitor.    (E11.59,  Z79.4) Type 2 diabetes mellitus with other circulatory complication, with long-term current use of insulin (H)  Comment/Plan: cont same meds--Lantus, metformin, accuchecks have been mostly 120-180's occas in 200's and very rarely >300. Once noted to be <100, no changes today    (K22.2) Stricture and stenosis of esophagus   (K21.00) Gastroesophageal reflux disease with esophagitis without hemorrhage  Comment: no acute issues lately  Plan: f/u GI per their recs and prn if symptoms, cont PPI    CV ISSUES:  (I25.10) Coronary artery disease involving native coronary artery of native heart without angina pectoris   (I25.5) Ischemic cardiomyopathy  (I10) Essential hypertension  (I95.9) Hypotension, unspecified hypotension type   Comment/Plan: no acute issues, except SBP  Runs mostly 100-110 and occas 70-90's, will discontinue the BB but cont same meds--asa, statin , monitor.    (C61) Prostate cancer (H)  Comment/Plan: f/u with urology per their rec--Lupron also    (R41.89) Cognitive impairment  (F84.0) Autism disorder  Comment/Plan: settling in to new routine--cont same regimen of Zoloft, monitor.      Electronically signed by:  Kailey Saucedo, SARATH CNP

## 2021-06-16 NOTE — PROGRESS NOTES
[place] [facility]  [place] Medical Record Number:  [id number]  Place of Service where encounter took place:  [facility] [place] ([facility]) [[id number]]  Chief Complaint   Patient presents with     Nursing Home Acute       HPI:    Jose Lees  is a 80 year old (1940), who is being seen today for an episodic care visit.  HPI information obtained from: {FGS HPI:326002}. Today's concern is:  {FGS DX:982228}    Past Medical and Surgical History reviewed in Epic today.    MEDICATIONS:  {Providers Please double check the med list (in the plan section >> meds & orders tab) and Discontinue any of the meds flagged by the TC to be discontinued}  Current Outpatient Medications   Medication Sig Dispense Refill     ASPIRIN LOW DOSE 81 MG chewable tablet TAKE ONE TABLET BY MOUTH EVERY DAY 30 tablet 4     atorvastatin (LIPITOR) 40 MG tablet TAKE ONE TABLET BY MOUTH EVERY DAY 90 tablet 3     Cholecalciferol (VITAMIN D3) 50 MCG (2000 UT) CAPS TAKE 1 CAPSULE BY MOUTH EVERY DAY 28 capsule 0     Continuous Glucose Monitor Sup MISC 4 times daily (before meals and nightly)       Contour Next EZ (CONTOUR NEXT EZ W/DEVICE KIT) w/Device KIT USE AS DIRECTED 1 kit 0     CONTOUR NEXT TEST test strip TEST TWICE A DAY 50 strip 4     insulin glargine (LANTUS PEN) 100 UNIT/ML pen Inject 7 Units Subcutaneous At Bedtime  0     Leuprolide Acetate, 6 Month, (LUPRON DEPOT, 6-MONTH, IM)        metFORMIN (GLUCOPHAGE) 500 MG tablet TAKE 2 TABLETS BY MOUTH TWICE A DAY WITH MEALS 360 tablet 3     Microlet Lancets MISC USE TWICE DAILY 100 each 0     omeprazole (PRILOSEC) 20 MG DR capsule TAKE 1 CAPSULE BY MOUTH EVERY MORNING BEFORE A MEAL 90 capsule 3     senna-docusate (SENOKOT-S/PERICOLACE) 8.6-50 MG tablet Take 1 tablet by mouth daily as needed for constipation       sertraline (ZOLOFT) 50 MG tablet Take 1 tablet (50 mg) by mouth every morning 60 tablet 3     triamcinolone (ARISTOCORT HP) 0.5 % external cream  Apply topically 2 times daily To aa on legs for 2 weeks. Tapering with improvement and restarting with flares. 60 g 1     ***    REVIEW OF SYSTEMS:  {ROS FGS:653876}    Objective:  There were no vitals taken for this visit.  Exam:  {senior living physical exam :259075}    Labs:   {fgslab:370285}    ASSESSMENT/PLAN:  {FGS DX:424644}    {fgsorders:078118}  ***    {fgstime1:596003}  Electronically signed by:  Melina Anderson MA ***  {Providers Please double check the med list (in the plan section >> meds & orders tab) and Discontinue any of the meds flagged by the TC to be discontinued}

## 2021-06-21 VITALS
DIASTOLIC BLOOD PRESSURE: 64 MMHG | SYSTOLIC BLOOD PRESSURE: 110 MMHG | RESPIRATION RATE: 16 BRPM | TEMPERATURE: 97.7 F | HEART RATE: 83 BPM | OXYGEN SATURATION: 96 %

## 2021-06-21 NOTE — PROGRESS NOTES
[place] [facility]  [place] Medical Record Number:  [id number]  Place of Service where encounter took place:  [facility] ([facility]) [[id number]]  Chief Complaint   Patient presents with     Wellness Visit       HPI:    Jose Lees  is a 80 year old (1940), who is being seen today for an episodic care visit.  HPI information obtained from: {FGS HPI:338051}. Today's concern is:  {FGS DX:530766}    Past Medical and Surgical History reviewed in Epic today.    MEDICATIONS:  {Providers Please double check the med list (in the plan section >> meds & orders tab) and Discontinue any of the meds flagged by the TC to be discontinued}  Current Outpatient Medications   Medication Sig Dispense Refill     ASPIRIN LOW DOSE 81 MG chewable tablet TAKE ONE TABLET BY MOUTH EVERY DAY 30 tablet 4     atorvastatin (LIPITOR) 40 MG tablet TAKE ONE TABLET BY MOUTH EVERY DAY 90 tablet 3     Cholecalciferol (VITAMIN D3) 50 MCG (2000 UT) CAPS TAKE 1 CAPSULE BY MOUTH EVERY DAY 28 capsule 0     Continuous Glucose Monitor Sup MISC 4 times daily (before meals and nightly)       Contour Next EZ (CONTOUR NEXT EZ W/DEVICE KIT) w/Device KIT USE AS DIRECTED 1 kit 0     CONTOUR NEXT TEST test strip TEST TWICE A DAY 50 strip 4     insulin glargine (LANTUS PEN) 100 UNIT/ML pen Inject 7 Units Subcutaneous At Bedtime  0     Leuprolide Acetate, 6 Month, (LUPRON DEPOT, 6-MONTH, IM)        metFORMIN (GLUCOPHAGE) 500 MG tablet TAKE 2 TABLETS BY MOUTH TWICE A DAY WITH MEALS 360 tablet 3     Microlet Lancets MISC USE TWICE DAILY 100 each 0     omeprazole (PRILOSEC) 20 MG DR capsule TAKE 1 CAPSULE BY MOUTH EVERY MORNING BEFORE A MEAL 90 capsule 3     senna-docusate (SENOKOT-S/PERICOLACE) 8.6-50 MG tablet Take 1 tablet by mouth daily as needed for constipation       sertraline (ZOLOFT) 50 MG tablet Take 1 tablet (50 mg) by mouth every morning 60 tablet 3     triamcinolone (ARISTOCORT HP) 0.5 % external cream Apply  "topically 2 times daily To aa on legs for 2 weeks. Tapering with improvement and restarting with flares. 60 g 1     ***    REVIEW OF SYSTEMS:  {ROS FGS:305897}    Objective:  /64   Pulse 83   Temp 97.7  F (36.5  C)   Resp 16   SpO2 96%   Exam:  {Nursing home physical exam :841108}    Labs:   {fgslab:600734}    ASSESSMENT/PLAN:  {FGS DX:892184}    {fgsorders:534843}  ***    {fgstime1:266336}  Electronically signed by:  Melina Anderson MA ***  {Providers Please double check the med list (in the plan section >> meds & orders tab) and Discontinue any of the meds flagged by the TC to be discontinued}    Annual Wellness Visit    Are you in the first 12 months of your Medicare Part B coverage?  { :974272::\"No\"}    Physical Health:    In general, how would you rate your overall physical health? { :572845}    Outside of work, how many days during the week do you exercise?{ :424965}    Outside of work, approximately how many minutes a day do you exercise?{ :555927}    If you drink alcohol do you typically have >3 drinks per day or >7 drinks per week? { :755809}    Do you usually eat at least 4 servings of fruit and vegetables a day, include whole grains & fiber and avoid regularly eating high fat or \"junk\" foods? { :071301::\"Yes\"}    Do you have any problems taking medications regularly? { :143028::\"No\"}    Do you have any side effects from medications? { :234674}    Needs assistance for the following daily activities: { :909006}    Which of the following safety concerns are present in your home?  { :654488::\"none identified\"}     Hearing impairment: { :955479}    In the past 6 months, have you been bothered by leaking of urine? { :062958}    Mental Health:    In general, how would you rate your overall mental or emotional health? { :645673}  {go to screenings, assessment and complete PHQ2 if not yet done}    PHQ-2 Score:         No flowsheet data found.     Do you feel safe in your environment? { :290799}    Have " "you ever done Advance Care Planning? (For example, a Health Directive, POLST, or a discussion with a medical provider or your loved ones about your wishes)? { :990875}    Fall risk:  { :516956}  {If any of the above assessments are answered yes, consider ordering appropriate referrals (Optional):299928::\"click delete button to remove this line now\"}  Cognitive Screening: { :507407}    {Do you have sleep apnea, excessive snoring or daytime drowsiness? (Optional):378544}    Current providers sharing in care for this patient include: {Rooming staff:  Please update Care Team in Rooming Activity, refresh this note and then delete this statement}  Patient Care Team:  Kailey Saucedo APRN CNP as PCP - General (Internal Medicine)  Cecilia Krause MD as MD (Internal Medicine)  Meilna Anderson MA as Medical Assistant (Geriatric Medicine)  Jonny Dominguez MD as Assigned Heart and Vascular Provider  Kailey Saucedo APRN CNP as Assigned PCP    Melina Anderson MA                "

## 2021-06-22 NOTE — PROGRESS NOTES
[place] [facility]  [place] Medical Record Number:  [id number]  Place of Service where encounter took place:  [facility] ([facility]) [[id number]]  Chief Complaint   Patient presents with     Wellness Visit       HPI:    Jose Lees  is a 80 year old (1940), who is being seen today for an episodic care visit.  HPI information obtained from: facility chart records, facility staff and [facility] chart review. Today's concern is: review and WELLNESS VISIT     Type 2 diabetes mellitus with other circulatory complication, with long-term current use of insulin (H)  Stricture and stenosis of esophagus  Gastroesophageal reflux disease with esophagitis without hemorrhage  Coronary artery disease involving native coronary artery of native heart without angina pectoris  Ischemic cardiomyopathy  Cognitive impairment  Autism disorder  Pigmented purpuric dermatosis      Past Medical and Surgical History reviewed in Epic today.    MEDICATIONS:     Current Outpatient Medications   Medication Sig Dispense Refill     ASPIRIN LOW DOSE 81 MG chewable tablet TAKE ONE TABLET BY MOUTH EVERY DAY 30 tablet 4     atorvastatin (LIPITOR) 40 MG tablet TAKE ONE TABLET BY MOUTH EVERY DAY 90 tablet 3     Cholecalciferol (VITAMIN D3) 50 MCG (2000 UT) CAPS TAKE 1 CAPSULE BY MOUTH EVERY DAY 28 capsule 0     Continuous Glucose Monitor Sup MISC 4 times daily (before meals and nightly)       Contour Next EZ (CONTOUR NEXT EZ W/DEVICE KIT) w/Device KIT USE AS DIRECTED 1 kit 0     CONTOUR NEXT TEST test strip TEST TWICE A DAY 50 strip 4     insulin glargine (LANTUS PEN) 100 UNIT/ML pen Inject 7 Units Subcutaneous At Bedtime  0     Leuprolide Acetate, 6 Month, (LUPRON DEPOT, 6-MONTH, IM)        metFORMIN (GLUCOPHAGE) 500 MG tablet TAKE 2 TABLETS BY MOUTH TWICE A DAY WITH MEALS 360 tablet 3     Microlet Lancets MISC USE TWICE DAILY 100 each 0     omeprazole (PRILOSEC) 20 MG DR capsule TAKE 1 CAPSULE BY  "MOUTH EVERY MORNING BEFORE A MEAL 90 capsule 3     senna-docusate (SENOKOT-S/PERICOLACE) 8.6-50 MG tablet Take 1 tablet by mouth daily as needed for constipation       sertraline (ZOLOFT) 50 MG tablet Take 1 tablet (50 mg) by mouth every morning 60 tablet 3     triamcinolone (ARISTOCORT HP) 0.5 % external cream Apply topically 2 times daily To aa on legs for 2 weeks. Tapering with improvement and restarting with flares. 60 g 1        TODAY DURING EXAM/ROS:  No CP, SOB, Cough, dizziness, fevers, chills, HA, N/V, dysuria or Bowel Abnormalities. Appetite is good-per staff he often now cooks for himself .  No pain--\"My legs feel fine\"    Objective:  /64   Pulse 83   Temp 97.7  F (36.5  C)   Resp 16   SpO2 96%   Exam:  GENERAL: Siting in chair in room, in NAD,  ENT:  Mouth is WNL normal hearing acuity  EYES:  Conjunctivae, lids, pupils and irises normal  RESP:  respiratory effort  of chest normal, lungs CTA, no respiratory distress  CV:  Auscultation of heart done , RRR  no murmur, rub, or gallop,trace pitting  pedal bilat edema, +pedal pulses  ABDOMEN:  normal bowel sounds, soft, nontender   M/S:    ROSS equally, seen in chair  SKIN:  Inspection of skin at baseline except LE's have wraps in place--no open sores noted thru wraps pt said \"the nurse said they are fine\".     NEURO:   Cranial nerves are grossly intact and at patient's baseline  PSYCH:  oriented X 3, affect and mood normal      Labs:   Recent Labs   Lab Test 03/04/21 02/27/21  0634     137 137   POTASSIUM 3.8  3.8 3.6   CHLORIDE 102  102 108   CO2 26  26 25   ANIONGAP 9  9 4   *  183* 164*   BUN 9  9 13   CR 0.76  0.76 0.86   TENZIN 8.9  8.9 8.3*     CBC RESULTS:   Recent Labs   Lab Test 04/12/21  1210   WBC 7.6   RBC 3.91*   HGB 11.7*   HCT 35.8*   MCV 92   MCH 29.9   MCHC 32.7   RDW 13.8            Annual Wellness Visit    Are you in the first 12 months of your Medicare Part B coverage?  NA    Physical Health:    In " "general, how would you rate your overall physical health? good    Outside of work, how many days during the week do you exercise? NA    Outside of work, approximately how many minutes a day do you exercise? NA    If you drink alcohol do you typically have >3 drinks per day or >7 drinks per week?  NONE    Do you usually eat at least 4 servings of fruit and vegetables a day, include whole grains & fiber and avoid regularly eating high fat or \"junk\" foods? NO    Do you have any problems taking medications regularly? No    Do you have any side effects from medications? none    Needs assistance for the following daily activities: NO USES WALKER TO GET AROUND AND TRANSFERS SELF    Which of the following safety concerns are present in your home?  none identified     Hearing impairment: Yes, VERY MILD      In the past 6 months, have you been bothered by leaking of urine? no    Mental Health:    In general, how would you rate your overall mental or emotional health? good       PHQ-2 Score: is  0    No flowsheet data found.     Do you feel safe in your environment? Yes    Have you ever done Advance Care Planning? (For example, a Health Directive, POLST, or a discussion with a medical provider or your loved ones about your wishes)? Yes, advance care planning is on file.    Fall risk and Cognitive Screenings: ROS: SLUMS 14/30 .Safety eval 26/27.  ACL 3.8   Ambulatory with 4WW  FROM TCU stay in March 2021    Do you have sleep apnea, excessive snoring or daytime drowsiness?: no    Current providers sharing in care for this patient include:    Patient Care Team:  Kailey Saucedo APRN CNP as PCP - General (Internal Medicine)  Cecilia Krause MD as MD (Internal Medicine)  Melina Anderson MA as Medical Assistant (Geriatric Medicine)  Jonny Dominguez MD as Assigned Heart and Vascular Provider       ASSESSMENT / PLAN:  (E11.59,  Z79.4) Type 2 diabetes mellitus with other circulatory complication, with long-term current use of " insulin (H)  (primary encounter diagnosis)  Comment/Plan: sugars running mostly mid 100's to mostly mid 200's--staff managing his meds, will check A1C in August. Metformin and Lantus.    (K22.2) Stricture and stenosis of esophagus   (K21.00) Gastroesophageal reflux disease with esophagitis without hemorrhage  Comment/Plan: doing well, no swallowing issues,  F/U GI prn, cont with PPI    (I25.10) Coronary artery disease involving native coronary artery of native heart without angina pectoris   (I25.5) Ischemic cardiomyopathy  Comment/Plan: saw card 5/24--doing well no need to see unless issues for 2 years per Cards note.    (R41.89) Cognitive impairment   (F84.0) Autism disorder  Comment/Plan: doing well, definitely needed more services in his apt and help with meds, etc.  Cont same regime, monitor.    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  This included discussion of pertinent diagnostic results, current and new treatments . All questions answered and there is agreement   on the Care Plan.  i told her I would get A1c in August., felt he was doing better with nurses more involved and giving meds. He does need encourage to get out of room more---she agreed and said he does get out more to get his mail, computer room, etc--he confirmed this with me at our visit also . >50 minutes on this visit with >50% talking to family and resident    Electronically signed by:  SARATH Yi CNP

## 2021-06-23 ENCOUNTER — ASSISTED LIVING VISIT (OUTPATIENT)
Dept: GERIATRICS | Facility: CLINIC | Age: 81
End: 2021-06-23
Payer: COMMERCIAL

## 2021-06-23 DIAGNOSIS — E11.59 TYPE 2 DIABETES MELLITUS WITH OTHER CIRCULATORY COMPLICATION, WITH LONG-TERM CURRENT USE OF INSULIN (H): Primary | ICD-10-CM

## 2021-06-23 DIAGNOSIS — Z79.4 TYPE 2 DIABETES MELLITUS WITH OTHER CIRCULATORY COMPLICATION, WITH LONG-TERM CURRENT USE OF INSULIN (H): Primary | ICD-10-CM

## 2021-06-23 DIAGNOSIS — L81.7 PIGMENTED PURPURIC DERMATOSIS: ICD-10-CM

## 2021-06-23 DIAGNOSIS — F84.0 AUTISM DISORDER: ICD-10-CM

## 2021-06-23 DIAGNOSIS — I25.5 ISCHEMIC CARDIOMYOPATHY: ICD-10-CM

## 2021-06-23 DIAGNOSIS — I25.10 CORONARY ARTERY DISEASE INVOLVING NATIVE CORONARY ARTERY OF NATIVE HEART WITHOUT ANGINA PECTORIS: ICD-10-CM

## 2021-06-23 DIAGNOSIS — K22.2 STRICTURE AND STENOSIS OF ESOPHAGUS: ICD-10-CM

## 2021-06-23 DIAGNOSIS — K21.00 GASTROESOPHAGEAL REFLUX DISEASE WITH ESOPHAGITIS WITHOUT HEMORRHAGE: ICD-10-CM

## 2021-06-23 DIAGNOSIS — R41.89 COGNITIVE IMPAIRMENT: ICD-10-CM

## 2021-06-23 NOTE — LETTER
6/23/2021        RE: Jose Lees  [place]  [address] [address] [address]        [place] [facility]  [place] Medical Record Number:  [id number]  Place of Service where encounter took place:  [facility] ([facility]) [[id number]]  Chief Complaint   Patient presents with     Wellness Visit       HPI:    Jose Lees  is a 80 year old (1940), who is being seen today for an episodic care visit.  HPI information obtained from: facility chart records, facility staff and [facility] chart review. Today's concern is: review and WELLNESS VISIT     Type 2 diabetes mellitus with other circulatory complication, with long-term current use of insulin (H)  Stricture and stenosis of esophagus  Gastroesophageal reflux disease with esophagitis without hemorrhage  Coronary artery disease involving native coronary artery of native heart without angina pectoris  Ischemic cardiomyopathy  Cognitive impairment  Autism disorder  Pigmented purpuric dermatosis      Past Medical and Surgical History reviewed in Epic today.    MEDICATIONS:     Current Outpatient Medications   Medication Sig Dispense Refill     ASPIRIN LOW DOSE 81 MG chewable tablet TAKE ONE TABLET BY MOUTH EVERY DAY 30 tablet 4     atorvastatin (LIPITOR) 40 MG tablet TAKE ONE TABLET BY MOUTH EVERY DAY 90 tablet 3     Cholecalciferol (VITAMIN D3) 50 MCG (2000 UT) CAPS TAKE 1 CAPSULE BY MOUTH EVERY DAY 28 capsule 0     Continuous Glucose Monitor Sup MISC 4 times daily (before meals and nightly)       Contour Next EZ (CONTOUR NEXT EZ W/DEVICE KIT) w/Device KIT USE AS DIRECTED 1 kit 0     CONTOUR NEXT TEST test strip TEST TWICE A DAY 50 strip 4     insulin glargine (LANTUS PEN) 100 UNIT/ML pen Inject 7 Units Subcutaneous At Bedtime  0     Leuprolide Acetate, 6 Month, (LUPRON DEPOT, 6-MONTH, IM)        metFORMIN (GLUCOPHAGE) 500 MG tablet TAKE 2 TABLETS BY MOUTH TWICE A DAY WITH MEALS 360 tablet 3      "Microlet Lancets MISC USE TWICE DAILY 100 each 0     omeprazole (PRILOSEC) 20 MG DR capsule TAKE 1 CAPSULE BY MOUTH EVERY MORNING BEFORE A MEAL 90 capsule 3     senna-docusate (SENOKOT-S/PERICOLACE) 8.6-50 MG tablet Take 1 tablet by mouth daily as needed for constipation       sertraline (ZOLOFT) 50 MG tablet Take 1 tablet (50 mg) by mouth every morning 60 tablet 3     triamcinolone (ARISTOCORT HP) 0.5 % external cream Apply topically 2 times daily To aa on legs for 2 weeks. Tapering with improvement and restarting with flares. 60 g 1        TODAY DURING EXAM/ROS:  No CP, SOB, Cough, dizziness, fevers, chills, HA, N/V, dysuria or Bowel Abnormalities. Appetite is good-per staff he often now cooks for himself .  No pain--\"My legs feel fine\"    Objective:  /64   Pulse 83   Temp 97.7  F (36.5  C)   Resp 16   SpO2 96%   Exam:  GENERAL: Siting in chair in room, in NAD,  ENT:  Mouth is WNL normal hearing acuity  EYES:  Conjunctivae, lids, pupils and irises normal  RESP:  respiratory effort  of chest normal, lungs CTA, no respiratory distress  CV:  Auscultation of heart done , RRR  no murmur, rub, or gallop,trace pitting  pedal bilat edema, +pedal pulses  ABDOMEN:  normal bowel sounds, soft, nontender   M/S:    ROSS equally, seen in chair  SKIN:  Inspection of skin at baseline except LE's have wraps in place--no open sores noted thru wraps pt said \"the nurse said they are fine\".     NEURO:   Cranial nerves are grossly intact and at patient's baseline  PSYCH:  oriented X 3, affect and mood normal      Labs:   Recent Labs   Lab Test 03/04/21 02/27/21  0634     137 137   POTASSIUM 3.8  3.8 3.6   CHLORIDE 102  102 108   CO2 26  26 25   ANIONGAP 9  9 4   *  183* 164*   BUN 9  9 13   CR 0.76  0.76 0.86   TENZIN 8.9  8.9 8.3*     CBC RESULTS:   Recent Labs   Lab Test 04/12/21  1210   WBC 7.6   RBC 3.91*   HGB 11.7*   HCT 35.8*   MCV 92   MCH 29.9   MCHC 32.7   RDW 13.8            Annual Wellness " "Visit    Are you in the first 12 months of your Medicare Part B coverage?  NA    Physical Health:    In general, how would you rate your overall physical health? good    Outside of work, how many days during the week do you exercise? NA    Outside of work, approximately how many minutes a day do you exercise? NA    If you drink alcohol do you typically have >3 drinks per day or >7 drinks per week?  NONE    Do you usually eat at least 4 servings of fruit and vegetables a day, include whole grains & fiber and avoid regularly eating high fat or \"junk\" foods? NO    Do you have any problems taking medications regularly? No    Do you have any side effects from medications? none    Needs assistance for the following daily activities: NO USES WALKER TO GET AROUND AND TRANSFERS SELF    Which of the following safety concerns are present in your home?  none identified     Hearing impairment: Yes, VERY MILD      In the past 6 months, have you been bothered by leaking of urine? no    Mental Health:    In general, how would you rate your overall mental or emotional health? good       PHQ-2 Score: is  0    No flowsheet data found.     Do you feel safe in your environment? Yes    Have you ever done Advance Care Planning? (For example, a Health Directive, POLST, or a discussion with a medical provider or your loved ones about your wishes)? Yes, advance care planning is on file.    Fall risk and Cognitive Screenings: ROS: SLUMS 14/30 .Safety eval 26/27.  ACL 3.8   Ambulatory with 4WW  FROM TCU stay in March 2021    Do you have sleep apnea, excessive snoring or daytime drowsiness?: no    Current providers sharing in care for this patient include:    Patient Care Team:  Kailey Saucedo APRN CNP as PCP - General (Internal Medicine)  Cecilia Krause MD as MD (Internal Medicine)  Melina Anderson MA as Medical Assistant (Geriatric Medicine)  Jonny Dominguez MD as Assigned Heart and Vascular Provider       ASSESSMENT / " PLAN:  (E11.59,  Z79.4) Type 2 diabetes mellitus with other circulatory complication, with long-term current use of insulin (H)  (primary encounter diagnosis)  Comment/Plan: sugars running mostly mid 100's to mostly mid 200's--staff managing his meds, will check A1C in August. Metformin and Lantus.    (K22.2) Stricture and stenosis of esophagus   (K21.00) Gastroesophageal reflux disease with esophagitis without hemorrhage  Comment/Plan: doing well, no swallowing issues,  F/U GI prn, cont with PPI    (I25.10) Coronary artery disease involving native coronary artery of native heart without angina pectoris   (I25.5) Ischemic cardiomyopathy  Comment/Plan: saw card 5/24--doing well no need to see unless issues for 2 years per Cards note.    (R41.89) Cognitive impairment   (F84.0) Autism disorder  Comment/Plan: doing well, definitely needed more services in his apt and help with meds, etc.  Cont same regime, monitor.    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  This included discussion of pertinent diagnostic results, current and new treatments . All questions answered and there is agreement   on the Care Plan.  i told her I would get A1c in August., felt he was doing better with nurses more involved and giving meds. He does need encourage to get out of room more---she agreed and said he does get out more to get his mail, computer room, etc--he confirmed this with me at our visit also . >50 minutes on this visit with >50% talking to family and resident    Electronically signed by:  SARATH Yi CNP                 Sincerely,        SARATH Yi CNP

## 2021-07-12 ENCOUNTER — TELEPHONE (OUTPATIENT)
Dept: GERIATRICS | Facility: CLINIC | Age: 81
End: 2021-07-12

## 2021-07-12 NOTE — TELEPHONE ENCOUNTER
[facility] Nurse Triage Telephone Note    Provider: SARATH London CNP    Facility: [facility]   Facility Type:  AL    Caller: Rebeca  Call Back Number: [phone]    No Known Allergies    Reason for call: Pt c/o runny nose, sore throat and cough since Sat 7/10. Pt reports runny nose has somewhat improved since the weekend, however throat is still bothersome. Pt is fatigued, nurse noted pt to be using  cough medications and throat sprays. Not on any antihistamines. Drinking fluids.  Vitals: BP:  88/57  P:: 90  R:: 18  SPO2: 98% R/A Temp.:  96.7     Verbal Order/Direction given by Provider: MCS STANDING ORDER GIVEN:  Cepacol or therapeutic equivalent (regular or sugar free) 1 lozenge dissolved in mouth Q 2 hours PRN for sore throat; contraindicated in dysphagia    Continue to monitor condition    Provider giving Order:  SARATH Antonio CNP    Verbal Order given to: Rebeca Trivedi RN

## 2021-07-29 NOTE — PLAN OF CARE
Problem: Goal Outcome Summary  Goal: Goal Outcome Summary  Patient is A&O, VSS on RA, up with assist of 1, voiding adequately in the bathroom, but brief on for occasional incontinence, Mod carb-low sodium-no caffeine diet. On droplet precaution, complaints of sore throat and lozenges available 3X daily. Plan is to d/c on Monday when done with Tamiflu         Complex Repair And Dorsal Nasal Flap Text: The defect edges were debeveled with a #15 scalpel blade.  The primary defect was closed partially with a complex linear closure.  Given the location of the remaining defect, shape of the defect and the proximity to free margins a dorsal nasal flap was deemed most appropriate for complete closure of the defect.  Using a sterile surgical marker, an appropriate flap was drawn incorporating the defect and placing the expected incisions within the relaxed skin tension lines where possible.    The area thus outlined was incised deep to adipose tissue with a #15 scalpel blade.  The skin margins were undermined to an appropriate distance in all directions utilizing iris scissors.

## 2021-08-01 ENCOUNTER — LAB REQUISITION (OUTPATIENT)
Dept: LAB | Facility: CLINIC | Age: 81
End: 2021-08-01
Payer: COMMERCIAL

## 2021-08-01 DIAGNOSIS — N18.9 CHRONIC KIDNEY DISEASE, UNSPECIFIED: ICD-10-CM

## 2021-08-01 DIAGNOSIS — I10 ESSENTIAL (PRIMARY) HYPERTENSION: ICD-10-CM

## 2021-08-01 DIAGNOSIS — E11.9 TYPE 2 DIABETES MELLITUS WITHOUT COMPLICATIONS (H): ICD-10-CM

## 2021-08-02 NOTE — PROGRESS NOTES
[facility]  [place] Medical Record Number:  [id number]  Place of Service where encounter took place:  [facility] ([facility] [[id number]]  Chief Complaint   Patient presents with     Nursing Home Acute       HPI:    Jose Lees  is a 80 year old (1940), who is being seen today for an episodic care visit.  HPI information obtained from: facility chart records, facility staff, patient report and [facility] chart review. Today's concern is:     Type 2 diabetes mellitus without complication, without long-term current use of insulin (H)  Stricture and stenosis of esophagus  Gastroesophageal reflux disease with esophagitis without hemorrhage  Coronary artery disease involving native coronary artery of native heart without angina pectoris  Ischemic cardiomyopathy  Cognitive impairment  Autism disorder      Past Medical and Surgical History reviewed in Epic today.    MEDICATIONS:     Current Outpatient Medications   Medication Sig Dispense Refill     ASPIRIN LOW DOSE 81 MG chewable tablet TAKE ONE TABLET BY MOUTH EVERY DAY 30 tablet 4     atorvastatin (LIPITOR) 40 MG tablet TAKE ONE TABLET BY MOUTH EVERY DAY 90 tablet 3     Cholecalciferol (VITAMIN D3) 50 MCG (2000 UT) CAPS TAKE 1 CAPSULE BY MOUTH EVERY DAY 28 capsule 0     Continuous Glucose Monitor Sup MISC 4 times daily (before meals and nightly)       Contour Next EZ (CONTOUR NEXT EZ W/DEVICE KIT) w/Device KIT USE AS DIRECTED 1 kit 0     CONTOUR NEXT TEST test strip TEST TWICE A DAY 50 strip 4     LANTUS VIAL 100 UNIT/ML soln INJECT 7 UNITS SUBCUTANEOUSLY EVERY MORNING *REORDER AS NEEDED* 10 mL 2     Leuprolide Acetate, 6 Month, (LUPRON DEPOT, 6-MONTH, IM)        metFORMIN (GLUCOPHAGE) 500 MG tablet TAKE 2 TABLETS BY MOUTH TWICE A DAY WITH MEALS 360 tablet 3     Microlet Lancets MISC USE TWICE DAILY 100 each 0     omeprazole (PRILOSEC) 20 MG DR capsule TAKE 1 CAPSULE BY MOUTH EVERY MORNING BEFORE A MEAL 90  "capsule 3     senna-docusate (SENOKOT-S/PERICOLACE) 8.6-50 MG tablet Take 1 tablet by mouth daily as needed for constipation       sertraline (ZOLOFT) 50 MG tablet Take 1 tablet (50 mg) by mouth every morning 60 tablet 3     triamcinolone (ARISTOCORT HP) 0.5 % external cream Apply topically 2 times daily To aa on legs for 2 weeks. Tapering with improvement and restarting with flares. 60 g 1        TODAY DURING EXAM/ROS:  No CP, SOB, Cough, dizziness, fevers, chills, HA, N/V, dysuria or Bowel Abnormalities. Appetite is good.  No pains. Says 'I am doing fine\".    Objective:  /83   Pulse 84   Temp 97.7  F (36.5  C)   Resp 16   SpO2 97%   Exam:  GENERAL APPEARANCE:  Alert, in no distress, oriented, cooperative  ENT:  Mouth and posterior oropharynx normal, moist mucous membranes, normal hearing acuity  EYES:  Conjunctiva and lids normal  RESP:  respiratory effort and palpation of chest normal, lungs clear to auscultation , no respiratory distress  CV:  Palpation and auscultation of heart done , regular rate and rhythm, no murmur, rub, or gallop, trace LE edema, Black compression stocking on bilat to knees  ABDOMEN:  normal bowel sounds, soft, nontender, no hepatosplenomegaly or other masses  M/S:   up by self independently  and has walker  SKIN:  Inspection of skin and subcutaneous tissue baseline, but limited as pt dressed--no sores on legs  NEURO:   Cranial nerves 2-12 are normal tested and grossly at patient's baseline  PSYCH:  oriented X 3, affect and mood normal, forgetful    Labs:   Lab Results   Component Value Date    A1C 6.7 08/03/2021    A1C 11.4 01/26/2021    A1C 11.8 01/20/2020    A1C 10.2 11/08/2019    A1C 11.6 05/20/2019    A1C 9.9 02/08/2019     Recent Labs   Lab Test 08/03/21  0643 03/04/21  0840    137   POTASSIUM 4.2 3.8   CHLORIDE 101 102   CO2 29 26   ANIONGAP 9 9    183*   BUN 16 9   CR 0.89 0.76   TENZIN 9.3 8.9     CBC RESULTS: Recent Labs   Lab Test 04/12/21  1210   WBC 7.6 "   RBC 3.91*   HGB 11.7*   HCT 35.8*   MCV 92   MCH 29.9   MCHC 32.7   RDW 13.8        ASSESSMENT / PLAN:  (E11.9) Type 2 diabetes mellitus without complication, without long-term current use of insulin (H)  (primary encounter diagnosis)  Comment/Plan:  Amazingly better control with AIC nearly 1/2 less--staff has been giving his meds since return to his apt several mos ago.  Consider decrease Lantus or metformin. Will cont monitor for now--he has an interesting array of foods: eating a plate of potatoes today. Sometimes a can of green beans.    (K22.2) Stricture and stenosis of esophagus   (K21.00) Gastroesophageal reflux disease with esophagitis without hemorrhage  Comment/Plan: no symptoms, f/u GI when needed, saw them earlier in the year.  Cont  Prilosec, monitor    (I25.10) Coronary artery disease involving native coronary artery of native heart without angina pectoris   (I25.5) Ischemic cardiomyopathy  Comment/Plan: no acute issues, SBP fine, asa Lipitor, on no BP meds , monitor.    (R41.89) Cognitive impairment   (F84.0) Autism disorder  Comment/Plan: Zoloft, doing well with more services in his apt since  March 19th, 2021       Electronically signed by:  SARATH Yi CNP

## 2021-08-03 ENCOUNTER — ASSISTED LIVING VISIT (OUTPATIENT)
Dept: GERIATRICS | Facility: CLINIC | Age: 81
End: 2021-08-03
Payer: COMMERCIAL

## 2021-08-03 DIAGNOSIS — E11.9 TYPE 2 DIABETES MELLITUS WITHOUT COMPLICATION, WITHOUT LONG-TERM CURRENT USE OF INSULIN (H): Primary | ICD-10-CM

## 2021-08-03 DIAGNOSIS — R41.89 COGNITIVE IMPAIRMENT: ICD-10-CM

## 2021-08-03 DIAGNOSIS — F84.0 AUTISM DISORDER: ICD-10-CM

## 2021-08-03 DIAGNOSIS — K22.2 STRICTURE AND STENOSIS OF ESOPHAGUS: ICD-10-CM

## 2021-08-03 DIAGNOSIS — I25.10 CORONARY ARTERY DISEASE INVOLVING NATIVE CORONARY ARTERY OF NATIVE HEART WITHOUT ANGINA PECTORIS: ICD-10-CM

## 2021-08-03 DIAGNOSIS — K21.00 GASTROESOPHAGEAL REFLUX DISEASE WITH ESOPHAGITIS WITHOUT HEMORRHAGE: ICD-10-CM

## 2021-08-03 DIAGNOSIS — I25.5 ISCHEMIC CARDIOMYOPATHY: ICD-10-CM

## 2021-08-03 LAB
ANION GAP SERPL CALCULATED.3IONS-SCNC: 9 MMOL/L (ref 5–18)
BUN SERPL-MCNC: 16 MG/DL (ref 8–28)
CALCIUM SERPL-MCNC: 9.3 MG/DL (ref 8.5–10.5)
CHLORIDE BLD-SCNC: 101 MMOL/L (ref 98–107)
CO2 SERPL-SCNC: 29 MMOL/L (ref 22–31)
CREAT SERPL-MCNC: 0.89 MG/DL (ref 0.7–1.3)
GFR SERPL CREATININE-BSD FRML MDRD: 81 ML/MIN/1.73M2
GLUCOSE BLD-MCNC: 101 MG/DL (ref 70–125)
HBA1C MFR BLD: 6.7 %
POTASSIUM BLD-SCNC: 4.2 MMOL/L (ref 3.5–5)
SODIUM SERPL-SCNC: 139 MMOL/L (ref 136–145)

## 2021-08-03 PROCEDURE — 36415 COLL VENOUS BLD VENIPUNCTURE: CPT | Mod: ORL | Performed by: NURSE PRACTITIONER

## 2021-08-03 PROCEDURE — 83036 HEMOGLOBIN GLYCOSYLATED A1C: CPT | Mod: ORL | Performed by: NURSE PRACTITIONER

## 2021-08-03 PROCEDURE — P9604 ONE-WAY ALLOW PRORATED TRIP: HCPCS | Mod: ORL | Performed by: NURSE PRACTITIONER

## 2021-08-03 PROCEDURE — 80048 BASIC METABOLIC PNL TOTAL CA: CPT | Mod: ORL | Performed by: NURSE PRACTITIONER

## 2021-08-03 NOTE — LETTER
8/3/2021        RE: Jose Lees  [address] [place]  [address] [facility] [address]        [facility]  [place] Medical Record Number:  [id number]  Place of Service where encounter took place:  [facility] ([facility] [[id number]]  Chief Complaint   Patient presents with     Nursing Home Acute       HPI:    Jose Lees  is a 80 year old (1940), who is being seen today for an episodic care visit.  HPI information obtained from: facility chart records, facility staff, patient report and [facility] chart review. Today's concern is:     Type 2 diabetes mellitus without complication, without long-term current use of insulin (H)  Stricture and stenosis of esophagus  Gastroesophageal reflux disease with esophagitis without hemorrhage  Coronary artery disease involving native coronary artery of native heart without angina pectoris  Ischemic cardiomyopathy  Cognitive impairment  Autism disorder      Past Medical and Surgical History reviewed in Epic today.    MEDICATIONS:     Current Outpatient Medications   Medication Sig Dispense Refill     ASPIRIN LOW DOSE 81 MG chewable tablet TAKE ONE TABLET BY MOUTH EVERY DAY 30 tablet 4     atorvastatin (LIPITOR) 40 MG tablet TAKE ONE TABLET BY MOUTH EVERY DAY 90 tablet 3     Cholecalciferol (VITAMIN D3) 50 MCG (2000 UT) CAPS TAKE 1 CAPSULE BY MOUTH EVERY DAY 28 capsule 0     Continuous Glucose Monitor Sup MISC 4 times daily (before meals and nightly)       Contour Next EZ (CONTOUR NEXT EZ W/DEVICE KIT) w/Device KIT USE AS DIRECTED 1 kit 0     CONTOUR NEXT TEST test strip TEST TWICE A DAY 50 strip 4     LANTUS VIAL 100 UNIT/ML soln INJECT 7 UNITS SUBCUTANEOUSLY EVERY MORNING *REORDER AS NEEDED* 10 mL 2     Leuprolide Acetate, 6 Month, (LUPRON DEPOT, 6-MONTH, IM)        metFORMIN (GLUCOPHAGE) 500 MG tablet TAKE 2 TABLETS BY MOUTH TWICE A DAY WITH MEALS 360 tablet 3     Microlet Lancets MISC USE TWICE DAILY  "100 each 0     omeprazole (PRILOSEC) 20 MG DR capsule TAKE 1 CAPSULE BY MOUTH EVERY MORNING BEFORE A MEAL 90 capsule 3     senna-docusate (SENOKOT-S/PERICOLACE) 8.6-50 MG tablet Take 1 tablet by mouth daily as needed for constipation       sertraline (ZOLOFT) 50 MG tablet Take 1 tablet (50 mg) by mouth every morning 60 tablet 3     triamcinolone (ARISTOCORT HP) 0.5 % external cream Apply topically 2 times daily To aa on legs for 2 weeks. Tapering with improvement and restarting with flares. 60 g 1        TODAY DURING EXAM/ROS:  No CP, SOB, Cough, dizziness, fevers, chills, HA, N/V, dysuria or Bowel Abnormalities. Appetite is good.  No pains. Says 'I am doing fine\".    Objective:  /83   Pulse 84   Temp 97.7  F (36.5  C)   Resp 16   SpO2 97%   Exam:  GENERAL APPEARANCE:  Alert, in no distress, oriented, cooperative  ENT:  Mouth and posterior oropharynx normal, moist mucous membranes, normal hearing acuity  EYES:  Conjunctiva and lids normal  RESP:  respiratory effort and palpation of chest normal, lungs clear to auscultation , no respiratory distress  CV:  Palpation and auscultation of heart done , regular rate and rhythm, no murmur, rub, or gallop, trace LE edema, Black compression stocking on bilat to knees  ABDOMEN:  normal bowel sounds, soft, nontender, no hepatosplenomegaly or other masses  M/S:   up by self independently  and has walker  SKIN:  Inspection of skin and subcutaneous tissue baseline, but limited as pt dressed--no sores on legs  NEURO:   Cranial nerves 2-12 are normal tested and grossly at patient's baseline  PSYCH:  oriented X 3, affect and mood normal, forgetful    Labs:   Lab Results   Component Value Date    A1C 6.7 08/03/2021    A1C 11.4 01/26/2021    A1C 11.8 01/20/2020    A1C 10.2 11/08/2019    A1C 11.6 05/20/2019    A1C 9.9 02/08/2019     Recent Labs   Lab Test 08/03/21  0643 03/04/21  0840    137   POTASSIUM 4.2 3.8   CHLORIDE 101 102   CO2 29 26   ANIONGAP 9 9    183* "   BUN 16 9   CR 0.89 0.76   TENZIN 9.3 8.9     CBC RESULTS: Recent Labs   Lab Test 04/12/21  1210   WBC 7.6   RBC 3.91*   HGB 11.7*   HCT 35.8*   MCV 92   MCH 29.9   MCHC 32.7   RDW 13.8        ASSESSMENT / PLAN:  (E11.9) Type 2 diabetes mellitus without complication, without long-term current use of insulin (H)  (primary encounter diagnosis)  Comment/Plan:  Amazingly better control with AIC nearly 1/2 less--staff has been giving his meds since return to his apt several mos ago.  Consider decrease Lantus or metformin. Will cont monitor for now--he has an interesting array of foods: eating a plate of potatoes today. Sometimes a can of green beans.    (K22.2) Stricture and stenosis of esophagus   (K21.00) Gastroesophageal reflux disease with esophagitis without hemorrhage  Comment/Plan: no symptoms, f/u GI when needed, saw them earlier in the year.  Cont  Prilosec, monitor    (I25.10) Coronary artery disease involving native coronary artery of native heart without angina pectoris   (I25.5) Ischemic cardiomyopathy  Comment/Plan: no acute issues, SBP fine, asa Lipitor, on no BP meds , monitor.    (R41.89) Cognitive impairment   (F84.0) Autism disorder  Comment/Plan: Zoloft, doing well with more services in his apt since  March 19th, 2021       Electronically signed by:  SARATH Yi CNP                 Sincerely,        SARATH Yi CNP

## 2021-08-05 VITALS
SYSTOLIC BLOOD PRESSURE: 120 MMHG | DIASTOLIC BLOOD PRESSURE: 83 MMHG | HEART RATE: 84 BPM | OXYGEN SATURATION: 97 % | TEMPERATURE: 97.7 F | RESPIRATION RATE: 16 BRPM

## 2021-08-25 ENCOUNTER — OFFICE VISIT (OUTPATIENT)
Dept: PODIATRY | Facility: CLINIC | Age: 81
End: 2021-08-25
Payer: COMMERCIAL

## 2021-08-25 VITALS — DIASTOLIC BLOOD PRESSURE: 62 MMHG | SYSTOLIC BLOOD PRESSURE: 100 MMHG | HEIGHT: 69 IN | BODY MASS INDEX: 22.21 KG/M2

## 2021-08-25 DIAGNOSIS — S90.221A SUBUNGUAL HEMATOMA OF TOE OF RIGHT FOOT, INITIAL ENCOUNTER: ICD-10-CM

## 2021-08-25 DIAGNOSIS — E11.42 DIABETIC POLYNEUROPATHY ASSOCIATED WITH TYPE 2 DIABETES MELLITUS (H): Primary | ICD-10-CM

## 2021-08-25 DIAGNOSIS — M20.41 HAMMER TOES OF BOTH FEET: ICD-10-CM

## 2021-08-25 DIAGNOSIS — M20.42 HAMMER TOES OF BOTH FEET: ICD-10-CM

## 2021-08-25 PROCEDURE — 99203 OFFICE O/P NEW LOW 30 MIN: CPT | Performed by: PODIATRIST

## 2021-08-25 NOTE — LETTER
8/25/2021         RE: Jose Lees  [place]  [address] [facility] [address]        Dear Colleague,    Thank you for referring your patient, Jose Lees, to the [facility]. Please see a copy of my visit note below.    PATIENT HISTORY: Jose Lees is a 80 year old male who presents to clinic for issues with his toes.  He is here with his wife.  Patient lives in a nursing home and notes that the nurses have been applying antibiotic ointment and Band-Aids to the toes.  Wife does not know when issues with the toes started but states that she was told about 2 weeks ago.  Patient denies pain but does have neuropathy to the feet.  They deny fever, nausea, vomiting.  Wondering what can be done to help the toes.    Review of Systems:  Patient denies fever, chills, rash, wound, stiffness, limping,  weakness, heart burn, blood in stool, chest pain with activity, calf pain when walking, shortness of breath with activity, chronic cough, easy bleeding/bruising, swelling of ankles, excessive thirst, fatigue, depression, anxiety.  Patient admits to numbness.     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     Prostate cancer (H) 10/11    GLEASOR 8     Stricture and stenosis of esophagus 10/22/2015     Stroke, 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     Type 2 diabetes mellitus without complication, without long-term current use of insulin (H) 7/25/2017         PAST SURGICAL HISTORY:   Past Surgical History:   Procedure Laterality Date     CORONARY ANGIOGRAPHY ADULT ORDER       ESOPHAGOSCOPY, GASTROSCOPY, DUODENOSCOPY (EGD), COMBINED N/A 8/6/2015    Procedure: COMBINED ESOPHAGOSCOPY, GASTROSCOPY, DUODENOSCOPY (EGD), REMOVE FOREIGN BODY;  Surgeon: Yu Tapia MD;  Location:  GI     ESOPHAGOSCOPY, GASTROSCOPY, DUODENOSCOPY (EGD), COMBINED N/A 1/28/2021    Procedure: ESOPHAGOGASTRODUODENOSCOPY (EGD) WITH FLUORO,Biopsy, and Dilation;  Surgeon: Audra Huang MD;  Location:  OR     HEART CATH, ANGIOPLASTY  4/14    BMS to LAD     PHACOEMULSIFICATION CLEAR CORNEA WITH STANDARD INTRAOCULAR LENS IMPLANT Right 12/4/2018    Procedure: COMPLEX RIGHT EYE PHACOEMULSIFICATION CLEAR CORNEA WITH STANDARD INTRAOCULAR LENS IMPLANT;  Surgeon: Kamar Kyle MD;  Location:  EC     PHACOEMULSIFICATION CLEAR CORNEA WITH STANDARD INTRAOCULAR LENS IMPLANT Left 12/19/2018    Procedure: COMPLEX LEFT EYE PHACOEMULSIFICATION CLEAR CORNEA WITH STANDARD INTRAOCULAR LENS IMPLANT;  Surgeon: Kamar Kyle MD;  Location:  EC        MEDICATIONS:   Current Outpatient Medications:      ASPIRIN LOW DOSE 81 MG chewable tablet, TAKE ONE TABLET BY MOUTH EVERY DAY, Disp: 28 tablet, Rfl: 4     atorvastatin (LIPITOR) 40 MG tablet, TAKE ONE TABLET BY MOUTH EVERY DAY, Disp: 90 tablet, Rfl: 3     Cholecalciferol (VITAMIN D3) 50 MCG (2000 UT) CAPS, TAKE 1 CAPSULE BY MOUTH EVERY DAY, Disp: 28 capsule, Rfl: 0     Continuous Glucose Monitor Sup MISC, 4 times daily (before meals and nightly), Disp: , Rfl:      Contour Next EZ (CONTOUR NEXT EZ W/DEVICE KIT) w/Device KIT, USE AS DIRECTED, Disp: 1 kit, Rfl: 0     CONTOUR NEXT TEST test strip, TEST TWICE A DAY, Disp: 50 strip, Rfl: 4     LANTUS VIAL 100 UNIT/ML soln, INJECT 7 UNITS SUBCUTANEOUSLY EVERY MORNING *REORDER AS NEEDED*, Disp: 10 mL, Rfl: 2     Leuprolide Acetate, 6 Month, (LUPRON DEPOT, 6-MONTH, IM), , Disp: , Rfl:       metFORMIN (GLUCOPHAGE) 500 MG tablet, TAKE 2 TABLETS BY MOUTH TWICE A DAY WITH MEALS, Disp: 360 tablet, Rfl: 3     Microlet Lancets MISC, USE TWICE DAILY, Disp: 100 each, Rfl: 0     omeprazole (PRILOSEC) 20 MG DR capsule, TAKE 1 CAPSULE BY MOUTH EVERY MORNING BEFORE A MEAL, Disp: 90 capsule, Rfl: 3     senna-docusate (SENOKOT-S/PERICOLACE) 8.6-50 MG tablet, Take 1 tablet by mouth daily as needed for constipation, Disp:  , Rfl:      sertraline (ZOLOFT) 50 MG tablet, Take 1 tablet (50 mg) by mouth every morning, Disp: 60 tablet, Rfl: 3     triamcinolone (ARISTOCORT HP) 0.5 % external cream, Apply topically 2 times daily To aa on legs for 2 weeks. Tapering with improvement and restarting with flares., Disp: 60 g, Rfl: 1     ALLERGIES:  No Known Allergies     SOCIAL HISTORY:   Social History     Socioeconomic History     Marital status: Single     Spouse name: Not on file     Number of children: Not on file     Years of education: Not on file     Highest education level: Not on file   Occupational History     Not on file   Tobacco Use     Smoking status: Never Smoker     Smokeless tobacco: Never Used   Substance and Sexual Activity     Alcohol use: No     Comment: never     Drug use: No     Sexual activity: Not Currently     Partners: Male   Other Topics Concern     Parent/sibling w/ CABG, MI or angioplasty before 65F 55M? Not Asked      Service Not Asked     Blood Transfusions Not Asked     Caffeine Concern No     Occupational Exposure Not Asked     Hobby Hazards Not Asked     Sleep Concern No     Stress Concern Not Asked     Weight Concern Yes     Comment: weight loss 22# since 4/2016     Special Diet Not Asked     Back Care Not Asked     Exercise No     Bike Helmet Not Asked     Seat Belt Not Asked     Self-Exams Not Asked   Social History Narrative     Not on file     Social Determinants of Health     Financial Resource Strain:      Difficulty of Paying Living Expenses:    Food Insecurity:      Worried About  "Running Out of Food in the Last Year:      Ran Out of Food in the Last Year:    Transportation Needs:      Lack of Transportation (Medical):      Lack of Transportation (Non-Medical):    Physical Activity:      Days of Exercise per Week:      Minutes of Exercise per Session:    Stress:      Feeling of Stress :    Social Connections:      Frequency of Communication with Friends and Family:      Frequency of Social Gatherings with Friends and Family:      Attends Christian Services:      Active Member of Clubs or Organizations:      Attends Club or Organization Meetings:      Marital Status:    Intimate Partner Violence:      Fear of Current or Ex-Partner:      Emotionally Abused:      Physically Abused:      Sexually Abused:         FAMILY HISTORY:   Family History   Problem Relation Age of Onset     Cerebrovascular Disease Mother 92     C.A.D. Father 48        EXAM:Vitals: /62   Ht 1.753 m (5' 9\")   BMI 22.21 kg/m    BMI= Body mass index is 22.21 kg/m .    A1C: 6.7 (8/2021)    General appearance: Patient is alert and fully cooperative with history & exam.  No sign of distress is noted during the visit.     Psychiatric: Affect is pleasant & appropriate.  Patient appears motivated to improve health.     Respiratory: Breathing is regular & unlabored while sitting.     HEENT: Hearing is intact to spoken word.  Speech is clear.  No gross evidence of visual impairment that would impact ambulation.     Dermatologic: Dried subungual hematomas to toes 3 and 4 on the right and 2 and 4 on the left.     Vascular: DP & PT pulses are intact & regular bilaterally.  No significant edema or varicosities noted.  CFT and skin temperature is normal to both lower extremities.     Neurologic: Lower extremity sensation is absent to feet.     Musculoskeletal: Patient is ambulatory without assistive device or brace.  Semiflexible contractures of toes 2 through 4 bilateral.     ASSESSMENT:    Diabetic polyneuropathy associated with " type 2 diabetes mellitus (H)  Subungual hematoma of toe of right foot, initial encounter  Hammer toes of both feet     Medical Decision Making/Plan:  Reviewed patient's chart in epic.  Talked about diabetes and his feet.  Discussed that with the numbness he is more prone to bumping his toes and not knowing it.  Patient states that he does walk around without shoes and just his stocks.  Recommend that he wear shoes at all times to protect the toes and the feet.  He was given an order for diabetic shoes and inserts.  Currently there is no signs of infection and the toe appears to have healed at this point.  Would just recommend motioning the feet daily and checking the feet daily for open lesions or calluses.  All questions were answered to patient and patient's wife satisfaction and they will call further questions or concerns.    Patient risk factor: Patient is at medium risk for infection.        Reshma Huff DPM, Podiatry/Foot and Ankle Surgery        Again, thank you for allowing me to participate in the care of your patient.        Sincerely,        Reshma Huff DPM, Podiatry/Foot and Ankle Surgery

## 2021-08-25 NOTE — PATIENT INSTRUCTIONS
Thank you for choosing [facility] / [facility] Ankle Surgery!    DR. MICHAEL'S CLINIC:  [facility] [place] SCHEDULE SURGERY: [phone]   [address] BILLING QUESTIONS: [phone]   [place] [address] APPOINTMENTS: [phone]   PH: [phone] CONSUMER PRICE LINE:[phone]   FAX: [phone]      Follow up: as needed  Next steps: If you do not hear from Orthotic scheduling in the next 48 hours, please call any location below.      [facility] LOCATIONS  [facility]  [address]  [place] [address]  Phone: [phone]  Fax: [phone] [facility]  [address]  [place] [address]  Phone: [phone]   Fax: [phone]   [facility]  [address]  [place] [address]  Phone: [phone]  Fax: [phone] [facility]  [address]  [place] [address]  Phone: [phone]   Fax: [phone]   [facility]   [address]  [place] [address]  Phone: [phone]  Fax: [phone] * Please call any location listed to make an appointment for a casting/fitting. Your referral was sent to their central office and they will all have the order on file.     WEARING YOUR CUSTOM FOOT ORTHOTICS   Most insurance plans cover one pair of orthotics per year. You must check with your   insurance plan to see what your payment responsibility will be. Please call your   insurance company by calling the number on the back of your insurance card.   Orthotic's are non-refundable and non-returnable.   Orthotics are made of various designs. Some orthotics are covered with material that extends beyond your toes. If your orthotic is of this design, you will likely need to trim the toe end to get a proper fit. The insole from your shoe can be used as a template. Simply overlay the shoe insert on top of the custom  orthotic. Align the heel end while tracing the length of the insert onto the custom orthotic. Use a large scissor to trim the toe end until you get a proper fit in the shoe.   The orthotic needs to be pushed as far back in the shoe as possible. The heel portion should not ride forward so as not to irritate your heel.   Orthotics are designed to work with socks. Excessive perspiration will shorten the life span of the orthotics. Remove the orthotic from the shoe frequently for proper drying.   The break-in period lasts for weeks. People new to orthotics will likely experience new aches and pains. The orthotic is forcing your foot into a new position. Arch, foot and leg muscle aches and fatigue are common during these weeks. Minor discomfort can be considered normal break in phenomenon. Start wearing your orthotic around your home your first day. Limited activity for one to two hours is recommended. You can increase one or two additional hours each day provided the aches and pains are subsiding. The degree of discomfort, fatigue and problems will dictate the speed of break in. You may require multiple weeks to work up to full time use.   Do not continue wearing your orthotics if they are creating problems such as blisters or sores. Do not hesitate to call the clinic to speak with a nurse regarding orthotic   break in, fit, trimming, etc. You may also need to see the doctor if the orthotics are   simply not working out. Adjustments are sometimes made to improve orthotic   function.     Orthotics will only work in certain styles and types of shoes. Orthotics rarely work in dress shoes. Slip-ons, clogs, sandals and heels are particularly troublesome. Specially designed orthotics may be necessary for these types of shoes. Your custom orthotic was designed for activities that require appropriate walking or running shoes. Lace up athletic shoes, walking shoes or work boots should work appropriately. You may need a wider or  "longer shoe. Shoes with a removable  or insert work best. In general, you want to remove an insert from the shoe before placing the orthotic into the shoe. Shoes without a removable liner may not work as well.     When purchasing new shoes, bring your orthotics along to get a proper fit. Shop at stores that are familiar with orthotics.   Frequent washing of the orthotic may shorten the life span of the top cover. The top cover can be replaced but will generally last one to five years depending on use and foot perspiration.     DIABETES AND YOUR FEET  Diabetes can result in several problems in the feet including ulcers (open sores) and amputations. Two of the most important reasons why people develop foot problems when they have diabetes is : 1. Neuropathy (loss of feeling)  2. Vascular disease (loss or decrease of blood flow).    Neuropathy is a term used to describe a loss of nerve function.  Patients with diabetes are at risk of developing neuropathy if their sugars continue to run high and are above the normal value. One theory for neuropathy is that the \"extra\" sugar in the body enters the nerves and is broken down. These by-products build up in the nerve causing it to swell and impairing nerve function. Often times, this can be prevented by controlling your sugars, dieting and exercise.    When a person develops neuropathy, they usually begin to feel numbness or tingling in their feet and sometime in their legs.  Other symptoms may include painful burning or hot feet, tingling or feeling like insects or ants are crawling on your feet or legs.  If the diabetes is sever and the sugars run high for long periods of time, neuropathy can also occur in the hands.    Vascular disease  is a term used to describe a loss or decrease in circulation (blood flow). There is a problem in getting blood and oxygen to areas that need it. Similar to neuropathy, sugars can build up in the walls of the arteries (blood " vessels) and cause them to become swollen, thickened and hardened. This decreases the amount of blood that can go to an area that needs it. Though this is common in the legs of diabetic patients, it can also affect other arteries (blood vessels) in the body such as in the heart and eyes.    In the legs, vascular disease usually results in cramping. Patients who develop leg cramps after walking the same distance every time (i.e. One block, half a mile, ect.) need to let their doctors know so that their circulation may be checked. Cramps causing severe pain in the feet and/or legs while sleeping and the cramps go away when you stand or hang your legs off the side of the bed, may also be a sign of poor blood circulation.  Occasional cramping in cold weather or on rare occasions with activity may not be due to poor circulation, but you should inform your doctor.    PREVENTION OF THESE DISEASES  The key to prevention is good blood sugar control. Poor blood sugar control is a big reason many of these problems start. Physical activity (exercise) is a very good way to help decrease your blood sugars. Exercise can lower your blood sugar, blood pressure, and cholesterol. It also reduces your risk for heart disease and stroke, relieves stress, and strengthens your heart, muscles and bones.  In addition, regular activity helps insulin work better, improves your blood circulation, and keeps your joints flexible. If you're trying to lose weight, a combination of exercise and wise food choices can help you reach your target weight and maintain it.      PAIN MANAGEMENT (**Please speak with your primary doctor about any medications**)  1.Blood Sugar Control - Most important  2. Medications such as:  Amytriptylline, duloxetine, gabapentin, lyrica, tramadol (talk with your primary care doctor about this).     NUTRITION:  Nutrition is also important to help with healing. If your body does not have what it needs, it can't heal.  "  1. Increasing your protein intake is important.  With wounds you need 60-90gm of protein a day to help with healing. Over the counter protein shakes such as Reggie, Glucerna, Ensure, ect... can help to supplement your daily protein intake.   2. It is also important to take Vitamins to help with healing.  Vitamins such as B12, B6 and Vitamin D3 are important for healing. These can be gotten over the counter at pharmacies or at stores like [facility] or the [employer].    I can also prescribe a dietary supplement called \"Rheumate\" that has a lot of essential vitamins in one capsule.  This may not be covered by insurance though.     FOOT CARE RECOMMENDATIONS   1. Wash your feet with lukewarm water and a mild soap and then dry them thoroughly, especially between the toes.     2. Examine your feet daily looking for cuts, corns, blisters, cracks, ect, especially after wearing new shoes. Make sure to look between your toes. If you cannot see the bottom of your feet, set a mirror on the floor and hold your foot over it, or ask a spouse, friend or family member to examine your feet for you. Contact your doctor immediately if new problems are noted or if sores are not healing.     3. Immediately apply moisturizer to the tops and bottoms of your feet, avoiding areas between the toes. Hand lotion (Intesive Care, Avis, Eucerin, Neutrogena, Curel, ect) is sufficient unless your doctor prescribes a medicated lotion. Apply sunscreen to your feet when going swimming outside.     4. Use clean comfortable shoes, wear white socks (if you have any bleeding or drainage, you will see it on white socks). Socks should not have thick seams or cut off the circulation around the leg. Break in new shoes slowly and rotate with older shoes until broken in. Check the inside of your shoes with your hand to look for areas of irritation or objects that may have fallen into your shoes.       5. Keep slippers by the side of your bed for use during the " night.     6.  Shoes should be fitted by a professional and should not cause areas of irritation.  Check your feet regularly when wearing a new pair of shoes and replace them as needed.     7.  Talk to your doctor about proper exercise. Exercise and stretching stimulate blood flow to your feet and maintain proper glucose levels.     8.  Monitor your blood glucose level as instructed by your doctor. Notify your doctor immediately if your blood sugar is abnormally high or low.    9. Cut your nails straight across, but then gently round any sharp edges with a cardboard nail file. If you have neuropathy, peripheral vascular disease or cannot see that well to trim your own toenails contact [employer] ([phone]) or [employer] ([phone]).      THINGS TO AVOID DOING   1.  Do not soak your feet if you have an open sore. Use only lukewarm water and always check the temperature with your hand as hot water can easily burn your feet.       2.  Never use a hot water bottle or heating pad on your feet. Also do not apply cold compresses to your feet. With decreased sensation, you could burn or freeze your feet.       3.  Do not apply any of these to your feet:    -  Over the counter medicine for corns or warts    -  Harsh chemicals like boric acid    -  Do not self-treat corns, cuts, blisters or infections. Always consult your doctor.       4.  Do not wear sandals, slippers or walk barefoot, especially on hot sand or concrete or other harsh surfaces.     5.  If you smoke, stop!!!

## 2021-08-25 NOTE — PROGRESS NOTES
PATIENT HISTORY: Jose Lees is a 80 year old male who presents to clinic for issues with his toes.  He is here with his wife.  Patient lives in a nursing home and notes that the nurses have been applying antibiotic ointment and Band-Aids to the toes.  Wife does not know when issues with the toes started but states that she was told about 2 weeks ago.  Patient denies pain but does have neuropathy to the feet.  They deny fever, nausea, vomiting.  Wondering what can be done to help the toes.    Review of Systems:  Patient denies fever, chills, rash, wound, stiffness, limping,  weakness, heart burn, blood in stool, chest pain with activity, calf pain when walking, shortness of breath with activity, chronic cough, easy bleeding/bruising, swelling of ankles, excessive thirst, fatigue, depression, anxiety.  Patient admits to numbness.     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     Prostate cancer (H) 10/11    GLEASOR 8     Stricture and stenosis of esophagus 10/22/2015     Stroke, 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     Type 2 diabetes mellitus without complication, without long-term current use of insulin (H) 7/25/2017        PAST SURGICAL HISTORY:   Past Surgical History:   Procedure Laterality Date     CORONARY ANGIOGRAPHY ADULT ORDER       ESOPHAGOSCOPY, GASTROSCOPY, DUODENOSCOPY (EGD), COMBINED N/A 8/6/2015    Procedure: COMBINED ESOPHAGOSCOPY, GASTROSCOPY, DUODENOSCOPY (EGD), REMOVE FOREIGN BODY;   Surgeon: Yu Tapia MD;  Location:  GI     ESOPHAGOSCOPY, GASTROSCOPY, DUODENOSCOPY (EGD), COMBINED N/A 1/28/2021    Procedure: ESOPHAGOGASTRODUODENOSCOPY (EGD) WITH FLUORO,Biopsy, and Dilation;  Surgeon: Audra Huang MD;  Location:  OR     HEART CATH, ANGIOPLASTY  4/14    BMS to LAD     PHACOEMULSIFICATION CLEAR CORNEA WITH STANDARD INTRAOCULAR LENS IMPLANT Right 12/4/2018    Procedure: COMPLEX RIGHT EYE PHACOEMULSIFICATION CLEAR CORNEA WITH STANDARD INTRAOCULAR LENS IMPLANT;  Surgeon: Kamar Kyle MD;  Location:  EC     PHACOEMULSIFICATION CLEAR CORNEA WITH STANDARD INTRAOCULAR LENS IMPLANT Left 12/19/2018    Procedure: COMPLEX LEFT EYE PHACOEMULSIFICATION CLEAR CORNEA WITH STANDARD INTRAOCULAR LENS IMPLANT;  Surgeon: Kamar Kyle MD;  Location:  EC        MEDICATIONS:   Current Outpatient Medications:      ASPIRIN LOW DOSE 81 MG chewable tablet, TAKE ONE TABLET BY MOUTH EVERY DAY, Disp: 28 tablet, Rfl: 4     atorvastatin (LIPITOR) 40 MG tablet, TAKE ONE TABLET BY MOUTH EVERY DAY, Disp: 90 tablet, Rfl: 3     Cholecalciferol (VITAMIN D3) 50 MCG (2000 UT) CAPS, TAKE 1 CAPSULE BY MOUTH EVERY DAY, Disp: 28 capsule, Rfl: 0     Continuous Glucose Monitor Sup MISC, 4 times daily (before meals and nightly), Disp: , Rfl:      Contour Next EZ (CONTOUR NEXT EZ W/DEVICE KIT) w/Device KIT, USE AS DIRECTED, Disp: 1 kit, Rfl: 0     CONTOUR NEXT TEST test strip, TEST TWICE A DAY, Disp: 50 strip, Rfl: 4     LANTUS VIAL 100 UNIT/ML soln, INJECT 7 UNITS SUBCUTANEOUSLY EVERY MORNING *REORDER AS NEEDED*, Disp: 10 mL, Rfl: 2     Leuprolide Acetate, 6 Month, (LUPRON DEPOT, 6-MONTH, IM), , Disp: , Rfl:      metFORMIN (GLUCOPHAGE) 500 MG tablet, TAKE 2 TABLETS BY MOUTH TWICE A DAY WITH MEALS, Disp: 360 tablet, Rfl: 3     Microlet Lancets MISC, USE TWICE DAILY, Disp: 100 each, Rfl: 0     omeprazole (PRILOSEC) 20 MG DR capsule, TAKE 1 CAPSULE BY MOUTH EVERY MORNING BEFORE A MEAL, Disp: 90  capsule, Rfl: 3     senna-docusate (SENOKOT-S/PERICOLACE) 8.6-50 MG tablet, Take 1 tablet by mouth daily as needed for constipation, Disp:  , Rfl:      sertraline (ZOLOFT) 50 MG tablet, Take 1 tablet (50 mg) by mouth every morning, Disp: 60 tablet, Rfl: 3     triamcinolone (ARISTOCORT HP) 0.5 % external cream, Apply topically 2 times daily To aa on legs for 2 weeks. Tapering with improvement and restarting with flares., Disp: 60 g, Rfl: 1     ALLERGIES:  No Known Allergies     SOCIAL HISTORY:   Social History     Socioeconomic History     Marital status: Single     Spouse name: Not on file     Number of children: Not on file     Years of education: Not on file     Highest education level: Not on file   Occupational History     Not on file   Tobacco Use     Smoking status: Never Smoker     Smokeless tobacco: Never Used   Substance and Sexual Activity     Alcohol use: No     Comment: never     Drug use: No     Sexual activity: Not Currently     Partners: Male   Other Topics Concern     Parent/sibling w/ CABG, MI or angioplasty before 65F 55M? Not Asked      Service Not Asked     Blood Transfusions Not Asked     Caffeine Concern No     Occupational Exposure Not Asked     Hobby Hazards Not Asked     Sleep Concern No     Stress Concern Not Asked     Weight Concern Yes     Comment: weight loss 22# since 4/2016     Special Diet Not Asked     Back Care Not Asked     Exercise No     Bike Helmet Not Asked     Seat Belt Not Asked     Self-Exams Not Asked   Social History Narrative     Not on file     Social Determinants of Health     Financial Resource Strain:      Difficulty of Paying Living Expenses:    Food Insecurity:      Worried About Running Out of Food in the Last Year:      Ran Out of Food in the Last Year:    Transportation Needs:      Lack of Transportation (Medical):      Lack of Transportation (Non-Medical):    Physical Activity:      Days of Exercise per Week:      Minutes of Exercise per Session:   "  Stress:      Feeling of Stress :    Social Connections:      Frequency of Communication with Friends and Family:      Frequency of Social Gatherings with Friends and Family:      Attends Yazdanism Services:      Active Member of Clubs or Organizations:      Attends Club or Organization Meetings:      Marital Status:    Intimate Partner Violence:      Fear of Current or Ex-Partner:      Emotionally Abused:      Physically Abused:      Sexually Abused:         FAMILY HISTORY:   Family History   Problem Relation Age of Onset     Cerebrovascular Disease Mother 92     C.A.D. Father 48        EXAM:Vitals: /62   Ht 1.753 m (5' 9\")   BMI 22.21 kg/m    BMI= Body mass index is 22.21 kg/m .    A1C: 6.7 (8/2021)    General appearance: Patient is alert and fully cooperative with history & exam.  No sign of distress is noted during the visit.     Psychiatric: Affect is pleasant & appropriate.  Patient appears motivated to improve health.     Respiratory: Breathing is regular & unlabored while sitting.     HEENT: Hearing is intact to spoken word.  Speech is clear.  No gross evidence of visual impairment that would impact ambulation.     Dermatologic: Dried subungual hematomas to toes 3 and 4 on the right and 2 and 4 on the left.     Vascular: DP & PT pulses are intact & regular bilaterally.  No significant edema or varicosities noted.  CFT and skin temperature is normal to both lower extremities.     Neurologic: Lower extremity sensation is absent to feet.     Musculoskeletal: Patient is ambulatory without assistive device or brace.  Semiflexible contractures of toes 2 through 4 bilateral.     ASSESSMENT:    Diabetic polyneuropathy associated with type 2 diabetes mellitus (H)  Subungual hematoma of toe of right foot, initial encounter  Hammer toes of both feet     Medical Decision Making/Plan:  Reviewed patient's chart in epic.  Talked about diabetes and his feet.  Discussed that with the numbness he is more prone to " bumping his toes and not knowing it.  Patient states that he does walk around without shoes and just his stocks.  Recommend that he wear shoes at all times to protect the toes and the feet.  He was given an order for diabetic shoes and inserts.  Currently there is no signs of infection and the toe appears to have healed at this point.  Would just recommend motioning the feet daily and checking the feet daily for open lesions or calluses.  All questions were answered to patient and patient's wife satisfaction and they will call further questions or concerns.    Patient risk factor: Patient is at medium risk for infection.        Reshma Huff DPM, Podiatry/Foot and Ankle Surgery

## 2021-09-07 NOTE — PROGRESS NOTES
"Chief Complaint: review of toe wounds-saw Dr Huff on 8/25/21    HPI:    Jose Lees is a 80 year old  (1940), who is being seen today for an episodic care visit at [facility]. Today's concern is: states no pain, feels toes healed     Injury of toe, unspecified laterality, sequela  Type 2 diabetes mellitus with diabetic polyneuropathy, with long-term current use of insulin (H)  Cognitive impairment  Autism disorder      REVIEW OF SYSTEMS:  No complaints    /62   Pulse 84   Temp 97.7  F (36.5  C)   Resp 16   SpO2 97%   GENERAL APPEARANCE:  Alert,oriented x 3 in no distress.  Non distressed RESP pattern.. trade pedal bilat edema. Toes reviewed--no open areas, no hematomas , clear skin.     ASSESSMENT / PLAN:  (S99.929S) Injury of toe, unspecified laterality, sequela  (primary encounter diagnosis)  (E11.42,  Z79.4) Type 2 diabetes mellitus with diabetic polyneuropathy, with long-term current use of insulin (H)  Comment/Plan:   healed toes, appreciate Dr Huff's review --pt reminded to wear shoes but often just has socks on..    (R41.89) Cognitive impairment   (F84.0) Autism disorder  Comment/Plan: seems at baseline,  Apt is odiferous--? \"old\"food smell. Pt has food on clothing also.  Per staff, family has been reluctant to pay for home keeping and assistance with daily ADLs and dressing ,  though had agreed in the past.   Monitor,        Electronically Signed by:  SARATH Yi CNP    "

## 2021-09-08 ENCOUNTER — ASSISTED LIVING VISIT (OUTPATIENT)
Dept: GERIATRICS | Facility: CLINIC | Age: 81
End: 2021-09-08
Payer: COMMERCIAL

## 2021-09-08 VITALS
OXYGEN SATURATION: 97 % | SYSTOLIC BLOOD PRESSURE: 100 MMHG | RESPIRATION RATE: 16 BRPM | TEMPERATURE: 97.7 F | HEART RATE: 84 BPM | DIASTOLIC BLOOD PRESSURE: 62 MMHG

## 2021-09-08 DIAGNOSIS — S99.929S INJURY OF TOE, UNSPECIFIED LATERALITY, SEQUELA: Primary | ICD-10-CM

## 2021-09-08 DIAGNOSIS — F84.0 AUTISM DISORDER: ICD-10-CM

## 2021-09-08 DIAGNOSIS — E11.42 TYPE 2 DIABETES MELLITUS WITH DIABETIC POLYNEUROPATHY, WITH LONG-TERM CURRENT USE OF INSULIN (H): ICD-10-CM

## 2021-09-08 DIAGNOSIS — R41.89 COGNITIVE IMPAIRMENT: ICD-10-CM

## 2021-09-08 DIAGNOSIS — Z79.4 TYPE 2 DIABETES MELLITUS WITH DIABETIC POLYNEUROPATHY, WITH LONG-TERM CURRENT USE OF INSULIN (H): ICD-10-CM

## 2021-09-08 NOTE — LETTER
"    9/8/2021        RE: Jose Lees  [address] [place]  [address] [address]        Chief Complaint: review of toe wounds-saw Dr Huff on 8/25/21    HPI:    Jose Lees is a 80 year old  (1940), who is being seen today for an episodic care visit at [facility]. Today's concern is: states no pain, feels toes healed     Injury of toe, unspecified laterality, sequela  Type 2 diabetes mellitus with diabetic polyneuropathy, with long-term current use of insulin (H)  Cognitive impairment  Autism disorder      REVIEW OF SYSTEMS:  No complaints    /62   Pulse 84   Temp 97.7  F (36.5  C)   Resp 16   SpO2 97%   GENERAL APPEARANCE:  Alert,oriented x 3 in no distress.  Non distressed RESP pattern.. trade pedal bilat edema. Toes reviewed--no open areas, no hematomas , clear skin.     ASSESSMENT / PLAN:  (S99.929S) Injury of toe, unspecified laterality, sequela  (primary encounter diagnosis)  (E11.42,  Z79.4) Type 2 diabetes mellitus with diabetic polyneuropathy, with long-term current use of insulin (H)  Comment/Plan:   healed toes, appreciate Dr Huff's review --pt reminded to wear shoes but often just has socks on..    (R41.89) Cognitive impairment   (F84.0) Autism disorder  Comment/Plan: seems at baseline,  Apt is odiferous--? \"old\"food smell. Pt has food on clothing also.  Per staff, family has been reluctant to pay for home keeping and assistance with daily ADLs and dressing ,  though had agreed in the past.   Monitor,        Electronically Signed by:  SARATH Yi CNP          Sincerely,        SARATH Yi CNP    "

## 2021-10-02 ENCOUNTER — HEALTH MAINTENANCE LETTER (OUTPATIENT)
Age: 81
End: 2021-10-02

## 2021-10-06 DIAGNOSIS — K59.00 CONSTIPATION, UNSPECIFIED CONSTIPATION TYPE: ICD-10-CM

## 2021-10-06 RX ORDER — AMOXICILLIN 250 MG
CAPSULE ORAL
Qty: 30 TABLET | Refills: 0 | Status: SHIPPED | OUTPATIENT
Start: 2021-10-06 | End: 2022-01-01

## 2021-10-07 VITALS
DIASTOLIC BLOOD PRESSURE: 62 MMHG | HEART RATE: 84 BPM | SYSTOLIC BLOOD PRESSURE: 100 MMHG | RESPIRATION RATE: 16 BRPM | TEMPERATURE: 97.7 F | OXYGEN SATURATION: 97 %

## 2021-10-07 NOTE — PROGRESS NOTES
"Chief Complaint: _ asked to check ears for cerumen and review of DM and BPS    HPI:    Jose Lees is a 80 year old  (1940), who is being seen today for an episodic care visit at [facility]. Today's concern is:     Bilateral impacted cerumen  Chronic systolic congestive heart failure (H)  Presence of stent in coronary artery in patient with coronary artery disease  Hypertension, unspecified type  Type 2 diabetes mellitus with diabetic polyneuropathy, with long-term current use of insulin (H)  Cognitive impairment  Autism  Stricture and stenosis of esophagus      TODAY DURING EXAM/ROS:  No CP, SOB, Cough, dizziness,  HA, N/V, dysuria--wears depends but says not incontinent.  His Bowel function is fine. Appetite is eclectic based on foods in his apt--eating fine.  No pain.    Current Outpatient Medications   Medication Sig Dispense Refill     carbamide peroxide (DEBROX) 6.5 % otic solution 5 drops 2 times daily For three days both ears and have pt flush ears out in shower.       ASPIRIN LOW DOSE 81 MG chewable tablet TAKE ONE TABLET BY MOUTH EVERY DAY 28 tablet 4     atorvastatin (LIPITOR) 40 MG tablet TAKE ONE TABLET BY MOUTH EVERY DAY 90 tablet 3     BD SAFETYGLIDE INSULIN SYRINGE 29G X 1/2\" 0.3 ML USE DAILY AS DIRECTED 30 each 6     Cholecalciferol (VITAMIN D3) 50 MCG (2000 UT) CAPS TAKE 1 CAPSULE BY MOUTH EVERY DAY 28 capsule 0     Continuous Glucose Monitor Sup MISC 4 times daily (before meals and nightly)       Contour Next EZ (CONTOUR NEXT EZ W/DEVICE KIT) w/Device KIT USE AS DIRECTED 1 kit 0     CONTOUR NEXT TEST test strip TEST TWICE A DAY 50 strip 4     LANTUS VIAL 100 UNIT/ML soln INJECT 7 UNITS SUBCUTANEOUSLY EVERY MORNING *REORDER AS NEEDED* 30 mL 2     Leuprolide Acetate, 6 Month, (LUPRON DEPOT, 6-MONTH, IM)        metFORMIN (GLUCOPHAGE) 500 MG tablet TAKE 2 TABLETS BY MOUTH TWICE A DAY WITH MEALS 360 tablet 3     Microlet Lancets MISC USE TWICE DAILY 100 " each 0     omeprazole (PRILOSEC) 20 MG DR capsule TAKE 1 CAPSULE BY MOUTH EVERY MORNING BEFORE A MEAL 90 capsule 3     senna-docusate (SENOKOT-S/PERICOLACE) 8.6-50 MG tablet TAKE ONE TABLET BY MOUTH EVERY DAY AS NEEDED FOR CONSTIPATION 30 tablet 0     sertraline (ZOLOFT) 50 MG tablet TAKE ONE TABLET BY MOUTH EVERY MORNING 28 tablet 3     triamcinolone (ARISTOCORT HP) 0.5 % external cream Apply topically 2 times daily To aa on legs for 2 weeks. Tapering with improvement and restarting with flares. 60 g 1       /62   Pulse 84   Temp 97.7  F (36.5  C)   Resp 16   SpO2 97%   GENERAL APPEARANCE:  Alert,oriented x 30-forgetful, is in no distress. Lungs CTA , S1/S2 without M/G/R  . No  edema. Abdomen is soft, +BTS . No focal neurological deficits    ASSESSMENT / PLAN:  (H61.23) Bilateral impacted cerumen  (primary encounter diagnosis)  Comment/Plan:   mod amt wax on left and small amt on right--order Debrox, monitor.    (I50.22) Chronic systolic congestive heart failure (H)   (I25.10,  Z95.5) Presence of stent in coronary artery in patient with coronary artery disease   (I10) Hypertension, unspecified type  Comment/Plan: SBP runs mostly 110-120's cont same POC, meds --asa, Lipitor    (E11.42,  Z79.4) Type 2 diabetes mellitus with diabetic polyneuropathy, with long-term current use of insulin (H)  Comment/Plan: runs mostly high 100's to low 200's, no change in  Lantus, Glucophage, monitor    (R41.89) Cognitive impairment   (F84.0) Autism  Comment/Plan: Zoloft, no acute issues, family to help keep apt clean--     (K22.2) Stricture and stenosis of esophagus  Comment/Plan: has had dilatation in past but doing fine, monitor and if issues recur, will send for procedure.        Electronically Signed by:  SARATH Yi CNP

## 2021-10-12 DIAGNOSIS — Z79.4 TYPE 2 DIABETES MELLITUS WITH DIABETIC POLYNEUROPATHY, WITH LONG-TERM CURRENT USE OF INSULIN (H): Primary | ICD-10-CM

## 2021-10-12 DIAGNOSIS — E11.42 TYPE 2 DIABETES MELLITUS WITH DIABETIC POLYNEUROPATHY, WITH LONG-TERM CURRENT USE OF INSULIN (H): Primary | ICD-10-CM

## 2021-10-12 RX ORDER — NEEDLES, SAFETY 22GX1 1/2"
NEEDLE, DISPOSABLE MISCELLANEOUS
Qty: 30 EACH | Refills: 6 | Status: SHIPPED | OUTPATIENT
Start: 2021-10-12 | End: 2022-02-10

## 2021-10-13 ENCOUNTER — ASSISTED LIVING VISIT (OUTPATIENT)
Dept: GERIATRICS | Facility: CLINIC | Age: 81
End: 2021-10-13
Payer: COMMERCIAL

## 2021-10-13 DIAGNOSIS — K22.2 STRICTURE AND STENOSIS OF ESOPHAGUS: ICD-10-CM

## 2021-10-13 DIAGNOSIS — I50.22 CHRONIC SYSTOLIC CONGESTIVE HEART FAILURE (H): ICD-10-CM

## 2021-10-13 DIAGNOSIS — I25.10 PRESENCE OF STENT IN CORONARY ARTERY IN PATIENT WITH CORONARY ARTERY DISEASE: ICD-10-CM

## 2021-10-13 DIAGNOSIS — R41.89 COGNITIVE IMPAIRMENT: ICD-10-CM

## 2021-10-13 DIAGNOSIS — I10 HYPERTENSION, UNSPECIFIED TYPE: ICD-10-CM

## 2021-10-13 DIAGNOSIS — H61.23 BILATERAL IMPACTED CERUMEN: Primary | ICD-10-CM

## 2021-10-13 DIAGNOSIS — Z79.4 TYPE 2 DIABETES MELLITUS WITH DIABETIC POLYNEUROPATHY, WITH LONG-TERM CURRENT USE OF INSULIN (H): ICD-10-CM

## 2021-10-13 DIAGNOSIS — Z95.5 PRESENCE OF STENT IN CORONARY ARTERY IN PATIENT WITH CORONARY ARTERY DISEASE: ICD-10-CM

## 2021-10-13 DIAGNOSIS — F84.0 AUTISM: ICD-10-CM

## 2021-10-13 DIAGNOSIS — E11.42 TYPE 2 DIABETES MELLITUS WITH DIABETIC POLYNEUROPATHY, WITH LONG-TERM CURRENT USE OF INSULIN (H): ICD-10-CM

## 2021-10-13 NOTE — LETTER
"    10/13/2021        RE: Jose Lees  [address]  [address] [facility] [address]        Chief Complaint: _ asked to check ears for cerumen and review of DM and BPS    HPI:    Jose Lees is a 80 year old  (1940), who is being seen today for an episodic care visit at [facility]. Today's concern is:     Bilateral impacted cerumen  Chronic systolic congestive heart failure (H)  Presence of stent in coronary artery in patient with coronary artery disease  Hypertension, unspecified type  Type 2 diabetes mellitus with diabetic polyneuropathy, with long-term current use of insulin (H)  Cognitive impairment  Autism  Stricture and stenosis of esophagus      TODAY DURING EXAM/ROS:  No CP, SOB, Cough, dizziness,  HA, N/V, dysuria--wears depends but says not incontinent.  His Bowel function is fine. Appetite is eclectic based on foods in his apt--eating fine.  No pain.    Current Outpatient Medications   Medication Sig Dispense Refill     carbamide peroxide (DEBROX) 6.5 % otic solution 5 drops 2 times daily For three days both ears and have pt flush ears out in shower.       ASPIRIN LOW DOSE 81 MG chewable tablet TAKE ONE TABLET BY MOUTH EVERY DAY 28 tablet 4     atorvastatin (LIPITOR) 40 MG tablet TAKE ONE TABLET BY MOUTH EVERY DAY 90 tablet 3     BD SAFETYGLIDE INSULIN SYRINGE 29G X 1/2\" 0.3 ML USE DAILY AS DIRECTED 30 each 6     Cholecalciferol (VITAMIN D3) 50 MCG (2000 UT) CAPS TAKE 1 CAPSULE BY MOUTH EVERY DAY 28 capsule 0     Continuous Glucose Monitor Sup MISC 4 times daily (before meals and nightly)       Contour Next EZ (CONTOUR NEXT EZ W/DEVICE KIT) w/Device KIT USE AS DIRECTED 1 kit 0     CONTOUR NEXT TEST test strip TEST TWICE A DAY 50 strip 4     LANTUS VIAL 100 UNIT/ML soln INJECT 7 UNITS SUBCUTANEOUSLY EVERY MORNING *REORDER AS NEEDED* 30 mL 2     Leuprolide Acetate, 6 Month, (LUPRON DEPOT, 6-MONTH, IM)        metFORMIN (GLUCOPHAGE) 500 MG " tablet TAKE 2 TABLETS BY MOUTH TWICE A DAY WITH MEALS 360 tablet 3     Microlet Lancets MISC USE TWICE DAILY 100 each 0     omeprazole (PRILOSEC) 20 MG DR capsule TAKE 1 CAPSULE BY MOUTH EVERY MORNING BEFORE A MEAL 90 capsule 3     senna-docusate (SENOKOT-S/PERICOLACE) 8.6-50 MG tablet TAKE ONE TABLET BY MOUTH EVERY DAY AS NEEDED FOR CONSTIPATION 30 tablet 0     sertraline (ZOLOFT) 50 MG tablet TAKE ONE TABLET BY MOUTH EVERY MORNING 28 tablet 3     triamcinolone (ARISTOCORT HP) 0.5 % external cream Apply topically 2 times daily To aa on legs for 2 weeks. Tapering with improvement and restarting with flares. 60 g 1       /62   Pulse 84   Temp 97.7  F (36.5  C)   Resp 16   SpO2 97%   GENERAL APPEARANCE:  Alert,oriented x 30-forgetful, is in no distress. Lungs CTA , S1/S2 without M/G/R  . No  edema. Abdomen is soft, +BTS . No focal neurological deficits    ASSESSMENT / PLAN:  (H61.23) Bilateral impacted cerumen  (primary encounter diagnosis)  Comment/Plan:   mod amt wax on left and small amt on right--order Debrox, monitor.    (I50.22) Chronic systolic congestive heart failure (H)   (I25.10,  Z95.5) Presence of stent in coronary artery in patient with coronary artery disease   (I10) Hypertension, unspecified type  Comment/Plan: SBP runs mostly 110-120's cont same POC, meds --asa, Lipitor    (E11.42,  Z79.4) Type 2 diabetes mellitus with diabetic polyneuropathy, with long-term current use of insulin (H)  Comment/Plan: runs mostly high 100's to low 200's, no change in  Lantus, Glucophage, monitor    (R41.89) Cognitive impairment   (F84.0) Autism  Comment/Plan: Zoloft, no acute issues, family to help keep apt clean--     (K22.2) Stricture and stenosis of esophagus  Comment/Plan: has had dilatation in past but doing fine, monitor and if issues recur, will send for procedure.        Electronically Signed by:  SARATH Yi CNP          Sincerely,        SARATH Yi CNP

## 2021-10-19 PROBLEM — F32.9 MAJOR DEPRESSION: Status: ACTIVE | Noted: 2020-01-20

## 2021-11-01 ENCOUNTER — TRANSFERRED RECORDS (OUTPATIENT)
Dept: HEALTH INFORMATION MANAGEMENT | Facility: CLINIC | Age: 81
End: 2021-11-01
Payer: COMMERCIAL

## 2021-12-06 DIAGNOSIS — E55.9 VITAMIN D DEFICIENCY: ICD-10-CM

## 2021-12-06 RX ORDER — ACETAMINOPHEN 160 MG
TABLET,DISINTEGRATING ORAL
Qty: 30 CAPSULE | Refills: 11 | Status: SHIPPED | OUTPATIENT
Start: 2021-12-06 | End: 2022-01-01

## 2021-12-08 ENCOUNTER — ASSISTED LIVING VISIT (OUTPATIENT)
Dept: GERIATRICS | Facility: CLINIC | Age: 81
End: 2021-12-08
Payer: COMMERCIAL

## 2021-12-08 DIAGNOSIS — Z95.5 PRESENCE OF STENT IN CORONARY ARTERY IN PATIENT WITH CORONARY ARTERY DISEASE: ICD-10-CM

## 2021-12-08 DIAGNOSIS — I10 HYPERTENSION, UNSPECIFIED TYPE: ICD-10-CM

## 2021-12-08 DIAGNOSIS — F84.0 AUTISM: ICD-10-CM

## 2021-12-08 DIAGNOSIS — I25.10 PRESENCE OF STENT IN CORONARY ARTERY IN PATIENT WITH CORONARY ARTERY DISEASE: ICD-10-CM

## 2021-12-08 DIAGNOSIS — Z79.4 TYPE 2 DIABETES MELLITUS WITH DIABETIC POLYNEUROPATHY, WITH LONG-TERM CURRENT USE OF INSULIN (H): ICD-10-CM

## 2021-12-08 DIAGNOSIS — R41.89 COGNITIVE IMPAIRMENT: Primary | ICD-10-CM

## 2021-12-08 DIAGNOSIS — I50.22 CHRONIC SYSTOLIC CONGESTIVE HEART FAILURE (H): ICD-10-CM

## 2021-12-08 DIAGNOSIS — E11.42 TYPE 2 DIABETES MELLITUS WITH DIABETIC POLYNEUROPATHY, WITH LONG-TERM CURRENT USE OF INSULIN (H): ICD-10-CM

## 2021-12-08 DIAGNOSIS — C61 PROSTATE CANCER (H): ICD-10-CM

## 2021-12-08 NOTE — PROGRESS NOTES
"Chief Complaint: follow up--ACTUALLY DONE ON 12/14/2021    HPI:    Jose Lees is a 80 year old  (1940), who [facility] Today's concern is: states feels fine.  No open areas on legs. Very hungry. He  Is sitting in chair in living room with various foods in different containers in numerous areas. Apt has a stale food smell. There is food dropped on floor also     Cognitive impairment  Autism  Type 2 diabetes mellitus with diabetic polyneuropathy, with long-term current use of insulin (H)  Chronic systolic congestive heart failure (H)  Presence of stent in coronary artery in patient with coronary artery disease  Hypertension, unspecified type  Prostate cancer (H)      TODAY DURING EXAM/ROS:  No CP, SOB, Cough, dizziness,  HA, N/V, dysuria-, bowel function is fine. Appetite is varied as family brings in food--eating fine.  No pain.    Current Outpatient Medications   Medication Sig Dispense Refill     ASPIRIN LOW DOSE 81 MG chewable tablet TAKE ONE TABLET BY MOUTH EVERY DAY 28 tablet 4     atorvastatin (LIPITOR) 40 MG tablet TAKE ONE TABLET BY MOUTH EVERY DAY 90 tablet 3     BD SAFETYGLIDE INSULIN SYRINGE 29G X 1/2\" 0.3 ML USE DAILY AS DIRECTED 30 each 6     carbamide peroxide (DEBROX) 6.5 % otic solution 5 drops 2 times daily For three days both ears and have pt flush ears out in shower.       Cholecalciferol (VITAMIN D3) 50 MCG (2000 UT) CAPS TAKE 1 CAPSULE BY MOUTH EVERY DAY 30 capsule 11     Continuous Glucose Monitor Sup MISC 4 times daily (before meals and nightly)       Contour Next EZ (CONTOUR NEXT EZ W/DEVICE KIT) w/Device KIT USE AS DIRECTED 1 kit 0     CONTOUR NEXT TEST test strip TEST TWICE A DAY 50 strip 4     LANTUS VIAL 100 UNIT/ML soln INJECT 7 UNITS SUBCUTANEOUSLY EVERY MORNING *REORDER AS NEEDED* 30 mL 2     Leuprolide Acetate, 6 Month, (LUPRON DEPOT, 6-MONTH, IM)        metFORMIN (GLUCOPHAGE) 500 MG " tablet TAKE 2 TABLETS BY MOUTH TWICE A DAY WITH MEALS 360 tablet 3     Microlet Lancets MISC USE TWICE DAILY 100 each 0     omeprazole (PRILOSEC) 20 MG DR capsule TAKE 1 CAPSULE BY MOUTH EVERY MORNING BEFORE A MEAL 90 capsule 3     senna-docusate (SENOKOT-S/PERICOLACE) 8.6-50 MG tablet TAKE ONE TABLET BY MOUTH EVERY DAY AS NEEDED FOR CONSTIPATION 30 tablet 0     sertraline (ZOLOFT) 50 MG tablet TAKE ONE TABLET BY MOUTH EVERY MORNING 28 tablet 3     triamcinolone (ARISTOCORT HP) 0.5 % external cream Apply topically 2 times daily To aa on legs for 2 weeks. Tapering with improvement and restarting with flares. 60 g 1       /71   Pulse 80   Temp 97.7  F (36.5  C)   Resp 16   SpO2 96%   GENERAL APPEARANCE:  Alert,oriented x 30-forgetful, is in no distress. Lungs clear to ausculation , S1/S2-RRR No  edema. Abdomen is soft, +BTS . No focal neurological deficits. Legs have compression stocking on    Recent Labs   Lab Test 08/03/21  0643 03/04/21  0840    137   POTASSIUM 4.2 3.8   CHLORIDE 101 102   CO2 29 26   ANIONGAP 9 9    183*   BUN 16 9   CR 0.89 0.76   TENZIN 9.3 8.9      ASSESSMENT / PLAN:  (R41.89) Cognitive impairment  (primary encounter diagnosis)   (F84.0) Autism  Comment/Plan: needs 24 hr monitoring and med management, he was not doing well with this on his onw in the past.. would  Benefit from more frequent housekeeping    (E11.42,  Z79.4) Type 2 diabetes mellitus with diabetic polyneuropathy, with long-term current use of insulin (H)  Comment/Plan: on metformin and Lantus, will check BMP and a1C next month. With the various foods he eats difficult to say his overall DM control.  checking with staff re accuchecks also    (I50.22) Chronic systolic congestive heart failure (H)  (I25.10,  Z95.5) Presence of stent in coronary artery in patient with coronary artery disease   (I10) Hypertension, unspecified type  Comment/Plan: reasonable BP, no acute issue or edema.  Cont with asa Lipitor, no BP  med    (C61) Prostate cancer (H)  Comment/Plan: saw oncology last month--Lupron--appreciate their care. No changes and they will cont to f/u quarterly and prn      Electronically Signed by:  SARATH Yi CNP

## 2021-12-08 NOTE — LETTER
"    12/8/2021        RE: Jose Lees  [address]  [address] [facility] [address]        Chief Complaint: follow up    HPI:    Jose Lees is a 80 year old  (1940), who is being seen today for an episodic care visit at [facility]. Today's concern is: states feels fine.  No open areas on legs. Very hungry. He  Is sitting in chair in living room with various foods in different containers in numerous areas. Apt has a stale food smell. There is food dropped on floor also     Cognitive impairment  Autism  Type 2 diabetes mellitus with diabetic polyneuropathy, with long-term current use of insulin (H)  Chronic systolic congestive heart failure (H)  Presence of stent in coronary artery in patient with coronary artery disease  Hypertension, unspecified type  Prostate cancer (H)      TODAY DURING EXAM/ROS:  No CP, SOB, Cough, dizziness,  HA, N/V, dysuria-, bowel function is fine. Appetite is varied as family brings in food--eating fine.  No pain.    Current Outpatient Medications   Medication Sig Dispense Refill     ASPIRIN LOW DOSE 81 MG chewable tablet TAKE ONE TABLET BY MOUTH EVERY DAY 28 tablet 4     atorvastatin (LIPITOR) 40 MG tablet TAKE ONE TABLET BY MOUTH EVERY DAY 90 tablet 3     BD SAFETYGLIDE INSULIN SYRINGE 29G X 1/2\" 0.3 ML USE DAILY AS DIRECTED 30 each 6     carbamide peroxide (DEBROX) 6.5 % otic solution 5 drops 2 times daily For three days both ears and have pt flush ears out in shower.       Cholecalciferol (VITAMIN D3) 50 MCG (2000 UT) CAPS TAKE 1 CAPSULE BY MOUTH EVERY DAY 30 capsule 11     Continuous Glucose Monitor Sup MISC 4 times daily (before meals and nightly)       Contour Next EZ (CONTOUR NEXT EZ W/DEVICE KIT) w/Device KIT USE AS DIRECTED 1 kit 0     CONTOUR NEXT TEST test strip TEST TWICE A DAY 50 strip 4     LANTUS VIAL 100 UNIT/ML soln INJECT 7 UNITS SUBCUTANEOUSLY EVERY MORNING *REORDER AS NEEDED* 30 mL 2     Leuprolide " Acetate, 6 Month, (LUPRON DEPOT, 6-MONTH, IM)        metFORMIN (GLUCOPHAGE) 500 MG tablet TAKE 2 TABLETS BY MOUTH TWICE A DAY WITH MEALS 360 tablet 3     Microlet Lancets MISC USE TWICE DAILY 100 each 0     omeprazole (PRILOSEC) 20 MG DR capsule TAKE 1 CAPSULE BY MOUTH EVERY MORNING BEFORE A MEAL 90 capsule 3     senna-docusate (SENOKOT-S/PERICOLACE) 8.6-50 MG tablet TAKE ONE TABLET BY MOUTH EVERY DAY AS NEEDED FOR CONSTIPATION 30 tablet 0     sertraline (ZOLOFT) 50 MG tablet TAKE ONE TABLET BY MOUTH EVERY MORNING 28 tablet 3     triamcinolone (ARISTOCORT HP) 0.5 % external cream Apply topically 2 times daily To aa on legs for 2 weeks. Tapering with improvement and restarting with flares. 60 g 1       /71   Pulse 80   Temp 97.7  F (36.5  C)   Resp 16   SpO2 96%   GENERAL APPEARANCE:  Alert,oriented x 30-forgetful, is in no distress. Lungs clear to ausculation , S1/S2-RRR No  edema. Abdomen is soft, +BTS . No focal neurological deficits. Legs have compression stocking on    Recent Labs   Lab Test 08/03/21  0643 03/04/21  0840    137   POTASSIUM 4.2 3.8   CHLORIDE 101 102   CO2 29 26   ANIONGAP 9 9    183*   BUN 16 9   CR 0.89 0.76   TENZIN 9.3 8.9      ASSESSMENT / PLAN:  (R41.89) Cognitive impairment  (primary encounter diagnosis)   (F84.0) Autism  Comment/Plan: needs 24 hr monitoring and med management, he was not doing well with this on his onw in the past.. would  Benefit from more frequent housekeeping    (E11.42,  Z79.4) Type 2 diabetes mellitus with diabetic polyneuropathy, with long-term current use of insulin (H)  Comment/Plan: on metformin and Lantus, will check BMP and a1C next month. With the various foods he eats difficult to say his overall DM control.  checking with staff re accuchecks also    (I50.22) Chronic systolic congestive heart failure (H)  (I25.10,  Z95.5) Presence of stent in coronary artery in patient with coronary artery disease   (I10) Hypertension, unspecified  type  Comment/Plan: reasonable BP, no acute issue or edema.  Cont with asa Lipitor, no BP med    (C61) Prostate cancer (H)  Comment/Plan: saw oncology last month--Lupron--appreciate their care. No changes and they will cont to f/u quarterly and prn      Electronically Signed by:  SARATH Yi CNP               Sincerely,        SARATH Yi CNP

## 2021-12-09 VITALS
RESPIRATION RATE: 16 BRPM | TEMPERATURE: 97.7 F | DIASTOLIC BLOOD PRESSURE: 62 MMHG | HEART RATE: 84 BPM | OXYGEN SATURATION: 97 % | SYSTOLIC BLOOD PRESSURE: 100 MMHG

## 2021-12-14 ENCOUNTER — ASSISTED LIVING VISIT (OUTPATIENT)
Dept: GERIATRICS | Facility: CLINIC | Age: 81
End: 2021-12-14
Payer: COMMERCIAL

## 2021-12-14 VITALS
SYSTOLIC BLOOD PRESSURE: 121 MMHG | DIASTOLIC BLOOD PRESSURE: 71 MMHG | TEMPERATURE: 97.7 F | RESPIRATION RATE: 16 BRPM | HEART RATE: 80 BPM | OXYGEN SATURATION: 96 %

## 2021-12-14 DIAGNOSIS — H61.23 BILATERAL IMPACTED CERUMEN: Primary | ICD-10-CM

## 2021-12-16 DIAGNOSIS — Z79.4 TYPE 2 DIABETES MELLITUS WITH OTHER CIRCULATORY COMPLICATION, WITH LONG-TERM CURRENT USE OF INSULIN (H): ICD-10-CM

## 2021-12-16 DIAGNOSIS — E11.59 TYPE 2 DIABETES MELLITUS WITH OTHER CIRCULATORY COMPLICATION, WITH LONG-TERM CURRENT USE OF INSULIN (H): ICD-10-CM

## 2022-01-01 ENCOUNTER — HOSPITAL ENCOUNTER (OUTPATIENT)
Dept: NUCLEAR MEDICINE | Facility: CLINIC | Age: 82
Setting detail: NUCLEAR MEDICINE
Discharge: HOME | End: 2022-05-10
Attending: INTERNAL MEDICINE
Payer: COMMERCIAL

## 2022-01-01 ENCOUNTER — ASSISTED LIVING VISIT (OUTPATIENT)
Dept: GERIATRICS | Facility: CLINIC | Age: 82
End: 2022-01-01
Payer: MEDICARE

## 2022-01-01 ENCOUNTER — ASSISTED LIVING VISIT (OUTPATIENT)
Dept: GERIATRICS | Facility: CLINIC | Age: 82
End: 2022-01-01
Payer: COMMERCIAL

## 2022-01-01 ENCOUNTER — MEDICAL CORRESPONDENCE (OUTPATIENT)
Dept: HEALTH INFORMATION MANAGEMENT | Facility: CLINIC | Age: 82
End: 2022-01-01
Payer: COMMERCIAL

## 2022-01-01 ENCOUNTER — HOSPITAL ENCOUNTER (OUTPATIENT)
Dept: CT IMAGING | Facility: CLINIC | Age: 82
Discharge: HOME | End: 2022-05-10
Attending: INTERNAL MEDICINE | Admitting: INTERNAL MEDICINE
Payer: COMMERCIAL

## 2022-01-01 ENCOUNTER — HEALTH MAINTENANCE LETTER (OUTPATIENT)
Age: 82
End: 2022-01-01

## 2022-01-01 ENCOUNTER — ASSISTED LIVING VISIT (OUTPATIENT)
Dept: GERIATRICS | Facility: CLINIC | Age: 82
End: 2022-01-01
Payer: OTHER MISCELLANEOUS

## 2022-01-01 VITALS
TEMPERATURE: 97.7 F | BODY MASS INDEX: 22.22 KG/M2 | HEIGHT: 69 IN | RESPIRATION RATE: 16 BRPM | HEART RATE: 90 BPM | DIASTOLIC BLOOD PRESSURE: 63 MMHG | SYSTOLIC BLOOD PRESSURE: 101 MMHG | OXYGEN SATURATION: 96 % | WEIGHT: 150 LBS

## 2022-01-01 VITALS
TEMPERATURE: 97.7 F | HEART RATE: 90 BPM | OXYGEN SATURATION: 96 % | SYSTOLIC BLOOD PRESSURE: 101 MMHG | RESPIRATION RATE: 16 BRPM | DIASTOLIC BLOOD PRESSURE: 63 MMHG

## 2022-01-01 VITALS
DIASTOLIC BLOOD PRESSURE: 63 MMHG | WEIGHT: 150 LBS | RESPIRATION RATE: 16 BRPM | OXYGEN SATURATION: 96 % | SYSTOLIC BLOOD PRESSURE: 101 MMHG | HEIGHT: 69 IN | BODY MASS INDEX: 22.22 KG/M2 | HEART RATE: 90 BPM | TEMPERATURE: 97.7 F

## 2022-01-01 VITALS
SYSTOLIC BLOOD PRESSURE: 101 MMHG | OXYGEN SATURATION: 96 % | HEART RATE: 90 BPM | HEIGHT: 69 IN | BODY MASS INDEX: 22.22 KG/M2 | RESPIRATION RATE: 16 BRPM | TEMPERATURE: 97.7 F | DIASTOLIC BLOOD PRESSURE: 63 MMHG | WEIGHT: 150 LBS

## 2022-01-01 VITALS
OXYGEN SATURATION: 96 % | DIASTOLIC BLOOD PRESSURE: 63 MMHG | SYSTOLIC BLOOD PRESSURE: 101 MMHG | HEIGHT: 69 IN | HEART RATE: 90 BPM | RESPIRATION RATE: 16 BRPM | TEMPERATURE: 97.7 F | BODY MASS INDEX: 22.22 KG/M2 | WEIGHT: 150 LBS

## 2022-01-01 VITALS
HEART RATE: 90 BPM | OXYGEN SATURATION: 96 % | RESPIRATION RATE: 16 BRPM | SYSTOLIC BLOOD PRESSURE: 101 MMHG | TEMPERATURE: 97.7 F | DIASTOLIC BLOOD PRESSURE: 63 MMHG

## 2022-01-01 DIAGNOSIS — C61 PROSTATE CANCER (H): Primary | ICD-10-CM

## 2022-01-01 DIAGNOSIS — E11.59 TYPE 2 DIABETES MELLITUS WITH OTHER CIRCULATORY COMPLICATION, WITH LONG-TERM CURRENT USE OF INSULIN (H): Primary | ICD-10-CM

## 2022-01-01 DIAGNOSIS — Z51.5 HOSPICE CARE PATIENT: ICD-10-CM

## 2022-01-01 DIAGNOSIS — I50.30 HEART FAILURE WITH PRESERVED EJECTION FRACTION, NYHA CLASS I (H): ICD-10-CM

## 2022-01-01 DIAGNOSIS — K21.00 GASTROESOPHAGEAL REFLUX DISEASE WITH ESOPHAGITIS WITHOUT HEMORRHAGE: ICD-10-CM

## 2022-01-01 DIAGNOSIS — C78.01 MALIGNANT NEOPLASM METASTATIC TO BOTH LUNGS (H): ICD-10-CM

## 2022-01-01 DIAGNOSIS — R41.89 COGNITIVE IMPAIRMENT: ICD-10-CM

## 2022-01-01 DIAGNOSIS — I25.2 HISTORY OF MI (MYOCARDIAL INFARCTION): ICD-10-CM

## 2022-01-01 DIAGNOSIS — E11.59 TYPE 2 DIABETES MELLITUS WITH OTHER CIRCULATORY COMPLICATION, WITH LONG-TERM CURRENT USE OF INSULIN (H): ICD-10-CM

## 2022-01-01 DIAGNOSIS — F84.0 AUTISM: ICD-10-CM

## 2022-01-01 DIAGNOSIS — E11.9 TYPE 2 DIABETES MELLITUS WITHOUT COMPLICATION, WITHOUT LONG-TERM CURRENT USE OF INSULIN (H): ICD-10-CM

## 2022-01-01 DIAGNOSIS — C61 PROSTATE CANCER (H): ICD-10-CM

## 2022-01-01 DIAGNOSIS — I25.10 CORONARY ARTERY DISEASE INVOLVING NATIVE CORONARY ARTERY OF NATIVE HEART WITHOUT ANGINA PECTORIS: ICD-10-CM

## 2022-01-01 DIAGNOSIS — Z79.4 TYPE 2 DIABETES MELLITUS WITH OTHER CIRCULATORY COMPLICATION, WITH LONG-TERM CURRENT USE OF INSULIN (H): ICD-10-CM

## 2022-01-01 DIAGNOSIS — I10 HYPERTENSION, UNSPECIFIED TYPE: ICD-10-CM

## 2022-01-01 DIAGNOSIS — K22.2 STRICTURE AND STENOSIS OF ESOPHAGUS: ICD-10-CM

## 2022-01-01 DIAGNOSIS — R33.9 URINARY RETENTION: ICD-10-CM

## 2022-01-01 DIAGNOSIS — E46 MALNUTRITION, UNSPECIFIED TYPE (H): ICD-10-CM

## 2022-01-01 DIAGNOSIS — C78.02 MALIGNANT NEOPLASM METASTATIC TO BOTH LUNGS (H): ICD-10-CM

## 2022-01-01 DIAGNOSIS — E78.5 HYPERLIPIDEMIA LDL GOAL <70: ICD-10-CM

## 2022-01-01 DIAGNOSIS — Z00.00 ROUTINE GENERAL MEDICAL EXAMINATION AT A HEALTH CARE FACILITY: ICD-10-CM

## 2022-01-01 DIAGNOSIS — F32.0 MILD MAJOR DEPRESSION (H): ICD-10-CM

## 2022-01-01 DIAGNOSIS — K56.2 VOLVULUS (H): ICD-10-CM

## 2022-01-01 DIAGNOSIS — I63.9 CEREBROVASCULAR ACCIDENT (CVA), UNSPECIFIED MECHANISM (H): ICD-10-CM

## 2022-01-01 DIAGNOSIS — Z79.4 TYPE 2 DIABETES MELLITUS WITH OTHER CIRCULATORY COMPLICATION, WITH LONG-TERM CURRENT USE OF INSULIN (H): Primary | ICD-10-CM

## 2022-01-01 DIAGNOSIS — K59.00 CONSTIPATION, UNSPECIFIED CONSTIPATION TYPE: ICD-10-CM

## 2022-01-01 DIAGNOSIS — M62.81 GENERALIZED MUSCLE WEAKNESS: ICD-10-CM

## 2022-01-01 DIAGNOSIS — Z76.0 ENCOUNTER FOR MEDICATION REFILL: ICD-10-CM

## 2022-01-01 PROCEDURE — 78306 BONE IMAGING WHOLE BODY: CPT

## 2022-01-01 PROCEDURE — A9503 TC99M MEDRONATE: HCPCS | Performed by: INTERNAL MEDICINE

## 2022-01-01 PROCEDURE — 74177 CT ABD & PELVIS W/CONTRAST: CPT

## 2022-01-01 PROCEDURE — 99207 PR NO CHARGE LOS: CPT | Performed by: NURSE PRACTITIONER

## 2022-01-01 PROCEDURE — 343N000001 HC RX 343: Performed by: INTERNAL MEDICINE

## 2022-01-01 PROCEDURE — 250N000009 HC RX 250: Performed by: INTERNAL MEDICINE

## 2022-01-01 PROCEDURE — 250N000011 HC RX IP 250 OP 636: Performed by: INTERNAL MEDICINE

## 2022-01-01 RX ORDER — ATORVASTATIN CALCIUM 40 MG/1
TABLET, FILM COATED ORAL
Qty: 28 TABLET | Refills: 11 | Status: SHIPPED | OUTPATIENT
Start: 2022-01-01 | End: 2022-01-01

## 2022-01-01 RX ORDER — GLIMEPIRIDE 1 MG/1
TABLET ORAL
Qty: 28 TABLET | Refills: 11 | Status: SHIPPED | OUTPATIENT
Start: 2022-01-01 | End: 2023-01-01

## 2022-01-01 RX ORDER — TC 99M MEDRONATE 20 MG/10ML
25 INJECTION, POWDER, LYOPHILIZED, FOR SOLUTION INTRAVENOUS ONCE
Status: COMPLETED | OUTPATIENT
Start: 2022-01-01 | End: 2022-01-01

## 2022-01-01 RX ORDER — AMOXICILLIN 250 MG
CAPSULE ORAL
Qty: 30 TABLET | Refills: 0 | Status: SHIPPED | OUTPATIENT
Start: 2022-01-01 | End: 2022-01-01

## 2022-01-01 RX ORDER — IOPAMIDOL 755 MG/ML
74 INJECTION, SOLUTION INTRAVASCULAR ONCE
Status: COMPLETED | OUTPATIENT
Start: 2022-01-01 | End: 2022-01-01

## 2022-01-01 RX ORDER — ASPIRIN 81 MG
TABLET,CHEWABLE ORAL
Qty: 28 TABLET | Refills: 11 | Status: SHIPPED | OUTPATIENT
Start: 2022-01-01 | End: 2023-01-01

## 2022-01-01 RX ORDER — MORPHINE SULFATE 30 MG/1
5 TABLET ORAL
COMMUNITY
Start: 2022-01-01

## 2022-01-01 RX ORDER — LORAZEPAM 0.5 MG/1
0.5 TABLET ORAL EVERY 4 HOURS PRN
COMMUNITY
Start: 2022-01-01

## 2022-01-01 RX ORDER — BISACODYL 10 MG
10 SUPPOSITORY, RECTAL RECTAL DAILY PRN
COMMUNITY
Start: 2022-01-01

## 2022-01-01 RX ORDER — ACETAMINOPHEN 650 MG/1
650 SUPPOSITORY RECTAL EVERY 6 HOURS PRN
COMMUNITY
Start: 2022-01-01

## 2022-01-01 RX ORDER — NEEDLES, SAFETY 22GX1 1/2"
NEEDLE, DISPOSABLE MISCELLANEOUS
Qty: 30 EACH | Refills: 6 | Status: SHIPPED | OUTPATIENT
Start: 2022-01-01 | End: 2023-01-01

## 2022-01-01 RX ADMIN — IOPAMIDOL 74 ML: 755 INJECTION, SOLUTION INTRAVENOUS at 08:16

## 2022-01-01 RX ADMIN — SODIUM CHLORIDE 61 ML: 9 INJECTION, SOLUTION INTRAVENOUS at 08:17

## 2022-01-01 RX ADMIN — TC 99M MEDRONATE 25.8 MCI.: 20 INJECTION, POWDER, LYOPHILIZED, FOR SOLUTION INTRAVENOUS at 07:59

## 2022-01-05 DIAGNOSIS — I63.9 CEREBROVASCULAR ACCIDENT (CVA), UNSPECIFIED MECHANISM (H): ICD-10-CM

## 2022-01-05 RX ORDER — ASPIRIN 81 MG
TABLET,CHEWABLE ORAL
Qty: 28 TABLET | Refills: 4 | Status: SHIPPED | OUTPATIENT
Start: 2022-01-05 | End: 2022-01-01

## 2022-01-09 ENCOUNTER — LAB REQUISITION (OUTPATIENT)
Dept: LAB | Facility: CLINIC | Age: 82
End: 2022-01-09
Payer: COMMERCIAL

## 2022-01-09 DIAGNOSIS — Z13.1 ENCOUNTER FOR SCREENING FOR DIABETES MELLITUS: ICD-10-CM

## 2022-01-09 DIAGNOSIS — D50.0 IRON DEFICIENCY ANEMIA SECONDARY TO BLOOD LOSS (CHRONIC): ICD-10-CM

## 2022-01-09 DIAGNOSIS — I10 ESSENTIAL (PRIMARY) HYPERTENSION: ICD-10-CM

## 2022-01-11 ENCOUNTER — TRANSFERRED RECORDS (OUTPATIENT)
Dept: HEALTH INFORMATION MANAGEMENT | Facility: CLINIC | Age: 82
End: 2022-01-11

## 2022-01-11 ENCOUNTER — ASSISTED LIVING VISIT (OUTPATIENT)
Dept: GERIATRICS | Facility: CLINIC | Age: 82
End: 2022-01-11
Payer: COMMERCIAL

## 2022-01-11 VITALS
RESPIRATION RATE: 16 BRPM | OXYGEN SATURATION: 96 % | SYSTOLIC BLOOD PRESSURE: 121 MMHG | TEMPERATURE: 97.7 F | HEART RATE: 80 BPM | DIASTOLIC BLOOD PRESSURE: 71 MMHG

## 2022-01-11 DIAGNOSIS — Z79.4 TYPE 2 DIABETES MELLITUS WITH OTHER CIRCULATORY COMPLICATION, WITH LONG-TERM CURRENT USE OF INSULIN (H): Primary | ICD-10-CM

## 2022-01-11 DIAGNOSIS — E11.59 TYPE 2 DIABETES MELLITUS WITH OTHER CIRCULATORY COMPLICATION, WITH LONG-TERM CURRENT USE OF INSULIN (H): Primary | ICD-10-CM

## 2022-01-11 DIAGNOSIS — N18.2 CKD (CHRONIC KIDNEY DISEASE) STAGE 2, GFR 60-89 ML/MIN: ICD-10-CM

## 2022-01-11 DIAGNOSIS — I10 PRIMARY HYPERTENSION: ICD-10-CM

## 2022-01-11 PROBLEM — E11.9 TYPE 2 DIABETES MELLITUS WITHOUT COMPLICATION, WITHOUT LONG-TERM CURRENT USE OF INSULIN (H): Status: RESOLVED | Noted: 2017-07-25 | Resolved: 2022-01-11

## 2022-01-11 LAB
ANION GAP SERPL CALCULATED.3IONS-SCNC: 12 MMOL/L (ref 5–18)
BUN SERPL-MCNC: 15 MG/DL (ref 8–28)
CALCIUM SERPL-MCNC: 8.8 MG/DL (ref 8.5–10.5)
CHLORIDE BLD-SCNC: 103 MMOL/L (ref 98–107)
CO2 SERPL-SCNC: 22 MMOL/L (ref 22–31)
CREAT SERPL-MCNC: 1.07 MG/DL (ref 0.7–1.3)
ERYTHROCYTE [DISTWIDTH] IN BLOOD BY AUTOMATED COUNT: 14 % (ref 10–15)
GFR SERPL CREATININE-BSD FRML MDRD: 70 ML/MIN/1.73M2
GLUCOSE BLD-MCNC: 219 MG/DL (ref 70–125)
HBA1C MFR BLD: 7 %
HCT VFR BLD AUTO: 37.5 % (ref 40–53)
HGB BLD-MCNC: 12.5 G/DL (ref 13.3–17.7)
MCH RBC QN AUTO: 31.1 PG (ref 26.5–33)
MCHC RBC AUTO-ENTMCNC: 33.3 G/DL (ref 31.5–36.5)
MCV RBC AUTO: 93 FL (ref 78–100)
PLATELET # BLD AUTO: 201 10E3/UL (ref 150–450)
POTASSIUM BLD-SCNC: 4.4 MMOL/L (ref 3.5–5)
RBC # BLD AUTO: 4.02 10E6/UL (ref 4.4–5.9)
SODIUM SERPL-SCNC: 137 MMOL/L (ref 136–145)
WBC # BLD AUTO: 6.7 10E3/UL (ref 4–11)

## 2022-01-11 PROCEDURE — 85027 COMPLETE CBC AUTOMATED: CPT | Mod: ORL | Performed by: NURSE PRACTITIONER

## 2022-01-11 PROCEDURE — 83036 HEMOGLOBIN GLYCOSYLATED A1C: CPT | Mod: ORL | Performed by: NURSE PRACTITIONER

## 2022-01-11 PROCEDURE — 80048 BASIC METABOLIC PNL TOTAL CA: CPT | Mod: ORL | Performed by: NURSE PRACTITIONER

## 2022-01-11 PROCEDURE — 36415 COLL VENOUS BLD VENIPUNCTURE: CPT | Mod: ORL | Performed by: NURSE PRACTITIONER

## 2022-01-11 NOTE — PROGRESS NOTES
CC: Lab Recheck     HPI:    Jose Lees is a 81 year old  (1940), who is being seen today for an episodic care visit at [facility]. Today's concern: lab recheck of BMP and A1C      Type 2 diabetes mellitus with other circulatory complication, with long-term current use of insulin (H)  CKD (chronic kidney disease) stage 2, GFR 60-89 ml/min  Primary hypertension    OBJECTIVE: A & O x 3, NAD. non labored Resp pattern. Watching TV, No focal neurological deficits.      /71   Pulse 80   Temp 97.7  F (36.5  C)   Resp 16   SpO2 96%     LABS: today   Recent Labs   Lab Test 01/11/22  1000 08/03/21  0643    139   POTASSIUM 4.4 4.2   CHLORIDE 103 101   CO2 22 29   ANIONGAP 12 9   * 101   BUN 15 16   CR 1.07 0.89   TENZIN 8.8 9.3     Lab Results   Component Value Date    A1C 7.0 1/11/2022    A1C 6.7 08/03/2021    A1C 11.4 01/26/2021    A1C 11.8 01/20/2020    A1C 10.2 11/08/2019    A1C 11.6 05/20/2019    A1C 9.9 02/08/2019       ASSESSMENT / PLAN:  (E11.59,  Z79.4) Type 2 diabetes mellitus with other circulatory complication, with long-term current use of insulin (H)  (primary encounter diagnosis)   (N18.2) CKD (chronic kidney disease) stage 2, GFR 60-89 ml/min  Comment/Plan: A1C today is 7.0. BMP is steady. No changes in insulins    (I10) Primary hypertension  Comment/Plan:  SBP runs 110-120's mostly , occas in low 100's --no changes today--monitor      Electronically Signed by:    Kailey Saucedo RN, CNP

## 2022-01-11 NOTE — LETTER
1/11/2022        RE: Jose Lees  [address]  [address] [facility] [address]        CC: Lab Recheck     HPI:    Jose Lees is a 81 year old  (1940), who is being seen today for an episodic care visit at [facility]. Today's concern: lab recheck of BMP and A1C      Type 2 diabetes mellitus with other circulatory complication, with long-term current use of insulin (H)  CKD (chronic kidney disease) stage 2, GFR 60-89 ml/min  Primary hypertension    OBJECTIVE: A & O x 3, NAD. non labored Resp pattern. Watching TV, No focal neurological deficits.      /71   Pulse 80   Temp 97.7  F (36.5  C)   Resp 16   SpO2 96%     LABS: today   Recent Labs   Lab Test 01/11/22  1000 08/03/21  0643    139   POTASSIUM 4.4 4.2   CHLORIDE 103 101   CO2 22 29   ANIONGAP 12 9   * 101   BUN 15 16   CR 1.07 0.89   TENZIN 8.8 9.3     Lab Results   Component Value Date    A1C 7.0 1/11/2022    A1C 6.7 08/03/2021    A1C 11.4 01/26/2021    A1C 11.8 01/20/2020    A1C 10.2 11/08/2019    A1C 11.6 05/20/2019    A1C 9.9 02/08/2019       ASSESSMENT / PLAN:  (E11.59,  Z79.4) Type 2 diabetes mellitus with other circulatory complication, with long-term current use of insulin (H)  (primary encounter diagnosis)   (N18.2) CKD (chronic kidney disease) stage 2, GFR 60-89 ml/min  Comment/Plan: A1C today is 7.0. BMP is steady. No changes in insulins    (I10) Primary hypertension  Comment/Plan:  SBP runs 110-120's mostly , occas in low 100's --no changes today--monitor      Electronically Signed by:    Kailey Saucedo RN, CNP            Sincerely,        Kailey Saucedo, APRN CNP

## 2022-01-20 ENCOUNTER — ASSISTED LIVING VISIT (OUTPATIENT)
Dept: GERIATRICS | Facility: CLINIC | Age: 82
End: 2022-01-20
Payer: COMMERCIAL

## 2022-01-20 VITALS
DIASTOLIC BLOOD PRESSURE: 71 MMHG | OXYGEN SATURATION: 96 % | HEART RATE: 80 BPM | SYSTOLIC BLOOD PRESSURE: 121 MMHG | BODY MASS INDEX: 22.28 KG/M2 | TEMPERATURE: 97.7 F | RESPIRATION RATE: 16 BRPM | HEIGHT: 69 IN | WEIGHT: 150.4 LBS

## 2022-01-20 DIAGNOSIS — F84.0 AUTISM: ICD-10-CM

## 2022-01-20 DIAGNOSIS — K22.2 STRICTURE AND STENOSIS OF ESOPHAGUS: ICD-10-CM

## 2022-01-20 DIAGNOSIS — Z79.4 TYPE 2 DIABETES MELLITUS WITH OTHER CIRCULATORY COMPLICATION, WITH LONG-TERM CURRENT USE OF INSULIN (H): Primary | ICD-10-CM

## 2022-01-20 DIAGNOSIS — F32.0 MILD MAJOR DEPRESSION (H): ICD-10-CM

## 2022-01-20 DIAGNOSIS — R41.89 COGNITIVE IMPAIRMENT: ICD-10-CM

## 2022-01-20 DIAGNOSIS — E11.59 TYPE 2 DIABETES MELLITUS WITH OTHER CIRCULATORY COMPLICATION, WITH LONG-TERM CURRENT USE OF INSULIN (H): Primary | ICD-10-CM

## 2022-01-20 DIAGNOSIS — I50.22 CHRONIC SYSTOLIC CONGESTIVE HEART FAILURE (H): ICD-10-CM

## 2022-01-20 DIAGNOSIS — I25.10 CORONARY ARTERY DISEASE INVOLVING NATIVE CORONARY ARTERY OF NATIVE HEART WITHOUT ANGINA PECTORIS: ICD-10-CM

## 2022-01-20 DIAGNOSIS — L81.7 PIGMENTED PURPURIC DERMATOSIS: ICD-10-CM

## 2022-01-20 DIAGNOSIS — I10 PRIMARY HYPERTENSION: ICD-10-CM

## 2022-01-20 DIAGNOSIS — C61 PROSTATE CANCER (H): ICD-10-CM

## 2022-01-20 ASSESSMENT — MIFFLIN-ST. JEOR: SCORE: 1382.59

## 2022-01-20 NOTE — LETTER
1/20/2022        RE: Jose Hollins  [address] [facility] [address]        Jose Lees is a 81 year old male seen January 20, 2022 at [facility] where he has resided for several years      Pt is seen in his cluttered apartment lying on his sofa wearing 2 short sleeved shirts and Depends.   However he is pleasant and welcoming, states he is feeling well    He denies any pain, dyspnea or other symptoms.  Scratching at extremities, with excoriated areas.   Seen by Dermatology in April 2021 and had a punch biopsy, given dx of pigmented purpuric dermatosis and treated with topical steroids.   Rash tends to flare at times.   By chart review, pt had a [facility] hospitalization in January 2021 for dysphagia, had stopped taking all of his medications.   He was found to have severe candidal esophagitis and esophageal strictures, underwent dilatation on 2 occasions.   He was started on insulin for poorly controlled DM2  He went to [facility]CU before discharge to his sister's home 2/18, then rehospitalized 2/26 for weakness and failure to thrive.  Treated with IVF.   His Lantus and metoprolol doses were decreased.    He again went to TCU, then back to [facility]    Pt has prostate cancer diagnosed in 2011   No metastatic disease on imaging.   He was treated with leuprolide and bicalutamide without improvement in PSA.   At some point pt stopped his medications.  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    Treated with leuprolide, last dose given November 2021    Past Medical History:   Diagnosis Date     ACP (advance care planning)     Form given     Acute anterior wall MI (H) 4/7/2014     Autism disorder 6/29/2012     CHF (congestive heart failure) (H)     ECHO EF=25-30% on 4/7/14     Coronary artery disease 4/2014 4/2014 HEART CATH - 70% mid LAD -- BMS placed, small 1st  diagonal 80%, RCA 60-70% before crux, 70% mid PDA     Dementia (H)      Hyperlipidaemia      Longstanding persistent atrial fibrillation (H) 1/20/2020     Mild major depression (H) 1/20/2020     Prostate cancer (H) 10/11    GLEASOR 8     Stricture and stenosis of esophagus 10/22/2015     Stroke, 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     Type 2 diabetes mellitus without complication, without long-term current use of insulin (H) 7/25/2017       Past Surgical History:   Procedure Laterality Date     CORONARY ANGIOGRAPHY ADULT ORDER       ESOPHAGOSCOPY, GASTROSCOPY, DUODENOSCOPY (EGD), COMBINED N/A 8/6/2015    Procedure: COMBINED ESOPHAGOSCOPY, GASTROSCOPY, DUODENOSCOPY (EGD), REMOVE FOREIGN BODY;  Surgeon: Yu Tapia MD;  Location: [facility]     ESOPHAGOSCOPY, GASTROSCOPY, DUODENOSCOPY (EGD), COMBINED N/A 1/28/2021    Procedure: ESOPHAGOGASTRODUODENOSCOPY (EGD) WITH FLUORO,Biopsy, and Dilation;  Surgeon: Audra Huang MD;  Location:  OR     HEART CATH, ANGIOPLASTY  4/14    BMS to LAD     PHACOEMULSIFICATION CLEAR CORNEA WITH STANDARD INTRAOCULAR LENS IMPLANT Right 12/4/2018    Procedure: COMPLEX RIGHT EYE PHACOEMULSIFICATION CLEAR CORNEA WITH STANDARD INTRAOCULAR LENS IMPLANT;  Surgeon: Kamar Kyle MD;  [facility] [facility]     PHACOEMULSIFICATION CLEAR CORNEA WITH STANDARD INTRAOCULAR LENS IMPLANT Left 12/19/2018    Procedure: COMPLEX LEFT EYE PHACOEMULSIFICATION CLEAR CORNEA WITH STANDARD INTRAOCULAR LENS IMPLANT;  Surgeon: Kamar Kyle MD;  Location[place] [facility]     SH:  Lives alone, AL apartment.  Previously lived at [facility].   His sister Judith is POA   Pt worked as a   Non smoker     ROS: SLUMS 14/30   ACL 3.8     Ambulatory with 4WW  Wt Readings from Last 5 Encounters:   01/20/22 68.2 kg (150 lb 6.4 oz)   03/05/21 68.2 kg (150 lb 6.4 oz)   03/04/21 68 kg (150 lb)   02/28/21 68.5 kg (151 lb)   02/15/21 69.6 kg (153 lb 6.4 oz)      EXAM:  NAD  /71  "  Pulse 80   Temp 97.7  F (36.5  C)   Resp 16   Ht 1.753 m (5' 9\")   Wt 68.2 kg (150 lb 6.4 oz)   SpO2 96%   BMI 22.21 kg/m     Neck supple without adenopathy.    Lungs clear bilaterally with fair air movement  Heart RRR s1s2 at 80  Abd soft, NT, no distention or guarding, +BS  Ext with 1+ edema, wearing compression stockings    On upper legs, has multiple scabbed over excoriated areas  Neuro: some limits to history, no focal findings  Psych: affect okay, smiling     Last Comprehensive Metabolic Panel:  Sodium   Date Value Ref Range Status   01/11/2022 137 136 - 145 mmol/L Final     Potassium   Date Value Ref Range Status   01/11/2022 4.4 3.5 - 5.0 mmol/L Final     Carbon Dioxide (CO2)   Date Value Ref Range Status   01/11/2022 22 22 - 31 mmol/L Final     Glucose   Date Value Ref Range Status   01/11/2022 219 (H) 70 - 125 mg/dL Final     Urea Nitrogen   Date Value Ref Range Status   01/11/2022 15 8 - 28 mg/dL Final     Creatinine   Date Value Ref Range Status   01/11/2022 1.07 0.70 - 1.30 mg/dL Final   03/04/2021 0.76 0.70 - 1.30 mg/dL Final     GFR Estimate   Date Value Ref Range Status   01/11/2022 70 >60 mL/min/1.73m2 Final     Calcium   Date Value Ref Range Status   01/11/2022 8.8 8.5 - 10.5 mg/dL Final     Lab Results   Component Value Date    WBC 6.7 01/11/2022      HGB 12.5 01/11/2022      MCV 93 01/11/2022       01/11/2022     ECHO 1/29/2021   Interpretation Summary  The visual ejection fraction is estimated at 35-40%.  Left ventricular systolic function is mild to moderately reduced.  There are regional wall motion abnormalities as specified.  The regional wall motion abnormalities are similar to previous and are consistent with LAD territory ischemia and / or infarction.  The ascending aorta is Mildly dilated.    CT CHEST, ABDOMEN, PELVIS WITH CONTRAST  1/26/2021    Moderate atherosclerotic vascular calcification of the  abdominal aorta and iliac vessels. Extensive bilateral gynecomastia.   " No evidence of other radiodense foreign bodies. Esophagus is patulous.   1.3 cm partially calcified hypoattenuating focus in the left hepatic lobe     Although the prostate gland is not significantly  enlarged it appears diffusely enhancing, nonspecific, can be due to underlying diffuse prostatitis versus prostatic malignancy.  IMPRESSION:   1. The sigmoid colon appears to be mildly distended and displaced into  the upper mid abdomen/left upper quadrant, however with no associated  mesenteric twisting or bowel obstruction. Finding is nonspecific, but might predispose patient into sigmoid volvulus.  2. 6 mm fat attenuating lesion abutting the posterior aspect of the  esophagus, could represent small food residue versus small submucosal  lipoma. No other radiodense foreign body seen within the esophageal lumen which appears mildly dilated/patulous.  3. Numerous bilateral pulmonary nodules, worrisome for metastatic disease.  4. The prostate gland although not significantly enlarged, appears  diffusely enhancing, nonspecific, can be due to underlying prostatitis  or prostate malignancy, recommend correlation with serum prostate-specific antigen.  5. Cholelithiasis without CT evidence of acute cholecystitis.  6. 1.3 cm partially calcified hypoattenuating focus in the left hepatic lobe, of indeterminate etiology.  ADDENDUM:  Comparison studies from 10/19/2020 and 10/16/2019 are now available  for comparison.   The area of the low-attenuation focus within the mid to lower esophagus on this study was not adequately assessed on the comparison study due to differences in distention of the esophagus. Multiple  pulmonary nodules have slightly increased in size. The sigmoid colon has not changed in position. The partially calcified lateral left hepatic lobe lesion has not significantly changed.         IMP/PLAN:   (E11.59,  Z79.4) Type 2 diabetes mellitus with other circulatory complication, with long-term current use of insulin  (H)   Comment: doing so much better since on med admin    Lab Results   Component Value Date    A1C 7.0 01/11/2022    A1C 6.7 08/03/2021    A1C 11.4 01/26/2021    A1C 11.8 01/20/2020      Plan: on metformin 500 mg bid, Lantus 7 units/day   He has widely variable CBGs and eating can be inconsistent.   Would consider discontinuation of insulin.       (K22.2) Stricture and stenosis of esophagus  (K21.00) Gastroesophageal reflux disease with esophagitis without hemorrhage  Comment: s/p dilatation x2   Plan: omeprazole 20 mg/day    Eats soft foods by preference  Follow up with GI as scheduled.       (I25.10) Coronary artery disease involving native coronary artery of native heart without angina pectoris  (I25.5) Ischemic cardiomyopathy  Comment: low EF, volume status stable     Plan: daily ASA, atorvastatin 40 mg/day for secondary prevention            (C61) Prostate cancer (H)  Comment: high PSA, local disease, castrate resistant     Plan: continue leuprolide, per Oncology       Last dose was November 2021 and 3 month follow up with labs scheduled.         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

## 2022-01-20 NOTE — PROGRESS NOTES
Jose Lees is a 81 year old male seen January 20, 2022 at [facility] where he has resided for several years      Pt is seen in his cluttered apartment lying on his sofa wearing 2 short sleeved shirts and Depends.   However he is pleasant and welcoming, states he is feeling well    He denies any pain, dyspnea or other symptoms.  Scratching at extremities, with excoriated areas.   Seen by Dermatology in April 2021 and had a punch biopsy, given dx of pigmented purpuric dermatosis and treated with topical steroids.   Rash tends to flare at times.   By chart review, pt had a [facility] hospitalization in January 2021 for dysphagia, had stopped taking all of his medications.   He was found to have severe candidal esophagitis and esophageal strictures, underwent dilatation on 2 occasions.   He was started on insulin for poorly controlled DM2  He went to TCU before discharge to his sister's home 2/18, then rehospitalized 2/26 for weakness and failure to thrive.  Treated with IVF.   His Lantus and metoprolol doses were decreased.    He again went to TCU, then back to [facility]    Pt has prostate cancer diagnosed in 2011   No metastatic disease on imaging.   He was treated with leuprolide and bicalutamide without improvement in PSA.   At some point pt stopped his medications.  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    Treated with leuprolide, last dose given November 2021    Past Medical History:   Diagnosis Date     ACP (advance care planning)     Form given     Acute anterior wall MI (H) 4/7/2014     Autism disorder 6/29/2012     CHF (congestive heart failure) (H)     ECHO EF=25-30% on 4/7/14     Coronary artery disease 4/2014 4/2014 HEART CATH - 70% mid LAD -- BMS placed, small 1st diagonal 80%, RCA 60-70% before crux, 70% mid PDA     Dementia (H)      Hyperlipidaemia      Longstanding  "persistent atrial fibrillation (H) 1/20/2020     Mild major depression (H) 1/20/2020     Prostate cancer (H) 10/11    GLEASOR 8     Stricture and stenosis of esophagus 10/22/2015     Stroke, 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     Type 2 diabetes mellitus without complication, without long-term current use of insulin (H) 7/25/2017       Past Surgical History:   Procedure Laterality Date     CORONARY ANGIOGRAPHY ADULT ORDER       ESOPHAGOSCOPY, GASTROSCOPY, DUODENOSCOPY (EGD), COMBINED N/A 8/6/2015    Procedure: COMBINED ESOPHAGOSCOPY, GASTROSCOPY, DUODENOSCOPY (EGD), REMOVE FOREIGN BODY;  Surgeon: Yu Tapia MD;  Location:  GI     ESOPHAGOSCOPY, GASTROSCOPY, DUODENOSCOPY (EGD), COMBINED N/A 1/28/2021    Procedure: ESOPHAGOGASTRODUODENOSCOPY (EGD) WITH FLUORO,Biopsy, and Dilation;  Surgeon: Audra Huang MD;  Location:  OR     HEART CATH, ANGIOPLASTY  4/14    BMS to LAD     PHACOEMULSIFICATION CLEAR CORNEA WITH STANDARD INTRAOCULAR LENS IMPLANT Right 12/4/2018    Procedure: COMPLEX RIGHT EYE PHACOEMULSIFICATION CLEAR CORNEA WITH STANDARD INTRAOCULAR LENS IMPLANT;  Surgeon: Kamar Kyle MD;  Location:  [facility]     PHACOEMULSIFICATION CLEAR CORNEA WITH STANDARD INTRAOCULAR LENS IMPLANT Left 12/19/2018    Procedure: COMPLEX LEFT EYE PHACOEMULSIFICATION CLEAR CORNEA WITH STANDARD INTRAOCULAR LENS IMPLANT;  Surgeon: Kamar Kyle MD;  Location: [facility]     SH:  Lives alone, [place] apartment.  Previously lived at [facility].   His sister Judith is POA   Pt worked as a   Non smoker     ROS: SLUMS 14/30   ACL 3.8     Ambulatory with 4WW  Wt Readings from Last 5 Encounters:   01/20/22 68.2 kg (150 lb 6.4 oz)   03/05/21 68.2 kg (150 lb 6.4 oz)   03/04/21 68 kg (150 lb)   02/28/21 68.5 kg (151 lb)   02/15/21 69.6 kg (153 lb 6.4 oz)      EXAM:  NAD  /71   Pulse 80   Temp 97.7  F (36.5  C)   Resp 16   Ht 1.753 m (5' 9\")   Wt 68.2 kg (150 lb 6.4 oz)   " SpO2 96%   BMI 22.21 kg/m     Neck supple without adenopathy.    Lungs clear bilaterally with fair air movement  Heart RRR s1s2 at 80  Abd soft, NT, no distention or guarding, +BS  Ext with 1+ edema, wearing compression stockings    On upper legs, has multiple scabbed over excoriated areas  Neuro: some limits to history, no focal findings  Psych: affect okay, smiling     Last Comprehensive Metabolic Panel:  Sodium   Date Value Ref Range Status   01/11/2022 137 136 - 145 mmol/L Final     Potassium   Date Value Ref Range Status   01/11/2022 4.4 3.5 - 5.0 mmol/L Final     Carbon Dioxide (CO2)   Date Value Ref Range Status   01/11/2022 22 22 - 31 mmol/L Final     Glucose   Date Value Ref Range Status   01/11/2022 219 (H) 70 - 125 mg/dL Final     Urea Nitrogen   Date Value Ref Range Status   01/11/2022 15 8 - 28 mg/dL Final     Creatinine   Date Value Ref Range Status   01/11/2022 1.07 0.70 - 1.30 mg/dL Final   03/04/2021 0.76 0.70 - 1.30 mg/dL Final     GFR Estimate   Date Value Ref Range Status   01/11/2022 70 >60 mL/min/1.73m2 Final     Calcium   Date Value Ref Range Status   01/11/2022 8.8 8.5 - 10.5 mg/dL Final     Lab Results   Component Value Date    WBC 6.7 01/11/2022      HGB 12.5 01/11/2022      MCV 93 01/11/2022       01/11/2022     ECHO 1/29/2021   Interpretation Summary  The visual ejection fraction is estimated at 35-40%.  Left ventricular systolic function is mild to moderately reduced.  There are regional wall motion abnormalities as specified.  The regional wall motion abnormalities are similar to previous and are consistent with LAD territory ischemia and / or infarction.  The ascending aorta is Mildly dilated.    CT CHEST, ABDOMEN, PELVIS WITH CONTRAST  1/26/2021    Moderate atherosclerotic vascular calcification of the  abdominal aorta and iliac vessels. Extensive bilateral gynecomastia.   No evidence of other radiodense foreign bodies. Esophagus is patulous.   1.3 cm partially calcified  hypoattenuating focus in the left hepatic lobe     Although the prostate gland is not significantly  enlarged it appears diffusely enhancing, nonspecific, can be due to underlying diffuse prostatitis versus prostatic malignancy.  IMPRESSION:   1. The sigmoid colon appears to be mildly distended and displaced into  the upper mid abdomen/left upper quadrant, however with no associated  mesenteric twisting or bowel obstruction. Finding is nonspecific, but might predispose patient into sigmoid volvulus.  2. 6 mm fat attenuating lesion abutting the posterior aspect of the  esophagus, could represent small food residue versus small submucosal  lipoma. No other radiodense foreign body seen within the esophageal lumen which appears mildly dilated/patulous.  3. Numerous bilateral pulmonary nodules, worrisome for metastatic disease.  4. The prostate gland although not significantly enlarged, appears  diffusely enhancing, nonspecific, can be due to underlying prostatitis  or prostate malignancy, recommend correlation with serum prostate-specific antigen.  5. Cholelithiasis without CT evidence of acute cholecystitis.  6. 1.3 cm partially calcified hypoattenuating focus in the left hepatic lobe, of indeterminate etiology.  ADDENDUM:  Comparison studies from 10/19/2020 and 10/16/2019 are now available  for comparison.   The area of the low-attenuation focus within the mid to lower esophagus on this study was not adequately assessed on the comparison study due to differences in distention of the esophagus. Multiple  pulmonary nodules have slightly increased in size. The sigmoid colon has not changed in position. The partially calcified lateral left hepatic lobe lesion has not significantly changed.         IMP/PLAN:   (E11.59,  Z79.4) Type 2 diabetes mellitus with other circulatory complication, with long-term current use of insulin (H)   Comment: doing so much better since on med admin    Lab Results   Component Value Date    A1C  7.0 01/11/2022    A1C 6.7 08/03/2021    A1C 11.4 01/26/2021    A1C 11.8 01/20/2020      Plan: on metformin 500 mg bid, Lantus 7 units/day   He has widely variable CBGs and eating can be inconsistent.   Would consider discontinuation of insulin.       (K22.2) Stricture and stenosis of esophagus  (K21.00) Gastroesophageal reflux disease with esophagitis without hemorrhage  Comment: s/p dilatation x2   Plan: omeprazole 20 mg/day    Eats soft foods by preference  Follow up with GI as scheduled.       (I25.10) Coronary artery disease involving native coronary artery of native heart without angina pectoris  (I25.5) Ischemic cardiomyopathy  Comment: low EF, volume status stable     Plan: daily ASA, atorvastatin 40 mg/day for secondary prevention            (C61) Prostate cancer (H)  Comment: high PSA, local disease, castrate resistant     Plan: continue leuprolide, per Oncology       Last dose was November 2021 and 3 month follow up with labs scheduled.         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

## 2022-01-22 ENCOUNTER — HEALTH MAINTENANCE LETTER (OUTPATIENT)
Age: 82
End: 2022-01-22

## 2022-01-27 PROBLEM — I25.119 CORONARY ARTERY DISEASE INVOLVING NATIVE CORONARY ARTERY OF NATIVE HEART WITH ANGINA PECTORIS (H): Status: RESOLVED | Noted: 2021-03-01 | Resolved: 2022-01-27

## 2022-01-27 PROBLEM — E46 MALNUTRITION, UNSPECIFIED TYPE (H): Status: RESOLVED | Noted: 2021-03-01 | Resolved: 2022-01-27

## 2022-02-01 ENCOUNTER — TRANSFERRED RECORDS (OUTPATIENT)
Dept: HEALTH INFORMATION MANAGEMENT | Facility: CLINIC | Age: 82
End: 2022-02-01
Payer: COMMERCIAL

## 2022-02-10 ENCOUNTER — ASSISTED LIVING VISIT (OUTPATIENT)
Dept: GERIATRICS | Facility: CLINIC | Age: 82
End: 2022-02-10
Payer: COMMERCIAL

## 2022-02-10 VITALS
OXYGEN SATURATION: 96 % | SYSTOLIC BLOOD PRESSURE: 121 MMHG | RESPIRATION RATE: 16 BRPM | TEMPERATURE: 97.7 F | DIASTOLIC BLOOD PRESSURE: 71 MMHG | HEART RATE: 80 BPM

## 2022-02-10 DIAGNOSIS — Z79.4 TYPE 2 DIABETES MELLITUS WITH OTHER CIRCULATORY COMPLICATION, WITH LONG-TERM CURRENT USE OF INSULIN (H): Primary | ICD-10-CM

## 2022-02-10 DIAGNOSIS — E11.59 TYPE 2 DIABETES MELLITUS WITH OTHER CIRCULATORY COMPLICATION, WITH LONG-TERM CURRENT USE OF INSULIN (H): Primary | ICD-10-CM

## 2022-02-10 NOTE — LETTER
2/10/2022        RE: Jose Lees  [place]  [address] [facility] [address]        Chief Complaint: review of blood sugars and AIC    HPI:    Jose Lees is a 81 year old  (1940), who is being seen today for an episodic care visit at [facility]. Today's concern is:  Type 2 diabetes mellitus with other circulatory complication, with long-term current use of insulin (H)      REVIEW OF SYSTEMS:  No complaints--pt said feels fine sitting in apt    /71   Pulse 80   Temp 97.7  F (36.5  C)   Resp 16   SpO2 96%   GENERAL APPEARANCE:  Alert,oriented x 3 in no distress, up independently in apt.No focal neurological deficits.     Lab Results   Component Value Date    A1C 7.0 01/11/2022    A1C 6.7 08/03/2021    A1C 11.4 01/26/2021    A1C 11.8 01/20/2020    A1C 10.2 11/08/2019    A1C 11.6 05/20/2019    A1C 9.9 02/08/2019     ASSESSMENT / PLAN:  (E11.59,  Z79.4) Type 2 diabetes mellitus with other circulatory complication, with long-term current use of insulin (H)  (primary encounter diagnosis)  Comment/Plan: last 2 AICs good, will cont with accuchecks bid ac for now.   Discontinue insulin.  AF/u in 3-4 weeks or prn.  Check A1C in 3-6 mos      Electronically Signed by:  SARATH Yi CNP          Sincerely,        SARATH Yi CNP

## 2022-02-10 NOTE — PROGRESS NOTES
Chief Complaint: review of blood sugars and AIC    HPI:    Jose Lees is a 81 year old  (1940), who is being seen today for an episodic care visit at [facility]. Today's concern is:  Type 2 diabetes mellitus with other circulatory complication, with long-term current use of insulin (H)      REVIEW OF SYSTEMS:  No complaints--pt said feels fine sitting in apt    /71   Pulse 80   Temp 97.7  F (36.5  C)   Resp 16   SpO2 96%   GENERAL APPEARANCE:  Alert,oriented x 3 in no distress, up independently in apt.No focal neurological deficits.     Lab Results   Component Value Date    A1C 7.0 01/11/2022    A1C 6.7 08/03/2021    A1C 11.4 01/26/2021    A1C 11.8 01/20/2020    A1C 10.2 11/08/2019    A1C 11.6 05/20/2019    A1C 9.9 02/08/2019     ASSESSMENT / PLAN:  (E11.59,  Z79.4) Type 2 diabetes mellitus with other circulatory complication, with long-term current use of insulin (H)  (primary encounter diagnosis)  Comment/Plan: last 2 AICs good, will cont with accuchecks bid ac for now.   Discontinue insulin.  AF/u in 3-4 weeks or prn.  Check A1C in 3-6 mos      Electronically Signed by:  SARATH Yi CNP    
4

## 2022-02-24 ENCOUNTER — ASSISTED LIVING VISIT (OUTPATIENT)
Dept: GERIATRICS | Facility: CLINIC | Age: 82
End: 2022-02-24
Payer: COMMERCIAL

## 2022-02-24 VITALS
SYSTOLIC BLOOD PRESSURE: 101 MMHG | TEMPERATURE: 97.7 F | OXYGEN SATURATION: 96 % | HEART RATE: 90 BPM | DIASTOLIC BLOOD PRESSURE: 63 MMHG | RESPIRATION RATE: 16 BRPM

## 2022-02-24 DIAGNOSIS — K22.2 STRICTURE AND STENOSIS OF ESOPHAGUS: ICD-10-CM

## 2022-02-24 DIAGNOSIS — I25.5 ISCHEMIC CARDIOMYOPATHY: ICD-10-CM

## 2022-02-24 DIAGNOSIS — L81.7 PIGMENTED PURPURIC DERMATOSIS: ICD-10-CM

## 2022-02-24 DIAGNOSIS — R41.89 COGNITIVE IMPAIRMENT: ICD-10-CM

## 2022-02-24 DIAGNOSIS — Z00.00 WELLNESS EXAMINATION: ICD-10-CM

## 2022-02-24 DIAGNOSIS — K21.00 GASTROESOPHAGEAL REFLUX DISEASE WITH ESOPHAGITIS WITHOUT HEMORRHAGE: ICD-10-CM

## 2022-02-24 DIAGNOSIS — Z79.4 TYPE 2 DIABETES MELLITUS WITH OTHER CIRCULATORY COMPLICATION, WITH LONG-TERM CURRENT USE OF INSULIN (H): Primary | ICD-10-CM

## 2022-02-24 DIAGNOSIS — E11.59 TYPE 2 DIABETES MELLITUS WITH OTHER CIRCULATORY COMPLICATION, WITH LONG-TERM CURRENT USE OF INSULIN (H): Primary | ICD-10-CM

## 2022-02-24 DIAGNOSIS — F84.0 AUTISM: ICD-10-CM

## 2022-02-24 DIAGNOSIS — I25.10 CORONARY ARTERY DISEASE INVOLVING NATIVE CORONARY ARTERY OF NATIVE HEART WITHOUT ANGINA PECTORIS: ICD-10-CM

## 2022-02-24 PROCEDURE — 99207 PR ANNUAL WELLNESS VISIT, PPS, INITIAL STAT: CPT | Performed by: NURSE PRACTITIONER

## 2022-02-24 ASSESSMENT — PATIENT HEALTH QUESTIONNAIRE - PHQ9: SUM OF ALL RESPONSES TO PHQ QUESTIONS 1-9: 0

## 2022-02-24 NOTE — LETTER
"    2/24/2022        RE: Jose Lees  [place]  [address] [facility] [address]        Annual Wellness Visit    Are you in the first 12 months of your Medicare Part B coverage?  No    Physical Health:    In general, how would you rate your overall physical health? good    Outside of work, how many days during the week do you exercise?  NA retired    Outside of work, approximately how many minutes a day do you exercise?not applicable    If you drink alcohol do you typically have >3 drinks per day or >7 drinks per week? No    Do you usually eat at least 4 servings of fruit and vegetables a day, include whole grains & fiber and avoid regularly eating high fat or \"junk\" foods? NO--feeds self, lots of prepared foods    Do you have any problems taking medications regularly? No    Do you have any side effects from medications? none    Needs assistance for the following daily activities: transportation, shopping, housework, laundry and taking medicine    Which of the following safety concerns are present in your home?  none identified     Hearing impairment: Yes, mildly sometimes  In the past 6 months, have you been bothered by leaking of urine? Yes--changes depends by self  usually once daily  Mental Health:    In general, how would you rate your overall mental or emotional health? excellent       2/24/2022:  PHQ-9 Score: 0      PHQ-2 ( 1999 [employer]) 6/23/2021   Q1: Little interest or pleasure in doing things 0   Q2: Feeling down, depressed or hopeless 0   PHQ-2 Score 0   PHQ-2 Total Score (12-17 Years)- Positive if 3 or more points; Administer PHQ-A if positive 0          Do you feel safe in your environment? Yes    Have you ever done Advance Care Planning? (For example, a Health Directive, POLST, or a discussion with a medical provider or your loved ones about your wishes)? Yes, advance care planning is on file.    Fall risk:  no falls,  Uses walker, not high risk for falls.    Do you have sleep " apnea, excessive snoring or daytime drowsiness?: no       Type 2 diabetes mellitus with other circulatory complication, with long-term current use of insulin (H)  Stricture and stenosis of esophagus  Gastroesophageal reflux disease with esophagitis without hemorrhage  Coronary artery disease involving native coronary artery of native heart without angina pectoris  Ischemic cardiomyopathy  Cognitive impairment  Autism  Pigmented purpuric dermatosis  Wellness examination        Current providers sharing in care for this patient include:    Patient Care Team:  Kailey Saucedo APRN CNP as PCP - General (Internal Medicine)  Cecilia Krause MD as MD (Internal Medicine)       SARATH Yi CNP                   Sincerely,        SARATH Yi CNP

## 2022-02-24 NOTE — PROGRESS NOTES
"Annual Wellness Visit    Are you in the first 12 months of your Medicare Part B coverage?  No    Physical Health:    In general, how would you rate your overall physical health? good    Outside of work, how many days during the week do you exercise?  NA retired    Outside of work, approximately how many minutes a day do you exercise?not applicable    If you drink alcohol do you typically have >3 drinks per day or >7 drinks per week? No    Do you usually eat at least 4 servings of fruit and vegetables a day, include whole grains & fiber and avoid regularly eating high fat or \"junk\" foods? NO--feeds self, lots of prepared foods    Do you have any problems taking medications regularly? No    Do you have any side effects from medications? none    Needs assistance for the following daily activities: transportation, shopping, housework, laundry and taking medicine    Which of the following safety concerns are present in your home?  none identified     Hearing impairment: Yes, mildly sometimes  In the past 6 months, have you been bothered by leaking of urine? Yes--changes depends by self  usually once daily  Mental Health:    In general, how would you rate your overall mental or emotional health? excellent       2/24/2022:  PHQ-9 Score: 0      PHQ-2 ( 1999 Pfizer) 6/23/2021   Q1: Little interest or pleasure in doing things 0   Q2: Feeling down, depressed or hopeless 0   PHQ-2 Score 0   PHQ-2 Total Score (12-17 Years)- Positive if 3 or more points; Administer PHQ-A if positive 0          Do you feel safe in your environment? Yes    Have you ever done Advance Care Planning? (For example, a Health Directive, POLST, or a discussion with a medical provider or your loved ones about your wishes)? Yes, advance care planning is on file.    Fall risk:  no falls,  Uses walker, not high risk for falls.    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

## 2022-03-22 NOTE — PROGRESS NOTES
"Chief Complaint: review of accuchecks.  Since discontinue of insulin 2/10/22--sugars have creeped up to mostly be be above 200 and some > 300. Review of his SBP; runs 90's to 120 SBP    HPI:    Jose Lees is a 81 year old  (1940), who is being seen today for an episodic care visit at [facility]. Today's concern is:     Type 2 diabetes mellitus with other circulatory complication, with long-term current use of insulin (H)  Coronary artery disease involving native coronary artery of native heart without angina pectoris      REVIEW OF SYSTEMS:  No complaints    /63   Pulse 90   Temp 97.7  F (36.5  C)   Resp 16   Ht 1.753 m (5' 9\")   Wt 68 kg (150 lb)   SpO2 96%   BMI 22.15 kg/m    GENERAL APPEARANCE:  Alert,oriented x 3 in no distress.says feels fine,     ASSESSMENT / PLAN:  (E11.59,  Z79.4) Type 2 diabetes mellitus with other circulatory complication, with long-term current use of insulin (H)  (primary encounter diagnosis)  Comment/Plan: sugars as above noted.   Cont metformin and add glimepiride (AMARYL) 1 MG tablet. Monitor.    (I25.10) Coronary artery disease involving native coronary artery of native heart without angina pectoris  Comment: of note, pt is lower to normotensive.  Plan: cont with stockings--compression, monitor. Is on no vasoactive meds but he sits a lot in chair in apt    Electronically Signed by:  SAARTH Yi CNP    "

## 2022-03-23 ENCOUNTER — ASSISTED LIVING VISIT (OUTPATIENT)
Dept: GERIATRICS | Facility: CLINIC | Age: 82
End: 2022-03-23
Payer: COMMERCIAL

## 2022-03-23 VITALS
WEIGHT: 150 LBS | RESPIRATION RATE: 16 BRPM | TEMPERATURE: 97.7 F | HEIGHT: 69 IN | DIASTOLIC BLOOD PRESSURE: 63 MMHG | BODY MASS INDEX: 22.22 KG/M2 | HEART RATE: 90 BPM | SYSTOLIC BLOOD PRESSURE: 101 MMHG | OXYGEN SATURATION: 96 %

## 2022-03-23 DIAGNOSIS — Z79.4 TYPE 2 DIABETES MELLITUS WITH OTHER CIRCULATORY COMPLICATION, WITH LONG-TERM CURRENT USE OF INSULIN (H): Primary | ICD-10-CM

## 2022-03-23 DIAGNOSIS — I25.10 CORONARY ARTERY DISEASE INVOLVING NATIVE CORONARY ARTERY OF NATIVE HEART WITHOUT ANGINA PECTORIS: ICD-10-CM

## 2022-03-23 DIAGNOSIS — E11.59 TYPE 2 DIABETES MELLITUS WITH OTHER CIRCULATORY COMPLICATION, WITH LONG-TERM CURRENT USE OF INSULIN (H): Primary | ICD-10-CM

## 2022-03-23 NOTE — LETTER
"    3/23/2022        RE: Jose Lees  [place]  [address] [address]        Chief Complaint: review of accuchecks.  Since discontinue of insulin 2/10/22--sugars have creeped up to mostly be be above 200 and some > 300. Review of his SBP; runs 90's to 120 SBP    HPI:    Jose Lees is a 81 year old  (1940), who is being seen today for an episodic care visit at [facility]. Today's concern is:     Type 2 diabetes mellitus with other circulatory complication, with long-term current use of insulin (H)  Coronary artery disease involving native coronary artery of native heart without angina pectoris      REVIEW OF SYSTEMS:  No complaints    /63   Pulse 90   Temp 97.7  F (36.5  C)   Resp 16   Ht 1.753 m (5' 9\")   Wt 68 kg (150 lb)   SpO2 96%   BMI 22.15 kg/m    GENERAL APPEARANCE:  Alert,oriented x 3 in no distress.says feels fine,     ASSESSMENT / PLAN:  (E11.59,  Z79.4) Type 2 diabetes mellitus with other circulatory complication, with long-term current use of insulin (H)  (primary encounter diagnosis)  Comment/Plan: sugars as above noted.   Cont metformin and add glimepiride (AMARYL) 1 MG tablet. Monitor.    (I25.10) Coronary artery disease involving native coronary artery of native heart without angina pectoris  Comment: of note, pt is lower to normotensive.  Plan: cont with stockings--compression, monitor. Is on no vasoactive meds but he sits a lot in chair in apt    Electronically Signed by:  SARATH Yi CNP          Sincerely,        SARATH Yi CNP    "

## 2022-03-24 RX ORDER — GLIMEPIRIDE 1 MG/1
1 TABLET ORAL
Qty: 30 TABLET | Refills: 3 | Status: SHIPPED | OUTPATIENT
Start: 2022-03-24 | End: 2022-01-01

## 2022-05-03 ENCOUNTER — TRANSFERRED RECORDS (OUTPATIENT)
Dept: HEALTH INFORMATION MANAGEMENT | Facility: CLINIC | Age: 82
End: 2022-05-03
Payer: COMMERCIAL

## 2022-05-13 NOTE — PROGRESS NOTES
Chief Complaint: ***    HPI:    Jose Lees is a 81 year old  (1940), who is being seen today for an episodic care visit at [facility]. Today's concern is:  {fgsdx:283032}    REVIEW OF SYSTEMS:  {KATALINA EYES:325468}    /63   Pulse 90   Temp 97.7  F (36.5  C)   Resp 16   SpO2 96%   GENERAL APPEARANCE:  Alert,oriented x *** in no distress. Lungs CTA***, S1/S2 without M/G/R ***. No ***edema. Abdomen is soft, +BTS***. No focal neurological deficits.  ***    Assessment/Plan:  {fgsdx:739844}        Electronically Signed by:  Melina Anderson MA

## 2022-05-16 PROBLEM — C78.00 MALIGNANT NEOPLASM METASTATIC TO LUNG, UNSPECIFIED LATERALITY (H): Status: ACTIVE | Noted: 2022-01-01

## 2022-05-16 NOTE — LETTER
5/13/2022        RE: Jose Hollins  [address] [address]        [facility]  Chief Complaint   Patient presents with     Annual Comprehensive Nursing Home     [facility] Record Number:  [id number]  Place of Service where encounter took place:  [facility] [[id number]]    HPI:    Jose Lees  is a 81 year old  (1940), who is being seen today for an annual comprehensive visit. HPI information obtained from: facility chart records, facility staff, patient report and [facility] chart review.  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  PAST SURGICAL HISTORY:  has a past surgical history that includes Coronary Angiography Adult Order; Heart Cath, Angioplasty (4/14); Esophagoscopy, gastroscopy,  duodenoscopy (EGD), combined (N/A, 8/6/2015); Phacoemulsification clear cornea with standard intraocular lens implant (Right, 12/4/2018); Phacoemulsification clear cornea with standard intraocular lens implant (Left, 12/19/2018); and Esophagoscopy, gastroscopy, duodenoscopy (EGD), combined (N/A, 1/28/2021).  IMMUNIZATIONS:  Immunization History   Administered Date(s) Administered     COVID-19,PF,Moderna 11/19/2021, 05/10/2022     COVID-19,PF,Pfizer (12+ Yrs) 02/11/2021     FLUAD(HD)65+ QUAD 10/04/2021     Influenza (High Dose) 3 valent vaccine 09/12/2014, 11/11/2015, 10/07/2016, 09/29/2017, 09/20/2018, 10/01/2019     Influenza (IIV3) PF 09/16/2011, 09/19/2012, 10/09/2013     Influenza, Quad, High Dose, Pf, 65yr+ (Fluzone HD) 10/06/2020     Pneumo Conj 13-V (2010&after) 10/07/2016     Pneumococcal 23 valent 09/16/2011     TDAP Vaccine (Adacel) 09/16/2011     Zoster vaccine, live 03/03/2015     Above immunizations pulled from [facility]. [facility] and facility records also reconciled. Outstanding information sent to  to update [facility]  .  Future immunizations are not needed at this point as all recommended immunizations are up to date.      Current Outpatient Medications   Medication Sig Dispense Refill     Alcohol Swabs (SM ALCOHOL PREP) 70 % PADS USE AS DIRECTED FOR BLOOD GLUCOSE TESTING OR INSULIN INJECTIONS 100 each 11     ASPIRIN LOW DOSE 81 MG chewable tablet TAKE ONE TABLET BY MOUTH EVERY DAY 28 tablet 4     atorvastatin (LIPITOR) 40 MG tablet TAKE ONE TABLET BY MOUTH EVERY DAY (HS) 28 tablet 3     carbamide peroxide (DEBROX) 6.5 % otic solution 5 drops 2 times daily For three days both ears and have pt flush ears out in shower.       Cholecalciferol (VITAMIN D3) 50 MCG (2000 UT) CAPS TAKE 1 CAPSULE BY MOUTH EVERY DAY 30 capsule 11     Continuous Glucose Monitor Sup MISC 2 times daily (before meals)        Contour Next EZ (CONTOUR NEXT EZ W/DEVICE KIT) w/Device KIT USE AS DIRECTED 1 kit 0      CONTOUR NEXT TEST test strip TEST TWICE A DAY 50 strip 4     glimepiride (AMARYL) 1 MG tablet Take 1 tablet (1 mg) by mouth every morning (before breakfast) 30 tablet 3     Lancets Misc. (UNISTIK 3 COMFORT) MISC CHECK BLOOD SUGARS TWICE DAILY *REORDER AS NEEDED* 100 each 10     Leuprolide Acetate, 6 Month, (LUPRON DEPOT, 6-MONTH, IM)        metFORMIN (GLUCOPHAGE) 500 MG tablet TAKE 2 TABLETS BY MOUTH TWICE A DAY WITH MEALS 112 tablet 3     Microlet Lancets MISC USE TWICE DAILY 100 each 0     omeprazole (PRILOSEC) 20 MG DR capsule TAKE 1 CAPSULE BY MOUTH EVERY MORNING BEFORE A MEAL 28 capsule 3     senna-docusate (SENOKOT-S/PERICOLACE) 8.6-50 MG tablet TAKE ONE TABLET BY MOUTH EVERY DAY AS NEEDED FOR CONSTIPATION 30 tablet 0     sertraline (ZOLOFT) 50 MG tablet TAKE ONE TABLET BY MOUTH EVERY MORNING 28 tablet 3     triamcinolone (ARISTOCORT HP) 0.5 % external cream Apply topically 2 times daily To aa on legs for 2 weeks. Tapering with improvement and restarting with flares. 60 g 1           Case Management:  I have reviewed the Assisted Living care plan, current immunizations and preventive care/cancer screening. .he is followed by Dr Burgos--had appt 5/3 and CT scans on 5/10/22 Patient's desire to return to the community is not present. Current Level of Care is appropriate.    Advance Directive Discussion:    I reviewed the current advanced directives as reflected in EPIC, the POLST and the facility chart, and verified the congruency of orders  . I contacted the first party shilo sister and bro in law and   plan of Care.  I did not due to cognitive impairment review the advance directives with the resident.     Team Discussion:  I communicated with the appropriate disciplines involved with the Plan of Care:   Nursing    Patient's goal is pain control and comfort.  Information reviewed:  Medications, vital signs, orders, and nursing notes.    TODAY DURING EXAM/ROS: pt forgetful at times but says :No CP, SOB,  Cough, dizziness, fevers, chills, HA, N/V, dysuria or Bowel Abnormalities. Appetite is good.  No pains      Vitals:  /63   Pulse 90   Temp 97.7  F (36.5  C)   Resp 16   SpO2 96%  There is no height or weight on file to calculate BMI.  Exam:  GENERAL: Siting in chair in room, eating BF  ENT:  Mouth and posterior oropharynx normal, normal hearing acuity--missing teeth as before  EYES:  Conjunctivae, lids, pupils and irises normal  RESP:  respiratory effort and palpation of chest normal, lungs clear posteriorly, no respiratory distress  CV:  Auscultation of heart done , RRR  no M,G,R.  Trace pedal edema, + pedal pulses  ABDOMEN:  normal bowel sounds, soft, nontender   M/S:    ROSS equally, seen in chair in apt  SKIN:  Inspection of skin at baseline--has Tubigrips on.  Has no open areas on legs per staff and pt.  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 I sent epic Email to Oncology (Pollo CLEMENS and Dr Burgos)     (E11.59,  Z50.4) Type 2 diabetes mellitus with other circulatory complication, with long-term current use of insulin (H)  Comment/Plan: sugars mostly mid to high 100's and range is  100 to 301(rarely >200). Cont same meds: Amaryl, Glucophage. No changes,  monitor.    CV ISSUES:  (I25.10) Coronary artery disease involving native coronary artery of native heart without angina pectoris  (I10) Hypertension, unspecified type   (I50.30) Heart failure with preserved ejection fraction, NYHA class I (H)  Comment/Plan: wt is 154# today, SBP runs mostly  100-120's occas <100, on no BP meds, on asa and statin, no changes    (R41.89) Cognitive impairment   (F84.0) Autism  (F32.0) Mild major depression (H)  Comment/Plan: Zoloft, no changes--he is usually in good spirits,needs AL for meds, sugar monitoring and has been much more steady and no further/new leg wounds either as when he came to TCU in 2021, monitor.     (Z00.00) Routine general medical examination at a health care facility: 05/16/2022     Total time with patient visit: 40 minutes including discussions about the POC and care coordination with the patient and family, see above.  . Greater than 50% of total time spent with counseling and coordinating care and they are planning to take him to appt to discuss treatment options for the cancer.  They have COVID though so are going to delay the appt and will let staff know when it will be..      Electronically signed by:  SARATH Yi CNP               Sincerely,        SARATH Yi CNP

## 2022-05-16 NOTE — PROGRESS NOTES
[facility]  Chief Complaint   Patient presents with     Annual Comprehensive Nursing Home     [place] Medical Record Number:  [id number]  Place of Service where encounter took place:  [facility] ([facility]) [[id number]]    HPI:    Jose Lees  is a 81 year old  (1940), who is being seen today for an annual comprehensive visit. HPI information obtained from: facility chart records, facility staff, patient report and [facility] chart review.  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  PAST SURGICAL HISTORY:  has a past surgical history that includes Coronary Angiography Adult Order; Heart Cath, Angioplasty (4/14); Esophagoscopy, gastroscopy, duodenoscopy (EGD), combined (N/A, 8/6/2015); Phacoemulsification clear cornea with standard intraocular lens  implant (Right, 12/4/2018); Phacoemulsification clear cornea with standard intraocular lens implant (Left, 12/19/2018); and Esophagoscopy, gastroscopy, duodenoscopy (EGD), combined (N/A, 1/28/2021).  IMMUNIZATIONS:  Immunization History   Administered Date(s) Administered     COVID-19,PF,Moderna 11/19/2021, 05/10/2022     COVID-19,PF,Pfizer (12+ Yrs) 02/11/2021     FLUAD(HD)65+ QUAD 10/04/2021     Influenza (High Dose) 3 valent vaccine 09/12/2014, 11/11/2015, 10/07/2016, 09/29/2017, 09/20/2018, 10/01/2019     Influenza (IIV3) PF 09/16/2011, 09/19/2012, 10/09/2013     Influenza, Quad, High Dose, Pf, 65yr+ (Fluzone HD) 10/06/2020     Pneumo Conj 13-V (2010&after) 10/07/2016     Pneumococcal 23 valent 09/16/2011     TDAP Vaccine (Adacel) 09/16/2011     Zoster vaccine, live 03/03/2015     Above immunizations pulled from [facility]. [facility] and facility records also reconciled. Outstanding information sent to  to update [facility]  .  Future immunizations are not needed at this point as all recommended immunizations are up to date.      Current Outpatient Medications   Medication Sig Dispense Refill     Alcohol Swabs (SM ALCOHOL PREP) 70 % PADS USE AS DIRECTED FOR BLOOD GLUCOSE TESTING OR INSULIN INJECTIONS 100 each 11     ASPIRIN LOW DOSE 81 MG chewable tablet TAKE ONE TABLET BY MOUTH EVERY DAY 28 tablet 4     atorvastatin (LIPITOR) 40 MG tablet TAKE ONE TABLET BY MOUTH EVERY DAY (HS) 28 tablet 3     carbamide peroxide (DEBROX) 6.5 % otic solution 5 drops 2 times daily For three days both ears and have pt flush ears out in shower.       Cholecalciferol (VITAMIN D3) 50 MCG (2000 UT) CAPS TAKE 1 CAPSULE BY MOUTH EVERY DAY 30 capsule 11     Continuous Glucose Monitor Sup MISC 2 times daily (before meals)        Contour Next EZ (CONTOUR NEXT EZ W/DEVICE KIT) w/Device KIT USE AS DIRECTED 1 kit 0     CONTOUR NEXT TEST test strip TEST TWICE A DAY 50 strip 4     glimepiride (AMARYL) 1 MG tablet Take 1 tablet  (1 mg) by mouth every morning (before breakfast) 30 tablet 3     Lancets Misc. (UNISTIK 3 COMFORT) MISC CHECK BLOOD SUGARS TWICE DAILY *REORDER AS NEEDED* 100 each 10     Leuprolide Acetate, 6 Month, (LUPRON DEPOT, 6-MONTH, IM)        metFORMIN (GLUCOPHAGE) 500 MG tablet TAKE 2 TABLETS BY MOUTH TWICE A DAY WITH MEALS 112 tablet 3     Microlet Lancets MISC USE TWICE DAILY 100 each 0     omeprazole (PRILOSEC) 20 MG DR capsule TAKE 1 CAPSULE BY MOUTH EVERY MORNING BEFORE A MEAL 28 capsule 3     senna-docusate (SENOKOT-S/PERICOLACE) 8.6-50 MG tablet TAKE ONE TABLET BY MOUTH EVERY DAY AS NEEDED FOR CONSTIPATION 30 tablet 0     sertraline (ZOLOFT) 50 MG tablet TAKE ONE TABLET BY MOUTH EVERY MORNING 28 tablet 3     triamcinolone (ARISTOCORT HP) 0.5 % external cream Apply topically 2 times daily To aa on legs for 2 weeks. Tapering with improvement and restarting with flares. 60 g 1           Case Management:  I have reviewed the Assisted Living care plan, current immunizations and preventive care/cancer screening. .he is followed by Dr Burgos--had appt 5/3 and CT scans on 5/10/22 Patient's desire to return to the community is not present. Current Level of Care is appropriate.    Advance Directive Discussion:    I reviewed the current advanced directives as reflected in EPIC, the POLST and the facility chart, and verified the congruency of orders  . I contacted the first party shilo sister and bro in law and   plan of Care.  I did not due to cognitive impairment review the advance directives with the resident.     Team Discussion:  I communicated with the appropriate disciplines involved with the Plan of Care:   Nursing    Patient's goal is pain control and comfort.  Information reviewed:  Medications, vital signs, orders, and nursing notes.    TODAY DURING EXAM/ROS: pt forgetful at times but says :No CP, SOB, Cough, dizziness, fevers, chills, HA, N/V, dysuria or Bowel Abnormalities. Appetite is good.  No  pains      Vitals:  /63   Pulse 90   Temp 97.7  F (36.5  C)   Resp 16   SpO2 96%  There is no height or weight on file to calculate BMI.  Exam:  GENERAL: Siting in chair in room, eating BF  ENT:  Mouth and posterior oropharynx normal, normal hearing acuity--missing teeth as before  EYES:  Conjunctivae, lids, pupils and irises normal  RESP:  respiratory effort and palpation of chest normal, lungs clear posteriorly, no respiratory distress  CV:  Auscultation of heart done , RRR  no M,G,R.  Trace pedal edema, + pedal pulses  ABDOMEN:  normal bowel sounds, soft, nontender   M/S:    ROSS equally, seen in chair in apt  SKIN:  Inspection of skin at baseline--has Tubigrips on.  Has no open areas on legs per staff and pt.  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 I sent epic Email to Oncology (Pollo CLEMENS and Dr Burgos)     (D87.59,  Z40.4) Type 2 diabetes mellitus with other circulatory complication, with long-term current use of insulin (H)  Comment/Plan: sugars mostly mid to high 100's and range is  100 to 301(rarely >200). Cont same meds: Amaryl, Glucophage. No changes, monitor.    CV ISSUES:  (I25.10) Coronary artery disease involving native coronary artery of  native heart without angina pectoris  (I10) Hypertension, unspecified type   (I50.30) Heart failure with preserved ejection fraction, NYHA class I (H)  Comment/Plan: wt is 154# today, SBP runs mostly  100-120's occas <100, on no BP meds, on asa and statin, no changes    (R41.89) Cognitive impairment   (F84.0) Autism  (F32.0) Mild major depression (H)  Comment/Plan: Zoloft, no changes--he is usually in good spirits,needs AL for meds, sugar monitoring and has been much more steady and no further/new leg wounds either as when he came to TCU in 2021, monitor.     (Z00.00) Routine general medical examination at a health care facility: 05/16/2022     Total time with patient visit: 40 minutes including discussions about the POC and care coordination with the patient and family, see above.  . Greater than 50% of total time spent with counseling and coordinating care and they are planning to take him to appt to discuss treatment options for the cancer.  They have COVID though so are going to delay the appt and will let staff know when it will be..      Electronically signed by:  SARATH Yi CNP

## 2022-06-06 NOTE — PROGRESS NOTES
"Chief Complaint: seen to f/u on mets cancer and  What plan pt and family wish.  Last week they did not want TX and not sure about hospice.    HPI:    Jose Lees is a 81 year old  (1940), who is being seen today for an episodic care visit at [facility]. 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 No bowel or bladder issues. wears depends instead of underwear but is not incontinent, he says. Otherwise: Respiratory, CV negative    /63   Pulse 90   Temp 97.7  F (36.5  C)   Resp 16   Ht 1.753 m (5' 9\")   Wt 68 kg (150 lb)   SpO2 96%   BMI 22.15 kg/m    GENERAL APPEARANCE:  Alert,oriented x 3 in no distress. Lungs CTA , S1/S2 without M/G/R  . No  edema. Abdomen is soft, +BTS . No focal neurological deficits.   skin warm dry intact,  Seen sitting in chair  Recent Labs   Lab Test 01/11/22  1000 08/03/21  0643    139   POTASSIUM 4.4 4.2   CHLORIDE 103 101   CO2 22 29   ANIONGAP 12 9   * 101   BUN 15 16   CR 1.07 0.89   TENZIN 8.8 9.3     CBC RESULTS: Recent Labs   Lab Test 01/11/22  1000   WBC 6.7   RBC 4.02*   HGB 12.5*   HCT 37.5*   MCV 93   MCH 31.1   MCHC 33.3   RDW 14.0        Current Outpatient " Medications   Medication Sig Dispense Refill     Alcohol Swabs (SM ALCOHOL PREP) 70 % PADS USE AS DIRECTED FOR BLOOD GLUCOSE TESTING OR INSULIN INJECTIONS 100 each 11     ASPIRIN LOW DOSE 81 MG chewable tablet TAKE ONE TABLET BY MOUTH EVERY DAY 28 tablet 11     atorvastatin (LIPITOR) 40 MG tablet TAKE ONE TABLET BY MOUTH EVERY DAY (HS) 28 tablet 11     carbamide peroxide (DEBROX) 6.5 % otic solution 5 drops 2 times daily For three days both ears and have pt flush ears out in shower.       Cholecalciferol (VITAMIN D3) 50 MCG (2000 UT) CAPS TAKE 1 CAPSULE BY MOUTH EVERY DAY 30 capsule 11     Continuous Glucose Monitor Sup MISC 2 times daily (before meals)        Contour Next EZ (CONTOUR NEXT EZ W/DEVICE KIT) w/Device KIT USE AS DIRECTED 1 kit 0     CONTOUR NEXT TEST test strip TEST TWICE A DAY 50 strip 4     glimepiride (AMARYL) 1 MG tablet TAKE ONE TABLET BY MOUTH EVERY MORNING BEFORE BREAKFAST 28 tablet 11     Lancets Misc. (UNISTIK 3 COMFORT) MISC CHECK BLOOD SUGARS TWICE DAILY *REORDER AS NEEDED* 100 each 10     Leuprolide Acetate, 6 Month, (LUPRON DEPOT, 6-MONTH, IM)        metFORMIN (GLUCOPHAGE) 500 MG tablet TAKE 2 TABLETS BY MOUTH TWICE A DAY WITH MEALS 120 tablet 11     Microlet Lancets MISC USE TWICE DAILY 100 each 0     omeprazole (PRILOSEC) 20 MG DR capsule TAKE 1 CAPSULE BY MOUTH EVERY MORNING BEFORE A MEAL 28 capsule 11     senna-docusate (SENOKOT-S/PERICOLACE) 8.6-50 MG tablet TAKE ONE TABLET BY MOUTH EVERY DAY AS NEEDED FOR CONSTIPATION 30 tablet 0     sertraline (ZOLOFT) 50 MG tablet TAKE ONE TABLET BY MOUTH EVERY MORNING 28 tablet 11     triamcinolone (ARISTOCORT HP) 0.5 % external cream Apply topically 2 times daily To aa on legs for 2 weeks. Tapering with improvement and restarting with flares. 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  I d/w [employer] Team--they will see. Has no pain currently.    CV ISSUES:  (I25.10) Coronary artery disease involving native coronary artery of native heart without angina pectoris   (I25.2) History of MI (myocardial infarction): h/of Stent   (I50.30) Heart failure with preserved ejection fraction, NYHA class I (H)   (I10) Hypertension, unspecified type  Comment/Plan: no acute issues, compensated, no edema, cont same POC, meds monitor.    (E11.9) Type 2 diabetes mellitus without complication, without long-term current use of insulin (H)  Comment/Plan: sugars have been reasonable in mid 100's mostly.  Is on metformin and Amaryl, no changes. monitor.    (R41.89) Cognitive impairment   (F84.0) Autism  Comment/Plan: at baseline--goal is to keep in his apt as long as possible.     Total time with patient visit: 40 minutes including discussions about the POC and care coordination with the patient and family, sister and bro in law. Greater than 50% of total time spent with counseling and coordinating care due to all in agreement to Hospice and goals of care as noted above..      Electronically Signed by:  SARATH Yi CNP

## 2022-06-07 NOTE — LETTER
"    6/7/2022        RE: Jose Lees  [place]  [address] [address] [facility] [address]        Chief Complaint: seen to f/u on mets cancer and  What plan pt and family wish.  Last week they did not want TX and not sure about hospice.    HPI:    Jose Lees is a 81 year old  (1940), who is being seen today for an episodic care visit at [facility]. 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 No bowel or bladder issues. wears depends instead of underwear but is not incontinent, he says. Otherwise: Respiratory, CV negative    /63   Pulse 90   Temp 97.7  F (36.5  C)   Resp 16   Ht 1.753 m (5' 9\")   Wt 68 kg (150 lb)   SpO2 96%   BMI 22.15 kg/m    GENERAL APPEARANCE:  Alert,oriented x 3 in no distress. Lungs CTA , S1/S2 without M/G/R  . No  edema. Abdomen is soft, +BTS . No focal neurological deficits.   skin warm dry intact,  Seen sitting in chair  Recent Labs   Lab Test 01/11/22  1000 08/03/21  0643    139   POTASSIUM 4.4 4.2   CHLORIDE 103 101   CO2 22 29   ANIONGAP 12 9   * 101   BUN 15 16   CR 1.07 0.89   TENZIN 8.8 9.3     CBC RESULTS: Recent Labs   Lab Test 01/11/22  1000   WBC 6.7   RBC " 4.02*   HGB 12.5*   HCT 37.5*   MCV 93   MCH 31.1   MCHC 33.3   RDW 14.0        Current Outpatient Medications   Medication Sig Dispense Refill     Alcohol Swabs (SM ALCOHOL PREP) 70 % PADS USE AS DIRECTED FOR BLOOD GLUCOSE TESTING OR INSULIN INJECTIONS 100 each 11     ASPIRIN LOW DOSE 81 MG chewable tablet TAKE ONE TABLET BY MOUTH EVERY DAY 28 tablet 11     atorvastatin (LIPITOR) 40 MG tablet TAKE ONE TABLET BY MOUTH EVERY DAY (HS) 28 tablet 11     carbamide peroxide (DEBROX) 6.5 % otic solution 5 drops 2 times daily For three days both ears and have pt flush ears out in shower.       Cholecalciferol (VITAMIN D3) 50 MCG (2000 UT) CAPS TAKE 1 CAPSULE BY MOUTH EVERY DAY 30 capsule 11     Continuous Glucose Monitor Sup MISC 2 times daily (before meals)        Contour Next EZ (CONTOUR NEXT EZ W/DEVICE KIT) w/Device KIT USE AS DIRECTED 1 kit 0     CONTOUR NEXT TEST test strip TEST TWICE A DAY 50 strip 4     glimepiride (AMARYL) 1 MG tablet TAKE ONE TABLET BY MOUTH EVERY MORNING BEFORE BREAKFAST 28 tablet 11     Lancets Misc. (UNISTIK 3 COMFORT) MISC CHECK BLOOD SUGARS TWICE DAILY *REORDER AS NEEDED* 100 each 10     Leuprolide Acetate, 6 Month, (LUPRON DEPOT, 6-MONTH, IM)        metFORMIN (GLUCOPHAGE) 500 MG tablet TAKE 2 TABLETS BY MOUTH TWICE A DAY WITH MEALS 120 tablet 11     Microlet Lancets MISC USE TWICE DAILY 100 each 0     omeprazole (PRILOSEC) 20 MG DR capsule TAKE 1 CAPSULE BY MOUTH EVERY MORNING BEFORE A MEAL 28 capsule 11     senna-docusate (SENOKOT-S/PERICOLACE) 8.6-50 MG tablet TAKE ONE TABLET BY MOUTH EVERY DAY AS NEEDED FOR CONSTIPATION 30 tablet 0     sertraline (ZOLOFT) 50 MG tablet TAKE ONE TABLET BY MOUTH EVERY MORNING 28 tablet 11     triamcinolone (ARISTOCORT HP) 0.5 % external cream Apply topically 2 times daily To aa on legs for 2 weeks. Tapering with improvement and restarting with flares. 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  I d/w [employer] Team--they will see. Has no pain currently.    CV ISSUES:  (I25.10) Coronary artery disease involving native coronary artery of native heart without angina pectoris   (I25.2) History of MI (myocardial infarction): h/of Stent   (I50.30) Heart failure with preserved ejection fraction, NYHA class I (H)   (I10) Hypertension, unspecified type  Comment/Plan: no acute issues, compensated, no edema, cont same POC, meds monitor.    (E11.9) Type 2 diabetes mellitus without complication, without long-term current use of insulin (H)  Comment/Plan: sugars have been reasonable in mid 100's mostly.  Is on metformin and Amaryl, no changes. monitor.    (R41.89) Cognitive impairment   (F84.0) Autism  Comment/Plan: at baseline--goal is to keep in his apt as long as possible.     Total time with patient visit: 40 minutes including discussions about the POC and care coordination with the patient and family, sister and bro in law. Greater than 50% of total time spent with counseling and coordinating care due to all in agreement to Hospice and goals of care as noted above..      Electronically Signed by:  SARATH Yi CNP          Sincerely,        SARATH Yi CNP

## 2022-06-08 PROBLEM — E11.9 TYPE 2 DIABETES MELLITUS WITHOUT COMPLICATION, WITHOUT LONG-TERM CURRENT USE OF INSULIN (H): Status: ACTIVE | Noted: 2022-01-01

## 2022-06-08 PROBLEM — Z79.4 TYPE 2 DIABETES MELLITUS WITH DIABETIC POLYNEUROPATHY, WITH LONG-TERM CURRENT USE OF INSULIN (H): Status: RESOLVED | Noted: 2021-09-08 | Resolved: 2022-01-01

## 2022-06-08 PROBLEM — C78.02 MALIGNANT NEOPLASM METASTATIC TO BOTH LUNGS (H): Status: ACTIVE | Noted: 2022-01-01

## 2022-06-08 PROBLEM — E11.42 TYPE 2 DIABETES MELLITUS WITH DIABETIC POLYNEUROPATHY, WITH LONG-TERM CURRENT USE OF INSULIN (H): Status: RESOLVED | Noted: 2021-09-08 | Resolved: 2022-01-01

## 2022-06-08 PROBLEM — C78.01 MALIGNANT NEOPLASM METASTATIC TO BOTH LUNGS (H): Status: ACTIVE | Noted: 2022-01-01

## 2022-06-22 NOTE — LETTER
"    6/22/2022        RE: Jose Hollins  [address] [address] [facility] [address]        Chief Complaint: f/u since admission to hospice recently    HPI:    Jose Lees is a 81 year old  (1940), who is being seen today for an episodic care visit at [facility]. 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 Lungs CTA , S1/S2 without M/G/R. Trace edema. Abdomen is soft, +BTS . No focal neurological deficits.  He has no C/O pain, SOB, Cough, dizziness, HA, N/V or chills.  No probs with voiding or BMs. Sleeping well  Current Outpatient Medications   Medication Sig Dispense Refill     acetaminophen (TYLENOL) 650 MG suppository Place 650 mg rectally every 6 hours as needed for fever       Alcohol Swabs (SM ALCOHOL PREP) 70 % PADS USE AS DIRECTED FOR BLOOD GLUCOSE TESTING OR INSULIN INJECTIONS 100 each 11     ASPIRIN LOW DOSE 81 MG chewable tablet TAKE ONE TABLET BY MOUTH EVERY DAY 28 tablet 11     atorvastatin (LIPITOR) 40 MG tablet TAKE ONE TABLET BY MOUTH EVERY DAY (HS) 28 tablet 11     bisacodyl (DULCOLAX) 10 MG suppository Place 10 mg rectally daily as needed for constipation       carbamide peroxide (DEBROX) 6.5 % otic solution 5 drops 2 times daily For three days both ears and have pt flush ears out in " shower.       Cholecalciferol (VITAMIN D3) 50 MCG (2000 UT) CAPS TAKE 1 CAPSULE BY MOUTH EVERY DAY 30 capsule 11     Continuous Glucose Monitor Sup MISC 2 times daily (before meals)        Contour Next EZ (CONTOUR NEXT EZ W/DEVICE KIT) w/Device KIT USE AS DIRECTED 1 kit 0     CONTOUR NEXT TEST test strip TEST TWICE A DAY 50 strip 4     glimepiride (AMARYL) 1 MG tablet TAKE ONE TABLET BY MOUTH EVERY MORNING BEFORE BREAKFAST 28 tablet 11     hyoscyamine (LEVSIN/SL) 0.125 MG sublingual tablet Place 0.125 mg under the tongue every 4 hours as needed for cramping       Lancets Misc. (UNISTIK 3 COMFORT) MISC CHECK BLOOD SUGARS TWICE DAILY *REORDER AS NEEDED* 100 each 10     Leuprolide Acetate, 6 Month, (LUPRON DEPOT, 6-MONTH, IM)        LORazepam (ATIVAN) 0.5 MG tablet Take 0.5 mg by mouth every 6 hours as needed for anxiety       metFORMIN (GLUCOPHAGE) 500 MG tablet TAKE 2 TABLETS BY MOUTH TWICE A DAY WITH MEALS 120 tablet 11     Microlet Lancets MISC USE TWICE DAILY 100 each 0     morphine 2.5 MG solu-tab Take 2.5 mg by mouth every hour as needed for shortness of breath / dyspnea or moderate to severe pain       omeprazole (PRILOSEC) 20 MG DR capsule TAKE 1 CAPSULE BY MOUTH EVERY MORNING BEFORE A MEAL 28 capsule 11     senna-docusate (SENOKOT-S/PERICOLACE) 8.6-50 MG tablet TAKE ONE TABLET BY MOUTH EVERY DAY AS NEEDED FOR CONSTIPATION 30 tablet 0     sertraline (ZOLOFT) 50 MG tablet TAKE ONE TABLET BY MOUTH EVERY MORNING 28 tablet 11     triamcinolone (ARISTOCORT HP) 0.5 % external cream Apply topically 2 times daily To aa on legs for 2 weeks. Tapering with improvement and restarting with flares. 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 Goal is comfort. Monitor.    (I25.10) Coronary artery disease involving  native coronary artery of native heart without angina pectoris   (I25.2) History of MI (myocardial infarction): h/o Stent   (I50.30) Heart failure with preserved ejection fraction, NYHA class I (H)   (I10) Hypertension, unspecified type  Comment/Plan: cont same meds at this time==no probs taking po--monitor.    (E11.9) Type 2 diabetes mellitus without complication, without long-term current use of insulin (H)  Comment/Plan: controlled reasonably, no change monitor.    (R41.89) Cognitive impairment   (F84.0) Autism  Comment/Plan: at baseline, aware of diagnoses but ?his overall understanding. Able to be in apt--will move to [facility] if care needs increase        Electronically Signed by:  SARATH Yi CNP          Sincerely,        SARATH Yi CNP

## 2022-06-22 NOTE — PROGRESS NOTES
"Chief Complaint: f/u since admission to hospice recently    HPI:    Jose Lees is a 81 year old  (1940), who is being seen today for an episodic care visit at [facility]. 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 Lungs CTA , S1/S2 without M/G/R. Trace edema. Abdomen is soft, +BTS . No focal neurological deficits.  He has no C/O pain, SOB, Cough, dizziness, HA, N/V or chills.  No probs with voiding or BMs. Sleeping well  Current Outpatient Medications   Medication Sig Dispense Refill     acetaminophen (TYLENOL) 650 MG suppository Place 650 mg rectally every 6 hours as needed for fever       Alcohol Swabs (SM ALCOHOL PREP) 70 % PADS USE AS DIRECTED FOR BLOOD GLUCOSE TESTING OR INSULIN INJECTIONS 100 each 11     ASPIRIN LOW DOSE 81 MG chewable tablet TAKE ONE TABLET BY MOUTH EVERY DAY 28 tablet 11     atorvastatin (LIPITOR) 40 MG tablet TAKE ONE TABLET BY MOUTH EVERY DAY (HS) 28 tablet 11     bisacodyl (DULCOLAX) 10 MG suppository Place 10 mg rectally daily as needed for constipation       carbamide peroxide (DEBROX) 6.5 % otic solution 5 drops 2 times daily For three days both ears and have pt flush ears out in shower.       Cholecalciferol (VITAMIN D3) 50 MCG (2000 UT) CAPS TAKE 1 CAPSULE BY MOUTH EVERY DAY 30 capsule 11     " Continuous Glucose Monitor Sup MISC 2 times daily (before meals)        Contour Next EZ (CONTOUR NEXT EZ W/DEVICE KIT) w/Device KIT USE AS DIRECTED 1 kit 0     CONTOUR NEXT TEST test strip TEST TWICE A DAY 50 strip 4     glimepiride (AMARYL) 1 MG tablet TAKE ONE TABLET BY MOUTH EVERY MORNING BEFORE BREAKFAST 28 tablet 11     hyoscyamine (LEVSIN/SL) 0.125 MG sublingual tablet Place 0.125 mg under the tongue every 4 hours as needed for cramping       Lancets Misc. (UNISTIK 3 COMFORT) MISC CHECK BLOOD SUGARS TWICE DAILY *REORDER AS NEEDED* 100 each 10     Leuprolide Acetate, 6 Month, (LUPRON DEPOT, 6-MONTH, IM)        LORazepam (ATIVAN) 0.5 MG tablet Take 0.5 mg by mouth every 6 hours as needed for anxiety       metFORMIN (GLUCOPHAGE) 500 MG tablet TAKE 2 TABLETS BY MOUTH TWICE A DAY WITH MEALS 120 tablet 11     Microlet Lancets MISC USE TWICE DAILY 100 each 0     morphine 2.5 MG solu-tab Take 2.5 mg by mouth every hour as needed for shortness of breath / dyspnea or moderate to severe pain       omeprazole (PRILOSEC) 20 MG DR capsule TAKE 1 CAPSULE BY MOUTH EVERY MORNING BEFORE A MEAL 28 capsule 11     senna-docusate (SENOKOT-S/PERICOLACE) 8.6-50 MG tablet TAKE ONE TABLET BY MOUTH EVERY DAY AS NEEDED FOR CONSTIPATION 30 tablet 0     sertraline (ZOLOFT) 50 MG tablet TAKE ONE TABLET BY MOUTH EVERY MORNING 28 tablet 11     triamcinolone (ARISTOCORT HP) 0.5 % external cream Apply topically 2 times daily To aa on legs for 2 weeks. Tapering with improvement and restarting with flares. 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 Goal is comfort. Monitor.    (I25.10) Coronary artery disease involving native coronary artery of native heart without angina pectoris   (I25.2) History of MI (myocardial infarction): h/o  Stent   (I50.30) Heart failure with preserved ejection fraction, NYHA class I (H)   (I10) Hypertension, unspecified type  Comment/Plan: cont same meds at this time==no probs taking po--monitor.    (E11.9) Type 2 diabetes mellitus without complication, without long-term current use of insulin (H)  Comment/Plan: controlled reasonably, no change monitor.    (R41.89) Cognitive impairment   (F84.0) Autism  Comment/Plan: at baseline, aware of diagnoses but ?his overall understanding. Able to be in apt--will move to [facility] if care needs increase        Electronically Signed by:  SARATH Yi CNP

## 2022-06-26 PROBLEM — Z51.5 HOSPICE CARE PATIENT: Status: ACTIVE | Noted: 2022-01-01

## 2022-07-07 NOTE — PROGRESS NOTES
"Chief Complaint: f/u on pt in AL on Hospice    HPI:    Jose Lees is a 81 year old  (1940), who is being seen today for an episodic care visit at [facility]. 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 Appetite is says is eating well \"I ate two potatoes today, in the microwave with butter, they were good\"..  No pain. Enjoys sitting outside on patio      /63   Pulse 90   Temp 97.7  F (36.5  C)   Resp 16   Ht 1.753 m (5' 9\")   Wt 68 kg (150 lb)   SpO2 96%   BMI 22.15 kg/m    SUBJECTIVE/OBJECTIVE DATA:  GENERAL APPEARANCE:  Alert,oriented x 3 in no distress. Lungs CTA , S1/S2 without M/G/R . Has mild pitting edema Lt > Rt. His  Abdomen is soft, +BTS . No focal neurological deficits. Walks with walker, sitting on bench outside in shade     Current Outpatient Medications   Medication Sig Dispense Refill     acetaminophen (TYLENOL) 650 MG suppository Place 650 mg rectally every 6 hours as needed for fever       Alcohol Swabs (SM ALCOHOL PREP) 70 % PADS USE AS DIRECTED FOR BLOOD GLUCOSE TESTING OR INSULIN INJECTIONS 100 each 11     ASPIRIN LOW DOSE 81 MG chewable tablet TAKE ONE TABLET BY MOUTH EVERY DAY 28 tablet 11     atorvastatin (LIPITOR) 40 MG tablet TAKE ONE TABLET BY MOUTH EVERY DAY (HS) 28 tablet 11     bisacodyl (DULCOLAX) 10 MG suppository Place 10 mg rectally daily as needed for constipation       carbamide peroxide (DEBROX) 6.5 % otic " solution 5 drops 2 times daily For three days both ears and have pt flush ears out in shower.       Cholecalciferol (VITAMIN D3) 50 MCG (2000 UT) CAPS TAKE 1 CAPSULE BY MOUTH EVERY DAY 30 capsule 11     Contour Next EZ (CONTOUR NEXT EZ W/DEVICE KIT) w/Device KIT USE AS DIRECTED 1 kit 0     CONTOUR NEXT TEST test strip TEST TWICE A DAY 50 strip 4     glimepiride (AMARYL) 1 MG tablet TAKE ONE TABLET BY MOUTH EVERY MORNING BEFORE BREAKFAST 28 tablet 11     hyoscyamine (LEVSIN/SL) 0.125 MG sublingual tablet Place 0.125 mg under the tongue every 4 hours as needed for cramping       Lancets Misc. (UNISTIK 3 COMFORT) MISC CHECK BLOOD SUGARS TWICE DAILY *REORDER AS NEEDED* 100 each 10     LORazepam (ATIVAN) 0.5 MG tablet Take 0.5 mg by mouth every 6 hours as needed for anxiety       metFORMIN (GLUCOPHAGE) 500 MG tablet TAKE 2 TABLETS BY MOUTH TWICE A DAY WITH MEALS 120 tablet 11     Microlet Lancets MISC USE TWICE DAILY 100 each 0     morphine 2.5 MG solu-tab Take 2.5 mg by mouth every hour as needed for shortness of breath / dyspnea or moderate to severe pain       omeprazole (PRILOSEC) 20 MG DR capsule TAKE 1 CAPSULE BY MOUTH EVERY MORNING BEFORE A MEAL 28 capsule 11     senna-docusate (SENOKOT-S/PERICOLACE) 8.6-50 MG tablet TAKE ONE TABLET BY MOUTH EVERY DAY AS NEEDED FOR CONSTIPATION 30 tablet 0     sertraline (ZOLOFT) 50 MG tablet TAKE ONE TABLET BY MOUTH EVERY MORNING 28 tablet 11     triamcinolone (ARISTOCORT HP) 0.5 % external cream Apply topically 2 times daily To aa on legs for 2 weeks. Tapering with improvement and restarting with flares. 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    CV ISSUES:  (I25.10) Coronary artery disease involving native coronary artery of native heart without angina pectoris  (I25.2) History of MI (myocardial infarction): h/of Stent   (I50.30) Heart failure with preserved ejection fraction, NYHA class I (H)   (I10) Hypertension, unspecified type  Comment/Plan: compensated, Vitals fine, consider discontinuation of statin and asa when declines more.    (E11.9) Type 2 diabetes mellitus without complication, without long-term current use of insulin (H)  Comment/Plan: cont current meds of amaryl and metformin, adjust as needed when appetite decreases.    (R41.89) Cognitive impairment   (F84.0) Autism  Comment/Plan: appears at baseline, doing fine in his apt. [facility]     Electronically Signed by:  SARATH Yi CNP

## 2022-09-14 NOTE — LETTER
"    9/14/2022        RE: Jose Lees  [place]  [address] [facility] [address]        Chief Complaint: weight loss and increased weakness per d/w Hospice Team and RN    HPI:    Jose Lees is a 81 year old  (1940), who is being seen today for an episodic care visit at [facility]. 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 Appetite is down.  No pain. He feels weaker and mostly stays in his apt now \"I don't go to the computer room or outside but save my energy for when family comes\".  He feels SOB at times.  Has occas Cough,.  OBJECTIVE DATA:  GENERAL APPEARANCE:  Alert,oriented x 3 in no distress. Lungs CTA , RRR.  Trace bilat pedal edema. Abdomen is soft, +BTS . No focal neurological deficits. Skin intact --no leg sores.     Current Outpatient Medications   Medication Sig Dispense Refill     acetaminophen (TYLENOL) 650 MG suppository Place 650 mg rectally every 6 hours as needed for fever       Alcohol Swabs (SM ALCOHOL PREP) 70 % PADS USE AS DIRECTED FOR BLOOD GLUCOSE TESTING OR INSULIN INJECTIONS 100 each 11     ASPIRIN LOW DOSE 81 MG chewable tablet TAKE ONE TABLET BY MOUTH EVERY DAY 28 tablet 11     bisacodyl (DULCOLAX) 10 MG suppository Place 10 mg rectally daily as needed for constipation       carbamide peroxide (DEBROX) 6.5 % otic solution 5 drops 2 times daily For " three days both ears and have pt flush ears out in shower.       Contour Next EZ (CONTOUR NEXT EZ W/DEVICE KIT) w/Device KIT USE AS DIRECTED 1 kit 0     CONTOUR NEXT TEST test strip TEST TWICE A DAY 50 strip 11     glimepiride (AMARYL) 1 MG tablet TAKE ONE TABLET BY MOUTH EVERY MORNING BEFORE BREAKFAST 28 tablet 11     hyoscyamine (LEVSIN/SL) 0.125 MG sublingual tablet Place 0.125 mg under the tongue every 4 hours as needed for cramping       Lancets Misc. (UNISTIK 3 COMFORT) MISC CHECK BLOOD SUGARS TWICE DAILY *REORDER AS NEEDED* 100 each 10     LORazepam (ATIVAN) 0.5 MG tablet Take 0.5 mg by mouth every 6 hours as needed for anxiety       metFORMIN (GLUCOPHAGE) 500 MG tablet TAKE 2 TABLETS BY MOUTH TWICE A DAY WITH MEALS 120 tablet 11     Microlet Lancets MISC USE TWICE DAILY 100 each 0     morphine 2.5 MG solu-tab Take 2.5 mg by mouth every hour as needed for shortness of breath / dyspnea or moderate to severe pain       omeprazole (PRILOSEC) 20 MG DR capsule TAKE 1 CAPSULE BY MOUTH EVERY MORNING BEFORE A MEAL 28 capsule 11     senna-docusate (SENOKOT-S/PERICOLACE) 8.6-50 MG tablet TAKE ONE TABLET BY MOUTH EVERY DAY AS NEEDED FOR CONSTIPATION 30 tablet 0     sertraline (ZOLOFT) 50 MG tablet TAKE ONE TABLET BY MOUTH EVERY MORNING 28 tablet 11     triamcinolone (ARISTOCORT HP) 0.5 % external cream Apply topically 2 times daily To aa on legs for 2 weeks. Tapering with improvement and restarting with flares. 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 Also discontinue vitamin d and statin.  appreciate hospice care    (I25.10) Coronary artery disease involving native coronary artery of native heart without angina pectoris   (I50.30) Heart failure with preserved ejection fraction, NYHA class I  (H)   (I10) Hypertension, unspecified type  Comment/Plan: SBP runs 110-120's will cont with asa for now.    (E11.9) Type 2 diabetes mellitus without complication, without long-term current use of insulin (H)  Comment/Plan: accuchecks high 100 to mid 200's mostly, will cont Amaryl, metformin, will taper as he declines and eats less. Will await wt.    (R41.89) Cognitive impairment   (F84.0) Autism  Comment/Plan: at baseline  he is very fond of  family/how much they mean to him and enjoys there visits.      Electronically Signed by:  SARATH Yi CNP        Sincerely,        SARATH Yi CNP

## 2022-09-14 NOTE — PROGRESS NOTES
"Chief Complaint: weight loss and increased weakness per d/w Hospice Team and RN    HPI:    Jose Lees is a 81 year old  (1940), who is being seen today for an episodic care visit at [facility]. 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 Appetite is down.  No pain. He feels weaker and mostly stays in his apt now \"I don't go to the computer room or outside but save my energy for when family comes\".  He feels SOB at times.  Has occas Cough,.  OBJECTIVE DATA:  GENERAL APPEARANCE:  Alert,oriented x 3 in no distress. Lungs CTA , RRR.  Trace bilat pedal edema. Abdomen is soft, +BTS . No focal neurological deficits. Skin intact --no leg sores.     Current Outpatient Medications   Medication Sig Dispense Refill     acetaminophen (TYLENOL) 650 MG suppository Place 650 mg rectally every 6 hours as needed for fever       Alcohol Swabs (SM ALCOHOL PREP) 70 % PADS USE AS DIRECTED FOR BLOOD GLUCOSE TESTING OR INSULIN INJECTIONS 100 each 11     ASPIRIN LOW DOSE 81 MG chewable tablet TAKE ONE TABLET BY MOUTH EVERY DAY 28 tablet 11     bisacodyl (DULCOLAX) 10 MG suppository Place 10 mg rectally daily as needed for constipation       carbamide peroxide (DEBROX) 6.5 % otic solution 5 drops 2 times daily For three days both ears and have pt flush ears out in shower.       Contour Next EZ (CONTOUR NEXT EZ W/DEVICE KIT) " w/Device KIT USE AS DIRECTED 1 kit 0     CONTOUR NEXT TEST test strip TEST TWICE A DAY 50 strip 11     glimepiride (AMARYL) 1 MG tablet TAKE ONE TABLET BY MOUTH EVERY MORNING BEFORE BREAKFAST 28 tablet 11     hyoscyamine (LEVSIN/SL) 0.125 MG sublingual tablet Place 0.125 mg under the tongue every 4 hours as needed for cramping       Lancets Misc. (UNISTIK 3 COMFORT) MISC CHECK BLOOD SUGARS TWICE DAILY *REORDER AS NEEDED* 100 each 10     LORazepam (ATIVAN) 0.5 MG tablet Take 0.5 mg by mouth every 6 hours as needed for anxiety       metFORMIN (GLUCOPHAGE) 500 MG tablet TAKE 2 TABLETS BY MOUTH TWICE A DAY WITH MEALS 120 tablet 11     Microlet Lancets MISC USE TWICE DAILY 100 each 0     morphine 2.5 MG solu-tab Take 2.5 mg by mouth every hour as needed for shortness of breath / dyspnea or moderate to severe pain       omeprazole (PRILOSEC) 20 MG DR capsule TAKE 1 CAPSULE BY MOUTH EVERY MORNING BEFORE A MEAL 28 capsule 11     senna-docusate (SENOKOT-S/PERICOLACE) 8.6-50 MG tablet TAKE ONE TABLET BY MOUTH EVERY DAY AS NEEDED FOR CONSTIPATION 30 tablet 0     sertraline (ZOLOFT) 50 MG tablet TAKE ONE TABLET BY MOUTH EVERY MORNING 28 tablet 11     triamcinolone (ARISTOCORT HP) 0.5 % external cream Apply topically 2 times daily To aa on legs for 2 weeks. Tapering with improvement and restarting with flares. 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 Also discontinue vitamin d and statin.  appreciate hospice care    (I25.10) Coronary artery disease involving native coronary artery of native heart without angina pectoris   (I50.30) Heart failure with preserved ejection fraction, NYHA class I (H)   (I10) Hypertension, unspecified type  Comment/Plan: SBP runs 110-120's will cont with asa for  now.    (E11.9) Type 2 diabetes mellitus without complication, without long-term current use of insulin (H)  Comment/Plan: accuchecks high 100 to mid 200's mostly, will cont Amaryl, metformin, will taper as he declines and eats less. Will await wt.    (R41.89) Cognitive impairment   (F84.0) Autism  Comment/Plan: at baseline  he is very fond of  family/how much they mean to him and enjoys there visits.      Electronically Signed by:  SARATH Yi CNP

## 2022-11-23 PROBLEM — R33.9 URINARY RETENTION: Status: ACTIVE | Noted: 2022-01-01

## 2022-11-23 NOTE — LETTER
"    11/23/2022        RE: Jose [place]  [address] [address] [facility] [address]        Chief Complaint: pt had a tierney placed on wkd by Hospice--unable to void--1000cc+ in bladder. draining well since then    HPI:    Jose Lees is a 82 year old  (1940), who is being seen today for an episodic care visit at [facility] 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 Appetite is fair.  No pain except neck bothers him and he is taking MS SL daily now with good relief. No back or bladder pains..    OBJECTIVE DATA:   GENERAL APPEARANCE:  Alert,oriented x 3 forgetful , very pleasant,  in no distress. Lungs CTA , S1/S2 without M/G/R. Trace pedal bilat edema. Abdomen is soft, +BTS. Tierney draining clear yellow urine with sediment in it. No focal neurological deficits.     Current Outpatient Medications   Medication Sig Dispense Refill     acetaminophen (TYLENOL) 650 MG suppository Place 650 mg rectally every 6 hours as needed for fever       Alcohol Swabs (SM ALCOHOL PREP) 70 % PADS USE AS DIRECTED FOR BLOOD GLUCOSE TESTING OR INSULIN INJECTIONS 100 each 11     ASPIRIN LOW DOSE 81 MG chewable tablet TAKE ONE TABLET BY MOUTH EVERY DAY 28 tablet 11     BD SAFETYGLIDE INSULIN SYRINGE 29G X 1/2\" 0.3 ML USE DAILY AS " DIRECTED 30 each 6     bisacodyl (DULCOLAX) 10 MG suppository Place 10 mg rectally daily as needed for constipation       carbamide peroxide (DEBROX) 6.5 % otic solution 5 drops 2 times daily For three days both ears and have pt flush ears out in shower.       Contour Next EZ (CONTOUR NEXT EZ W/DEVICE KIT) w/Device KIT USE AS DIRECTED 1 kit 0     CONTOUR NEXT TEST test strip TEST TWICE A DAY 50 strip 11     glimepiride (AMARYL) 1 MG tablet TAKE ONE TABLET BY MOUTH EVERY MORNING BEFORE BREAKFAST 28 tablet 11     hyoscyamine (LEVSIN/SL) 0.125 MG sublingual tablet Place 0.125 mg under the tongue every 4 hours as needed for cramping       Lancets Misc. (UNISTIK 3 COMFORT) MISC CHECK BLOOD SUGARS TWICE DAILY *REORDER AS NEEDED* 100 each 10     LORazepam (ATIVAN) 0.5 MG tablet Take 0.5 mg by mouth every 6 hours as needed for anxiety       metFORMIN (GLUCOPHAGE) 500 MG tablet TAKE 2 TABLETS BY MOUTH TWICE A DAY WITH MEALS 120 tablet 11     Microlet Lancets MISC USE TWICE DAILY 100 each 0     morphine 2.5 MG solu-tab Take 2.5 mg by mouth every hour as needed for shortness of breath / dyspnea or moderate to severe pain       omeprazole (PRILOSEC) 20 MG DR capsule TAKE 1 CAPSULE BY MOUTH EVERY MORNING BEFORE A MEAL 28 capsule 11     senna-docusate (SENOKOT-S/PERICOLACE) 8.6-50 MG tablet TAKE ONE TABLET BY MOUTH EVERY DAY AS NEEDED FOR CONSTIPATION 30 tablet 0     sertraline (ZOLOFT) 50 MG tablet TAKE ONE TABLET BY MOUTH EVERY MORNING 28 tablet 11     triamcinolone (ARISTOCORT HP) 0.5 % external cream Apply topically 2 times daily To aa on legs for 2 weeks. Tapering with improvement and restarting with flares. 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     (Z51.5) Hospice care  patient  Comment/Plan: appreciate all of Hospice care, they are so caring with this esteban man.    (I25.10) Coronary artery disease involving native coronary artery of native heart without angina pectoris   (I50.30) Heart failure with preserved ejection fraction, NYHA class I (H)  Comment/Plan: on no BP meds, BP runs ,  No sign HF, on asa still , monitor.    (E11.9) Type 2 diabetes mellitus without complication, without long-term current use of insulin (H)  Comment/Plan: running mostly  140-200 sugars, occas >. Cont with metformin, Amaryl, no changes but adjust as needed.    (R41.89) Cognitive impairment   (F84.0) Autism  Comment/Plan: no changes, managing in apt fine at this point.  monitor.      Electronically Signed by:  SARATH Yi CNP        Sincerely,        SARATH Yi CNP

## 2022-11-23 NOTE — PROGRESS NOTES
"Chief Complaint: pt had a tierney placed on wkd by Hospice--unable to void--1000cc+ in bladder. draining well since then    HPI:    Jose Lees is a 82 year old  (1940), who is being seen today for an episodic care visit at [facility] ([facility] 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 Appetite is fair.  No pain except neck bothers him and he is taking MS SL daily now with good relief. No back or bladder pains..    OBJECTIVE DATA:   GENERAL APPEARANCE:  Alert,oriented x 3 forgetful , very pleasant,  in no distress. Lungs CTA , S1/S2 without M/G/R. Trace pedal bilat edema. Abdomen is soft, +BTS. Tierney draining clear yellow urine with sediment in it. No focal neurological deficits.     Current Outpatient Medications   Medication Sig Dispense Refill     acetaminophen (TYLENOL) 650 MG suppository Place 650 mg rectally every 6 hours as needed for fever       Alcohol Swabs (SM ALCOHOL PREP) 70 % PADS USE AS DIRECTED FOR BLOOD GLUCOSE TESTING OR INSULIN INJECTIONS 100 each 11     ASPIRIN LOW DOSE 81 MG chewable tablet TAKE ONE TABLET BY MOUTH EVERY DAY 28 tablet 11     BD SAFETYGLIDE INSULIN SYRINGE 29G X 1/2\" 0.3 ML USE DAILY AS DIRECTED 30 each 6     bisacodyl (DULCOLAX) 10 MG suppository Place 10 mg rectally daily as needed for " constipation       carbamide peroxide (DEBROX) 6.5 % otic solution 5 drops 2 times daily For three days both ears and have pt flush ears out in shower.       Contour Next EZ (CONTOUR NEXT EZ W/DEVICE KIT) w/Device KIT USE AS DIRECTED 1 kit 0     CONTOUR NEXT TEST test strip TEST TWICE A DAY 50 strip 11     glimepiride (AMARYL) 1 MG tablet TAKE ONE TABLET BY MOUTH EVERY MORNING BEFORE BREAKFAST 28 tablet 11     hyoscyamine (LEVSIN/SL) 0.125 MG sublingual tablet Place 0.125 mg under the tongue every 4 hours as needed for cramping       Lancets Misc. (UNISTIK 3 COMFORT) MISC CHECK BLOOD SUGARS TWICE DAILY *REORDER AS NEEDED* 100 each 10     LORazepam (ATIVAN) 0.5 MG tablet Take 0.5 mg by mouth every 6 hours as needed for anxiety       metFORMIN (GLUCOPHAGE) 500 MG tablet TAKE 2 TABLETS BY MOUTH TWICE A DAY WITH MEALS 120 tablet 11     Microlet Lancets MISC USE TWICE DAILY 100 each 0     morphine 2.5 MG solu-tab Take 2.5 mg by mouth every hour as needed for shortness of breath / dyspnea or moderate to severe pain       omeprazole (PRILOSEC) 20 MG DR capsule TAKE 1 CAPSULE BY MOUTH EVERY MORNING BEFORE A MEAL 28 capsule 11     senna-docusate (SENOKOT-S/PERICOLACE) 8.6-50 MG tablet TAKE ONE TABLET BY MOUTH EVERY DAY AS NEEDED FOR CONSTIPATION 30 tablet 0     sertraline (ZOLOFT) 50 MG tablet TAKE ONE TABLET BY MOUTH EVERY MORNING 28 tablet 11     triamcinolone (ARISTOCORT HP) 0.5 % external cream Apply topically 2 times daily To aa on legs for 2 weeks. Tapering with improvement and restarting with flares. 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     (Z51.5) Hospice care patient  Comment/Plan: appreciate all of Hospice care, they are so caring with this esteban man.    (I25.10) Coronary  artery disease involving native coronary artery of native heart without angina pectoris   (I50.30) Heart failure with preserved ejection fraction, NYHA class I (H)  Comment/Plan: on no BP meds, BP runs ,  No sign HF, on asa still , monitor.    (E11.9) Type 2 diabetes mellitus without complication, without long-term current use of insulin (H)  Comment/Plan: running mostly  140-200 sugars, occas >. Cont with metformin, Amaryl, no changes but adjust as needed.    (R41.89) Cognitive impairment   (F84.0) Autism  Comment/Plan: no changes, managing in apt fine at this point.  monitor.      Electronically Signed by:  SARATH Yi CNP

## 2023-01-01 ENCOUNTER — NURSING HOME VISIT (OUTPATIENT)
Dept: GERIATRICS | Facility: CLINIC | Age: 83
End: 2023-01-01
Payer: MEDICARE

## 2023-01-01 ENCOUNTER — ASSISTED LIVING VISIT (OUTPATIENT)
Dept: GERIATRICS | Facility: CLINIC | Age: 83
End: 2023-01-01
Payer: OTHER MISCELLANEOUS

## 2023-01-01 ENCOUNTER — HOSPITAL ENCOUNTER (EMERGENCY)
Facility: CLINIC | Age: 83
Discharge: HOME | End: 2023-01-14
Attending: EMERGENCY MEDICINE | Admitting: EMERGENCY MEDICINE
Payer: MEDICARE

## 2023-01-01 ENCOUNTER — TRANSITIONAL CARE UNIT VISIT (OUTPATIENT)
Dept: GERIATRICS | Facility: CLINIC | Age: 83
End: 2023-01-01
Payer: COMMERCIAL

## 2023-01-01 ENCOUNTER — TRANSITIONAL CARE UNIT VISIT (OUTPATIENT)
Dept: GERIATRICS | Facility: CLINIC | Age: 83
End: 2023-01-01
Payer: MEDICARE

## 2023-01-01 ENCOUNTER — ASSISTED LIVING VISIT (OUTPATIENT)
Dept: GERIATRICS | Facility: CLINIC | Age: 83
End: 2023-01-01
Payer: MEDICARE

## 2023-01-01 ENCOUNTER — TELEPHONE (OUTPATIENT)
Dept: EMERGENCY MEDICINE | Facility: CLINIC | Age: 83
End: 2023-01-01

## 2023-01-01 ENCOUNTER — TELEPHONE (OUTPATIENT)
Dept: GERIATRICS | Facility: CLINIC | Age: 83
End: 2023-01-01
Payer: OTHER MISCELLANEOUS

## 2023-01-01 ENCOUNTER — HEALTH MAINTENANCE LETTER (OUTPATIENT)
Age: 83
End: 2023-01-01

## 2023-01-01 VITALS
HEIGHT: 69 IN | TEMPERATURE: 98.6 F | DIASTOLIC BLOOD PRESSURE: 65 MMHG | RESPIRATION RATE: 20 BRPM | BODY MASS INDEX: 19.26 KG/M2 | HEART RATE: 91 BPM | WEIGHT: 130 LBS | SYSTOLIC BLOOD PRESSURE: 106 MMHG | OXYGEN SATURATION: 97 %

## 2023-01-01 VITALS
WEIGHT: 141.7 LBS | OXYGEN SATURATION: 95 % | BODY MASS INDEX: 20.99 KG/M2 | HEART RATE: 95 BPM | DIASTOLIC BLOOD PRESSURE: 66 MMHG | RESPIRATION RATE: 19 BRPM | SYSTOLIC BLOOD PRESSURE: 103 MMHG | TEMPERATURE: 97.2 F | HEIGHT: 69 IN

## 2023-01-01 VITALS
OXYGEN SATURATION: 92 % | RESPIRATION RATE: 18 BRPM | HEIGHT: 69 IN | HEART RATE: 101 BPM | BODY MASS INDEX: 17.92 KG/M2 | SYSTOLIC BLOOD PRESSURE: 91 MMHG | TEMPERATURE: 97.5 F | DIASTOLIC BLOOD PRESSURE: 60 MMHG | WEIGHT: 121 LBS

## 2023-01-01 VITALS
OXYGEN SATURATION: 97 % | BODY MASS INDEX: 19.26 KG/M2 | HEART RATE: 91 BPM | RESPIRATION RATE: 20 BRPM | TEMPERATURE: 98.6 F | DIASTOLIC BLOOD PRESSURE: 65 MMHG | WEIGHT: 130 LBS | SYSTOLIC BLOOD PRESSURE: 106 MMHG | HEIGHT: 69 IN

## 2023-01-01 VITALS
HEART RATE: 92 BPM | SYSTOLIC BLOOD PRESSURE: 91 MMHG | HEIGHT: 69 IN | OXYGEN SATURATION: 94 % | WEIGHT: 142.2 LBS | RESPIRATION RATE: 18 BRPM | DIASTOLIC BLOOD PRESSURE: 55 MMHG | BODY MASS INDEX: 21.06 KG/M2 | TEMPERATURE: 98.2 F

## 2023-01-01 VITALS
BODY MASS INDEX: 18 KG/M2 | DIASTOLIC BLOOD PRESSURE: 66 MMHG | WEIGHT: 121.5 LBS | HEART RATE: 89 BPM | SYSTOLIC BLOOD PRESSURE: 91 MMHG | HEIGHT: 69 IN | RESPIRATION RATE: 16 BRPM | OXYGEN SATURATION: 92 % | TEMPERATURE: 97.5 F

## 2023-01-01 VITALS
SYSTOLIC BLOOD PRESSURE: 105 MMHG | WEIGHT: 141.7 LBS | DIASTOLIC BLOOD PRESSURE: 66 MMHG | OXYGEN SATURATION: 94 % | RESPIRATION RATE: 18 BRPM | BODY MASS INDEX: 20.99 KG/M2 | HEART RATE: 92 BPM | TEMPERATURE: 98.1 F | HEIGHT: 69 IN

## 2023-01-01 VITALS
SYSTOLIC BLOOD PRESSURE: 106 MMHG | RESPIRATION RATE: 18 BRPM | TEMPERATURE: 97.9 F | OXYGEN SATURATION: 95 % | DIASTOLIC BLOOD PRESSURE: 68 MMHG | BODY MASS INDEX: 20.88 KG/M2 | HEART RATE: 80 BPM | HEIGHT: 69 IN | WEIGHT: 141 LBS

## 2023-01-01 VITALS
OXYGEN SATURATION: 92 % | HEART RATE: 89 BPM | WEIGHT: 141 LBS | BODY MASS INDEX: 20.88 KG/M2 | HEIGHT: 69 IN | DIASTOLIC BLOOD PRESSURE: 66 MMHG | SYSTOLIC BLOOD PRESSURE: 91 MMHG | TEMPERATURE: 97.5 F | RESPIRATION RATE: 16 BRPM

## 2023-01-01 VITALS
DIASTOLIC BLOOD PRESSURE: 65 MMHG | WEIGHT: 130 LBS | TEMPERATURE: 98.6 F | RESPIRATION RATE: 20 BRPM | BODY MASS INDEX: 19.26 KG/M2 | HEART RATE: 91 BPM | OXYGEN SATURATION: 97 % | SYSTOLIC BLOOD PRESSURE: 106 MMHG | HEIGHT: 69 IN

## 2023-01-01 DIAGNOSIS — Z51.5 HOSPICE CARE PATIENT: ICD-10-CM

## 2023-01-01 DIAGNOSIS — R53.81 DECLINING FUNCTIONAL STATUS: ICD-10-CM

## 2023-01-01 DIAGNOSIS — C78.00 MALIGNANT NEOPLASM METASTATIC TO LUNG, UNSPECIFIED LATERALITY (H): ICD-10-CM

## 2023-01-01 DIAGNOSIS — R63.4 WEIGHT LOSS: ICD-10-CM

## 2023-01-01 DIAGNOSIS — Z86.73 HISTORY OF CEREBROVASCULAR ACCIDENT (CVA) DUE TO EMBOLISM: ICD-10-CM

## 2023-01-01 DIAGNOSIS — I10 HYPERTENSION, UNSPECIFIED TYPE: ICD-10-CM

## 2023-01-01 DIAGNOSIS — I25.5 ISCHEMIC CARDIOMYOPATHY: ICD-10-CM

## 2023-01-01 DIAGNOSIS — M62.81 GENERALIZED MUSCLE WEAKNESS: ICD-10-CM

## 2023-01-01 DIAGNOSIS — R41.89 COGNITIVE IMPAIRMENT: ICD-10-CM

## 2023-01-01 DIAGNOSIS — F84.0 AUTISM DISORDER: ICD-10-CM

## 2023-01-01 DIAGNOSIS — C61 PROSTATE CANCER (H): ICD-10-CM

## 2023-01-01 DIAGNOSIS — C78.00 MALIGNANT NEOPLASM METASTATIC TO LUNG, UNSPECIFIED LATERALITY (H): Primary | ICD-10-CM

## 2023-01-01 DIAGNOSIS — Z79.4 TYPE 2 DIABETES MELLITUS WITH OTHER CIRCULATORY COMPLICATION, WITH LONG-TERM CURRENT USE OF INSULIN (H): ICD-10-CM

## 2023-01-01 DIAGNOSIS — R33.9 URINARY RETENTION: Primary | ICD-10-CM

## 2023-01-01 DIAGNOSIS — E11.9 TYPE 2 DIABETES MELLITUS WITHOUT COMPLICATION, WITHOUT LONG-TERM CURRENT USE OF INSULIN (H): ICD-10-CM

## 2023-01-01 DIAGNOSIS — B95.2 ENTEROCOCCUS UTI: ICD-10-CM

## 2023-01-01 DIAGNOSIS — I25.10 CORONARY ARTERY DISEASE INVOLVING NATIVE CORONARY ARTERY OF NATIVE HEART WITHOUT ANGINA PECTORIS: ICD-10-CM

## 2023-01-01 DIAGNOSIS — R62.7 FAILURE TO THRIVE IN ADULT: ICD-10-CM

## 2023-01-01 DIAGNOSIS — M54.50 LOW BACK PAIN WITHOUT SCIATICA, UNSPECIFIED BACK PAIN LATERALITY, UNSPECIFIED CHRONICITY: ICD-10-CM

## 2023-01-01 DIAGNOSIS — C78.01 MALIGNANT NEOPLASM METASTATIC TO BOTH LUNGS (H): ICD-10-CM

## 2023-01-01 DIAGNOSIS — K64.9 HEMORRHOIDS, UNSPECIFIED HEMORRHOID TYPE: Primary | ICD-10-CM

## 2023-01-01 DIAGNOSIS — E11.9 TYPE 2 DIABETES MELLITUS WITHOUT COMPLICATION, WITHOUT LONG-TERM CURRENT USE OF INSULIN (H): Primary | ICD-10-CM

## 2023-01-01 DIAGNOSIS — C61 PROSTATE CANCER (H): Primary | ICD-10-CM

## 2023-01-01 DIAGNOSIS — M15.0 PRIMARY OSTEOARTHRITIS INVOLVING MULTIPLE JOINTS: ICD-10-CM

## 2023-01-01 DIAGNOSIS — Z95.5 PRESENCE OF STENT IN CORONARY ARTERY IN PATIENT WITH CORONARY ARTERY DISEASE: ICD-10-CM

## 2023-01-01 DIAGNOSIS — N39.0 URINARY TRACT INFECTION ASSOCIATED WITH INDWELLING URETHRAL CATHETER, INITIAL ENCOUNTER (H): ICD-10-CM

## 2023-01-01 DIAGNOSIS — T83.511A URINARY TRACT INFECTION ASSOCIATED WITH INDWELLING URETHRAL CATHETER, INITIAL ENCOUNTER (H): ICD-10-CM

## 2023-01-01 DIAGNOSIS — R33.9 URINARY RETENTION: ICD-10-CM

## 2023-01-01 DIAGNOSIS — T83.011A MALFUNCTION OF FOLEY CATHETER, INITIAL ENCOUNTER (H): ICD-10-CM

## 2023-01-01 DIAGNOSIS — N39.0 ENTEROCOCCUS UTI: ICD-10-CM

## 2023-01-01 DIAGNOSIS — C78.02 MALIGNANT NEOPLASM METASTATIC TO BOTH LUNGS (H): ICD-10-CM

## 2023-01-01 DIAGNOSIS — E11.59 TYPE 2 DIABETES MELLITUS WITH OTHER CIRCULATORY COMPLICATION, WITH LONG-TERM CURRENT USE OF INSULIN (H): ICD-10-CM

## 2023-01-01 DIAGNOSIS — I25.10 PRESENCE OF STENT IN CORONARY ARTERY IN PATIENT WITH CORONARY ARTERY DISEASE: ICD-10-CM

## 2023-01-01 DIAGNOSIS — F32.0 MILD MAJOR DEPRESSION (H): ICD-10-CM

## 2023-01-01 LAB
ALBUMIN UR-MCNC: 20 MG/DL
AMORPH CRY #/AREA URNS HPF: ABNORMAL /HPF
APPEARANCE UR: ABNORMAL
BACTERIA #/AREA URNS HPF: ABNORMAL /HPF
BACTERIA UR CULT: ABNORMAL
BILIRUB UR QL STRIP: NEGATIVE
COLOR UR AUTO: YELLOW
GLUCOSE UR STRIP-MCNC: 100 MG/DL
HGB UR QL STRIP: ABNORMAL
HYALINE CASTS: 1 /LPF
KETONES UR STRIP-MCNC: NEGATIVE MG/DL
LEUKOCYTE ESTERASE UR QL STRIP: ABNORMAL
MUCOUS THREADS #/AREA URNS LPF: PRESENT /LPF
NITRATE UR QL: NEGATIVE
PH UR STRIP: 5.5 [PH] (ref 5–7)
RBC URINE: 62 /HPF
SP GR UR STRIP: 1.01 (ref 1–1.03)
UROBILINOGEN UR STRIP-MCNC: NORMAL MG/DL
WBC URINE: 86 /HPF

## 2023-01-01 PROCEDURE — 250N000009 HC RX 250: Performed by: EMERGENCY MEDICINE

## 2023-01-01 PROCEDURE — 99348 HOME/RES VST EST LOW MDM 30: CPT | Mod: GW | Performed by: NURSE PRACTITIONER

## 2023-01-01 PROCEDURE — 99308 SBSQ NF CARE LOW MDM 20: CPT | Mod: GW | Performed by: NURSE PRACTITIONER

## 2023-01-01 PROCEDURE — 51701 INSERT BLADDER CATHETER: CPT

## 2023-01-01 PROCEDURE — 99349 HOME/RES VST EST MOD MDM 40: CPT | Mod: GW | Performed by: INTERNAL MEDICINE

## 2023-01-01 PROCEDURE — 99309 SBSQ NF CARE MODERATE MDM 30: CPT | Mod: GV | Performed by: NURSE PRACTITIONER

## 2023-01-01 PROCEDURE — 250N000013 HC RX MED GY IP 250 OP 250 PS 637: Performed by: EMERGENCY MEDICINE

## 2023-01-01 PROCEDURE — 99309 SBSQ NF CARE MODERATE MDM 30: CPT | Performed by: INTERNAL MEDICINE

## 2023-01-01 PROCEDURE — 81001 URINALYSIS AUTO W/SCOPE: CPT | Performed by: EMERGENCY MEDICINE

## 2023-01-01 PROCEDURE — 99309 SBSQ NF CARE MODERATE MDM 30: CPT | Mod: GW | Performed by: INTERNAL MEDICINE

## 2023-01-01 PROCEDURE — 87086 URINE CULTURE/COLONY COUNT: CPT | Performed by: EMERGENCY MEDICINE

## 2023-01-01 PROCEDURE — 99283 EMERGENCY DEPT VISIT LOW MDM: CPT | Mod: 25

## 2023-01-01 PROCEDURE — 51702 INSERT TEMP BLADDER CATH: CPT

## 2023-01-01 RX ORDER — TRAZODONE HYDROCHLORIDE 50 MG/1
50 TABLET, FILM COATED ORAL AT BEDTIME
Start: 2023-01-01

## 2023-01-01 RX ORDER — SENNOSIDES A AND B 8.6 MG/1
1 TABLET, FILM COATED ORAL 2 TIMES DAILY
COMMUNITY
Start: 2023-01-01 | End: 2023-01-01

## 2023-01-01 RX ORDER — CEPHALEXIN 500 MG/1
500 CAPSULE ORAL 4 TIMES DAILY
Qty: 28 CAPSULE | Refills: 0 | Status: SHIPPED | OUTPATIENT
Start: 2023-01-01 | End: 2023-01-01

## 2023-01-01 RX ORDER — LIDOCAINE 5 G/100G
1 CREAM RECTAL; TOPICAL DAILY PRN
COMMUNITY
Start: 2023-01-01

## 2023-01-01 RX ORDER — LIDOCAINE HYDROCHLORIDE 20 MG/ML
10 JELLY TOPICAL ONCE
Status: COMPLETED | OUTPATIENT
Start: 2023-01-01 | End: 2023-01-01

## 2023-01-01 RX ORDER — AMOXICILLIN 250 MG
1 CAPSULE ORAL 2 TIMES DAILY
COMMUNITY
Start: 2023-01-01

## 2023-01-01 RX ORDER — CEPHALEXIN 500 MG/1
500 CAPSULE ORAL ONCE
Status: COMPLETED | OUTPATIENT
Start: 2023-01-01 | End: 2023-01-01

## 2023-01-01 RX ORDER — HALOPERIDOL 0.5 MG/1
0.5 TABLET ORAL EVERY 4 HOURS PRN
COMMUNITY
Start: 2023-01-01

## 2023-01-01 RX ORDER — HYDROXYZINE PAMOATE 50 MG/1
50 CAPSULE ORAL EVERY 6 HOURS PRN
COMMUNITY
Start: 2023-01-01

## 2023-01-01 RX ADMIN — CEPHALEXIN 500 MG: 500 CAPSULE ORAL at 06:19

## 2023-01-01 RX ADMIN — LIDOCAINE HYDROCHLORIDE 10 ML: 20 JELLY TOPICAL at 04:42

## 2023-01-01 ASSESSMENT — ACTIVITIES OF DAILY LIVING (ADL)
ADLS_ACUITY_SCORE: 37
ADLS_ACUITY_SCORE: 37

## 2023-01-01 ASSESSMENT — ENCOUNTER SYMPTOMS
RECTAL PAIN: 1
ABDOMINAL PAIN: 1

## 2023-01-14 NOTE — ED NOTES
Addendum:    Pt was incontinent of stool and had an existing indwelling tierney on arrival.   External hemorrhoids that are painful during perineal care.  Existing Tierney had about 50 ml of turbid yellow urine in the standard collection bag.    Pt did not express the need to urinate, however he was tender with palpation on lower abdomen area.  Bladder scanner volume = 620 ML.    Pt did not remember when tierney was placed at the nursing home.  Existing tierney was removed and a new one placed successfully on first attempt.  MD was notified.    Kelsey Rich RN,.......................................... 1/14/2023   4:33 AM

## 2023-01-14 NOTE — ED NOTES
Report called to staff nurse at the [facility] in [place].  Ph: # [phone].  Kelsey Rich RN,.......................................... 1/14/2023   6:28 AM

## 2023-01-14 NOTE — DISCHARGE INSTRUCTIONS
Take the antibiotics for possible infection of the bladder (urinary tract infection)  We exchanged the catheter for a new one as the old one wasn't draining. This seemed to resolve his pain

## 2023-01-14 NOTE — ED TRIAGE NOTES
[facility]  ED Arrival Note    Arrived to ED from [facility] via EMS c/o rectal pain. Hx of Dementia, Autism and T2DM.    Reportedly pt started to experience rectal pain tonight unsure if it was before bedtime. Pt states that he has had this pain before and it went away following a bowel movement. He denies having a bowel movement tonight.    Pre hospital BG~208.    Visitors during triage: None    Ambulatory: No    Directed to: Main ED       Triage Assessment       Row Name 01/14/23 0359       Triage Assessment (Adult)    Airway WDL WDL       Respiratory WDL    Respiratory WDL WDL       Skin Circulation/Temperature WDL    Skin Circulation/Temperature WDL WDL       Cardiac WDL    Cardiac WDL WDL       Peripheral/Neurovascular WDL    Peripheral Neurovascular WDL WDL       Cognitive/Neuro/Behavioral WDL    Cognitive/Neuro/Behavioral WDL WDL

## 2023-01-14 NOTE — ED PROVIDER NOTES
"    History     Chief Complaint:  Rectal/perineal Pain       The history is provided by the patient.      Jose Lees is a 82 year old male who presents with rectal pain and abdominal pain earlier today, now resolved following catheter placement in the ED. He reports he previously experienced pain like this, which went away following a bowel movement. Denies bowel movements tonight. No fever.     Independent Historian: Patient     Review of External Notes: I reviewed Care Everywhere.     ROS:  Review of Systems   Gastrointestinal: Positive for abdominal pain and rectal pain.   All other systems reviewed and are negative.    Allergies:  No Known Allergies     Medications:    Aspirin 81 mg   Amaryl  Levsin  Metformin  Zoloft    Past Medical History:    Acute anterior wall MI  Autism disorder  Congestive heart failure  CAD  Dementia  Hyperlipidemia   Atrial fibrillation   Mild major depression   Prostate cancer  Stricture and stenosis of esophagus  Stroke, embolic  Diabetes mellitus, type 2    Past Surgical History:    Esophagogastroduodenoscopy  Coronary angiography  Heart cath, angioplasty  Phacoemulsification clear cornea with standard intraocular lens implant, bilateral      Family History:    Father- CAD age 48  Mother- CVD    Social History:  The patient presents to the ED alone via EMS.  PCP: Kailey Saucedo     Physical Exam     Patient Vitals for the past 24 hrs:   BP Temp Temp src Pulse Resp SpO2 Height Weight   01/14/23 0400 106/65 -- -- -- -- 97 % -- --   01/14/23 0358 106/65 98.6  F (37  C) Oral 102 18 97 % 1.753 m (5' 9\") 59 kg (130 lb)        Physical Exam  General: Sitting up in bed  Eyes:  The pupils are equal and round    Conjunctivae and sclerae are normal  ENT:    Wearing a mask  Neck:  Normal range of motion  CV:  Regular rate     Skin warm and well perfused   Resp:  Non labored breathing on room air    No tachypnea    No cough heard  GI:  Abdomen is soft, there is no rigidity    No " distension    No rebound tenderness     No abdominal tenderness    Tierney catheter draining clear urine  Rectal:  Soft brown stool on digital exam. External hemorrhoids. Female tech in room during exam  MS:  Normal muscular tone  Skin:  No rash or acute skin lesions noted  Neuro:   Awake, alert.      Speech is normal and fluent.    Face is symmetric.     Moves all extremities equally  Psych: Normal affect.  Appropriate interactions.    Emergency Department Course   Emergency Department Course & Assessments:       Interventions:  Medications   cephALEXin (KEFLEX) capsule 500 mg (has no administration in time range)   lidocaine (XYLOCAINE) 2 % external gel 10 mL (10 mLs Urethral Given 1/14/23 9302)        Consultations/Discussion of Management or Tests:  ED Course as of 01/14/23 0605   Sat Jan 14, 2023   5539 I obtained the history and examined the patient as noted above.        Disposition:  The patient was discharged to home.     Impression & Plan    Medical Decision Making:  Jose Lees is a 82 year old male who presented to the ED with rectal pain. Patient with rectal pain. His catheter was not draining much urine and bladder scan showed significant urine in bladder. Nurse replaced the tierney and then had significant output of urine and resolution of his pain. Seems like his pain was from urinary retention from malfunctioning of catheter. UA mildly abnormal so will start on antibiotics for possible UTI. Culture in process. Don't think that he actually has rectal pain. Had bowel movement in ED that was normal. No indication for imaging of abdomen.     Diagnosis:    ICD-10-CM    1. Malfunction of Tierney catheter, initial encounter (H)  T83.011A       2. Urinary tract infection associated with indwelling urethral catheter, initial encounter (H)  T83.511A     N39.0            Discharge Medications:  New Prescriptions    CEPHALEXIN (KEFLEX) 500 MG CAPSULE    Take 1 capsule (500 mg) by mouth 4 times daily for 7  days        Scribe Disclosure:  I, Fadia Transe, am serving as a scribe at 3:52 AM on 1/14/2023 to document services personally performed by Laney Perez MD based on my observations and the provider's statements to me.     1/14/2023   Laney Perez MD Goertz, Maria Kristine, MD  01/14/23 0856

## 2023-01-14 NOTE — ED NOTES
Bed: ED20  Expected date:   Expected time:   Means of arrival:   Comments:  [address] 82M autism, dementia  rectal pain eta 3574

## 2023-01-16 NOTE — TELEPHONE ENCOUNTER
[facility] () Emergency Department/Urgent Care Lab result notification     Patient/parent Name  Iris - Nurse    RN Assessment (Patient s current Symptoms), include time called.   4:45 PM - Meseret nurse returning call requesting antibiotics - she consulted hospice for rx change and didn't consult a provider - they have a facility provider but she is now requesting an rx change from out team - she reports keflex was never started - she reports patient is doing well no concerns for worsening infection or pyelonephritis  Fever: None  Pain:  None      Lab result (if applicable):  Final Urine Culture Report on 1/15/23  [facility] Emergency Dept discharge antibiotic prescribed: Cephalexin (Keflex) 500 mg capsule, 1 capsule (500 mg) by mouth 4 times daily for 7 days.  #1. Bacteria, >100,000 CFU/ML Enterococcus faecalis,  is [NOT TESTED] to antibiotic.   Recommendations in treatment per [facility] ED lab result Urine Culture protocol.    RN Recommendations/Instructions per [facility] ED lab result protocol  Patient Nurse Meseret notified of lab result and treatment recommendations. STOP Keflex and START Rx for Amoxicillin-Clavulanate (Augmentin) 875-125 mg PO tablet, 1 tablet by mouth 2 times daily for 7 days sent to [Pharmacy - [facility], [place], [place]].      Please Contact your PCP clinic or return to the Emergency department if your:    Symptoms return.    Symptoms do not improve after 3 days on antibiotic.    Symptoms do not resolve after completing antibiotic.    Symptoms worsen or other concerning symptom's.    PCP follow-up Questions asked: YES       Lyn Elias RN  [facility] [employer] [place]  Emergency Dept Lab Result RN  # [phone]     Copy of Lab result   Urine Culture  Order: [id number]   Collected 1/14/2023  5:12 AM      Status: Final result      Visible to patient: Yes (seen)     Specimen Information: Urine, Midstream    4 Result Notes  Culture  >100,000 CFU/mL Enterococcus faecalis Abnormal             Resulting Agency: [facility]     Susceptibility     Enterococcus faecalis     DUNIA     Ampicillin <=2 ug/mL Susceptible     Nitrofurantoin <=16 ug/mL Susceptible     Penicillin 8 ug/mL Susceptible     Vancomycin 1 ug/mL Susceptible                  Specimen Collected: 01/14/23  5:12 AM Last Resulted: 01/15/23 10:25 PM

## 2023-01-16 NOTE — TELEPHONE ENCOUNTER
[facility] Emergency Department/Urgent Care Lab result notification     Patient/parent Name  Nurse    RN Assessment (Patient s current Symptoms), include time called.   2:12 PM return call from facility nurse [facility] who requests writer to fax final urine culture results for hospice team/provider to review and advise on - has noted some improvement overall  Fax #: [phone]  ATTN: Nursing    Lab result (if applicable):  Final Urine Culture Report on 1/15/23  [facility] Emergency Dept discharge antibiotic prescribed: Cephalexin (Keflex) 500 mg capsule, 1 capsule (500 mg) by mouth 4 times daily for 7 days.  #1. Bacteria, >100,000 CFU/ML Enterococcus faecalis,  is [NOT TESTED] to antibiotic.   Recommendations in treatment per [facility] ED lab result Urine Culture protocol.    RN Recommendations/Instructions per [place] ED lab result protocol  Patient facility nurse notified of lab result and treatment recommendations that if facility is taking results ED results team advises review and rx change - Keflex not a good choice.  Final urine culture results faxed to [facility].        PCP follow-up Questions asked: YES       Lyn Elias RN  [facility]  Emergency Dept Lab Result RN  Ph# [phone]     Copy of Lab result   Urine Culture  Order: [id number] - Reflex for Order [id number]   Status: Final result      Visible to patient: Yes (seen)     Specimen Information: Urine, Midstream    3 Result Notes  Culture >100,000 CFU/mL Enterococcus faecalis Abnormal             Resulting Agency: [facility]     Susceptibility    Enterococcus faecalis (1)    Antibiotic Interpretation Sensitivity  Method Status    Penicillin Susceptible 8 ug/mL DUNIA Final    Ampicillin Susceptible <=2 ug/mL DUNIA Final    Vancomycin Susceptible 1 ug/mL DUNIA Final    Nitrofurantoin Susceptible <=16 ug/mL DUNIA Final          Specimen Collected:  01/14/23  5:12 AM Last Resulted: 01/15/23 10:25 PM

## 2023-01-16 NOTE — TELEPHONE ENCOUNTER
[facility] () Emergency Department Lab result notification    [place] ED lab result protocol used  Urine culture    Reason for call  Notify of lab results, assess symptoms,  review ED providers recommendations/discharge instructions (if necessary) and advise per ED lab result f/u protocol    Lab Result (including Rx patient on, if applicable)  Final Urine Culture Report on 1/15/23  [facility] Emergency Dept discharge antibiotic prescribed: Cephalexin (Keflex) 500 mg capsule, 1 capsule (500 mg) by mouth 4 times daily for 7 days.  #1. Bacteria, >100,000 CFU/ML Enterococcus faecalis,  is [NOT TESTED] to antibiotic.   Recommendations in treatment per [facility] ED lab result Urine Culture protocol.    Information table from Emergency Dept Provider visit on 1/14/23  Symptoms reported at ED visit (Chief complaint, HPI) Chief Complaint:  Rectal/perineal Pain        The history is provided by the patient.      Jose Lees is a 82 year old male who presents with rectal pain and abdominal pain earlier today, now resolved following catheter placement in the ED. He reports he previously experienced pain like this, which went away following a bowel movement. Denies bowel movements tonight. No fever.    Significant Medical hx, if applicable (i.e. CKD, diabetes) DMII, CVI, Malignant neoplasm lung, prostate cancer, urinary retention   Allergies No Known Allergies   Weight, if applicable Wt Readings from Last 2 Encounters:   01/14/23 59 kg (130 lb)   11/23/22 68 kg (150 lb)      Coumadin/Warfarin [Yes /No] NO   Creatinine Level (mg/dl) Creatinine   Date Value Ref Range Status   01/11/2022 1.07 0.70 - 1.30 mg/dL Final   03/04/2021 0.76 0.70 - 1.30 mg/dL Final   03/04/2021 0.76 0.70 - 1.30 mg/dL Final      Creatinine clearance (ml/min), if applicable Creatinine clearance cannot be calculated (Patient's most recent lab result is older than the maximum 30 days allowed.)  Calculated from above  values - creatinine clearance: 45.05 mL/min   ED providers Impression and Plan (applicable information) Medical Decision Making:  Jose Lees is a 82 year old male who presented to the ED with rectal pain. Patient with rectal pain. His catheter was not draining much urine and bladder scan showed significant urine in bladder. Nurse replaced the tierney and then had significant output of urine and resolution of his pain. Seems like his pain was from urinary retention from malfunctioning of catheter. UA mildly abnormal so will start on antibiotics for possible UTI. Culture in process. Don't think that he actually has rectal pain. Had bowel movement in ED that was normal. No indication for imaging of abdomen.      Diagnosis:      ICD-10-CM     1. Malfunction of Tierney catheter, initial encounter (H)  T83.011A         2. Urinary tract infection associated with indwelling urethral catheter, initial encounter (H)  T83.511A       N39.0               Discharge Medications:       New Prescriptions     CEPHALEXIN (KEFLEX) 500 MG CAPSULE    Take 1 capsule (500 mg) by mouth 4 times daily for 7 days         Scribe Disclosure:  IFadia, am serving as a scribe at 3:52 AM on 1/14/2023 to document services personally performed by Laney Perez MD based on my observations and the provider's statements to me.      1/14/2023   Laney Perez MD Goertz, Maria Kristine, MD  01/14/23 0856     ED diagnosis  Malfunction of Tierney catheter, initial encounter (H)  Urinary tract infection associated with indwelling urethral catheter, initial encounter (H)   ED provider  Laney Perez MD       RN Assessment (Patient s current Symptoms), include time called.  9:51 AM - writer notes EMS reports shows patient may reside at [facility] living [facility] -Phone #: [phone]  Catheter: YES    Left voicemail message requesting a call back to [facility] ED Lab Result  RN at [phone].  RN is available every day between 9 a.m. and 5:30 p.m.      Lyn Elias RN  [facility]  Emergency Dept Lab Result RN  Ph# [phone]     Copy of Lab result   Urine Culture  Order: [id number] - Reflex for Order [id number]   Status: Final result      Visible to patient: Yes (seen)     Specimen Information: Urine, Midstream    2 Result Notes  Culture >100,000 CFU/mL Enterococcus faecalis Abnormal             Resulting Agency: [facility]     Susceptibility    Enterococcus faecalis (1)    Antibiotic Interpretation Sensitivity  Method Status    Penicillin Susceptible 8 ug/mL DUNIA Final    Ampicillin Susceptible <=2 ug/mL DUNIA Final    Vancomycin Susceptible 1 ug/mL DUNIA Final    Nitrofurantoin Susceptible <=16 ug/mL DUNIA Final          Specimen Collected: 01/14/23  5:12 AM Last Resulted: 01/15/23 10:25 PM

## 2023-01-19 NOTE — LETTER
"    1/19/2023        RE: Jose Hollins  [address] [facility] [id number]        Jose Lees is a 82 year old male seen January 19, 2023 at [facility] where he has resided for several years, seen to follow up ED visit for catheter pain.   Pt is seen in his apartment up to chair, states he is feeling well   His Sahni catheter had malfunctioned and was exchanged in ED, with relief from urinary retention, \"really helped the pain.\"    UC+ for Enterococcus faecalis and currently being treated with a 7 day course of Augmentin     He ambulates with 4WW, but \"breathing off\" when he is active    Sleeps well.        By chart review, pt had a [facility] hospitalization in January 2021 for dysphagia, had stopped taking all of his medications.   He was found to have severe candidal esophagitis and esophageal strictures, underwent dilatation on 2 occasions.   He was started on insulin for poorly controlled DM2  He went to TCU before he discharged to his sister's home on 2/18, then rehospitalized 2/26 for weakness and failure to thrive.  Treated with IVF. His Lantus and metoprolol doses were decreased.    He again went to TCU, then back to [facility]    Pt has prostate cancer diagnosed in 2011   No metastatic disease on imaging.   He was treated with leuprolide and bicalutamide without improvement in PSA.   At some point pt stopped his medications.  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    Treated with leuprolide for a couple of years.   Seen in follow up in May 2022 and scans as below.  After discussion with pt and family, he was signed on to [facility] in June 2022      Seen by Dermatology in April 2021 and had a punch biopsy, given dx of pigmented purpuric dermatosis and treated with topical steroids.   Rash tends to flare at times.    Past Medical History: "   Diagnosis Date     ACP (advance care planning)     Form given     Acute anterior wall MI (H) 4/7/2014     Autism disorder 6/29/2012     CHF (congestive heart failure) (H)     ECHO EF=25-30% on 4/7/14     Coronary artery disease 4/2014 4/2014 HEART CATH - 70% mid LAD -- BMS placed, small 1st diagonal 80%, RCA 60-70% before crux, 70% mid PDA     Dementia (H)      Hyperlipidaemia      Longstanding persistent atrial fibrillation (H) 1/20/2020     Mild major depression (H) 1/20/2020     Prostate cancer (H) 10/11    GLEASOR 8     Stricture and stenosis of esophagus 10/22/2015     Stroke, 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     Type 2 diabetes mellitus without complication, without long-term current use of insulin (H) 7/25/2017       Past Surgical History:   Procedure Laterality Date     CORONARY ANGIOGRAPHY ADULT ORDER       ESOPHAGOSCOPY, GASTROSCOPY, DUODENOSCOPY (EGD), COMBINED N/A 8/6/2015    Procedure: COMBINED ESOPHAGOSCOPY, GASTROSCOPY, DUODENOSCOPY (EGD), REMOVE FOREIGN BODY;  Surgeon: Yu Tapia MD;  Location:  GI     ESOPHAGOSCOPY, GASTROSCOPY, DUODENOSCOPY (EGD), COMBINED N/A 1/28/2021    Procedure: ESOPHAGOGASTRODUODENOSCOPY (EGD) WITH FLUORO,Biopsy, and Dilation;  Surgeon: Audra Huang MD;  Location:  OR     HEART CATH, ANGIOPLASTY  4/14    BMS to LAD     PHACOEMULSIFICATION CLEAR CORNEA WITH STANDARD INTRAOCULAR LENS IMPLANT Right 12/4/2018    Procedure: COMPLEX RIGHT EYE PHACOEMULSIFICATION CLEAR CORNEA WITH STANDARD INTRAOCULAR LENS IMPLANT;  Surgeon: Kamar Kyle MD;  Location:  EC     PHACOEMULSIFICATION CLEAR CORNEA WITH STANDARD INTRAOCULAR LENS IMPLANT Left 12/19/2018    Procedure: COMPLEX LEFT EYE PHACOEMULSIFICATION CLEAR CORNEA WITH STANDARD INTRAOCULAR LENS IMPLANT;  Surgeon: Kamar Kyle MD;  Location:  EC     SH:  Lives alone, AL apartment.  Previously lived at [facility].   His sister Judith is POA   Pt worked as a taxi  "  Non smoker     ROS: SLUMS 14/30   ACL 3.8     Ambulatory with 4WW  Weight was 150 lbs in 2022>>> now 130 lbs     EXAM:  NAD  /65   Pulse 91   Temp 98.6  F (37  C)   Resp 20   Ht 1.753 m (5' 9\")   Wt 59 kg (130 lb)   SpO2 97%   BMI 19.20 kg/m     Neck supple without adenopathy.    Lungs clear bilaterally with fair air movement  Heart RRR s1s2  Abd soft, NT, no distention or guarding, +BS  Clear yellow urine in Sahni bag.    Ext with 1+ edema, wearing compression stockings   Neuro: some limits to history, no focal findings  Psych: affect okay, pleasant       Labs reviewed, none recent given Hospice pt      ECHO 1/29/2021   Interpretation Summary  The visual ejection fraction is estimated at 35-40%.  Left ventricular systolic function is mild to moderately reduced.  There are regional wall motion abnormalities as specified.  The regional wall motion abnormalities are similar to previous and are consistent with LAD territory ischemia and / or infarction.  The ascending aorta is Mildly dilated.    NUCLEAR MEDICINE BONE SCAN WHOLE BODY May 10, 2022  FINDINGS: No evidence of skeletal metastatic disease. Scattered  degenerative uptake in the wrists, left hip, and knees. Degenerative uptake is most prominent in the left hip.             CT ABDOMEN/PELVIS WITH CONTRAST May 10, 2022   CLINICAL HISTORY: Prostate cancer (H)  LOWER CHEST: There are innumerable pulmonary nodules in both lung  bases, increased in size and number when compared to previous. One of  the larger metastases in the left lower lobe   HEPATOBILIARY: Stable 1.3 cm partially calcified nodule/cyst within  the left hepatic lobe. No new liver lesions. Cholelithiasis.  KIDNEYS/BLADDER: Right renal cysts. No hydronephrosis  PELVIC ORGANS: The prostate gland is mildly enlarged and diffusely  enhancing, likely due to known prostate cancer.  ADDITIONAL FINDINGS: No pelvic or retroperitoneal lymphadenopathy.  MUSCULOSKELETAL: No visualized " skeletal metastases.                                                      IMPRESSION:   1.  Pulmonary metastases have increased in size and number compared to 1/26/2021.  2.  No evidence of metastatic disease in the abdomen or pelvis.                                                   IMP/PLAN:   (R33.9) Urinary retention    (N39.0,  B95.2) Enterococcus UTI  Comment: improved symptoms after catheter replaced   Plan: finish week course of Augmentin   Routine catheter cares     (E11.59,  Z79.4) Type 2 diabetes mellitus with other circulatory complication, with long-term current use of insulin (H)   Comment: doing so much better since on med admin    Lab Results   Component Value Date    A1C 7.0 01/11/2022     Plan: on metformin 1000 mg bid and glimepiride 1 mg/day      (K22.2) Stricture and stenosis of esophagus  (K21.00) Gastroesophageal reflux disease with esophagitis without hemorrhage  Comment: s/p dilatation x2   Plan: omeprazole 20 mg/day    Eats soft foods by preference    (I25.10) Coronary artery disease involving native coronary artery of native heart without angina pectoris  (I25.5) Ischemic cardiomyopathy  Comment: low EF, volume status stable     Plan: daily ASA        Monitor for symptoms     (C61) Prostate cancer (H)  (C78.01,  C78.02) 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)  Comment: high PSA, local disease, castrate resistant    Worsening of pulmonary mets by imaging      Plan:  per Oncology     Comfort focus      (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

## 2023-02-02 NOTE — PROGRESS NOTES
"Jose Lees is a 82 year old male seen January 19, 2023 at [facility] where he has resided for several years, seen to follow up ED visit for catheter pain.   Pt is seen in his apartment up to chair, states he is feeling well   His Sahni catheter had malfunctioned and was exchanged in ED, with relief from urinary retention, \"really helped the pain.\"    UC+ for Enterococcus faecalis and currently being treated with a 7 day course of Augmentin     He ambulates with 4WW, but \"breathing off\" when he is active    Sleeps well.        By chart review, pt had a [facility] hospitalization in January 2021 for dysphagia, had stopped taking all of his medications.   He was found to have severe candidal esophagitis and esophageal strictures, underwent dilatation on 2 occasions.   He was started on insulin for poorly controlled DM2  He went to TCU before he discharged to his sister's home on 2/18, then rehospitalized 2/26 for weakness and failure to thrive.  Treated with IVF. His Lantus and metoprolol doses were decreased.    He again went to TCU, then back to [facility]    Pt has prostate cancer diagnosed in 2011   No metastatic disease on imaging.   He was treated with leuprolide and bicalutamide without improvement in PSA.   At some point pt stopped his medications.  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    Treated with leuprolide for a couple of years.   Seen in follow up in May 2022 and scans as below.  After discussion with pt and family, he was signed on to [facility] in June 2022      Seen by Dermatology in April 2021 and had a punch biopsy, given dx of pigmented purpuric dermatosis and treated with topical steroids.   Rash tends to flare at times.    Past Medical History:   Diagnosis Date     ACP (advance care planning)     Form given     Acute anterior wall MI (H) 4/7/2014     Autism " disorder 6/29/2012     CHF (congestive heart failure) (H)     ECHO EF=25-30% on 4/7/14     Coronary artery disease 4/2014 4/2014 HEART CATH - 70% mid LAD -- BMS placed, small 1st diagonal 80%, RCA 60-70% before crux, 70% mid PDA     Dementia (H)      Hyperlipidaemia      Longstanding persistent atrial fibrillation (H) 1/20/2020     Mild major depression (H) 1/20/2020     Prostate cancer (H) 10/11    GLEASOR 8     Stricture and stenosis of esophagus 10/22/2015     Stroke, 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     ESOPHAGOSCOPY, GASTROSCOPY, DUODENOSCOPY (EGD), COMBINED N/A 1/28/2021    Procedure: ESOPHAGOGASTRODUODENOSCOPY (EGD) WITH FLUORO,Biopsy, and Dilation;  Surgeon: Audra Huang MD;  Location:  OR     HEART CATH, ANGIOPLASTY  4/14    BMS to LAD     PHACOEMULSIFICATION CLEAR CORNEA WITH STANDARD INTRAOCULAR LENS IMPLANT Right 12/4/2018    Procedure: COMPLEX RIGHT EYE PHACOEMULSIFICATION CLEAR CORNEA WITH STANDARD INTRAOCULAR LENS IMPLANT;  Surgeon: Kamar Kyle MD;  Location:  EC     PHACOEMULSIFICATION CLEAR CORNEA WITH STANDARD INTRAOCULAR LENS IMPLANT Left 12/19/2018    Procedure: COMPLEX LEFT EYE PHACOEMULSIFICATION CLEAR CORNEA WITH STANDARD INTRAOCULAR LENS IMPLANT;  Surgeon: Kamar Kyle MD;  Location: [facility]     SH:  Lives alone, AL apartment.  Previously lived at [facility].   His sister Judith is POA   Pt worked as a   Non smoker     ROS: SLUMS 14/30   ACL 3.8     Ambulatory with 4WW  Weight was 150 lbs in 2022>>> now 130  "lbs     EXAM:  NAD  /65   Pulse 91   Temp 98.6  F (37  C)   Resp 20   Ht 1.753 m (5' 9\")   Wt 59 kg (130 lb)   SpO2 97%   BMI 19.20 kg/m     Neck supple without adenopathy.    Lungs clear bilaterally with fair air movement  Heart RRR s1s2  Abd soft, NT, no distention or guarding, +BS  Clear yellow urine in Sahni bag.    Ext with 1+ edema, wearing compression stockings   Neuro: some limits to history, no focal findings  Psych: affect okay, pleasant       Labs reviewed, none recent given Hospice pt      ECHO 1/29/2021   Interpretation Summary  The visual ejection fraction is estimated at 35-40%.  Left ventricular systolic function is mild to moderately reduced.  There are regional wall motion abnormalities as specified.  The regional wall motion abnormalities are similar to previous and are consistent with LAD territory ischemia and / or infarction.  The ascending aorta is Mildly dilated.    NUCLEAR MEDICINE BONE SCAN WHOLE BODY May 10, 2022  FINDINGS: No evidence of skeletal metastatic disease. Scattered  degenerative uptake in the wrists, left hip, and knees. Degenerative uptake is most prominent in the left hip.             CT ABDOMEN/PELVIS WITH CONTRAST May 10, 2022   CLINICAL HISTORY: Prostate cancer (H)  LOWER CHEST: There are innumerable pulmonary nodules in both lung  bases, increased in size and number when compared to previous. One of  the larger metastases in the left lower lobe   HEPATOBILIARY: Stable 1.3 cm partially calcified nodule/cyst within  the left hepatic lobe. No new liver lesions. Cholelithiasis.  KIDNEYS/BLADDER: Right renal cysts. No hydronephrosis  PELVIC ORGANS: The prostate gland is mildly enlarged and diffusely  enhancing, likely due to known prostate cancer.  ADDITIONAL FINDINGS: No pelvic or retroperitoneal lymphadenopathy.  MUSCULOSKELETAL: No visualized skeletal metastases.                                                      IMPRESSION:   1.  Pulmonary metastases have " increased in size and number compared to 1/26/2021.  2.  No evidence of metastatic disease in the abdomen or pelvis.                                                   IMP/PLAN:   (R33.9) Urinary retention    (N39.0,  B95.2) Enterococcus UTI  Comment: improved symptoms after catheter replaced   Plan: finish week course of Augmentin   Routine catheter cares     (E11.59,  Z79.4) Type 2 diabetes mellitus with other circulatory complication, with long-term current use of insulin (H)   Comment: doing so much better since on med admin    Lab Results   Component Value Date    A1C 7.0 01/11/2022     Plan: on metformin 1000 mg bid and glimepiride 1 mg/day      (K22.2) Stricture and stenosis of esophagus  (K21.00) Gastroesophageal reflux disease with esophagitis without hemorrhage  Comment: s/p dilatation x2   Plan: omeprazole 20 mg/day    Eats soft foods by preference    (I25.10) Coronary artery disease involving native coronary artery of native heart without angina pectoris  (I25.5) Ischemic cardiomyopathy  Comment: low EF, volume status stable     Plan: daily ASA        Monitor for symptoms     (C61) Prostate cancer (H)  (C78.01,  C78.02) 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)  Comment: high PSA, local disease, castrate resistant    Worsening of pulmonary mets by imaging      Plan:  per Oncology     Comfort focus      (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  Comment: intermittent pain   Plan: acetaminophen or MS PRN for pain      (R41.89) Cognitive impairment  (F84.0) Autism disorder  Comment: has stopped medications on occasion     Plan: AL support for med admin, meals, activity        (F32.0) Mild major depression (H)  Comment: by hx  Plan: sertraline 50 mg/day     Cecilia Krause MD

## 2023-02-03 PROBLEM — E11.59 TYPE 2 DIABETES MELLITUS WITH OTHER CIRCULATORY COMPLICATION, WITH LONG-TERM CURRENT USE OF INSULIN (H): Status: RESOLVED | Noted: 2021-03-20 | Resolved: 2023-01-01

## 2023-02-03 PROBLEM — I50.30 HEART FAILURE WITH PRESERVED EJECTION FRACTION, NYHA CLASS I (H): Status: RESOLVED | Noted: 2021-03-01 | Resolved: 2023-01-01

## 2023-02-03 PROBLEM — Z79.4 TYPE 2 DIABETES MELLITUS WITH OTHER CIRCULATORY COMPLICATION, WITH LONG-TERM CURRENT USE OF INSULIN (H): Status: RESOLVED | Noted: 2021-03-20 | Resolved: 2023-01-01

## 2023-02-08 PROBLEM — R53.81 DECLINING FUNCTIONAL STATUS: Status: ACTIVE | Noted: 2023-01-01

## 2023-02-08 PROBLEM — R63.4 WEIGHT LOSS: Status: ACTIVE | Noted: 2023-01-01

## 2023-02-08 NOTE — PROGRESS NOTES
"Chief Complaint: seen as report of decline per Hospice, pt is  Very weak, not eating or moving well.  He is not able to ambulate independently. Saw hi lying on couch.  Tamia, .    HPI:    Jose Lees is a 82 year old  (1940), who is being seen today for an episodic care visit at [facility]). Today's concern is:     Malignant neoplasm metastatic to lung, unspecified laterality (H)  Prostate cancer (H)  Urinary retention  Hospice care patient  Failure to thrive in adult  Generalized muscle weakness  Weight loss  Declining functional status  Type 2 diabetes mellitus without complication, without long-term current use of insulin (H)  Autism disorder  Cognitive impairment      /65   Pulse 91   Temp 98.6  F (37  C)   Resp 20   Ht 1.753 m (5' 9\")   Wt 59 kg (130 lb)   SpO2 97%   BMI 19.20 kg/m    TODAY'S EXAM:  SUBJECTIVE: and OBJECTIVE DATA:   GENERAL APPEARANCE:  Alert,oriented x 2=3 forgetful, feels very tired. incontinent--\"I think my catheter is leaking\". \"I need help to sit up, I cant do it\". in no distress. Lungs CTA but decreased,  RRR,   + LE edema. Abdomen is soft, +BTS--hypoactive. No focal neurological deficits. Sahni draining cloudy yellow urine.    Current Outpatient Medications   Medication Sig Dispense Refill     methylcellulose (CITRUCEL) 500 MG TABS tablet Take 500 mg by mouth daily 1 tablespoon powder in full glass of water dialy       acetaminophen (TYLENOL) 650 MG suppository Place 650 mg rectally every 6 hours as needed for fever       bisacodyl (DULCOLAX) 10 MG suppository Place 10 mg rectally daily as needed for constipation       hyoscyamine (LEVSIN/SL) 0.125 MG sublingual tablet Place 0.125 mg under the tongue every 4 hours as needed for cramping       LORazepam (ATIVAN) 0.5 MG tablet Take 0.5 mg by mouth every 6 hours as needed for anxiety       morphine 2.5 MG solu-tab Take 2.5 mg by mouth every hour as needed for shortness of " "breath / dyspnea or moderate to severe pain       omeprazole (PRILOSEC) 20 MG DR capsule TAKE 1 CAPSULE BY MOUTH EVERY MORNING BEFORE A MEAL 28 capsule 11     sertraline (ZOLOFT) 50 MG tablet TAKE ONE TABLET BY MOUTH EVERY MORNING 28 tablet 11     STIMULANT LAXATIVE 8.6-50 MG tablet TAKE ONE TABLET BY MOUTH EVERY DAY AS NEEDED FOR CONSTIPATION 30 tablet 0     ASSESSMENT / PLAN:  (C78.00) Malignant neoplasm metastatic to lung, unspecified laterality (H)  (primary encounter diagnosis)   (C61) Prostate cancer (H)   (R33.9) Urinary retention  Comment/Plan: flush tierney tid and prn, pain control,  monitor.    (Z51.5) Hospice care patient  Comment/Plan: appreciate hospice care, also dc'd asa yest.    (R62.7) Failure to thrive in adult   (M62.81) Generalized muscle weakness   (R63.4) Weight loss   (R53.81) Declining functional status  Comment/Plan: d/w Hospice RN today, this afternoon.  Goal is to keep pt in his apt with increased cares and help by AL and Hospice staff..  IF unable to meet his needs in a safe and comfortable manner, we rec he transfer to the Care center but best for pt if he can stay in him \"HOME\".    (E11.9) Type 2 diabetes mellitus without complication, without long-term current use of insulin (H)  Comment/Plan: yesterday per d/w Hospice RN,  Discontinued DM meds--not eating much, discontinue accuchecks    (F84.0) Autism disorder  (R41.89) Cognitive impairment  Comment/Plan: declining, goal is comfort.   appreciate Hospice cares.        Electronically Signed by:  SARATH Yi CNP  "

## 2023-02-08 NOTE — LETTER
"    2/8/2023        RE: Jose Lees  [place]  [address] [facility] [address]        Chief Complaint: seen as report of decline per Hospice, pt is  Very weak, not eating or moving well.  He is not able to ambulate independently. Saw hi lying on couch.  Teary, .    HPI:    Jose Lees is a 82 year old  (1940), who is being seen today for an episodic care visit at [facility] Today's concern is:     Malignant neoplasm metastatic to lung, unspecified laterality (H)  Prostate cancer (H)  Urinary retention  Hospice care patient  Failure to thrive in adult  Generalized muscle weakness  Weight loss  Declining functional status  Type 2 diabetes mellitus without complication, without long-term current use of insulin (H)  Autism disorder  Cognitive impairment      /65   Pulse 91   Temp 98.6  F (37  C)   Resp 20   Ht 1.753 m (5' 9\")   Wt 59 kg (130 lb)   SpO2 97%   BMI 19.20 kg/m    TODAY'S EXAM:  SUBJECTIVE: and OBJECTIVE DATA:   GENERAL APPEARANCE:  Alert,oriented x 2=3 forgetful, feels very tired. incontinent--\"I think my catheter is leaking\". \"I need help to sit up, I cant do it\". in no distress. Lungs CTA but decreased,  RRR,   + LE edema. Abdomen is soft, +BTS--hypoactive. No focal neurological deficits. Sahni draining cloudy yellow urine.    Current Outpatient Medications   Medication Sig Dispense Refill     methylcellulose (CITRUCEL) 500 MG TABS tablet Take 500 mg by mouth daily 1 tablespoon powder in full glass of water dialy       acetaminophen (TYLENOL) 650 MG suppository Place 650 mg rectally every 6 hours as needed for fever       bisacodyl (DULCOLAX) 10 MG suppository Place 10 mg rectally daily as needed for constipation       hyoscyamine (LEVSIN/SL) 0.125 MG sublingual tablet Place 0.125 mg under the tongue every 4 hours as needed for cramping       LORazepam (ATIVAN) 0.5 MG tablet Take 0.5 mg by mouth every 6 hours as " "needed for anxiety       morphine 2.5 MG solu-tab Take 2.5 mg by mouth every hour as needed for shortness of breath / dyspnea or moderate to severe pain       omeprazole (PRILOSEC) 20 MG DR capsule TAKE 1 CAPSULE BY MOUTH EVERY MORNING BEFORE A MEAL 28 capsule 11     sertraline (ZOLOFT) 50 MG tablet TAKE ONE TABLET BY MOUTH EVERY MORNING 28 tablet 11     STIMULANT LAXATIVE 8.6-50 MG tablet TAKE ONE TABLET BY MOUTH EVERY DAY AS NEEDED FOR CONSTIPATION 30 tablet 0     ASSESSMENT / PLAN:  (C78.00) Malignant neoplasm metastatic to lung, unspecified laterality (H)  (primary encounter diagnosis)   (C61) Prostate cancer (H)   (R33.9) Urinary retention  Comment/Plan: flush tierney tid and prn, pain control,  monitor.    (Z51.5) Hospice care patient  Comment/Plan: appreciate hospice care, also dc'd asa yest.    (R62.7) Failure to thrive in adult   (M62.81) Generalized muscle weakness   (R63.4) Weight loss   (R53.81) Declining functional status  Comment/Plan: d/w Hospice RN today, this afternoon.  Goal is to keep pt in his apt with increased cares and help by AL and Hospice staff..  IF unable to meet his needs in a safe and comfortable manner, we rec he transfer to the Care center but best for pt if he can stay in him \"HOME\".    (E11.9) Type 2 diabetes mellitus without complication, without long-term current use of insulin (H)  Comment/Plan: yesterday per d/w Hospice RN,  Discontinued DM meds--not eating much, discontinue accuchecks    (F84.0) Autism disorder  (R41.89) Cognitive impairment  Comment/Plan: declining, goal is comfort.   appreciate Hospice cares.        Electronically Signed by:  SARATH Yi CNP        Sincerely,        SARATH Yi CNP      "

## 2023-02-12 NOTE — TELEPHONE ENCOUNTER
Discharge Planning Assessment  [facility]     Patient Name: El Jiang  MRN: [id number]  Today's Date: 2/12/2023    Admit Date: 2/12/2023    Plan: Pt intends to return home upon discharge   Discharge Needs Assessment     Row Name 02/12/23 1450       Living Environment    People in Home spouse    Name(s) of People in Home Sanaz Jiagn [phone]    Current Living Arrangements home    Primary Care Provided by self    Provides Primary Care For no one    Family Caregiver if Needed spouse    Family Caregiver Names Sanaz Jiang    Quality of Family Relationships involved;helpful    Able to Return to Prior Arrangements yes       Resource/Environmental Concerns    Resource/Environmental Concerns none    Transportation Concerns none       Food Insecurity    Within the past 12 months, you worried that your food would run out before you got the money to buy more. Never true    Within the past 12 months, the food you bought just didn't last and you didn't have money to get more. Never true       Transition Planning    Patient/Family Anticipates Transition to home;home with family    Patient/Family Anticipated Services at Transition none    Transportation Anticipated car, drives self;family or friend will provide       Discharge Needs Assessment    Readmission Within the Last 30 Days no previous admission in last 30 days    Equipment Currently Used at Home none    Concerns to be Addressed no discharge needs identified;denies needs/concerns at this time    Anticipated Changes Related to Illness none    Equipment Needed After Discharge none    Provided Post Acute Provider List? N/A    Provided Post Acute Provider Quality & Resource List? N/A               Discharge Plan     Row Name 02/12/23 1451       Plan    Plan Pt intends to return home upon discharge    Patient/Family in Agreement with Plan yes    Provided Post Acute Provider List? N/A    Provided Post Acute Provider Quality & Resource List? N/A    Plan Comments  warfarin (COUMADIN) 2 MG tablet; last OV : 3/10/2017    Component      Latest Ref Rng & Units 7/25/2017   INR      0.8 - 1.2 1.4 (H)        Spoke with pt and spouse at [facility] with permission. Introduced self and explained role of CCP. Verified face sheet and that PCP was Adrian Ibrahim. Pt stated that he occassionally had difficulty paying for medications. Good Rx card given and advised pt to ask PCP for samples. Pt is independent in ADL's and denies the need for any assistive devices or community resources. Advised pt and family if any further needs arise, contact case management              Continued Care and Services - Admitted Since 2/12/2023    Coordination has not been started for this encounter.          Demographic Summary    No documentation.                Functional Status    No documentation.                Psychosocial    No documentation.                Abuse/Neglect    No documentation.                Legal    No documentation.                Substance Abuse    No documentation.                Patient Forms    No documentation.                   Pam King RN

## 2023-02-16 NOTE — LETTER
2/16/2023        RE: Jose Hollins  [address] [address] [facility] [address] [facility] [id number]        Jose Lees is a 82 year old male seen February 16, 2023 at [facility] where he was admitted last week from his AL apartment due to higher care needs.    Pt had become so weak that he was unable to even sit up unassisted.      Seen today in his room up to chair, reports he is feeling better and is happy about the transfer and increased services . Eating better         By chart review, pt had a [facility] hospitalization in January 2021 for dysphagia, had stopped taking all of his medications.   He was found to have severe candidal esophagitis and esophageal strictures, underwent dilatation on 2 occasions.   He was started on insulin for poorly controlled DM2  He went to TCU before he discharged to his sister's home on 2/18, then rehospitalized 2/26 for weakness and failure to thrive.  Treated with IVF; his Lantus and metoprolol doses were decreased.    He again went to [facility], then back to [facility] with services.  Pt has prostate cancer diagnosed in 2011   No metastatic disease on imaging.   He was treated with leuprolide and bicalutamide without improvement in PSA.   At some point pt stopped his medications.  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    Treated with leuprolide for a couple of years.   Seen in follow up in May 2022 and scans as below.  After discussion with pt and family, he was signed on to [facility] in June 2022      Seen by Dermatology in April 2021 and had a punch biopsy, given dx of pigmented purpuric dermatosis and treated with topical steroids.   Rash tends to flare at times.    Past Medical History:   Diagnosis Date     ACP (advance care planning)     Form given     Acute anterior wall MI (H) 4/7/2014     Autism disorder 6/29/2012     CHF (congestive heart  failure) (H)     ECHO EF=25-30% on 4/7/14     Coronary artery disease 4/2014 4/2014 HEART CATH - 70% mid LAD -- BMS placed, small 1st diagonal 80%, RCA 60-70% before crux, 70% mid PDA     Dementia (H)      Hyperlipidaemia      Longstanding persistent atrial fibrillation (H) 1/20/2020     Mild major depression (H) 1/20/2020     Prostate cancer (H) 10/11    GLEASOR 8     Stricture and stenosis of esophagus 10/22/2015     Stroke, 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     Type 2 diabetes mellitus without complication, without long-term current use of insulin (H) 7/25/2017       Past Surgical History:   Procedure Laterality Date     CORONARY ANGIOGRAPHY ADULT ORDER       ESOPHAGOSCOPY, GASTROSCOPY, DUODENOSCOPY (EGD), COMBINED N/A 8/6/2015    Procedure: COMBINED ESOPHAGOSCOPY, GASTROSCOPY, DUODENOSCOPY (EGD), REMOVE FOREIGN BODY;  Surgeon: Yu Tapia MD;  Location:  GI     ESOPHAGOSCOPY, GASTROSCOPY, DUODENOSCOPY (EGD), COMBINED N/A 1/28/2021    Procedure: ESOPHAGOGASTRODUODENOSCOPY (EGD) WITH FLUORO,Biopsy, and Dilation;  Surgeon: Audra Huang MD;  Location:  OR     HEART CATH, ANGIOPLASTY  4/14    BMS to LAD     PHACOEMULSIFICATION CLEAR CORNEA WITH STANDARD INTRAOCULAR LENS IMPLANT Right 12/4/2018    Procedure: COMPLEX RIGHT EYE PHACOEMULSIFICATION CLEAR CORNEA WITH STANDARD INTRAOCULAR LENS IMPLANT;  Surgeon: Kamar Kyle MD;  Location:  EC     PHACOEMULSIFICATION CLEAR CORNEA WITH STANDARD INTRAOCULAR LENS IMPLANT Left 12/19/2018    Procedure: COMPLEX LEFT EYE PHACOEMULSIFICATION CLEAR CORNEA WITH STANDARD INTRAOCULAR LENS IMPLANT;  Surgeon: Kamar Kyle MD;  Location: [facility]     SH:  Previously lived at [facility], and before that at [facility].   His sister Judith is POA   Pt worked as a   Non smoker     ROS: SLUMS 14/30   ACL 3.8     Transfers with mechanical lift, needs assist for all ADLs      Weight was 150 lbs  "in 2022>>> 130 lbs in January 2023     EXAM: frail, NAD  BP 91/55   Pulse 92   Temp 98.2  F (36.8  C)   Resp 18   Ht 1.753 m (5' 9\")   Wt 64.5 kg (142 lb 3.2 oz)   SpO2 94%   BMI 21.00 kg/m     Neck supple without adenopathy.    Lungs clear bilaterally with fair air movement  Heart RRR s1s2  Abd soft, NT, no distention or guarding, +BS  Clear yellow urine in Sahni bag.    Ext with 1+ edema, wearing compression stockings   Neuro: some limits to history, no focal findings  Psych: affect okay, pleasant       Labs reviewed, none recent given Hospice pt      ECHO 1/29/2021   Interpretation Summary  The visual ejection fraction is estimated at 35-40%.  Left ventricular systolic function is mild to moderately reduced.  There are regional wall motion abnormalities as specified.  The regional wall motion abnormalities are similar to previous and are consistent with LAD territory ischemia and / or infarction.  The ascending aorta is Mildly dilated.    NUCLEAR MEDICINE BONE SCAN WHOLE BODY May 10, 2022  FINDINGS: No evidence of skeletal metastatic disease. Scattered  degenerative uptake in the wrists, left hip, and knees. Degenerative uptake is most prominent in the left hip.             CT ABDOMEN/PELVIS WITH CONTRAST May 10, 2022   CLINICAL HISTORY: Prostate cancer (H)  LOWER CHEST: There are innumerable pulmonary nodules in both lung  bases, increased in size and number when compared to previous. One of the larger metastases in the left lower lobe   HEPATOBILIARY: Stable 1.3 cm partially calcified nodule/cyst within the left hepatic lobe. No new liver lesions. Cholelithiasis.  KIDNEYS/BLADDER: Right renal cysts. No hydronephrosis  PELVIC ORGANS: The prostate gland is mildly enlarged and diffusely enhancing, likely due to known prostate cancer.  ADDITIONAL FINDINGS: No pelvic or retroperitoneal lymphadenopathy.  MUSCULOSKELETAL: No visualized skeletal metastases.                                                    "   IMPRESSION:   1.  Pulmonary metastases have increased in size and number compared to 1/26/2021.  2.  No evidence of metastatic disease in the abdomen or pelvis.                                                   IMP/PLAN:   (R33.9) Urinary retention    Comment: continues with indwelling Sahni catheter      Plan: Routine catheter cares     (E11.59,  Z79.4) Type 2 diabetes mellitus with other circulatory complication, with long-term current use of insulin (H)   Comment: doing so much better since on med admin    Lab Results   Component Value Date    A1C 7.0 01/11/2022     Plan: no longer treated    Diet as tolerated     (K22.2) Stricture and stenosis of esophagus  (K21.00) Gastroesophageal reflux disease with esophagitis without hemorrhage  Comment: s/p dilatation x2   Plan: omeprazole 20 mg/day    Eats soft foods by preference    (I25.10) Coronary artery disease involving native coronary artery of native heart without angina pectoris  (I25.5) Ischemic cardiomyopathy  Comment: low EF as above, volume status stable today   Plan: Monitor for symptoms and treat to comfort     (C61) Prostate cancer (H)  (C78.01,  C78.02) 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)  Comment: high PSA, local disease, castrate resistant    Worsening of pulmonary mets by imaging      Plan:  per Oncology     Comfort focus      (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  Comment: intermittent pain  Degenerative joint disease seen on imaging as above   Plan: acetaminophen or MS PRN for pain      (R41.89) Cognitive impairment  (F84.0) Autism disorder  Comment: has stopped medications on occasion     Plan: LTC support for med admin, meals, activity        (F32.0) Mild major depression (H)  Comment: by hx  Plan: sertraline 50 mg/day    (M62.81) Generalized muscle weakness  (R62.7) Failure to thrive in adult  (Z51.5) Hospice care patient  Comment: acute on chronic decline, now transferred to care facility for increased support     Plan: appreciate services of [facility] in maximizing pt comfort      Cecilia Krause MD         Sincerely,        Cecilia Krause MD

## 2023-02-20 NOTE — PROGRESS NOTES
[facility]  [facility] Medical Record Number:  [id number]  Place of Service where encounter took place:  [facility] ([facility]) [[id number]]  Chief Complaint   Patient presents with     Nursing Home Acute       HPI:    Jose Lees  is a 82 year old (1940), who is being seen today for an episodic care visit.  HPI information obtained from: facility chart records, facility staff, patient report and [facility] chart review. Today's concern is: transferred to LTC Hospice bed on 2/10/2023 as not able to transfer pt w/o Jaelyn in [place] [facility].  He has been comfortable since then here     Type 2 diabetes mellitus without complication, without long-term current use of insulin (H)  Prostate cancer (H)  Malignant neoplasm metastatic to lung, unspecified laterality (H)  Presence of stent in coronary artery in patient with coronary artery disease  Ischemic cardiomyopathy  Hypertension, unspecified type  History of cerebrovascular accident (CVA) due to embolism  Coronary artery disease involving native coronary artery of native heart without angina pectoris  Hospice care patient  Failure to thrive in adult  Autism disorder  Cognitive impairment      Past Medical and Surgical History reviewed in Epic today.    MEDICATIONS:     Current Outpatient Medications   Medication Sig Dispense Refill     acetaminophen (TYLENOL) 650 MG suppository Place 650 mg rectally every 6 hours as needed for fever       bisacodyl (DULCOLAX) 10 MG suppository Place 10 mg rectally daily as needed for constipation       hyoscyamine (LEVSIN/SL) 0.125 MG sublingual tablet Place 0.125 mg under the tongue every 4 hours as needed for cramping       LORazepam (ATIVAN) 0.5 MG tablet Take 0.5 mg by mouth every 6 hours as needed for anxiety       methylcellulose (CITRUCEL) 500 MG TABS tablet Take 500 mg by mouth daily 1 tablespoon powder in full glass of water dialy       morphine 2.5 MG solu-tab Take 2.5  "mg by mouth every hour as needed for shortness of breath / dyspnea or moderate to severe pain       omeprazole (PRILOSEC) 20 MG DR capsule TAKE 1 CAPSULE BY MOUTH EVERY MORNING BEFORE A MEAL 28 capsule 11     sertraline (ZOLOFT) 50 MG tablet TAKE ONE TABLET BY MOUTH EVERY MORNING 28 tablet 11     STIMULANT LAXATIVE 8.6-50 MG tablet TAKE ONE TABLET BY MOUTH EVERY DAY AS NEEDED FOR CONSTIPATION 30 tablet 0          Subjective/Objective:  /66   Pulse 92   Temp 98.1  F (36.7  C)   Resp 18   Ht 1.753 m (5' 9\")   Wt 64.3 kg (141 lb 11.2 oz)   SpO2 94%   BMI 20.93 kg/m    Exam:  A&O x3-forgetful but quite clear today.  Says I is comfortable no pain, tierney working fine, no constipation  Lungs CTA but decreased,  RRR,   +trace bilat LE edema. Abdomen is soft, +BTS No focal neurological deficits. Tierney draining cloudy yellow urine.    Labs:   Recent labs in [facility] reviewed by me today.      ASSESSMENT / PLAN:  (E11.9) Type 2 diabetes mellitus without complication, without long-term current use of insulin (H)  (primary encounter diagnosis)  Comment/Plan: on no meds, eating what he wishes--usually 75%--not checking accuchecks, goal is comfort and enjoyment       (C61) Prostate cancer (H)   (C78.00) Malignant neoplasm metastatic to lung, unspecified laterality (H)  (Z51.5) Hospice care patient   (R62.7) Failure to thrive in adult  Comment/Plan: wt loss.   Pain control good, all meds comfort focused.   No changes today. appreciate Hospice care     CARDIAC  (I25.10,  Z95.5) Presence of stent in coronary artery in patient with coronary artery disease   (I25.5) Ischemic cardiomyopathy   (I10) Hypertension, unspecified type   (Z86.73) History of cerebrovascular accident (CVA) due to embolism   (I25.10) Coronary artery disease involving native coronary artery of native heart without angina pectoris  Comment/Plan: on no meds, goal is comfort. SBP runs  mostly    (F84.0) Autism disorder   (R41.89) Cognitive " impairment  Comment/Plan: Zoloft, monitor.    Electronically signed by:  SARATH Yi CNP

## 2023-02-27 PROBLEM — K64.9 HEMORRHOIDS, UNSPECIFIED HEMORRHOID TYPE: Status: ACTIVE | Noted: 2023-01-01

## 2023-02-27 NOTE — PROGRESS NOTES
"Chief Complaint: C/O hemorrhoids in wkd--hospice team add comfort meds for this.  Today says he feels ok --stool normal today    HPI:    Jose Lees is a 82 year old  (1940), who is being seen today for an episodic care visit at [facility]. Today's concern is:     Hemorrhoids, unspecified hemorrhoid type  Prostate cancer (H)  Malignant neoplasm metastatic to lung, unspecified laterality (H)  Ischemic cardiomyopathy  Hypertension, unspecified type  History of cerebrovascular accident (CVA) due to embolism  Coronary artery disease involving native coronary artery of native heart without angina pectoris  Hospice care patient  Failure to thrive in adult  Autism disorder  Cognitive impairment      /66   Pulse 95   Temp 97.2  F (36.2  C)   Resp 19   Ht 1.753 m (5' 9\")   Wt 64.3 kg (141 lb 11.2 oz)   SpO2 95%   BMI 20.93 kg/m    TODAY'S EXAM:  SUBJECTIVE:  No CP, SOB, Cough, dizziness, HA, N/V, has tierney.   No Bowel Abnormalities. Appetite is \"I ate my cheerios and soft boiled egg today\".  No pain except bottom and lower back.    OBJECTIVE DATA:   GENERAL APPEARANCE:  Alert,oriented x 3 in no distress. Lungs CTA but decreased,  RRR,  No edema today. Abdomen is soft, +BTS . No focal neurological deficits.     Current Outpatient Medications   Medication Sig Dispense Refill     lidocaine, Anorectal, 5 % CREA Externally apply 1 Application. topically daily as needed RECTAL PAIN OR HEMORRHOIDS       pramox-pe-glycerin-petrolatum (PREPARATION H) 1-0.25-14.4-15 % CREA cream Place 1 g rectally 2 times daily TO RECTUM FOR HEMORRHOIDS       senna (SENOKOT) 8.6 MG tablet Take 1 tablet by mouth 2 times daily AND BID PRN CONSTIPATION       witch hazel-glycerin (TUCKS) pad Apply 1 each topically as needed for hemorrhoids AFTER EACH bm       acetaminophen (TYLENOL) 650 MG suppository Place 650 mg rectally every 6 hours as needed for fever       bisacodyl (DULCOLAX) 10 MG suppository Place 10 mg " rectally daily as needed for constipation       hyoscyamine (LEVSIN/SL) 0.125 MG sublingual tablet Place 0.125 mg under the tongue every 4 hours as needed for cramping       LORazepam (ATIVAN) 0.5 MG tablet Take 0.5 mg by mouth every 6 hours as needed for anxiety       morphine 2.5 MG solu-tab Take 2.5 mg by mouth every hour as needed for shortness of breath / dyspnea or moderate to severe pain       omeprazole (PRILOSEC) 20 MG DR capsule TAKE 1 CAPSULE BY MOUTH EVERY MORNING BEFORE A MEAL 28 capsule 11     sertraline (ZOLOFT) 50 MG tablet TAKE ONE TABLET BY MOUTH EVERY MORNING 28 tablet 11     STIMULANT LAXATIVE 8.6-50 MG tablet TAKE ONE TABLET BY MOUTH EVERY DAY AS NEEDED FOR CONSTIPATION 30 tablet 0     ASSESSMENT / PLAN:  (K64.9) Hemorrhoids, unspecified hemorrhoid type  (primary encounter diagnosis)  Comment/Plan: improved, cont with bowel meds to control and avoid hemorrhoids. monitor.    (C61) Prostate cancer (H)  (C78.00) Malignant neoplasm metastatic to lung, unspecified laterality (H)  (Z51.5) Hospice care patient  (R62.7) Failure to thrive in adult  Comment/Plan: Goal is comfort--no treatment, monitor    CV ISSUES:  (I25.5) Ischemic cardiomyopathy   (I10) Hypertension, unspecified type   (Z86.73) History of cerebrovascular accident (CVA) due to embolism   (I25.10) Coronary artery disease involving native coronary artery of native heart without angina pectoris  Comment/Plan: on no meds any longer, goal is comfort SBP running  for most part      (F84.0) Autism disorder   (R41.89) Cognitive impairment  Comment/Plan: cont Zoloft and comfort meds per Hospice--adjust as needed.        Electronically Signed by:  SARATH Yi CNP

## 2023-03-01 NOTE — PROGRESS NOTES
Problem(s) Oriented visit        SUBJECTIVE:                                                    Jose Lees is a 77 year old male who presents to clinic today for recheck of INR. He is having EGD with dilation in two days. He has been off Coumadin for 4 days. He is needing INR< 1.5. No problems.      Problem list, Medication list, Allergies, and Medical/Social/Surgical histories reviewed in [facility] and updated as appropriate.   Additional history: as documented    ROS:  5 point ROS completed and negative except noted above, including Gen, CV, Resp, GI, MS      Histories:   Patient Active Problem List   Diagnosis     Diabetes mellitus, type 2 (H)     Prostate cancer (H)     Autism disorder     ACP (advance care planning)     Health Care Home     History of MI (myocardial infarction)     History of stroke     CHF (congestive heart failure) (H)     Long term current use of anticoagulant therapy     Stricture and stenosis of esophagus     History of TIA (transient ischemic attack) and stroke     Influenza A     Physical deconditioning     Type 2 diabetes mellitus without complication, without long-term current use of insulin (H)     Past Surgical History:   Procedure Laterality Date     CORONARY ANGIOGRAPHY ADULT ORDER       ESOPHAGOSCOPY, GASTROSCOPY, DUODENOSCOPY (EGD), COMBINED N/A 8/6/2015    Procedure: COMBINED ESOPHAGOSCOPY, GASTROSCOPY, DUODENOSCOPY (EGD), REMOVE FOREIGN BODY;  Surgeon: Yu Tapia MD;  Location:  GI     HEART CATH, ANGIOPLASTY  4/14    BMS to LAD       Social History   Substance Use Topics     Smoking status: Never Smoker     Smokeless tobacco: Never Used     Alcohol use No      Comment: never     Family History   Problem Relation Age of Onset     CEREBROVASCULAR DISEASE Mother 92     C.A.D. Father 48           OBJECTIVE:                                                    There were no vitals taken for this visit.  There is no height or weight on file to calculate BMI.   GENERAL  normal mood with appropriate affect APPEARANCE: Alert, no acute distress  NEURO: Alert, oriented, speech and mentation normal  PSYCH: mentation appears normal, affect and mood normal   Labs Resulted Today:   Results for orders placed or performed in visit on 01/31/18   Prothrombin - INR ([facility])   Result Value Ref Range    INR 1.2 (A) 2.0 - 3.0     ASSESSMENT/PLAN:                                                        Jose was seen today for inr followup.    Diagnoses and all orders for this visit:    Long term current use of anticoagulant therapy  -     Prothrombin - INR ([facility])        Patient Instructions   INR today is 1.2. You are safe to have procedure on 2/2/18.   After the procedure, please restart regular coumadin dosing and recheck a week from Friday.      The following health maintenance items are reviewed in Epic and correct as of today:  Health Maintenance   Topic Date Due     EYE EXAM Q1 YEAR  04/01/2013     FALL RISK ASSESSMENT  01/07/2015     FOOT EXAM Q1 YEAR  02/18/2017     MICROALBUMIN Q1 YEAR  02/18/2017     BMP Q6 MOS  08/16/2017     A1C Q6 MO  01/25/2018     ALT Q1 YR  02/07/2018     CBC Q1 YR  02/09/2018     CREATININE Q1 YEAR  02/16/2018     LIPID MONITORING Q1 YEAR  07/25/2018     TSH W/ FREE T4 REFLEX Q2 YEAR  02/16/2019     HF ACTION PLAN Q3 YR  04/06/2019     TETANUS IMMUNIZATION (SYSTEM ASSIGNED)  09/16/2021     ADVANCE DIRECTIVE PLANNING Q5 YRS  05/12/2022     INFLUENZA VACCINE (SYSTEM ASSIGNED)  Completed     PNEUMOCOCCAL  Completed       Arlene Mcdermott MD  [facility] [place]  [phone]    For any issues my office # is [phone]

## 2023-03-02 NOTE — PROGRESS NOTES
Jose Lees is a 82 year old male seen February 16, 2023 at [facility] where he was admitted last week from his [place] apartment due to higher care needs.    Pt had become so weak that he was unable to even sit up unassisted.      Seen today in his room up to chair, reports he is feeling better and is happy about the transfer and increased services . Eating better         By chart review, pt had a [facility] hospitalization in January 2021 for dysphagia, had stopped taking all of his medications.   He was found to have severe candidal esophagitis and esophageal strictures, underwent dilatation on 2 occasions.   He was started on insulin for poorly controlled DM2  He went to [facility]CU before he discharged to his sister's home on 2/18, then rehospitalized 2/26 for weakness and failure to thrive.  Treated with IVF; his Lantus and metoprolol doses were decreased.    He again went to TCU, then back to [facility] with services.  Pt has prostate cancer diagnosed in 2011   No metastatic disease on imaging.   He was treated with leuprolide and bicalutamide without improvement in PSA.   At some point pt stopped his medications.  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    Treated with leuprolide for a couple of years.   Seen in follow up in May 2022 and scans as below.  After discussion with pt and family, he was signed on to [facility] in June 2022      Seen by Dermatology in April 2021 and had a punch biopsy, given dx of pigmented purpuric dermatosis and treated with topical steroids.   Rash tends to flare at times.    Past Medical History:   Diagnosis Date     ACP (advance care planning)     Form given     Acute anterior wall MI (H) 4/7/2014     Autism disorder 6/29/2012     CHF (congestive heart failure) (H)     ECHO EF=25-30% on 4/7/14     Coronary artery disease 4/2014 4/2014 HEART CATH - 70% mid LAD -- BMS  placed, small 1st diagonal 80%, RCA 60-70% before crux, 70% mid PDA     Dementia (H)      Hyperlipidaemia      Longstanding persistent atrial fibrillation (H) 1/20/2020     Mild major depression (H) 1/20/2020     Prostate cancer (H) 10/11    GLEASOR 8     Stricture and stenosis of esophagus 10/22/2015     Stroke, 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     Type 2 diabetes mellitus without complication, without long-term current use of insulin (H) 7/25/2017       Past Surgical History:   Procedure Laterality Date     CORONARY ANGIOGRAPHY ADULT ORDER       ESOPHAGOSCOPY, GASTROSCOPY, DUODENOSCOPY (EGD), COMBINED N/A 8/6/2015    Procedure: COMBINED ESOPHAGOSCOPY, GASTROSCOPY, DUODENOSCOPY (EGD), REMOVE FOREIGN BODY;  Surgeon: Yu Tapia MD;  Location:  GI     ESOPHAGOSCOPY, GASTROSCOPY, DUODENOSCOPY (EGD), COMBINED N/A 1/28/2021    Procedure: ESOPHAGOGASTRODUODENOSCOPY (EGD) WITH FLUORO,Biopsy, and Dilation;  Surgeon: Audra Huang MD;  Location:  OR     HEART CATH, ANGIOPLASTY  4/14    BMS to LAD     PHACOEMULSIFICATION CLEAR CORNEA WITH STANDARD INTRAOCULAR LENS IMPLANT Right 12/4/2018    Procedure: COMPLEX RIGHT EYE PHACOEMULSIFICATION CLEAR CORNEA WITH STANDARD INTRAOCULAR LENS IMPLANT;  Surgeon: Kamar Kyle MD;  Location: [facility]     PHACOEMULSIFICATION CLEAR CORNEA WITH STANDARD INTRAOCULAR LENS IMPLANT Left 12/19/2018    Procedure: COMPLEX LEFT EYE PHACOEMULSIFICATION CLEAR CORNEA WITH STANDARD INTRAOCULAR LENS IMPLANT;  Surgeon: Kamar Kyle MD;  Location: [facility]     SH:  Previously lived at [facility], and before that at [facility].   His sister Judith samson POA   Pt worked as a   Non smoker     ROS: SLUMS 14/30   ACL 3.8     Transfers with mechanical lift, needs assist for all ADLs      Weight was 150 lbs in 2022>>> 130 lbs in January 2023     EXAM: frail, NAD  BP 91/55   Pulse 92   Temp 98.2  F (36.8  C)   Resp 18   Ht  "1.753 m (5' 9\")   Wt 64.5 kg (142 lb 3.2 oz)   SpO2 94%   BMI 21.00 kg/m     Neck supple without adenopathy.    Lungs clear bilaterally with fair air movement  Heart RRR s1s2  Abd soft, NT, no distention or guarding, +BS  Clear yellow urine in Sahni bag.    Ext with 1+ edema, wearing compression stockings   Neuro: some limits to history, no focal findings  Psych: affect okay, pleasant       Labs reviewed, none recent given Hospice pt      ECHO 1/29/2021   Interpretation Summary  The visual ejection fraction is estimated at 35-40%.  Left ventricular systolic function is mild to moderately reduced.  There are regional wall motion abnormalities as specified.  The regional wall motion abnormalities are similar to previous and are consistent with LAD territory ischemia and / or infarction.  The ascending aorta is Mildly dilated.    NUCLEAR MEDICINE BONE SCAN WHOLE BODY May 10, 2022  FINDINGS: No evidence of skeletal metastatic disease. Scattered  degenerative uptake in the wrists, left hip, and knees. Degenerative uptake is most prominent in the left hip.             CT ABDOMEN/PELVIS WITH CONTRAST May 10, 2022   CLINICAL HISTORY: Prostate cancer (H)  LOWER CHEST: There are innumerable pulmonary nodules in both lung  bases, increased in size and number when compared to previous. One of the larger metastases in the left lower lobe   HEPATOBILIARY: Stable 1.3 cm partially calcified nodule/cyst within the left hepatic lobe. No new liver lesions. Cholelithiasis.  KIDNEYS/BLADDER: Right renal cysts. No hydronephrosis  PELVIC ORGANS: The prostate gland is mildly enlarged and diffusely enhancing, likely due to known prostate cancer.  ADDITIONAL FINDINGS: No pelvic or retroperitoneal lymphadenopathy.  MUSCULOSKELETAL: No visualized skeletal metastases.                                                      IMPRESSION:   1.  Pulmonary metastases have increased in size and number compared to 1/26/2021.  2.  No evidence of " metastatic disease in the abdomen or pelvis.                                                   IMP/PLAN:   (R33.9) Urinary retention    Comment: continues with indwelling Sahni catheter      Plan: Routine catheter cares     (E11.59,  Z79.4) Type 2 diabetes mellitus with other circulatory complication, with long-term current use of insulin (H)   Comment: doing so much better since on med admin    Lab Results   Component Value Date    A1C 7.0 01/11/2022     Plan: no longer treated    Diet as tolerated     (K22.2) Stricture and stenosis of esophagus  (K21.00) Gastroesophageal reflux disease with esophagitis without hemorrhage  Comment: s/p dilatation x2   Plan: omeprazole 20 mg/day    Eats soft foods by preference    (I25.10) Coronary artery disease involving native coronary artery of native heart without angina pectoris  (I25.5) Ischemic cardiomyopathy  Comment: low EF as above, volume status stable today   Plan: Monitor for symptoms and treat to comfort     (C61) Prostate cancer (H)  (C78.01,  C78.02) 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)  Comment: high PSA, local disease, castrate resistant    Worsening of pulmonary mets by imaging      Plan:  per Oncology     Comfort focus      (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  Comment: intermittent pain  Degenerative joint disease seen on imaging as above   Plan: acetaminophen or MS PRN for pain      (R41.89) Cognitive impairment  (F84.0) Autism disorder  Comment: has stopped medications on occasion     Plan: LTC support for med admin, meals, activity        (F32.0) Mild major depression (H)  Comment: by hx  Plan: sertraline 50 mg/day    (M62.81) Generalized muscle weakness  (R62.7) Failure to thrive in adult  (Z51.5) Hospice care patient  Comment: acute on chronic decline, now transferred to care facility for increased support    Plan: appreciate services of [facility] in maximizing pt comfort      Cecilia Krause MD

## 2023-03-07 NOTE — PROGRESS NOTES
[facility]  Chief Complaint   Patient presents with     custodial Regulatory     [facility] Record Number:  [id number]  Place of Service where encounter took place:  [facility] ([facility]) [[id number]]    HPI:    Jose Lees  is 82 year old (1940), who is being seen today for a federally mandated E/M visit.  HPI information obtained from: facility chart records, facility staff, patient report and [facility] chart review. Today's concerns are:     Prostate cancer (H)  Urinary retention  Malignant neoplasm metastatic to lung, unspecified laterality (H)  Weight loss  Low back pain without sciatica, unspecified back pain laterality, unspecified chronicity  Hospice care patient  Hypertension, unspecified type  History of cerebrovascular accident (CVA) due to embolism  Autism disorder  Cognitive impairment  Type 2 diabetes mellitus without complication, without long-term current use of insulin (H)      ALLERGIES:Patient has no known allergies.  PAST MEDICAL HISTORY:   has a past medical history of ACP (advance care planning), Acute anterior wall MI (H) (4/7/2014), Autism disorder (6/29/2012), CHF (congestive heart failure) (H), Coronary artery disease (4/2014), Dementia (H), Hyperlipidaemia, Longstanding persistent atrial fibrillation (H) (1/20/2020), Mild major depression (H) (1/20/2020), Prostate cancer (H) (10/11), Stricture and stenosis of esophagus (10/22/2015), Stroke, embolic (H) (4/7/2014), and Type 2 diabetes mellitus without complication, without long-term current use of insulin (H) (7/25/2017).  PAST SURGICAL HISTORY:   has a past surgical history that includes Coronary Angiography Adult Order; Heart Cath, Angioplasty (4/14); Esophagoscopy, gastroscopy, duodenoscopy (EGD), combined (N/A, 8/6/2015); Phacoemulsification clear cornea with standard intraocular lens implant (Right, 12/4/2018); Phacoemulsification clear cornea with standard  intraocular lens implant (Left, 12/19/2018); and Esophagoscopy, gastroscopy, duodenoscopy (EGD), combined (N/A, 1/28/2021).  FAMILY HISTORY: family history includes C.A.D. (age of onset: 48) in his father; Cerebrovascular Disease (age of onset: 92) in his mother.  SOCIAL HISTORY:  reports that he has never smoked. He has never used smokeless tobacco. He reports that he does not drink alcohol and does not use drugs.    MEDICATIONS:  Current Outpatient Medications   Medication Sig Dispense Refill     acetaminophen (TYLENOL) 650 MG suppository Place 650 mg rectally every 6 hours as needed for fever       bisacodyl (DULCOLAX) 10 MG suppository Place 10 mg rectally daily as needed for constipation       hyoscyamine (LEVSIN/SL) 0.125 MG sublingual tablet Place 0.125 mg under the tongue every 4 hours as needed for cramping       lidocaine, Anorectal, 5 % CREA Externally apply 1 Application. topically daily as needed RECTAL PAIN OR HEMORRHOIDS       LORazepam (ATIVAN) 0.5 MG tablet Take 0.5 mg by mouth every 6 hours as needed for anxiety       morphine 2.5 MG solu-tab Take 2.5 mg by mouth every hour as needed for shortness of breath / dyspnea or moderate to severe pain       omeprazole (PRILOSEC) 20 MG DR capsule TAKE 1 CAPSULE BY MOUTH EVERY MORNING BEFORE A MEAL 28 capsule 11     pramox-pe-glycerin-petrolatum (PREPARATION H) 1-0.25-14.4-15 % CREA cream Place 1 g rectally 2 times daily TO RECTUM FOR HEMORRHOIDS       senna (SENOKOT) 8.6 MG tablet Take 1 tablet by mouth 2 times daily AND BID PRN CONSTIPATION       sertraline (ZOLOFT) 50 MG tablet TAKE ONE TABLET BY MOUTH EVERY MORNING 28 tablet 11     STIMULANT LAXATIVE 8.6-50 MG tablet TAKE ONE TABLET BY MOUTH EVERY DAY AS NEEDED FOR CONSTIPATION 30 tablet 0     witch hazel-glycerin (TUCKS) pad Apply 1 each topically as needed for hemorrhoids AFTER EACH bm             Case Management:  I have reviewed the care plan and MDS and do agree with the plan. Patient's desire to  "return to the community is not present. Information reviewed:  Medications, vital signs, orders, and nursing notes.     TODAY DURING EXAM/ROS:  No CP, SOB, Cough, dizziness, fevers, chills, HA, N/V, dysuria or Bowel Abnormalities. Appetite is fair.  No pain except low back pain      Vitals:  /68   Pulse 80   Temp 97.9  F (36.6  C)   Resp 18   Ht 1.753 m (5' 9\")   Wt 64 kg (141 lb)   SpO2 95%   BMI 20.82 kg/m    Body mass index is 20.82 kg/m .  Exam:  GENERAL APPEARANCE:  Alert, in no distress, oriented, cooperative  ENT:  Mouth and posterior oropharynx normal, moist mucous membranes, normal hearing acuity  EYES:  Conjunctiva and lids normal  RESP:  respiratory effort and palpation of chest normal, lungs clear to auscultation , no respiratory distress, diminished breath sounds    CV:  Palpation and auscultation of heart done , regular rate and rhythm, no murmur, rub, or gallop, no edema, +2 pedal pulses  ABDOMEN:  normal bowel sounds, soft, nontender, no hepatosplenomegaly or other masses  :    Examination of scrotal contents is normal, Examination of the penis is normal and tierney draining yellow urine.  genitalia examined with Hospice RN as she had felt a lump in past near back of scrotum: none present on exam/  M/S:   CODY, seen in bed  SKIN:  Inspection of skin and subcutaneous tissue baseline  NEURO:   Cranial nerves 2-12 are normal tested and grossly at patient's baseline  PSYCH:  oriented X 3, insight and judgement impaired, affect and mood normal    Lab/Diagnostic data:   None recently:  Recent Labs   Lab Test 01/11/22  1000 08/03/21  0643    139   POTASSIUM 4.4 4.2   CHLORIDE 103 101   CO2 22 29   ANIONGAP 12 9   * 101   BUN 15 16   CR 1.07 0.89   TENZIN 8.8 9.3     Hemoglobin   Date Value Ref Range Status   01/11/2022 12.5 (L) 13.3 - 17.7 g/dL Final   04/12/2021 11.7 (L) 13.3 - 17.7 g/dL Final   02/26/2021 13.3 13.3 - 17.7 g/dL Final     ASSESSMENT / PLAN:  (C61) Prostate cancer (H)  " (primary encounter diagnosis)   (R33.9) Urinary retention  Comment/Plan: tierney draining fine, see below, cont tierney cares, monitor.    (C78.00) Malignant neoplasm metastatic to lung, unspecified laterality (H)   (R63.4) Weight loss   (M54.50) Low back pain without sciatica, unspecified back pain laterality, unspecified chronicity   (Z51.5) Hospice care patient  Comment/Plan: is pretty comfortable today-- cont with current regimen, and adjust as needed. Goal is comfort.    OTHER ISSUES:  (E11.9) Type 2 diabetes mellitus without complication, without long-term current use of insulin (H)   (I10) Hypertension, unspecified type   (Z86.73) History of cerebrovascular accident (CVA) due to embolism  Comment/Plan: SBP runs 105-110's,  On no meds, goal is comfort.    (F84.0) Autism disorder   (R41.89) Cognitive impairment  Comment/Plan: At baseline, is comfortable in current LTC Hospice setting.    Electronically signed by:  SARATH Yi CNP

## 2023-03-08 PROBLEM — F84.0 AUTISM: Status: RESOLVED | Noted: 2021-02-01 | Resolved: 2023-01-01

## 2023-03-08 PROBLEM — M54.50 LOW BACK PAIN WITHOUT SCIATICA, UNSPECIFIED BACK PAIN LATERALITY, UNSPECIFIED CHRONICITY: Status: ACTIVE | Noted: 2023-01-01

## 2023-04-06 NOTE — LETTER
4/6/2023        RE: Jose Hollins  [address] [facility] [id number]        Jose Lees is a 82 year old male seen April 6, 2023 at [facility] where he has resided for 2 months (admit 2/2023) seen for regulatory visit.  Pt is seen in his room resting abed late morning.  Reports feeling okay, no current pain or other symptoms   States he is eating okay        By chart review, pt was living at [facility] and had a [facility] hospitalization in January 2021 for dysphagia, had stopped taking all of his medications.   He was found to have severe candidal esophagitis and esophageal strictures, underwent dilatation on 2 occasions.   He was started on insulin for poorly controlled DM2  He went to [facility] before he discharged to his sister's home on 2/18, then rehospitalized 2/26 for weakness and failure to thrive.  Treated with IVF; his Lantus and metoprolol doses were decreased.  He again went to [facility], then back to [facility] with services.  Pt has prostate cancer diagnosed in 2011   No metastatic disease on imaging.   He was treated with leuprolide and bicalutamide without improvement in PSA.   At some point pt stopped his medications.  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    Treated with leuprolide for a couple of years.   Seen in follow up in May 2022 and scans as below.  After discussion with pt and family, he was signed on to [facility] in June 2022      Seen by Dermatology in April 2021 and had a punch biopsy, given dx of pigmented purpuric dermatosis and treated with topical steroids.   Rash tends to flare at times.  Pt has had cognitive and physical decline over the past year, becoming less able to manage his ADLs and needing more support   In February he was so weak he could not sit up unassisted and transferred over to LTC, enrolled in [facility] at that  time   He was seen in the ED in January 2023 for rectal pain, determined to be related to a Sahni catheter malfunction and urinary retention; pain resolved when it was replaced.  He was treated with Augmentin for Enterococcus faecalis UTI.     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/2020     Prostate cancer (H) 10/11    GLEASOR 8     Stricture and stenosis of esophagus 10/22/2015     Stroke, 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     Type 2 diabetes mellitus without complication, without long-term current use of insulin (H) 7/25/2017       Past Surgical History:   Procedure Laterality Date     CORONARY ANGIOGRAPHY ADULT ORDER       ESOPHAGOSCOPY, GASTROSCOPY, DUODENOSCOPY (EGD), COMBINED N/A 8/6/2015    Procedure: COMBINED ESOPHAGOSCOPY, GASTROSCOPY, DUODENOSCOPY (EGD), REMOVE FOREIGN BODY;  Surgeon: Yu Tapia MD;  Location:  GI     ESOPHAGOSCOPY, GASTROSCOPY, DUODENOSCOPY (EGD), COMBINED N/A 1/28/2021    Procedure: ESOPHAGOGASTRODUODENOSCOPY (EGD) WITH FLUORO,Biopsy, and Dilation;  Surgeon: Audra Huang MD;  Location:  OR     HEART CATH, ANGIOPLASTY  4/14    BMS to LAD     PHACOEMULSIFICATION CLEAR CORNEA WITH STANDARD INTRAOCULAR LENS IMPLANT Right 12/4/2018    Procedure: COMPLEX RIGHT EYE PHACOEMULSIFICATION CLEAR CORNEA WITH STANDARD INTRAOCULAR LENS IMPLANT;  Surgeon: Kamar Kyle MD;  Location:  EC     PHACOEMULSIFICATION CLEAR CORNEA WITH STANDARD INTRAOCULAR LENS IMPLANT Left 12/19/2018     "Procedure: COMPLEX LEFT EYE PHACOEMULSIFICATION CLEAR CORNEA WITH STANDARD INTRAOCULAR LENS IMPLANT;  Surgeon: Kamar Kyle MD;  Location: [facility]:  Previously lived at [facility], and before that at [facility].   His sister Judith is POA   Pt worked as a   Non smoker     ROS: SLUMS 14/30   ACL 3.8     Transfers with mechanical lift, needs assist for all ADLs      Weight was 150 lbs in 2022>>> 130 lbs in January 2023   Wt Readings from Last 5 Encounters:   04/06/23 64 kg (141 lb)   03/07/23 64 kg (141 lb)   02/27/23 64.3 kg (141 lb 11.2 oz)   02/27/23 64.3 kg (141 lb 11.2 oz)   02/20/23 64.3 kg (141 lb 11.2 oz)      EXAM: frail, NAD  BP 91/66   Pulse 89   Temp 97.5  F (36.4  C)   Resp 16   Ht 1.753 m (5' 9\")   Wt 64 kg (141 lb)   SpO2 92%   BMI 20.82 kg/m     Neck supple without adenopathy.    Lungs clear bilaterally with fair air movement  Heart RRR s1s2  Abd soft, NT, no distention or guarding, +BS  Clear yellow urine in Sahni bag.    Ext with trace edema   Neuro: some limits to history, no focal findings  Psych: affect okay, pleasant  Today's exam unchanged from above        Labs reviewed, none recent given Hospice pt      ECHO 1/29/2021   Interpretation Summary  The visual ejection fraction is estimated at 35-40%.  Left ventricular systolic function is mild to moderately reduced.  There are regional wall motion abnormalities as specified.  The regional wall motion abnormalities are similar to previous and are consistent with LAD territory ischemia and / or infarction.  The ascending aorta is Mildly dilated.             CT ABDOMEN/PELVIS WITH CONTRAST May 10, 2022   CLINICAL HISTORY: Prostate cancer (H)  LOWER CHEST: There are innumerable pulmonary nodules in both lung  bases, increased in size and number when compared to previous. One of the larger metastases in the left lower lobe   HEPATOBILIARY: Stable 1.3 cm partially calcified nodule/cyst within the left " hepatic lobe. No new liver lesions. Cholelithiasis.  KIDNEYS/BLADDER: Right renal cysts. No hydronephrosis  PELVIC ORGANS: The prostate gland is mildly enlarged and diffusely enhancing, likely due to known prostate cancer.  ADDITIONAL FINDINGS: No pelvic or retroperitoneal lymphadenopathy.  MUSCULOSKELETAL: No visualized skeletal metastases.                                                      IMPRESSION:   1.  Pulmonary metastases have increased in size and number compared to 1/26/2021.  2.  No evidence of metastatic disease in the abdomen or pelvis.                                                   IMP/PLAN:   (R33.9) Urinary retention    Comment: continues with indwelling Sahni catheter      Plan: Routine catheter cares     (E11.59,  Z79.4) Type 2 diabetes mellitus with other circulatory complication, with long-term current use of insulin (H)   Comment: doing so much better since on med admin    Lab Results   Component Value Date    A1C 7.0 01/11/2022     Plan: no longer treated    Diet as tolerated     (K22.2) Stricture and stenosis of esophagus  (K21.00) Gastroesophageal reflux disease with esophagitis without hemorrhage  Comment: s/p dilatation x2   Plan: omeprazole 20 mg/day    Eats soft foods by preference    (I25.10) Coronary artery disease involving native coronary artery of native heart without angina pectoris  (I25.5) Ischemic cardiomyopathy  Comment: low EF as above, volume status stable today   Plan: Monitor for symptoms and treat to comfort     (C61) Prostate cancer (H)  (C78.01,  C78.02) 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)  Comment: high PSA, local disease, castrate resistant    Worsening of pulmonary mets by imaging       Plan: now on Hospice for comfort focus      (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  Comment: intermittent pain  Degenerative joint disease seen on imaging as above   Plan: acetaminophen or MS PRN for pain     (R53.81) Declining functional  status  (R41.89) Cognitive impairment  (F84.0) Autism disorder  Comment: has stopped medications on occasion     Plan: LTC support for med admin, meals, activityand assist with cares        (F32.0) Mild major depression (H)  Comment: by hx  Plan: sertraline 50 mg/day    (M62.81) Generalized muscle weakness  (R62.7) Failure to thrive in adult  (Z51.5) Hospice care patient  Comment: acute on chronic decline, transferred to LTC for increased support    Plan: appreciate services of [facility] in maximizing pt comfort      Cecilia Krause MD         Sincerely,        Cecilia Krause MD

## 2023-04-06 NOTE — PROGRESS NOTES
Jose Lees is a 82 year old male seen April 6, 2023 at [facility] where he has resided for 2 months (admit 2/2023) seen for regulatory visit.  Pt is seen in his room resting abed late morning.  Reports feeling okay, no current pain or other symptoms   States he is eating okay        By chart review, pt was living at [facility] and had a [facility] hospitalization in January 2021 for dysphagia, had stopped taking all of his medications.   He was found to have severe candidal esophagitis and esophageal strictures, underwent dilatation on 2 occasions.   He was started on insulin for poorly controlled DM2  He went to TCU before he discharged to his sister's home on 2/18, then rehospitalized 2/26 for weakness and failure to thrive.  Treated with IVF; his Lantus and metoprolol doses were decreased.  He again went to [facility], then back to [facility] with services.  Pt has prostate cancer diagnosed in 2011   No metastatic disease on imaging.   He was treated with leuprolide and bicalutamide without improvement in PSA.   At some point pt stopped his medications.  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    Treated with leuprolide for a couple of years.   Seen in follow up in May 2022 and scans as below.  After discussion with pt and family, he was signed on to [facility] in June 2022      Seen by Dermatology in April 2021 and had a punch biopsy, given dx of pigmented purpuric dermatosis and treated with topical steroids.   Rash tends to flare at times.  Pt has had cognitive and physical decline over the past year, becoming less able to manage his ADLs and needing more support   In February he was so weak he could not sit up unassisted and transferred over to LTC, enrolled in [facility] at that time   He was seen in the ED in January 2023 for rectal pain, determined to be related to a Sahni catheter  malfunction and urinary retention; pain resolved when it was replaced.  He was treated with Augmentin for Enterococcus faecalis UTI.     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/2020     Prostate cancer (H) 10/11    GLEASOR 8     Stricture and stenosis of esophagus 10/22/2015     Stroke, 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     Type 2 diabetes mellitus without complication, without long-term current use of insulin (H) 7/25/2017       Past Surgical History:   Procedure Laterality Date     CORONARY ANGIOGRAPHY ADULT ORDER       ESOPHAGOSCOPY, GASTROSCOPY, DUODENOSCOPY (EGD), COMBINED N/A 8/6/2015    Procedure: COMBINED ESOPHAGOSCOPY, GASTROSCOPY, DUODENOSCOPY (EGD), REMOVE FOREIGN BODY;  Surgeon: Yu Tapia MD;  Location:  GI     ESOPHAGOSCOPY, GASTROSCOPY, DUODENOSCOPY (EGD), COMBINED N/A 1/28/2021    Procedure: ESOPHAGOGASTRODUODENOSCOPY (EGD) WITH FLUORO,Biopsy, and Dilation;  Surgeon: Audra Huang MD;  Location:  OR     HEART CATH, ANGIOPLASTY  4/14    BMS to LAD     PHACOEMULSIFICATION CLEAR CORNEA WITH STANDARD INTRAOCULAR LENS IMPLANT Right 12/4/2018    Procedure: COMPLEX RIGHT EYE PHACOEMULSIFICATION CLEAR CORNEA WITH STANDARD INTRAOCULAR LENS IMPLANT;  Surgeon: Kamar Kyle MD;  Location:  EC     PHACOEMULSIFICATION CLEAR CORNEA WITH STANDARD INTRAOCULAR LENS IMPLANT Left 12/19/2018    Procedure: COMPLEX LEFT EYE PHACOEMULSIFICATION CLEAR CORNEA WITH STANDARD INTRAOCULAR LENS IMPLANT;  Surgeon:  "Kamar Kyle MD;  Location: [facility]:  [facility]   His sister Judith is POA   Pt worked as a   Non smoker     ROS: SLUMS 14/30   ACL 3.8     Transfers with mechanical lift, needs assist for all ADLs      Weight was 150 lbs in 2022>>> 130 lbs in January 2023   Wt Readings from Last 5 Encounters:   04/06/23 64 kg (141 lb)   03/07/23 64 kg (141 lb)   02/27/23 64.3 kg (141 lb 11.2 oz)   02/27/23 64.3 kg (141 lb 11.2 oz)   02/20/23 64.3 kg (141 lb 11.2 oz)      EXAM: frail, NAD  BP 91/66   Pulse 89   Temp 97.5  F (36.4  C)   Resp 16   Ht 1.753 m (5' 9\")   Wt 64 kg (141 lb)   SpO2 92%   BMI 20.82 kg/m     Neck supple without adenopathy.    Lungs clear bilaterally with fair air movement  Heart RRR s1s2  Abd soft, NT, no distention or guarding, +BS  Clear yellow urine in Sahni bag.    Ext with trace edema   Neuro: some limits to history, no focal findings  Psych: affect okay, pleasant  Today's exam unchanged from above        Labs reviewed, none recent given Hospice pt      ECHO 1/29/2021   Interpretation Summary  The visual ejection fraction is estimated at 35-40%.  Left ventricular systolic function is mild to moderately reduced.  There are regional wall motion abnormalities as specified.  The regional wall motion abnormalities are similar to previous and are consistent with LAD territory ischemia and / or infarction.  The ascending aorta is Mildly dilated.             CT ABDOMEN/PELVIS WITH CONTRAST May 10, 2022   CLINICAL HISTORY: Prostate cancer (H)  LOWER CHEST: There are innumerable pulmonary nodules in both lung  bases, increased in size and number when compared to previous. One of the larger metastases in the left lower lobe   HEPATOBILIARY: Stable 1.3 cm partially calcified nodule/cyst within the left hepatic lobe. No new liver lesions. Cholelithiasis.  KIDNEYS/BLADDER: Right renal cysts. No " hydronephrosis  PELVIC ORGANS: The prostate gland is mildly enlarged and diffusely enhancing, likely due to known prostate cancer.  ADDITIONAL FINDINGS: No pelvic or retroperitoneal lymphadenopathy.  MUSCULOSKELETAL: No visualized skeletal metastases.                                                      IMPRESSION:   1.  Pulmonary metastases have increased in size and number compared to 1/26/2021.  2.  No evidence of metastatic disease in the abdomen or pelvis.                                                   IMP/PLAN:   (R33.9) Urinary retention    Comment: continues with indwelling Sahni catheter      Plan: Routine catheter cares     (E11.59,  Z79.4) Type 2 diabetes mellitus with other circulatory complication, with long-term current use of insulin (H)   Comment: doing so much better since on med admin    Lab Results   Component Value Date    A1C 7.0 01/11/2022     Plan: no longer treated    Diet as tolerated     (K22.2) Stricture and stenosis of esophagus  (K21.00) Gastroesophageal reflux disease with esophagitis without hemorrhage  Comment: s/p dilatation x2   Plan: omeprazole 20 mg/day    Eats soft foods by preference    (I25.10) Coronary artery disease involving native coronary artery of native heart without angina pectoris  (I25.5) Ischemic cardiomyopathy  Comment: low EF as above, volume status stable today   Plan: Monitor for symptoms and treat to comfort     (C61) Prostate cancer (H)  (C78.01,  C78.02) 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)  Comment: high PSA, local disease, castrate resistant    Worsening of pulmonary mets by imaging       Plan: now on Hospice for comfort focus      (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  Comment: intermittent pain  Degenerative joint disease seen on imaging as above   Plan: acetaminophen or MS PRN for pain     (R53.81) Declining functional status  (R41.89) Cognitive impairment  (F84.0) Autism disorder  Comment: has stopped medications on  occasion     Plan: LTC support for med admin, meals, activityand assist with cares        (F32.0) Mild major depression (H)  Comment: by hx  Plan: sertraline 50 mg/day    (M62.81) Generalized muscle weakness  (R62.7) Failure to thrive in adult  (Z51.5) Hospice care patient  Comment: acute on chronic decline, transferred to LTC for increased support    Plan: appreciate services of [facility] in maximizing pt comfort      Cecilia Krause MD

## 2023-05-01 NOTE — TELEPHONE ENCOUNTER
[facility] NON-EMERGENT  ENCOUNTER    Jose Lees is a 82 year old  (1940), phone call received today regarding:     Disposition    Facility calling to report discontinuation of oral medication by hospice. Pt has been refusing all oral meds for the past week due to difficulty swallowing. PCP updated.      Electronically signed by:   Maris Graham RN

## 2023-05-02 NOTE — LETTER
"    5/2/2023        RE: Jose Lees  [address]  [address] [address]        [facility]    Chief Complaint   Patient presents with     Nursing Home Acute     HPI:  Jose Lees is a 82 year old  (1940), who is being seen today for an episodic care visit at: [facility] ([facility]) [[id number]].     Patient seen today for recheck of multiple chronic medical issues:    1. Prostate cancer (H)    2. Malignant neoplasm metastatic to lung, unspecified laterality (H)    3. Ischemic cardiomyopathy    4. Failure to thrive in adult    5. Autism disorder    6. Cognitive impairment    7. Hospice care patient      Nursing just repositioned patient in bed for comfort. No new concerns reported.     Patient found lying in bed. Alert, calm. Soft voice. Reports is comfortable \"now\" and then denies concerns and politely requests to be alone.    Allergies, and PMH/PSH reviewed in [facility] today.  REVIEW OF SYSTEMS:  As above    Objective:   BP 91/66   Pulse 89   Temp 97.5  F (36.4  C)   Resp 16   Ht 1.753 m (5' 9\")   Wt 55.1 kg (121 lb 8 oz)   SpO2 92%   BMI 17.94 kg/m    General: Frail, poor health  Resp: Effort WNL  CV: VS as above  Abd- soft, nontender, BS +  Musc- very weak  Psych- alert, calm, pleasant          Assessment/Plan:     Prostate cancer (H)  Malignant neoplasm metastatic to lung, unspecified laterality (H)  Ischemic cardiomyopathy  Failure to thrive in adult  Autism disorder  Cognitive impairment  Hospice care patient     - appears comfortable currently. F/b hospice. Hospice pharmacotherapy kit in place for comfort.     Per nursing hospice here and just increased MS to 5 mg q 1 hour prn from 2.5 mg q 1 hour prn for SOB/pain.    Continue supportive cares and treatments, hospice.       Electronically signed by: SARATH Santana CNP           Sincerely,        SARATH Santana CNP      "

## 2023-05-02 NOTE — PROGRESS NOTES
"[facility]IATRICS    Chief Complaint   Patient presents with     Nursing Home Acute     HPI:  Jose Lees is a 82 year old  (1940), who is being seen today for an episodic care visit at: [facility] ([facility]) [[id number]].     Patient seen today for recheck of multiple chronic medical issues:    1. Prostate cancer (H)    2. Malignant neoplasm metastatic to lung, unspecified laterality (H)    3. Ischemic cardiomyopathy    4. Failure to thrive in adult    5. Autism disorder    6. Cognitive impairment    7. Hospice care patient      Nursing just repositioned patient in bed for comfort. No new concerns reported.     Patient found lying in bed. Alert, calm. Soft voice. Reports is comfortable \"now\" and then denies concerns and politely requests to be alone.    Allergies, and PMH/PSH reviewed in [facility] today.  REVIEW OF SYSTEMS:  As above    Objective:   BP 91/66   Pulse 89   Temp 97.5  F (36.4  C)   Resp 16   Ht 1.753 m (5' 9\")   Wt 55.1 kg (121 lb 8 oz)   SpO2 92%   BMI 17.94 kg/m    General: Frail, poor health  Resp: Effort WNL  CV: VS as above  Abd- soft, nontender, BS +  Musc- very weak  Psych- alert, calm, pleasant          Assessment/Plan:     Prostate cancer (H)  Malignant neoplasm metastatic to lung, unspecified laterality (H)  Ischemic cardiomyopathy  Failure to thrive in adult  Autism disorder  Cognitive impairment  Hospice care patient     - appears comfortable currently. F/b hospice. Hospice pharmacotherapy kit in place for comfort.     Per nursing hospice here and just increased MS to 5 mg q 1 hour prn from 2.5 mg q 1 hour prn for SOB/pain.    Continue supportive cares and treatments, hospice.       Electronically signed by: SARATH Santana CNP     "

## 2023-05-08 NOTE — PROGRESS NOTES
"Chief Complaint: hospice follow up--pt is declining per staff    HPI:    Jose Lees is a 82 year old  (1940), who is being seen today for an episodic care visit at [facility] home LTC patient on [place] unit. Today's concern is: itching with higher MSO4 but comfortable     Prostate cancer (H)  Malignant neoplasm metastatic to lung, unspecified laterality (H)  Ischemic cardiomyopathy  Failure to thrive in adult  Autism disorder  Cognitive impairment  Hospice care patient      BP 91/60   Pulse 101   Temp 97.5  F (36.4  C)   Resp 18   Ht 1.753 m (5' 9\")   Wt 54.9 kg (121 lb)   SpO2 92%   BMI 17.87 kg/m    TODAY'S EXAM:  SUBJECTIVE and OBJECTIVE DATA:   Non verbal, eyes closed, no response but appears to be comfortable. Lungs decreased but mostly clear. Warm extremities, no edema, IRRR. Pale skin.      Current Outpatient Medications   Medication Sig Dispense Refill     haloperidol (HALDOL) 0.5 MG tablet Take 0.5 mg by mouth every 4 hours as needed for agitation       hydrOXYzine (VISTARIL) 50 MG capsule Take 50 mg by mouth every 6 hours as needed for itching       acetaminophen (TYLENOL) 650 MG suppository Place 650 mg rectally every 6 hours as needed for fever       bisacodyl (DULCOLAX) 10 MG suppository Place 10 mg rectally daily as needed for constipation       hyoscyamine (LEVSIN/SL) 0.125 MG sublingual tablet Place 0.125 mg under the tongue every 4 hours as needed for cramping       lidocaine, Anorectal, 5 % CREA Externally apply 1 Application. topically daily as needed RECTAL PAIN OR HEMORRHOIDS       LORazepam (ATIVAN) 0.5 MG tablet Take 0.5 mg by mouth every 4 hours as needed for anxiety And 0.5 mg q 6 hours scheduled       morphine 2.5 MG solu-tab Take 5 mg by mouth every hour as needed for shortness of breath or moderate to severe pain And 5 mg po q 6 hours       senna-docusate (SENOKOT-S/PERICOLACE) 8.6-50 MG tablet Take 1 tablet by mouth 2 times daily       traZODone (DESYREL) 50 MG tablet " Take 1 tablet (50 mg) by mouth At Bedtime       witch hazel-glycerin (TUCKS) pad Apply 1 each topically as needed for hemorrhoids AFTER EACH bm       ASSESSMENT / PLAN:  (C61) Prostate cancer (H)  (primary encounter diagnosis)   (C78.00) Malignant neoplasm metastatic to lung, unspecified laterality (H)   (I25.5) Ischemic cardiomyopathy   (R62.7) Failure to thrive in adult  (F84.0) Autism disorder   (R41.89) Cognitive impairment  Comment/Plan: off most meds,except comfort. Declining quickly.  Vistaril helps for itching with higher doses of MSO4--no changes as is comfortable on current regimen.    (Z51.5) Hospice care patient  Comment/Plan: appreciate hospice care. Cont same, monitor.      Electronically Signed by:  SARATH Yi CNP

## 2023-05-25 NOTE — TELEPHONE ENCOUNTER
Talked with Judith (sister) about Deshawn's INR. She is waiting for a acll back from [facility]   No

## 2024-04-20 NOTE — MR AVS SNAPSHOT
After Visit Summary   1/25/2017    Jose Lees    MRN: [id number]           Patient Information     Date Of Birth          1940        Visit Information        Provider Department      1/25/2017 2:00 PM Jennifer Gonzalez RPH [facility]        Care Instructions    Recommendations from today's MTM visit:                                                    MTM (medication therapy management) is a service provided by a clinical pharmacist designed to help you get the most of out of your medicines.   Today we reviewed what your medicines are for, how to know if they are working, that your medicines are safe and how to make your medicine regimen as easy as possible.     1. Checking INR and A1c today.     Next MTM visit: 6 months or sooner if needed.     To schedule another MTM appointment, please call the clinic directly or you may call the MTM scheduling line at [phone] or toll-free at [phone].     My Clinical Pharmacist's contact information:                                                      It was a pleasure seeing you today!  Please feel free to contact me with any questions or concerns you have.      Jennifer Gonzalez, Pharm.D, [facility]  Medication Therapy Management Pharmacist  [phone]    You may receive a survey about the MTM services you received.  I would appreciate your feedback to help me serve you better in the future. Please fill it out and return it when you can. Your comments will be anonymous.                  Follow-ups after your visit        Your next 10 appointments already scheduled     Jan 25, 2017  2:30 PM   LAB with  LAB   [facility] ([facility])    [address] [place] [address] [phone]           Patient must bring picture ID.  Patient should be prepared to give a urine specimen  Please do not eat 10-12 hours before your appointment if you are coming in fasting for labs on lipids,  "cholesterol, or glucose (sugar).  Pregnant women should follow their Care Team instructions. Water with medications is okay. Do not drink coffee or other fluids.   If you have concerns about taking  your medications, please ask at office or if scheduling via [employer], send a message by clicking on Secure Messaging, Message Your Care Team.              Who to contact     If you have questions or need follow up information about today's clinic visit or your schedule please contact [facility] directly at [phone].  Normal or non-critical lab and imaging results will be communicated to you by [employer], letter or phone within 4 business days after the clinic has received the results. If you do not hear from us within 7 days, please contact the clinic through [employer] or phone. If you have a critical or abnormal lab result, we will notify you by phone as soon as possible.  Submit refill requests through [employer] or call your pharmacy and they will forward the refill request to us. Please allow 3 business days for your refill to be completed.          Additional Information About Your Visit        [employer] Information     [employer] lets you send messages to your doctor, view your test results, renew your prescriptions, schedule appointments and more. To sign up, go to [email or web address] . Click on \"Log in\" on the left side of the screen, which will take you to the Welcome page. Then click on \"Sign up Now\" on the right side of the page.     You will be asked to enter the access code listed below, as well as some personal information. Please follow the directions to create your username and password.     Your access code is: [email or web address]  Expires: 2017  2:46 PM     Your access code will  in 90 days. If you need help or a new code, please call your [facility] clinic or [phone].        Care EveryWhere ID     This is your Care EveryWhere ID. This could be used by other organizations to access " your [place] medical records  [id number]         Blood Pressure from Last 3 Encounters:   06/23/16 120/80   05/24/16 116/60   04/15/16 110/58    Weight from Last 3 Encounters:   05/24/16 190 lb (86.183 kg)   02/18/16 187 lb (84.823 kg)   02/03/16 190 lb 6.4 oz (86.365 kg)              Today, you had the following     No orders found for display       Primary Care Provider Office Phone # Fax #    Jermain Helm [phone]       [facility] [address]  [facility] [address]        Thank you!     Thank you for choosing [facility]  for your care. Our goal is always to provide you with excellent care. Hearing back from our patients is one way we can continue to improve our services. Please take a few minutes to complete the written survey that you may receive in the mail after your visit with us. Thank you!             Your Updated Medication List - Protect others around you: Learn how to safely use, store and throw away your medicines at www.disposemymeds.org.          This list is accurate as of: 1/25/17  2:18 PM.  Always use your most recent med list.                   Brand Name Dispense Instructions for use    ACE/ARB NOT PRESCRIBED (INTENTIONAL)      ACE & ARB not prescribed due to Symptomatic hypotension not due to excessive diuresis       aspirin 81 MG chewable tablet     36 tablet    Take 1 tablet (81 mg) by mouth daily       atorvastatin 20 MG tablet    LIPITOR    90 tablet    TAKE 1 TABLET EVERY DAY       bicalutamide 50 MG tablet    CASODEX    90 tablet    TAKE 1 TABLET EVERY EVENING       Blood Glucose Monitoring Suppl W/DEVICE Kit     1 kit    Test blood glucose every other day       D3-1000 PO      Take 1 capsule by mouth daily       ENSURE PO      Take 1 Can by mouth daily.       glimepiride 1 MG tablet    AMARYL    90 tablet    TAKE 1 TABLET (1 MG) BY MOUTH EVERY MORNING (BEFORE BREAKFAST)       metFORMIN 500 MG tablet    GLUCOPHAGE    360  tablet    Take 2 tablets (1,000 mg) by mouth 2 times daily (with meals)       metoprolol 25 MG 24 hr tablet    TOPROL-XL    90 tablet    TAKE 1 TABLET EVERY DAY       multivitamin, therapeutic Tabs tablet      Take 1 tablet by mouth daily       sertraline 50 MG tablet    ZOLOFT    90 tablet    TAKE 1 TABLET EVERY DAY       * warfarin 4 MG tablet    COUMADIN    180 tablet    4 mg every day except tuesday       * warfarin 2 MG tablet    COUMADIN    90 tablet    Take 2 mg on Tuesdays only       * Notice:  This list has 2 medication(s) that are the same as other medications prescribed for you. Read the directions carefully, and ask your doctor or other care provider to review them with you.       [phone]

## (undated) DEVICE — GLOVE PROTEXIS MICRO 6.5  2D73PM65

## (undated) DEVICE — EYE PACK BVI READYPAK KIT #1

## (undated) DEVICE — PACK CATARACT CUSTOM SO DALE SEY32CTFCX

## (undated) DEVICE — EYE SHIELD PLASTIC

## (undated) DEVICE — GLOVE PROTEXIS MICRO 7.0  2D73PM70

## (undated) DEVICE — LINEN TOWEL PACK X5 5464

## (undated) DEVICE — EYE KNIFE SLIT XSTAR VISITEC 2.5MM 45DEG BEVEL UP 373725

## (undated) DEVICE — GLOVE PROTEXIS MICRO 8.0  2D73PM80

## (undated) DEVICE — EYE CANN IRR 25GA HYDRODISSECTING 585037

## (undated) DEVICE — EYE SOL BSS 500ML BAG 0065-1795-04

## (undated) DEVICE — EYE PACK CUSTOM ANTERIOR 30DEG TIP CENTURION PPK6682-04

## (undated) DEVICE — Device

## (undated) DEVICE — EYE RING MALYUGIN PUPIL EXPANDER 7.0MM MAL-000-2

## (undated) DEVICE — EYE SOL BSS 15ML BOTTLE 65079515

## (undated) DEVICE — EYE RING MALYUGIN PUPIL EXPANDER 6.25MM MAL-000-1

## (undated) RX ORDER — ACETAMINOPHEN 325 MG/1
TABLET ORAL
Status: DISPENSED
Start: 2018-12-04

## (undated) RX ORDER — FENTANYL CITRATE 50 UG/ML
INJECTION, SOLUTION INTRAMUSCULAR; INTRAVENOUS
Status: DISPENSED
Start: 2018-12-04

## (undated) RX ORDER — PROPOFOL 10 MG/ML
INJECTION, EMULSION INTRAVENOUS
Status: DISPENSED
Start: 2018-12-19

## (undated) RX ORDER — EPINEPHRINE 1 MG/ML
INJECTION, SOLUTION, CONCENTRATE INTRAVENOUS
Status: DISPENSED
Start: 2018-12-04

## (undated) RX ORDER — LIDOCAINE HYDROCHLORIDE 10 MG/ML
INJECTION, SOLUTION EPIDURAL; INFILTRATION; INTRACAUDAL; PERINEURAL
Status: DISPENSED
Start: 2018-12-19

## (undated) RX ORDER — GLYCOPYRROLATE 0.2 MG/ML
INJECTION, SOLUTION INTRAMUSCULAR; INTRAVENOUS
Status: DISPENSED
Start: 2018-12-04

## (undated) RX ORDER — TETRACAINE HYDROCHLORIDE 5 MG/ML
SOLUTION OPHTHALMIC
Status: DISPENSED
Start: 2018-12-04

## (undated) RX ORDER — TETRACAINE HYDROCHLORIDE 5 MG/ML
SOLUTION OPHTHALMIC
Status: DISPENSED
Start: 2018-12-19

## (undated) RX ORDER — FENTANYL CITRATE 50 UG/ML
INJECTION, SOLUTION INTRAMUSCULAR; INTRAVENOUS
Status: DISPENSED
Start: 2018-12-19

## (undated) RX ORDER — NEOSTIGMINE METHYLSULFATE 1 MG/ML
VIAL (ML) INJECTION
Status: DISPENSED
Start: 2018-12-04

## (undated) RX ORDER — EPINEPHRINE 1 MG/ML
INJECTION, SOLUTION, CONCENTRATE INTRAVENOUS
Status: DISPENSED
Start: 2018-12-19

## (undated) RX ORDER — FENTANYL CITRATE 50 UG/ML
INJECTION, SOLUTION INTRAMUSCULAR; INTRAVENOUS
Status: DISPENSED
Start: 2021-01-28

## (undated) RX ORDER — PROPOFOL 10 MG/ML
INJECTION, EMULSION INTRAVENOUS
Status: DISPENSED
Start: 2021-01-28

## (undated) RX ORDER — LIDOCAINE HYDROCHLORIDE 10 MG/ML
INJECTION, SOLUTION EPIDURAL; INFILTRATION; INTRACAUDAL; PERINEURAL
Status: DISPENSED
Start: 2018-12-04